# Patient Record
Sex: FEMALE | Race: WHITE | NOT HISPANIC OR LATINO | Employment: OTHER | URBAN - NONMETROPOLITAN AREA
[De-identification: names, ages, dates, MRNs, and addresses within clinical notes are randomized per-mention and may not be internally consistent; named-entity substitution may affect disease eponyms.]

---

## 2017-06-20 ENCOUNTER — TRANSCRIBE ORDERS (OUTPATIENT)
Dept: ADMINISTRATIVE | Facility: HOSPITAL | Age: 77
End: 2017-06-20

## 2017-06-20 DIAGNOSIS — M54.16 LUMBAR RADICULOPATHY: ICD-10-CM

## 2017-06-20 DIAGNOSIS — M54.5 LOW BACK PAIN, UNSPECIFIED BACK PAIN LATERALITY, UNSPECIFIED CHRONICITY, WITH SCIATICA PRESENCE UNSPECIFIED: Primary | ICD-10-CM

## 2017-06-27 ENCOUNTER — HOSPITAL ENCOUNTER (OUTPATIENT)
Dept: CT IMAGING | Facility: HOSPITAL | Age: 77
Discharge: HOME | End: 2017-06-27
Admitting: PHYSICIAN ASSISTANT

## 2017-06-27 DIAGNOSIS — M54.5 LOW BACK PAIN, UNSPECIFIED BACK PAIN LATERALITY, UNSPECIFIED CHRONICITY, WITH SCIATICA PRESENCE UNSPECIFIED: ICD-10-CM

## 2017-06-27 DIAGNOSIS — M54.16 LUMBAR RADICULOPATHY: ICD-10-CM

## 2017-06-27 PROCEDURE — 72131 CT LUMBAR SPINE W/O DYE: CPT

## 2017-09-15 ENCOUNTER — HOSPITAL ENCOUNTER (EMERGENCY)
Facility: HOSPITAL | Age: 77
Discharge: HOME | End: 2017-09-16
Attending: EMERGENCY MEDICINE | Admitting: EMERGENCY MEDICINE

## 2017-09-15 ENCOUNTER — APPOINTMENT (OUTPATIENT)
Dept: CT IMAGING | Facility: HOSPITAL | Age: 77
End: 2017-09-15

## 2017-09-15 DIAGNOSIS — R10.84 GENERALIZED ABDOMINAL PAIN: Primary | ICD-10-CM

## 2017-09-15 LAB
ALBUMIN SERPL-MCNC: 3.7 G/DL (ref 3.5–5)
ALBUMIN/GLOB SERPL: 1 G/DL (ref 1.1–2.5)
ALP SERPL-CCNC: 185 U/L (ref 24–120)
ALT SERPL W P-5'-P-CCNC: 28 U/L (ref 0–54)
ANION GAP SERPL CALCULATED.3IONS-SCNC: 10 MMOL/L (ref 4–13)
AST SERPL-CCNC: 40 U/L (ref 7–45)
BACTERIA UR QL AUTO: ABNORMAL /HPF
BASOPHILS # BLD AUTO: 0.04 10*3/MM3 (ref 0–0.2)
BASOPHILS NFR BLD AUTO: 0.5 % (ref 0–2)
BILIRUB SERPL-MCNC: 0.7 MG/DL (ref 0.1–1)
BILIRUB UR QL STRIP: ABNORMAL
BUN BLD-MCNC: 22 MG/DL (ref 5–21)
BUN/CREAT SERPL: 7.1 (ref 7–25)
CALCIUM SPEC-SCNC: 9 MG/DL (ref 8.4–10.4)
CHLORIDE SERPL-SCNC: 99 MMOL/L (ref 98–110)
CLARITY UR: CLEAR
CO2 SERPL-SCNC: 31 MMOL/L (ref 24–31)
COLOR UR: YELLOW
CREAT BLD-MCNC: 3.1 MG/DL (ref 0.5–1.4)
DEPRECATED RDW RBC AUTO: 54 FL (ref 40–54)
EOSINOPHIL # BLD AUTO: 0.21 10*3/MM3 (ref 0–0.7)
EOSINOPHIL NFR BLD AUTO: 2.7 % (ref 0–4)
ERYTHROCYTE [DISTWIDTH] IN BLOOD BY AUTOMATED COUNT: 14 % (ref 12–15)
GFR SERPL CREATININE-BSD FRML MDRD: 15 ML/MIN/1.73
GLOBULIN UR ELPH-MCNC: 3.6 GM/DL
GLUCOSE BLD-MCNC: 122 MG/DL (ref 70–100)
GLUCOSE UR STRIP-MCNC: NEGATIVE MG/DL
HCT VFR BLD AUTO: 36.5 % (ref 37–47)
HGB BLD-MCNC: 11.5 G/DL (ref 12–16)
HGB UR QL STRIP.AUTO: ABNORMAL
HOLD SPECIMEN: NORMAL
HOLD SPECIMEN: NORMAL
HYALINE CASTS UR QL AUTO: ABNORMAL /LPF
IMM GRANULOCYTES # BLD: 0.02 10*3/MM3 (ref 0–0.03)
IMM GRANULOCYTES NFR BLD: 0.3 % (ref 0–5)
KETONES UR QL STRIP: ABNORMAL
LARGE PLATELETS: NORMAL
LEUKOCYTE ESTERASE UR QL STRIP.AUTO: ABNORMAL
LIPASE SERPL-CCNC: 28 U/L (ref 23–203)
LYMPHOCYTES # BLD AUTO: 1.09 10*3/MM3 (ref 0.72–4.86)
LYMPHOCYTES NFR BLD AUTO: 13.9 % (ref 15–45)
MACROCYTES BLD QL SMEAR: NORMAL
MCH RBC QN AUTO: 33.4 PG (ref 28–32)
MCHC RBC AUTO-ENTMCNC: 31.5 G/DL (ref 33–36)
MCV RBC AUTO: 106.1 FL (ref 82–98)
MONOCYTES # BLD AUTO: 0.74 10*3/MM3 (ref 0.19–1.3)
MONOCYTES NFR BLD AUTO: 9.4 % (ref 4–12)
NEUTROPHILS # BLD AUTO: 5.74 10*3/MM3 (ref 1.87–8.4)
NEUTROPHILS NFR BLD AUTO: 73.2 % (ref 39–78)
NITRITE UR QL STRIP: NEGATIVE
PH UR STRIP.AUTO: 5.5 [PH] (ref 5–8)
PLATELET # BLD AUTO: 132 10*3/MM3 (ref 130–400)
PMV BLD AUTO: 11.4 FL (ref 6–12)
POTASSIUM BLD-SCNC: 3.9 MMOL/L (ref 3.5–5.3)
PROT SERPL-MCNC: 7.3 G/DL (ref 6.3–8.7)
PROT UR QL STRIP: ABNORMAL
RBC # BLD AUTO: 3.44 10*6/MM3 (ref 4.2–5.4)
RBC # UR: ABNORMAL /HPF
REF LAB TEST METHOD: ABNORMAL
SODIUM BLD-SCNC: 140 MMOL/L (ref 135–145)
SP GR UR STRIP: >=1.03 (ref 1–1.03)
SQUAMOUS #/AREA URNS HPF: ABNORMAL /HPF
UROBILINOGEN UR QL STRIP: ABNORMAL
WBC MORPH BLD: NORMAL
WBC NRBC COR # BLD: 7.84 10*3/MM3 (ref 4.8–10.8)
WBC UR QL AUTO: ABNORMAL /HPF
WHOLE BLOOD HOLD SPECIMEN: NORMAL
WHOLE BLOOD HOLD SPECIMEN: NORMAL

## 2017-09-15 PROCEDURE — 87086 URINE CULTURE/COLONY COUNT: CPT | Performed by: EMERGENCY MEDICINE

## 2017-09-15 PROCEDURE — 83690 ASSAY OF LIPASE: CPT | Performed by: EMERGENCY MEDICINE

## 2017-09-15 PROCEDURE — 80053 COMPREHEN METABOLIC PANEL: CPT | Performed by: EMERGENCY MEDICINE

## 2017-09-15 PROCEDURE — 85007 BL SMEAR W/DIFF WBC COUNT: CPT | Performed by: EMERGENCY MEDICINE

## 2017-09-15 PROCEDURE — 85025 COMPLETE CBC W/AUTO DIFF WBC: CPT | Performed by: EMERGENCY MEDICINE

## 2017-09-15 PROCEDURE — 74176 CT ABD & PELVIS W/O CONTRAST: CPT

## 2017-09-15 PROCEDURE — 81001 URINALYSIS AUTO W/SCOPE: CPT | Performed by: EMERGENCY MEDICINE

## 2017-09-15 PROCEDURE — 99284 EMERGENCY DEPT VISIT MOD MDM: CPT

## 2017-09-15 RX ORDER — PRAVASTATIN SODIUM 40 MG
40 TABLET ORAL DAILY
COMMUNITY

## 2017-09-15 RX ORDER — DIPHENHYDRAMINE HCL 25 MG
25 CAPSULE ORAL 2 TIMES DAILY
Status: ON HOLD | COMMUNITY
End: 2019-02-11 | Stop reason: SDUPTHER

## 2017-09-15 RX ORDER — OXYCODONE AND ACETAMINOPHEN 10; 325 MG/1; MG/1
1 TABLET ORAL EVERY 8 HOURS PRN
Status: ON HOLD | COMMUNITY
End: 2017-11-04

## 2017-09-15 RX ORDER — OMEPRAZOLE 20 MG/1
20 CAPSULE, DELAYED RELEASE ORAL DAILY
COMMUNITY
End: 2018-12-04

## 2017-09-15 RX ORDER — LEVOTHYROXINE SODIUM 112 UG/1
112 TABLET ORAL DAILY
COMMUNITY
End: 2019-07-06 | Stop reason: HOSPADM

## 2017-09-15 RX ORDER — CARVEDILOL 12.5 MG/1
12.5 TABLET ORAL SEE ADMIN INSTRUCTIONS
COMMUNITY
End: 2019-02-11 | Stop reason: HOSPADM

## 2017-09-15 RX ORDER — LOSARTAN POTASSIUM 25 MG/1
25 TABLET ORAL NIGHTLY
COMMUNITY
End: 2018-07-24

## 2017-09-15 RX ORDER — ONDANSETRON 4 MG/1
4 TABLET, ORALLY DISINTEGRATING ORAL EVERY 8 HOURS PRN
COMMUNITY
End: 2020-03-24

## 2017-09-16 VITALS
SYSTOLIC BLOOD PRESSURE: 139 MMHG | OXYGEN SATURATION: 94 % | WEIGHT: 165 LBS | DIASTOLIC BLOOD PRESSURE: 72 MMHG | HEIGHT: 61 IN | RESPIRATION RATE: 18 BRPM | HEART RATE: 92 BPM | TEMPERATURE: 97.8 F | BODY MASS INDEX: 31.15 KG/M2

## 2017-09-16 NOTE — ED PROVIDER NOTES
Subjective   HPI Comments: Patient complaining of generalized abdominal pain.  Has been an ongoing issue for the past several weeks.  She states that is the past day the pain has worsened.  She reports that it is sharp left lower quadrant and similar to the pain that she had when she had diverticulitis.  Patient has not had any nausea or vomiting associated with the abdominal pain.  She has not had any fever.  Patient states that she has been hurting so bad that she has not been able to eat.  Prior to arrival patient took a Percocet which she states has essentially resolved her pain this evening.      History provided by:  Patient      Review of Systems   Constitutional: Negative for activity change, fatigue and fever.   HENT: Negative for congestion, ear pain, facial swelling, postnasal drip, rhinorrhea, sinus pressure, sore throat and trouble swallowing.    Eyes: Negative for photophobia and redness.   Respiratory: Negative for chest tightness, shortness of breath and wheezing.    Cardiovascular: Negative for chest pain and palpitations.   Gastrointestinal: Positive for abdominal distention. Negative for abdominal pain, diarrhea, nausea and vomiting.   Genitourinary: Negative for difficulty urinating, dysuria and flank pain.   Musculoskeletal: Negative for back pain and neck pain.   Skin: Negative for color change and wound.   Neurological: Negative for dizziness, speech difficulty, weakness, light-headedness and headaches.   Psychiatric/Behavioral: Negative for agitation, confusion, self-injury and suicidal ideas.       Past Medical History:   Diagnosis Date   • Arthritis    • CHF (congestive heart failure)    • Coronary artery disease    • Disease of thyroid gland    • Diverticulitis    • History of transfusion    • Hypertension    • Injury of back    • Pancreatitis    • Pneumonia    • Renal disorder     dialysis        No Known Allergies    Past Surgical History:   Procedure Laterality Date   • APPENDECTOMY      • BACK SURGERY     • CARDIAC SURGERY     • CAROTID ENDARTERECTOMY Bilateral    • CHOLECYSTECTOMY     • COLON SURGERY     • JOINT REPLACEMENT      knee   • LAPAROSCOPIC GASTRIC BANDING     • TUBAL ABDOMINAL LIGATION         History reviewed. No pertinent family history.    Social History     Social History   • Marital status:      Spouse name: N/A   • Number of children: N/A   • Years of education: N/A     Social History Main Topics   • Smoking status: Never Smoker   • Smokeless tobacco: None   • Alcohol use No   • Drug use: No   • Sexual activity: Not Asked     Other Topics Concern   • None     Social History Narrative   • None           Objective   Physical Exam   Constitutional: She is oriented to person, place, and time. She appears well-developed and well-nourished.   HENT:   Head: Normocephalic and atraumatic.   Nose: Nose normal.   Mouth/Throat: Oropharynx is clear and moist.   Eyes: Conjunctivae and EOM are normal. Pupils are equal, round, and reactive to light.   Neck: Normal range of motion. Neck supple.   Cardiovascular: Normal rate, regular rhythm and normal heart sounds.    No murmur heard.  Pulmonary/Chest: Effort normal and breath sounds normal.   Abdominal: Soft. Normal appearance and bowel sounds are normal. She exhibits no distension. There is tenderness in the left lower quadrant. There is no rigidity, no rebound and no guarding.   Musculoskeletal: Normal range of motion.   Neurological: She is alert and oriented to person, place, and time. No cranial nerve deficit.   Skin: Skin is warm and dry.   Psychiatric: She has a normal mood and affect. Her behavior is normal. Judgment and thought content normal.   Nursing note and vitals reviewed.      Procedures         ED Course  ED Course      Lab Results (last 24 hours)     Procedure Component Value Units Date/Time    Urinalysis With / Culture If Indicated [[id number]]  (Abnormal) Collected:  09/15/17 2038    Specimen:  Urine from Urine, Clean  Catch Updated:  09/15/17 2117     Color, UA Yellow     Appearance, UA Clear     pH, UA 5.5     Specific Gravity, UA >=1.030 (H)     Glucose, UA Negative     Ketones, UA Trace (A)     Bilirubin, UA Moderate (2+) (A)     Blood, UA Moderate (2+) (A)     Protein, UA 30 mg/dL (1+) (A)     Leuk Esterase, UA Small (1+) (A)     Nitrite, UA Negative     Urobilinogen, UA 0.2 E.U./dL    Urine Culture [[id number]] Collected:  09/15/17 2038    Specimen:  Urine from Urine, Clean Catch Updated:  09/15/17 2107    Urinalysis, Microscopic Only [[id number]]  (Abnormal) Collected:  09/15/17 2038    Specimen:  Urine from Urine, Clean Catch Updated:  09/15/17 2117     RBC, UA 3-5 (A) /HPF      WBC, UA 3-5 (A) /HPF      Bacteria, UA Trace (A) /HPF      Squamous Epithelial Cells, UA 0-2 /HPF      Hyaline Casts, UA 0-2 /LPF      Methodology Automated Microscopy    Narrative:       <0.5 cc urine received  Microscopic performed on unspun urine    CBC & Differential [[id number]] Collected:  09/15/17 2059    Specimen:  Blood Updated:  09/15/17 2158    Narrative:       The following orders were created for panel order CBC & Differential.  Procedure                               Abnormality         Status                     ---------                               -----------         ------                     Scan Slide[[id number]]                                       Final result               CBC Auto Differential[[id number]]        Abnormal            Final result                 Please view results for these tests on the individual orders.    Comprehensive Metabolic Panel [[id number]]  (Abnormal) Collected:  09/15/17 2059    Specimen:  Blood Updated:  09/15/17 2214     Glucose 122 (H) mg/dL      BUN 22 (H) mg/dL      Creatinine 3.10 (H) mg/dL      Sodium 140 mmol/L      Potassium 3.9 mmol/L      Chloride 99 mmol/L      CO2 31.0 mmol/L      Calcium 9.0 mg/dL      Total Protein 7.3 g/dL      Albumin 3.70 g/dL      ALT (SGPT) 28 U/L      AST (SGOT)  40 U/L      Alkaline Phosphatase 185 (H) U/L      Total Bilirubin 0.7 mg/dL      eGFR Non African Amer 15 (L) mL/min/1.73      Globulin 3.6 gm/dL      A/G Ratio 1.0 (L) g/dL      BUN/Creatinine Ratio 7.1     Anion Gap 10.0 mmol/L     Narrative:       The MDRD GFR formula is only valid for adults with stable renal function between ages 18 and 70.    Lipase [[id number]]  (Normal) Collected:  09/15/17 2059    Specimen:  Blood Updated:  09/15/17 2214     Lipase 28 U/L     CBC Auto Differential [[id number]]  (Abnormal) Collected:  09/15/17 2059    Specimen:  Blood Updated:  09/15/17 2158     WBC 7.84 10*3/mm3      RBC 3.44 (L) 10*6/mm3      Hemoglobin 11.5 (L) g/dL      Hematocrit 36.5 (L) %      .1 (H) fL      MCH 33.4 (H) pg      MCHC 31.5 (L) g/dL      RDW 14.0 %      RDW-SD 54.0 fl      MPV 11.4 fL      Platelets 132 10*3/mm3      Neutrophil % 73.2 %      Lymphocyte % 13.9 (L) %      Monocyte % 9.4 %      Eosinophil % 2.7 %      Basophil % 0.5 %      Immature Grans % 0.3 %      Neutrophils, Absolute 5.74 10*3/mm3      Lymphocytes, Absolute 1.09 10*3/mm3      Monocytes, Absolute 0.74 10*3/mm3      Eosinophils, Absolute 0.21 10*3/mm3      Basophils, Absolute 0.04 10*3/mm3      Immature Grans, Absolute 0.02 10*3/mm3     Narrative:       Appended report.  These results have been appended to a previously verified report.    Scan Slide [[id number]] Collected:  09/15/17 2059    Specimen:  Blood Updated:  09/15/17 2158     Macrocytes Slight/1+     WBC Morphology Normal     Large Platelets Slight/1+                      MDM  Number of Diagnoses or Management Options  Generalized abdominal pain: new and requires workup  Diagnosis management comments: Patient has not had any pain while here in the ED.  The CT results are essentially negative there is no blockage, diverticuli or colonic wall thickening.  Lab work also is unremarkable.  Her renal function is essentially baseline.  Do not believe that she has a urinary tract  infection since patient barely makes urine to begin with.  We will have her follow-up with her primary care.  Told to return to the ED if she has any further issues or new complaints.       Amount and/or Complexity of Data Reviewed  Clinical lab tests: ordered and reviewed  Tests in the radiology section of CPT®: ordered and reviewed  Tests in the medicine section of CPT®: ordered and reviewed  Independent visualization of images, tracings, or specimens: yes    Risk of Complications, Morbidity, and/or Mortality  Presenting problems: moderate  Diagnostic procedures: moderate  Management options: moderate    Patient Progress  Patient progress: stable      Final diagnoses:   Generalized abdominal pain            Jony Starks MD  09/16/17 0023

## 2017-09-17 LAB — BACTERIA SPEC AEROBE CULT: NORMAL

## 2017-09-19 NOTE — ED NOTES
"ED Call Back Questions    1. How are you doing since leaving the Emergency Department?    Feel better  2. Do you have any questions about your discharge instructions? No     3. Have you filled your new prescriptions yet? N/A  a. Do you have any questions about those medications? N/A    4. Were you able to make a follow-up appointment with the physician? Yes     5. Do you have a primary care physician? Yes   a. If No, would you like for me to set you up with one? N/A  i. If Yes, “I will have our ED  give you a call right back at this number to work with you on the best time for an appointment.”    6. We are always looking to get better at what we do. Do you have any suggestions for what we can do to be even better? N/A  a. If Yes, \"Thank you for sharing your concerns. I apologize. I will follow up with our manager and patient . Would you like someone to call you back?\" No     7. Is there anything else I can do for you? No   Pt stated visit went very well     Luis Carlos Matias  09/19/17 1437    "

## 2017-10-09 ENCOUNTER — TELEPHONE (OUTPATIENT)
Dept: VASCULAR SURGERY | Facility: CLINIC | Age: 77
End: 2017-10-09

## 2017-10-09 NOTE — TELEPHONE ENCOUNTER
Patient called and stated that Dr. San had increased her medicine and that when she was in dialysis it was causing her BP to drop drastically.  Advised patient that she had yet to see Dr. San and that it must be another provider who increased the said medication.  Patient apologized and said that she had called the wrong doctor and that she would call the correct provider.

## 2017-10-26 ENCOUNTER — OFFICE VISIT (OUTPATIENT)
Dept: VASCULAR SURGERY | Facility: CLINIC | Age: 77
End: 2017-10-26

## 2017-10-26 VITALS
SYSTOLIC BLOOD PRESSURE: 96 MMHG | BODY MASS INDEX: 32.1 KG/M2 | HEIGHT: 61 IN | DIASTOLIC BLOOD PRESSURE: 50 MMHG | HEART RATE: 72 BPM | WEIGHT: 170 LBS

## 2017-10-26 DIAGNOSIS — R10.84 GENERALIZED ABDOMINAL PAIN: ICD-10-CM

## 2017-10-26 DIAGNOSIS — K55.1 MESENTERIC ISCHEMIA, CHRONIC (HCC): Primary | ICD-10-CM

## 2017-10-26 DIAGNOSIS — I10 ESSENTIAL HYPERTENSION: ICD-10-CM

## 2017-10-26 DIAGNOSIS — E78.2 MIXED HYPERLIPIDEMIA: ICD-10-CM

## 2017-10-26 PROCEDURE — 99204 OFFICE O/P NEW MOD 45 MIN: CPT | Performed by: SURGERY

## 2017-10-26 NOTE — PROGRESS NOTES
10/26/2017      Barrett Mckenzie MD  [address] [place] [address]   [address] [place] [id number]    Cheri Ely  1940    Chief Complaint   Patient presents with   • Mesenteric Artery Stenosis     Complaint of abdominal pain radiating to back.       Dear Barrett Mckenzie MD:      HPI  I had the pleasure of seeing your patient Cheri Ely in the office today.  Thank you kindly for this consultation.  As you recall, Cheri Ely is a 77 y.o.  female who you are currently following for routine health maintenance.  She has complaints of cramping to her stomach after she eats for the past couple of weeks.  She had lap band surgery in 2008.  She is also on dialysis for the past 2 years with a left upper extremity fistula created by Dr. Dubose.     Past Medical History:   Diagnosis Date   • Arthritis    • Carotid artery disease    • CHF (congestive heart failure)    • Chronic renal failure     on dialysis   • Coronary artery disease    • Disease of thyroid gland    • Diverticulitis    • History of transfusion    • Hyperlipidemia    • Hypertension    • Injury of back    • Mesenteric artery insufficiency    • Multilevel degenerative disc disease    • Osteoporosis    • Pancreatitis    • Pneumonia        Past Surgical History:   Procedure Laterality Date   • AORTIC VALVE SURGERY     • APPENDECTOMY     • BACK SURGERY     • CARDIAC SURGERY     • CAROTID ENDARTERECTOMY Bilateral    • CATARACT EXTRACTION, BILATERAL     • CERVICAL SPINE SURGERY     • CHOLECYSTECTOMY     • COLON SURGERY     • CORONARY ARTERY BYPASS GRAFT     • JOINT REPLACEMENT      knee   • LAPAROSCOPIC GASTRIC BANDING     • LEEP     • LUMBAR FUSION     • TOE AMPUTATION Left     2nd toe   • TOTAL KNEE ARTHROPLASTY Bilateral    • TUBAL ABDOMINAL LIGATION         Family History   Problem Relation Age of Onset   • Hypertension Mother    • Cancer Mother      stomach   • Coronary artery disease Father    • Heart attack Father    • Diabetes Brother    •  Coronary artery disease Brother        Social History     Social History   • Marital status:      Spouse name: N/A   • Number of children: N/A   • Years of education: N/A     Occupational History   • Not on file.     Social History Main Topics   • Smoking status: Never Smoker   • Smokeless tobacco: Never Used   • Alcohol use No   • Drug use: No   • Sexual activity: Defer     Other Topics Concern   • Not on file     Social History Narrative       No Known Allergies    Prior to Admission medications    Medication Sig Start Date End Date Taking? Authorizing Provider   B Complex-C-Folic Acid (DIALYVITE PO) Take  by mouth Daily.   Yes Historical Provider, MD   carvedilol (COREG) 12.5 MG tablet Take 12.5 mg by mouth 3 (Three) Times a Day.   Yes Historical Provider, MD   Cholecalciferol (D3 ADULT PO) Take 2,000 Units by mouth Daily.   Yes Historical Provider, MD   diphenhydrAMINE (BENADRYL) 25 mg capsule Take 25 mg by mouth 2 (Two) Times a Day.   Yes Historical Provider, MD   levothyroxine (SYNTHROID, LEVOTHROID) 175 MCG tablet Take 175 mcg by mouth Daily.   Yes Historical Provider, MD   losartan (COZAAR) 25 MG tablet Take 25 mg by mouth Daily.   Yes Historical Provider, MD   Multiple Vitamin (DAILY-SANGEETA PO) Take 1 tablet by mouth Daily.   Yes Historical Provider, MD   omeprazole (priLOSEC) 20 MG capsule Take 20 mg by mouth Daily.   Yes Historical Provider, MD   ondansetron ODT (ZOFRAN-ODT) 4 MG disintegrating tablet Take 4 mg by mouth Every 8 (Eight) Hours As Needed for Nausea or Vomiting.   Yes Historical Provider, MD   oxyCODONE-acetaminophen (PERCOCET)  MG per tablet Take 1 tablet by mouth Every 8 (Eight) Hours As Needed for Moderate Pain .   Yes Historical Provider, MD   pravastatin (PRAVACHOL) 40 MG tablet Take 40 mg by mouth Daily.   Yes Historical Provider, MD   sertraline (ZOLOFT) 50 MG tablet Take 50 mg by mouth Daily.   Yes Historical Provider, MD   budesonide-formoterol (SYMBICORT) 160-4.5 MCG/ACT  "inhaler Inhale 2 puffs As Needed.  10/26/17  Historical Provider, MD   fluticasone-salmeterol (ADVAIR) 250-50 MCG/DOSE DISKUS Inhale As Needed.  10/26/17  Historical Provider, MD       Review of Systems   Constitutional: Negative.    HENT: Negative.    Eyes: Negative.    Respiratory: Negative.    Gastrointestinal: Positive for abdominal pain.   Endocrine: Negative.    Genitourinary: Negative.    Musculoskeletal: Negative.    Skin: Negative.    Allergic/Immunologic: Negative.    Neurological: Negative.    Hematological: Negative.    Psychiatric/Behavioral: Negative.        BP 96/50  Pulse 72  Ht 61\" (154.9 cm)  Wt 170 lb (77.1 kg)  BMI 32.12 kg/m2  Physical Exam   Constitutional: She is oriented to person, place, and time. She appears well-developed and well-nourished.   HENT:   Head: Normocephalic and atraumatic.   Eyes: Pupils are equal, round, and reactive to light. No scleral icterus.   Neck: Neck supple. No JVD present. Carotid bruit is not present. No thyromegaly present.   Cardiovascular: Normal rate, regular rhythm and normal heart sounds.    Pulses:       Carotid pulses are 2+ on the right side, and 2+ on the left side.       Femoral pulses are 2+ on the right side, and 2+ on the left side.       Popliteal pulses are 2+ on the right side, and 2+ on the left side.        Dorsalis pedis pulses are 2+ on the right side, and 2+ on the left side.        Posterior tibial pulses are 2+ on the right side, and 2+ on the left side.   Pulmonary/Chest: Effort normal and breath sounds normal.   Abdominal: Soft. Bowel sounds are normal. She exhibits no distension, no abdominal bruit and no mass. There is no hepatosplenomegaly. There is no tenderness.   Musculoskeletal: Normal range of motion. She exhibits no edema.   Lymphadenopathy:     She has no cervical adenopathy.   Neurological: She is alert and oriented to person, place, and time. She has normal strength. No cranial nerve deficit or sensory deficit.   Skin: " Skin is warm, dry and intact.   Psychiatric: She has a normal mood and affect.   Nursing note and vitals reviewed.      No results found.    Patient Active Problem List   Diagnosis   • Hypertension   • Hyperlipidemia   • Chronic renal failure         ICD-10-CM ICD-9-CM   1. Mesenteric ischemia, chronic K55.1 557.1   2. Generalized abdominal pain R10.84 789.07   3. Essential hypertension I10 401.9   4. Mixed hyperlipidemia E78.2 272.2           Plan: After thoroughly evaluating Cheri Ely, I believe the best course of action is to Proceed with a CTA of the abdomen and pelvis to better evaluate her mesenteric arteries.  She does have evidence of arterial occlusive disease that she may need intervention.  If not then I would recommend that she sees GI.  She has a history of ulcers in the past and this may be the cause of her symptoms.  I will see her back in 1 week's time with her testing.  I also counseled her about her vascular risk factors with regards to hypertension and hyperlipidemia.  The patient can continue taking her current medication regimen as previously planned.  This was all discussed in full with complete understanding.    Thank you for allowing me to participate in the care of your patient.  Please do not hesitate with any questions or concerns.  I will keep you aware of any further encounters with Cheri Ely.        Sincerely yours,         Tomasz San, DO Barrett Mckenzie Jr., MD

## 2017-10-30 ENCOUNTER — APPOINTMENT (OUTPATIENT)
Dept: GENERAL RADIOLOGY | Facility: HOSPITAL | Age: 77
End: 2017-10-30

## 2017-10-30 ENCOUNTER — HOSPITAL ENCOUNTER (INPATIENT)
Facility: HOSPITAL | Age: 77
LOS: 5 days | Discharge: NURSING, REHAB OR LONG-TERM CARE | End: 2017-11-04
Attending: EMERGENCY MEDICINE | Admitting: ORTHOPAEDIC SURGERY

## 2017-10-30 ENCOUNTER — APPOINTMENT (OUTPATIENT)
Dept: CT IMAGING | Facility: HOSPITAL | Age: 77
End: 2017-10-30

## 2017-10-30 DIAGNOSIS — S32.502A CLOSED NONDISPLACED FRACTURE OF LEFT PUBIS, INITIAL ENCOUNTER (HCC): Primary | ICD-10-CM

## 2017-10-30 DIAGNOSIS — Z74.09 IMPAIRED FUNCTIONAL MOBILITY, BALANCE, GAIT, AND ENDURANCE: ICD-10-CM
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
## 2017-10-30 PROBLEM — S32.592A CLOSED FRACTURE OF RAMUS OF LEFT PUBIS (HCC): Status: ACTIVE | Noted: 2017-10-30

## 2017-10-30 PROBLEM — S32.9XXA PELVIS FRACTURE (HCC): Status: ACTIVE | Noted: 2017-10-30

## 2017-10-30 LAB
ALBUMIN SERPL-MCNC: 3.4 G/DL (ref 3.5–5)
ALBUMIN/GLOB SERPL: 1 G/DL (ref 1.1–2.5)
ALP SERPL-CCNC: 178 U/L (ref 24–120)
ALT SERPL W P-5'-P-CCNC: 27 U/L (ref 0–54)
ANION GAP SERPL CALCULATED.3IONS-SCNC: 16 MMOL/L (ref 4–13)
APTT PPP: 32.3 SECONDS (ref 24.1–34.8)
AST SERPL-CCNC: 30 U/L (ref 7–45)
BASOPHILS # BLD AUTO: 0.03 10*3/MM3 (ref 0–0.2)
BASOPHILS NFR BLD AUTO: 0.4 % (ref 0–2)
BILIRUB SERPL-MCNC: 0.4 MG/DL (ref 0.1–1)
BUN BLD-MCNC: 65 MG/DL (ref 5–21)
BUN/CREAT SERPL: 8.2 (ref 7–25)
CALCIUM SPEC-SCNC: 8.5 MG/DL (ref 8.4–10.4)
CHLORIDE SERPL-SCNC: 94 MMOL/L (ref 98–110)
CO2 SERPL-SCNC: 26 MMOL/L (ref 24–31)
CREAT BLD-MCNC: 7.9 MG/DL (ref 0.5–1.4)
DEPRECATED RDW RBC AUTO: 52.8 FL (ref 40–54)
EOSINOPHIL # BLD AUTO: 0.26 10*3/MM3 (ref 0–0.7)
EOSINOPHIL NFR BLD AUTO: 3.4 % (ref 0–4)
ERYTHROCYTE [DISTWIDTH] IN BLOOD BY AUTOMATED COUNT: 14 % (ref 12–15)
GFR SERPL CREATININE-BSD FRML MDRD: 5 ML/MIN/1.73
GLOBULIN UR ELPH-MCNC: 3.3 GM/DL
GLUCOSE BLD-MCNC: 88 MG/DL (ref 70–100)
HCT VFR BLD AUTO: 31.8 % (ref 37–47)
HGB BLD-MCNC: 10.1 G/DL (ref 12–16)
IMM GRANULOCYTES # BLD: 0.02 10*3/MM3 (ref 0–0.03)
IMM GRANULOCYTES NFR BLD: 0.3 % (ref 0–5)
INR PPP: 1.18 (ref 0.91–1.09)
LYMPHOCYTES # BLD AUTO: 0.91 10*3/MM3 (ref 0.72–4.86)
LYMPHOCYTES NFR BLD AUTO: 12 % (ref 15–45)
MCH RBC QN AUTO: 33.1 PG (ref 28–32)
MCHC RBC AUTO-ENTMCNC: 31.8 G/DL (ref 33–36)
MCV RBC AUTO: 104.3 FL (ref 82–98)
MONOCYTES # BLD AUTO: 0.48 10*3/MM3 (ref 0.19–1.3)
MONOCYTES NFR BLD AUTO: 6.3 % (ref 4–12)
NEUTROPHILS # BLD AUTO: 5.86 10*3/MM3 (ref 1.87–8.4)
NEUTROPHILS NFR BLD AUTO: 77.6 % (ref 39–78)
PLATELET # BLD AUTO: 140 10*3/MM3 (ref 130–400)
PMV BLD AUTO: 11 FL (ref 6–12)
POTASSIUM BLD-SCNC: 4.2 MMOL/L (ref 3.5–5.3)
PROT SERPL-MCNC: 6.7 G/DL (ref 6.3–8.7)
PROTHROMBIN TIME: 15.4 SECONDS (ref 11.9–14.6)
RBC # BLD AUTO: 3.05 10*6/MM3 (ref 4.2–5.4)
SODIUM BLD-SCNC: 136 MMOL/L (ref 135–145)
WBC NRBC COR # BLD: 7.56 10*3/MM3 (ref 4.8–10.8)

## 2017-10-30 PROCEDURE — 85730 THROMBOPLASTIN TIME PARTIAL: CPT | Performed by: ORTHOPAEDIC SURGERY

## 2017-10-30 PROCEDURE — 63710000001 DIPHENHYDRAMINE PER 50 MG: Performed by: ORTHOPAEDIC SURGERY

## 2017-10-30 PROCEDURE — 73502 X-RAY EXAM HIP UNI 2-3 VIEWS: CPT

## 2017-10-30 PROCEDURE — 5A1D70Z PERFORMANCE OF URINARY FILTRATION, INTERMITTENT, LESS THAN 6 HOURS PER DAY: ICD-10-PCS | Performed by: INTERNAL MEDICINE

## 2017-10-30 PROCEDURE — 85025 COMPLETE CBC W/AUTO DIFF WBC: CPT | Performed by: ORTHOPAEDIC SURGERY

## 2017-10-30 PROCEDURE — 99283 EMERGENCY DEPT VISIT LOW MDM: CPT

## 2017-10-30 PROCEDURE — 85610 PROTHROMBIN TIME: CPT | Performed by: ORTHOPAEDIC SURGERY

## 2017-10-30 PROCEDURE — 80053 COMPREHEN METABOLIC PANEL: CPT | Performed by: ORTHOPAEDIC SURGERY

## 2017-10-30 PROCEDURE — 70450 CT HEAD/BRAIN W/O DYE: CPT

## 2017-10-30 PROCEDURE — 73552 X-RAY EXAM OF FEMUR 2/>: CPT

## 2017-10-30 RX ORDER — MORPHINE SULFATE 4 MG/ML
4 INJECTION, SOLUTION INTRAMUSCULAR; INTRAVENOUS ONCE
Status: DISCONTINUED | OUTPATIENT
Start: 2017-10-30 | End: 2017-10-30

## 2017-10-30 RX ORDER — ONDANSETRON 2 MG/ML
4 INJECTION INTRAMUSCULAR; INTRAVENOUS
Status: DISCONTINUED | OUTPATIENT
Start: 2017-10-30 | End: 2017-11-04 | Stop reason: HOSPADM

## 2017-10-30 RX ORDER — DIPHENHYDRAMINE HCL 25 MG
25 CAPSULE ORAL EVERY 4 HOURS PRN
Status: DISCONTINUED | OUTPATIENT
Start: 2017-10-30 | End: 2017-11-04 | Stop reason: HOSPADM

## 2017-10-30 RX ORDER — SODIUM CHLORIDE 0.9 % (FLUSH) 0.9 %
1-10 SYRINGE (ML) INJECTION AS NEEDED
Status: DISCONTINUED | OUTPATIENT
Start: 2017-10-30 | End: 2017-11-04 | Stop reason: HOSPADM

## 2017-10-30 RX ORDER — OXYCODONE AND ACETAMINOPHEN 10; 325 MG/1; MG/1
1 TABLET ORAL EVERY 8 HOURS PRN
Status: DISCONTINUED | OUTPATIENT
Start: 2017-10-30 | End: 2017-11-04 | Stop reason: HOSPADM

## 2017-10-30 RX ORDER — CARVEDILOL 6.25 MG/1
12.5 TABLET ORAL 3 TIMES DAILY
Status: DISCONTINUED | OUTPATIENT
Start: 2017-10-30 | End: 2017-11-04 | Stop reason: HOSPADM

## 2017-10-30 RX ORDER — ATORVASTATIN CALCIUM 10 MG/1
10 TABLET, FILM COATED ORAL NIGHTLY
Status: DISCONTINUED | OUTPATIENT
Start: 2017-10-30 | End: 2017-11-04 | Stop reason: HOSPADM

## 2017-10-30 RX ORDER — DIPHENHYDRAMINE HCL 25 MG
25 CAPSULE ORAL 2 TIMES DAILY
Status: DISCONTINUED | OUTPATIENT
Start: 2017-10-30 | End: 2017-11-04 | Stop reason: HOSPADM

## 2017-10-30 RX ORDER — DIPHENHYDRAMINE HYDROCHLORIDE 50 MG/ML
25 INJECTION INTRAMUSCULAR; INTRAVENOUS EVERY 4 HOURS PRN
Status: DISCONTINUED | OUTPATIENT
Start: 2017-10-30 | End: 2017-11-04 | Stop reason: HOSPADM

## 2017-10-30 RX ORDER — LIDOCAINE HYDROCHLORIDE 10 MG/ML
0.3 INJECTION, SOLUTION INFILTRATION; PERINEURAL AS NEEDED
Status: DISCONTINUED | OUTPATIENT
Start: 2017-10-30 | End: 2017-11-04 | Stop reason: HOSPADM

## 2017-10-30 RX ORDER — ONDANSETRON 2 MG/ML
4 INJECTION INTRAMUSCULAR; INTRAVENOUS ONCE
Status: DISCONTINUED | OUTPATIENT
Start: 2017-10-30 | End: 2017-11-04 | Stop reason: HOSPADM

## 2017-10-30 RX ORDER — ALBUMIN (HUMAN) 12.5 G/50ML
12.5 SOLUTION INTRAVENOUS AS NEEDED
Status: ACTIVE | OUTPATIENT
Start: 2017-10-30 | End: 2017-10-31

## 2017-10-30 RX ORDER — CLONIDINE HYDROCHLORIDE 0.1 MG/1
0.1 TABLET ORAL
Status: DISCONTINUED | OUTPATIENT
Start: 2017-10-30 | End: 2017-11-04 | Stop reason: HOSPADM

## 2017-10-30 RX ORDER — PANTOPRAZOLE SODIUM 40 MG/1
40 TABLET, DELAYED RELEASE ORAL
Status: DISCONTINUED | OUTPATIENT
Start: 2017-10-30 | End: 2017-11-04 | Stop reason: HOSPADM

## 2017-10-30 RX ORDER — ALUMINA, MAGNESIA, AND SIMETHICONE 2400; 2400; 240 MG/30ML; MG/30ML; MG/30ML
15 SUSPENSION ORAL AS NEEDED
Status: DISCONTINUED | OUTPATIENT
Start: 2017-10-30 | End: 2017-11-04 | Stop reason: HOSPADM

## 2017-10-30 RX ORDER — LEVOTHYROXINE SODIUM 175 UG/1
175 TABLET ORAL
Status: DISCONTINUED | OUTPATIENT
Start: 2017-10-30 | End: 2017-11-04 | Stop reason: HOSPADM

## 2017-10-30 RX ORDER — ACETAMINOPHEN 500 MG
1000 TABLET ORAL EVERY 4 HOURS PRN
Status: DISCONTINUED | OUTPATIENT
Start: 2017-10-30 | End: 2017-11-04 | Stop reason: HOSPADM

## 2017-10-30 RX ADMIN — CARVEDILOL 12.5 MG: 6.25 TABLET, FILM COATED ORAL at 22:55

## 2017-10-30 RX ADMIN — CARVEDILOL 12.5 MG: 6.25 TABLET, FILM COATED ORAL at 17:47

## 2017-10-30 RX ADMIN — OXYCODONE HYDROCHLORIDE AND ACETAMINOPHEN 1 TABLET: 10; 325 TABLET ORAL at 15:20

## 2017-10-30 RX ADMIN — OXYCODONE HYDROCHLORIDE AND ACETAMINOPHEN 1 TABLET: 10; 325 TABLET ORAL at 23:30

## 2017-10-30 RX ADMIN — DIPHENHYDRAMINE HYDROCHLORIDE 25 MG: 25 CAPSULE ORAL at 17:50

## 2017-10-30 RX ADMIN — ATORVASTATIN CALCIUM 10 MG: 10 TABLET, FILM COATED ORAL at 21:24

## 2017-10-31 ENCOUNTER — APPOINTMENT (OUTPATIENT)
Dept: GENERAL RADIOLOGY | Facility: HOSPITAL | Age: 77
End: 2017-10-31

## 2017-10-31 LAB
25(OH)D3 SERPL-MCNC: 46.8 NG/ML (ref 30–100)
ANION GAP SERPL CALCULATED.3IONS-SCNC: 14 MMOL/L (ref 4–13)
BUN BLD-MCNC: 39 MG/DL (ref 5–21)
BUN/CREAT SERPL: 6.6 (ref 7–25)
CALCIUM SPEC-SCNC: 8.2 MG/DL (ref 8.4–10.4)
CHLORIDE SERPL-SCNC: 92 MMOL/L (ref 98–110)
CO2 SERPL-SCNC: 31 MMOL/L (ref 24–31)
CREAT BLD-MCNC: 5.9 MG/DL (ref 0.5–1.4)
GFR SERPL CREATININE-BSD FRML MDRD: 7 ML/MIN/1.73
GLUCOSE BLD-MCNC: 96 MG/DL (ref 70–100)
MAGNESIUM SERPL-MCNC: 1.6 MG/DL (ref 1.4–2.2)
PHOSPHATE SERPL-MCNC: 4.3 MG/DL (ref 2.5–4.5)
POTASSIUM BLD-SCNC: 3.5 MMOL/L (ref 3.5–5.3)
PTH-INTACT SERPL-MCNC: 59.7 PG/ML (ref 7.5–53.5)
SODIUM BLD-SCNC: 137 MMOL/L (ref 135–145)

## 2017-10-31 PROCEDURE — 97530 THERAPEUTIC ACTIVITIES: CPT

## 2017-10-31 PROCEDURE — 82306 VITAMIN D 25 HYDROXY: CPT | Performed by: INTERNAL MEDICINE

## 2017-10-31 PROCEDURE — 73610 X-RAY EXAM OF ANKLE: CPT

## 2017-10-31 PROCEDURE — G8979 MOBILITY GOAL STATUS: HCPCS

## 2017-10-31 PROCEDURE — 83970 ASSAY OF PARATHORMONE: CPT | Performed by: INTERNAL MEDICINE

## 2017-10-31 PROCEDURE — 80048 BASIC METABOLIC PNL TOTAL CA: CPT | Performed by: INTERNAL MEDICINE

## 2017-10-31 PROCEDURE — G8978 MOBILITY CURRENT STATUS: HCPCS

## 2017-10-31 PROCEDURE — 63710000001 DIPHENHYDRAMINE PER 50 MG: Performed by: ORTHOPAEDIC SURGERY

## 2017-10-31 PROCEDURE — 83735 ASSAY OF MAGNESIUM: CPT | Performed by: INTERNAL MEDICINE

## 2017-10-31 PROCEDURE — 84100 ASSAY OF PHOSPHORUS: CPT | Performed by: INTERNAL MEDICINE

## 2017-10-31 PROCEDURE — 97110 THERAPEUTIC EXERCISES: CPT

## 2017-10-31 PROCEDURE — 97162 PT EVAL MOD COMPLEX 30 MIN: CPT

## 2017-10-31 RX ADMIN — ATORVASTATIN CALCIUM 10 MG: 10 TABLET, FILM COATED ORAL at 20:01

## 2017-10-31 RX ADMIN — DIPHENHYDRAMINE HYDROCHLORIDE 25 MG: 25 CAPSULE ORAL at 08:19

## 2017-10-31 RX ADMIN — OXYCODONE HYDROCHLORIDE AND ACETAMINOPHEN 1 TABLET: 10; 325 TABLET ORAL at 16:31

## 2017-10-31 RX ADMIN — OXYCODONE HYDROCHLORIDE AND ACETAMINOPHEN 1 TABLET: 10; 325 TABLET ORAL at 08:20

## 2017-10-31 RX ADMIN — LEVOTHYROXINE SODIUM 175 MCG: 175 TABLET ORAL at 05:27

## 2017-10-31 RX ADMIN — CARVEDILOL 12.5 MG: 6.25 TABLET, FILM COATED ORAL at 20:00

## 2017-10-31 RX ADMIN — SERTRALINE 50 MG: 50 TABLET, FILM COATED ORAL at 08:20

## 2017-10-31 RX ADMIN — DIPHENHYDRAMINE HYDROCHLORIDE 25 MG: 25 CAPSULE ORAL at 17:29

## 2017-10-31 RX ADMIN — PANTOPRAZOLE SODIUM 40 MG: 40 TABLET, DELAYED RELEASE ORAL at 05:27

## 2017-11-01 LAB
ANION GAP SERPL CALCULATED.3IONS-SCNC: 15 MMOL/L (ref 4–13)
BUN BLD-MCNC: 56 MG/DL (ref 5–21)
BUN/CREAT SERPL: 7.7 (ref 7–25)
CALCIUM SPEC-SCNC: 7.9 MG/DL (ref 8.4–10.4)
CHLORIDE SERPL-SCNC: 89 MMOL/L (ref 98–110)
CO2 SERPL-SCNC: 27 MMOL/L (ref 24–31)
CREAT BLD-MCNC: 7.32 MG/DL (ref 0.5–1.4)
GFR SERPL CREATININE-BSD FRML MDRD: 5 ML/MIN/1.73
GLUCOSE BLD-MCNC: 91 MG/DL (ref 70–100)
POTASSIUM BLD-SCNC: 4.5 MMOL/L (ref 3.5–5.3)
SODIUM BLD-SCNC: 131 MMOL/L (ref 135–145)

## 2017-11-01 PROCEDURE — 5A1D70Z PERFORMANCE OF URINARY FILTRATION, INTERMITTENT, LESS THAN 6 HOURS PER DAY: ICD-10-PCS | Performed by: INTERNAL MEDICINE

## 2017-11-01 PROCEDURE — 63710000001 DIPHENHYDRAMINE PER 50 MG: Performed by: ORTHOPAEDIC SURGERY

## 2017-11-01 PROCEDURE — 80048 BASIC METABOLIC PNL TOTAL CA: CPT | Performed by: INTERNAL MEDICINE

## 2017-11-01 PROCEDURE — 97530 THERAPEUTIC ACTIVITIES: CPT

## 2017-11-01 PROCEDURE — 97110 THERAPEUTIC EXERCISES: CPT

## 2017-11-01 RX ADMIN — ATORVASTATIN CALCIUM 10 MG: 10 TABLET, FILM COATED ORAL at 21:15

## 2017-11-01 RX ADMIN — PANTOPRAZOLE SODIUM 40 MG: 40 TABLET, DELAYED RELEASE ORAL at 05:47

## 2017-11-01 RX ADMIN — DIPHENHYDRAMINE HYDROCHLORIDE 25 MG: 25 CAPSULE ORAL at 18:13

## 2017-11-01 RX ADMIN — OXYCODONE HYDROCHLORIDE AND ACETAMINOPHEN 1 TABLET: 10; 325 TABLET ORAL at 17:02

## 2017-11-01 RX ADMIN — DIPHENHYDRAMINE HYDROCHLORIDE 25 MG: 25 CAPSULE ORAL at 08:41

## 2017-11-01 RX ADMIN — CARVEDILOL 12.5 MG: 6.25 TABLET, FILM COATED ORAL at 17:00

## 2017-11-01 RX ADMIN — OXYCODONE HYDROCHLORIDE AND ACETAMINOPHEN 1 TABLET: 10; 325 TABLET ORAL at 05:47

## 2017-11-01 RX ADMIN — LEVOTHYROXINE SODIUM 175 MCG: 175 TABLET ORAL at 05:47

## 2017-11-01 RX ADMIN — CARVEDILOL 6.25 MG: 6.25 TABLET, FILM COATED ORAL at 21:15

## 2017-11-01 RX ADMIN — SERTRALINE 50 MG: 50 TABLET, FILM COATED ORAL at 08:41

## 2017-11-01 NOTE — PROGRESS NOTES
Continued Stay Note  AGATHA Jimenez     Patient Name: Cheri Ely  MRN: [id number]  Today's Date: 11/1/2017    Admit Date: 10/30/2017          Discharge Plan       11/01/17 1513    Case Management/Social Work Plan    Plan Needs SNF    Additional Comments SW went to patient's room to discuss need for SNF at discharge. Patient is currently in dialysis. Will follow to speak to patient. Patient is not safe to return home.                  LEONARDO HungW

## 2017-11-01 NOTE — PROGRESS NOTES
Nephrology ([facility]) Progress Note      Patient:  Cheri Ely  YOB: 1940  Date of Service: 11/1/2017  MRN: [id number]   Acct: [id number]   Primary Care Physician: Barrett Mckenzie Jr., MD  Advance Directive: Full Code  Admit Date: 10/30/2017       Hospital Day: 2  Referring Provider: Ko Rivera MD      Patient personally seen and examined.  Complete chart including Consults, Notes, Operative Reports, Labs, Cardiology, and Radiology studies reviewed as able.        Subjective:  Cheri Ely is a 77 y.o. female  whom we were consulted for end stage renal disease management.  Admitted following a fall and pubic ramus fracture.  Hemodialysis on Monday Wednesday Friday; no issues.  Today is feeling the same; pain well controlled as long as she remains immobile.    Allergies:  Review of patient's allergies indicates no known allergies.    Home Meds:  Prescriptions Prior to Admission   Medication Sig Dispense Refill Last Dose   • carvedilol (COREG) 12.5 MG tablet Take 12.5 mg by mouth 3 (Three) Times a Day.   10/30/2017 at Unknown time   • Cholecalciferol (D3 ADULT PO) Take 2,000 Units by mouth Daily.   10/30/2017 at Unknown time   • diphenhydrAMINE (BENADRYL) 25 mg capsule Take 25 mg by mouth 2 (Two) Times a Day.   10/30/2017 at Unknown time   • levothyroxine (SYNTHROID, LEVOTHROID) 175 MCG tablet Take 175 mcg by mouth Daily.   10/30/2017 at Unknown time   • losartan (COZAAR) 25 MG tablet Take 25 mg by mouth Every Night.   10/29/2017 at Unknown time   • Multiple Vitamin (DAILY-SANGEETA PO) Take 1 tablet by mouth Daily.   10/30/2017 at Unknown time   • omeprazole (priLOSEC) 20 MG capsule Take 20 mg by mouth Daily.   10/30/2017 at Unknown time   • ondansetron ODT (ZOFRAN-ODT) 4 MG disintegrating tablet Take 4 mg by mouth Every 8 (Eight) Hours As Needed for Nausea or Vomiting.   Past Week at Unknown time   • oxyCODONE-acetaminophen (PERCOCET)  MG per tablet Take 1  tablet by mouth Every 8 (Eight) Hours As Needed for Moderate Pain .   Past Week at Unknown time   • pravastatin (PRAVACHOL) 40 MG tablet Take 40 mg by mouth Daily.   10/29/2017 at Unknown time   • sertraline (ZOLOFT) 50 MG tablet Take 50 mg by mouth Daily.   10/29/2017 at Unknown time       Medicines:  Current Facility-Administered Medications   Medication Dose Route Frequency Provider Last Rate Last Dose   • acetaminophen (TYLENOL) tablet 1,000 mg  1,000 mg Oral Q4H PRN Chris Hsu MD       • aluminum-magnesium hydroxide-simethicone (MAALOX MAX) 400-400-40 MG/5ML suspension 15 mL  15 mL Oral PRN Chris Hsu MD       • atorvastatin (LIPITOR) tablet 10 mg  10 mg Oral Nightly Ko Rivera MD   10 mg at 10/31/17 2001   • carvedilol (COREG) tablet 12.5 mg  12.5 mg Oral TID Ko Rivera MD   12.5 mg at 10/31/17 2000   • CloNIDine (CATAPRES) tablet 0.1 mg  0.1 mg Oral Q1H PRN Chris Hsu MD       • diphenhydrAMINE (BENADRYL) capsule 25 mg  25 mg Oral BID Ko Rivera MD   25 mg at 11/01/17 0841   • diphenhydrAMINE (BENADRYL) capsule 25 mg  25 mg Oral Q4H PRN Chris Hsu MD        Or   • diphenhydrAMINE (BENADRYL) injection 25 mg  25 mg Intravenous Q4H PRN Chris Hsu MD       • levothyroxine (SYNTHROID, LEVOTHROID) tablet 175 mcg  175 mcg Oral Q AM Ko Rivera MD   175 mcg at 11/01/17 0547   • lidocaine (XYLOCAINE) 1 % injection 0.3 mL  0.3 mL Infiltration PRN Chris Hsu MD       • ondansetron (ZOFRAN) injection 4 mg  4 mg Intravenous Once Ko Rivera MD       • ondansetron (ZOFRAN) injection 4 mg  4 mg Intravenous Q2H PRN Chris Hsu MD       • oxyCODONE-acetaminophen (PERCOCET)  MG per tablet 1 tablet  1 tablet Oral Q8H PRN Ko Rivera MD   1 tablet at 11/01/17 0547   • pantoprazole (PROTONIX) EC tablet 40 mg  40 mg Oral Q AM Ko Rivera MD   40 mg at 11/01/17 0547   •  sertraline (ZOLOFT) tablet 50 mg  50 mg Oral Daily Ko Rivera MD   50 mg at 11/01/17 0841   • sodium chloride 0.9 % flush 1-10 mL  1-10 mL Intravenous PRN Ko Rivera MD           Past Medical History:  Past Medical History:   Diagnosis Date   • Arthritis    • Carotid artery disease    • CHF (congestive heart failure)    • Chronic renal failure     on dialysis   • Coronary artery disease    • Disease of thyroid gland    • Diverticulitis    • History of transfusion    • Hyperlipidemia    • Hypertension    • Injury of back    • Mesenteric artery insufficiency    • Multilevel degenerative disc disease    • Osteoporosis    • Pancreatitis    • Pneumonia        Past Surgical History:  Past Surgical History:   Procedure Laterality Date   • AORTIC VALVE SURGERY     • APPENDECTOMY     • BACK SURGERY     • CARDIAC SURGERY     • CAROTID ENDARTERECTOMY Bilateral    • CATARACT EXTRACTION, BILATERAL     • CERVICAL SPINE SURGERY     • CHOLECYSTECTOMY     • COLON SURGERY     • CORONARY ARTERY BYPASS GRAFT     • JOINT REPLACEMENT      knee   • LAPAROSCOPIC GASTRIC BANDING     • LEEP     • LUMBAR FUSION     • TOE AMPUTATION Left     2nd toe   • TOTAL KNEE ARTHROPLASTY Bilateral    • TUBAL ABDOMINAL LIGATION         Family History  Family History   Problem Relation Age of Onset   • Hypertension Mother    • Cancer Mother      stomach   • Coronary artery disease Father    • Heart attack Father    • Diabetes Brother    • Coronary artery disease Brother        Social History  Social History     Social History   • Marital status:      Spouse name: N/A   • Number of children: N/A   • Years of education: N/A     Occupational History   • Not on file.     Social History Main Topics   • Smoking status: Never Smoker   • Smokeless tobacco: Never Used   • Alcohol use No   • Drug use: No   • Sexual activity: Defer     Other Topics Concern   • Not on file     Social History Narrative         Review of Systems:  History obtained  "from chart review and the patient  General ROS: No fever or chills  Respiratory ROS: No cough, shortness of breath, wheezing  Cardiovascular ROS: no chest pain or dyspnea on exertion  Gastrointestinal ROS: No abdominal pain or melena  Genito-Urinary ROS: No dysuria or hematuria  Neurological ROS: negative  14 point ROS reviewed with the patient and negative except as noted above and in the HPI unless unable to obtain.    Objective:  /50 (BP Location: Right arm, Patient Position: Lying)  Pulse 58  Temp 98.2 °F (36.8 °C) (Oral)   Resp 16  Ht 61\" (154.9 cm)  Wt 170 lb (77.1 kg)  SpO2 94%  BMI 32.12 kg/m2    Intake/Output Summary (Last 24 hours) at 11/01/17 1030  Last data filed at 11/01/17 0548   Gross per 24 hour   Intake              240 ml   Output                0 ml   Net              240 ml        General: awake/alert    Neck: supple, no JVD  Chest:  clear to auscultation bilaterally without respiratory distress  CVS: regular rate and rhythm  Abdominal: soft, nontender, normal bowel sounds  Extremities: trace bilateral lower ext edema  Skin: warm and dry without rash   Neuro: no focal motor deficits      Labs:    Results from last 7 days  Lab Units 10/30/17  0919   WBC 10*3/mm3 7.56   HEMOGLOBIN g/dL 10.1*   HEMATOCRIT % 31.8*   PLATELETS 10*3/mm3 140           Results from last 7 days  Lab Units 11/01/17  0517 10/31/17  0533 10/30/17  0919   SODIUM mmol/L 131* 137 136   POTASSIUM mmol/L 4.5 3.5 4.2   CHLORIDE mmol/L 89* 92* 94*   CO2 mmol/L 27.0 31.0 26.0   BUN mg/dL 56* 39* 65*   CREATININE mg/dL 7.32* 5.90* 7.90*   CALCIUM mg/dL 7.9* 8.2* 8.5   BILIRUBIN mg/dL  --   --  0.4   ALK PHOS U/L  --   --  178*   ALT (SGPT) U/L  --   --  27   AST (SGOT) U/L  --   --  30   GLUCOSE mg/dL 91 96 88       Radiology:   Imaging Results (last 72 hours)     Procedure Component Value Units Date/Time    XR Hip With or Without Pelvis 2 - 3 View Left [[id number]] Collected:  10/30/17 0757     Updated:  10/30/17 0804    " Narrative:       EXAMINATION: XR HIP W OR WO PELVIS 2-3 VIEW LEFT-  10/30/2017 7:57 AM  CDT     HISTORY: Fall. Left hip pain     COMPARISON:CT exam dated 09/15/2017     TECHNIQUE:3 views: AP pelvis. AP lateral left hip.     FINDINGS:     There are fractures of the left superior-inferior pubic rami not noted  on the previous CT examination.     The hip joint is maintained with osteoarthritic changes. The femur is  intact without fracture.     There is osteoporosis. There are changes from lumbar spine surgery.       Impression:       1. Left superior and inferior pubic rami fractures.  2. Left hip joint maintained without femur fracture.  This report was finalized on 10/30/2017 08:01 by Dr. Valentino Mccollum MD.    XR Femur 2 View Left [[id number]] Collected:  10/30/17 0801     Updated:  10/30/17 0806    Narrative:       EXAMINATION: XR FEMUR 2 VW LEFT-  10/30/2017 8:02 AM CDT     HISTORY: Fall     COMPARISON:None     TECHNIQUE:2 views, 4 images. AP and lateral projection imaging     FINDINGS:     Left superior and inferior pubic rami fractures noted.     The hip joint and knee joint are maintained. The bony structures are  intact without additional fracture.     Changes from knee arthroplasty observed. No hardware complication  identified.     Arterial calcifications observed.       Impression:       1. Left superior and inferior pubic rami fractures.  2. Left femur intact without fracture.  3. Changes from left knee arthroplasty. No hardware complications.  This report was finalized on 10/30/2017 08:03 by Dr. Valentino Mccollum MD.    CT Head Without Contrast [[id number]] Collected:  10/30/17 0821     Updated:  10/30/17 0832    Narrative:       History:  77-year-old evaluated for fall.      Reference:  CT head July 2015     Technique:   Unenhanced CT imaging of the head/brain performed.     For this CT exam, one or more of the following dose reduction techniques  was employed:  -automated exposure control  -mA and/or kVp  adjustment for patient size  -iterative reconstruction      mGy-cm     Findings:   There is no intracranial hemorrhage or extra-axial collection. No  findings of cerebral edema or contusion. 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  There is age-related atrophy without hydrocephalus.     No skull fracture is evident. No hemorrhage in the visualized paranasal  sinuses. Extracranial soft tissues are unremarkable.          Impression:       Impression:  No acute intracranial injury.  This report was finalized on 10/30/2017 08:29 by  Tolu Joseph, .          Culture:         Assessment   1.  End stage renal disease on hemodialysis  2.  Status post fall with left pubic ramus fracture  3.  Essential hypertension  4.  Anemia of chronic kidney disease  5.  Secondary hyperparathyroidism  6.  Vitamin D deficiency   7.  Hyponatremia     Plan:  1.  Dialysis today  2.  Continue to follow labs      Dirk Tristan, ABILIO  11/1/2017  10:30 AM

## 2017-11-01 NOTE — PLAN OF CARE
Problem: Patient Care Overview (Adult)  Goal: Plan of Care Review  Outcome: Ongoing (interventions implemented as appropriate)    11/01/17 2904   Coping/Psychosocial Response Interventions   Plan Of Care Reviewed With patient;daughter   Patient Care Overview   Progress no change   Outcome Evaluation   Outcome Summary/Follow up Plan PO pain meds PRN with good relief. Up with PT only. Talked about possible rehab placement.          Problem: Fall Risk (Adult)  Goal: Absence of Falls  Outcome: Ongoing (interventions implemented as appropriate)    Problem: Orthopaedic Fracture (Adult)  Goal: Signs and Symptoms of Listed Potential Problems Will be Absent or Manageable (Orthopaedic Fracture)  Outcome: Ongoing (interventions implemented as appropriate)    Problem: Pain, Chronic (Adult)  Goal: Acceptable Pain Control/Comfort Level  Outcome: Outcome(s) achieved Date Met:  11/01/17

## 2017-11-01 NOTE — PLAN OF CARE
Problem: Patient Care Overview (Adult)  Goal: Plan of Care Review  Outcome: Ongoing (interventions implemented as appropriate)    11/01/17 1011   Coping/Psychosocial Response Interventions   Plan Of Care Reviewed With patient   Patient Care Overview   Progress progress toward functional goals is gradual   Outcome Evaluation   Outcome Summary/Follow up Plan PT tx completed. Pt supine in bed. C/O increased pain with any movement. 10/10 with movement. Max assist of 2 supine to sit, sit-stand mod of 2. Able to stand more upright thann yesterday, but unable to take any steps due to pain. Pt benefit from rehab/SNF for cont'd therapy.

## 2017-11-01 NOTE — PLAN OF CARE
Problem: Patient Care Overview (Adult)  Goal: Plan of Care Review  Outcome: Ongoing (interventions implemented as appropriate)    11/01/17 0213   Coping/Psychosocial Response Interventions   Plan Of Care Reviewed With patient   Patient Care Overview   Progress improving   Outcome Evaluation   Outcome Summary/Follow up Plan pt denies pain as long as she remains immobile, x-ray of right ankle negative however it is causing more pain than pelvic fx at this time, VSS, safety maintained, no urine produced, dialysis today.         Problem: Fall Risk (Adult)  Goal: Absence of Falls  Outcome: Ongoing (interventions implemented as appropriate)    Problem: Orthopaedic Fracture (Adult)  Goal: Signs and Symptoms of Listed Potential Problems Will be Absent or Manageable (Orthopaedic Fracture)  Outcome: Ongoing (interventions implemented as appropriate)    Problem: Pain, Chronic (Adult)  Goal: Acceptable Pain Control/Comfort Level  Outcome: Ongoing (interventions implemented as appropriate)

## 2017-11-01 NOTE — THERAPY TREATMENT NOTE
Acute Care - Physical Therapy Treatment Note  [facility]     Patient Name: Cheri Ely  : 1940  MRN: [id number]  Today's Date: 2017  Onset of Illness/Injury or Date of Surgery Date: 10/30/17  Date of Referral to PT: 10/30/17  Referring Physician: Dr Rivera    Admit Date: 10/30/2017    Visit Dx:    ICD-10-CM ICD-9-CM   1. Closed nondisplaced fracture of left pubis, initial encounter S32.502A 808.2   2. Fall, initial encounter W19.XXXA E888.9   3. Impaired functional mobility, balance, gait, and endurance Z74.09 V49.89     Patient Active Problem List   Diagnosis   • Hypertension   • Hyperlipidemia   • Chronic renal failure   • Closed fracture of ramus of left pubis               Adult Rehabilitation Note       17 1011 10/31/17 1350       Rehab Assessment/Intervention    Discipline physical therapy assistant  -KJ physical therapy assistant  -CW     Document Type therapy note (daily note)  -KJ therapy note (daily note)  -CW     Subjective Information agree to therapy;complains of;pain  -KJ agree to therapy  -CW     Patient Effort, Rehab Treatment fair  -KJ good  -CW     Precautions/Limitations fall precautions   WBAT LLE and R foot  -KJ fall precautions  -CW     Recorded by [KJ] Airam Pelletier PTA [CW] Casandra Crooks PTA     Pain Assessment    Pain Assessment 0-10  -KJ Farias-Lopez FACES  -CW     Farias-Baker FACES Pain Rating  10  -CW     Pain Score 10  -KJ      Post Pain Score 10  -KJ      Pain Type Acute pain  -KJ Acute pain  -CW     Pain Location Groin  -KJ Hip  -CW     Pain Orientation Left  -KJ Left  -CW     Pain Radiating Towards  L thigh  -CW     Pain Descriptors Sharp  -KJ Sharp;Shooting;Stabbing  -CW     Pain Frequency Intermittent   with movement  -KJ Intermittent  -CW     Pain Intervention(s) Medication (See MAR);Repositioned  -KJ Repositioned  -CW     Response to Interventions  tolerated  -CW     Multiple Pain Sites Yes  -KJ Yes  -CW     Recorded by [KJ] Airam Pelletier PTA [CW]  Casandra Crooks PTA     Pain 2    Farias-Baker FACES Pain Rating 2  10  -CW     Pre Tx Pain Score 2 5  -KJ      Post Tx Pain Score 2 10  -KJ      Pain Type 2 Acute pain  -KJ Acute pain  -CW     Pain Location 2 Ankle  -KJ Ankle  -CW     Pain Orientation 2 Right  -KJ Right  -CW     Pain Descriptors 2 Sharp;Stabbing  -KJ Stabbing;Aching  -CW     Pain Frequency 2 Constant/continuous  -KJ Constant/continuous  -CW     Pain Intervention(s) 2 Repositioned  -KJ Repositioned  -CW     Recorded by [KJ] Airam Pelletier PTA [CW] Casandra Crooks PTA     Mobility Assessment/Training    Extremity Weight-Bearing Status left lower extremity;right lower extremity  -KJ left lower extremity  -CW     Left Lower Extremity Weight-Bearing weight-bearing as tolerated  -KJ weight-bearing as tolerated  -CW     Additional Documentation --   CT scan ok on right foot  -KJ      Recorded by [KJ] Airam Pelletier PTA [CW] Casandra Crooks PTA     Bed Mobility, Assessment/Treatment    Bed Mobility, Assistive Device bed rails;head of bed elevated;draw sheet  -KJ bed rails;head of bed elevated;draw sheet  -CW     Bed Mob, Supine to Sit, [place] verbal cues required;maximum assist (25% patient effort);2 person assist required;moderate assist (50% patient effort)  -KJ maximum assist (25% patient effort);verbal cues required  -CW     Bed Mob, Sit to Supine, [place] verbal cues required;maximum assist (25% patient effort);2 person assist required  -KJ maximum assist (25% patient effort);2 person assist required;verbal cues required  -CW     Bed Mobility, Safety Issues decreased use of legs for bridging/pushing;decreased use of arms for pushing/pulling  -KJ decreased use of legs for bridging/pushing  -CW     Bed Mobility, Impairments pain;strength decreased  -KJ pain  -CW     Recorded by [KJ] Airam Pellteier PTA [CW] Casandra Crooks PTA     Transfer Assessment/Treatment    Transfers, Bed-Chair-Bed, Assist Device elevated surface  -KJ  elevated surface  -CW     Transfers, Sit-Stand [place] moderate assist (50% patient effort);verbal cues required;2 person assist required  -KJ maximum assist (25% patient effort);2 person assist required;verbal cues required   CGA X 2 to maintain balance standing  -CW     Transfers, Stand-Sit [place] verbal cues required;minimum assist (75% patient effort);moderate assist (50% patient effort);2 person assist required  -KJ minimum assist (75% patient effort);2 person assist required;verbal cues required  -CW     Transfers, Sit-Stand-Sit, Assist Device rolling walker  -KJ rolling walker  -CW     Transfer, Safety Issues weight-shifting ability decreased;step length decreased  -KJ weight-shifting ability decreased  -CW     Transfer, Impairments strength decreased;pain  -KJ pain;strength decreased  -CW     Transfer, Comment stood x 1 for 2 minutes; able to take one side step with assistance from therapist sideways up towards head of bed  -KJ Stood x 2. Pt very forward flexed leaning over walker.  -CW     Recorded by [KJ] Airam Pelletier PTA [CW] Casandra Crooks PTA     Gait Assessment/Treatment    Gait, Comment not ablet to walk due to pain  -KJ      Recorded by [KJ] Airam Pelletier PTA      Balance Skills Training    Sitting-Level of Assistance  Close supervision  -CW     Sitting-Balance Support  Feet supported;Right upper extremity supported;Left upper extremity supported  -CW     Standing-Level of Assistance  Contact guard;Minimum assistance;x2  -CW     Static Standing Balance Support  assistive device  -CW     Recorded by  [CW] Casandra Crooks PTA     Therapy Exercises    Bilateral Lower Extremities AAROM:;10 reps  -KJ AROM:;20 reps;sitting;ankle pumps/circles;LAQ   unable to AP on R foot  -CW     Recorded by [KJ] Airam Pelletier PTA [CW] Casandra Crooks PTA     Positioning and Restraints    Pre-Treatment Position in bed  -KJ in bed  -CW     Post Treatment Position bed  -KJ bed  -CW     In Bed   supine;call light within reach;with family/caregiver;RLE elevated  -CW     Recorded by [KJ] Airam Pelletier, PTA [CW] Casandra Crooks, PTA       User Key  (r) = Recorded By, (t) = Taken By, (c) = Cosigned By    Initials Name Effective Dates    KJ Airam BARROW Pelletier, PTA 08/02/16 -     CW Casandra Crooks, PTA 06/22/15 -                 IP PT Goals       10/31/17 1201          Bed Mobility PT LTG    Bed Mobility PT LTG, Date Established 10/31/17  -PB (r) AA (t) PB (c)      Bed Mobility PT LTG, Time to Achieve by discharge  -PB (r) AA (t) PB (c)      Bed Mobility PT LTG, Activity Type all bed mobility  -PB (r) AA (t) PB (c)      Bed Mobility PT LTG, [place] Level independent  -PB (r) AA (t) PB (c)      Transfer Training PT LTG    Transfer Training PT LTG, Date Established 10/31/17  -PB (r) AA (t) PB (c)      Transfer Training PT LTG, Time to Achieve by discharge  -PB (r) AA (t) PB (c)      Transfer Training PT LTG, Activity Type bed to chair /chair to bed;sit to stand/stand to sit  -PB (r) AA (t) PB (c)      Transfer Training PT LTG, [place] Level conditional independence  -PB (r) AA (t) PB (c)      Transfer Training PT LTG, Assist Device walker, rolling  -PB (r) AA (t) PB (c)      Static Standing Balance PT LTG    Static Standing Balance PT LTG, Date Established 10/31/17  -PB (r) AA (t) PB (c)      Static Standing Balance PT LTG, Time to Achieve by discharge  -PB (r) AA (t) PB (c)      Static Standing Balance PT LTG, [place] Level conditional independence  -PB (r) AA (t) PB (c)      Static Standing Balance PT LTG, Assist Device assistive Device  -PB (r) AA (t) PB (c)      Static Standing Balance PT LTG, Additional Goal 5 min  -PB (r) AA (t) PB (c)        User Key  (r) = Recorded By, (t) = Taken By, (c) = Cosigned By    Initials Name Provider Type    PB Valentino Puentes, PT DPT Physical Therapist    MARIA D Palencia, PT Student PT Student          Physical Therapy Education     Title: PT OT SLP  Therapies (Active)     Topic: Physical Therapy (Active)     Point: Mobility training (Active)    Learning Progress Summary    Learner Readiness Method Response Comment Documented by Status   Patient Acceptance E NR bed mobility, transfers, and benefits of exercise  11/01/17 1102 Active    Acceptance E VU,NR Benefits of activity, role of PT, hand and foot placement for transfers  10/31/17 1200 Done               Point: Body mechanics (Done)    Learning Progress Summary    Learner Readiness Method Response Comment Documented by Status   Patient Acceptance E VU,NR Benefits of activity, role of PT, hand and foot placement for transfers  10/31/17 1200 Done                      User Key     Initials Effective Dates Name Provider Type Discipline     08/02/16 -  Airam Pelletier, PTA Physical Therapy Assistant PT     10/11/17 -  Vandana Palencia, PT Student PT Student PT                    PT Recommendation and Plan  Anticipated Discharge Disposition: skilled nursing facility, home with 24/7 care, home with home health  Demonstrates Need for Referral to Another Service: occupational therapy  Planned Therapy Interventions: bed mobility training, gait training, patient/family education, transfer training  PT Frequency: 2 times/day, daily, per priority policy  Plan of Care Review  Plan Of Care Reviewed With: patient  Progress: progress toward functional goals is gradual  Outcome Summary/Follow up Plan: PT tx completed. Pt supine in bed. C/O increased pain with any movement. 10/10 with movement. Max assist of 2 supine to sit, sit-stand mod of 2. Able to stand more upright thann yesterday, but unable to take any steps due to pain. Pt benefit from rehab/SNF for cont'd therapy.          Outcome Measures       10/31/17 1100          How much help from another person do you currently need...    Turning from your back to your side while in flat bed without using bedrails? 2  -PB (r) AA (t) PB (c)      Moving from lying on back  to sitting on the side of a flat bed without bedrails? 2  -PB (r) AA (t) PB (c)      Moving to and from a bed to a chair (including a wheelchair)? 2  -PB (r) AA (t) PB (c)      Standing up from a chair using your arms (e.g., wheelchair, bedside chair)? 2  -PB (r) AA (t) PB (c)      Climbing 3-5 steps with a railing? 1  -PB (r) AA (t) PB (c)      To walk in hospital room? 1  -PB (r) AA (t) PB (c)      AM-PAC 6 Clicks Score 10  -PB (r) AA (t)      Functional Assessment    Outcome Measure Options AM-PAC 6 Clicks Basic Mobility (PT)  -PB (r) AA (t) PB (c)        User Key  (r) = Recorded By, (t) = Taken By, (c) = Cosigned By    Initials Name Provider Type    PB Valentino Puentes, PT DPT Physical Therapist    MARIA D Palencia, PT Student PT Student           Time Calculation:       Therapy Charges for Today     Code Description Service Date Service Provider Modifiers Qty    [id number]  PT THER SUPP EA 15 MIN 10/31/2017 Airam Pelletier, PTA GP 1          PT G-Codes  Outcome Measure Options: AM-PAC 6 Clicks Basic Mobility (PT)  Score: 10  Functional Limitation: Mobility: Walking and moving around  Mobility: Walking and Moving Around Current Status (): At least 60 percent but less than 80 percent impaired, limited or restricted  Mobility: Walking and Moving Around Goal Status (): At least 40 percent but less than 60 percent impaired, limited or restricted    Airam Pelletier, PTA  11/1/2017

## 2017-11-02 ENCOUNTER — APPOINTMENT (OUTPATIENT)
Dept: CT IMAGING | Facility: HOSPITAL | Age: 77
End: 2017-11-02

## 2017-11-02 LAB
ANION GAP SERPL CALCULATED.3IONS-SCNC: 14 MMOL/L (ref 4–13)
BUN BLD-MCNC: 30 MG/DL (ref 5–21)
BUN/CREAT SERPL: 6.1 (ref 7–25)
CALCIUM SPEC-SCNC: 8.4 MG/DL (ref 8.4–10.4)
CHLORIDE SERPL-SCNC: 91 MMOL/L (ref 98–110)
CO2 SERPL-SCNC: 31 MMOL/L (ref 24–31)
CREAT BLD-MCNC: 4.9 MG/DL (ref 0.5–1.4)
GFR SERPL CREATININE-BSD FRML MDRD: 9 ML/MIN/1.73
GLUCOSE BLD-MCNC: 107 MG/DL (ref 70–100)
POTASSIUM BLD-SCNC: 4.2 MMOL/L (ref 3.5–5.3)
SODIUM BLD-SCNC: 136 MMOL/L (ref 135–145)

## 2017-11-02 PROCEDURE — 97530 THERAPEUTIC ACTIVITIES: CPT

## 2017-11-02 PROCEDURE — 63710000001 DIPHENHYDRAMINE PER 50 MG: Performed by: ORTHOPAEDIC SURGERY

## 2017-11-02 PROCEDURE — 97110 THERAPEUTIC EXERCISES: CPT

## 2017-11-02 PROCEDURE — 80048 BASIC METABOLIC PNL TOTAL CA: CPT | Performed by: INTERNAL MEDICINE

## 2017-11-02 RX ADMIN — LEVOTHYROXINE SODIUM 175 MCG: 175 TABLET ORAL at 05:40

## 2017-11-02 RX ADMIN — SERTRALINE 50 MG: 50 TABLET, FILM COATED ORAL at 09:52

## 2017-11-02 RX ADMIN — ATORVASTATIN CALCIUM 10 MG: 10 TABLET, FILM COATED ORAL at 20:43

## 2017-11-02 RX ADMIN — DIPHENHYDRAMINE HYDROCHLORIDE 25 MG: 25 CAPSULE ORAL at 17:51

## 2017-11-02 RX ADMIN — PANTOPRAZOLE SODIUM 40 MG: 40 TABLET, DELAYED RELEASE ORAL at 05:40

## 2017-11-02 RX ADMIN — OXYCODONE HYDROCHLORIDE AND ACETAMINOPHEN 1 TABLET: 10; 325 TABLET ORAL at 22:10

## 2017-11-02 RX ADMIN — DIPHENHYDRAMINE HYDROCHLORIDE 25 MG: 25 CAPSULE ORAL at 09:52

## 2017-11-02 RX ADMIN — OXYCODONE HYDROCHLORIDE AND ACETAMINOPHEN 1 TABLET: 10; 325 TABLET ORAL at 01:04

## 2017-11-02 RX ADMIN — OXYCODONE HYDROCHLORIDE AND ACETAMINOPHEN 1 TABLET: 10; 325 TABLET ORAL at 11:28

## 2017-11-02 RX ADMIN — CARVEDILOL 12.5 MG: 6.25 TABLET, FILM COATED ORAL at 09:52

## 2017-11-02 NOTE — PROGRESS NOTES
Continued Stay Note   [facility]     Patient Name: Cheri Ely  MRN: [id number]  Today's Date: 11/2/2017    Admit Date: 10/30/2017          Discharge Plan       11/02/17 0938    Case Management/Social Work Plan    Plan referral to [facility]    Patient/Family In Agreement With Plan yes    Additional Comments Spoke to patient regarding placement.  Patient is in agreement with referral to [facility]  Patient does dialysis at [place].  Will proceed with referral to [facility]  Transportation to and from dialysis will need to be addressed.  Patient states is unsure if she will be able to transport via private vehicle but states her daughter can transport her if patient is physicially able to.              Discharge Codes     None            ELYSSA Norman

## 2017-11-02 NOTE — PLAN OF CARE
Problem: Patient Care Overview (Adult)  Goal: Plan of Care Review  Outcome: Ongoing (interventions implemented as appropriate)    11/02/17 4568   Coping/Psychosocial Response Interventions   Plan Of Care Reviewed With patient   Patient Care Overview   Progress improving   Outcome Evaluation   Outcome Summary/Follow up Plan pt c/o only when she is moving. Got up to BSC this shift and up to straight back chair with PT. Remains free form injury this shift.          Problem: Fall Risk (Adult)  Goal: Absence of Falls  Outcome: Ongoing (interventions implemented as appropriate)    Problem: Orthopaedic Fracture (Adult)  Goal: Signs and Symptoms of Listed Potential Problems Will be Absent or Manageable (Orthopaedic Fracture)  Outcome: Ongoing (interventions implemented as appropriate)

## 2017-11-02 NOTE — PROGRESS NOTES
Nephrology ([facility]) Progress Note      Patient:  Cheri Ely  YOB: 1940  Date of Service: 11/2/2017  MRN: [id number]   Acct: [id number]   Primary Care Physician: Barrett Mckenzie Jr., MD  Advance Directive: Full Code  Admit Date: 10/30/2017       Hospital Day: 3  Referring Provider: Ko Rivera MD      Patient personally seen and examined.  Complete chart including Consults, Notes, Operative Reports, Labs, Cardiology, and Radiology studies reviewed as able.        Subjective:  Cheri Ely is a 77 y.o. female  whom we were consulted for end stage renal disease management.  Admitted following a fall and pubic ramus fracture.  Hemodialysis on Monday Wednesday Friday; no issues.  No new overnight issues; pain in pelvis being controlled with PRN medications.    Allergies:  Review of patient's allergies indicates no known allergies.    Home Meds:  Prescriptions Prior to Admission   Medication Sig Dispense Refill Last Dose   • carvedilol (COREG) 12.5 MG tablet Take 12.5 mg by mouth 3 (Three) Times a Day.   10/30/2017 at Unknown time   • Cholecalciferol (D3 ADULT PO) Take 2,000 Units by mouth Daily.   10/30/2017 at Unknown time   • diphenhydrAMINE (BENADRYL) 25 mg capsule Take 25 mg by mouth 2 (Two) Times a Day.   10/30/2017 at Unknown time   • levothyroxine (SYNTHROID, LEVOTHROID) 175 MCG tablet Take 175 mcg by mouth Daily.   10/30/2017 at Unknown time   • losartan (COZAAR) 25 MG tablet Take 25 mg by mouth Every Night.   10/29/2017 at Unknown time   • Multiple Vitamin (DAILY-SANGEETA PO) Take 1 tablet by mouth Daily.   10/30/2017 at Unknown time   • omeprazole (priLOSEC) 20 MG capsule Take 20 mg by mouth Daily.   10/30/2017 at Unknown time   • ondansetron ODT (ZOFRAN-ODT) 4 MG disintegrating tablet Take 4 mg by mouth Every 8 (Eight) Hours As Needed for Nausea or Vomiting.   Past Week at Unknown time   • oxyCODONE-acetaminophen (PERCOCET)  MG per tablet Take 1  tablet by mouth Every 8 (Eight) Hours As Needed for Moderate Pain .   Past Week at Unknown time   • pravastatin (PRAVACHOL) 40 MG tablet Take 40 mg by mouth Daily.   10/29/2017 at Unknown time   • sertraline (ZOLOFT) 50 MG tablet Take 50 mg by mouth Daily.   10/29/2017 at Unknown time       Medicines:  Current Facility-Administered Medications   Medication Dose Route Frequency Provider Last Rate Last Dose   • acetaminophen (TYLENOL) tablet 1,000 mg  1,000 mg Oral Q4H PRN Chris Hsu MD       • aluminum-magnesium hydroxide-simethicone (MAALOX MAX) 400-400-40 MG/5ML suspension 15 mL  15 mL Oral PRN Chris Hsu MD       • atorvastatin (LIPITOR) tablet 10 mg  10 mg Oral Nightly Ko Rivera MD   10 mg at 11/01/17 2115   • carvedilol (COREG) tablet 12.5 mg  12.5 mg Oral TID Ko Rivera MD   12.5 mg at 11/02/17 0952   • CloNIDine (CATAPRES) tablet 0.1 mg  0.1 mg Oral Q1H PRN Chris Hsu MD       • diphenhydrAMINE (BENADRYL) capsule 25 mg  25 mg Oral BID Ko Rivera MD   25 mg at 11/02/17 0952   • diphenhydrAMINE (BENADRYL) capsule 25 mg  25 mg Oral Q4H PRN Chris Hsu MD        Or   • diphenhydrAMINE (BENADRYL) injection 25 mg  25 mg Intravenous Q4H PRN Chris Hsu MD       • levothyroxine (SYNTHROID, LEVOTHROID) tablet 175 mcg  175 mcg Oral Q AM Ko Rivera MD   175 mcg at 11/02/17 0540   • lidocaine (XYLOCAINE) 1 % injection 0.3 mL  0.3 mL Infiltration PRN Chris Hsu MD       • ondansetron (ZOFRAN) injection 4 mg  4 mg Intravenous Once Ko Rivera MD       • ondansetron (ZOFRAN) injection 4 mg  4 mg Intravenous Q2H PRN Chris Hsu MD       • oxyCODONE-acetaminophen (PERCOCET)  MG per tablet 1 tablet  1 tablet Oral Q8H PRN Ko Rivera MD   1 tablet at 11/02/17 0104   • pantoprazole (PROTONIX) EC tablet 40 mg  40 mg Oral Q AM Ko Rivera MD   40 mg at 11/02/17 1294   •  sertraline (ZOLOFT) tablet 50 mg  50 mg Oral Daily Ko Rivera MD   50 mg at 11/02/17 0952   • sodium chloride 0.9 % flush 1-10 mL  1-10 mL Intravenous PRN Ko Rivera MD           Past Medical History:  Past Medical History:   Diagnosis Date   • Arthritis    • Carotid artery disease    • CHF (congestive heart failure)    • Chronic renal failure     on dialysis   • Coronary artery disease    • Disease of thyroid gland    • Diverticulitis    • History of transfusion    • Hyperlipidemia    • Hypertension    • Injury of back    • Mesenteric artery insufficiency    • Multilevel degenerative disc disease    • Osteoporosis    • Pancreatitis    • Pneumonia        Past Surgical History:  Past Surgical History:   Procedure Laterality Date   • AORTIC VALVE SURGERY     • APPENDECTOMY     • BACK SURGERY     • CARDIAC SURGERY     • CAROTID ENDARTERECTOMY Bilateral    • CATARACT EXTRACTION, BILATERAL     • CERVICAL SPINE SURGERY     • CHOLECYSTECTOMY     • COLON SURGERY     • CORONARY ARTERY BYPASS GRAFT     • JOINT REPLACEMENT      knee   • LAPAROSCOPIC GASTRIC BANDING     • LEEP     • LUMBAR FUSION     • TOE AMPUTATION Left     2nd toe   • TOTAL KNEE ARTHROPLASTY Bilateral    • TUBAL ABDOMINAL LIGATION         Family History  Family History   Problem Relation Age of Onset   • Hypertension Mother    • Cancer Mother      stomach   • Coronary artery disease Father    • Heart attack Father    • Diabetes Brother    • Coronary artery disease Brother        Social History  Social History     Social History   • Marital status:      Spouse name: N/A   • Number of children: N/A   • Years of education: N/A     Occupational History   • Not on file.     Social History Main Topics   • Smoking status: Never Smoker   • Smokeless tobacco: Never Used   • Alcohol use No   • Drug use: No   • Sexual activity: Defer     Other Topics Concern   • Not on file     Social History Narrative         Review of Systems:  History obtained  "from chart review and the patient  General ROS: No fever or chills  Respiratory ROS: No cough, shortness of breath, wheezing  Cardiovascular ROS: no chest pain or dyspnea on exertion  Gastrointestinal ROS: No abdominal pain or melena  Genito-Urinary ROS: No dysuria or hematuria  Neurological ROS: negative  14 point ROS reviewed with the patient and negative except as noted above and in the HPI unless unable to obtain.    Objective:  BP (!) 104/35 (BP Location: Right arm, Patient Position: Lying)  Pulse 76  Temp 97.9 °F (36.6 °C) (Oral)   Resp 14  Ht 61\" (154.9 cm)  Wt 170 lb (77.1 kg)  SpO2 98%  BMI 32.12 kg/m2    Intake/Output Summary (Last 24 hours) at 11/02/17 1021  Last data filed at 11/02/17 0400   Gross per 24 hour   Intake              720 ml   Output                0 ml   Net              720 ml        General: awake/alert    Neck: supple, no JVD  Chest:  clear to auscultation bilaterally without respiratory distress  CVS: regular rate and rhythm  Abdominal: soft, nontender, normal bowel sounds  Extremities: trace bilateral lower ext edema  Skin: warm and dry without rash   Neuro: no focal motor deficits      Labs:    Results from last 7 days  Lab Units 10/30/17  0919   WBC 10*3/mm3 7.56   HEMOGLOBIN g/dL 10.1*   HEMATOCRIT % 31.8*   PLATELETS 10*3/mm3 140           Results from last 7 days  Lab Units 11/02/17  0538 11/01/17  0517 10/31/17  0533 10/30/17  0919   SODIUM mmol/L 136 131* 137 136   POTASSIUM mmol/L 4.2 4.5 3.5 4.2   CHLORIDE mmol/L 91* 89* 92* 94*   CO2 mmol/L 31.0 27.0 31.0 26.0   BUN mg/dL 30* 56* 39* 65*   CREATININE mg/dL 4.90* 7.32* 5.90* 7.90*   CALCIUM mg/dL 8.4 7.9* 8.2* 8.5   BILIRUBIN mg/dL  --   --   --  0.4   ALK PHOS U/L  --   --   --  178*   ALT (SGPT) U/L  --   --   --  27   AST (SGOT) U/L  --   --   --  30   GLUCOSE mg/dL 107* 91 96 88       Radiology:   Imaging Results (last 72 hours)     Procedure Component Value Units Date/Time    XR Hip With or Without Pelvis 2 - 3 " View Left [[id number]] Collected:  10/30/17 0757     Updated:  10/30/17 0804    Narrative:       EXAMINATION: XR HIP W OR WO PELVIS 2-3 VIEW LEFT-  10/30/2017 7:57 AM  CDT     HISTORY: Fall. Left hip pain     COMPARISON:CT exam dated 09/15/2017     TECHNIQUE:3 views: AP pelvis. AP lateral left hip.     FINDINGS:     There are fractures of the left superior-inferior pubic rami not noted  on the previous CT examination.     The hip joint is maintained with osteoarthritic changes. The femur is  intact without fracture.     There is osteoporosis. There are changes from lumbar spine surgery.       Impression:       1. Left superior and inferior pubic rami fractures.  2. Left hip joint maintained without femur fracture.  This report was finalized on 10/30/2017 08:01 by Dr. Valentino Mccollum MD.    XR Femur 2 View Left [[id number]] Collected:  10/30/17 0801     Updated:  10/30/17 0806    Narrative:       EXAMINATION: XR FEMUR 2 VW LEFT-  10/30/2017 8:02 AM CDT     HISTORY: Fall     COMPARISON:None     TECHNIQUE:2 views, 4 images. AP and lateral projection imaging     FINDINGS:     Left superior and inferior pubic rami fractures noted.     The hip joint and knee joint are maintained. The bony structures are  intact without additional fracture.     Changes from knee arthroplasty observed. No hardware complication  identified.     Arterial calcifications observed.       Impression:       1. Left superior and inferior pubic rami fractures.  2. Left femur intact without fracture.  3. Changes from left knee arthroplasty. No hardware complications.  This report was finalized on 10/30/2017 08:03 by Dr. Valentino Mccollum MD.    CT Head Without Contrast [[id number]] Collected:  10/30/17 0821     Updated:  10/30/17 0832    Narrative:       History:  77-year-old evaluated for fall.      Reference:  CT head July 2015     Technique:   Unenhanced CT imaging of the head/brain performed.     For this CT exam, one or more of the following dose  reduction techniques  was employed:  -automated exposure control  -mA and/or kVp adjustment for patient size  -iterative reconstruction      mGy-cm     Findings:   There is no intracranial hemorrhage or extra-axial collection. No  findings of cerebral edema or contusion. 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  There is age-related atrophy without hydrocephalus.     No skull fracture is evident. No hemorrhage in the visualized paranasal  sinuses. Extracranial soft tissues are unremarkable.          Impression:       Impression:  No acute intracranial injury.  This report was finalized on 10/30/2017 08:29 by  Tolu Joseph, .          Culture:         Assessment   1.  End stage renal disease on hemodialysis  2.  Status post fall with left pubic ramus fracture  3.  Essential hypertension  4.  Anemia of chronic kidney disease  5.  Secondary hyperparathyroidism  6.  Vitamin D deficiency   7.  Hyponatremia     Plan:  1.  Dialysis next due 11/03  2.  Continue to follow labs      Dirk Tristan, APRN  11/2/2017  10:21 AM

## 2017-11-02 NOTE — PLAN OF CARE
Problem: Patient Care Overview (Adult)  Goal: Plan of Care Review  Outcome: Ongoing (interventions implemented as appropriate)    11/02/17 0229   Coping/Psychosocial Response Interventions   Plan Of Care Reviewed With patient   Patient Care Overview   Progress no change   Outcome Evaluation   Outcome Summary/Follow up Plan pt still unable to ambulate, pt encouraged to turn to prevent pu but stated she did not want to, VSS, PPP, safety maintained         Problem: Fall Risk (Adult)  Goal: Absence of Falls  Outcome: Ongoing (interventions implemented as appropriate)    Problem: Orthopaedic Fracture (Adult)  Goal: Signs and Symptoms of Listed Potential Problems Will be Absent or Manageable (Orthopaedic Fracture)  Outcome: Ongoing (interventions implemented as appropriate)

## 2017-11-02 NOTE — PLAN OF CARE
Problem: Patient Care Overview (Adult)  Goal: Plan of Care Review  Outcome: Ongoing (interventions implemented as appropriate)    11/02/17 1045   Coping/Psychosocial Response Interventions   Plan Of Care Reviewed With patient   Patient Care Overview   Progress progress toward functional goals as expected   Outcome Evaluation   Outcome Summary/Follow up Plan She was Mod assist for bedmobility and sit to stand. She took a few side steps up in bed and t/f's to [facility] [place] mod assist with Rwx. She was left in fowlers with call light.

## 2017-11-02 NOTE — DISCHARGE PLACEMENT REQUEST
"To: [facility]    From: Cynthia Blum [phone]          Cheri Ely (77 y.o. Female)     Date of Birth Social Security Number Address Home Phone MRN    1940  [address]  [facility] [address]  [id number]    [employer] Mar[employer] Status          [employer]        Admission Date Admission Type Admitting Provider Attending Provider Department, Room/Bed    10/30/17 Emergency Ko Rivera MD Jackson, Stephen H, MD [facility] 3A, 333/1    Discharge Date Discharge Disposition Discharge Destination                      Attending Provider: Ko Rivera MD     Allergies:  No Known Allergies    Isolation:  None   Infection:  None   Code Status:  FULL    Ht:  61\" (154.9 cm)   Wt:  170 lb (77.1 kg)    Admission Cmt:  None   Principal Problem:  None                Active Insurance as of 10/30/2017     Primary Coverage     Payor Plan Insurance Group Employer/Plan Group    MEDICARE MEDICARE A & B      Payor Plan Address Payor Plan Phone Number Effective From Effective To    [address] [phone] 4/1/1998     [place] [address]       Subscriber Name Subscriber Birth Date Member ID       CHERI ELY 1940 [id number]           Secondary Coverage     Payor Plan Insurance Group Employer/Plan Group    COMBINED INSURANCE CO COMBINED INSURANCE CO      Payor Plan Address Payor Plan Phone Number Effective From Effective To    [address] [phone] 1/1/2017     [place] [address]       Subscriber Name Subscriber Birth Date Member ID       CHERI ELY 1940 [id number]                 Emergency Contacts      (Rel.) Home Phone Work Phone Mobile Phone    Lucero Morales (Daughter) -- -- [phone]    LangstonChristy thompson (Daughter) -- -- [phone]               History & Physical      JESSICA Fox at 10/30/2017 12:18 PM            History and Physical  [facility]      Cheri Ely " (1940)  10/30/2017        CHIEF COMPLAINT:  Left hip pain    History Obtained From: patient     HISTORY OF PRESENT ILLNESS:                The patient is a 77 y.o. female who presents with above chief complaint. The patient was accompanying her daughter to surgery at [facility].  She walks with a walker and she slipped and fell landing on her left hip.  She was brought to the emergency department and was found to have fractures of the left superior and inferior pubic ramus.  She continues to have pain which is only controlled with narcotic pain medication.  The patient is on renal dialysis.  She denies any head trauma or loss of consciousness.    Past Medical History:    Past Medical History:   Diagnosis Date   • Arthritis    • Carotid artery disease    • CHF (congestive heart failure)    • Chronic renal failure     on dialysis   • Coronary artery disease    • Disease of thyroid gland    • Diverticulitis    • History of transfusion    • Hyperlipidemia    • Hypertension    • Injury of back    • Mesenteric artery insufficiency    • Multilevel degenerative disc disease    • Osteoporosis    • Pancreatitis    • Pneumonia        Past Surgical History:    Past Surgical History:   Procedure Laterality Date   • AORTIC VALVE SURGERY     • APPENDECTOMY     • BACK SURGERY     • CARDIAC SURGERY     • CAROTID ENDARTERECTOMY Bilateral    • CATARACT EXTRACTION, BILATERAL     • CERVICAL SPINE SURGERY     • CHOLECYSTECTOMY     • COLON SURGERY     • CORONARY ARTERY BYPASS GRAFT     • JOINT REPLACEMENT      knee   • LAPAROSCOPIC GASTRIC BANDING     • LEEP     • LUMBAR FUSION     • TOE AMPUTATION Left     2nd toe   • TOTAL KNEE ARTHROPLASTY Bilateral    • TUBAL ABDOMINAL LIGATION         Current Medications:     Current Facility-Administered Medications:   •  atorvastatin (LIPITOR) tablet 10 mg, 10 mg, Oral, Nightly, Ko Rivera MD  •  carvedilol (COREG) tablet 12.5 mg, 12.5 mg, Oral, TID, Ko Rivera  MD  •  diphenhydrAMINE (BENADRYL) capsule 25 mg, 25 mg, Oral, BID, Ko Rivera MD  •  levothyroxine (SYNTHROID, LEVOTHROID) tablet 175 mcg, 175 mcg, Oral, Q AM, Ko Rivera MD  •  ondansetron (ZOFRAN) injection 4 mg, 4 mg, Intravenous, Once, Ko Rivera MD  •  oxyCODONE-acetaminophen (PERCOCET)  MG per tablet 1 tablet, 1 tablet, Oral, Q8H PRN, Ko Rivera MD  •  pantoprazole (PROTONIX) EC tablet 40 mg, 40 mg, Oral, Q AM, Ko Rivera MD  •  sertraline (ZOLOFT) tablet 50 mg, 50 mg, Oral, Daily, Ko Rivera MD  •  sodium chloride 0.9 % flush 1-10 mL, 1-10 mL, Intravenous, PRN, Ko Rivera MD    Allergies:  Review of patient's allergies indicates no known allergies.    Social History:   Social History     Social History   • Marital status:      Spouse name: N/A   • Number of children: N/A   • Years of education: N/A     Social History Main Topics   • Smoking status: Never Smoker   • Smokeless tobacco: Never Used   • Alcohol use No   • Drug use: No   • Sexual activity: Defer     Other Topics Concern   • None     Social History Narrative       Family History:   Family History   Problem Relation Age of Onset   • Hypertension Mother    • Cancer Mother      stomach   • Coronary artery disease Father    • Heart attack Father    • Diabetes Brother    • Coronary artery disease Brother        REVIEW OF SYSTEMS:    All systems were reviewed and negative except for:  Musculoskeletal: positive for  bone pain, joint pain, joint stiffness, joint swelling and See HPI    PHYSICAL EXAM:        General Appearance:    Alert, cooperative, in no acute distress   Head:    Normocephalic, without obvious abnormality, atraumatic   Eyes:            Vision intact, EOM intact     Ears:    Ears appear intact with no abnormalities noted   Neck:   No adenopathy, supple, trachea midline, no thyromegaly   Back:     No kyphosis present, no scoliosis present, no skin lesions,      erythema or  scars, no tenderness to palpation,   range of motion normal   Lungs:     Clear, respirations regular, even and unlabored    Heart:    Regular rhythm and normal rate, no edema   Chest Wall:    No abnormalities observed   Abdomen:     Normal bowel sounds, no masses, no organomegaly, soft        non-tender, non-distended, no guarding   Rectal:     Deferred   Extremities:   Severe tenderness left hip.  Severe pain with any ROM left hip.  Otherwise, Moves all extremities well, no edema, no cyanosis, no redness   Pulses:   Pulses palpable and equal bilaterally   Skin:   No bleeding, bruising or rash   Lymph nodes:   No palpable adenopathy   Neurologic:   Cranial nerves 2 - 12 grossly intact, alert and oriented times 3.       DATA:    Lab Results (last 24 hours)     Procedure Component Value Units Date/Time    CBC & Differential [[id number]] Collected:  10/30/17 0919    Specimen:  Blood Updated:  10/30/17 0929    Narrative:       The following orders were created for panel order CBC & Differential.  Procedure                               Abnormality         Status                     ---------                               -----------         ------                     CBC Auto Differential[[id number]]        Abnormal            Final result                 Please view results for these tests on the individual orders.    CBC Auto Differential [[id number]]  (Abnormal) Collected:  10/30/17 0919    Specimen:  Blood Updated:  10/30/17 0929     WBC 7.56 10*3/mm3      RBC 3.05 (L) 10*6/mm3      Hemoglobin 10.1 (L) g/dL      Hematocrit 31.8 (L) %      .3 (H) fL      MCH 33.1 (H) pg      MCHC 31.8 (L) g/dL      RDW 14.0 %      RDW-SD 52.8 fl      MPV 11.0 fL      Platelets 140 10*3/mm3      Neutrophil % 77.6 %      Lymphocyte % 12.0 (L) %      Monocyte % 6.3 %      Eosinophil % 3.4 %      Basophil % 0.4 %      Immature Grans % 0.3 %      Neutrophils, Absolute 5.86 10*3/mm3      Lymphocytes, Absolute 0.91 10*3/mm3       Monocytes, Absolute 0.48 10*3/mm3      Eosinophils, Absolute 0.26 10*3/mm3      Basophils, Absolute 0.03 10*3/mm3      Immature Grans, Absolute 0.02 10*3/mm3     Protime-INR [[id number]]  (Abnormal) Collected:  10/30/17 0919    Specimen:  Blood Updated:  10/30/17 0938     Protime 15.4 (H) Seconds      INR 1.18 (H)    aPTT [[id number]]  (Normal) Collected:  10/30/17 0919    Specimen:  Blood Updated:  10/30/17 0938     PTT 32.3 seconds     Comprehensive Metabolic Panel [[id number]]  (Abnormal) Collected:  10/30/17 0919    Specimen:  Blood Updated:  10/30/17 1003     Glucose 88 mg/dL      BUN 65 (H) mg/dL      Creatinine 7.90 (H) mg/dL      Sodium 136 mmol/L      Potassium 4.2 mmol/L      Chloride 94 (L) mmol/L      CO2 26.0 mmol/L      Calcium 8.5 mg/dL      Total Protein 6.7 g/dL      Albumin 3.40 (L) g/dL      ALT (SGPT) 27 U/L      AST (SGOT) 30 U/L      Alkaline Phosphatase 178 (H) U/L      Total Bilirubin 0.4 mg/dL      eGFR Non African Amer 5 (L) mL/min/1.73      Globulin 3.3 gm/dL      A/G Ratio 1.0 (L) g/dL      BUN/Creatinine Ratio 8.2     Anion Gap 16.0 (H) mmol/L     Narrative:       The MDRD GFR formula is only valid for adults with stable renal function between ages 18 and 70.          Radiology:   Imaging Results (last 7 days)     Procedure Component Value Units Date/Time    XR Hip With or Without Pelvis 2 - 3 View Left [[id number]] Collected:  10/30/17 0757     Updated:  10/30/17 0804    Narrative:       EXAMINATION: XR HIP W OR WO PELVIS 2-3 VIEW LEFT-  10/30/2017 7:57 AM  CDT     HISTORY: Fall. Left hip pain     COMPARISON:CT exam dated 09/15/2017     TECHNIQUE:3 views: AP pelvis. AP lateral left hip.     FINDINGS:     There are fractures of the left superior-inferior pubic rami not noted  on the previous CT examination.     The hip joint is maintained with osteoarthritic changes. The femur is  intact without fracture.     There is osteoporosis. There are changes from lumbar spine surgery.        Impression:       1. Left superior and inferior pubic rami fractures.  2. Left hip joint maintained without femur fracture.  This report was finalized on 10/30/2017 08:01 by Dr. Valentino Mccollum MD.    XR Femur 2 View Left [[id number]] Collected:  10/30/17 0801     Updated:  10/30/17 0806    Narrative:       EXAMINATION: XR FEMUR 2 VW LEFT-  10/30/2017 8:02 AM CDT     HISTORY: Fall     COMPARISON:None     TECHNIQUE:2 views, 4 images. AP and lateral projection imaging     FINDINGS:     Left superior and inferior pubic rami fractures noted.     The hip joint and knee joint are maintained. The bony structures are  intact without additional fracture.     Changes from knee arthroplasty observed. No hardware complication  identified.     Arterial calcifications observed.       Impression:       1. Left superior and inferior pubic rami fractures.  2. Left femur intact without fracture.  3. Changes from left knee arthroplasty. No hardware complications.  This report was finalized on 10/30/2017 08:03 by Dr. Valentino Mccollum MD.    CT Head Without Contrast [[id number]] Collected:  10/30/17 0821     Updated:  10/30/17 0832    Narrative:       History:  77-year-old evaluated for fall.      Reference:  CT head July 2015     Technique:   Unenhanced CT imaging of the head/brain performed.     For this CT exam, one or more of the following dose reduction techniques  was employed:  -automated exposure control  -mA and/or kVp adjustment for patient size  -iterative reconstruction      mGy-cm     Findings:   There is no intracranial hemorrhage or extra-axial collection. No  findings of cerebral edema or contusion. 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  There is age-related atrophy without hydrocephalus.     No skull fracture is evident. No hemorrhage in the visualized paranasal  sinuses. Extracranial soft tissues are unremarkable.          Impression:       Impression:  No  acute intracranial injury.  This report was finalized on 10/30/2017 08:29 by  Tolu Joseph, .          Imaging was brought up and reviewed and I agree with the radiology findings.    IMPRESSION/RECOMMENDATIONS:    Active Problems:    Closed fracture of ramus of left pubis      Assessment: Fractures of left pubic ramus in a patient on renal dialysis.    Plan:  1) The patient has been admitted to the hospital for pain control.  Physical therapy has been ordered and she can be weight bearing as tolerated.  Nephrology has been consulted.  We plan to treat this patient non-operatively and will follow her with xrays after discharge until the fracture has healed.  She will need dialysis per nephrology.      Electronically signed by JESSICA Fox12:18 PM10/30/2017             Electronically signed by Ko Rivera MD at 10/30/2017  5:37 PM        Hospital Medications (active)       Dose Frequency Start End    acetaminophen (TYLENOL) tablet 1,000 mg 1,000 mg Every 4 Hours PRN 10/30/2017     Sig - Route: Take 2 tablets by mouth Every 4 (Four) Hours As Needed for Mild Pain , Headache or Fever. - Oral    Cosign for Ordering: Accepted by Chris Hsu MD on 10/30/2017  8:28 PM    aluminum-magnesium hydroxide-simethicone (MAALOX MAX) 400-400-40 MG/5ML suspension 15 mL 15 mL As Needed 10/30/2017     Sig - Route: Take 15 mL by mouth As Needed for Indigestion. - Oral    Cosign for Ordering: Accepted by Chris Hsu MD on 10/30/2017  8:28 PM    atorvastatin (LIPITOR) tablet 10 mg 10 mg Nightly 10/30/2017     Sig - Route: Take 1 tablet by mouth Every Night. - Oral    carvedilol (COREG) tablet 12.5 mg 12.5 mg 3 Times Daily 10/30/2017     Sig - Route: Take 2 tablets by mouth 3 (Three) Times a Day. - Oral    CloNIDine (CATAPRES) tablet 0.1 mg 0.1 mg Every 1 Hour PRN 10/30/2017     Sig - Route: Take 1 tablet by mouth Every 1 (One) Hour As Needed for High Blood Pressure (SPB greater than 190 or DBP   "greater than 120). - Oral    Cosign for Ordering: Accepted by Chris Hsu MD on 10/30/2017  8:28 PM    diphenhydrAMINE (BENADRYL) capsule 25 mg 25 mg 2 Times Daily 10/30/2017     Sig - Route: Take 1 capsule by mouth 2 (Two) Times a Day. - Oral    diphenhydrAMINE (BENADRYL) capsule 25 mg 25 mg Every 4 Hours PRN 10/30/2017     Sig - Route: Take 1 capsule by mouth Every 4 (Four) Hours As Needed for Itching (anaphylactic reaction). - Oral    Cosign for Ordering: Accepted by Chris Hsu MD on 10/30/2017  8:28 PM    Linked Group 1:  \"Or\" Linked Group Details        diphenhydrAMINE (BENADRYL) injection 25 mg 25 mg Every 4 Hours PRN 10/30/2017     Sig - Route: Infuse 0.5 mL into a venous catheter Every 4 (Four) Hours As Needed for Itching (anaphylactic reaction). - Intravenous    Cosign for Ordering: Accepted by Chris Hsu MD on 10/30/2017  8:28 PM    Linked Group 1:  \"Or\" Linked Group Details        levothyroxine (SYNTHROID, LEVOTHROID) tablet 175 mcg 175 mcg Every Early Morning 10/30/2017     Sig - Route: Take 1 tablet by mouth Every Morning. - Oral    lidocaine (XYLOCAINE) 1 % injection 0.3 mL 0.3 mL As Needed 10/30/2017     Sig - Route: 0.3 mL by Infiltration route As Needed (prior to insertion of fistula needles). - Infiltration    Cosign for Ordering: Accepted by Chris Hsu MD on 10/30/2017  8:28 PM    ondansetron (ZOFRAN) injection 4 mg 4 mg Once 10/30/2017     Sig - Route: Infuse 2 mL into a venous catheter 1 (One) Time. - Intravenous    ondansetron (ZOFRAN) injection 4 mg 4 mg Every 2 Hours PRN 10/30/2017     Sig - Route: Infuse 2 mL into a venous catheter Every 2 (Two) Hours As Needed for Nausea or Vomiting. - Intravenous    Cosign for Ordering: Accepted by Chris Hsu MD on 10/30/2017  8:28 PM    oxyCODONE-acetaminophen (PERCOCET)  MG per tablet 1 tablet 1 tablet Every 8 Hours PRN 10/30/2017     Sig - Route: Take 1 tablet by mouth Every " 8 (Eight) Hours As Needed for Moderate Pain . - Oral    pantoprazole (PROTONIX) EC tablet 40 mg 40 mg Every Early Morning 10/30/2017     Sig - Route: Take 1 tablet by mouth Every Morning. - Oral    sertraline (ZOLOFT) tablet 50 mg 50 mg Daily 10/30/2017     Sig - Route: Take 1 tablet by mouth Daily. - Oral    sodium chloride 0.9 % flush 1-10 mL 1-10 mL As Needed 10/30/2017     Sig - Route: Infuse 1-10 mL into a venous catheter As Needed for Line Care. - Intravenous          Operative/Procedure Notes (all)     No notes of this type exist for this encounter.           Physician Progress Notes (most recent note)      Ko Rivera MD at 11/1/2017  3:06 PM  Version 1 of 1         Ankle xray neg     Electronically signed by Ko Rivera MD at 11/1/2017  3:07 PM           Consult Notes (most recent note)      Chris Hsu MD at 10/30/2017  2:20 PM  Version 1 of 1     Consult Orders:    1. Inpatient Consult to Nephrology [[id number]] ordered by Ko Rivera MD at 10/30/17 1014                Nephrology ([facility]) Consult Note      Patient:  Cheri Ely  YOB: 1940  Date of Service: 10/30/2017  MRN: [id number]   Acct: [id number]   Primary Care Physician: Barrett Mckenzie Jr., MD  Advance Directive: Full Code  Admit Date: 10/30/2017       Hospital Day: 0  Referring Provider: Ko Rivera MD      Patient personally seen and examined.  Complete chart including Consults, Notes, Operative Reports, Labs, Cardiology, and Radiology studies reviewed as able.        Subjective:  Cheri Ely is a 77 y.o. female  whom we were consulted for ESRD.  Pt admitted after fall and pubic ramus fracture while visiting daughter at [facility].  Pain controlled.  No issues with HD.  No n/v/d/cp.  No syncope or LOC.  No dizziness.      Allergies:  Review of patient's allergies indicates no known allergies.    Home Meds:  Prescriptions Prior to Admission   Medication Sig  Dispense Refill Last Dose   • carvedilol (COREG) 12.5 MG tablet Take 12.5 mg by mouth 3 (Three) Times a Day.   10/30/2017 at Unknown time   • Cholecalciferol (D3 ADULT PO) Take 2,000 Units by mouth Daily.   10/30/2017 at Unknown time   • diphenhydrAMINE (BENADRYL) 25 mg capsule Take 25 mg by mouth 2 (Two) Times a Day.   10/30/2017 at Unknown time   • levothyroxine (SYNTHROID, LEVOTHROID) 175 MCG tablet Take 175 mcg by mouth Daily.   10/30/2017 at Unknown time   • losartan (COZAAR) 25 MG tablet Take 25 mg by mouth Every Night.   10/29/2017 at Unknown time   • Multiple Vitamin (DAILY-SANGEETA PO) Take 1 tablet by mouth Daily.   10/30/2017 at Unknown time   • omeprazole (priLOSEC) 20 MG capsule Take 20 mg by mouth Daily.   10/30/2017 at Unknown time   • ondansetron ODT (ZOFRAN-ODT) 4 MG disintegrating tablet Take 4 mg by mouth Every 8 (Eight) Hours As Needed for Nausea or Vomiting.   Past Week at Unknown time   • oxyCODONE-acetaminophen (PERCOCET)  MG per tablet Take 1 tablet by mouth Every 8 (Eight) Hours As Needed for Moderate Pain .   Past Week at Unknown time   • pravastatin (PRAVACHOL) 40 MG tablet Take 40 mg by mouth Daily.   10/29/2017 at Unknown time   • sertraline (ZOLOFT) 50 MG tablet Take 50 mg by mouth Daily.   10/29/2017 at Unknown time       Medicines:  Current Facility-Administered Medications   Medication Dose Route Frequency Provider Last Rate Last Dose   • acetaminophen (TYLENOL) tablet 1,000 mg  1,000 mg Oral Q4H PRN Chris Hsu MD       • albumin human 25 % IV SOLN 12.5 g  12.5 g Intravenous PRN Chris Hsu MD       • aluminum-magnesium hydroxide-simethicone (MAALOX MAX) 400-400-40 MG/5ML suspension 15 mL  15 mL Oral PRN Chris Hsu MD       • atorvastatin (LIPITOR) tablet 10 mg  10 mg Oral Nightly Ko Rivera MD       • carvedilol (COREG) tablet 12.5 mg  12.5 mg Oral TID Ko Rivera MD   12.5 mg at 10/30/17 7274   • CloNIDine (CATAPRES)  tablet 0.1 mg  0.1 mg Oral Q1H PRN Chris Hsu MD       • diphenhydrAMINE (BENADRYL) capsule 25 mg  25 mg Oral BID Ko Rivera MD   25 mg at 10/30/17 1750   • diphenhydrAMINE (BENADRYL) capsule 25 mg  25 mg Oral Q4H PRN Chris Hsu MD        Or   • diphenhydrAMINE (BENADRYL) injection 25 mg  25 mg Intravenous Q4H PRN Chris Hsu MD       • levothyroxine (SYNTHROID, LEVOTHROID) tablet 175 mcg  175 mcg Oral Q AM Ko Rivera MD       • lidocaine (XYLOCAINE) 1 % injection 0.3 mL  0.3 mL Infiltration PRN Chris Hsu MD       • ondansetron (ZOFRAN) injection 4 mg  4 mg Intravenous Once Ko Rivera MD       • ondansetron (ZOFRAN) injection 4 mg  4 mg Intravenous Q2H PRN Chris Hsu MD       • oxyCODONE-acetaminophen (PERCOCET)  MG per tablet 1 tablet  1 tablet Oral Q8H PRN Ko Rivera MD   1 tablet at 10/30/17 1520   • pantoprazole (PROTONIX) EC tablet 40 mg  40 mg Oral Q AM Ko Rivera MD       • sertraline (ZOLOFT) tablet 50 mg  50 mg Oral Daily Ko Rivera MD       • sodium chloride 0.9 % flush 1-10 mL  1-10 mL Intravenous PRN Ko Rivera MD           Past Medical History:  Past Medical History:   Diagnosis Date   • Arthritis    • Carotid artery disease    • CHF (congestive heart failure)    • Chronic renal failure     on dialysis   • Coronary artery disease    • Disease of thyroid gland    • Diverticulitis    • History of transfusion    • Hyperlipidemia    • Hypertension    • Injury of back    • Mesenteric artery insufficiency    • Multilevel degenerative disc disease    • Osteoporosis    • Pancreatitis    • Pneumonia        Past Surgical History:  Past Surgical History:   Procedure Laterality Date   • AORTIC VALVE SURGERY     • APPENDECTOMY     • BACK SURGERY     • CARDIAC SURGERY     • CAROTID ENDARTERECTOMY Bilateral    • CATARACT EXTRACTION, BILATERAL     • CERVICAL SPINE SURGERY     •  "CHOLECYSTECTOMY     • COLON SURGERY     • CORONARY ARTERY BYPASS GRAFT     • JOINT REPLACEMENT      knee   • LAPAROSCOPIC GASTRIC BANDING     • LEEP     • LUMBAR FUSION     • TOE AMPUTATION Left     2nd toe   • TOTAL KNEE ARTHROPLASTY Bilateral    • TUBAL ABDOMINAL LIGATION         Family History  Family History   Problem Relation Age of Onset   • Hypertension Mother    • Cancer Mother      stomach   • Coronary artery disease Father    • Heart attack Father    • Diabetes Brother    • Coronary artery disease Brother        Social History  Social History     Social History   • Marital status:      Spouse name: N/A   • Number of children: N/A   • Years of education: N/A     Occupational History   • Not on file.     Social History Main Topics   • Smoking status: Never Smoker   • Smokeless tobacco: Never Used   • Alcohol use No   • Drug use: No   • Sexual activity: Defer     Other Topics Concern   • Not on file     Social History Narrative         Review of Systems:  History obtained from chart review and the patient  General ROS: No fever or chills  Respiratory ROS: No cough, shortness of breath, wheezing  Cardiovascular ROS: no chest pain or dyspnea on exertion  Gastrointestinal ROS: No abdominal pain or melena  Genito-Urinary ROS: No dysuria or hematuria  14 point ROS reviewed with the patient and negative except as noted above and in the HPI unless unable to obtain.    Objective:  BP (!) 100/36 (BP Location: Right arm, Patient Position: Lying)  Pulse 95  Temp 98.9 °F (37.2 °C) (Oral)   Resp 16  Ht 61\" (154.9 cm)  Wt 170 lb (77.1 kg)  SpO2 93%  BMI 32.12 kg/m2  No intake or output data in the 24 hours ending 10/30/17 2120  General: awake/alert   Chest:  clear to auscultation bilaterally without respiratory distress  CVS: regular rate and rhythm  Abdominal: soft, nontender, normal bowel sounds  Extremities: no cyanosis or edema  Skin: warm and dry without rash      Labs:    Results from last 7 days  Lab " Units 10/30/17  0919   WBC 10*3/mm3 7.56   HEMOGLOBIN g/dL 10.1*   HEMATOCRIT % 31.8*   PLATELETS 10*3/mm3 140           Results from last 7 days  Lab Units 10/30/17  0919   SODIUM mmol/L 136   POTASSIUM mmol/L 4.2   CHLORIDE mmol/L 94*   CO2 mmol/L 26.0   BUN mg/dL 65*   CREATININE mg/dL 7.90*   CALCIUM mg/dL 8.5   BILIRUBIN mg/dL 0.4   ALK PHOS U/L 178*   ALT (SGPT) U/L 27   AST (SGOT) U/L 30   GLUCOSE mg/dL 88       Radiology:   Imaging Results (last 72 hours)     Procedure Component Value Units Date/Time    XR Hip With or Without Pelvis 2 - 3 View Left [[id number]] Collected:  10/30/17 0757     Updated:  10/30/17 0804    Narrative:       EXAMINATION: XR HIP W OR WO PELVIS 2-3 VIEW LEFT-  10/30/2017 7:57 AM  CDT     HISTORY: Fall. Left hip pain     COMPARISON:CT exam dated 09/15/2017     TECHNIQUE:3 views: AP pelvis. AP lateral left hip.     FINDINGS:     There are fractures of the left superior-inferior pubic rami not noted  on the previous CT examination.     The hip joint is maintained with osteoarthritic changes. The femur is  intact without fracture.     There is osteoporosis. There are changes from lumbar spine surgery.       Impression:       1. Left superior and inferior pubic rami fractures.  2. Left hip joint maintained without femur fracture.  This report was finalized on 10/30/2017 08:01 by Dr. Valentino Mccollum MD.    XR Femur 2 View Left [[id number]] Collected:  10/30/17 0801     Updated:  10/30/17 0806    Narrative:       EXAMINATION: XR FEMUR 2 VW LEFT-  10/30/2017 8:02 AM CDT     HISTORY: Fall     COMPARISON:None     TECHNIQUE:2 views, 4 images. AP and lateral projection imaging     FINDINGS:     Left superior and inferior pubic rami fractures noted.     The hip joint and knee joint are maintained. The bony structures are  intact without additional fracture.     Changes from knee arthroplasty observed. No hardware complication  identified.     Arterial calcifications observed.       Impression:        1. Left superior and inferior pubic rami fractures.  2. Left femur intact without fracture.  3. Changes from left knee arthroplasty. No hardware complications.  This report was finalized on 10/30/2017 08:03 by Dr. Valentino Mccollum MD.    CT Head Without Contrast [[id number]] Collected:  10/30/17 0821     Updated:  10/30/17 0832    Narrative:       History:  77-year-old evaluated for fall.      Reference:  CT head July 2015     Technique:   Unenhanced CT imaging of the head/brain performed.     For this CT exam, one or more of the following dose reduction techniques  was employed:  -automated exposure control  -mA and/or kVp adjustment for patient size  -iterative reconstruction      mGy-cm     Findings:   There is no intracranial hemorrhage or extra-axial collection. No  findings of cerebral edema or contusion. 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  There is age-related atrophy without hydrocephalus.     No skull fracture is evident. No hemorrhage in the visualized paranasal  sinuses. Extracranial soft tissues are unremarkable.          Impression:       Impression:  No acute intracranial injury.  This report was finalized on 10/30/2017 08:29 by  Tolu Joseph, .          Culture:         Assessment   ESRD  Fall from standing with closed left pubic ramus fracture  HTN  Anemia CKD  Secondary Hyperparathyroidism  Low Vitamin D    Plan:  HD MWF  Monitor labs  Pain control per ortho, no operative plans per their note      Thank you for the consult, we appreciate the opportunity to provide care to your patients.  Feel free to contact me if I can be of any further assistance.      Chris Hsu MD  10/30/2017  9:20 PM       Electronically signed by Chris Hsu MD at 10/30/2017  9:22 PM        Nutrition Notes (most recent note)     No notes of this type exist for this encounter.           Physical Therapy Notes (most recent note)       Airam Pelletier PTA at 2017 11:05 AM  Version 1 of 1         Acute Care - Physical Therapy Treatment Note  [facility]     Patient Name: Cheri Ely  : 1940  MRN: [id number]  Today's Date: 2017  Onset of Illness/Injury or Date of Surgery Date: 10/30/17  Date of Referral to PT: 10/30/17  Referring Physician: Dr Rivera    Admit Date: 10/30/2017    Visit Dx:    ICD-10-CM ICD-9-CM   1. Closed nondisplaced fracture of left pubis, initial encounter S32.502A 808.2   2. Fall, initial encounter W19.XXXA E888.9   3. Impaired functional mobility, balance, gait, and endurance Z74.09 V49.89     Patient Active Problem List   Diagnosis   • Hypertension   • Hyperlipidemia   • Chronic renal failure   • Closed fracture of ramus of left pubis               Adult Rehabilitation Note       17 1011 10/31/17 1350       Rehab Assessment/Intervention    Discipline physical therapy assistant  -KJ physical therapy assistant  -CW     Document Type therapy note (daily note)  -KJ therapy note (daily note)  -CW     Subjective Information agree to therapy;complains of;pain  -KJ agree to therapy  -CW     Patient Effort, Rehab Treatment fair  -KJ good  -CW     Precautions/Limitations fall precautions   WBAT LLE and R foot  -KJ fall precautions  -CW     Recorded by [KJ] Airam Pelletier PTA [CW] Casandra Crooks PTA     Pain Assessment    Pain Assessment 0-10  -KJ Farias-Lopez FACES  -CW     Farias-Baker FACES Pain Rating  10  -CW     Pain Score 10  -KJ      Post Pain Score 10  -KJ      Pain Type Acute pain  -KJ Acute pain  -CW     Pain Location Groin  -KJ Hip  -CW     Pain Orientation Left  -KJ Left  -CW     Pain Radiating Towards  L thigh  -CW     Pain Descriptors Sharp  -KJ Sharp;Shooting;Stabbing  -CW     Pain Frequency Intermittent   with movement  -KJ Intermittent  -CW     Pain Intervention(s) Medication (See MAR);Repositioned  -KJ Repositioned  -CW     Response to Interventions  tolerated  -CW     Multiple Pain  Sites Yes  -KJ Yes  -CW     Recorded by [KJ] Airam Pelletier PTA [CW] Casandra Crooks PTA     Pain 2    Farias-Baker FACES Pain Rating 2  10  -CW     Pre Tx Pain Score 2 5  -KJ      Post Tx Pain Score 2 10  -KJ      Pain Type 2 Acute pain  -KJ Acute pain  -CW     Pain Location 2 Ankle  -KJ Ankle  -CW     Pain Orientation 2 Right  -KJ Right  -CW     Pain Descriptors 2 Sharp;Stabbing  -KJ Stabbing;Aching  -CW     Pain Frequency 2 Constant/continuous  -KJ Constant/continuous  -CW     Pain Intervention(s) 2 Repositioned  -KJ Repositioned  -CW     Recorded by [KJ] Airam Pelletier PTA [CW] Casandra Crooks PTA     Mobility Assessment/Training    Extremity Weight-Bearing Status left lower extremity;right lower extremity  -KJ left lower extremity  -CW     Left Lower Extremity Weight-Bearing weight-bearing as tolerated  -KJ weight-bearing as tolerated  -CW     Additional Documentation --   CT scan ok on right foot  -KJ      Recorded by [KJ] Airam Pelletier PTA [CW] Casandra Crooks PTA     Bed Mobility, Assessment/Treatment    Bed Mobility, Assistive Device bed rails;head of bed elevated;draw sheet  -KJ bed rails;head of bed elevated;draw sheet  -CW     Bed Mob, Supine to Sit, [place] verbal cues required;maximum assist (25% patient effort);2 person assist required;moderate assist (50% patient effort)  -KJ maximum assist (25% patient effort);verbal cues required  -CW     Bed Mob, Sit to Supine, [place] verbal cues required;maximum assist (25% patient effort);2 person assist required  -KJ maximum assist (25% patient effort);2 person assist required;verbal cues required  -CW     Bed Mobility, Safety Issues decreased use of legs for bridging/pushing;decreased use of arms for pushing/pulling  -KJ decreased use of legs for bridging/pushing  -CW     Bed Mobility, Impairments pain;strength decreased  -KJ pain  -CW     Recorded by [KJ] Airam Pelletier PTA [CW] Casandra Crooks PTA     Transfer  Assessment/Treatment    Transfers, Bed-Chair-Bed, Assist Device elevated surface  -KJ elevated surface  -CW     Transfers, Sit-Stand [place] moderate assist (50% patient effort);verbal cues required;2 person assist required  -KJ maximum assist (25% patient effort);2 person assist required;verbal cues required   CGA X 2 to maintain balance standing  -CW     Transfers, Stand-Sit [place] verbal cues required;minimum assist (75% patient effort);moderate assist (50% patient effort);2 person assist required  -KJ minimum assist (75% patient effort);2 person assist required;verbal cues required  -CW     Transfers, Sit-Stand-Sit, Assist Device rolling walker  -KJ rolling walker  -CW     Transfer, Safety Issues weight-shifting ability decreased;step length decreased  -KJ weight-shifting ability decreased  -CW     Transfer, Impairments strength decreased;pain  -KJ pain;strength decreased  -CW     Transfer, Comment stood x 1 for 2 minutes; able to take one side step with assistance from therapist sideways up towards head of bed  -KJ Stood x 2. Pt very forward flexed leaning over walker.  -CW     Recorded by [KJ] Airam Pelletier PTA [CW] Casandra Crooks PTA     Gait Assessment/Treatment    Gait, Comment not ablet to walk due to pain  -KJ      Recorded by [KJ] Airam Pelletier PTA      Balance Skills Training    Sitting-Level of Assistance  Close supervision  -CW     Sitting-Balance Support  Feet supported;Right upper extremity supported;Left upper extremity supported  -CW     Standing-Level of Assistance  Contact guard;Minimum assistance;x2  -CW     Static Standing Balance Support  assistive device  -CW     Recorded by  [CW] Casandra Crooks PTA     Therapy Exercises    Bilateral Lower Extremities AAROM:;10 reps  -KJ AROM:;20 reps;sitting;ankle pumps/circles;LAQ   unable to AP on R foot  -CW     Recorded by [KJ] Airam Pelletier PTA [CW] Casandra Crooks PTA     Positioning and Restraints    Pre-Treatment  Position in bed  -KJ in bed  -CW     Post Treatment Position bed  -KJ bed  -CW     In Bed  supine;call light within reach;with family/caregiver;RLE elevated  -CW     Recorded by [KJ] Airam Pelletier, BEATRICE [CW] Casandra Crooks PTA       User Key  (r) = Recorded By, (t) = Taken By, (c) = Cosigned By    Initials Name Effective Dates    KYMBERLY Pelletier, BEATRICE 08/02/16 -     CW Casandra Crooks PTA 06/22/15 -                 IP PT Goals       10/31/17 1201          Bed Mobility PT LTG    Bed Mobility PT LTG, Date Established 10/31/17  -PB (r) AA (t) PB (c)      Bed Mobility PT LTG, Time to Achieve by discharge  -PB (r) AA (t) PB (c)      Bed Mobility PT LTG, Activity Type all bed mobility  -PB (r) AA (t) PB (c)      Bed Mobility PT LTG, [place] Level independent  -PB (r) AA (t) PB (c)      Transfer Training PT LTG    Transfer Training PT LTG, Date Established 10/31/17  -PB (r) AA (t) PB (c)      Transfer Training PT LTG, Time to Achieve by discharge  -PB (r) AA (t) PB (c)      Transfer Training PT LTG, Activity Type bed to chair /chair to bed;sit to stand/stand to sit  -PB (r) AA (t) PB (c)      Transfer Training PT LTG, [place] Level conditional independence  -PB (r) AA (t) PB (c)      Transfer Training PT LTG, Assist Device walker, rolling  -PB (r) AA (t) PB (c)      Static Standing Balance PT LTG    Static Standing Balance PT LTG, Date Established 10/31/17  -PB (r) AA (t) PB (c)      Static Standing Balance PT LTG, Time to Achieve by discharge  -PB (r) AA (t) PB (c)      Static Standing Balance PT LTG, [place] Level conditional independence  -PB (r) AA (t) PB (c)      Static Standing Balance PT LTG, Assist Device assistive Device  -PB (r) AA (t) PB (c)      Static Standing Balance PT LTG, Additional Goal 5 min  -PB (r) AA (t) PB (c)        User Key  (r) = Recorded By, (t) = Taken By, (c) = Cosigned By    Initials Name Provider Type    PB Valentino Puentes, PT DPT Physical Therapist    MARIA D BARROW  Talha, PT Student PT Student          Physical Therapy Education     Title: PT OT SLP Therapies (Active)     Topic: Physical Therapy (Active)     Point: Mobility training (Active)    Learning Progress Summary    Learner Readiness Method Response Comment Documented by Status   Patient Acceptance E NR bed mobility, transfers, and benefits of exercise  11/01/17 1102 Active    Acceptance E VU,NR Benefits of activity, role of PT, hand and foot placement for transfers  10/31/17 1200 Done               Point: Body mechanics (Done)    Learning Progress Summary    Learner Readiness Method Response Comment Documented by Status   Patient Acceptance E VU,NR Benefits of activity, role of PT, hand and foot placement for transfers AA 10/31/17 1200 Done                      User Key     Initials Effective Dates Name Provider Type Discipline     08/02/16 -  Airam Pelletier, PTA Physical Therapy Assistant PT     10/11/17 -  Vandana Palencia, PT Student PT Student PT                    PT Recommendation and Plan  Anticipated Discharge Disposition: skilled nursing facility, home with 24/7 care, home with home health  Demonstrates Need for Referral to Another Service: occupational therapy  Planned Therapy Interventions: bed mobility training, gait training, patient/family education, transfer training  PT Frequency: 2 times/day, daily, per priority policy  Plan of Care Review  Plan Of Care Reviewed With: patient  Progress: progress toward functional goals is gradual  Outcome Summary/Follow up Plan: PT tx completed. Pt supine in bed. C/O increased pain with any movement. 10/10 with movement. Max assist of 2 supine to sit, sit-stand mod of 2. Able to stand more upright thann yesterday, but unable to take any steps due to pain. Pt benefit from rehab/SNF for cont'd therapy.          Outcome Measures       10/31/17 1100          How much help from another person do you currently need...    Turning from your back to your side while in  flat bed without using bedrails? 2  -PB (r) AA (t) PB (c)      Moving from lying on back to sitting on the side of a flat bed without bedrails? 2  -PB (r) AA (t) PB (c)      Moving to and from a bed to a chair (including a wheelchair)? 2  -PB (r) AA (t) PB (c)      Standing up from a chair using your arms (e.g., wheelchair, bedside chair)? 2  -PB (r) AA (t) PB (c)      Climbing 3-5 steps with a railing? 1  -PB (r) AA (t) PB (c)      To walk in hospital room? 1  -PB (r) AA (t) PB (c)      AM-PAC 6 Clicks Score 10  -PB (r) AA (t)      Functional Assessment    Outcome Measure Options AM-PAC 6 Clicks Basic Mobility (PT)  -PB (r) AA (t) PB (c)        User Key  (r) = Recorded By, (t) = Taken By, (c) = Cosigned By    Initials Name Provider Type    PB Valentino Puentes, PT DPT Physical Therapist    AA Vandana Palencia, PT Student PT Student           Time Calculation:       Therapy Charges for Today     Code Description Service Date Service Provider Modifiers Qty    94357519337 HC PT THER SUPP EA 15 MIN 10/31/2017 Airam Pelletier PTA GP 1          PT G-Codes  Outcome Measure Options: AM-PAC 6 Clicks Basic Mobility (PT)  Score: 10  Functional Limitation: Mobility: Walking and moving around  Mobility: Walking and Moving Around Current Status (): At least 60 percent but less than 80 percent impaired, limited or restricted  Mobility: Walking and Moving Around Goal Status (): At least 40 percent but less than 60 percent impaired, limited or restricted    Airam Pelletier PTA  11/1/2017            Electronically signed by Airam Pelletier PTA at 11/1/2017 11:06 AM        Occupational Therapy Notes (most recent note)     No notes of this type exist for this encounter.        Speech Language Pathology Notes (most recent note)     No notes of this type exist for this encounter.

## 2017-11-02 NOTE — THERAPY TREATMENT NOTE
Acute Care - Physical Therapy Treatment Note  [facility]     Patient Name: Cheri Ely  : 1940  MRN: [id number]  Today's Date: 2017  Onset of Illness/Injury or Date of Surgery Date: 10/30/17  Date of Referral to PT: 10/30/17  Referring Physician: Dr Rivera    Admit Date: 10/30/2017    Visit Dx:    ICD-10-CM ICD-9-CM   1. Closed nondisplaced fracture of left pubis, initial encounter S32.502A 808.2   2. Fall, initial encounter W19.XXXA E888.9   3. Impaired functional mobility, balance, gait, and endurance Z74.09 V49.89     Patient Active Problem List   Diagnosis   • Hypertension   • Hyperlipidemia   • Chronic renal failure   • Closed fracture of ramus of left pubis               Adult Rehabilitation Note       17 1544 17 1045 17 1500    Rehab Assessment/Intervention    Discipline physical therapy assistant  -KJ physical therapy assistant  -AB physical therapy assistant  -KJ    Document Type therapy note (daily note)  -KJ therapy note (daily note)  -AB therapy note (daily note)  -KJ    Subjective Information agree to therapy  -KJ agree to therapy;complains of;pain  -AB agree to therapy  -KJ    Precautions/Limitations fall precautions  -KJ fall precautions  -AB     Recorded by [KJ] Airam Pelletier PTA [AB] Rafaela rAreguin PTA [KJ] Airam Pelletier PTA    Pain Assessment    Pain Assessment 0-10  -KJ 0-10  -AB     Pain Score 5  -KJ 9  -AB     Post Pain Score 7  -KJ 10  -AB     Pain Type Acute pain  -KJ Acute pain  -AB     Pain Location Groin  -KJ Groin  -AB     Pain Orientation Left  -KJ Left  -AB     Pain Descriptors Sharp  -KJ Sharp  -AB     Pain Frequency Intermittent  -KJ Intermittent  -AB     Pain Intervention(s) Repositioned  -KJ Repositioned  -AB     Response to Interventions  tolerated  -AB     Recorded by [KJ] Airam Pelletier PTA [AB] Rafaela Arreguin PTA     Pain 2    Pre Tx Pain Score 2  7  -AB     Post Tx Pain Score 2  10  -AB     Pain Type 2  Acute pain  -AB     Pain  Location 2  Ankle  -AB     Pain Orientation 2  Right  -AB     Pain Descriptors 2  Sharp  -AB     Pain Frequency 2  Intermittent  -AB     Pain Intervention(s) 2  Repositioned  -AB     Recorded by  [AB] Rafaela Arreguin PTA     Bed Mobility, Assessment/Treatment    Bed Mob, Supine to Sit, [place] verbal cues required;moderate assist (50% patient effort)  -KJ maximum assist (25% patient effort);moderate assist (50% patient effort)  -AB     Bed Mob, Sit to Supine, [place]  moderate assist (50% patient effort);maximum assist (25% patient effort)  -AB     Bed Mobility, Safety Issues decreased use of arms for pushing/pulling  -KJ decreased use of legs for bridging/pushing;decreased use of arms for pushing/pulling  -AB     Bed Mobility, Impairments pain;strength decreased  -KJ pain;strength decreased  -AB     Recorded by [KJ] Airam Pelletier PTA [AB] Rafaela Arreguin PTA     Transfer Assessment/Treatment    Transfers, Sit-Stand [place] verbal cues required;minimum assist (75% patient effort);contact guard assist  -KJ moderate assist (50% patient effort)  -AB     Transfers, Stand-Sit [place] verbal cues required;contact guard assist  -KJ minimum assist (75% patient effort)  -AB     Transfers, Sit-Stand-Sit, Assist Device rolling walker  -KJ rolling walker  -AB     Toilet Transfer, [place]  moderate assist (50% patient effort);other (see comments)   had to help move BLE  -AB     Toilet Transfer, Assistive Device  rolling walker  -AB     Transfer, Safety Issues weight-shifting ability decreased  -KJ weight-shifting ability decreased;step length decreased;other (see comments)   hold to front of walker  -AB     Transfer, Impairments strength decreased  -KJ strength decreased  -AB     Recorded by [KJ] Airam Pelletier PTA [AB] Rafaela Arreguin PTA     Gait Assessment/Treatment    Gait, [place] Level verbal cues required;minimum assist (75% patient effort)  -KJ minimum assist (75% patient  effort);moderate assist (50% patient effort);other (see comments)   had to help slide R LE  -AB     Gait, Assistive Device rolling walker  -KJ rolling walker  -AB     Gait, Distance (Feet) --   steps to chair(3-4) small steps  -KJ --   side steps up in bed  -AB     Gait, Safety Issues weight-shifting ability decreased;step length decreased  -KJ      Gait, Impairments impaired balance;strength decreased;pain  -KJ      Recorded by [KJ] Airam Pelletier PTA [AB] Rafaela Arreguin PTA     Therapy Exercises    Bilateral Lower Extremities AAROM:;AROM:;15 reps  -KJ AAROM:;AROM:;20 reps;sitting;LAQ  -AB     Recorded by [KJ] Airam Pelletier PTA [AB] Rafaela Arreguin PTA     Positioning and Restraints    Pre-Treatment Position in bed  -KJ in bed  -AB     Post Treatment Position chair  -KJ bed  -AB     In Bed  fowlers;call light within reach  -AB     Recorded by [KJ] Airam Pelletier PTA [AB] Rafaela Arreguin PTA       11/01/17 1011 10/31/17 1350       Rehab Assessment/Intervention    Discipline physical therapy assistant  -KJ physical therapy assistant  -CW     Document Type therapy note (daily note)  -KJ therapy note (daily note)  -CW     Subjective Information agree to therapy;complains of;pain  -KJ agree to therapy  -CW     Patient Effort, Rehab Treatment fair  -KJ good  -CW     Precautions/Limitations fall precautions   WBAT LLE and R foot  -KJ fall precautions  -CW     Recorded by [KJ] Airam Pelletier PTA [CW] Casandra Crooks PTA     Pain Assessment    Pain Assessment 0-10  -KJ Farias-Lopez FACES  -CW     Farias-Baker FACES Pain Rating  10  -CW     Pain Score 10  -KJ      Post Pain Score 10  -KJ      Pain Type Acute pain  -KJ Acute pain  -CW     Pain Location Groin  -KJ Hip  -CW     Pain Orientation Left  -KJ Left  -CW     Pain Radiating Towards  L thigh  -CW     Pain Descriptors Sharp  -KJ Sharp;Shooting;Stabbing  -CW     Pain Frequency Intermittent   with movement  -KJ Intermittent  -CW     Pain Intervention(s) Medication  (See MAR);Repositioned  -KJ Repositioned  -CW     Response to Interventions  tolerated  -CW     Multiple Pain Sites Yes  -KJ Yes  -CW     Recorded by [KJ] Airam Pelletier PTA [CW] Casandra Crooks PTA     Pain 2    Farias-Baker FACES Pain Rating 2  10  -CW     Pre Tx Pain Score 2 5  -KJ      Post Tx Pain Score 2 10  -KJ      Pain Type 2 Acute pain  -KJ Acute pain  -CW     Pain Location 2 Ankle  -KJ Ankle  -CW     Pain Orientation 2 Right  -KJ Right  -CW     Pain Descriptors 2 Sharp;Stabbing  -KJ Stabbing;Aching  -CW     Pain Frequency 2 Constant/continuous  -KJ Constant/continuous  -CW     Pain Intervention(s) 2 Repositioned  -KJ Repositioned  -CW     Recorded by [KJ] Airam Pelletier PTA [CW] Casandra Crooks PTA     Mobility Assessment/Training    Extremity Weight-Bearing Status left lower extremity;right lower extremity  -KJ left lower extremity  -CW     Left Lower Extremity Weight-Bearing weight-bearing as tolerated  -KJ weight-bearing as tolerated  -CW     Additional Documentation --   CT scan ok on right foot  -KJ      Recorded by [KJ] Airam Pelletier PTA [CW] Casandra Crooks PTA     Bed Mobility, Assessment/Treatment    Bed Mobility, Assistive Device bed rails;head of bed elevated;draw sheet  -KJ bed rails;head of bed elevated;draw sheet  -CW     Bed Mob, Supine to Sit, [place] verbal cues required;maximum assist (25% patient effort);2 person assist required;moderate assist (50% patient effort)  -KJ maximum assist (25% patient effort);verbal cues required  -CW     Bed Mob, Sit to Supine, [place] verbal cues required;maximum assist (25% patient effort);2 person assist required  -KJ maximum assist (25% patient effort);2 person assist required;verbal cues required  -CW     Bed Mobility, Safety Issues decreased use of legs for bridging/pushing;decreased use of arms for pushing/pulling  -KJ decreased use of legs for bridging/pushing  -CW     Bed Mobility, Impairments pain;strength decreased  -KJ  pain  -CW     Recorded by [KJ] Airam Pelletier PTA [CW] Casandra Coroks PTA     Transfer Assessment/Treatment    Transfers, Bed-Chair-Bed, Assist Device elevated surface  -KJ elevated surface  -CW     Transfers, Sit-Stand [place] moderate assist (50% patient effort);verbal cues required;2 person assist required  -KJ maximum assist (25% patient effort);2 person assist required;verbal cues required   CGA X 2 to maintain balance standing  -CW     Transfers, Stand-Sit [place] verbal cues required;minimum assist (75% patient effort);moderate assist (50% patient effort);2 person assist required  -KJ minimum assist (75% patient effort);2 person assist required;verbal cues required  -CW     Transfers, Sit-Stand-Sit, Assist Device rolling walker  -KJ rolling walker  -CW     Transfer, Safety Issues weight-shifting ability decreased;step length decreased  -KJ weight-shifting ability decreased  -CW     Transfer, Impairments strength decreased;pain  -KJ pain;strength decreased  -CW     Transfer, Comment stood x 1 for 2 minutes; able to take one side step with assistance from therapist sideways up towards head of bed  -KJ Stood x 2. Pt very forward flexed leaning over walker.  -CW     Recorded by [KJ] Airam Pelletier PTA [CW] Casandra Crooks PTA     Gait Assessment/Treatment    Gait, Comment not ablet to walk due to pain  -KJ      Recorded by [KJ] Airam Pelletier PTA      Balance Skills Training    Sitting-Level of Assistance  Close supervision  -CW     Sitting-Balance Support  Feet supported;Right upper extremity supported;Left upper extremity supported  -CW     Standing-Level of Assistance  Contact guard;Minimum assistance;x2  -CW     Static Standing Balance Support  assistive device  -CW     Recorded by  [CW] Casandra Crooks PTA     Therapy Exercises    Bilateral Lower Extremities AAROM:;10 reps  -KJ AROM:;20 reps;sitting;ankle pumps/circles;LAQ   unable to AP on R foot  -CW     Recorded by [KJ] Airam BARROW  Prabhu, PTA [CW] Casandra Crooks PTA     Positioning and Restraints    Pre-Treatment Position in bed  -KJ in bed  -CW     Post Treatment Position bed  -KJ bed  -CW     In Bed  supine;call light within reach;with family/caregiver;RLE elevated  -CW     Recorded by [KJ] Airam Pelletier, PTA [CW] Casandra Crooks PTA       User Key  (r) = Recorded By, (t) = Taken By, (c) = Cosigned By    Initials Name Effective Dates    AB Rafaela Arreguin, PTA 08/02/16 -     KJ Airam Pelletier, PTA 08/02/16 -     CW Casandra Crooks, PTA 06/22/15 -                 IP PT Goals       10/31/17 1201          Bed Mobility PT LTG    Bed Mobility PT LTG, Date Established 10/31/17  -PB (r) AA (t) PB (c)      Bed Mobility PT LTG, Time to Achieve by discharge  -PB (r) AA (t) PB (c)      Bed Mobility PT LTG, Activity Type all bed mobility  -PB (r) AA (t) PB (c)      Bed Mobility PT LTG, [place] Level independent  -PB (r) AA (t) PB (c)      Transfer Training PT LTG    Transfer Training PT LTG, Date Established 10/31/17  -PB (r) AA (t) PB (c)      Transfer Training PT LTG, Time to Achieve by discharge  -PB (r) AA (t) PB (c)      Transfer Training PT LTG, Activity Type bed to chair /chair to bed;sit to stand/stand to sit  -PB (r) AA (t) PB (c)      Transfer Training PT LTG, [place] Level conditional independence  -PB (r) AA (t) PB (c)      Transfer Training PT LTG, Assist Device walker, rolling  -PB (r) AA (t) PB (c)      Static Standing Balance PT LTG    Static Standing Balance PT LTG, Date Established 10/31/17  -PB (r) AA (t) PB (c)      Static Standing Balance PT LTG, Time to Achieve by discharge  -PB (r) AA (t) PB (c)      Static Standing Balance PT LTG, [place] Level conditional independence  -PB (r) AA (t) PB (c)      Static Standing Balance PT LTG, Assist Device assistive Device  -PB (r) AA (t) PB (c)      Static Standing Balance PT LTG, Additional Goal 5 min  -PB (r) AA (t) PB (c)        User Key  (r) = Recorded By, (t)  = Taken By, (c) = Cosigned By    Initials Name Provider Type    PB Valentino Puentes, PT DPT Physical Therapist     Vandana Palencia, PT Student PT Student          Physical Therapy Education     Title: PT OT SLP Therapies (Active)     Topic: Physical Therapy (Active)     Point: Mobility training (Active)    Learning Progress Summary    Learner Readiness Method Response Comment Documented by Status   Patient Acceptance E NR bed mobility, transfers, and benefits of exercise  11/01/17 1102 Active    Acceptance E VU,NR Benefits of activity, role of PT, hand and foot placement for transfers  10/31/17 1200 Done               Point: Body mechanics (Done)    Learning Progress Summary    Learner Readiness Method Response Comment Documented by Status   Patient Acceptance E VU,NR Benefits of activity, role of PT, hand and foot placement for transfers  10/31/17 1200 Done                      User Key     Initials Effective Dates Name Provider Type Discipline     08/02/16 -  Airam Pelletier, PTA Physical Therapy Assistant PT     10/11/17 -  Vandana Palencia, PT Student PT Student PT                    PT Recommendation and Plan  Anticipated Discharge Disposition: skilled nursing facility, home with 24/7 care, home with home health  Demonstrates Need for Referral to Another Service: occupational therapy  Planned Therapy Interventions: bed mobility training, gait training, patient/family education, transfer training  PT Frequency: 2 times/day, daily, per priority policy  Plan of Care Review  Plan Of Care Reviewed With: patient  Progress: progress toward functional goals is gradual  Outcome Summary/Follow up Plan: PT tx completed. Pt supine in bed. C/O increased pain with any movement. 10/10 with movement. Max assist of 2 supine to sit, sit-stand mod of 2. Able to stand more upright thann yesterday, but unable to take any steps due to pain. Pt benefit from rehab/SNF for cont'd therapy.          Outcome Measures        10/31/17 1100          How much help from another person do you currently need...    Turning from your back to your side while in flat bed without using bedrails? 2  -PB (r) AA (t) PB (c)      Moving from lying on back to sitting on the side of a flat bed without bedrails? 2  -PB (r) AA (t) PB (c)      Moving to and from a bed to a chair (including a wheelchair)? 2  -PB (r) AA (t) PB (c)      Standing up from a chair using your arms (e.g., wheelchair, bedside chair)? 2  -PB (r) AA (t) PB (c)      Climbing 3-5 steps with a railing? 1  -PB (r) AA (t) PB (c)      To walk in hospital room? 1  -PB (r) AA (t) PB (c)      AM-PAC 6 Clicks Score 10  -PB (r) AA (t)      Functional Assessment    Outcome Measure Options AM-PAC 6 Clicks Basic Mobility (PT)  -PB (r) AA (t) PB (c)        User Key  (r) = Recorded By, (t) = Taken By, (c) = Cosigned By    Initials Name Provider Type    PB Valentino Puentes, PT DPT Physical Therapist    AA Vandana Palencia, PT Student PT Student           Time Calculation:         PT Charges       11/02/17 1045          Time Calculation    Start Time 1045  -AB      Stop Time 1126  -AB      Time Calculation (min) 41 min  -AB      PT Received On 11/02/17  -AB      PT Goal Re-Cert Due Date 11/10/17  -AB      Time Calculation- PT    Total Timed Code Minutes- PT 41 minute(s)  -AB        User Key  (r) = Recorded By, (t) = Taken By, (c) = Cosigned By    Initials Name Provider Type    AB Rafaela Arreguin PTA Physical Therapy Assistant          Therapy Charges for Today     Code Description Service Date Service Provider Modifiers Qty    [id number]  PT THER PROC EA 15 MIN 11/1/2017 Airam Pelletier PTA GP 1    [id number]  PT THERAPEUTIC ACT EA 15 MIN 11/1/2017 Airam Pelletier PTA GP 2          PT G-Codes  Outcome Measure Options: AM-PAC 6 Clicks Basic Mobility (PT)  Score: 10  Functional Limitation: Mobility: Walking and moving around  Mobility: Walking and Moving Around Current Status (): At least 60  percent but less than 80 percent impaired, limited or restricted  Mobility: Walking and Moving Around Goal Status (): At least 40 percent but less than 60 percent impaired, limited or restricted    Airam Pelletier, PTA  11/2/2017

## 2017-11-02 NOTE — PROGRESS NOTES
Continued Stay Note   [facility]     Patient Name: Cheri Ely  MRN: [id number]  Today's Date: 11/2/2017    Admit Date: 10/30/2017          Discharge Plan       11/02/17 1409    Case Management/Social Work Plan    Plan [facility] [place]    Additional Comments [facility] [place] has offered pt a private room and will provide transportation to and from dialysis. Pt/family have been in communication with the nh and they accept the bed offer. Will follow for d/c.               Discharge Codes     None            MALIA Trevizo

## 2017-11-02 NOTE — THERAPY TREATMENT NOTE
Acute Care - Physical Therapy Treatment Note  [facility]     Patient Name: Cheri Ely  : 1940  MRN: [id number]  Today's Date: 2017  Onset of Illness/Injury or Date of Surgery Date: 10/30/17  Date of Referral to PT: 10/30/17  Referring Physician: Dr Rivera    Admit Date: 10/30/2017    Visit Dx:    ICD-10-CM ICD-9-CM   1. Closed nondisplaced fracture of left pubis, initial encounter S32.502A 808.2   2. Fall, initial encounter W19.XXXA E888.9   3. Impaired functional mobility, balance, gait, and endurance Z74.09 V49.89     Patient Active Problem List   Diagnosis   • Hypertension   • Hyperlipidemia   • Chronic renal failure   • Closed fracture of ramus of left pubis               Adult Rehabilitation Note       17 1045 17 1500 17 1011    Rehab Assessment/Intervention    Discipline physical therapy assistant  -AB physical therapy assistant  -KJ physical therapy assistant  -KJ    Document Type therapy note (daily note)  -AB therapy note (daily note)  -KJ therapy note (daily note)  -KJ    Subjective Information agree to therapy;complains of;pain  -AB agree to therapy  -KJ agree to therapy;complains of;pain  -KJ    Patient Effort, Rehab Treatment   fair  -KJ    Precautions/Limitations fall precautions  -AB  fall precautions   WBAT LLE and R foot  -KJ    Recorded by [AB] Rafaela Arreguin PTA [KJ] Airam Pelletier PTA [KJ] Airam Pelletier PTA    Pain Assessment    Pain Assessment 0-10  -AB  0-10  -KJ    Pain Score 9  -AB  10  -KJ    Post Pain Score 10  -AB  10  -KJ    Pain Type Acute pain  -AB  Acute pain  -KJ    Pain Location Groin  -AB  Groin  -KJ    Pain Orientation Left  -AB  Left  -KJ    Pain Descriptors Sharp  -AB  Sharp  -KJ    Pain Frequency Intermittent  -AB  Intermittent   with movement  -KJ    Pain Intervention(s) Repositioned  -AB  Medication (See MAR);Repositioned  -KJ    Response to Interventions tolerated  -AB      Multiple Pain Sites   Yes  -KJ    Recorded by [AB] Rafaela ORTIZ  BEATRICE Arreguin  [KJ] Airam Pelletier PTA    Pain 2    Pre Tx Pain Score 2 7  -AB  5  -KJ    Post Tx Pain Score 2 10  -AB  10  -KJ    Pain Type 2 Acute pain  -AB  Acute pain  -KJ    Pain Location 2 Ankle  -AB  Ankle  -KJ    Pain Orientation 2 Right  -AB  Right  -KJ    Pain Descriptors 2 Sharp  -AB  Sharp;Stabbing  -KJ    Pain Frequency 2 Intermittent  -AB  Constant/continuous  -KJ    Pain Intervention(s) 2 Repositioned  -AB  Repositioned  -KJ    Recorded by [AB] Rafaela Arreguin PTA  [KJ] Airam Pelletier PTA    Mobility Assessment/Training    Extremity Weight-Bearing Status   left lower extremity;right lower extremity  -KJ    Left Lower Extremity Weight-Bearing   weight-bearing as tolerated  -KJ    Additional Documentation   --   CT scan ok on right foot  -KJ    Recorded by   [KJ] Airam Pelletier PTA    Bed Mobility, Assessment/Treatment    Bed Mobility, Assistive Device   bed rails;head of bed elevated;draw sheet  -KJ    Bed Mob, Supine to Sit, [place] maximum assist (25% patient effort);moderate assist (50% patient effort)  -AB  verbal cues required;maximum assist (25% patient effort);2 person assist required;moderate assist (50% patient effort)  -KJ    Bed Mob, Sit to Supine, [place] moderate assist (50% patient effort);maximum assist (25% patient effort)  -AB  verbal cues required;maximum assist (25% patient effort);2 person assist required  -KJ    Bed Mobility, Safety Issues decreased use of legs for bridging/pushing;decreased use of arms for pushing/pulling  -AB  decreased use of legs for bridging/pushing;decreased use of arms for pushing/pulling  -KJ    Bed Mobility, Impairments pain;strength decreased  -AB  pain;strength decreased  -KJ    Recorded by [AB] Rafaela Arreguin, BEATRICE  [KJ] Airam Pelletier PTA    Transfer Assessment/Treatment    Transfers, Bed-Chair-Bed, Assist Device   elevated surface  -KJ    Transfers, Sit-Stand [place] moderate assist (50% patient effort)  -AB  moderate assist (50%  patient effort);verbal cues required;2 person assist required  -KJ    Transfers, Stand-Sit [place] minimum assist (75% patient effort)  -AB  verbal cues required;minimum assist (75% patient effort);moderate assist (50% patient effort);2 person assist required  -KJ    Transfers, Sit-Stand-Sit, Assist Device rolling walker  -AB  rolling walker  -KJ    Toilet Transfer, [place] moderate assist (50% patient effort);other (see comments)   had to help move BLE  -AB      Toilet Transfer, Assistive Device rolling walker  -AB      Transfer, Safety Issues weight-shifting ability decreased;step length decreased;other (see comments)   hold to front of walker  -AB  weight-shifting ability decreased;step length decreased  -KJ    Transfer, Impairments strength decreased  -AB  strength decreased;pain  -KJ    Transfer, Comment   stood x 1 for 2 minutes; able to take one side step with assistance from therapist sideways up towards head of bed  -KJ    Recorded by [AB] Rafaela Arreguin PTA  [KJ] Airam Pelletier PTA    Gait Assessment/Treatment    Gait, [place] minimum assist (75% patient effort);moderate assist (50% patient effort);other (see comments)   had to help slide R LE  -AB      Gait, Assistive Device rolling walker  -AB      Gait, Distance (Feet) --   side steps up in bed  -AB      Gait, Comment   not ablet to walk due to pain  -KJ    Recorded by [AB] Rafaela Arreguin PTA  [KJ] Airam Pelletier PTA    Therapy Exercises    Bilateral Lower Extremities AAROM:;AROM:;20 reps;sitting;LAQ  -AB  AAROM:;10 reps  -KJ    Recorded by [AB] Rafaela Arreguin PTA  [KJ] Airam Pelletier PTA    Positioning and Restraints    Pre-Treatment Position in bed  -AB  in bed  -KJ    Post Treatment Position bed  -AB  bed  -KJ    In Bed fowlers;call light within reach  -AB      Recorded by [AB] Rafaela Arreguin PTA  [KJ] Airam Pelletier PTA      10/31/17 1350          Rehab Assessment/Intervention    Discipline physical therapy assistant   -CW      Document Type therapy note (daily note)  -CW      Subjective Information agree to therapy  -CW      Patient Effort, Rehab Treatment good  -CW      Precautions/Limitations fall precautions  -CW      Recorded by [CW] Casandra Crooks PTA      Pain Assessment    Pain Assessment Farias-Lopez FACES  -CW      Farias-Baker FACES Pain Rating 10  -CW      Pain Type Acute pain  -CW      Pain Location Hip  -CW      Pain Orientation Left  -CW      Pain Radiating Towards L thigh  -CW      Pain Descriptors Sharp;Shooting;Stabbing  -CW      Pain Frequency Intermittent  -CW      Pain Intervention(s) Repositioned  -CW      Response to Interventions tolerated  -CW      Multiple Pain Sites Yes  -CW      Recorded by [CW] Casandra Crooks PTA      Pain 2    Farias-Baker FACES Pain Rating 2 10  -CW      Pain Type 2 Acute pain  -CW      Pain Location 2 Ankle  -CW      Pain Orientation 2 Right  -CW      Pain Descriptors 2 Stabbing;Aching  -CW      Pain Frequency 2 Constant/continuous  -CW      Pain Intervention(s) 2 Repositioned  -CW      Recorded by [CW] Casandra Crooks PTA      Mobility Assessment/Training    Extremity Weight-Bearing Status left lower extremity  -CW      Left Lower Extremity Weight-Bearing weight-bearing as tolerated  -CW      Recorded by [CW] Casandra Crooks PTA      Bed Mobility, Assessment/Treatment    Bed Mobility, Assistive Device bed rails;head of bed elevated;draw sheet  -CW      Bed Mob, Supine to Sit, [place] maximum assist (25% patient effort);verbal cues required  -CW      Bed Mob, Sit to Supine, [place] maximum assist (25% patient effort);2 person assist required;verbal cues required  -CW      Bed Mobility, Safety Issues decreased use of legs for bridging/pushing  -CW      Bed Mobility, Impairments pain  -CW      Recorded by [CW] Casandra Crooks PTA      Transfer Assessment/Treatment    Transfers, Bed-Chair-Bed, Assist Device elevated surface  -CW      Transfers, Sit-Stand  [place] maximum assist (25% patient effort);2 person assist required;verbal cues required   CGA X 2 to maintain balance standing  -CW      Transfers, Stand-Sit [place] minimum assist (75% patient effort);2 person assist required;verbal cues required  -CW      Transfers, Sit-Stand-Sit, Assist Device rolling walker  -CW      Transfer, Safety Issues weight-shifting ability decreased  -CW      Transfer, Impairments pain;strength decreased  -CW      Transfer, Comment Stood x 2. Pt very forward flexed leaning over walker.  -CW      Recorded by [CW] Casandra Crooks PTA      Balance Skills Training    Sitting-Level of Assistance Close supervision  -CW      Sitting-Balance Support Feet supported;Right upper extremity supported;Left upper extremity supported  -CW      Standing-Level of Assistance Contact guard;Minimum assistance;x2  -CW      Static Standing Balance Support assistive device  -CW      Recorded by [CW] Casandra Crooks PTA      Therapy Exercises    Bilateral Lower Extremities AROM:;20 reps;sitting;ankle pumps/circles;LAQ   unable to AP on R foot  -CW      Recorded by [CW] Casandra Crooks PTA      Positioning and Restraints    Pre-Treatment Position in bed  -CW      Post Treatment Position bed  -CW      In Bed supine;call light within reach;with family/caregiver;RLE elevated  -CW      Recorded by [CW] Caasndra Crooks PTA        User Key  (r) = Recorded By, (t) = Taken By, (c) = Cosigned By    Initials Name Effective Dates    AB Rafaela Arreguin, [facility] 08/02/16 -     KJ Airam Pelletier, [facility] 08/02/16 -     CW Casandra Crooks, [facility] 06/22/15 -                 IP PT Goals       10/31/17 1201          Bed Mobility PT LTG    Bed Mobility PT LTG, Date Established 10/31/17  -PB (r) AA (t) PB (c)      Bed Mobility PT LTG, Time to Achieve by discharge  -PB (r) AA (t) PB (c)      Bed Mobility PT LTG, Activity Type all bed mobility  -PB (r) AA (t) PB (c)      Bed Mobility PT LTG, [place] Level  independent  -PB (r) AA (t) PB (c)      Transfer Training PT LTG    Transfer Training PT LTG, Date Established 10/31/17  -PB (r) AA (t) PB (c)      Transfer Training PT LTG, Time to Achieve by discharge  -PB (r) AA (t) PB (c)      Transfer Training PT LTG, Activity Type bed to chair /chair to bed;sit to stand/stand to sit  -PB (r) AA (t) PB (c)      Transfer Training PT LTG, [place] Level conditional independence  -PB (r) AA (t) PB (c)      Transfer Training PT LTG, Assist Device walker, rolling  -PB (r) AA (t) PB (c)      Static Standing Balance PT LTG    Static Standing Balance PT LTG, Date Established 10/31/17  -PB (r) AA (t) PB (c)      Static Standing Balance PT LTG, Time to Achieve by discharge  -PB (r) AA (t) PB (c)      Static Standing Balance PT LTG, [place] Level conditional independence  -PB (r) AA (t) PB (c)      Static Standing Balance PT LTG, Assist Device assistive Device  -PB (r) AA (t) PB (c)      Static Standing Balance PT LTG, Additional Goal 5 min  -PB (r) AA (t) PB (c)        User Key  (r) = Recorded By, (t) = Taken By, (c) = Cosigned By    Initials Name Provider Type    ELVER Puentes, PT DPT Physical Therapist    MARIA D Palencia, PT Student PT Student          Physical Therapy Education     Title: PT OT SLP Therapies (Active)     Topic: Physical Therapy (Active)     Point: Mobility training (Active)    Learning Progress Summary    Learner Readiness Method Response Comment Documented by Status   Patient Acceptance E NR bed mobility, transfers, and benefits of exercise KJ 11/01/17 1102 Active    Acceptance E VU,NR Benefits of activity, role of PT, hand and foot placement for transfers AA 10/31/17 1200 Done               Point: Body mechanics (Done)    Learning Progress Summary    Learner Readiness Method Response Comment Documented by Status   Patient Acceptance E VU,NR Benefits of activity, role of PT, hand and foot placement for transfers AA 10/31/17 1200 Done                       User Key     Initials Effective Dates Name Provider Type Discipline    KJ 08/02/16 -  Airam Pelletier, PTA Physical Therapy Assistant PT    AA 10/11/17 -  Vandana Palencia, PT Student PT Student PT                    PT Recommendation and Plan  Anticipated Discharge Disposition: skilled nursing facility, home with 24/7 care, home with home health  Demonstrates Need for Referral to Another Service: occupational therapy  Planned Therapy Interventions: bed mobility training, gait training, patient/family education, transfer training  PT Frequency: 2 times/day, daily, per priority policy  Plan of Care Review  Plan Of Care Reviewed With: patient  Progress: progress toward functional goals as expected  Outcome Summary/Follow up Plan: She was Mod assist for bedmobility and sit to stand. She took a few side steps up in bed and t/f's to [facility] mod assist with Rwx. She was left in fowlers with call light.          Outcome Measures       10/31/17 1100          How much help from another person do you currently need...    Turning from your back to your side while in flat bed without using bedrails? 2  -PB (r) AA (t) PB (c)      Moving from lying on back to sitting on the side of a flat bed without bedrails? 2  -PB (r) AA (t) PB (c)      Moving to and from a bed to a chair (including a wheelchair)? 2  -PB (r) AA (t) PB (c)      Standing up from a chair using your arms (e.g., wheelchair, bedside chair)? 2  -PB (r) AA (t) PB (c)      Climbing 3-5 steps with a railing? 1  -PB (r) AA (t) PB (c)      To walk in hospital room? 1  -PB (r) AA (t) PB (c)      AM-PAC 6 Clicks Score 10  -PB (r) AA (t)      Functional Assessment    Outcome Measure Options AM-PAC 6 Clicks Basic Mobility (PT)  -PB (r) AA (t) PB (c)        User Key  (r) = Recorded By, (t) = Taken By, (c) = Cosigned By    Initials Name Provider Type    PB Valentino Puentes, PT DPT Physical Therapist    AA Vandana Palencia, PT Student PT Student           Time Calculation:          PT Charges       11/02/17 1045          Time Calculation    Start Time 1045  -AB      Stop Time 1126  -AB      Time Calculation (min) 41 min  -AB      PT Received On 11/02/17  -AB      PT Goal Re-Cert Due Date 11/10/17  -AB      Time Calculation- PT    Total Timed Code Minutes- PT 41 minute(s)  -AB        User Key  (r) = Recorded By, (t) = Taken By, (c) = Cosigned By    Initials Name Provider Type    AB Rafaela Arreguin PTA Physical Therapy Assistant          Therapy Charges for Today     Code Description Service Date Service Provider Modifiers Qty    47726430613 HC PT THERAPEUTIC ACT EA 15 MIN 11/2/2017 Rafaela Arreguin PTA GP 2    31642644593 HC PT THER PROC EA 15 MIN 11/2/2017 Rafaela Arreguin PTA GP 1          PT G-Codes  Outcome Measure Options: AM-PAC 6 Clicks Basic Mobility (PT)  Score: 10  Functional Limitation: Mobility: Walking and moving around  Mobility: Walking and Moving Around Current Status (): At least 60 percent but less than 80 percent impaired, limited or restricted  Mobility: Walking and Moving Around Goal Status (): At least 40 percent but less than 60 percent impaired, limited or restricted    Rafaela Arreguin PTA  11/2/2017

## 2017-11-03 LAB
ANION GAP SERPL CALCULATED.3IONS-SCNC: 14 MMOL/L (ref 4–13)
BUN BLD-MCNC: 45 MG/DL (ref 5–21)
BUN/CREAT SERPL: 7 (ref 7–25)
CALCIUM SPEC-SCNC: 8.1 MG/DL (ref 8.4–10.4)
CHLORIDE SERPL-SCNC: 88 MMOL/L (ref 98–110)
CO2 SERPL-SCNC: 29 MMOL/L (ref 24–31)
CREAT BLD-MCNC: 6.41 MG/DL (ref 0.5–1.4)
GFR SERPL CREATININE-BSD FRML MDRD: 6 ML/MIN/1.73
GLUCOSE BLD-MCNC: 81 MG/DL (ref 70–100)
POTASSIUM BLD-SCNC: 4.2 MMOL/L (ref 3.5–5.3)
SODIUM BLD-SCNC: 131 MMOL/L (ref 135–145)

## 2017-11-03 PROCEDURE — 80048 BASIC METABOLIC PNL TOTAL CA: CPT | Performed by: INTERNAL MEDICINE

## 2017-11-03 PROCEDURE — 63710000001 DIPHENHYDRAMINE PER 50 MG: Performed by: ORTHOPAEDIC SURGERY

## 2017-11-03 PROCEDURE — 97530 THERAPEUTIC ACTIVITIES: CPT

## 2017-11-03 PROCEDURE — 97110 THERAPEUTIC EXERCISES: CPT

## 2017-11-03 PROCEDURE — 5A1D70Z PERFORMANCE OF URINARY FILTRATION, INTERMITTENT, LESS THAN 6 HOURS PER DAY: ICD-10-PCS | Performed by: INTERNAL MEDICINE

## 2017-11-03 RX ADMIN — ATORVASTATIN CALCIUM 10 MG: 10 TABLET, FILM COATED ORAL at 21:12

## 2017-11-03 RX ADMIN — OXYCODONE HYDROCHLORIDE AND ACETAMINOPHEN 1 TABLET: 10; 325 TABLET ORAL at 10:36

## 2017-11-03 RX ADMIN — SERTRALINE 50 MG: 50 TABLET, FILM COATED ORAL at 10:34

## 2017-11-03 RX ADMIN — LEVOTHYROXINE SODIUM 175 MCG: 175 TABLET ORAL at 06:32

## 2017-11-03 RX ADMIN — PANTOPRAZOLE SODIUM 40 MG: 40 TABLET, DELAYED RELEASE ORAL at 06:32

## 2017-11-03 RX ADMIN — DIPHENHYDRAMINE HYDROCHLORIDE 25 MG: 25 CAPSULE ORAL at 17:58

## 2017-11-03 RX ADMIN — DIPHENHYDRAMINE HYDROCHLORIDE 25 MG: 25 CAPSULE ORAL at 10:34

## 2017-11-03 RX ADMIN — CARVEDILOL 12.5 MG: 6.25 TABLET, FILM COATED ORAL at 21:12

## 2017-11-03 RX ADMIN — CARVEDILOL 12.5 MG: 6.25 TABLET, FILM COATED ORAL at 17:57

## 2017-11-03 RX ADMIN — OXYCODONE HYDROCHLORIDE AND ACETAMINOPHEN 1 TABLET: 10; 325 TABLET ORAL at 21:12

## 2017-11-03 NOTE — THERAPY TREATMENT NOTE
Acute Care - Physical Therapy Treatment Note  [facility]     Patient Name: Cheri Ely  : 1940  MRN: [id number]  Today's Date: 11/3/2017  Onset of Illness/Injury or Date of Surgery Date: 10/30/17  Date of Referral to PT: 10/30/17  Referring Physician: Dr Rivera    Admit Date: 10/30/2017    Visit Dx:    ICD-10-CM ICD-9-CM   1. Closed nondisplaced fracture of left pubis, initial encounter S32.502A 808.2   2. Fall, initial encounter W19.XXXA E888.9   3. Impaired functional mobility, balance, gait, and endurance Z74.09 V49.89     Patient Active Problem List   Diagnosis   • Hypertension   • Hyperlipidemia   • Chronic renal failure   • Closed fracture of ramus of left pubis               Adult Rehabilitation Note       17 1425 17 0900 17 1544    Rehab Assessment/Intervention    Discipline physical therapy assistant  - physical therapy assistant  - physical therapy assistant  -    Document Type therapy note (daily note)  -  therapy note (daily note)  -    Subjective Information agree to therapy;complains of  -  agree to therapy  -    Treatment Not Performed  patient unavailable for treatment  -     Treatment Not Performed, Comment  dialysis  -     Precautions/Limitations fall precautions  -  fall precautions  -    Recorded by [] Idania Sibley PTA [] Idania Sibley PTA [KJ] Airam Pelletier PTA    Pain Assessment    Pain Assessment 0-10  -  0-10  -KJ    Pain Score 6  -  5  -KJ    Post Pain Score   7  -KJ    Pain Type Acute pain  -  Acute pain  -KJ    Pain Location Hip  -  Groin  -    Pain Orientation Right;Left  -  Left  -KJ    Pain Descriptors Sharp  -  Sharp  -KJ    Pain Frequency Intermittent   with movement  -  Intermittent  -KJ    Pain Intervention(s) Medication (See MAR);Repositioned  -  Repositioned  -KJ    Response to Interventions tolerated  -      Recorded by [] Idania Sibley PTA  [KJ] Airam Pelletier PTA    Pain 2    Pre Tx Pain  Score 2 4  -      Pain Type 2 Acute pain  -      Pain Location 2 Ankle  -      Pain Orientation 2 Right  -      Pain Descriptors 2 Sharp  -      Pain Frequency 2 Intermittent  -      Pain Intervention(s) 2 Medication (See MAR);Repositioned  -      Response to Interventions 2 tolerated  -      Recorded by [] Idania Sibley PTA      Bed Mobility, Assessment/Treatment    Bed Mob, Supine to Sit, [place] verbal cues required;minimum assist (75% patient effort)  -  verbal cues required;moderate assist (50% patient effort)  -    Bed Mob, Sit to Supine, [place] --   chair  -      Bed Mobility, Safety Issues decreased use of arms for pushing/pulling;decreased use of legs for bridging/pushing  -  decreased use of arms for pushing/pulling  -KJ    Bed Mobility, Impairments pain;strength decreased  -  pain;strength decreased  -KJ    Recorded by [] Idania Sibley PTA  [KJ] Airam Pelletier PTA    Transfer Assessment/Treatment    Transfers, Sit-Stand [place] minimum assist (75% patient effort);verbal cues required  -  verbal cues required;minimum assist (75% patient effort);contact guard assist  -    Transfers, Stand-Sit [place] contact guard assist;verbal cues required  -  verbal cues required;contact guard assist  -KJ    Transfers, Sit-Stand-Sit, Assist Device rolling walker  -  rolling walker  -    Transfer, Safety Issues weight-shifting ability decreased  -  weight-shifting ability decreased  -KJ    Transfer, Impairments pain  -  strength decreased  -KJ    Recorded by [] Idania Sibley PTA  [KJ] Airam Pelletier PTA    Gait Assessment/Treatment    Gait, [place] Level verbal cues required;contact guard assist  -  verbal cues required;minimum assist (75% patient effort)  -KJ    Gait, Assistive Device rolling walker  -  rolling walker  -KJ    Gait, Distance (Feet) --   steps bed to chair  -  --   steps to chair(3-4) small steps  -    Gait, Safety  Issues   weight-shifting ability decreased;step length decreased  -KJ    Gait, Impairments pain  -AH  impaired balance;strength decreased;pain  -KJ    Recorded by [AH] Idania Sibley PTA  [KJ] Airam Pelletier PTA    Therapy Exercises    Bilateral Lower Extremities AAROM:;AROM:;20 reps;supine  -AH  AAROM:;AROM:;15 reps  -KJ    Recorded by [AH] Idania Sibley PTA  [KJ] Airam Pelletier PTA    Positioning and Restraints    Pre-Treatment Position in bed  -AH  in bed  -KJ    Post Treatment Position chair  -AH  chair  -KJ    In Chair sitting;call light within reach;encouraged to call for assist;with nsg  -AH      Recorded by [AH] Idania Sibley PTA  [KJ] Airam Pelletier PTA      11/02/17 1045 11/01/17 1500 11/01/17 1011    Rehab Assessment/Intervention    Discipline physical therapy assistant  -AB physical therapy assistant  -KJ physical therapy assistant  -KJ    Document Type therapy note (daily note)  -AB therapy note (daily note)  -KJ therapy note (daily note)  -KJ    Subjective Information agree to therapy;complains of;pain  -AB agree to therapy  -KJ agree to therapy;complains of;pain  -KJ    Patient Effort, Rehab Treatment   fair  -KJ    Precautions/Limitations fall precautions  -AB  fall precautions   WBAT LLE and R foot  -KJ    Recorded by [AB] Rafaela Arreguin PTA [KJ] Airam Pelletier PTA [KJ] Airam Pelletier PTA    Pain Assessment    Pain Assessment 0-10  -AB  0-10  -KJ    Pain Score 9  -AB  10  -KJ    Post Pain Score 10  -AB  10  -KJ    Pain Type Acute pain  -AB  Acute pain  -KJ    Pain Location Groin  -AB  Groin  -KJ    Pain Orientation Left  -AB  Left  -KJ    Pain Descriptors Sharp  -AB  Sharp  -KJ    Pain Frequency Intermittent  -AB  Intermittent   with movement  -KJ    Pain Intervention(s) Repositioned  -AB  Medication (See MAR);Repositioned  -KJ    Response to Interventions tolerated  -AB      Multiple Pain Sites   Yes  -KJ    Recorded by [AB] Rafaela Arreguin PTA  [KJ] Airam Pelletier PTA    Pain 2     Pre Tx Pain Score 2 7  -AB  5  -KJ    Post Tx Pain Score 2 10  -AB  10  -KJ    Pain Type 2 Acute pain  -AB  Acute pain  -KJ    Pain Location 2 Ankle  -AB  Ankle  -KJ    Pain Orientation 2 Right  -AB  Right  -KJ    Pain Descriptors 2 Sharp  -AB  Sharp;Stabbing  -KJ    Pain Frequency 2 Intermittent  -AB  Constant/continuous  -KJ    Pain Intervention(s) 2 Repositioned  -AB  Repositioned  -KJ    Recorded by [AB] Rafaela Arreguin PTA  [KJ] Airam Pelletier PTA    Mobility Assessment/Training    Extremity Weight-Bearing Status   left lower extremity;right lower extremity  -KJ    Left Lower Extremity Weight-Bearing   weight-bearing as tolerated  -KJ    Additional Documentation   --   CT scan ok on right foot  -KJ    Recorded by   [KJ] Airam Pelletier PTA    Bed Mobility, Assessment/Treatment    Bed Mobility, Assistive Device   bed rails;head of bed elevated;draw sheet  -KJ    Bed Mob, Supine to Sit, [place] maximum assist (25% patient effort);moderate assist (50% patient effort)  -AB  verbal cues required;maximum assist (25% patient effort);2 person assist required;moderate assist (50% patient effort)  -KJ    Bed Mob, Sit to Supine, [place] moderate assist (50% patient effort);maximum assist (25% patient effort)  -AB  verbal cues required;maximum assist (25% patient effort);2 person assist required  -KJ    Bed Mobility, Safety Issues decreased use of legs for bridging/pushing;decreased use of arms for pushing/pulling  -AB  decreased use of legs for bridging/pushing;decreased use of arms for pushing/pulling  -KJ    Bed Mobility, Impairments pain;strength decreased  -AB  pain;strength decreased  -KJ    Recorded by [AB] Rafaela Arreguin, PTA  [KJ] Airam Pelletier PTA    Transfer Assessment/Treatment    Transfers, Bed-Chair-Bed, Assist Device   elevated surface  -KJ    Transfers, Sit-Stand [place] moderate assist (50% patient effort)  -AB  moderate assist (50% patient effort);verbal cues required;2 person assist  required  -KJ    Transfers, Stand-Sit [place] minimum assist (75% patient effort)  -AB  verbal cues required;minimum assist (75% patient effort);moderate assist (50% patient effort);2 person assist required  -KJ    Transfers, Sit-Stand-Sit, Assist Device rolling walker  -AB  rolling walker  -KJ    Toilet Transfer, [place] moderate assist (50% patient effort);other (see comments)   had to help move BLE  -AB      Toilet Transfer, Assistive Device rolling walker  -AB      Transfer, Safety Issues weight-shifting ability decreased;step length decreased;other (see comments)   hold to front of walker  -AB  weight-shifting ability decreased;step length decreased  -KJ    Transfer, Impairments strength decreased  -AB  strength decreased;pain  -KJ    Transfer, Comment   stood x 1 for 2 minutes; able to take one side step with assistance from therapist sideways up towards head of bed  -KJ    Recorded by [AB] Rafaela Arreguin PTA  [KJ] Airam Pelletier PTA    Gait Assessment/Treatment    Gait, [place] Level minimum assist (75% patient effort);moderate assist (50% patient effort);other (see comments)   had to help slide R LE  -AB      Gait, Assistive Device rolling walker  -AB      Gait, Distance (Feet) --   side steps up in bed  -AB      Gait, Comment   not ablet to walk due to pain  -KJ    Recorded by [AB] Rafaela Arreguin PTA  [KJ] Airam Pelletier PTA    Therapy Exercises    Bilateral Lower Extremities AAROM:;AROM:;20 reps;sitting;LAQ  -AB  AAROM:;10 reps  -KJ    Recorded by [AB] Rafaela Arreguin PTA  [KJ] Airam Pelletier PTA    Positioning and Restraints    Pre-Treatment Position in bed  -AB  in bed  -KJ    Post Treatment Position bed  -AB  bed  -KJ    In Bed fowlers;call light within reach  -AB      Recorded by [AB] Rafaela Arreguin PTA  [KJ] Airam Pelletier PTA      User Key  (r) = Recorded By, (t) = Taken By, (c) = Cosigned By    Initials Name Effective Dates    AB Rafaela Arreguin PTA 08/02/16 [facility]  Idania Sibley, PTA 08/02/16 -     KJ Airam BARROW Pelletier, PTA 08/02/16 -                 IP PT Goals       10/31/17 1201          Bed Mobility PT LTG    Bed Mobility PT LTG, Date Established 10/31/17  -PB (r) AA (t) PB (c)      Bed Mobility PT LTG, Time to Achieve by discharge  -PB (r) AA (t) PB (c)      Bed Mobility PT LTG, Activity Type all bed mobility  -PB (r) AA (t) PB (c)      Bed Mobility PT LTG, [place] Level independent  -PB (r) AA (t) PB (c)      Transfer Training PT LTG    Transfer Training PT LTG, Date Established 10/31/17  -PB (r) AA (t) PB (c)      Transfer Training PT LTG, Time to Achieve by discharge  -PB (r) AA (t) PB (c)      Transfer Training PT LTG, Activity Type bed to chair /chair to bed;sit to stand/stand to sit  -PB (r) AA (t) PB (c)      Transfer Training PT LTG, [place] Level conditional independence  -PB (r) AA (t) PB (c)      Transfer Training PT LTG, Assist Device walker, rolling  -PB (r) AA (t) PB (c)      Static Standing Balance PT LTG    Static Standing Balance PT LTG, Date Established 10/31/17  -PB (r) AA (t) PB (c)      Static Standing Balance PT LTG, Time to Achieve by discharge  -PB (r) AA (t) PB (c)      Static Standing Balance PT LTG, [place] Level conditional independence  -PB (r) AA (t) PB (c)      Static Standing Balance PT LTG, Assist Device assistive Device  -PB (r) AA (t) PB (c)      Static Standing Balance PT LTG, Additional Goal 5 min  -PB (r) AA (t) PB (c)        User Key  (r) = Recorded By, (t) = Taken By, (c) = Cosigned By    Initials Name Provider Type    PB Valentino Puentes, PT DPT Physical Therapist    MARIA D Palencia, PT Student PT Student          Physical Therapy Education     Title: PT OT SLP Therapies (Active)     Topic: Physical Therapy (Active)     Point: Mobility training (Done)    Learning Progress Summary    Learner Readiness Method Response Comment Documented by Status   Patient Acceptance E VU trans  11/03/17 1506 Done    Acceptance E NR  bed mobility, transfers, and benefits of exercise  11/01/17 1102 Active    Acceptance E VU,NR Benefits of activity, role of PT, hand and foot placement for transfers  10/31/17 1200 Done               Point: Body mechanics (Done)    Learning Progress Summary    Learner Readiness Method Response Comment Documented by Status   Patient Acceptance E VU,NR Benefits of activity, role of PT, hand and foot placement for transfers  10/31/17 1200 Done                      User Key     Initials Effective Dates Name Provider Type Discipline     08/02/16 -  Idania Silbey, PTA Physical Therapy Assistant PT     08/02/16 -  Airam Pelletier PTA Physical Therapy Assistant PT     10/11/17 -  Vandana Palencia, PT Student PT Student PT                    PT Recommendation and Plan  Anticipated Discharge Disposition: skilled nursing facility, home with 24/7 care, home with home health  Demonstrates Need for Referral to Another Service: occupational therapy  Planned Therapy Interventions: bed mobility training, gait training, patient/family education, transfer training  PT Frequency: 2 times/day, daily, per priority policy  Plan of Care Review  Plan Of Care Reviewed With: patient  Progress: progress toward functional goals as expected  Outcome Summary/Follow up Plan: pt performed BLE AA-AROM x 20 reps, trans bed-chair min assist, pt  would benefit SNF          Outcome Measures       11/03/17 1500          How much help from another person do you currently need...    Turning from your back to your side while in flat bed without using bedrails? 2  -AH      Moving from lying on back to sitting on the side of a flat bed without bedrails? 2  -AH      Moving to and from a bed to a chair (including a wheelchair)? 2  -AH      Standing up from a chair using your arms (e.g., wheelchair, bedside chair)? 2  -AH      Climbing 3-5 steps with a railing? 1  -AH      To walk in hospital room? 1  -AH      AM-PAC 6 Clicks Score 10  -AH       Functional Assessment    Outcome Measure Options -PAC 6 Clicks Basic Mobility (PT)  -        User Key  (r) = Recorded By, (t) = Taken By, (c) = Cosigned By    Initials Name Provider Type    MICHA Sibley PTA Physical Therapy Assistant           Time Calculation:         PT Charges       11/03/17 1513          Time Calculation    Start Time 1425  -      Stop Time 1448  -      Time Calculation (min) 23 min  -      PT Received On 11/03/17  -      PT Goal Re-Cert Due Date 11/10/17  -      Time Calculation- PT    Total Timed Code Minutes- PT 23 minute(s)  -        User Key  (r) = Recorded By, (t) = Taken By, (c) = Cosigned By    Initials Name Provider Type    MICHA Sibley PTA Physical Therapy Assistant          Therapy Charges for Today     Code Description Service Date Service Provider Modifiers Qty    30218906708 HC PT THERAPEUTIC ACT EA 15 MIN 11/3/2017 Idania Sibley PTA GP 1    58008917602 HC PT THER PROC EA 15 MIN 11/3/2017 Idania Sibley PTA GP 1          PT G-Codes  Outcome Measure Options: -PAC 6 Clicks Basic Mobility (PT)  Score: 10  Functional Limitation: Mobility: Walking and moving around  Mobility: Walking and Moving Around Current Status (): At least 60 percent but less than 80 percent impaired, limited or restricted  Mobility: Walking and Moving Around Goal Status (): At least 40 percent but less than 60 percent impaired, limited or restricted    Idania Sibley PTA  11/3/2017

## 2017-11-03 NOTE — PLAN OF CARE
Problem: Patient Care Overview (Adult)  Goal: Plan of Care Review  Outcome: Ongoing (interventions implemented as appropriate)    11/03/17 9967   Coping/Psychosocial Response Interventions   Plan Of Care Reviewed With patient   Patient Care Overview   Progress progress toward functional goals as expected   Outcome Evaluation   Outcome Summary/Follow up Plan pt performed BLE AA-AROM x 20 reps, trans bed-chair min assist, pt would benefit SNF

## 2017-11-03 NOTE — PLAN OF CARE
Problem: Patient Care Overview (Adult)  Goal: Plan of Care Review  Outcome: Ongoing (interventions implemented as appropriate)    11/03/17 1539   Coping/Psychosocial Response Interventions   Plan Of Care Reviewed With patient   Patient Care Overview   Progress improving   Outcome Evaluation   Outcome Summary/Follow up Plan medicated for pain as ordered...dialysis today...tolerated well...bp meds held this am...up in chair...safety maintained...awaiting d/c to rehab...Dr Rivera's office notified of bed availability...continue to monitor       Goal: Adult Individualization and Mutuality  Outcome: Ongoing (interventions implemented as appropriate)    Problem: Fall Risk (Adult)  Goal: Absence of Falls  Outcome: Ongoing (interventions implemented as appropriate)    Problem: Orthopaedic Fracture (Adult)  Goal: Signs and Symptoms of Listed Potential Problems Will be Absent or Manageable (Orthopaedic Fracture)  Outcome: Ongoing (interventions implemented as appropriate)

## 2017-11-03 NOTE — PROGRESS NOTES
Continued Stay Note   [place]     Patient Name: Cheri Ely  MRN: [id number]  Today's Date: 11/3/2017    Admit Date: 10/30/2017          Discharge Plan       11/03/17 1449    Case Management/Social Work Plan    Plan [facility]    Patient/Family In Agreement With Plan yes    Additional Comments Patient has bed available at [facility]. JUICE following for discharge orders. JUICE did confirm with Peggy at [facility] that patient can admit over the weekend if ready for discharge. Patient can admit today but discharge med list will have to be faxed by 1700.               Discharge Codes     None            ELYSSA Hung

## 2017-11-03 NOTE — PROGRESS NOTES
Nephrology ([facility]) Progress Note      Patient:  Cheri Ely  YOB: 1940  Date of Service: 11/3/2017  MRN: [id number]   Acct: [id number]   Primary Care Physician: Barrett Mckenzie Jr., MD  Advance Directive: Full Code  Admit Date: 10/30/2017       Hospital Day: 4  Referring Provider: Ko Rivera MD      Patient personally seen and examined.  Complete chart including Consults, Notes, Operative Reports, Labs, Cardiology, and Radiology studies reviewed as able.        Subjective:  Cheri Ely is a 77 y.o. female  whom we were consulted for end stage renal disease management.  Admitted following a fall and pubic ramus fracture.  Hemodialysis on Monday Wednesday Friday; no issues.  No new issues today; pain in pelvis adequately controlled with PRN medications.  Seen during dialysis this AM; tolerating well.    Allergies:  Review of patient's allergies indicates no known allergies.    Home Meds:  Prescriptions Prior to Admission   Medication Sig Dispense Refill Last Dose   • carvedilol (COREG) 12.5 MG tablet Take 12.5 mg by mouth 3 (Three) Times a Day.   10/30/2017 at Unknown time   • Cholecalciferol (D3 ADULT PO) Take 2,000 Units by mouth Daily.   10/30/2017 at Unknown time   • diphenhydrAMINE (BENADRYL) 25 mg capsule Take 25 mg by mouth 2 (Two) Times a Day.   10/30/2017 at Unknown time   • levothyroxine (SYNTHROID, LEVOTHROID) 175 MCG tablet Take 175 mcg by mouth Daily.   10/30/2017 at Unknown time   • losartan (COZAAR) 25 MG tablet Take 25 mg by mouth Every Night.   10/29/2017 at Unknown time   • Multiple Vitamin (DAILY-SANGEETA PO) Take 1 tablet by mouth Daily.   10/30/2017 at Unknown time   • omeprazole (priLOSEC) 20 MG capsule Take 20 mg by mouth Daily.   10/30/2017 at Unknown time   • ondansetron ODT (ZOFRAN-ODT) 4 MG disintegrating tablet Take 4 mg by mouth Every 8 (Eight) Hours As Needed for Nausea or Vomiting.   Past Week at Unknown time   •  oxyCODONE-acetaminophen (PERCOCET)  MG per tablet Take 1 tablet by mouth Every 8 (Eight) Hours As Needed for Moderate Pain .   Past Week at Unknown time   • pravastatin (PRAVACHOL) 40 MG tablet Take 40 mg by mouth Daily.   10/29/2017 at Unknown time   • sertraline (ZOLOFT) 50 MG tablet Take 50 mg by mouth Daily.   10/29/2017 at Unknown time       Medicines:  Current Facility-Administered Medications   Medication Dose Route Frequency Provider Last Rate Last Dose   • acetaminophen (TYLENOL) tablet 1,000 mg  1,000 mg Oral Q4H PRN Chris Hsu MD       • aluminum-magnesium hydroxide-simethicone (MAALOX MAX) 400-400-40 MG/5ML suspension 15 mL  15 mL Oral PRN Chris Hsu MD       • atorvastatin (LIPITOR) tablet 10 mg  10 mg Oral Nightly Ko Rivera MD   10 mg at 11/02/17 2043   • carvedilol (COREG) tablet 12.5 mg  12.5 mg Oral TID Ko Rivera MD   12.5 mg at 11/02/17 0952   • CloNIDine (CATAPRES) tablet 0.1 mg  0.1 mg Oral Q1H PRN Chris Hsu MD       • diphenhydrAMINE (BENADRYL) capsule 25 mg  25 mg Oral BID Ko Rivera MD   25 mg at 11/02/17 1751   • diphenhydrAMINE (BENADRYL) capsule 25 mg  25 mg Oral Q4H PRN Chris Hsu MD        Or   • diphenhydrAMINE (BENADRYL) injection 25 mg  25 mg Intravenous Q4H PRN Chris Hsu MD       • levothyroxine (SYNTHROID, LEVOTHROID) tablet 175 mcg  175 mcg Oral Q AM Ko Rivera MD   175 mcg at 11/03/17 0632   • lidocaine (XYLOCAINE) 1 % injection 0.3 mL  0.3 mL Infiltration PRN Chris Hsu MD       • ondansetron (ZOFRAN) injection 4 mg  4 mg Intravenous Once Ko Rivera MD       • ondansetron (ZOFRAN) injection 4 mg  4 mg Intravenous Q2H PRN Chris Hsu MD       • oxyCODONE-acetaminophen (PERCOCET)  MG per tablet 1 tablet  1 tablet Oral Q8H PRN Ko Rivera MD   1 tablet at 11/02/17 8465   • pantoprazole (PROTONIX) EC tablet 40 mg  40 mg  Oral Q AM Ko Rivera MD   40 mg at 11/03/17 0632   • sertraline (ZOLOFT) tablet 50 mg  50 mg Oral Daily Ko Rivera MD   50 mg at 11/02/17 0952   • sodium chloride 0.9 % flush 1-10 mL  1-10 mL Intravenous PRN Ko Rivera MD           Past Medical History:  Past Medical History:   Diagnosis Date   • Arthritis    • Carotid artery disease    • CHF (congestive heart failure)    • Chronic renal failure     on dialysis   • Coronary artery disease    • Disease of thyroid gland    • Diverticulitis    • History of transfusion    • Hyperlipidemia    • Hypertension    • Injury of back    • Mesenteric artery insufficiency    • Multilevel degenerative disc disease    • Osteoporosis    • Pancreatitis    • Pneumonia        Past Surgical History:  Past Surgical History:   Procedure Laterality Date   • AORTIC VALVE SURGERY     • APPENDECTOMY     • BACK SURGERY     • CARDIAC SURGERY     • CAROTID ENDARTERECTOMY Bilateral    • CATARACT EXTRACTION, BILATERAL     • CERVICAL SPINE SURGERY     • CHOLECYSTECTOMY     • COLON SURGERY     • CORONARY ARTERY BYPASS GRAFT     • JOINT REPLACEMENT      knee   • LAPAROSCOPIC GASTRIC BANDING     • LEEP     • LUMBAR FUSION     • TOE AMPUTATION Left     2nd toe   • TOTAL KNEE ARTHROPLASTY Bilateral    • TUBAL ABDOMINAL LIGATION         Family History  Family History   Problem Relation Age of Onset   • Hypertension Mother    • Cancer Mother      stomach   • Coronary artery disease Father    • Heart attack Father    • Diabetes Brother    • Coronary artery disease Brother        Social History  Social History     Social History   • Marital status:      Spouse name: N/A   • Number of children: N/A   • Years of education: N/A     Occupational History   • Not on file.     Social History Main Topics   • Smoking status: Never Smoker   • Smokeless tobacco: Never Used   • Alcohol use No   • Drug use: No   • Sexual activity: Defer     Other Topics Concern   • Not on file     Social  "History Narrative         Review of Systems:  History obtained from chart review and the patient  General ROS: No fever or chills  Respiratory ROS: No cough, shortness of breath, wheezing  Cardiovascular ROS: no chest pain or dyspnea on exertion  Gastrointestinal ROS: No abdominal pain or melena  Genito-Urinary ROS: No dysuria or hematuria  Neurological ROS: negative  14 point ROS reviewed with the patient and negative except as noted above and in the HPI unless unable to obtain.    Objective:  /79 (BP Location: Right arm, Patient Position: Lying)  Pulse 67  Temp 98 °F (36.7 °C) (Oral)   Resp 16  Ht 61\" (154.9 cm)  Wt 170 lb (77.1 kg)  SpO2 100%  BMI 32.12 kg/m2    Intake/Output Summary (Last 24 hours) at 11/03/17 0829  Last data filed at 11/02/17 1958   Gross per 24 hour   Intake              536 ml   Output                0 ml   Net              536 ml        General: awake/alert    Neck: supple, no JVD  Chest:  clear to auscultation bilaterally without respiratory distress  CVS: regular rate and rhythm  Abdominal: soft, nontender, normal bowel sounds  Extremities: trace bilateral lower ext edema  Skin: warm and dry without rash   Neuro: no focal motor deficits      Labs:    Results from last 7 days  Lab Units 10/30/17  0919   WBC 10*3/mm3 7.56   HEMOGLOBIN g/dL 10.1*   HEMATOCRIT % 31.8*   PLATELETS 10*3/mm3 140           Results from last 7 days  Lab Units 11/03/17  0518 11/02/17  0538 11/01/17  0517  10/30/17  0919   SODIUM mmol/L 131* 136 131*  < > 136   POTASSIUM mmol/L 4.2 4.2 4.5  < > 4.2   CHLORIDE mmol/L 88* 91* 89*  < > 94*   CO2 mmol/L 29.0 31.0 27.0  < > 26.0   BUN mg/dL 45* 30* 56*  < > 65*   CREATININE mg/dL 6.41* 4.90* 7.32*  < > 7.90*   CALCIUM mg/dL 8.1* 8.4 7.9*  < > 8.5   BILIRUBIN mg/dL  --   --   --   --  0.4   ALK PHOS U/L  --   --   --   --  178*   ALT (SGPT) U/L  --   --   --   --  27   AST (SGOT) U/L  --   --   --   --  30   GLUCOSE mg/dL 81 107* 91  < > 88   < > = values in " this interval not displayed.    Radiology:   Imaging Results (last 72 hours)     Procedure Component Value Units Date/Time    XR Hip With or Without Pelvis 2 - 3 View Left [[id number]] Collected:  10/30/17 0757     Updated:  10/30/17 0804    Narrative:       EXAMINATION: XR HIP W OR WO PELVIS 2-3 VIEW LEFT-  10/30/2017 7:57 AM  CDT     HISTORY: Fall. Left hip pain     COMPARISON:CT exam dated 09/15/2017     TECHNIQUE:3 views: AP pelvis. AP lateral left hip.     FINDINGS:     There are fractures of the left superior-inferior pubic rami not noted  on the previous CT examination.     The hip joint is maintained with osteoarthritic changes. The femur is  intact without fracture.     There is osteoporosis. There are changes from lumbar spine surgery.       Impression:       1. Left superior and inferior pubic rami fractures.  2. Left hip joint maintained without femur fracture.  This report was finalized on 10/30/2017 08:01 by Dr. Valentino Mccollum MD.    XR Femur 2 View Left [[id number]] Collected:  10/30/17 0801     Updated:  10/30/17 0806    Narrative:       EXAMINATION: XR FEMUR 2 VW LEFT-  10/30/2017 8:02 AM CDT     HISTORY: Fall     COMPARISON:None     TECHNIQUE:2 views, 4 images. AP and lateral projection imaging     FINDINGS:     Left superior and inferior pubic rami fractures noted.     The hip joint and knee joint are maintained. The bony structures are  intact without additional fracture.     Changes from knee arthroplasty observed. No hardware complication  identified.     Arterial calcifications observed.       Impression:       1. Left superior and inferior pubic rami fractures.  2. Left femur intact without fracture.  3. Changes from left knee arthroplasty. No hardware complications.  This report was finalized on 10/30/2017 08:03 by Dr. Valentino Mccollum MD.    CT Head Without Contrast [[id number]] Collected:  10/30/17 0821     Updated:  10/30/17 0832    Narrative:       History:  77-year-old evaluated for fall.       Reference:  CT head July 2015     Technique:   Unenhanced CT imaging of the head/brain performed.     For this CT exam, one or more of the following dose reduction techniques  was employed:  -automated exposure control  -mA and/or kVp adjustment for patient size  -iterative reconstruction      mGy-cm     Findings:   There is no intracranial hemorrhage or extra-axial collection. No  findings of cerebral edema or contusion. 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  There is age-related atrophy without hydrocephalus.     No skull fracture is evident. No hemorrhage in the visualized paranasal  sinuses. Extracranial soft tissues are unremarkable.          Impression:       Impression:  No acute intracranial injury.  This report was finalized on 10/30/2017 08:29 by  Tolu Joseph, .          Culture:         Assessment   1.  End stage renal disease on hemodialysis  2.  Status post fall with left pubic ramus fracture  3.  Essential hypertension  4.  Anemia of chronic kidney disease  5.  Secondary hyperparathyroidism  6.  Vitamin D deficiency   7.  Hyponatremia     Plan:  1.  Dialysis today  2.  Continue to follow labs    Dialysis   Pt was seen on RRT--tolerating well  Modality: Hemodialysis  Access: Arterial Venous Fistula  Location: right upper  QB: 400  QD: 600  UF: 3000      Dirk Tristan, ABILIO  11/3/2017  8:29 AM

## 2017-11-03 NOTE — PLAN OF CARE
Problem: Patient Care Overview (Adult)  Goal: Plan of Care Review  Outcome: Ongoing (interventions implemented as appropriate)    11/03/17 0616   Coping/Psychosocial Response Interventions   Plan Of Care Reviewed With patient   Patient Care Overview   Progress improving   Outcome Evaluation   Outcome Summary/Follow up Plan Medicated for c/o pain with prn pain meds, relief noted. Neuro wnl. Will have dialysis today. Safety maintained.       Goal: Adult Individualization and Mutuality  Outcome: Ongoing (interventions implemented as appropriate)  Goal: Discharge Needs Assessment  Outcome: Ongoing (interventions implemented as appropriate)    Problem: Fall Risk (Adult)  Goal: Absence of Falls  Outcome: Ongoing (interventions implemented as appropriate)    Problem: Orthopaedic Fracture (Adult)  Goal: Signs and Symptoms of Listed Potential Problems Will be Absent or Manageable (Orthopaedic Fracture)  Outcome: Ongoing (interventions implemented as appropriate)

## 2017-11-04 VITALS
HEIGHT: 61 IN | RESPIRATION RATE: 18 BRPM | SYSTOLIC BLOOD PRESSURE: 109 MMHG | BODY MASS INDEX: 32.1 KG/M2 | HEART RATE: 95 BPM | TEMPERATURE: 98 F | WEIGHT: 170 LBS | DIASTOLIC BLOOD PRESSURE: 59 MMHG | OXYGEN SATURATION: 94 %

## 2017-11-04 LAB
ANION GAP SERPL CALCULATED.3IONS-SCNC: 13 MMOL/L (ref 4–13)
BUN BLD-MCNC: 31 MG/DL (ref 5–21)
BUN/CREAT SERPL: 7.5 (ref 7–25)
CALCIUM SPEC-SCNC: 8.5 MG/DL (ref 8.4–10.4)
CHLORIDE SERPL-SCNC: 95 MMOL/L (ref 98–110)
CO2 SERPL-SCNC: 26 MMOL/L (ref 24–31)
CREAT BLD-MCNC: 4.16 MG/DL (ref 0.5–1.4)
GFR SERPL CREATININE-BSD FRML MDRD: 10 ML/MIN/1.73
GLUCOSE BLD-MCNC: 90 MG/DL (ref 70–100)
POTASSIUM BLD-SCNC: 4.7 MMOL/L (ref 3.5–5.3)
SODIUM BLD-SCNC: 134 MMOL/L (ref 135–145)

## 2017-11-04 PROCEDURE — 63710000001 DIPHENHYDRAMINE PER 50 MG: Performed by: ORTHOPAEDIC SURGERY

## 2017-11-04 PROCEDURE — 80048 BASIC METABOLIC PNL TOTAL CA: CPT | Performed by: INTERNAL MEDICINE

## 2017-11-04 RX ORDER — OXYCODONE AND ACETAMINOPHEN 10; 325 MG/1; MG/1
1 TABLET ORAL EVERY 8 HOURS PRN
Qty: 40 TABLET | Refills: 0 | Status: SHIPPED | OUTPATIENT
Start: 2017-11-04 | End: 2019-02-11 | Stop reason: HOSPADM

## 2017-11-04 RX ADMIN — DIPHENHYDRAMINE HYDROCHLORIDE 25 MG: 25 CAPSULE ORAL at 08:41

## 2017-11-04 RX ADMIN — PANTOPRAZOLE SODIUM 40 MG: 40 TABLET, DELAYED RELEASE ORAL at 06:01

## 2017-11-04 RX ADMIN — SERTRALINE 50 MG: 50 TABLET, FILM COATED ORAL at 08:41

## 2017-11-04 RX ADMIN — OXYCODONE HYDROCHLORIDE AND ACETAMINOPHEN 1 TABLET: 10; 325 TABLET ORAL at 06:01

## 2017-11-04 RX ADMIN — LEVOTHYROXINE SODIUM 175 MCG: 175 TABLET ORAL at 06:01

## 2017-11-04 NOTE — PLAN OF CARE
Problem: Patient Care Overview (Adult)  Goal: Plan of Care Review  Outcome: Outcome(s) achieved Date Met:  11/04/17 11/04/17 1454   Coping/Psychosocial Response Interventions   Plan Of Care Reviewed With patient   Patient Care Overview   Progress no change   Outcome Evaluation   Outcome Summary/Follow up Plan VSS, safety maintained, no c/o pain, free from falls, discharged to [facility] rehab       Goal: Adult Individualization and Mutuality  Outcome: Outcome(s) achieved Date Met:  11/04/17  Goal: Discharge Needs Assessment  Outcome: Outcome(s) achieved Date Met:  11/04/17    Problem: Fall Risk (Adult)  Goal: Absence of Falls  Outcome: Outcome(s) achieved Date Met:  11/04/17    Problem: Orthopaedic Fracture (Adult)  Goal: Signs and Symptoms of Listed Potential Problems Will be Absent or Manageable (Orthopaedic Fracture)  Outcome: Outcome(s) achieved Date Met:  11/04/17    Problem: Pain, Chronic (Adult)  Goal: Acceptable Pain Control/Comfort Level  Outcome: Outcome(s) achieved Date Met:  11/04/17

## 2017-11-04 NOTE — DISCHARGE SUMMARY
"[facility]  DISCHARGE SUMMARY       Date of Admission: 10/30/2017  Date of Discharge:  11/4/2017  Primary Care Physician: Barrett Mckenzie Jr., MD    Presenting Problem/History of Present Illness:  Pelvis fracture [S32.9XXA]     Final Discharge Diagnoses:  Hospital Problem List     Closed fracture of ramus of left pubis          Consults: Renal service    Procedures Performed: None    Pertinent Test Results: Negative Route ankle x-ray    History of Present Illness on Day of Discharge: Stable on discharge     Hospital Course:  The patient is a 77 y.o. female who presented to [facility] following a fall with a pelvic fracture that was minimally displaced.  A decision was made to treat the pelvic fracture nonoperatively.  She was having some ankle pain and x-ray proved to be negative for fracture.  At this juncture the patient is ambulatory with a walker with assistance.  She currently is discharged to a nursing facility and can bear weight as tolerated.  She is to follow-up in the office       /59 (BP Location: Right arm, Patient Position: Lying)  Pulse 95  Temp 98 °F (36.7 °C) (Oral)   Resp 18  Ht 61\" (154.9 cm)  Wt 170 lb (77.1 kg)  SpO2 94%  BMI 32.12 kg/m2      Discharge Medications:   Cheri Ely   Home Medication Instructions RENEE:[id number]    Printed on:11/04/17 0848   Medication Information                      carvedilol (COREG) 12.5 MG tablet  Take 12.5 mg by mouth 3 (Three) Times a Day.             Cholecalciferol (D3 ADULT PO)  Take 2,000 Units by mouth Daily.             diphenhydrAMINE (BENADRYL) 25 mg capsule  Take 25 mg by mouth 2 (Two) Times a Day.             levothyroxine (SYNTHROID, LEVOTHROID) 175 MCG tablet  Take 175 mcg by mouth Daily.             losartan (COZAAR) 25 MG tablet  Take 25 mg by mouth Every Night.             Multiple Vitamin (DAILY-SANGEETA PO)  Take 1 tablet by mouth Daily.             omeprazole (priLOSEC) 20 MG capsule  Take 20 mg by mouth " Daily.             ondansetron ODT (ZOFRAN-ODT) 4 MG disintegrating tablet  Take 4 mg by mouth Every 8 (Eight) Hours As Needed for Nausea or Vomiting.             oxyCODONE-acetaminophen (PERCOCET)  MG per tablet  Take 1 tablet by mouth Every 8 (Eight) Hours As Needed for Moderate Pain .             pravastatin (PRAVACHOL) 40 MG tablet  Take 40 mg by mouth Daily.             sertraline (ZOLOFT) 50 MG tablet  Take 50 mg by mouth Daily.                 Discharge Diet: reg    Discharge Care Plan/Instructions: She can bear weight as tolerated she is to follow-up in the office in 4 weeks    Follow-up Appointments:   mon dec 4      Ko Rivera MD  11/04/17  8:48 AM

## 2017-11-04 NOTE — DISCHARGE PLACEMENT REQUEST
"To:  Rhonda  From:  Ifrah Kian, MARTINA  [phone]    Cheri Ely (77 y.o. Female)     Date of Birth Social Security Number Address Home Phone MRN    1940  [address]  [facility] [place] [facility] [address]  [id number]    Sikh Marital Status          Rastafarian        Admission Date Admission Type Admitting Provider Attending Provider Department, Room/Bed    10/30/17 Emergency Ko Rivera MD Jackson, Stephen H, MD [facility] 3A, 333/1    Discharge Date Discharge Disposition Discharge Destination         Skilled Nursing Facility (AL - External)             Attending Provider: Ko Rivera MD     Allergies:  No Known Allergies    Isolation:  None   Infection:  None   Code Status:  FULL    Ht:  61\" (154.9 cm)   Wt:  170 lb (77.1 kg)    Admission Cmt:  None   Principal Problem:  None                Active Insurance as of 10/30/2017     Primary Coverage     Payor Plan Insurance Group Employer/Plan Group    MEDICARE MEDICARE A & B      Payor Plan Address Payor Plan Phone Number Effective From Effective To    [address] [phone] 4/1/1998     [place] [address]       Subscriber Name Subscriber Birth Date Member ID       CHERI ELY ALFREDO 1940 [id number]           Secondary Coverage     Payor Plan Insurance Group Employer/Plan Group    COMBINED INSURANCE CO COMBINED INSURANCE CO      Payor Plan Address Payor Plan Phone Number Effective From Effective To    [address] [phone] 1/1/2017     [place] [address]       Subscriber Name Subscriber Birth Date Member ID       CHERI ELY ALFREDO 1940 [id number]                 Emergency Contacts      (Rel.) Home Phone Work Phone Mobile Phone    Lucero Morales (Daughter) -- -- [phone]    LangstonChristy thompson (Daughter) -- -- [phone]              "

## 2017-11-04 NOTE — PLAN OF CARE
Problem: Inpatient Physical Therapy  Goal: Bed Mobility Goal LTG- PT  Outcome: Outcome(s) achieved Date Met:  11/04/17    10/31/17 1201 11/04/17 1618   Bed Mobility PT LTG   Bed Mobility PT LTG, Date Established 10/31/17 --    Bed Mobility PT LTG, Time to Achieve by discharge --    Bed Mobility PT LTG, Activity Type all bed mobility --    Bed Mobility PT LTG, [place] Level independent --    Bed Mobility PT LTG, Date Goal Reviewed --  11/04/17   Bed Mobility PT LTG, Outcome --  goal met       Goal: Transfer Training Goal 1 LTG- PT  Outcome: Outcome(s) achieved Date Met:  11/04/17    10/31/17 1201 11/04/17 1618   Transfer Training PT LTG   Transfer Training PT LTG, Date Established 10/31/17 --    Transfer Training PT LTG, Time to Achieve by discharge --    Transfer Training PT LTG, Activity Type bed to chair /chair to bed;sit to stand/stand to sit --    Transfer Training PT LTG, [place] Level conditional independence --    Transfer Training PT LTG, Assist Device walker, rolling --    Transfer Training PT LTG, Date Goal Reviewed --  11/04/17   Transfer Training PT LTG, Outcome --  goal met       Goal: Static Standing Balance Goal LTG- PT  Outcome: Outcome(s) achieved Date Met:  11/04/17    10/31/17 1201 11/04/17 1618   Static Standing Balance PT LTG   Static Standing Balance PT LTG, Date Established 10/31/17 --    Static Standing Balance PT LTG, Time to Achieve by discharge --    Static Standing Balance PT LTG, [place] Level conditional independence --    Static Standing Balance PT LTG, Assist Device assistive Device --    Static Standing Balance PT LTG, Additional Goal 5 min --    Static Standing Balance PT LTG, Date Goal Reviewed --  11/04/17   Static Standing Balance PT LTG, Outcome --  goal met

## 2017-11-04 NOTE — THERAPY DISCHARGE NOTE
Acute Care - Physical Therapy Discharge Summary  [facility]       Patient Name: Cheri Ely  : 1940  MRN: [id number]    Today's Date: 2017  Onset of Illness/Injury or Date of Surgery Date: 10/30/17    Date of Referral to PT: 10/30/17  Referring Physician: Dr Rivera      Admit Date: 10/30/2017      PT Recommendation and Plan    Visit Dx:    ICD-10-CM ICD-9-CM   1. Closed nondisplaced fracture of left pubis, initial encounter S32.502A 808.2   2. Fall, initial encounter W19.XXXA E888.9   3. Impaired functional mobility, balance, gait, and endurance Z74.09 V49.89             Outcome Measures       17 1500          How much help from another person do you currently need...    Turning from your back to your side while in flat bed without using bedrails? 2  -AH      Moving from lying on back to sitting on the side of a flat bed without bedrails? 2  -AH      Moving to and from a bed to a chair (including a wheelchair)? 2  -AH      Standing up from a chair using your arms (e.g., wheelchair, bedside chair)? 2  -AH      Climbing 3-5 steps with a railing? 1  -AH      To walk in hospital room? 1  -AH      AM-PAC 6 Clicks Score 10  -      Functional Assessment    Outcome Measure Options AM-PAC 6 Clicks Basic Mobility (PT)  -        User Key  (r) = Recorded By, (t) = Taken By, (c) = Cosigned By    Initials Name Provider Type     Idania Sibley, PTA Physical Therapy Assistant                      IP PT Goals       17 1618 10/31/17 1201       Bed Mobility PT LTG    Bed Mobility PT LTG, Date Established  10/31/17  -PB (r) AA (t) PB (c)     Bed Mobility PT LTG, Time to Achieve  by discharge  -PB (r) AA (t) PB (c)     Bed Mobility PT LTG, Activity Type  all bed mobility  -PB (r) AA (t) PB (c)     Bed Mobility PT LTG, [place] Level  independent  -PB (r) AA (t) PB (c)     Bed Mobility PT LTG, Date Goal Reviewed 17  -      Bed Mobility PT LTG, Outcome goal met  -      Transfer Training PT  LTG    Transfer Training PT LTG, Date Established  10/31/17  -PB (r) AA (t) PB (c)     Transfer Training PT LTG, Time to Achieve  by discharge  -PB (r) AA (t) PB (c)     Transfer Training PT LTG, Activity Type  bed to chair /chair to bed;sit to stand/stand to sit  -PB (r) AA (t) PB (c)     Transfer Training PT LTG, [place] Level  conditional independence  -PB (r) AA (t) PB (c)     Transfer Training PT LTG, Assist Device  walker, rolling  -PB (r) AA (t) PB (c)     Transfer Training PT  LTG, Date Goal Reviewed 11/04/17  -      Transfer Training PT LTG, Outcome goal met  -      Static Standing Balance PT LTG    Static Standing Balance PT LTG, Date Established  10/31/17  -PB (r) AA (t) PB (c)     Static Standing Balance PT LTG, Time to Achieve  by discharge  -PB (r) AA (t) PB (c)     Static Standing Balance PT LTG, [place] Level  conditional independence  -PB (r) AA (t) PB (c)     Static Standing Balance PT LTG, Assist Device  assistive Device  -PB (r) AA (t) PB (c)     Static Standing Balance PT LTG, Additional Goal  5 min  -PB (r) AA (t) PB (c)     Static Standing Balance PT LTG, Date Goal Reviewed 11/04/17  -      Static Standing Balance PT LTG, Outcome goal met  -        User Key  (r) = Recorded By, (t) = Taken By, (c) = Cosigned By    Initials Name Provider Type     Idania Sibley PTA Physical Therapy Assistant    ELVER Puentes, PT DPT Physical Therapist    AA Vandana Palencia, PT Student PT Student          Therapy Charges for Today     Code Description Service Date Service Provider Modifiers Qty    36054647851 HC PT THERAPEUTIC ACT EA 15 MIN 11/3/2017 Idania Sibley PTA GP 1    17065913038 HC PT THER PROC EA 15 MIN 11/3/2017 Idania Sibley PTA GP 1          PT Discharge Summary  Reason for Discharge: Discharge from facility  Outcomes Achieved: Refer to plan of care for updates on goals achieved  Discharge Destination: SNF      Idania Sibley PTA   11/4/2017

## 2017-11-04 NOTE — PLAN OF CARE
Problem: Patient Care Overview (Adult)  Goal: Plan of Care Review  Outcome: Ongoing (interventions implemented as appropriate)    11/04/17 0322   Coping/Psychosocial Response Interventions   Plan Of Care Reviewed With patient   Patient Care Overview   Progress no change   Outcome Evaluation   Outcome Summary/Follow up Plan Patient rested well through the night. c/o pain, prn pain meds given as ordered. VSS, safety maintained. will continue to monitor.         Problem: Fall Risk (Adult)  Goal: Absence of Falls  Outcome: Ongoing (interventions implemented as appropriate)    Problem: Orthopaedic Fracture (Adult)  Goal: Signs and Symptoms of Listed Potential Problems Will be Absent or Manageable (Orthopaedic Fracture)  Outcome: Ongoing (interventions implemented as appropriate)    Problem: Pain, Chronic (Adult)  Goal: Acceptable Pain Control/Comfort Level  Outcome: Ongoing (interventions implemented as appropriate)

## 2017-11-04 NOTE — PROGRESS NOTES
Continued Stay Note   [place]     Patient Name: Cheri Ely  MRN: [id number]  Today's Date: 11/4/2017    Admit Date: 10/30/2017          Discharge Plan       11/04/17 0921    Case Management/Social Work Plan    Plan SW spoke to Rose at [facility] [phone].  She stated pt can dc there today.  RN please call report to [phone] and fax dc summary/meds/scripts to [phone].  JUICE spoke to Marie at [facility] and they are on standby to take pt.  A facesheet has been faxed to [facility].  ELYSSA Zimmerman.    Patient/Family In Agreement With Plan yes              Discharge Codes     None        Expected Discharge Date and Time     Expected Discharge Date Expected Discharge Time    Nov 4, 2017             ELYSSA Frances

## 2017-12-12 ENCOUNTER — TELEPHONE (OUTPATIENT)
Dept: VASCULAR SURGERY | Facility: CLINIC | Age: 77
End: 2017-12-12

## 2017-12-14 ENCOUNTER — OFFICE VISIT (OUTPATIENT)
Dept: VASCULAR SURGERY | Facility: CLINIC | Age: 77
End: 2017-12-14

## 2017-12-14 ENCOUNTER — HOSPITAL ENCOUNTER (OUTPATIENT)
Dept: CT IMAGING | Facility: HOSPITAL | Age: 77
Discharge: HOME | End: 2017-12-14
Admitting: NURSE PRACTITIONER

## 2017-12-14 VITALS
SYSTOLIC BLOOD PRESSURE: 104 MMHG | HEIGHT: 61 IN | DIASTOLIC BLOOD PRESSURE: 58 MMHG | WEIGHT: 166 LBS | HEART RATE: 74 BPM | BODY MASS INDEX: 31.34 KG/M2

## 2017-12-14 DIAGNOSIS — E78.2 MIXED HYPERLIPIDEMIA: ICD-10-CM

## 2017-12-14 DIAGNOSIS — Z51.81 ENCOUNTER FOR MONITORING ANTIPLATELET THERAPY: ICD-10-CM

## 2017-12-14 DIAGNOSIS — K55.069 OCCLUSION OF SUPERIOR MESENTERIC ARTERY (HCC): Primary | ICD-10-CM

## 2017-12-14 DIAGNOSIS — I10 ESSENTIAL HYPERTENSION: ICD-10-CM

## 2017-12-14 DIAGNOSIS — Z01.818 PREOPERATIVE TESTING: ICD-10-CM

## 2017-12-14 DIAGNOSIS — K55.1 CHRONIC MESENTERIC ISCHEMIA (HCC): ICD-10-CM

## 2017-12-14 DIAGNOSIS — R10.84 GENERALIZED ABDOMINAL PAIN: ICD-10-CM

## 2017-12-14 DIAGNOSIS — Z79.02 ENCOUNTER FOR MONITORING ANTIPLATELET THERAPY: ICD-10-CM

## 2017-12-14 LAB — CREAT BLDA-MCNC: 3.9 MG/DL (ref 0.6–1.3)

## 2017-12-14 PROCEDURE — 0 IOPAMIDOL PER 1 ML: Performed by: SURGERY

## 2017-12-14 PROCEDURE — 82565 ASSAY OF CREATININE: CPT

## 2017-12-14 PROCEDURE — 74174 CTA ABD&PLVS W/CONTRAST: CPT

## 2017-12-14 PROCEDURE — 99214 OFFICE O/P EST MOD 30 MIN: CPT | Performed by: SURGERY

## 2017-12-14 RX ADMIN — IOPAMIDOL 101 ML: 755 INJECTION, SOLUTION INTRAVENOUS at 09:15

## 2017-12-14 NOTE — PROGRESS NOTES
"12/14/2017       Barrett Mckenzie MD  [address] [place] [address]   [address] [place] [id number]    Cheri Ely  1940    Chief Complaint   Patient presents with   • Follow-up     Follow up from CT of abdomen. Abdomenal fullness.       Dear Barrett Mckenzie MD:      HPI  I had the pleasure of seeing your patient Cheri Ely in the office today. As you recall, Cheri Ely is a 77 y.o.  female who you are currently following for routine health maintenance.  She has complaints of cramping to her stomach after she eats for the past couple of weeks.  She continues to complaints of pain after she eats.  She had lap band surgery in 2008.  She is also on dialysis for the past 2 years with a left upper extremity fistula created by Dr. Dubose.  She did fall after her last visit sustaining a pelvic fracture.  She was sent to rehab for this.  She did have noninvasive testing performed today, which I did review in office.  Her dialysis is on Monday, Wednesday, and Friday.      Review of Systems   Constitutional: Negative.    HENT: Negative.    Eyes: Negative.    Respiratory: Negative.    Gastrointestinal: Positive for abdominal pain.   Endocrine: Negative.    Genitourinary: Negative.    Musculoskeletal: Negative.    Skin: Negative.    Allergic/Immunologic: Negative.    Neurological: Negative.    Hematological: Negative.    Psychiatric/Behavioral: Negative.        /58  Pulse 74  Ht 154.9 cm (61\")  Wt 75.3 kg (166 lb)  BMI 31.37 kg/m2  Physical Exam   Constitutional: She is oriented to person, place, and time. She appears well-developed and well-nourished.   HENT:   Head: Normocephalic and atraumatic.   Eyes: Pupils are equal, round, and reactive to light. No scleral icterus.   Neck: Neck supple. No JVD present. Carotid bruit is not present. No thyromegaly present.   Cardiovascular: Normal rate, regular rhythm and normal heart sounds.    Pulses:       Carotid pulses are 2+ on the right side, and 2+ on " the left side.       Femoral pulses are 2+ on the right side, and 2+ on the left side.       Popliteal pulses are 2+ on the right side, and 2+ on the left side.        Dorsalis pedis pulses are 2+ on the right side, and 2+ on the left side.        Posterior tibial pulses are 2+ on the right side, and 2+ on the left side.   Pulmonary/Chest: Effort normal and breath sounds normal.   Abdominal: Soft. Bowel sounds are normal. She exhibits no distension, no abdominal bruit and no mass. There is no hepatosplenomegaly. There is no tenderness.   Musculoskeletal: Normal range of motion. She exhibits no edema.   Lymphadenopathy:     She has no cervical adenopathy.   Neurological: She is alert and oriented to person, place, and time. She has normal strength. No cranial nerve deficit or sensory deficit.   Skin: Skin is warm, dry and intact.   Psychiatric: She has a normal mood and affect.   Nursing note and vitals reviewed.      Ct Angiogram Abdomen Pelvis With & Without Contrast    Result Date: 12/14/2017  Narrative: EXAMINATION:  CT ANGIOGRAM ABDOMEN PELVIS W WO CONTRAST-  12/14/2017 9:28 AM EST  HISTORY: Rule out mesenteric ischemia; R10.84-Generalized abdominal pain.  TECHNIQUE: Spiral CT angiography was performed of the abdomen and pelvis with contrast. Multiplanar and 3-D images were reconstructed.  DLP: 1044 mGy-cm. Automated dosage control was utilized.  COMPARISON: 09/15/2017.  LUNG BASES: There is mild pulmonary fibrosis in the lung bases. There is an aortic valve prosthesis. Coronary artery calcification is noted. There is cardiomegaly.  LIVER AND SPLEEN: The liver demonstrates a normal essentially unenhanced appearance. There is very early arterial enhancement. There has been prior cholecystectomy. Mild prominence of the biliary tree is likely related. Calcified splenic granulomas are noted.  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 There is an 8 mm cystic lesion in the distal tail of the pancreas the  pancreas is otherwise unremarkable. The small cystic lesion in the tail of pancreas is stable compared to the prior study.  KIDNEYS AND ADRENALS: The adrenal glands are unremarkable. There is diffuse cortical thinning of both kidneys right greater than left. There is a 1.8 cm left renal cyst. The renal collecting systems and ureters are nondilated. The urinary bladder is decompressed. There is a small amount of air in the urinary bladder. There is mild stranding of the fat around the urinary bladder.  BOWEL: No oral contrast was administered. Gastric wall thickening is likely an artifact of underdistention. There is a lap band device in place. There is diverticulosis of the colon. There are no CT findings of diverticulitis. There are areas of underdistention of the colon. Small bowel loops are nondilated.  OTHER: There are new fractures of the superior and inferior pubic rami on the left side not present on the previous study. These are probably subacute in age. There are postoperative and degenerative changes of the visualized spine. There is enthesopathy of the pelvis and greater trochanters.  VASCULAR: There is diffuse atheromatous disease of the aortoiliac vessels. The celiac plexus is patent but there is high-grade narrowing at its origin. There is dense plaque at the origin of the SMA with thrombus extending to the first bifurcation of the SMA. The proximal SMA is completely occluded. There is reconstitution of flow in the SMA just inferior to the first bifurcation. There is a moderate amount of plaque in the SMA distal to the first bifurcation. The inferior mesenteric artery is patent and slightly larger than normal. This likely provides some collateral blood flow. There is very dense plaque and severe stenosis of the proximal renal arteries bilaterally. There does not appear to be significant iliac artery stenosis although there is moderate plaque in the common iliac arteries and in the proximal internal and  external iliac arteries.      Impression: 1. Extensive vascular disease, as described above. 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 Please see the above report for complete description of vascular findings. There is also high-grade stenosis at the origins of both renal arteries. 2. Pulmonary fibrosis in both lung bases. Cardiomegaly. Coronary artery disease. Prior aortic valve replacement. 3. Prior cholecystectomy. Prominence of the biliary tree is felt to be related. 4. Diffuse cortical thinning of both kidneys right greater than left. There is a 1.8 cm left renal cyst. Bladder wall thickening versus artifact of underdistention. Small foci of air within the urinary bladder, indeterminate. Correlate with any instrumentation. 5. Diverticulosis of the colon. Wall thickening of the stomach is probably an artifact of underdistention. No small bowel distention. 6. Subacute appearing fractures of the superior and inferior pubic rami on the left are new compared to 09/15/2017.  Results were discussed with ABILIO Rae. This report was finalized on 12/14/2017 09:33 by Dr. Jaswinder Pena MD.      Patient Active Problem List   Diagnosis   • Hypertension   • Hyperlipidemia   • Chronic renal failure   • Closed fracture of ramus of left pubis   • Preoperative testing   • Occlusion of superior mesenteric artery   • Chronic mesenteric ischemia         ICD-10-CM ICD-9-CM   1. Occlusion of superior mesenteric artery K55.069 557.0   2. Preoperative testing Z01.818 V72.84   3. Encounter for monitoring antiplatelet therapy Z51.81 V58.83    Z79.02 V58.63   4. Essential hypertension I10 401.9   5. Mixed hyperlipidemia E78.2 272.2   6. Chronic mesenteric ischemia K55.1 557.1           Plan: After thoroughly evaluating Cheri FUENTES Ely, I believe the best course of action is to proceed with a mesenteric angiogram.  She does have  continued discomfort in her abdomen when she eats.  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  She has a widely patent RIZWANA with retrograde filling which is compensating.  A lengthy discussion was had regarding all treatment options both endovascular and open.  Risks of angiogram were discussed.  These include, but are not limited to, bleeding, infection, vessel damage, nerve damage, embolus, and loss of limb.  The patient understands these risks and wishes to proceed with procedure.   I also counseled her about her vascular risk factors with regards to hypertension and hyperlipidemia.  The patient can continue taking her current medication regimen as previously planned.  This was all discussed in full with complete understanding.    Thank you for allowing me to participate in the care of your patient.  Please do not hesitate with any questions or concerns.  I will keep you aware of any further encounters with Cheri Ely.        Sincerely yours,         DO Barrett Blandon Jr., MD

## 2018-01-02 ENCOUNTER — APPOINTMENT (OUTPATIENT)
Dept: PREADMISSION TESTING | Facility: HOSPITAL | Age: 78
End: 2018-01-02

## 2018-01-02 ENCOUNTER — HOSPITAL ENCOUNTER (OUTPATIENT)
Dept: GENERAL RADIOLOGY | Facility: HOSPITAL | Age: 78
Discharge: HOME | End: 2018-01-02
Admitting: NURSE PRACTITIONER

## 2018-01-02 VITALS
WEIGHT: 174.82 LBS | DIASTOLIC BLOOD PRESSURE: 46 MMHG | BODY MASS INDEX: 34.32 KG/M2 | HEART RATE: 67 BPM | RESPIRATION RATE: 18 BRPM | SYSTOLIC BLOOD PRESSURE: 122 MMHG | OXYGEN SATURATION: 98 % | HEIGHT: 60 IN

## 2018-01-02 DIAGNOSIS — Z51.81 ENCOUNTER FOR MONITORING ANTIPLATELET THERAPY: ICD-10-CM

## 2018-01-02 DIAGNOSIS — Z01.818 PREOPERATIVE TESTING: ICD-10-CM

## 2018-01-02 DIAGNOSIS — Z79.02 ENCOUNTER FOR MONITORING ANTIPLATELET THERAPY: ICD-10-CM

## 2018-01-02 DIAGNOSIS — R79.89 ABNORMAL BLOOD CREATININE LEVEL: Primary | ICD-10-CM

## 2018-01-02 DIAGNOSIS — K55.069 OCCLUSION OF SUPERIOR MESENTERIC ARTERY (HCC): ICD-10-CM

## 2018-01-02 LAB
ANION GAP SERPL CALCULATED.3IONS-SCNC: 12 MMOL/L (ref 4–13)
APTT PPP: 33.7 SECONDS (ref 24.1–34.8)
BASOPHILS # BLD AUTO: 0.05 10*3/MM3 (ref 0–0.2)
BASOPHILS NFR BLD AUTO: 0.6 % (ref 0–2)
BUN BLD-MCNC: 37 MG/DL (ref 5–21)
BUN/CREAT SERPL: 6.2 (ref 7–25)
CALCIUM SPEC-SCNC: 8.2 MG/DL (ref 8.4–10.4)
CHLORIDE SERPL-SCNC: 101 MMOL/L (ref 98–110)
CO2 SERPL-SCNC: 30 MMOL/L (ref 24–31)
CREAT BLD-MCNC: 6 MG/DL (ref 0.5–1.4)
DEPRECATED RDW RBC AUTO: 55.2 FL (ref 40–54)
EOSINOPHIL # BLD AUTO: 0.24 10*3/MM3 (ref 0–0.7)
EOSINOPHIL NFR BLD AUTO: 3 % (ref 0–4)
ERYTHROCYTE [DISTWIDTH] IN BLOOD BY AUTOMATED COUNT: 14.6 % (ref 12–15)
GFR SERPL CREATININE-BSD FRML MDRD: 7 ML/MIN/1.73
GLUCOSE BLD-MCNC: 101 MG/DL (ref 70–100)
HCT VFR BLD AUTO: 31.4 % (ref 37–47)
HGB BLD-MCNC: 10 G/DL (ref 12–16)
IMM GRANULOCYTES # BLD: 0.03 10*3/MM3 (ref 0–0.03)
IMM GRANULOCYTES NFR BLD: 0.4 % (ref 0–5)
INR PPP: 1.28 (ref 0.91–1.09)
LYMPHOCYTES # BLD AUTO: 0.94 10*3/MM3 (ref 0.72–4.86)
LYMPHOCYTES NFR BLD AUTO: 11.8 % (ref 15–45)
MCH RBC QN AUTO: 33.2 PG (ref 28–32)
MCHC RBC AUTO-ENTMCNC: 31.8 G/DL (ref 33–36)
MCV RBC AUTO: 104.3 FL (ref 82–98)
MONOCYTES # BLD AUTO: 0.48 10*3/MM3 (ref 0.19–1.3)
MONOCYTES NFR BLD AUTO: 6 % (ref 4–12)
NEUTROPHILS # BLD AUTO: 6.25 10*3/MM3 (ref 1.87–8.4)
NEUTROPHILS NFR BLD AUTO: 78.2 % (ref 39–78)
NRBC BLD MANUAL-RTO: 0 /100 WBC (ref 0–0)
PLATELET # BLD AUTO: 149 10*3/MM3 (ref 130–400)
PMV BLD AUTO: 10.8 FL (ref 6–12)
POTASSIUM BLD-SCNC: 4.2 MMOL/L (ref 3.5–5.3)
PROTHROMBIN TIME: 16.4 SECONDS (ref 11.9–14.6)
RBC # BLD AUTO: 3.01 10*6/MM3 (ref 4.2–5.4)
SODIUM BLD-SCNC: 143 MMOL/L (ref 135–145)
WBC NRBC COR # BLD: 7.99 10*3/MM3 (ref 4.8–10.8)

## 2018-01-02 PROCEDURE — 71046 X-RAY EXAM CHEST 2 VIEWS: CPT

## 2018-01-02 PROCEDURE — 85025 COMPLETE CBC W/AUTO DIFF WBC: CPT | Performed by: NURSE PRACTITIONER

## 2018-01-02 PROCEDURE — 93005 ELECTROCARDIOGRAM TRACING: CPT

## 2018-01-02 PROCEDURE — 36415 COLL VENOUS BLD VENIPUNCTURE: CPT

## 2018-01-02 PROCEDURE — 80048 BASIC METABOLIC PNL TOTAL CA: CPT | Performed by: NURSE PRACTITIONER

## 2018-01-02 PROCEDURE — 85610 PROTHROMBIN TIME: CPT | Performed by: NURSE PRACTITIONER

## 2018-01-02 PROCEDURE — 93010 ELECTROCARDIOGRAM REPORT: CPT | Performed by: INTERNAL MEDICINE

## 2018-01-02 PROCEDURE — 85730 THROMBOPLASTIN TIME PARTIAL: CPT | Performed by: NURSE PRACTITIONER

## 2018-01-02 NOTE — DISCHARGE INSTRUCTIONS
DAY OF SURGERY INSTRUCTIONS        YOUR SURGEON: ROSEMARY PASTRANA    PROCEDURE: MESENTERIC ANGIOGRAM, GROIN ACCESS    DATE OF SURGERY: January 8TH 2018    ARRIVAL TIME: AS DIRECTED BY OFFICE    DAY OF SURGERY TAKE ONLY THESE MEDICATIONS: CARVEDILOL        BEFORE YOU COME TO THE HOSPITAL  (Pre-op instructions)  • Do not eat, drink, smoke or chew gum after midnight the night before surgery.  This also includes no mints.  • Morning of surgery take only the medicines you have been instructed with a sip of water unless otherwise instructed  by your physician.  • Do not shave, wear makeup or dark nail polish.  • Remove all jewelry including rings.  • Leave anything you consider valuable at home.  • Leave your suitcase in the car until after your surgery.  • Bring the following with you if applicable:  o Picture ID and insurance, Medicare or Medicaid cards  o Co-pay/deductible required by insurance (cash, check, credit card)  o Copy of advance directive, living will or power-of- documents if not brought to PAT  o CPAP or BIPAP mask and tubing  o Relaxation aids (MP3 player, book, magazine)  • On the day of surgery check in at registration located at the main entrance of the hospital.       Outpatient Surgery Guidelines, Adult  Outpatient procedures are those for which the person having the procedure is allowed to go home the same day as the procedure. Various procedures are done on an outpatient basis. You should follow some general guidelines if you will be having an outpatient procedure.  LET YOUR HEALTH CARE PROVIDER KNOW ABOUT:  · Any allergies you have.  · All medicines you are taking, including vitamins, herbs, eye drops, creams, and over-the-counter medicines.  · Previous problems you or members of your family have had with the use of anesthetics.  · Any blood disorders you have.  · Previous surgeries you have had.  · Medical conditions you have.  RISKS AND COMPLICATIONS  Your health care provider will  discuss possible risks and complications with you before surgery. Common risks and complications include:    · Problems due to the use of anesthetics.  · Blood loss and replacement (does not apply to minor surgical procedures).  · Temporary increase in pain due to surgery.  · Uncorrected pain or problems that the surgery was meant to correct.  · Infection.  · New damage.  BEFORE THE PROCEDURE  · Ask your health care provider about changing or stopping your regular medicines. You may need to stop taking certain medicines in the days or weeks before the procedure.  · Stop smoking at least 2 weeks before surgery. This lowers your risk for complications during and after surgery. Ask your health care provider for help with this if needed.  · Eat your usual meals and a light supper the day before surgery. Continue fluid intake. Do not drink alcohol.  · Do not eat or drink after midnight the night before your surgery.   · Arrange for someone to take you home and to stay with you for 24 hours after the procedure. Medicine given for your procedure may affect your ability to drive or to care for yourself.  · Call your health care provider's office if you develop an illness or problem that may prevent you from safely having your procedure.  AFTER THE PROCEDURE  After surgery, you will be taken to a recovery area, where your progress will be monitored. If there are no complications, you will be allowed to go home when you are awake, stable, and taking fluids well. You may have numbness around the surgical site. Healing will take some time. You will have tenderness at the surgical site and may have some swelling and bruising. You may also have some nausea.  HOME CARE INSTRUCTIONS  · Do not drive for 24 hours, or as directed by your health care provider. Do not drive while taking prescription pain medicines.  · Do not drink alcohol for 24 hours.  · Do not make important decisions or sign legal documents for 24 hours.  · You may  resume a normal diet and activities as directed.  · Do not lift anything heavier than 10 pounds (4.5 kg) or play contact sports until your health care provider says it is okay.  · Change your bandages (dressings) as directed.  · Only take over-the-counter or prescription medicines as directed by your health care provider.  · Follow up with your health care provider as directed.  SEEK MEDICAL CARE IF:  · You have increased bleeding (more than a small spot) from the surgical site.  · You have redness, swelling, or increasing pain in the wound.  · You see pus coming from the wound.  · You have a fever.  · You notice a bad smell coming from the wound or dressing.  · You feel lightheaded or faint.  · You develop a rash.  · You have trouble breathing.  · You develop allergies.  MAKE SURE YOU:  · Understand these instructions.  · Will watch your condition.  · Will get help right away if you are not doing well or get worse.     This information is not intended to replace advice given to you by your health care provider. Make sure you discuss any questions you have with your health care provider.     Document Released: 09/12/2002 Document Revised: 05/03/2016 Document Reviewed: 05/22/2014  [employer] Interactive Patient Education ©2016 [employer]       Fall Prevention in Hospitals, Adult  As a hospital patient, your condition and the treatments you receive can increase your risk for falls. Some additional risk factors for falls in a hospital include:  · Being in an unfamiliar environment.  · Being on bed rest.  · Your surgery.  · Taking certain medicines.  · Your tubing requirements, such as intravenous (IV) therapy or catheters.  It is important that you learn how to decrease fall risks while at the hospital. Below are important tips that can help prevent falls.  SAFETY TIPS FOR PREVENTING FALLS  Talk about your risk of falling.  · Ask your health care provider why you are at risk for falling. Is it your medicine, illness,  tubing placement, or something else?  · Make a plan with your health care provider to keep you safe from falls.  · Ask your health care provider or pharmacist about side effects of your medicines. Some medicines can make you dizzy or affect your coordination.  Ask for help.  · Ask for help before getting out of bed. You may need to press your call button.  · Ask for assistance in getting safely to the toilet.  · Ask for a walker or cane to be put at your bedside. Ask that most of the side rails on your bed be placed up before your health care provider leaves the room.  · Ask family or friends to sit with you.  · Ask for things that are out of your reach, such as your glasses, hearing aids, telephone, bedside table, or call button.  Follow these tips to avoid falling:  · Stay lying or seated, rather than standing, while waiting for help.  · Wear rubber-soled slippers or shoes whenever you walk in the hospital.  · Avoid quick, sudden movements.  ¨ Change positions slowly.  ¨ Sit on the side of your bed before standing.  ¨ Stand up slowly and wait before you start to walk.  · Let your health care provider know if there is a spill on the floor.  · Pay careful attention to the medical equipment, electrical cords, and tubes around you.  · When you need help, use your call button by your bed or in the bathroom. Wait for one of your health care providers to help you.  · If you feel dizzy or unsure of your footing, return to bed and wait for assistance.  · Avoid being distracted by the TV, telephone, or another person in your room.  · Do not lean or support yourself on rolling objects, such as IV poles or bedside tables.     This information is not intended to replace advice given to you by your health care provider. Make sure you discuss any questions you have with your health care provider.     Document Released: 12/15/2001 Document Revised: 01/08/2016 Document Reviewed: 08/25/2013  [employer] Interactive Patient Education  ©2016 [employer].       Surgical Site Infections FAQs  What is a Surgical Site Infection (SSI)?  A surgical site infection is an infection that occurs after surgery in the part of the body where the surgery took place. Most patients who have surgery do not develop an infection. However, infections develop in about 1 to 3 out of every 100 patients who have surgery.  Some of the common symptoms of a surgical site infection are:  · Redness and pain around the area where you had surgery  · Drainage of cloudy fluid from your surgical wound  · Fever  Can SSIs be treated?  Yes. Most surgical site infections can be treated with antibiotics. The antibiotic given to you depends on the bacteria (germs) causing the infection. Sometimes patients with SSIs also need another surgery to treat the infection.  What are some of the things that hospitals are doing to prevent SSIs?  To prevent SSIs, doctors, nurses, and other healthcare providers:  · Clean their hands and arms up to their elbows with an antiseptic agent just before the surgery.  · Clean their hands with soap and water or an alcohol-based hand rub before and after caring for each patient.  · May remove some of your hair immediately before your surgery using electric clippers if the hair is in the same area where the procedure will occur. They should not shave you with a razor.  · Wear special hair covers, masks, gowns, and gloves during surgery to keep the surgery area clean.  · Give you antibiotics before your surgery starts. In most cases, you should get antibiotics within 60 minutes before the surgery starts and the antibiotics should be stopped within 24 hours after surgery.  · Clean the skin at the site of your surgery with a special soap that kills germs.  What can I do to help prevent SSIs?  Before your surgery:  · Tell your doctor about other medical problems you may have. Health problems such as allergies, diabetes, and obesity could affect your surgery and  your treatment.  · Quit smoking. Patients who smoke get more infections. Talk to your doctor about how you can quit before your surgery.  · Do not shave near where you will have surgery. Shaving with a razor can irritate your skin and make it easier to develop an infection.  At the time of your surgery:  · Speak up if someone tries to shave you with a razor before surgery. Ask why you need to be shaved and talk with your surgeon if you have any concerns.  · Ask if you will get antibiotics before surgery.  After your surgery:  · Make sure that your healthcare providers clean their hands before examining you, either with soap and water or an alcohol-based hand rub.  · If you do not see your providers clean their hands, please ask them to do so.  · Family and friends who visit you should not touch the surgical wound or dressings.  · Family and friends should clean their hands with soap and water or an alcohol-based hand rub before and after visiting you. If you do not see them clean their hands, ask them to clean their hands.  What do I need to do when I go home from the hospital?  · Before you go home, your doctor or nurse should explain everything you need to know about taking care of your wound. Make sure you understand how to care for your wound before you leave the hospital.  · Always clean your hands before and after caring for your wound.  · Before you go home, make sure you know who to contact if you have questions or problems after you get home.  · If you have any symptoms of an infection, such as redness and pain at the surgery site, drainage, or fever, call your doctor immediately.  If you have additional questions, please ask your doctor or nurse.  Developed and co-sponsored by The Society for Healthcare Epidemiology of [place] (SHEA); Infectious Diseases Society of [place] (IDSA); American Hospital Association; Association for Professionals in Infection Control and Epidemiology (APIC); Centers for Disease  Control and Prevention (CDC); and The Joint Commission.     This information is not intended to replace advice given to you by your health care provider. Make sure you discuss any questions you have with your health care provider.     Document Released: 12/23/2014 Document Revised: 01/08/2016 Document Reviewed: 03/02/2016  [employer] Interactive Patient Education ©2016 [employer] Inc.       [facility]  CHG 4% Patient Instruction Sheet    Preparing the Skin Before Surgery  Preparing or “prepping” skin before surgery can reduce the risk of infection at the surgical site. To make the process easier,[facility] has chosen 4% Chlorhexidine Gluconate (CHG) antiseptic solution.   The steps below outline the prepping process and should be carefully followed.                                                                                                                                                      Prep the skin at the following time(s):                                                      We recommend you shower the night before surgery, and again the morning of surgery with the 4% CHG antiseptic solution using half of the bottle and a cloth each time.  Dress in clean clothes/sleepwear after showering.  See instructions below for application.          Do not apply any lotions or moisturizers.       Do not shave the area to be prepped for at least 2 days prior to surgery.    Clipping the hair may be done immediately prior to your surgery at the hospital    if needed.    Directions:  Thoroughly rinse your body with water.  Apply 4% CHG to a cloth and wash skin gently, paying special attention to the operative site.  Rinse again thoroughly.  Once you have begun using this product do not apply anything else to your skin. If itching or redness persists, rinse affected areas and discontinue use.    When using this product:  • Keep out of eyes, ears, and mouth.  • If solution should contact these areas, rinse out promptly  and thoroughly with water.  • For external use only.  • Do not use in genital area, on your face or head.      PATIENT/FAMILY/RESPONSIBLE PARTY VERBALIZES UNDERSTANDING OF ABOVE EDUCATION.  COPY OF PAIN SCALE GIVEN AND REVIEWED WITH VERBALIZED UNDERSTANDING.

## 2018-01-05 ENCOUNTER — TELEPHONE (OUTPATIENT)
Dept: VASCULAR SURGERY | Facility: CLINIC | Age: 78
End: 2018-01-05

## 2018-01-05 NOTE — TELEPHONE ENCOUNTER
"Left a voicemail regarding as well--will await phone call back.  She is scheduled for a mesenteric angiogram and preop testing EKG showed atrial fibrillation.  I compared with other EKGs and can not find in history, but patient new whe had an \"abnormality\".  I only see previous EKGs showing A-V block  "

## 2018-01-05 NOTE — TELEPHONE ENCOUNTER
[facility] Called back on Ms Marcelo and stated you could give the office a call back at the [phone] or you could also try [phone]. Thanks

## 2018-01-05 NOTE — TELEPHONE ENCOUNTER
I called [phone] and left message at Dr. Lopez's office on the triage nurse line requesting records be faxed to our office that indicate the chronic atrial fibrillation diagnosis per Kim Maria's request.

## 2018-01-05 NOTE — TELEPHONE ENCOUNTER
Call placed to Dr Miguel's office.Message left per voice mail.Inquired if patient had diagnosis of afib.Office phone and fax number given.

## 2018-01-08 ENCOUNTER — APPOINTMENT (OUTPATIENT)
Dept: INTERVENTIONAL RADIOLOGY/VASCULAR | Facility: HOSPITAL | Age: 78
End: 2018-01-08

## 2018-01-08 ENCOUNTER — ANESTHESIA EVENT (OUTPATIENT)
Dept: PERIOP | Facility: HOSPITAL | Age: 78
End: 2018-01-08

## 2018-01-08 ENCOUNTER — ANESTHESIA (OUTPATIENT)
Dept: PERIOP | Facility: HOSPITAL | Age: 78
End: 2018-01-08

## 2018-01-08 ENCOUNTER — HOSPITAL ENCOUNTER (OUTPATIENT)
Facility: HOSPITAL | Age: 78
Discharge: HOME | End: 2018-01-08
Attending: SURGERY | Admitting: SURGERY

## 2018-01-08 VITALS
TEMPERATURE: 97.5 F | SYSTOLIC BLOOD PRESSURE: 144 MMHG | OXYGEN SATURATION: 96 % | DIASTOLIC BLOOD PRESSURE: 58 MMHG | HEART RATE: 99 BPM | RESPIRATION RATE: 16 BRPM

## 2018-01-08 DIAGNOSIS — Z01.818 PREOPERATIVE TESTING: ICD-10-CM

## 2018-01-08 DIAGNOSIS — R79.89 ABNORMAL BLOOD CREATININE LEVEL: ICD-10-CM

## 2018-01-08 DIAGNOSIS — K55.069 OCCLUSION OF SUPERIOR MESENTERIC ARTERY (HCC): ICD-10-CM

## 2018-01-08 LAB
ABO GROUP BLD: NORMAL
ALBUMIN SERPL-MCNC: 3.3 G/DL (ref 3.5–5)
ALBUMIN/GLOB SERPL: 1 G/DL (ref 1.1–2.5)
ALP SERPL-CCNC: 179 U/L (ref 24–120)
ALT SERPL W P-5'-P-CCNC: 23 U/L (ref 0–54)
ANION GAP SERPL CALCULATED.3IONS-SCNC: 16 MMOL/L (ref 4–13)
AST SERPL-CCNC: 23 U/L (ref 7–45)
BILIRUB SERPL-MCNC: 0.3 MG/DL (ref 0.1–1)
BLD GP AB SCN SERPL QL: NEGATIVE
BUN BLD-MCNC: 60 MG/DL (ref 5–21)
BUN/CREAT SERPL: 7.9 (ref 7–25)
CALCIUM SPEC-SCNC: 8 MG/DL (ref 8.4–10.4)
CHLORIDE SERPL-SCNC: 100 MMOL/L (ref 98–110)
CO2 SERPL-SCNC: 25 MMOL/L (ref 24–31)
CREAT BLD-MCNC: 7.57 MG/DL (ref 0.5–1.4)
GFR SERPL CREATININE-BSD FRML MDRD: 5 ML/MIN/1.73
GLOBULIN UR ELPH-MCNC: 3.3 GM/DL
GLUCOSE BLD-MCNC: 81 MG/DL (ref 70–100)
POTASSIUM BLD-SCNC: 4.1 MMOL/L (ref 3.5–5.3)
PROT SERPL-MCNC: 6.6 G/DL (ref 6.3–8.7)
RH BLD: POSITIVE
SODIUM BLD-SCNC: 141 MMOL/L (ref 135–145)

## 2018-01-08 PROCEDURE — 80053 COMPREHEN METABOLIC PANEL: CPT | Performed by: NURSE PRACTITIONER

## 2018-01-08 PROCEDURE — 25010000002 DEXAMETHASONE PER 1 MG: Performed by: NURSE ANESTHETIST, CERTIFIED REGISTERED

## 2018-01-08 PROCEDURE — 25010000002 HEPARIN (PORCINE) PER 1000 UNITS: Performed by: SURGERY

## 2018-01-08 PROCEDURE — 86850 RBC ANTIBODY SCREEN: CPT | Performed by: NURSE PRACTITIONER

## 2018-01-08 PROCEDURE — C1769 GUIDE WIRE: HCPCS | Performed by: SURGERY

## 2018-01-08 PROCEDURE — G0257 UNSCHED DIALYSIS ESRD PT HOS: HCPCS

## 2018-01-08 PROCEDURE — 86900 BLOOD TYPING SEROLOGIC ABO: CPT | Performed by: NURSE PRACTITIONER

## 2018-01-08 PROCEDURE — C1894 INTRO/SHEATH, NON-LASER: HCPCS | Performed by: SURGERY

## 2018-01-08 PROCEDURE — 25010000003 CEFAZOLIN PER 500 MG: Performed by: NURSE PRACTITIONER

## 2018-01-08 PROCEDURE — 25010000002 HEPARIN (PORCINE) PER 1000 UNITS: Performed by: NURSE ANESTHETIST, CERTIFIED REGISTERED

## 2018-01-08 PROCEDURE — 25010000002 FENTANYL CITRATE (PF) 250 MCG/5ML SOLUTION: Performed by: NURSE ANESTHETIST, CERTIFIED REGISTERED

## 2018-01-08 PROCEDURE — 86901 BLOOD TYPING SEROLOGIC RH(D): CPT | Performed by: NURSE PRACTITIONER

## 2018-01-08 PROCEDURE — 36200 PLACE CATHETER IN AORTA: CPT | Performed by: SURGERY

## 2018-01-08 PROCEDURE — 25010000002 PROPOFOL 10 MG/ML EMULSION: Performed by: NURSE ANESTHETIST, CERTIFIED REGISTERED

## 2018-01-08 PROCEDURE — 76000 FLUOROSCOPY <1 HR PHYS/QHP: CPT

## 2018-01-08 PROCEDURE — 25010000002 ONDANSETRON PER 1 MG: Performed by: NURSE ANESTHETIST, CERTIFIED REGISTERED

## 2018-01-08 PROCEDURE — 25010000002 NEOSTIGMINE PER 0.5 MG: Performed by: NURSE ANESTHETIST, CERTIFIED REGISTERED

## 2018-01-08 PROCEDURE — 0 IOPAMIDOL 61 % SOLUTION: Performed by: SURGERY

## 2018-01-08 PROCEDURE — C1760 CLOSURE DEV, VASC: HCPCS | Performed by: SURGERY

## 2018-01-08 PROCEDURE — C1887 CATHETER, GUIDING: HCPCS | Performed by: SURGERY

## 2018-01-08 PROCEDURE — 36245 INS CATH ABD/L-EXT ART 1ST: CPT | Performed by: SURGERY

## 2018-01-08 PROCEDURE — 75726 ARTERY X-RAYS ABDOMEN: CPT | Performed by: SURGERY

## 2018-01-08 PROCEDURE — 75726 ARTERY X-RAYS ABDOMEN: CPT

## 2018-01-08 PROCEDURE — 75625 CONTRAST EXAM ABDOMINL AORTA: CPT

## 2018-01-08 RX ORDER — PHENYLEPHRINE HCL IN 0.9% NACL 0.8MG/10ML
SYRINGE (ML) INTRAVENOUS AS NEEDED
Status: DISCONTINUED | OUTPATIENT
Start: 2018-01-08 | End: 2018-01-08 | Stop reason: SURG

## 2018-01-08 RX ORDER — SODIUM CHLORIDE 0.9 % (FLUSH) 0.9 %
3 SYRINGE (ML) INJECTION AS NEEDED
Status: DISCONTINUED | OUTPATIENT
Start: 2018-01-08 | End: 2018-01-08

## 2018-01-08 RX ORDER — DEXAMETHASONE SODIUM PHOSPHATE 4 MG/ML
INJECTION, SOLUTION INTRA-ARTICULAR; INTRALESIONAL; INTRAMUSCULAR; INTRAVENOUS; SOFT TISSUE AS NEEDED
Status: DISCONTINUED | OUTPATIENT
Start: 2018-01-08 | End: 2018-01-08 | Stop reason: SURG

## 2018-01-08 RX ORDER — HYDROCODONE BITARTRATE AND ACETAMINOPHEN 7.5; 325 MG/1; MG/1
1 TABLET ORAL ONCE AS NEEDED
Status: DISCONTINUED | OUTPATIENT
Start: 2018-01-08 | End: 2018-01-08 | Stop reason: HOSPADM

## 2018-01-08 RX ORDER — LABETALOL HYDROCHLORIDE 5 MG/ML
5 INJECTION, SOLUTION INTRAVENOUS
Status: DISCONTINUED | OUTPATIENT
Start: 2018-01-08 | End: 2018-01-08 | Stop reason: HOSPADM

## 2018-01-08 RX ORDER — NALOXONE HCL 0.4 MG/ML
0.04 VIAL (ML) INJECTION AS NEEDED
Status: DISCONTINUED | OUTPATIENT
Start: 2018-01-08 | End: 2018-01-08 | Stop reason: HOSPADM

## 2018-01-08 RX ORDER — ONDANSETRON 2 MG/ML
INJECTION INTRAMUSCULAR; INTRAVENOUS AS NEEDED
Status: DISCONTINUED | OUTPATIENT
Start: 2018-01-08 | End: 2018-01-08 | Stop reason: SURG

## 2018-01-08 RX ORDER — FENTANYL CITRATE 50 UG/ML
INJECTION, SOLUTION INTRAMUSCULAR; INTRAVENOUS AS NEEDED
Status: DISCONTINUED | OUTPATIENT
Start: 2018-01-08 | End: 2018-01-08 | Stop reason: SURG

## 2018-01-08 RX ORDER — SODIUM CHLORIDE 9 MG/ML
20 INJECTION, SOLUTION INTRAVENOUS CONTINUOUS
Status: DISCONTINUED | OUTPATIENT
Start: 2018-01-08 | End: 2018-01-08 | Stop reason: HOSPADM

## 2018-01-08 RX ORDER — ONDANSETRON 2 MG/ML
4 INJECTION INTRAMUSCULAR; INTRAVENOUS ONCE AS NEEDED
Status: DISCONTINUED | OUTPATIENT
Start: 2018-01-08 | End: 2018-01-08 | Stop reason: HOSPADM

## 2018-01-08 RX ORDER — HEPARIN SODIUM 1000 [USP'U]/ML
INJECTION, SOLUTION INTRAVENOUS; SUBCUTANEOUS AS NEEDED
Status: DISCONTINUED | OUTPATIENT
Start: 2018-01-08 | End: 2018-01-08 | Stop reason: SURG

## 2018-01-08 RX ORDER — ONDANSETRON 2 MG/ML
4 INJECTION INTRAMUSCULAR; INTRAVENOUS AS NEEDED
Status: DISCONTINUED | OUTPATIENT
Start: 2018-01-08 | End: 2018-01-08 | Stop reason: HOSPADM

## 2018-01-08 RX ORDER — LIDOCAINE HYDROCHLORIDE 20 MG/ML
INJECTION, SOLUTION INFILTRATION; PERINEURAL AS NEEDED
Status: DISCONTINUED | OUTPATIENT
Start: 2018-01-08 | End: 2018-01-08 | Stop reason: SURG

## 2018-01-08 RX ORDER — CLOPIDOGREL BISULFATE 75 MG/1
75 TABLET ORAL DAILY
Qty: 30 TABLET | Refills: 5 | Status: SHIPPED | OUTPATIENT
Start: 2018-01-08 | End: 2018-02-07

## 2018-01-08 RX ORDER — CISATRACURIUM BESYLATE 2 MG/ML
INJECTION, SOLUTION INTRAVENOUS AS NEEDED
Status: DISCONTINUED | OUTPATIENT
Start: 2018-01-08 | End: 2018-01-08 | Stop reason: SURG

## 2018-01-08 RX ORDER — SODIUM CHLORIDE 9 MG/ML
500 INJECTION, SOLUTION INTRAVENOUS CONTINUOUS
Status: DISCONTINUED | OUTPATIENT
Start: 2018-01-08 | End: 2018-01-08

## 2018-01-08 RX ORDER — BUPIVACAINE HYDROCHLORIDE 5 MG/ML
INJECTION, SOLUTION EPIDURAL; INTRACAUDAL AS NEEDED
Status: DISCONTINUED | OUTPATIENT
Start: 2018-01-08 | End: 2018-01-08 | Stop reason: HOSPADM

## 2018-01-08 RX ORDER — HYDRALAZINE HYDROCHLORIDE 20 MG/ML
5 INJECTION INTRAMUSCULAR; INTRAVENOUS
Status: DISCONTINUED | OUTPATIENT
Start: 2018-01-08 | End: 2018-01-08 | Stop reason: HOSPADM

## 2018-01-08 RX ORDER — METOCLOPRAMIDE HYDROCHLORIDE 5 MG/ML
5 INJECTION INTRAMUSCULAR; INTRAVENOUS
Status: DISCONTINUED | OUTPATIENT
Start: 2018-01-08 | End: 2018-01-08 | Stop reason: HOSPADM

## 2018-01-08 RX ORDER — FLUMAZENIL 0.1 MG/ML
0.2 INJECTION INTRAVENOUS AS NEEDED
Status: DISCONTINUED | OUTPATIENT
Start: 2018-01-08 | End: 2018-01-08 | Stop reason: HOSPADM

## 2018-01-08 RX ORDER — MORPHINE SULFATE 2 MG/ML
2 INJECTION, SOLUTION INTRAMUSCULAR; INTRAVENOUS AS NEEDED
Status: DISCONTINUED | OUTPATIENT
Start: 2018-01-08 | End: 2018-01-08 | Stop reason: HOSPADM

## 2018-01-08 RX ORDER — IPRATROPIUM BROMIDE AND ALBUTEROL SULFATE 2.5; .5 MG/3ML; MG/3ML
3 SOLUTION RESPIRATORY (INHALATION) ONCE AS NEEDED
Status: DISCONTINUED | OUTPATIENT
Start: 2018-01-08 | End: 2018-01-08 | Stop reason: HOSPADM

## 2018-01-08 RX ORDER — MEPERIDINE HYDROCHLORIDE 25 MG/ML
12.5 INJECTION INTRAMUSCULAR; INTRAVENOUS; SUBCUTANEOUS
Status: DISCONTINUED | OUTPATIENT
Start: 2018-01-08 | End: 2018-01-08 | Stop reason: HOSPADM

## 2018-01-08 RX ORDER — SODIUM CHLORIDE, SODIUM LACTATE, POTASSIUM CHLORIDE, CALCIUM CHLORIDE 600; 310; 30; 20 MG/100ML; MG/100ML; MG/100ML; MG/100ML
100 INJECTION, SOLUTION INTRAVENOUS CONTINUOUS
Status: DISCONTINUED | OUTPATIENT
Start: 2018-01-08 | End: 2018-01-08 | Stop reason: HOSPADM

## 2018-01-08 RX ORDER — PROPOFOL 10 MG/ML
VIAL (ML) INTRAVENOUS AS NEEDED
Status: DISCONTINUED | OUTPATIENT
Start: 2018-01-08 | End: 2018-01-08 | Stop reason: SURG

## 2018-01-08 RX ORDER — GLYCOPYRROLATE 0.2 MG/ML
INJECTION INTRAMUSCULAR; INTRAVENOUS AS NEEDED
Status: DISCONTINUED | OUTPATIENT
Start: 2018-01-08 | End: 2018-01-08 | Stop reason: SURG

## 2018-01-08 RX ORDER — SODIUM CHLORIDE 0.9 % (FLUSH) 0.9 %
1-10 SYRINGE (ML) INJECTION AS NEEDED
Status: DISCONTINUED | OUTPATIENT
Start: 2018-01-08 | End: 2018-01-08 | Stop reason: HOSPADM

## 2018-01-08 RX ADMIN — Medication 80 MCG: at 08:27

## 2018-01-08 RX ADMIN — GLYCOPYRROLATE 0.4 MG: 0.2 INJECTION, SOLUTION INTRAMUSCULAR; INTRAVENOUS at 08:56

## 2018-01-08 RX ADMIN — PROPOFOL 100 MG: 10 INJECTION, EMULSION INTRAVENOUS at 07:48

## 2018-01-08 RX ADMIN — SODIUM CHLORIDE: 9 INJECTION, SOLUTION INTRAVENOUS at 08:56

## 2018-01-08 RX ADMIN — LIDOCAINE HYDROCHLORIDE 0.5 ML: 10 INJECTION, SOLUTION EPIDURAL; INFILTRATION; INTRACAUDAL; PERINEURAL at 06:09

## 2018-01-08 RX ADMIN — CISATRACURIUM BESYLATE 2 MG: 2 INJECTION INTRAVENOUS at 08:19

## 2018-01-08 RX ADMIN — ONDANSETRON HYDROCHLORIDE 4 MG: 2 SOLUTION INTRAMUSCULAR; INTRAVENOUS at 08:51

## 2018-01-08 RX ADMIN — SODIUM CHLORIDE 20 ML/HR: 9 INJECTION, SOLUTION INTRAVENOUS at 06:09

## 2018-01-08 RX ADMIN — FENTANYL CITRATE 50 MCG: 50 INJECTION INTRAMUSCULAR; INTRAVENOUS at 07:45

## 2018-01-08 RX ADMIN — DEXAMETHASONE SODIUM PHOSPHATE 4 MG: 4 INJECTION, SOLUTION INTRAMUSCULAR; INTRAVENOUS at 08:18

## 2018-01-08 RX ADMIN — FENTANYL CITRATE 50 MCG: 50 INJECTION INTRAMUSCULAR; INTRAVENOUS at 08:05

## 2018-01-08 RX ADMIN — FENTANYL CITRATE 50 MCG: 50 INJECTION INTRAMUSCULAR; INTRAVENOUS at 08:19

## 2018-01-08 RX ADMIN — HEPARIN SODIUM 1000 UNITS: 1000 INJECTION, SOLUTION INTRAVENOUS; SUBCUTANEOUS at 08:15

## 2018-01-08 RX ADMIN — Medication 80 MCG: at 07:49

## 2018-01-08 RX ADMIN — Medication 3 MG: at 08:56

## 2018-01-08 RX ADMIN — FENTANYL CITRATE 100 MCG: 50 INJECTION INTRAMUSCULAR; INTRAVENOUS at 07:48

## 2018-01-08 RX ADMIN — Medication 160 MCG: at 07:56

## 2018-01-08 RX ADMIN — Medication 80 MCG: at 08:19

## 2018-01-08 RX ADMIN — CEFAZOLIN SODIUM 2 G: 2 SOLUTION INTRAVENOUS at 07:57

## 2018-01-08 RX ADMIN — CISATRACURIUM BESYLATE 6 MG: 2 INJECTION INTRAVENOUS at 07:48

## 2018-01-08 RX ADMIN — LIDOCAINE HYDROCHLORIDE 100 MG: 20 INJECTION, SOLUTION INFILTRATION; PERINEURAL at 07:48

## 2018-01-08 NOTE — BRIEF OP NOTE
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

## 2018-01-08 NOTE — DISCHARGE INSTRUCTIONS
Moderate Conscious Sedation, Adult, Care After  Refer to this sheet in the next few weeks. These instructions provide you with information on caring for yourself after your procedure. Your health care provider may also give you more specific instructions. Your treatment has been planned according to current medical practices, but problems sometimes occur. Call your health care provider if you have any problems or questions after your procedure.  WHAT TO EXPECT AFTER THE PROCEDURE    After your procedure:  · You may feel sleepy, clumsy, and have poor balance for several hours.  · Vomiting may occur if you eat too soon after the procedure.  HOME CARE INSTRUCTIONS  · Do not participate in any activities where you could become injured for at least 24 hours. Do not:  ¨ Drive.  ¨ Swim.  ¨ Ride a bicycle.  ¨ Operate heavy machinery.  ¨ Cook.  ¨ Use power tools.  ¨ Climb ladders.  ¨ Work from a high place.  · Do not make important decisions or sign legal documents until you are improved.  · If you vomit, drink water, juice, or soup when you can drink without vomiting. Make sure you have little or no nausea before eating solid foods.  · Only take over-the-counter or prescription medicines for pain, discomfort, or fever as directed by your health care provider.  · Make sure you and your family fully understand everything about the medicines given to you, including what side effects may occur.  · You should not drink alcohol, take sleeping pills, or take medicines that cause drowsiness for at least 24 hours.  · If you smoke, do not smoke without supervision.  · If you are feeling better, you may resume normal activities 24 hours after you were sedated.  · Keep all appointments with your health care provider.  SEEK MEDICAL CARE IF:  · Your skin is pale or bluish in color.  · You continue to feel nauseous or vomit.  · Your pain is getting worse and is not helped by medicine.  · You have bleeding or swelling.  · You are still  sleepy or feeling clumsy after 24 hours.  SEEK IMMEDIATE MEDICAL CARE IF:  · You develop a rash.  · You have difficulty breathing.  · You develop any type of allergic problem.  · You have a fever.  MAKE SURE YOU:  · Understand these instructions.  · Will watch your condition.  · Will get help right away if you are not doing well or get worse.     This information is not intended to replace advice given to you by your health care provider. Make sure you discuss any questions you have with your health care provider.     Document Released: 10/08/2014 Document Revised: 01/08/2016 Document Reviewed: 10/08/2014  [employer] Interactive Patient Education ©2016 [employer]         CALL YOUR PHYSICIAN IF YOU EXPERIENCE  INCREASED PAIN NOT HELPED BY YOUR PAIN MEDICATION.        Fall Prevention in the Home      Falls can cause injuries. They can happen to people of all ages. There are many things you can do to make your home safe and to help prevent falls.    WHAT CAN I DO ON THE OUTSIDE OF MY HOME?  · Regularly fix the edges of walkways and driveways and fix any cracks.  · Remove anything that might make you trip as you walk through a door, such as a raised step or threshold.  · Trim any bushes or trees on the path to your home.  · Use bright outdoor lighting.  · Clear any walking paths of anything that might make someone trip, such as rocks or tools.  · Regularly check to see if handrails are loose or broken. Make sure that both sides of any steps have handrails.  · Any raised decks and porches should have guardrails on the edges.  · Have any leaves, snow, or ice cleared regularly.  · Use sand or salt on walking paths during winter.  · Clean up any spills in your garage right away. This includes oil or grease spills.  WHAT CAN I DO IN THE BATHROOM?    · Use night lights.  · Install grab bars by the toilet and in the tub and shower. Do not use towel bars as grab bars.  · Use non-skid mats or decals in the tub or shower.  · If  you need to sit down in the shower, use a plastic, non-slip stool.  · Keep the floor dry. Clean up any water that spills on the floor as soon as it happens.  · Remove soap buildup in the tub or shower regularly.  · Attach bath mats securely with double-sided non-slip rug tape.  · Do not have throw rugs and other things on the floor that can make you trip.  WHAT CAN I DO IN THE BEDROOM?  · Use night lights.  · Make sure that you have a light by your bed that is easy to reach.  · Do not use any sheets or blankets that are too big for your bed. They should not hang down onto the floor.  · Have a firm chair that has side arms. You can use this for support while you get dressed.  · Do not have throw rugs and other things on the floor that can make you trip.  WHAT CAN I DO IN THE KITCHEN?  · Clean up any spills right away.  · Avoid walking on wet floors.  · Keep items that you use a lot in easy-to-reach places.  · If you need to reach something above you, use a strong step stool that has a grab bar.  · Keep electrical cords out of the way.  · Do not use floor polish or wax that makes floors slippery. If you must use wax, use non-skid floor wax.  · Do not have throw rugs and other things on the floor that can make you trip.  WHAT CAN I DO WITH MY STAIRS?  · Do not leave any items on the stairs.  · Make sure that there are handrails on both sides of the stairs and use them. Fix handrails that are broken or loose. Make sure that handrails are as long as the stairways.  · Check any carpeting to make sure that it is firmly attached to the stairs. Fix any carpet that is loose or worn.  · Avoid having throw rugs at the top or bottom of the stairs. If you do have throw rugs, attach them to the floor with carpet tape.  · Make sure that you have a light switch at the top of the stairs and the bottom of the stairs. If you do not have them, ask someone to add them for you.  WHAT ELSE CAN I DO TO HELP PREVENT FALLS?  · Wear shoes  that:  ¨ Do not have high heels.  ¨ Have rubber bottoms.  ¨ Are comfortable and fit you well.  ¨ Are closed at the toe. Do not wear sandals.  · If you use a stepladder:  ¨ Make sure that it is fully opened. Do not climb a closed stepladder.  ¨ Make sure that both sides of the stepladder are locked into place.  ¨ Ask someone to hold it for you, if possible.  · Clearly [place] and make sure that you can see:  ¨ Any grab bars or handrails.  ¨ First and last steps.  ¨ Where the edge of each step is.  · Use tools that help you move around (mobility aids) if they are needed. These include:  ¨ Canes.  ¨ Walkers.  ¨ Scooters.  ¨ Crutches.  · Turn on the lights when you go into a dark area. Replace any light bulbs as soon as they burn out.  · Set up your furniture so you have a clear path. Avoid moving your furniture around.  · If any of your floors are uneven, fix them.  · If there are any pets around you, be aware of where they are.  · Review your medicines with your doctor. Some medicines can make you feel dizzy. This can increase your chance of falling.  Ask your doctor what other things that you can do to help prevent falls.     This information is not intended to replace advice given to you by your health care provider. Make sure you discuss any questions you have with your health care provider.     Document Released: 10/14/2010 Document Revised: 05/03/2016 Document Reviewed: 01/22/2016  [employer] Interactive Patient Education ©2016 [employer]     PATIENT/FAMILY/RESPONSIBLE PARTY VERBALIZES UNDERSTANDING OF ABOVE EDUCATION.  COPY OF PAIN SCALE GIVEN AND REVIEWED WITH VERBALIZED UNDERSTANDING.

## 2018-01-08 NOTE — PLAN OF CARE
Problem: Patient Care Overview (Adult)  Goal: Plan of Care Review  Outcome: Outcome(s) achieved Date Met: 01/08/18 01/08/18 1609   Coping/Psychosocial Response Interventions   Plan Of Care Reviewed With patient;daughter   Patient Care Overview   Progress improving   Outcome Evaluation   Outcome Summary/Follow up Plan Patient meets discharge criteria and is ready for discharge.     Goal: Adult Individualization and Mutuality  Outcome: Outcome(s) achieved Date Met: 01/08/18    Goal: Discharge Needs Assessment  Outcome: Outcome(s) achieved Date Met: 01/08/18      Problem: Perioperative Period (Adult)  Goal: Signs and Symptoms of Listed Potential Problems Will be Absent or Manageable (Perioperative Period)  Outcome: Outcome(s) achieved Date Met: 01/08/18

## 2018-01-08 NOTE — ANESTHESIA PREPROCEDURE EVALUATION
Anesthesia Evaluation     Patient summary reviewed   no history of anesthetic complications:  NPO Solid Status: > 8 hours  NPO Liquid Status: > 2 hours     Airway   Dental          Pulmonary - normal exam    breath sounds clear to auscultation  (-) COPD, asthma, recent URI, sleep apnea, not a smoker  Cardiovascular   Exercise tolerance: poor (<4 METS) (Limited by leg pain)    ECG reviewed  Patient on routine beta blocker and Beta blocker given within 24 hours of surgery  Rhythm: irregular  Rate: normal    (+) hypertension, valvular problems/murmurs (Aortic valve replacement 3 yrs ago), CAD, CABG (5-10 years ago, 2 vessels), dysrhythmias (Rate controlled with carvedilol) Atrial Fib, hyperlipidemia  (-) pacemaker, angina, cardiac stents    ROS comment: Dr. Lopez, Cardiologist in [place], [place], has been contacted by surgery, and the A-fib is chronic.  Has been rate-controlled.    Neuro/Psych  (-) seizures, TIA, CVA  GI/Hepatic/Renal/Endo    (+)  renal disease dialysis, hypothyroidism,   (-) GERD, liver disease, diabetes, hyperthyroidism    Musculoskeletal     Abdominal    Substance History      OB/GYN          Other        ROS/Med Hx Other: Last had dialysis 1/5/18                                  Anesthesia Plan    ASA 3     general   total IV anesthesia  intravenous induction   Anesthetic plan and risks discussed with patient.

## 2018-01-08 NOTE — PLAN OF CARE
Problem: Patient Care Overview (Adult)  Goal: Plan of Care Review  Outcome: Ongoing (interventions implemented as appropriate)   01/08/18 0937   Coping/Psychosocial Response Interventions   Plan Of Care Reviewed With patient   Patient Care Overview   Progress improving   Outcome Evaluation   Outcome Summary/Follow up Plan met pacu dc criteria       Problem: Perioperative Period (Adult)  Goal: Signs and Symptoms of Listed Potential Problems Will be Absent or Manageable (Perioperative Period)  Outcome: Ongoing (interventions implemented as appropriate)

## 2018-01-08 NOTE — ANESTHESIA PROCEDURE NOTES
Airway  Urgency: elective    Airway not difficult    General Information and Staff    Patient location during procedure: OR  CRNA: AUSTIN MARTINS    Indications and Patient Condition  Indications for airway management: airway protection    Preoxygenated: yes  Mask difficulty assessment: 1 - vent by mask    Final Airway Details  Final airway type: endotracheal airway      Successful airway: ETT  Cuffed: yes   Successful intubation technique: direct laryngoscopy  Facilitating devices/methods: intubating stylet  Endotracheal tube insertion site: oral  Blade: Gordon  Blade size: #2  ETT size: 7.5 mm  Cormack-Lehane Classification: grade I - full view of glottis  Placement verified by: chest auscultation and capnometry   Cuff volume (mL): 8  Measured from: lips  ETT to lips (cm): 22  Number of attempts at approach: 1    Additional Comments  Atraumatic

## 2018-01-08 NOTE — ANESTHESIA POSTPROCEDURE EVALUATION
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]) DO Lulu Clark CRNA          Anesthesia Type: MAC  Last vitals  BP   125/46 (01/08/18 0950)   Temp   97.5 °F (36.4 °C) (01/08/18 0940)   Pulse   74 (01/08/18 0950)   Resp   16 (01/08/18 0950)     SpO2   92 % (01/08/18 0950)     Post Anesthesia Care and Evaluation    Patient location during evaluation: PACU  Patient participation: complete - patient participated  Level of consciousness: awake and alert  Pain management: adequate  Airway patency: patent  Anesthetic complications: No anesthetic complications  PONV Status: controlled  Cardiovascular status: acceptable and hemodynamically stable  Respiratory status: acceptable  Hydration status: acceptable    Comments: Patient discharged from PACU prior to anesthesia evaluation based on Hilary Score.  For details, see RN note.     /46  Pulse 74  Temp 97.5 °F (36.4 °C) (Temporal Artery )   Resp 16  SpO2 92%

## 2018-01-08 NOTE — H&P
Cheri Ely  [id number]  [id number]  [facility] OR/NONE  Tomasz San DO  1/8/2018      HPI: Cheri Ely is a 77 y.o.  female who you are currently following for routine health maintenance.  She has complaints of cramping to her stomach after she eats for the past couple of weeks.  She continues to complaints of pain after she eats.  She had lap band surgery in 2008.  She is also on dialysis for the past 2 years with a left upper extremity fistula created by Dr. Dubose.  She did fall after her last visit sustaining a pelvic fracture.  She was sent to rehab for this.  She did have noninvasive testing performed today, which I did review in office.  Her dialysis is on Monday, Wednesday, and Friday.       Past Medical History:   Diagnosis Date   • Arthritis    • Carotid artery disease    • CHF (congestive heart failure)    • Chronic kidney disease on chronic dialysis    • Chronic renal failure     on dialysis   • Coronary artery disease    • Disease of thyroid gland    • Diverticulitis    • History of transfusion    • Hyperlipidemia    • Hypertension    • Injury of back    • Mesenteric artery insufficiency    • Multilevel degenerative disc disease    • Osteoporosis    • Pancreatitis    • Pelvis fracture    • Pneumonia        Past Surgical History:   Procedure Laterality Date   • AORTIC VALVE SURGERY     • APPENDECTOMY     • BACK SURGERY     • CARDIAC SURGERY     • CAROTID ENDARTERECTOMY Bilateral    • CATARACT EXTRACTION, BILATERAL     • CERVICAL SPINE SURGERY     • CHOLECYSTECTOMY     • COLON SURGERY     • CORONARY ARTERY BYPASS GRAFT     • DIALYSIS FISTULA CREATION     • JOINT REPLACEMENT      knee   • LAPAROSCOPIC GASTRIC BANDING     • LEEP     • LUMBAR FUSION     • TOE AMPUTATION Left     2nd toe   • TOTAL KNEE ARTHROPLASTY Bilateral    • TUBAL ABDOMINAL LIGATION         Family History   Problem Relation Age of Onset   • Hypertension Mother    • Cancer Mother      stomach   • Coronary artery disease Father     • Heart attack Father    • Diabetes Brother    • Coronary artery disease Brother        Social History     Social History   • Marital status:      Spouse name: N/A   • Number of children: N/A   • Years of education: N/A     Occupational History   • Not on file.     Social History Main Topics   • Smoking status: Never Smoker   • Smokeless tobacco: Never Used   • Alcohol use No   • Drug use: No   • Sexual activity: Defer     Other Topics Concern   • Not on file     Social History Narrative       No Known Allergies    Prescriptions Prior to Admission   Medication Sig Dispense Refill Last Dose   • carvedilol (COREG) 12.5 MG tablet Take 12.5 mg by mouth 3 (Three) Times a Day.   1/8/2018 at 0330   • Cholecalciferol (D3 ADULT PO) Take 2,000 Units by mouth Daily.   1/7/2018 at 1700   • diphenhydrAMINE (BENADRYL) 25 mg capsule Take 25 mg by mouth 2 (Two) Times a Day.   1/7/2018 at 1700   • levothyroxine (SYNTHROID, LEVOTHROID) 175 MCG tablet Take 175 mcg by mouth Daily.   1/7/2018 at 1700   • losartan (COZAAR) 25 MG tablet Take 25 mg by mouth Every Night.   1/5/2018   • Multiple Vitamin (DAILY-SANGEETA PO) Take 1 tablet by mouth Daily.   1/7/2018 at 1700   • omeprazole (priLOSEC) 20 MG capsule Take 20 mg by mouth Daily.   1/7/2018 at 0600   • ondansetron ODT (ZOFRAN-ODT) 4 MG disintegrating tablet Take 4 mg by mouth Every 8 (Eight) Hours As Needed for Nausea or Vomiting.   1/7/2018 at 1700   • oxyCODONE-acetaminophen (PERCOCET)  MG per tablet Take 1 tablet by mouth Every 8 (Eight) Hours As Needed for Moderate Pain . 40 tablet 0 1/7/2018 at 1700   • pravastatin (PRAVACHOL) 40 MG tablet Take 40 mg by mouth Daily.   1/7/2018 at 1700   • sertraline (ZOLOFT) 50 MG tablet Take 50 mg by mouth Daily.   1/7/2018 at 0600       Review of Systems  Constitutional: Negative.    HENT: Negative.    Eyes: Negative.    Respiratory: Negative.    Gastrointestinal: Positive for abdominal pain.   Endocrine: Negative.    Genitourinary:  Negative.    Musculoskeletal: Negative.    Skin: Negative.    Allergic/Immunologic: Negative.    Neurological: Negative.    Hematological: Negative.    Psychiatric/Behavioral: Negative.       Physical Exam  Constitutional: She is oriented to person, place, and time. She appears well-developed and well-nourished.   HENT:   Head: Normocephalic and atraumatic.   Eyes: Pupils are equal, round, and reactive to light. No scleral icterus.   Neck: Neck supple. No JVD present. Carotid bruit is not present. No thyromegaly present.   Cardiovascular: Normal rate, regular rhythm and normal heart sounds.    Pulses:       Carotid pulses are 2+ on the right side, and 2+ on the left side.       Femoral pulses are 2+ on the right side, and 2+ on the left side.       Popliteal pulses are 2+ on the right side, and 2+ on the left side.        Dorsalis pedis pulses are 2+ on the right side, and 2+ on the left side.        Posterior tibial pulses are 2+ on the right side, and 2+ on the left side.   Pulmonary/Chest: Effort normal and breath sounds normal.   Abdominal: Soft. Bowel sounds are normal. She exhibits no distension, no abdominal bruit and no mass. There is no hepatosplenomegaly. There is no tenderness.   Musculoskeletal: Normal range of motion. She exhibits no edema.   Lymphadenopathy:     She has no cervical adenopathy.   Neurological: She is alert and oriented to person, place, and time. She has normal strength. No cranial nerve deficit or sensory deficit.   Skin: Skin is warm, dry and intact.   Psychiatric: She has a normal mood and affect.   Nursing note and vitals reviewed.      Lab Results (last 7 days)     Procedure Component Value Units Date/Time    Comprehensive Metabolic Panel [[id number]]  (Abnormal) Collected:  01/08/18 0555    Specimen:  Blood Updated:  01/08/18 0700     Glucose 81 mg/dL      BUN 60 (H) mg/dL      Creatinine 7.57 (H) mg/dL      Sodium 141 mmol/L      Potassium 4.1 mmol/L      Chloride 100 mmol/L       CO2 25.0 mmol/L      Calcium 8.0 (L) mg/dL      Total Protein 6.6 g/dL      Albumin 3.30 (L) g/dL      ALT (SGPT) 23 U/L      AST (SGOT) 23 U/L      Alkaline Phosphatase 179 (H) U/L      Total Bilirubin 0.3 mg/dL      eGFR Non African Amer 5 (L) mL/min/1.73      Globulin 3.3 gm/dL      A/G Ratio 1.0 (L) g/dL      BUN/Creatinine Ratio 7.9     Anion Gap 16.0 (H) mmol/L     Narrative:       The MDRD GFR formula is only valid for adults with stable renal function between ages 18 and 70.          Imaging Results (last 7 days)     ** No results found for the last 168 hours. **          Impression:  1. Occlusion of superior mesenteric artery         Plan:  After thoroughly evaluating Cheri Ely, I believe the best course of action is to proceed with a mesenteric angiogram.  She does have continued discomfort in her abdomen when she eats.  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  She has a widely patent RIZWANA with retrograde filling which is compensating.  A lengthy discussion was had regarding all treatment options both endovascular and open.  Risks of angiogram were discussed.  These include, but are not limited to, bleeding, infection, vessel damage, nerve damage, embolus, and loss of limb.  The patient understands these risks and wishes to proceed with procedure.   I also counseled her about her vascular risk factors with regards to hypertension and hyperlipidemia.  The patient can continue taking her current medication regimen as previously planned.  This was all discussed in full with complete understanding.    Tomasz San,

## 2018-01-08 NOTE — PLAN OF CARE
Problem: Patient Care Overview (Adult)  Goal: Plan of Care Review  Outcome: Ongoing (interventions implemented as appropriate)   01/08/18 0651   Coping/Psychosocial Response Interventions   Plan Of Care Reviewed With patient   Patient Care Overview   Progress no change       Problem: Perioperative Period (Adult)  Goal: Signs and Symptoms of Listed Potential Problems Will be Absent or Manageable (Perioperative Period)  Outcome: Ongoing (interventions implemented as appropriate)   01/08/18 0651   Perioperative Period   Problems Assessed (Perioperative Period) hypothermia;physiologic stress response;situational response   Problems Present (Perioperative Period) none

## 2018-01-08 NOTE — OP NOTE
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  Introduction of catheter into the aorta  2.  Mesenteric angiogram with radiographic supervision and interpretation  3.  Attempted selective cannulation of the superior mesenteric artery and celiac artery  4.  Minx closure     SURGEON: Tomasz San DO      ANESTHESIA: General.    PREPARATION: Routine.    STAFF: Circulator: Adelita Glover RN  Scrub Person: Garth Ramos  Assistant: Brinda Rodriguez  Vascular Radiology Technician: Seema Billings    ESTIMATED BLOOD LOSS: 10 ML    SPECIMENS: None    COMPLICATIONS: None    INDICATIONS: Cheri Ely is a 77 y.o. female who you are currently following for routine health maintenance.  She has complaints of cramping to her stomach after she eats for the past couple of weeks.  She continues to complaints of pain after she eats.  She had lap band surgery in 2008.  She is also on dialysis for the past 2 years with a left upper extremity fistula created by Dr. Dubose.  She did fall after her last visit sustaining a pelvic fracture.  She was sent to rehab for this.  She did have noninvasive testing performed today, which I did review in office.  Her dialysis is on Monday, Wednesday, and Friday. The indications, risks, and possible complications of the procedure were explained to the patient, who voiced understanding and wished to proceed with surgery.     PROCEDURE IN DETAIL: The patient was taken to the operating room and placed on the operating table in a supine position. After general anesthesia was obtained, the bilateral groins was prepped and draped in a sterile manner.  5 mL of 0.5% Marcaine plain was used to infiltrate the right groin for local  anesthesia.  Using a micropuncture technique the right common femoral artery was cannulated and a micro-sheath was placed.  The advantage Glidewire was advanced into the aorta and a short 5 British introducer sheath was placed.  Patient was given 1000 units of intravenous heparin.  The Omni Flush catheter was advanced into the aorta and with C-arm at 90° JUWAN a mesenteric angiogram was performed.  Findings are as follows:  1.  Patent celiac artery with at least 80% ostial stenosis over the first 2 cm then widely patent at the split.   2.  Occluded SMA for the first 3 cm then quick reconstitution from retrograde filling of the RIZWANA  3.  Patent renal arteries bilaterally without significant stenosis  4.  Patent RIZWANA with large retrograde filling in the arc of Riolan    At this point multiple attempts were made to cannulate the SMA and celiac arteries.  There was significant heavy plaque burden blocking the take off of the SMA and this was unsuccessful.  Attempts were made at the celiac artery also which were also unsuccessful passing the wire.  Therefore the decision was made not to proceed any further.  Patient has significant collateral flow through the RIZWANA.  The sheath, wire, catheter were removed.  A minx was used to seal off the right common femoral artery.  Direct pressure was held for 10 minutes to help ensure hemostasis.  Sterile dressings were applied. The patient tolerated the procedure well. Sponge and needle counts were correct. The patient was then awakened and extubated in the operating room and taken to the recovery room in good condition.  Tomasz San,   Date: 1/8/2018 Time: 9:02 AM     CC:Barrett Mckenzie MD:

## 2018-01-22 ENCOUNTER — OFFICE VISIT (OUTPATIENT)
Dept: VASCULAR SURGERY | Facility: CLINIC | Age: 78
End: 2018-01-22

## 2018-01-22 VITALS
HEIGHT: 60 IN | WEIGHT: 167 LBS | RESPIRATION RATE: 20 BRPM | HEART RATE: 88 BPM | BODY MASS INDEX: 32.79 KG/M2 | SYSTOLIC BLOOD PRESSURE: 78 MMHG | DIASTOLIC BLOOD PRESSURE: 50 MMHG | OXYGEN SATURATION: 98 %

## 2018-01-22 DIAGNOSIS — E78.2 MIXED HYPERLIPIDEMIA: ICD-10-CM

## 2018-01-22 DIAGNOSIS — K55.1 CHRONIC MESENTERIC ISCHEMIA (HCC): Primary | ICD-10-CM

## 2018-01-22 DIAGNOSIS — I10 ESSENTIAL HYPERTENSION: ICD-10-CM

## 2018-01-22 DIAGNOSIS — N18.5 CHRONIC RENAL FAILURE, STAGE 5 (HCC): ICD-10-CM

## 2018-01-22 PROCEDURE — 99213 OFFICE O/P EST LOW 20 MIN: CPT | Performed by: NURSE PRACTITIONER

## 2018-01-22 NOTE — PROGRESS NOTES
"01/22/2018       Barrett Mckenzie MD  [address] [place] [address]   [address] [place] [id number]    Cheri Ely  1940    Chief Complaint   Patient presents with   • Follow-up     2 week post-op. Patient states she has a headache and they started about a week ago. She denies and sytems of stroke.       Dear Barrett Mckenzie MD:      HPI  I had the pleasure of seeing your patient Cheri Ely in the office today. As you recall, Cheri Ely is a 77 y.o.  female who you are currently following for routine health maintenance.  She has complaints of cramping to her stomach after she eats for the past couple of weeks.  She continues to complaints of pain after she eats.  She had lap band surgery in 2008.  She is also on dialysis for the past 2 years with a left upper extremity fistula created by Dr. Dubose.  She did fall after her last visit sustaining a pelvic fracture.  She was sent to rehab for this.  She did undergo a mesenteric angiogram, but was unsuccessful.  Dr. San Was unable to cannulate the superior mesenteric artery or celiac artery.  Her dialysis is on Monday, Wednesday, and Friday.  She did have dialysis today and her blood pressure is low.  She reports this is normal for her.  She has not seen [facility] vascular for almost 2 years and was instructed to follow up as needed.      Review of Systems   Constitutional: Negative.    HENT: Negative.    Eyes: Negative.    Respiratory: Negative.    Gastrointestinal: Positive for abdominal pain.   Endocrine: Negative.    Genitourinary: Negative.    Musculoskeletal: Negative.    Skin: Negative.    Allergic/Immunologic: Negative.    Neurological: Negative.    Hematological: Negative.    Psychiatric/Behavioral: Negative.        BP (!) 78/50  Pulse 88  Resp 20  Ht 152.4 cm (60\")  Wt 75.8 kg (167 lb)  SpO2 98%  BMI 32.61 kg/m2  Physical Exam   Constitutional: She is oriented to person, place, and time. She appears well-developed and well-nourished. "   HENT:   Head: Normocephalic and atraumatic.   Eyes: Pupils are equal, round, and reactive to light. No scleral icterus.   Neck: Neck supple. No JVD present. Carotid bruit is not present. No thyromegaly present.   Cardiovascular: Normal rate, regular rhythm and normal heart sounds.    Pulses:       Carotid pulses are 2+ on the right side, and 2+ on the left side.       Femoral pulses are 2+ on the right side, and 2+ on the left side.       Popliteal pulses are 2+ on the right side, and 2+ on the left side.        Dorsalis pedis pulses are 2+ on the right side, and 2+ on the left side.        Posterior tibial pulses are 2+ on the right side, and 2+ on the left side.   Left upper extremity fistula with thrill noted.   Pulmonary/Chest: Effort normal and breath sounds normal.   Abdominal: Soft. Bowel sounds are normal. She exhibits no distension, no abdominal bruit and no mass. There is no hepatosplenomegaly. There is no tenderness.   Musculoskeletal: Normal range of motion. She exhibits no edema.   Lymphadenopathy:     She has no cervical adenopathy.   Neurological: She is alert and oriented to person, place, and time. She has normal strength. No cranial nerve deficit or sensory deficit.   Skin: Skin is warm, dry and intact.   Psychiatric: She has a normal mood and affect.   Nursing note and vitals reviewed.      Xr Chest 2 Vw    Result Date: 1/2/2018  Narrative: EXAMINATION: XR CHEST 2 VW- 1/2/2018 1:51 PM CST  HISTORY: PREOP-MESENTERIC ANGIOGRAM, GROIN ACCESS; K55.069-Acute infarction of intestine, part and extent unspecified; Z01.818-Encounter for other preprocedural examination.  REPORT: Comparison is made with the study from 06/20/2016.  The lungs are hyperinflated, there are coarse interstitial markings that appears stable. No pneumothorax or pleural effusion is identified. There is no pulmonary consolidation. Mild cardiomegaly is present and there is evidence of previous aortic valve replacement. Fusion  hardware seen in the cervical spine and upper lumbar spine. There is also evidence of previous lap band surgery.      Impression: COPD with interstitial fibrosis and no pulmonary consolidation. Mild cardiomegaly with evidence of previous aortic valve replacement. No overt CHF is seen. Surgical changes also include cervical fusion, lumbar fusion and LAP-BAND surgery. This report was finalized on 01/02/2018 13:54 by Dr. Agustin Gonzalez MD.    Fl C Arm During Surgery    Result Date: 1/8/2018  Narrative: Performed by Dr. San. Please see procedure note.  This report was finalized on 01/08/2018 15:15 by Dr. Tomasz San MD.    Ir Angiogram Mesenteric Selective    Result Date: 1/11/2018  Narrative: Performed by Dr. San. Please see procedure note.  This report was finalized on 01/11/2018 13:11 by Dr. Tomasz San MD.      Patient Active Problem List   Diagnosis   • Hypertension   • Hyperlipidemia   • Chronic renal failure   • Closed fracture of ramus of left pubis   • Preoperative testing   • Occlusion of superior mesenteric artery   • Chronic mesenteric ischemia         ICD-10-CM ICD-9-CM   1. Chronic mesenteric ischemia K55.1 557.1   2. Mixed hyperlipidemia E78.2 272.2   3. Essential hypertension I10 401.9   4. Chronic renal failure, stage 5 N18.5 585.5         Plan: After thoroughly evaluating Cheri Ely, I believe the best course of action is to remain conservative from a vascular standpoint.  She did undergo an a mesenteric angiogram, but unsuccessful at cannulating the SMA or celiac.  She does have a left upper extremity arteriovenous fistula that was created at [facility], however she has not scheduled follow up.  She reports no problems during dialysis.  I will have her return in 6 months to assess.  I also counseled her about her vascular risk factors with regards to hypertension and hyperlipidemia.  The patient can continue taking her current medication regimen as previously planned.  This was  all discussed in full with complete understanding.    Thank you for allowing me to participate in the care of your patient.  Please do not hesitate with any questions or concerns.  I will keep you aware of any further encounters with Cheri Ely.        Sincerely yours,         ABILIO Rae Jr., MD

## 2018-07-23 ENCOUNTER — TELEPHONE (OUTPATIENT)
Dept: VASCULAR SURGERY | Facility: CLINIC | Age: 78
End: 2018-07-23

## 2018-07-23 NOTE — TELEPHONE ENCOUNTER
Spoke with Ms Ely reminding her of her appointment for Tuesday, July 24th, 2018 at 1015 am with Dr San. Ms Ely confirmed she would be here.

## 2018-07-24 ENCOUNTER — OFFICE VISIT (OUTPATIENT)
Dept: VASCULAR SURGERY | Facility: CLINIC | Age: 78
End: 2018-07-24

## 2018-07-24 VITALS
HEIGHT: 60 IN | HEART RATE: 77 BPM | BODY MASS INDEX: 32.59 KG/M2 | SYSTOLIC BLOOD PRESSURE: 118 MMHG | OXYGEN SATURATION: 96 % | WEIGHT: 166 LBS | DIASTOLIC BLOOD PRESSURE: 70 MMHG

## 2018-07-24 DIAGNOSIS — E78.2 MIXED HYPERLIPIDEMIA: ICD-10-CM

## 2018-07-24 DIAGNOSIS — I10 ESSENTIAL HYPERTENSION: Primary | ICD-10-CM

## 2018-07-24 DIAGNOSIS — K55.1 CHRONIC MESENTERIC ISCHEMIA (HCC): ICD-10-CM

## 2018-07-24 PROBLEM — Z01.818 PREOPERATIVE TESTING: Status: RESOLVED | Noted: 2017-12-14 | Resolved: 2018-07-24

## 2018-07-24 PROCEDURE — 99213 OFFICE O/P EST LOW 20 MIN: CPT | Performed by: SURGERY

## 2018-07-24 RX ORDER — CLOPIDOGREL BISULFATE 75 MG/1
75 TABLET ORAL DAILY
Qty: 90 TABLET | Refills: 2 | Status: SHIPPED | OUTPATIENT
Start: 2018-07-24 | End: 2019-04-02 | Stop reason: HOSPADM

## 2018-07-24 RX ORDER — LOSARTAN POTASSIUM 25 MG/1
25 TABLET ORAL 3 TIMES WEEKLY
COMMUNITY
End: 2019-07-06 | Stop reason: HOSPADM

## 2018-07-24 RX ORDER — CLOPIDOGREL BISULFATE 75 MG/1
75 TABLET ORAL DAILY
COMMUNITY
End: 2018-07-24 | Stop reason: SDUPTHER

## 2018-07-24 NOTE — PROGRESS NOTES
"07/24/2018       Barrett Mckenzie Jr., MD   [address] [place] [address]  [address] [place] [id number]      Cheri Ely  1940    Chief Complaint   Patient presents with   • Follow-up     Patients here for her 6 month follow up. Patient denies any problems at this time, no stroke like symptoms per patient.       Dear Barrett Mckenzie Jr., MD       HPI  I had the pleasure of seeing your patient Cheri Ely in the office today. As you recall, Cheri Ely is a 78 y.o.  female who you are currently following for routine health maintenance.  She was having complaints of pain when eats, but this has resolved.  She had lap band surgery in 2008.  She is also on dialysis for the past 2 years with a left upper extremity fistula created by Dr. Dubose.  SShe did undergo a mesenteric angiogram, but was unsuccessful.  Dr. San Was unable to cannulate the superior mesenteric artery or celiac artery.  Her dialysis is on Monday, Wednesday, and Friday.        Review of Systems   Constitutional: Negative.    HENT: Negative.    Eyes: Negative.    Respiratory: Negative.    Gastrointestinal: Positive for abdominal pain.   Endocrine: Negative.    Genitourinary: Negative.    Musculoskeletal: Negative.    Skin: Negative.    Allergic/Immunologic: Negative.    Neurological: Negative.    Hematological: Negative.    Psychiatric/Behavioral: Negative.        /70 (BP Location: Left arm, Patient Position: Sitting, Cuff Size: Adult)   Pulse 77   Ht 152.4 cm (60\")   Wt 75.3 kg (166 lb)   SpO2 96%   Breastfeeding? No   BMI 32.42 kg/m²   Physical Exam   Constitutional: She is oriented to person, place, and time. She appears well-developed and well-nourished.   HENT:   Head: Normocephalic and atraumatic.   Eyes: Pupils are equal, round, and reactive to light. No scleral icterus.   Neck: Neck supple. No JVD present. Carotid bruit is not present. No thyromegaly present.   Cardiovascular: Normal rate, regular rhythm and normal heart " sounds.    Pulses:       Carotid pulses are 2+ on the right side, and 2+ on the left side.       Femoral pulses are 2+ on the right side, and 2+ on the left side.       Popliteal pulses are 2+ on the right side, and 2+ on the left side.        Dorsalis pedis pulses are 2+ on the right side, and 2+ on the left side.        Posterior tibial pulses are 2+ on the right side, and 2+ on the left side.   Left upper extremity fistula with pulsatility   Pulmonary/Chest: Effort normal and breath sounds normal.   Abdominal: Soft. Bowel sounds are normal. She exhibits no distension, no abdominal bruit and no mass. There is no hepatosplenomegaly. There is no tenderness.   Musculoskeletal: Normal range of motion. She exhibits no edema.   Lymphadenopathy:     She has no cervical adenopathy.   Neurological: She is alert and oriented to person, place, and time. She has normal strength. No cranial nerve deficit or sensory deficit.   Skin: Skin is warm, dry and intact.   Psychiatric: She has a normal mood and affect.   Nursing note and vitals reviewed.      No results found.    Patient Active Problem List   Diagnosis   • Hypertension   • Hyperlipidemia   • Chronic renal failure   • Closed fracture of ramus of left pubis (CMS/HCC)   • Occlusion of superior mesenteric artery (CMS/HCC)   • Chronic mesenteric ischemia (CMS/HCC)         ICD-10-CM ICD-9-CM   1. Essential hypertension I10 401.9   2. Mixed hyperlipidemia E78.2 272.2   3. Chronic mesenteric ischemia (CMS/HCC) K55.1 557.1         Plan: After thoroughly evaluating Cheri Ely, I believe the best course of action is to remain conservative from a vascular standpoint.  She reports she is having no problems with dialysis however does have some pulsatility to her fistula.  She did undergo an a mesenteric angiogram, but unsuccessful at cannulating the SMA or celiac.  She does have a left upper extremity arteriovenous fistula that was created at [facility], however she has not  scheduled follow up.  She reports no problems during dialysis.  I will have her return in 6 months to assess.  I also counseled her about her vascular risk factors with regards to hypertension and hyperlipidemia.  The patient can continue taking her current medication regimen as previously planned.  This was all discussed in full with complete understanding.    Thank you for allowing me to participate in the care of your patient.  Please do not hesitate with any questions or concerns.  I will keep you aware of any further encounters with Cheri Ely.        Sincerely yours,         ABILIO Rae Jr., MD

## 2018-12-04 ENCOUNTER — OFFICE VISIT (OUTPATIENT)
Dept: GASTROENTEROLOGY | Facility: CLINIC | Age: 78
End: 2018-12-04

## 2018-12-04 VITALS
OXYGEN SATURATION: 95 % | HEART RATE: 53 BPM | HEIGHT: 59 IN | DIASTOLIC BLOOD PRESSURE: 60 MMHG | SYSTOLIC BLOOD PRESSURE: 110 MMHG | WEIGHT: 165 LBS | TEMPERATURE: 96.4 F | BODY MASS INDEX: 33.26 KG/M2

## 2018-12-04 DIAGNOSIS — R19.5 OCCULT BLOOD POSITIVE STOOL: ICD-10-CM

## 2018-12-04 DIAGNOSIS — K92.1 BLACK TARRY STOOLS: Primary | ICD-10-CM

## 2018-12-04 DIAGNOSIS — Z79.02 ANTIPLATELET OR ANTITHROMBOTIC LONG-TERM USE: ICD-10-CM

## 2018-12-04 DIAGNOSIS — D64.9 ANEMIA, UNSPECIFIED TYPE: ICD-10-CM

## 2018-12-04 DIAGNOSIS — Z87.19 HX OF GASTRITIS: ICD-10-CM

## 2018-12-04 PROCEDURE — 99214 OFFICE O/P EST MOD 30 MIN: CPT | Performed by: NURSE PRACTITIONER

## 2018-12-04 NOTE — PROGRESS NOTES
Chief Complaint:   Chief Complaint   Patient presents with   • GI Problem     blood in stool   • Anemia         Patient ID: Cheri Ely is a 78 y.o. female     History of Present Illness: This is a very pleasant 78-year-old female who is referred to our office by Dr. Deion Perry with oncology for occult blood in stools and anemia.    The patients last colonoscopy and EGD were10/1/15. Colonoscopy was diverticulosis no history of polyps. No family history of colon cancer or colon polyps. EGD was found to show bleeding erosive gastritis.     About 1 month ago she states she saw both mulberry and black stools.  She states that this occurred for 2 days in a row over the weekend and has not occurred since that time.  She states she had no other associated symptoms. The patient denies any nausea, vomiting, epigastric pain, dysphagia, pyrosis or hematemesis.  The patient denies any fever or chills.  Denies any hematochezia.  Denies any unintentional weight loss or loss of appetite.    Past Medical History:   Diagnosis Date   • Arthritis    • Carotid artery disease (CMS/HCC)    • CHF (congestive heart failure) (CMS/HCC)    • Chronic kidney disease on chronic dialysis (CMS/HCC)    • Chronic renal failure     on dialysis   • Coronary artery disease    • Disease of thyroid gland    • Diverticulitis    • History of transfusion    • Hyperlipidemia    • Hypertension    • Injury of back    • Mesenteric artery insufficiency (CMS/HCC)    • Multilevel degenerative disc disease    • Osteoporosis    • Pancreatitis    • Pelvis fracture (CMS/HCC)    • Pneumonia        Past Surgical History:   Procedure Laterality Date   • AORTIC VALVE SURGERY     • APPENDECTOMY     • BACK SURGERY     • CARDIAC SURGERY     • CAROTID ENDARTERECTOMY Bilateral    • CATARACT EXTRACTION, BILATERAL     • CERVICAL SPINE SURGERY     • CHOLECYSTECTOMY     • COLON SURGERY     • COLONOSCOPY  10/01/2015   • CORONARY ARTERY BYPASS GRAFT     • DIALYSIS FISTULA  CREATION     • JOINT REPLACEMENT      knee   • LAPAROSCOPIC GASTRIC BANDING     • LEEP     • LUMBAR FUSION     • TOE AMPUTATION Left     2nd toe   • TOTAL KNEE ARTHROPLASTY Bilateral    • TUBAL ABDOMINAL LIGATION     • UPPER GASTROINTESTINAL ENDOSCOPY  10/01/2015         Current Outpatient Medications:   •  B Complex-C (SUPER B COMPLEX/VITAMIN C PO), Take  by mouth., Disp: , Rfl:   •  carvedilol (COREG) 12.5 MG tablet, Take 12.5 mg by mouth 3 (Three) Times a Day., Disp: , Rfl:   •  Cholecalciferol (D3 ADULT PO), Take 2,000 Units by mouth Daily., Disp: , Rfl:   •  clopidogrel (PLAVIX) 75 MG tablet, Take 1 tablet by mouth Daily., Disp: 90 tablet, Rfl: 2  •  diphenhydrAMINE (BENADRYL) 25 mg capsule, Take 25 mg by mouth 2 (Two) Times a Day., Disp: , Rfl:   •  levothyroxine (SYNTHROID, LEVOTHROID) 175 MCG tablet, Take 175 mcg by mouth Daily., Disp: , Rfl:   •  losartan (COZAAR) 25 MG tablet, Take 25 mg by mouth Daily., Disp: , Rfl:   •  Multiple Vitamin (DAILY-SANGEETA PO), Take 1 tablet by mouth Daily., Disp: , Rfl:   •  ondansetron ODT (ZOFRAN-ODT) 4 MG disintegrating tablet, Take 4 mg by mouth Every 8 (Eight) Hours As Needed for Nausea or Vomiting., Disp: , Rfl:   •  oxyCODONE-acetaminophen (PERCOCET)  MG per tablet, Take 1 tablet by mouth Every 8 (Eight) Hours As Needed for Moderate Pain ., Disp: 40 tablet, Rfl: 0  •  pravastatin (PRAVACHOL) 40 MG tablet, Take 40 mg by mouth Daily., Disp: , Rfl:   •  sertraline (ZOLOFT) 50 MG tablet, Take 50 mg by mouth Daily., Disp: , Rfl:     No Known Allergies    Social History     Socioeconomic History   • Marital status:      Spouse name: Not on file   • Number of children: Not on file   • Years of education: Not on file   • Highest education level: Not on file   Social Needs   • Financial resource strain: Not on file   • Food insecurity - worry: Not on file   • Food insecurity - inability: Not on file   • Transportation needs - medical: Not on file   • Transportation  "needs - non-medical: Not on file   Occupational History   • Not on file   Tobacco Use   • Smoking status: Never Smoker   • Smokeless tobacco: Never Used   Substance and Sexual Activity   • Alcohol use: No   • Drug use: No   • Sexual activity: Defer   Other Topics Concern   • Not on file   Social History Narrative   • Not on file       Family History   Problem Relation Age of Onset   • Hypertension Mother    • Cancer Mother         stomach   • Coronary artery disease Father    • Heart attack Father    • Diabetes Brother    • Coronary artery disease Brother    • Colon cancer Neg Hx    • Colon polyps Neg Hx        Vitals:    12/04/18 0952   BP: 110/60   Pulse: 53   Temp: 96.4 °F (35.8 °C)   SpO2: 95%   Weight: 74.8 kg (165 lb)   Height: 149.9 cm (59\")       Review of Systems:    General:    Present -feeling well   Skin:    Not Present-Rash   HEENT:     Not Present-Acute visual changes or Acute hearing changes   Neck :    Not Present- swollen glands   Genitourinary:      Not Present- burning, frequency, urgency hematuria, dysuria,   Cardiovascular:   Not Present-chest pain, palpitations, or pressure   Respiratory:   Not Present- shortness of breath or cough   Gastrointestinal:  Musculoskeletal:  Neurological:  Psychiatric:   Present as mentioned in the HP    Not Present. Recent gait disturbances.    Not Present-Seizures and weakness in extremities.    Not Present- Anxiety or Depression.       Physical Exam:    General Appearance:    Alert, cooperative, in no acute distress   Psych:    Mood appropriate    Eyes:          conjunctivae and sclerae normal, no   icterus, no pallor   ENMT:    Ears appear intact with no abnormalities noted oral mucosa moist   Neck:   No adenopathy, supple, trachea midline, no thyromegaly, no   carotid bruit, no JVD    Cardiovascular:    Regular rhythm and normal rate, normal S1 and S2, no            murmur, no gallop, no rub, no click   Gastrointestinal:     Inspection normal.  Normal bowel " sounds, no masses, no organomegaly, soft round non-tender, non-distended, no guarding, no rebound or tenderness. No hepatosplenomegaly.   Skin:   No bleeding, bruising or rash   Neurologic:   nonfocal       Lab Results   Component Value Date    WBC 7.99 01/02/2018    WBC 7.56 10/30/2017    WBC 7.84 09/15/2017    HGB 10.0 (L) 01/02/2018    HGB 10.1 (L) 10/30/2017    HGB 11.5 (L) 09/15/2017    HCT 31.4 (L) 01/02/2018    HCT 31.8 (L) 10/30/2017    HCT 36.5 (L) 09/15/2017     01/02/2018     10/30/2017     09/15/2017        Lab Results   Component Value Date     01/08/2018     01/02/2018     (L) 11/04/2017    K 4.1 01/08/2018    K 4.2 01/02/2018    K 4.7 11/04/2017     01/08/2018     01/02/2018    CL 95 (L) 11/04/2017    CO2 25.0 01/08/2018    CO2 30.0 01/02/2018    CO2 26.0 11/04/2017    BUN 60 (H) 01/08/2018    BUN 37 (H) 01/02/2018    BUN 31 (H) 11/04/2017    CREATININE 7.57 (H) 01/08/2018    CREATININE 6.00 (H) 01/02/2018    CREATININE 3.90 (H) 12/14/2017    BILITOT 0.3 01/08/2018    BILITOT 0.4 10/30/2017    BILITOT 0.7 09/15/2017    ALKPHOS 179 (H) 01/08/2018    ALKPHOS 178 (H) 10/30/2017    ALKPHOS 185 (H) 09/15/2017    ALT 23 01/08/2018    ALT 27 10/30/2017    ALT 28 09/15/2017    AST 23 01/08/2018    AST 30 10/30/2017    AST 40 09/15/2017    GLUCOSE 81 01/08/2018    GLUCOSE 101 (H) 01/02/2018    GLUCOSE 90 11/04/2017       Lab Results   Component Value Date    INR 1.28 (H) 01/02/2018    INR 1.18 (H) 10/30/2017    INR 1.13 (H) 06/20/2016       Body mass index is 33.33 kg/m². Patient's Body mass index is 33.33 kg/m². BMI is above normal parameters. Recommendations include: no follow-up required.    Assessment and Plan:  Assessment/Plan   Cheri was seen today for gi problem and anemia.    Diagnoses and all orders for this visit:    Black tarry stools  Comments:  Scheduling EGD.  Orders:  -     Case Request; Standing  -     Case Request    Hx of  gastritis  Comments:  Erosive bleeding in the past  Orders:  -     Case Request; Standing  -     Case Request    Anemia, unspecified type    Occult blood positive stool    Antiplatelet or antithrombotic long-term use  Comments:  CAD and Carotids on Plavix must hold 5 days with permission      Other orders  -     Follow Anesthesia Guidelines / Standing Orders; Future  -     Implement Anesthesia Orders Day of Procedure; Standing  -     Obtain Informed Consent; Standing             There are no Patient Instructions on file for this visit.    Next follow-up appointment      The risks, benefits, and alternatives of endoscopy were reviewed with the patient today.  Risks including perforation, with or without dilation, possibly requiring surgery.  Additional risks include risk of bleeding.  There is also the risk of a drug reaction or problems with anesthesia.  This will be discussed with the further by the anesthesia team on the day of the procedure. The benefits include the diagnosis and management of disease of the upper digestive tract.  Alternatives to endoscopy include upper GI series, laboratory testing, radiographic evaluation, or no intervention.  The patient verbalizes understanding and agrees.      EMR Dragon/Transcription disclaimer:  Much of this encounter note is an electronic transcription/translation of spoken language to printed text. The electronic translation of spoken language may permit erroneous, or at times, nonsensical words or phrases to be inadvertently transcribed; although I have reviewed the note for such errors, some may still exist.

## 2018-12-11 ENCOUNTER — TELEPHONE (OUTPATIENT)
Dept: GASTROENTEROLOGY | Facility: CLINIC | Age: 78
End: 2018-12-11

## 2018-12-11 NOTE — TELEPHONE ENCOUNTER
I received Plavix clearance from Dr. San. I have scanned this to you under media in her chart.     [endoscopy]

## 2018-12-11 NOTE — TELEPHONE ENCOUNTER
OK to schedule procedure now that clearance to hold antiplatelet/anticoagulatant has been obtained.    Moni Garza MD

## 2018-12-12 PROBLEM — Z87.19 HX OF GASTRITIS: Status: ACTIVE | Noted: 2018-12-12

## 2018-12-12 PROBLEM — K92.1 BLACK TARRY STOOLS: Status: ACTIVE | Noted: 2018-12-12

## 2018-12-27 ENCOUNTER — ANESTHESIA EVENT (OUTPATIENT)
Dept: GASTROENTEROLOGY | Facility: HOSPITAL | Age: 78
End: 2018-12-27

## 2018-12-27 ENCOUNTER — ANESTHESIA (OUTPATIENT)
Dept: GASTROENTEROLOGY | Facility: HOSPITAL | Age: 78
End: 2018-12-27

## 2018-12-27 ENCOUNTER — TELEPHONE (OUTPATIENT)
Dept: GASTROENTEROLOGY | Facility: CLINIC | Age: 78
End: 2018-12-27

## 2018-12-27 ENCOUNTER — HOSPITAL ENCOUNTER (OUTPATIENT)
Facility: HOSPITAL | Age: 78
Setting detail: HOSPITAL OUTPATIENT SURGERY
Discharge: HOME | End: 2018-12-27
Attending: INTERNAL MEDICINE | Admitting: INTERNAL MEDICINE

## 2018-12-27 VITALS
WEIGHT: 163 LBS | TEMPERATURE: 96.2 F | HEIGHT: 59 IN | DIASTOLIC BLOOD PRESSURE: 44 MMHG | SYSTOLIC BLOOD PRESSURE: 104 MMHG | BODY MASS INDEX: 32.86 KG/M2 | OXYGEN SATURATION: 96 % | HEART RATE: 93 BPM | RESPIRATION RATE: 14 BRPM

## 2018-12-27 DIAGNOSIS — K25.0 ACUTE GASTRIC ULCER WITH HEMORRHAGE: Primary | ICD-10-CM

## 2018-12-27 DIAGNOSIS — K92.1 BLACK TARRY STOOLS: ICD-10-CM

## 2018-12-27 DIAGNOSIS — Z87.19 HX OF GASTRITIS: ICD-10-CM

## 2018-12-27 PROCEDURE — 25010000002 PROPOFOL 10 MG/ML EMULSION: Performed by: NURSE ANESTHETIST, CERTIFIED REGISTERED

## 2018-12-27 PROCEDURE — 87081 CULTURE SCREEN ONLY: CPT | Performed by: INTERNAL MEDICINE

## 2018-12-27 PROCEDURE — 43239 EGD BIOPSY SINGLE/MULTIPLE: CPT | Performed by: INTERNAL MEDICINE

## 2018-12-27 RX ORDER — SODIUM CHLORIDE 9 MG/ML
500 INJECTION, SOLUTION INTRAVENOUS CONTINUOUS PRN
Status: DISCONTINUED | OUTPATIENT
Start: 2018-12-27 | End: 2018-12-27 | Stop reason: HOSPADM

## 2018-12-27 RX ORDER — PROPOFOL 10 MG/ML
VIAL (ML) INTRAVENOUS AS NEEDED
Status: DISCONTINUED | OUTPATIENT
Start: 2018-12-27 | End: 2018-12-27 | Stop reason: SURG

## 2018-12-27 RX ORDER — LIDOCAINE HYDROCHLORIDE 20 MG/ML
INJECTION, SOLUTION INFILTRATION; PERINEURAL AS NEEDED
Status: DISCONTINUED | OUTPATIENT
Start: 2018-12-27 | End: 2018-12-27 | Stop reason: SURG

## 2018-12-27 RX ORDER — PANTOPRAZOLE SODIUM 40 MG/1
40 TABLET, DELAYED RELEASE ORAL DAILY
Qty: 30 TABLET | Refills: 11 | Status: ON HOLD | OUTPATIENT
Start: 2018-12-27 | End: 2019-06-27

## 2018-12-27 RX ORDER — SODIUM CHLORIDE 0.9 % (FLUSH) 0.9 %
3 SYRINGE (ML) INJECTION AS NEEDED
Status: DISCONTINUED | OUTPATIENT
Start: 2018-12-27 | End: 2018-12-27 | Stop reason: HOSPADM

## 2018-12-27 RX ADMIN — LIDOCAINE HYDROCHLORIDE 100 MG: 20 INJECTION, SOLUTION INFILTRATION; PERINEURAL at 10:54

## 2018-12-27 RX ADMIN — SODIUM CHLORIDE 500 ML: 9 INJECTION, SOLUTION INTRAVENOUS at 09:55

## 2018-12-27 RX ADMIN — LIDOCAINE HYDROCHLORIDE 0.5 ML: 10 INJECTION, SOLUTION EPIDURAL; INFILTRATION; INTRACAUDAL; PERINEURAL at 09:55

## 2018-12-27 RX ADMIN — PROPOFOL 70 MG: 10 INJECTION, EMULSION INTRAVENOUS at 10:54

## 2018-12-27 NOTE — DISCHARGE INSTRUCTIONS
AVOID all NSAIDS.  Start pantoprazole 40 mg daily on empty stomach.  Resume plavix in two days.   repeat endoscopy in two months. Office will call you.

## 2018-12-27 NOTE — ANESTHESIA POSTPROCEDURE EVALUATION
Patient: Cheri Ely    Procedure Summary     Date:  12/27/18 Room / Location:  [facility] ENDOSCOPY 6 / [facility] END[facility]    Anesthesia Start:  1050 Anesthesia Stop:  1102    Procedure:  ESOPHAGOGASTRODUODENOSCOPY WITH ANESTHESIA (N/A ) Diagnosis:       Black tarry stools      Hx of gastritis      (Black tarry stools [K92.1])      (Hx of gastritis [Z87.19])    Surgeon:  Moni Garza MD Provider:  Priscilla Duncan CRNA    Anesthesia Type:  general ASA Status:  3          Anesthesia Type: general  Last vitals  BP   104/44 (12/27/18 1130)   Temp   96.2 °F (35.7 °C) (12/27/18 0934)   Pulse   93 (12/27/18 1130)   Resp   14 (12/27/18 1130)     SpO2   96 % (12/27/18 1130)     Post Anesthesia Care and Evaluation    Patient location during evaluation: PHASE II  Patient participation: complete - patient participated  Level of consciousness: awake and alert  Pain score: 0  Pain management: adequate  Airway patency: patent  Anesthetic complications: No anesthetic complications  PONV Status: none  Cardiovascular status: acceptable  Respiratory status: acceptable  Hydration status: acceptable  No anesthesia care post op

## 2018-12-27 NOTE — ANESTHESIA PREPROCEDURE EVALUATION
Anesthesia Evaluation     Patient summary reviewed   no history of anesthetic complications:  NPO Solid Status: > 8 hours  NPO Liquid Status: > 8 hours           Airway   Mallampati: I  TM distance: >3 FB  Neck ROM: full  Dental    (+) edentulous    Pulmonary    (-) COPD, asthma, recent URI, sleep apnea, not a smoker  Cardiovascular   Exercise tolerance: poor (<4 METS) (Limited by leg pain)    ECG reviewed  Patient on routine beta blocker and Beta blocker given within 24 hours of surgery  Rhythm: irregular    (+) hypertension, valvular problems/murmurs (Aortic valve replacement 3 yrs ago), CAD, CABG (2V), dysrhythmias (Rate controlled with carvedilol) Atrial Fib, CHF, PVD, hyperlipidemia,   (-) pacemaker, angina, cardiac stents    ROS comment: Off plavix 12/21    Neuro/Psych  (+) seizures,     (-) TIA, CVA  GI/Hepatic/Renal/Endo    (+)   renal disease (12/26/18) dialysis, hypothyroidism,   (-) GERD, liver disease, diabetes, hyperthyroidism    Musculoskeletal     Abdominal    Substance History      OB/GYN          Other                        Anesthesia Plan    ASA 3     general   total IV anesthesia  intravenous induction   Anesthetic plan, all risks, benefits, and alternatives have been provided, discussed and informed consent has been obtained with: patient.

## 2018-12-28 PROBLEM — K25.0 ACUTE GASTRIC ULCER WITH HEMORRHAGE: Status: ACTIVE | Noted: 2018-12-28

## 2018-12-28 LAB — UREASE TISS QL: NEGATIVE

## 2019-01-01 ENCOUNTER — HOSPITAL ENCOUNTER (OUTPATIENT)
Dept: WOUND CARE | Age: 79
Discharge: HOME | End: 2019-06-27

## 2019-01-02 ENCOUNTER — TELEPHONE (OUTPATIENT)
Dept: GASTROENTEROLOGY | Facility: CLINIC | Age: 79
End: 2019-01-02

## 2019-01-02 NOTE — TELEPHONE ENCOUNTER
----- Message from Moni Garza MD sent at 1/2/2019  1:00 PM CST -----  I am writing to inform you that your endoscopy biopsies were good.     If you have any questions, please call our office.      Sincerely,        Moni Garza MD

## 2019-01-04 ENCOUNTER — TELEPHONE (OUTPATIENT)
Dept: GASTROENTEROLOGY | Facility: CLINIC | Age: 79
End: 2019-01-04

## 2019-01-04 NOTE — TELEPHONE ENCOUNTER
----- Message from Jelena Lala MA sent at 1/4/2019  9:27 AM CST -----    ----- Message -----  From: Kim Maria APRN  Sent: 1/4/2019   8:35 AM  To: Jelena Lala MA    She is ok to hold Plavix, but should take aspirin EC 81 mg while holding.  Thanks.  ----- Message -----  From: Jelena Lala MA  Sent: 12/28/2018   2:08 PM  To: Tomasz San, DO

## 2019-01-04 NOTE — TELEPHONE ENCOUNTER
I have explained to patient that I have received Plavix clearance from Dr. San's office and that her last dose of Plavix will be on 2/22/19.

## 2019-01-04 NOTE — TELEPHONE ENCOUNTER
I have received Plavix clearance from Dr. San's office. Please see Kim Maria's message.    Patient is already scheduled for endoscopy on 2/28/19. Her last dose of Plavix will be Friday, February 22nd.

## 2019-01-07 ENCOUNTER — TELEPHONE (OUTPATIENT)
Dept: GASTROENTEROLOGY | Facility: CLINIC | Age: 79
End: 2019-01-07

## 2019-01-07 NOTE — TELEPHONE ENCOUNTER
I have received Plavix clearance from Dr. San. I have scanned this to you under media in her chart. They had sent a message in epic about clearance last week.     Patient is scheduled for endoscopy on 2/28/19 and is aware that the last dose of Plavix is on 2/22/19. She is also aware that she needs to take Aspirin 81 mg for the 5 days she is holding the Plavix.

## 2019-01-23 ENCOUNTER — TELEPHONE (OUTPATIENT)
Dept: VASCULAR SURGERY | Facility: CLINIC | Age: 79
End: 2019-01-23

## 2019-01-23 NOTE — TELEPHONE ENCOUNTER
Spoke with Ms Ely reminding her of her appointment for Thursday, January 24th, 2019 at 915 am with Dr San. Ms Ely confirmed she would be here.

## 2019-01-24 ENCOUNTER — OFFICE VISIT (OUTPATIENT)
Dept: VASCULAR SURGERY | Facility: CLINIC | Age: 79
End: 2019-01-24

## 2019-01-24 VITALS
OXYGEN SATURATION: 92 % | WEIGHT: 166 LBS | HEART RATE: 75 BPM | HEIGHT: 60 IN | SYSTOLIC BLOOD PRESSURE: 82 MMHG | BODY MASS INDEX: 32.59 KG/M2 | DIASTOLIC BLOOD PRESSURE: 52 MMHG

## 2019-01-24 DIAGNOSIS — E78.2 MIXED HYPERLIPIDEMIA: ICD-10-CM

## 2019-01-24 DIAGNOSIS — N18.6 CHRONIC KIDNEY DISEASE WITH END STAGE RENAL FAILURE ON DIALYSIS (HCC): Primary | ICD-10-CM

## 2019-01-24 DIAGNOSIS — Z99.2 CHRONIC KIDNEY DISEASE WITH END STAGE RENAL FAILURE ON DIALYSIS (HCC): Primary | ICD-10-CM

## 2019-01-24 DIAGNOSIS — I10 ESSENTIAL HYPERTENSION: ICD-10-CM

## 2019-01-24 PROCEDURE — 99213 OFFICE O/P EST LOW 20 MIN: CPT | Performed by: NURSE PRACTITIONER

## 2019-01-24 NOTE — PROGRESS NOTES
"1/24/2019       Barrett Mckenzie Jr, MD   [address] [place] [address]  [address] [place] [id number]      Cheri Ely  1940    Chief Complaint   Patient presents with   • Follow-up     6 month f/u to evaluate fistula.  Pt states that she is doing well.        Dear Barrett Mckenzie Jr, MD       HPI  I had the pleasure of seeing your patient Cheri Ely in the office today. As you recall, Cheri Ely is a 78 y.o.  female who you are currently following for routine health maintenance.  She was having complaints of pain when eats, but this has resolved.  She had lap band surgery in 2008.  She is also on dialysis for the past 2 years with a left upper extremity fistula created by Dr. Dubose.  She did undergo a mesenteric angiogram, but was unsuccessful.  Dr. San Was unable to cannulate the superior mesenteric artery or celiac artery.  Her dialysis is on Monday, Wednesday, and Friday.  She reports no problems during dialysis.  Her blood pressure is low, however she is not symptomatic.        Review of Systems   Constitutional: Negative.    HENT: Negative.    Eyes: Negative.    Respiratory: Negative.    Endocrine: Negative.    Genitourinary: Negative.    Musculoskeletal: Negative.    Skin: Negative.    Allergic/Immunologic: Negative.    Neurological: Negative.    Hematological: Negative.    Psychiatric/Behavioral: Negative.        BP (!) 82/52   Pulse 75   Ht 151.1 cm (59.5\")   Wt 75.3 kg (166 lb)   SpO2 92%   BMI 32.97 kg/m²   Physical Exam   Constitutional: She is oriented to person, place, and time. She appears well-developed and well-nourished.   HENT:   Head: Normocephalic and atraumatic.   Eyes: Pupils are equal, round, and reactive to light. No scleral icterus.   Neck: Neck supple. No JVD present. Carotid bruit is not present. No thyromegaly present.   Cardiovascular: Normal rate, regular rhythm and normal heart sounds.   Pulses:       Carotid pulses are 2+ on the right side, and 2+ on the left " side.       Femoral pulses are 2+ on the right side, and 2+ on the left side.       Popliteal pulses are 2+ on the right side, and 2+ on the left side.        Dorsalis pedis pulses are 2+ on the right side, and 2+ on the left side.        Posterior tibial pulses are 2+ on the right side, and 2+ on the left side.   Left upper extremity fistula with pulsatility   Pulmonary/Chest: Effort normal and breath sounds normal.   Abdominal: Soft. Bowel sounds are normal. She exhibits no distension, no abdominal bruit and no mass. There is no hepatosplenomegaly. There is no tenderness.   Musculoskeletal: Normal range of motion. She exhibits no edema.   Lymphadenopathy:     She has no cervical adenopathy.   Neurological: She is alert and oriented to person, place, and time. She has normal strength. No cranial nerve deficit or sensory deficit.   Skin: Skin is warm, dry and intact.   Psychiatric: She has a normal mood and affect.   Nursing note and vitals reviewed.        Patient Active Problem List   Diagnosis   • Hypertension   • Hyperlipidemia   • Chronic renal failure   • Closed fracture of ramus of left pubis (CMS/HCC)   • Occlusion of superior mesenteric artery (CMS/HCC)   • Chronic mesenteric ischemia (CMS/HCC)   • Black tarry stools   • Hx of gastritis   • Acute gastric ulcer with hemorrhage         ICD-10-CM ICD-9-CM   1. Chronic kidney disease with end stage renal failure on dialysis (CMS/HCC) N18.6 585.6    Z99.2 V45.11   2. Essential hypertension I10 401.9   3. Mixed hyperlipidemia E78.2 272.2         Plan: After thoroughly evaluating Cheri ALFREDO Ely, I believe the best course of action is to remain conservative from a vascular standpoint.  She reports she is having no problems with dialysis however does have some pulsatility to her fistula, which is unchanged.    She does have a left upper extremity arteriovenous fistula that was created at [facility], but they had no follow up scheduled.  I discussed her monitoring her  blood pressure closely as is low, which she states is not abnormal.  I will have her return in 6 months for continued sureveillance.  She is having no symptoms of mesenteric ischemia.  I also counseled her about her vascular risk factors with regards to hypertension and hyperlipidemia.  The patient can continue taking her current medication regimen as previously planned.  This was all discussed in full with complete understanding.    Thank you for allowing me to participate in the care of your patient.  Please do not hesitate with any questions or concerns.  I will keep you aware of any further encounters with Cheri Ely.        Sincerely yours,         ABILIO Rae Ralph L Jr L, MD

## 2019-02-07 ENCOUNTER — APPOINTMENT (OUTPATIENT)
Dept: GENERAL RADIOLOGY | Facility: HOSPITAL | Age: 79
End: 2019-02-07

## 2019-02-07 ENCOUNTER — HOSPITAL ENCOUNTER (INPATIENT)
Facility: HOSPITAL | Age: 79
LOS: 3 days | Discharge: NURSING, REHAB OR LONG-TERM CARE | End: 2019-02-11
Attending: INTERNAL MEDICINE | Admitting: ORTHOPAEDIC SURGERY

## 2019-02-07 DIAGNOSIS — S72.141A DISPLACED INTERTROCHANTERIC FRACTURE OF RIGHT FEMUR, INITIAL ENCOUNTER FOR CLOSED FRACTURE (HCC): Primary | ICD-10-CM

## 2019-02-07 DIAGNOSIS — S72.141A CLOSED DISPLACED INTERTROCHANTERIC FRACTURE OF RIGHT FEMUR, INITIAL ENCOUNTER (HCC): ICD-10-CM

## 2019-02-07 DIAGNOSIS — Z74.09 IMPAIRED MOBILITY AND ADLS: ICD-10-CM

## 2019-02-07 DIAGNOSIS — Z74.09 IMPAIRED MOBILITY: ICD-10-CM

## 2019-02-07 DIAGNOSIS — Z78.9 IMPAIRED MOBILITY AND ADLS: ICD-10-CM

## 2019-02-07 LAB
ALBUMIN SERPL-MCNC: 3.5 G/DL (ref 3.5–5)
ALBUMIN/GLOB SERPL: 1.1 G/DL (ref 1.1–2.5)
ALP SERPL-CCNC: 170 U/L (ref 24–120)
ALT SERPL W P-5'-P-CCNC: 20 U/L (ref 0–54)
ANION GAP SERPL CALCULATED.3IONS-SCNC: 12 MMOL/L (ref 4–13)
ANISOCYTOSIS BLD QL: ABNORMAL
APTT PPP: 30.1 SECONDS (ref 24.1–34.8)
AST SERPL-CCNC: 33 U/L (ref 7–45)
BASOPHILS # BLD MANUAL: 0.11 10*3/MM3 (ref 0–0.2)
BASOPHILS NFR BLD AUTO: 2 % (ref 0–2)
BILIRUB SERPL-MCNC: 0.4 MG/DL (ref 0.1–1)
BUN BLD-MCNC: 47 MG/DL (ref 5–21)
BUN/CREAT SERPL: 7.8 (ref 7–25)
CALCIUM SPEC-SCNC: 8.5 MG/DL (ref 8.4–10.4)
CHLORIDE SERPL-SCNC: 98 MMOL/L (ref 98–110)
CO2 SERPL-SCNC: 28 MMOL/L (ref 24–31)
CREAT BLD-MCNC: 6.04 MG/DL (ref 0.5–1.4)
DEPRECATED RDW RBC AUTO: 55.5 FL (ref 40–54)
EOSINOPHIL # BLD MANUAL: 0.23 10*3/MM3 (ref 0–0.7)
EOSINOPHIL NFR BLD MANUAL: 4 % (ref 0–4)
ERYTHROCYTE [DISTWIDTH] IN BLOOD BY AUTOMATED COUNT: 14 % (ref 12–15)
GFR SERPL CREATININE-BSD FRML MDRD: 7 ML/MIN/1.73
GFR SERPL CREATININE-BSD FRML MDRD: ABNORMAL ML/MIN/1.73
GLOBULIN UR ELPH-MCNC: 3.3 GM/DL
GLUCOSE BLD-MCNC: 146 MG/DL (ref 70–100)
HCT VFR BLD AUTO: 33.8 % (ref 37–47)
HGB BLD-MCNC: 10.9 G/DL (ref 12–16)
INR PPP: 1.17 (ref 0.91–1.09)
LYMPHOCYTES # BLD MANUAL: 0.4 10*3/MM3 (ref 0.72–4.86)
LYMPHOCYTES NFR BLD MANUAL: 6 % (ref 4–12)
LYMPHOCYTES NFR BLD MANUAL: 7 % (ref 15–45)
MACROCYTES BLD QL SMEAR: ABNORMAL
MCH RBC QN AUTO: 34.8 PG (ref 28–32)
MCHC RBC AUTO-ENTMCNC: 32.2 G/DL (ref 33–36)
MCV RBC AUTO: 108 FL (ref 82–98)
MONOCYTES # BLD AUTO: 0.34 10*3/MM3 (ref 0.19–1.3)
NEUTROPHILS # BLD AUTO: 4.25 10*3/MM3 (ref 1.87–8.4)
NEUTROPHILS NFR BLD MANUAL: 75 % (ref 39–78)
PLATELET # BLD AUTO: 139 10*3/MM3 (ref 130–400)
PMV BLD AUTO: 10.7 FL (ref 6–12)
POTASSIUM BLD-SCNC: 4.4 MMOL/L (ref 3.5–5.3)
PROT SERPL-MCNC: 6.8 G/DL (ref 6.3–8.7)
PROTHROMBIN TIME: 15.3 SECONDS (ref 11.9–14.6)
RBC # BLD AUTO: 3.13 10*6/MM3 (ref 4.2–5.4)
SMALL PLATELETS BLD QL SMEAR: ABNORMAL
SODIUM BLD-SCNC: 138 MMOL/L (ref 135–145)
VARIANT LYMPHS NFR BLD MANUAL: 6 % (ref 0–5)
WBC MORPH BLD: NORMAL
WBC NRBC COR # BLD: 5.66 10*3/MM3 (ref 4.8–10.8)

## 2019-02-07 PROCEDURE — 85610 PROTHROMBIN TIME: CPT | Performed by: PHYSICIAN ASSISTANT

## 2019-02-07 PROCEDURE — 93010 ELECTROCARDIOGRAM REPORT: CPT | Performed by: INTERNAL MEDICINE

## 2019-02-07 PROCEDURE — 25010000002 MORPHINE PER 10 MG: Performed by: EMERGENCY MEDICINE

## 2019-02-07 PROCEDURE — 85025 COMPLETE CBC W/AUTO DIFF WBC: CPT | Performed by: PHYSICIAN ASSISTANT

## 2019-02-07 PROCEDURE — 86901 BLOOD TYPING SEROLOGIC RH(D): CPT | Performed by: PHYSICIAN ASSISTANT

## 2019-02-07 PROCEDURE — 73502 X-RAY EXAM HIP UNI 2-3 VIEWS: CPT

## 2019-02-07 PROCEDURE — 85007 BL SMEAR W/DIFF WBC COUNT: CPT | Performed by: PHYSICIAN ASSISTANT

## 2019-02-07 PROCEDURE — 86900 BLOOD TYPING SEROLOGIC ABO: CPT | Performed by: PHYSICIAN ASSISTANT

## 2019-02-07 PROCEDURE — 93005 ELECTROCARDIOGRAM TRACING: CPT | Performed by: INTERNAL MEDICINE

## 2019-02-07 PROCEDURE — 86850 RBC ANTIBODY SCREEN: CPT | Performed by: PHYSICIAN ASSISTANT

## 2019-02-07 PROCEDURE — 73560 X-RAY EXAM OF KNEE 1 OR 2: CPT

## 2019-02-07 PROCEDURE — 85730 THROMBOPLASTIN TIME PARTIAL: CPT | Performed by: PHYSICIAN ASSISTANT

## 2019-02-07 PROCEDURE — 99284 EMERGENCY DEPT VISIT MOD MDM: CPT

## 2019-02-07 PROCEDURE — 71045 X-RAY EXAM CHEST 1 VIEW: CPT

## 2019-02-07 PROCEDURE — 80053 COMPREHEN METABOLIC PANEL: CPT | Performed by: PHYSICIAN ASSISTANT

## 2019-02-07 PROCEDURE — 93005 ELECTROCARDIOGRAM TRACING: CPT | Performed by: ORTHOPAEDIC SURGERY

## 2019-02-07 PROCEDURE — 25010000002 ONDANSETRON PER 1 MG: Performed by: EMERGENCY MEDICINE

## 2019-02-07 PROCEDURE — 93005 ELECTROCARDIOGRAM TRACING: CPT | Performed by: PHYSICIAN ASSISTANT

## 2019-02-07 RX ORDER — ONDANSETRON 2 MG/ML
4 INJECTION INTRAMUSCULAR; INTRAVENOUS ONCE
Status: COMPLETED | OUTPATIENT
Start: 2019-02-07 | End: 2019-02-07

## 2019-02-07 RX ADMIN — ONDANSETRON HYDROCHLORIDE 4 MG: 2 INJECTION, SOLUTION INTRAMUSCULAR; INTRAVENOUS at 23:55

## 2019-02-07 RX ADMIN — MORPHINE SULFATE 4 MG: 4 INJECTION INTRAVENOUS at 23:54

## 2019-02-08 ENCOUNTER — ANESTHESIA EVENT (OUTPATIENT)
Dept: PERIOP | Facility: HOSPITAL | Age: 79
End: 2019-02-08

## 2019-02-08 ENCOUNTER — ANESTHESIA (OUTPATIENT)
Dept: PERIOP | Facility: HOSPITAL | Age: 79
End: 2019-02-08

## 2019-02-08 ENCOUNTER — APPOINTMENT (OUTPATIENT)
Dept: GENERAL RADIOLOGY | Facility: HOSPITAL | Age: 79
End: 2019-02-08

## 2019-02-08 PROBLEM — S72.141A CLOSED DISPLACED INTERTROCHANTERIC FRACTURE OF RIGHT FEMUR (HCC): Status: ACTIVE | Noted: 2019-02-08

## 2019-02-08 PROBLEM — S72.141A DISPLACED INTERTROCHANTERIC FRACTURE OF RIGHT FEMUR, INITIAL ENCOUNTER FOR CLOSED FRACTURE (HCC): Status: ACTIVE | Noted: 2019-02-07

## 2019-02-08 LAB
ABO GROUP BLD: NORMAL
ALBUMIN SERPL-MCNC: 3.5 G/DL (ref 3.5–5)
ALBUMIN/GLOB SERPL: 1.2 G/DL (ref 1.1–2.5)
ALP SERPL-CCNC: 154 U/L (ref 24–120)
ALT SERPL W P-5'-P-CCNC: 20 U/L (ref 0–54)
ANION GAP SERPL CALCULATED.3IONS-SCNC: 10 MMOL/L (ref 4–13)
AST SERPL-CCNC: 41 U/L (ref 7–45)
BASOPHILS # BLD AUTO: 0.06 10*3/MM3 (ref 0–0.2)
BASOPHILS NFR BLD AUTO: 0.6 % (ref 0–2)
BILIRUB SERPL-MCNC: 0.5 MG/DL (ref 0.1–1)
BLD GP AB SCN SERPL QL: NEGATIVE
BUN BLD-MCNC: 52 MG/DL (ref 5–21)
BUN/CREAT SERPL: 7.4 (ref 7–25)
CALCIUM SPEC-SCNC: 8.7 MG/DL (ref 8.4–10.4)
CHLORIDE SERPL-SCNC: 97 MMOL/L (ref 98–110)
CO2 SERPL-SCNC: 31 MMOL/L (ref 24–31)
CREAT BLD-MCNC: 6.99 MG/DL (ref 0.5–1.4)
DEPRECATED RDW RBC AUTO: 54.6 FL (ref 40–54)
EOSINOPHIL # BLD AUTO: 0.08 10*3/MM3 (ref 0–0.7)
EOSINOPHIL NFR BLD AUTO: 0.8 % (ref 0–4)
ERYTHROCYTE [DISTWIDTH] IN BLOOD BY AUTOMATED COUNT: 14 % (ref 12–15)
GFR SERPL CREATININE-BSD FRML MDRD: 6 ML/MIN/1.73
GFR SERPL CREATININE-BSD FRML MDRD: ABNORMAL ML/MIN/1.73
GLOBULIN UR ELPH-MCNC: 3 GM/DL
GLUCOSE BLD-MCNC: 129 MG/DL (ref 70–100)
GLUCOSE BLDC GLUCOMTR-MCNC: 126 MG/DL (ref 70–130)
HCT VFR BLD AUTO: 31.9 % (ref 37–47)
HGB BLD-MCNC: 10.2 G/DL (ref 12–16)
LYMPHOCYTES # BLD AUTO: 0.74 10*3/MM3 (ref 0.72–4.86)
LYMPHOCYTES NFR BLD AUTO: 7.5 % (ref 15–45)
MAGNESIUM SERPL-MCNC: 1.9 MG/DL (ref 1.4–2.2)
MCH RBC QN AUTO: 34.7 PG (ref 28–32)
MCHC RBC AUTO-ENTMCNC: 32 G/DL (ref 33–36)
MCV RBC AUTO: 108.5 FL (ref 82–98)
MONOCYTES # BLD AUTO: 0.69 10*3/MM3 (ref 0.19–1.3)
MONOCYTES NFR BLD AUTO: 7 % (ref 4–12)
NEUTROPHILS # BLD AUTO: 8.28 10*3/MM3 (ref 1.87–8.4)
NEUTROPHILS NFR BLD AUTO: 83.5 % (ref 39–78)
PHOSPHATE SERPL-MCNC: 5.3 MG/DL (ref 2.5–4.5)
PLATELET # BLD AUTO: 142 10*3/MM3 (ref 130–400)
PMV BLD AUTO: 10.9 FL (ref 6–12)
POTASSIUM BLD-SCNC: 5.4 MMOL/L (ref 3.5–5.3)
PROT SERPL-MCNC: 6.5 G/DL (ref 6.3–8.7)
RBC # BLD AUTO: 2.94 10*6/MM3 (ref 4.2–5.4)
RH BLD: POSITIVE
SODIUM BLD-SCNC: 138 MMOL/L (ref 135–145)
T&S EXPIRATION DATE: NORMAL
WBC NRBC COR # BLD: 9.91 10*3/MM3 (ref 4.8–10.8)

## 2019-02-08 PROCEDURE — 94799 UNLISTED PULMONARY SVC/PX: CPT

## 2019-02-08 PROCEDURE — 80053 COMPREHEN METABOLIC PANEL: CPT | Performed by: INTERNAL MEDICINE

## 2019-02-08 PROCEDURE — 85025 COMPLETE CBC W/AUTO DIFF WBC: CPT | Performed by: INTERNAL MEDICINE

## 2019-02-08 PROCEDURE — 25010000002 PROPOFOL 10 MG/ML EMULSION: Performed by: NURSE ANESTHETIST, CERTIFIED REGISTERED

## 2019-02-08 PROCEDURE — 25010000002 FENTANYL CITRATE (PF) 100 MCG/2ML SOLUTION: Performed by: ANESTHESIOLOGY

## 2019-02-08 PROCEDURE — 73502 X-RAY EXAM HIP UNI 2-3 VIEWS: CPT

## 2019-02-08 PROCEDURE — 5A1D70Z PERFORMANCE OF URINARY FILTRATION, INTERMITTENT, LESS THAN 6 HOURS PER DAY: ICD-10-PCS | Performed by: INTERNAL MEDICINE

## 2019-02-08 PROCEDURE — 76000 FLUOROSCOPY <1 HR PHYS/QHP: CPT

## 2019-02-08 PROCEDURE — C1713 ANCHOR/SCREW BN/BN,TIS/BN: HCPCS | Performed by: ORTHOPAEDIC SURGERY

## 2019-02-08 PROCEDURE — 25010000002 FENTANYL CITRATE (PF) 100 MCG/2ML SOLUTION: Performed by: NURSE ANESTHETIST, CERTIFIED REGISTERED

## 2019-02-08 PROCEDURE — 83735 ASSAY OF MAGNESIUM: CPT | Performed by: INTERNAL MEDICINE

## 2019-02-08 PROCEDURE — 82962 GLUCOSE BLOOD TEST: CPT

## 2019-02-08 PROCEDURE — 94760 N-INVAS EAR/PLS OXIMETRY 1: CPT

## 2019-02-08 PROCEDURE — 25010000002 METOCLOPRAMIDE PER 10 MG: Performed by: ANESTHESIOLOGY

## 2019-02-08 PROCEDURE — 84100 ASSAY OF PHOSPHORUS: CPT | Performed by: INTERNAL MEDICINE

## 2019-02-08 PROCEDURE — 0QS636Z REPOSITION RIGHT UPPER FEMUR WITH INTRAMEDULLARY INTERNAL FIXATION DEVICE, PERCUTANEOUS APPROACH: ICD-10-PCS | Performed by: ORTHOPAEDIC SURGERY

## 2019-02-08 PROCEDURE — C1769 GUIDE WIRE: HCPCS | Performed by: ORTHOPAEDIC SURGERY

## 2019-02-08 DEVICE — SCRW LK STRDRV TI 5X48MM STRL: Type: IMPLANTABLE DEVICE | Status: FUNCTIONAL

## 2019-02-08 DEVICE — IMPLANTABLE DEVICE: Type: IMPLANTABLE DEVICE | Status: FUNCTIONAL

## 2019-02-08 DEVICE — SCRW LK STRDRV TI 5X44MM STRL: Type: IMPLANTABLE DEVICE | Status: FUNCTIONAL

## 2019-02-08 RX ORDER — MEPERIDINE HYDROCHLORIDE 25 MG/ML
12.5 INJECTION INTRAMUSCULAR; INTRAVENOUS; SUBCUTANEOUS
Status: DISCONTINUED | OUTPATIENT
Start: 2019-02-08 | End: 2019-02-08 | Stop reason: HOSPADM

## 2019-02-08 RX ORDER — ONDANSETRON 4 MG/1
4 TABLET, ORALLY DISINTEGRATING ORAL EVERY 6 HOURS PRN
Status: DISCONTINUED | OUTPATIENT
Start: 2019-02-08 | End: 2019-02-11 | Stop reason: HOSPADM

## 2019-02-08 RX ORDER — ONDANSETRON 2 MG/ML
4 INJECTION INTRAMUSCULAR; INTRAVENOUS AS NEEDED
Status: DISCONTINUED | OUTPATIENT
Start: 2019-02-08 | End: 2019-02-08 | Stop reason: HOSPADM

## 2019-02-08 RX ORDER — BISACODYL 5 MG/1
5 TABLET, DELAYED RELEASE ORAL DAILY PRN
Status: DISCONTINUED | OUTPATIENT
Start: 2019-02-08 | End: 2019-02-11 | Stop reason: HOSPADM

## 2019-02-08 RX ORDER — CARVEDILOL 6.25 MG/1
12.5 TABLET ORAL
Status: DISCONTINUED | OUTPATIENT
Start: 2019-02-08 | End: 2019-02-09

## 2019-02-08 RX ORDER — NALOXONE HCL 0.4 MG/ML
0.04 VIAL (ML) INJECTION AS NEEDED
Status: DISCONTINUED | OUTPATIENT
Start: 2019-02-08 | End: 2019-02-08 | Stop reason: HOSPADM

## 2019-02-08 RX ORDER — MORPHINE SULFATE 2 MG/ML
2 INJECTION, SOLUTION INTRAMUSCULAR; INTRAVENOUS
Status: DISCONTINUED | OUTPATIENT
Start: 2019-02-08 | End: 2019-02-08 | Stop reason: HOSPADM

## 2019-02-08 RX ORDER — OXYCODONE AND ACETAMINOPHEN 10; 325 MG/1; MG/1
1 TABLET ORAL EVERY 8 HOURS PRN
Status: DISCONTINUED | OUTPATIENT
Start: 2019-02-08 | End: 2019-02-11 | Stop reason: HOSPADM

## 2019-02-08 RX ORDER — LEVOTHYROXINE SODIUM 0.15 MG/1
150 TABLET ORAL DAILY
Status: DISCONTINUED | OUTPATIENT
Start: 2019-02-08 | End: 2019-02-11 | Stop reason: HOSPADM

## 2019-02-08 RX ORDER — DOCUSATE SODIUM 100 MG/1
100 CAPSULE, LIQUID FILLED ORAL 2 TIMES DAILY
Status: DISCONTINUED | OUTPATIENT
Start: 2019-02-08 | End: 2019-02-11 | Stop reason: HOSPADM

## 2019-02-08 RX ORDER — IPRATROPIUM BROMIDE AND ALBUTEROL SULFATE 2.5; .5 MG/3ML; MG/3ML
3 SOLUTION RESPIRATORY (INHALATION) ONCE AS NEEDED
Status: DISCONTINUED | OUTPATIENT
Start: 2019-02-08 | End: 2019-02-08 | Stop reason: HOSPADM

## 2019-02-08 RX ORDER — LIDOCAINE HYDROCHLORIDE 20 MG/ML
INJECTION, SOLUTION INFILTRATION; PERINEURAL AS NEEDED
Status: DISCONTINUED | OUTPATIENT
Start: 2019-02-08 | End: 2019-02-08 | Stop reason: SURG

## 2019-02-08 RX ORDER — FLUMAZENIL 0.1 MG/ML
0.2 INJECTION INTRAVENOUS AS NEEDED
Status: DISCONTINUED | OUTPATIENT
Start: 2019-02-08 | End: 2019-02-08 | Stop reason: HOSPADM

## 2019-02-08 RX ORDER — ROCURONIUM BROMIDE 10 MG/ML
INJECTION, SOLUTION INTRAVENOUS AS NEEDED
Status: DISCONTINUED | OUTPATIENT
Start: 2019-02-08 | End: 2019-02-08 | Stop reason: SURG

## 2019-02-08 RX ORDER — SODIUM CHLORIDE 9 MG/ML
50 INJECTION, SOLUTION INTRAVENOUS CONTINUOUS
Status: DISCONTINUED | OUTPATIENT
Start: 2019-02-08 | End: 2019-02-08 | Stop reason: SDUPTHER

## 2019-02-08 RX ORDER — FENTANYL CITRATE 50 UG/ML
25 INJECTION, SOLUTION INTRAMUSCULAR; INTRAVENOUS AS NEEDED
Status: DISCONTINUED | OUTPATIENT
Start: 2019-02-08 | End: 2019-02-08 | Stop reason: HOSPADM

## 2019-02-08 RX ORDER — NYSTATIN 100000 [USP'U]/G
POWDER TOPICAL EVERY 12 HOURS SCHEDULED
Status: DISCONTINUED | OUTPATIENT
Start: 2019-02-08 | End: 2019-02-11 | Stop reason: HOSPADM

## 2019-02-08 RX ORDER — ACETAMINOPHEN 325 MG/1
650 TABLET ORAL EVERY 4 HOURS PRN
Status: DISCONTINUED | OUTPATIENT
Start: 2019-02-08 | End: 2019-02-11 | Stop reason: HOSPADM

## 2019-02-08 RX ORDER — ONDANSETRON 2 MG/ML
4 INJECTION INTRAMUSCULAR; INTRAVENOUS EVERY 6 HOURS PRN
Status: DISCONTINUED | OUTPATIENT
Start: 2019-02-08 | End: 2019-02-11 | Stop reason: HOSPADM

## 2019-02-08 RX ORDER — SODIUM CHLORIDE 9 MG/ML
INJECTION, SOLUTION INTRAVENOUS AS NEEDED
Status: DISCONTINUED | OUTPATIENT
Start: 2019-02-08 | End: 2019-02-08 | Stop reason: HOSPADM

## 2019-02-08 RX ORDER — PANTOPRAZOLE SODIUM 40 MG/1
40 TABLET, DELAYED RELEASE ORAL DAILY
Status: DISCONTINUED | OUTPATIENT
Start: 2019-02-08 | End: 2019-02-11 | Stop reason: HOSPADM

## 2019-02-08 RX ORDER — NALOXONE HCL 0.4 MG/ML
0.4 VIAL (ML) INJECTION
Status: DISCONTINUED | OUTPATIENT
Start: 2019-02-08 | End: 2019-02-11 | Stop reason: HOSPADM

## 2019-02-08 RX ORDER — PHENYLEPHRINE HCL IN 0.9% NACL 0.8MG/10ML
SYRINGE (ML) INTRAVENOUS AS NEEDED
Status: DISCONTINUED | OUTPATIENT
Start: 2019-02-08 | End: 2019-02-08 | Stop reason: SURG

## 2019-02-08 RX ORDER — SODIUM CHLORIDE 0.9 % (FLUSH) 0.9 %
3 SYRINGE (ML) INJECTION EVERY 12 HOURS SCHEDULED
Status: DISCONTINUED | OUTPATIENT
Start: 2019-02-08 | End: 2019-02-11 | Stop reason: HOSPADM

## 2019-02-08 RX ORDER — FENTANYL CITRATE 50 UG/ML
25 INJECTION, SOLUTION INTRAMUSCULAR; INTRAVENOUS
Status: DISCONTINUED | OUTPATIENT
Start: 2019-02-08 | End: 2019-02-08 | Stop reason: HOSPADM

## 2019-02-08 RX ORDER — METOCLOPRAMIDE HYDROCHLORIDE 5 MG/ML
5 INJECTION INTRAMUSCULAR; INTRAVENOUS
Status: DISCONTINUED | OUTPATIENT
Start: 2019-02-08 | End: 2019-02-08 | Stop reason: HOSPADM

## 2019-02-08 RX ORDER — HYDRALAZINE HYDROCHLORIDE 20 MG/ML
5 INJECTION INTRAMUSCULAR; INTRAVENOUS
Status: DISCONTINUED | OUTPATIENT
Start: 2019-02-08 | End: 2019-02-08 | Stop reason: HOSPADM

## 2019-02-08 RX ORDER — METOPROLOL TARTRATE 5 MG/5ML
2 INJECTION INTRAVENOUS ONCE AS NEEDED
Status: COMPLETED | OUTPATIENT
Start: 2019-02-08 | End: 2019-02-08

## 2019-02-08 RX ORDER — BUPIVACAINE HCL/0.9 % NACL/PF 0.1 %
2 PLASTIC BAG, INJECTION (ML) EPIDURAL ONCE
Status: COMPLETED | OUTPATIENT
Start: 2019-02-08 | End: 2019-02-08

## 2019-02-08 RX ORDER — OXYCODONE AND ACETAMINOPHEN 10; 325 MG/1; MG/1
1 TABLET ORAL ONCE AS NEEDED
Status: DISCONTINUED | OUTPATIENT
Start: 2019-02-08 | End: 2019-02-08 | Stop reason: HOSPADM

## 2019-02-08 RX ORDER — FENTANYL CITRATE 50 UG/ML
INJECTION, SOLUTION INTRAMUSCULAR; INTRAVENOUS AS NEEDED
Status: DISCONTINUED | OUTPATIENT
Start: 2019-02-08 | End: 2019-02-08 | Stop reason: SURG

## 2019-02-08 RX ORDER — CARVEDILOL 6.25 MG/1
12.5 TABLET ORAL
Status: DISCONTINUED | OUTPATIENT
Start: 2019-02-09 | End: 2019-02-09

## 2019-02-08 RX ORDER — METOCLOPRAMIDE HYDROCHLORIDE 5 MG/ML
5 INJECTION INTRAMUSCULAR; INTRAVENOUS ONCE AS NEEDED
Status: COMPLETED | OUTPATIENT
Start: 2019-02-08 | End: 2019-02-08

## 2019-02-08 RX ORDER — SODIUM CHLORIDE 0.9 % (FLUSH) 0.9 %
3-10 SYRINGE (ML) INJECTION AS NEEDED
Status: DISCONTINUED | OUTPATIENT
Start: 2019-02-08 | End: 2019-02-11 | Stop reason: HOSPADM

## 2019-02-08 RX ORDER — CARVEDILOL 6.25 MG/1
12.5 TABLET ORAL SEE ADMIN INSTRUCTIONS
Status: DISCONTINUED | OUTPATIENT
Start: 2019-02-08 | End: 2019-02-08

## 2019-02-08 RX ORDER — SODIUM CHLORIDE 0.9 % (FLUSH) 0.9 %
1-10 SYRINGE (ML) INJECTION AS NEEDED
Status: DISCONTINUED | OUTPATIENT
Start: 2019-02-08 | End: 2019-02-08 | Stop reason: HOSPADM

## 2019-02-08 RX ORDER — NALOXONE HCL 0.4 MG/ML
0.4 VIAL (ML) INJECTION
Status: DISCONTINUED | OUTPATIENT
Start: 2019-02-08 | End: 2019-02-08 | Stop reason: SDUPTHER

## 2019-02-08 RX ORDER — ATORVASTATIN CALCIUM 10 MG/1
10 TABLET, FILM COATED ORAL DAILY
Status: DISCONTINUED | OUTPATIENT
Start: 2019-02-08 | End: 2019-02-11 | Stop reason: HOSPADM

## 2019-02-08 RX ORDER — MORPHINE SULFATE 2 MG/ML
1 INJECTION, SOLUTION INTRAMUSCULAR; INTRAVENOUS EVERY 4 HOURS PRN
Status: DISCONTINUED | OUTPATIENT
Start: 2019-02-08 | End: 2019-02-11 | Stop reason: HOSPADM

## 2019-02-08 RX ORDER — ACETAMINOPHEN 500 MG
1000 TABLET ORAL ONCE
Status: COMPLETED | OUTPATIENT
Start: 2019-02-08 | End: 2019-02-08

## 2019-02-08 RX ORDER — PROPOFOL 10 MG/ML
VIAL (ML) INTRAVENOUS AS NEEDED
Status: DISCONTINUED | OUTPATIENT
Start: 2019-02-08 | End: 2019-02-08 | Stop reason: SURG

## 2019-02-08 RX ORDER — ONDANSETRON 4 MG/1
4 TABLET, FILM COATED ORAL EVERY 6 HOURS PRN
Status: DISCONTINUED | OUTPATIENT
Start: 2019-02-08 | End: 2019-02-11 | Stop reason: HOSPADM

## 2019-02-08 RX ORDER — BISACODYL 10 MG
10 SUPPOSITORY, RECTAL RECTAL DAILY PRN
Status: DISCONTINUED | OUTPATIENT
Start: 2019-02-08 | End: 2019-02-11 | Stop reason: HOSPADM

## 2019-02-08 RX ORDER — SODIUM CHLORIDE 0.9 % (FLUSH) 0.9 %
3 SYRINGE (ML) INJECTION EVERY 12 HOURS SCHEDULED
Status: DISCONTINUED | OUTPATIENT
Start: 2019-02-08 | End: 2019-02-08 | Stop reason: HOSPADM

## 2019-02-08 RX ORDER — VASOPRESSIN 20 U/ML
INJECTION PARENTERAL AS NEEDED
Status: DISCONTINUED | OUTPATIENT
Start: 2019-02-08 | End: 2019-02-08 | Stop reason: SURG

## 2019-02-08 RX ORDER — LABETALOL HYDROCHLORIDE 5 MG/ML
5 INJECTION, SOLUTION INTRAVENOUS
Status: DISCONTINUED | OUTPATIENT
Start: 2019-02-08 | End: 2019-02-08 | Stop reason: HOSPADM

## 2019-02-08 RX ORDER — ONDANSETRON 4 MG/1
4 TABLET, ORALLY DISINTEGRATING ORAL EVERY 8 HOURS PRN
Status: DISCONTINUED | OUTPATIENT
Start: 2019-02-08 | End: 2019-02-08 | Stop reason: SDUPTHER

## 2019-02-08 RX ORDER — LOSARTAN POTASSIUM 25 MG/1
25 TABLET ORAL
Status: DISCONTINUED | OUTPATIENT
Start: 2019-02-08 | End: 2019-02-11 | Stop reason: HOSPADM

## 2019-02-08 RX ADMIN — OXYCODONE HYDROCHLORIDE AND ACETAMINOPHEN 1 TABLET: 10; 325 TABLET ORAL at 03:13

## 2019-02-08 RX ADMIN — FENTANYL CITRATE 25 MCG: 50 INJECTION, SOLUTION INTRAMUSCULAR; INTRAVENOUS at 10:37

## 2019-02-08 RX ADMIN — Medication 160 MCG: at 11:30

## 2019-02-08 RX ADMIN — SODIUM CHLORIDE, PRESERVATIVE FREE 3 ML: 5 INJECTION INTRAVENOUS at 12:06

## 2019-02-08 RX ADMIN — METOCLOPRAMIDE 5 MG: 5 INJECTION, SOLUTION INTRAMUSCULAR; INTRAVENOUS at 10:37

## 2019-02-08 RX ADMIN — VASOPRESSIN 1 UNITS: 20 INJECTION INTRAVENOUS at 11:27

## 2019-02-08 RX ADMIN — EPHEDRINE SULFATE 20 MG: 50 INJECTION INTRAMUSCULAR; INTRAVENOUS; SUBCUTANEOUS at 11:35

## 2019-02-08 RX ADMIN — Medication 2 G: at 11:27

## 2019-02-08 RX ADMIN — ROCURONIUM BROMIDE 30 MG: 10 INJECTION INTRAVENOUS at 11:23

## 2019-02-08 RX ADMIN — SODIUM CHLORIDE, PRESERVATIVE FREE 3 ML: 5 INJECTION INTRAVENOUS at 02:18

## 2019-02-08 RX ADMIN — Medication 160 MCG: at 12:25

## 2019-02-08 RX ADMIN — VASOPRESSIN 1 UNITS: 20 INJECTION INTRAVENOUS at 11:35

## 2019-02-08 RX ADMIN — VASOPRESSIN 2 UNITS: 20 INJECTION INTRAVENOUS at 11:45

## 2019-02-08 RX ADMIN — HYDROMORPHONE HYDROCHLORIDE 1 MG: 1 INJECTION, SOLUTION INTRAMUSCULAR; INTRAVENOUS; SUBCUTANEOUS at 02:10

## 2019-02-08 RX ADMIN — Medication 160 MCG: at 12:40

## 2019-02-08 RX ADMIN — HYDROMORPHONE HYDROCHLORIDE 1 MG: 1 INJECTION, SOLUTION INTRAMUSCULAR; INTRAVENOUS; SUBCUTANEOUS at 05:40

## 2019-02-08 RX ADMIN — ACETAMINOPHEN 1000 MG: 500 TABLET, FILM COATED ORAL at 10:37

## 2019-02-08 RX ADMIN — FENTANYL CITRATE 50 MCG: 50 INJECTION, SOLUTION INTRAMUSCULAR; INTRAVENOUS at 12:24

## 2019-02-08 RX ADMIN — Medication 80 MCG: at 11:23

## 2019-02-08 RX ADMIN — NYSTATIN: 100000 POWDER TOPICAL at 21:15

## 2019-02-08 RX ADMIN — METOPROLOL TARTRATE 2 MG: 1 INJECTION, SOLUTION INTRAVENOUS at 10:41

## 2019-02-08 RX ADMIN — FENTANYL CITRATE 100 MCG: 50 INJECTION, SOLUTION INTRAMUSCULAR; INTRAVENOUS at 11:23

## 2019-02-08 RX ADMIN — SODIUM CHLORIDE 50 ML/HR: 9 INJECTION, SOLUTION INTRAVENOUS at 10:40

## 2019-02-08 RX ADMIN — Medication 160 MCG: at 11:40

## 2019-02-08 RX ADMIN — Medication 160 MCG: at 12:01

## 2019-02-08 RX ADMIN — PROPOFOL 70 MG: 10 INJECTION, EMULSION INTRAVENOUS at 11:23

## 2019-02-08 RX ADMIN — HYDROMORPHONE HYDROCHLORIDE 1 MG: 1 INJECTION, SOLUTION INTRAMUSCULAR; INTRAVENOUS; SUBCUTANEOUS at 08:47

## 2019-02-08 RX ADMIN — LIDOCAINE HYDROCHLORIDE 40 MG: 20 INJECTION, SOLUTION INFILTRATION; PERINEURAL at 11:23

## 2019-02-08 RX ADMIN — SODIUM CHLORIDE 1000 ML: 9 INJECTION, SOLUTION INTRAVENOUS at 18:28

## 2019-02-08 RX ADMIN — FENTANYL CITRATE 25 MCG: 50 INJECTION, SOLUTION INTRAMUSCULAR; INTRAVENOUS at 10:43

## 2019-02-08 NOTE — H&P
[facility]  HISTORY AND PHYSICAL    Date of Admission: 2/7/2019  Primary Care Physician: Barrett Mckenzie Jr, MD    Subjective     Chief Complaint: Fall    History of Present Illness  Patient is a 78-year-old white female with end-stage renal disease on hemodialysis Monday Wednesday Friday who was getting on her bathroom scale tonight when her feet got jumbled up with each other and she slid off the scales and fell and landed on her side.  She presented to the emergency room with significant right-sided pain in her hip.  She was found to have a closed fracture of the right hip with some displacement.  Orthopedics has been contacted and agreed to take her to the OR later on the day.  She is also to receive dialysis today.  She denies any chest pain or shortness of breath she denies any loss of consciousness she denies any other pains right now than her right hip.  Chest x-ray was also reviewed and is unremarkable from previous x-ray.  No EKG was done however she is in chronic A. fib which she sounds like she is currently from her exam.        Review of Systems   14 point review of systems negative except for as per HPI    Otherwise complete ROS reviewed and negative except as mentioned in the HPI.    Past Medical History:   Past Medical History:   Diagnosis Date   • Arthritis    • Carotid artery disease (CMS/HCC)    • CHF (congestive heart failure) (CMS/HCC)    • Chronic kidney disease on chronic dialysis (CMS/HCC)    • Chronic renal failure     on dialysis ON MON, WED, FRI   • Coronary artery disease    • Disease of thyroid gland    • Diverticulitis    • Gastric ulcer    • History of transfusion    • Hyperlipidemia    • Hypertension    • Injury of back    • Mesenteric artery insufficiency (CMS/HCC)    • Multilevel degenerative disc disease    • Osteoporosis    • Pancreatitis    • Pelvis fracture (CMS/HCC)    • Pneumonia      Past Surgical History:  Past Surgical  History:   Procedure Laterality Date   • AORTAGRAM Right 1/8/2018    Procedure: MESENTERIC ANGIOGRAM, GROIN ACCESS, MYNX CLOSURE;  Surgeon: Tomasz San DO;  Location: [facility] OR;  Service:    • AORTIC VALVE SURGERY     • APPENDECTOMY     • BACK SURGERY     • CARDIAC SURGERY     • CAROTID ENDARTERECTOMY Bilateral    • CATARACT EXTRACTION, BILATERAL     • CERVICAL SPINE SURGERY     • CHOLECYSTECTOMY     • COLON SURGERY     • COLONOSCOPY  10/01/2015   • CORONARY ARTERY BYPASS GRAFT     • DIALYSIS FISTULA CREATION     • ENDOSCOPY N/A 12/27/2018    Procedure: ESOPHAGOGASTRODUODENOSCOPY WITH ANESTHESIA;  Surgeon: Moni Garza MD;  Location:  [facility] ENDOSCOPY;  Service: Gastroenterology   • JOINT REPLACEMENT      knee   • LAPAROSCOPIC GASTRIC BANDING     • LEEP     • LUMBAR FUSION     • TOE AMPUTATION Left     2nd toe   • TOTAL KNEE ARTHROPLASTY Bilateral    • TUBAL ABDOMINAL LIGATION     • UPPER GASTROINTESTINAL ENDOSCOPY  10/01/2015     Social History:  reports that  has never smoked. she has never used smokeless tobacco. She reports that she does not drink alcohol or use drugs.    Family History: family history includes Cancer in her mother; Coronary artery disease in her brother and father; Diabetes in her brother; Heart attack in her father; Hypertension in her mother.       Allergies:  No Known Allergies  Medications:  Prior to Admission medications    Medication Sig Start Date End Date Taking? Authorizing Provider   B Complex-C (SUPER B COMPLEX/VITAMIN C PO) Take  by mouth.   Yes Radha Kruse MD   carvedilol (COREG) 12.5 MG tablet Take 12.5 mg by mouth See Admin Instructions. 1 tablet BID on non-dialysis days, and 1 tablet daily on dialysis days   Yes Radha Kruse MD   Cholecalciferol (D3 ADULT PO) Take 5,000 Units by mouth Daily.   Yes Radha Kruse MD   clopidogrel (PLAVIX) 75 MG tablet Take 1 tablet by mouth Daily. 7/24/18  Yes Kim Maria APRN   diphenhydrAMINE  "(BENADRYL) 25 mg capsule Take 25 mg by mouth 2 (Two) Times a Day.   Yes Radha Kruse MD   levothyroxine (SYNTHROID, LEVOTHROID) 175 MCG tablet Take 150 mcg by mouth Daily.   Yes Radha Kruse MD   losartan (COZAAR) 25 MG tablet Take 25 mg by mouth Daily (Monday-Friday). Monday, Wednesday, And Friday only.   Yes Radha Kruse MD   Multiple Vitamin (DAILY-SANGEETA PO) Take 1 tablet by mouth Daily.   Yes Radha Kruse MD   Multiple Vitamins-Minerals (HAIR SKIN AND NAILS FORMULA PO) Take 2 tablets by mouth Daily.   Yes Radha Kruse MD   ondansetron ODT (ZOFRAN-ODT) 4 MG disintegrating tablet Take 4 mg by mouth Every 8 (Eight) Hours As Needed for Nausea or Vomiting.   Yes Radha Kruse MD   oxyCODONE-acetaminophen (PERCOCET)  MG per tablet Take 1 tablet by mouth Every 8 (Eight) Hours As Needed for Moderate Pain . 11/4/17  Yes Ko Rivera MD   pantoprazole (PROTONIX) 40 MG EC tablet Take 1 tablet by mouth Daily. 12/27/18  Yes Moni Garza MD   pravastatin (PRAVACHOL) 40 MG tablet Take 40 mg by mouth Daily.   Yes Radha Kruse MD   sertraline (ZOLOFT) 50 MG tablet Take 50 mg by mouth Daily.   Yes Radha Kruse MD     Objective     Vital Signs: /58 (BP Location: Right arm, Patient Position: Lying)   Pulse 99   Temp 98 °F (36.7 °C) (Oral)   Resp 20   Ht 149.9 cm (59\")   Wt 77.6 kg (171 lb 1 oz)   SpO2 93%   BMI 34.55 kg/m²   Physical Exam  Gen. well-nourished well-developed white female in no acute distress  HEENT: Atraumatic normocephalic pupils equal round reactive to light extraocular movements intact sclerae anicteric TMs negative mucous membranes moist neck is supple without lymphadenopathy no JVD is noted no carotid bruits are auscultated oropharynx is without erythema or exudate  Chest: Clear to auscultation percussion  CV: Irregularly irregular rate and rhythm normal S1-S2 no gallops murmurs or rubs  Abdomen: Soft nontender " nondistended active bowel sounds no hepatosplenomegaly no masses no hernias  Extremities: No clubbing edema or cyanosis capillary refill is normal pulses are 2+ and symmetric at radial and dorsalis pedis posterior tibial pulses are intact and 2+ palpable pain with range of motion right hip  Neuro: Patient is awake alert and oriented ×3, cranial nerves II through XII are grossly intact, motor is 5 out of 5 bilaterally, DTRs are 2+ and symmetric bilaterally sensory exam is nonfocal  Skin: Warm dry and intact no evidence of breakdown  Sensorium: Normal thought and affect  Nursing notes and vital signs reviewed        Results Reviewed:  Lab Results (last 24 hours)     Procedure Component Value Units Date/Time    CBC & Differential [[id number]] Collected:  02/07/19 2249    Specimen:  Blood Updated:  02/07/19 2327    Narrative:       The following orders were created for panel order CBC & Differential.  Procedure                               Abnormality         Status                     ---------                               -----------         ------                     Manual Differential[[id number]]          Abnormal            Final result               CBC Auto Differential[[id number]]        Abnormal            Final result                 Please view results for these tests on the individual orders.    CBC Auto Differential [[id number]]  (Abnormal) Collected:  02/07/19 2249    Specimen:  Blood Updated:  02/07/19 2327     WBC 5.66 10*3/mm3      RBC 3.13 10*6/mm3      Hemoglobin 10.9 g/dL      Hematocrit 33.8 %      .0 fL      MCH 34.8 pg      MCHC 32.2 g/dL      RDW 14.0 %      RDW-SD 55.5 fl      MPV 10.7 fL      Platelets 139 10*3/mm3     Narrative:       The previously reported component NRBC is no longer being reported.    Manual Differential [[id number]]  (Abnormal) Collected:  02/07/19 2249    Specimen:  Blood Updated:  02/07/19 2327     Neutrophil % 75.0 %      Lymphocyte % 7.0 %      Monocyte % 6.0 %       Eosinophil % 4.0 %      Basophil % 2.0 %      Atypical Lymphocyte % 6.0 %      Neutrophils Absolute 4.25 10*3/mm3      Lymphocytes Absolute 0.40 10*3/mm3      Monocytes Absolute 0.34 10*3/mm3      Eosinophils Absolute 0.23 10*3/mm3      Basophils Absolute 0.11 10*3/mm3      Anisocytosis Slight/1+     Macrocytes Slight/1+     WBC Morphology Normal     Platelet Estimate Decreased    Comprehensive Metabolic Panel [[id number]]  (Abnormal) Collected:  02/07/19 2250    Specimen:  Blood Updated:  02/07/19 2320     Glucose 146 mg/dL      BUN 47 mg/dL      Creatinine 6.04 mg/dL      Sodium 138 mmol/L      Potassium 4.4 mmol/L      Chloride 98 mmol/L      CO2 28.0 mmol/L      Calcium 8.5 mg/dL      Total Protein 6.8 g/dL      Albumin 3.50 g/dL      ALT (SGPT) 20 U/L      AST (SGOT) 33 U/L      Alkaline Phosphatase 170 U/L      Total Bilirubin 0.4 mg/dL      eGFR Non African Amer 7 mL/min/1.73      Comment: <15 Indicative of kidney failure.        eGFR   Amer -- mL/min/1.73      Comment: <15 Indicative of kidney failure.        Globulin 3.3 gm/dL      A/G Ratio 1.1 g/dL      BUN/Creatinine Ratio 7.8     Anion Gap 12.0 mmol/L     Narrative:       The MDRD GFR formula is only valid for adults with stable renal function between ages 18 and 70.    aPTT [[id number]]  (Normal) Collected:  02/07/19 2250    Specimen:  Blood Updated:  02/07/19 2309     PTT 30.1 seconds     Protime-INR [[id number]]  (Abnormal) Collected:  02/07/19 2250    Specimen:  Blood Updated:  02/07/19 2309     Protime 15.3 Seconds      INR 1.17        Imaging Results (last 24 hours)     Procedure Component Value Units Date/Time    XR Chest 1 View [[id number]] Updated:  02/08/19 0006    XR Knee 1 or 2 View Right [[id number]] Updated:  02/07/19 2354    XR Hip With or Without Pelvis 2 - 3 View Right [[id number]] Updated:  02/07/19 2310        I have personally reviewed and interpreted the radiology studies and ECG obtained at time of admission.     Assessment /  Plan     Assessment:   Active Hospital Problems    Diagnosis   • Closed displaced intertrochanteric fracture of right femur (CMS/HCC)   1.  Fall with closed displaced intertrochanteric fracture of the right femur plan is to admit patient keep her n.p.o. consult orthopedic surgery for definitive management.  We will also check preop EKG.  IV Dilaudid for pain control as needed.  We will also consult physical therapy to evaluate and manage.  2.  End-stage renal disease on hemodialysis Monday Wednesday Friday consult nephrology to manage and continue dialysis here while she is in the hospital.  3.  A. fib chronic stable check EKG  4.  Peripheral vascular disease on Plavix we will hold preoperatively.    Patient seen after midnight         Code Status: Full     I discussed the patient's findings and my recommendations with the patient and her daughters are her healthcare power surrogate    Estimated length of stay to be determined    Jony Concepcion MD   02/08/19   1:37 AM

## 2019-02-08 NOTE — ED PROVIDER NOTES
Subjective   History of Present Illness  78-year-old female presents with a chief complaint of right hip pain.  Patient reports prior to arrival she was on a scale checking her weight when she had stepped off tripped over her feet and fell onto her right hip.  Patient reports she she was unable to move her leg and had called EMS.  Upon arrival her right leg is shortened and externally rotated.  Patient denies pain to her knee her lower back or head or neck injury.  Review of Systems   All other systems reviewed and are negative.      Past Medical History:   Diagnosis Date   • Arthritis    • Carotid artery disease (CMS/HCC)    • CHF (congestive heart failure) (CMS/HCC)    • Chronic kidney disease on chronic dialysis (CMS/HCC)    • Chronic renal failure     on dialysis ON MON, WED, FRI   • Coronary artery disease    • Disease of thyroid gland    • Diverticulitis    • Gastric ulcer    • History of transfusion    • Hyperlipidemia    • Hypertension    • Injury of back    • Mesenteric artery insufficiency (CMS/HCC)    • Multilevel degenerative disc disease    • Osteoporosis    • Pancreatitis    • Pelvis fracture (CMS/HCC)    • Pneumonia        No Known Allergies    Past Surgical History:   Procedure Laterality Date   • AORTAGRAM Right 1/8/2018    Procedure: MESENTERIC ANGIOGRAM, GROIN ACCESS, MYNX CLOSURE;  Surgeon: Tomasz San DO;  Location: [facility] OR;  Service:    • AORTIC VALVE SURGERY     • APPENDECTOMY     • BACK SURGERY     • CARDIAC SURGERY     • CAROTID ENDARTERECTOMY Bilateral    • CATARACT EXTRACTION, BILATERAL     • CERVICAL SPINE SURGERY     • CHOLECYSTECTOMY     • COLON SURGERY     • COLONOSCOPY  10/01/2015   • CORONARY ARTERY BYPASS GRAFT     • DIALYSIS FISTULA CREATION     • ENDOSCOPY N/A 12/27/2018    Procedure: ESOPHAGOGASTRODUODENOSCOPY WITH ANESTHESIA;  Surgeon: Moni Garza MD;  Location: [facility] ENDOSCOPY;  Service: Gastroenterology   • JOINT REPLACEMENT      knee   • LAPAROSCOPIC GASTRIC  BANDING     • LEEP     • LUMBAR FUSION     • TOE AMPUTATION Left     2nd toe   • TOTAL KNEE ARTHROPLASTY Bilateral    • TUBAL ABDOMINAL LIGATION     • UPPER GASTROINTESTINAL ENDOSCOPY  10/01/2015       Family History   Problem Relation Age of Onset   • Hypertension Mother    • Cancer Mother         stomach   • Coronary artery disease Father    • Heart attack Father    • Diabetes Brother    • Coronary artery disease Brother    • Colon cancer Neg Hx    • Colon polyps Neg Hx        Social History     Socioeconomic History   • Marital status:      Spouse name: Not on file   • Number of children: Not on file   • Years of education: Not on file   • Highest education level: Not on file   Tobacco Use   • Smoking status: Never Smoker   • Smokeless tobacco: Never Used   Substance and Sexual Activity   • Alcohol use: No   • Drug use: No   • Sexual activity: Defer           Objective   Physical Exam   Constitutional: She is oriented to person, place, and time. She appears well-developed and well-nourished.   HENT:   Head: Normocephalic and atraumatic.   Eyes: EOM are normal. Pupils are equal, round, and reactive to light.   Neck: Normal range of motion. Neck supple.   Cardiovascular: Normal rate and regular rhythm.   Murmur heard.  Abdominal: Soft. Bowel sounds are normal.   Musculoskeletal: She exhibits tenderness and deformity.   Right leg externally rotated and shortened   Neurological: She is alert and oriented to person, place, and time. No cranial nerve deficit. Coordination normal.   Skin: Skin is warm and dry.   Psychiatric: She has a normal mood and affect. Her behavior is normal.   Nursing note and vitals reviewed.      Procedures           ED Course        Labs Reviewed   COMPREHENSIVE METABOLIC PANEL - Abnormal; Notable for the following components:       Result Value    Glucose 146 (*)     BUN 47 (*)     Creatinine 6.04 (*)     Alkaline Phosphatase 170 (*)     eGFR Non  Amer 7 (*)     All other  components within normal limits    Narrative:     The MDRD GFR formula is only valid for adults with stable renal function between ages 18 and 70.   PROTIME-INR - Abnormal; Notable for the following components:    Protime 15.3 (*)     INR 1.17 (*)     All other components within normal limits   CBC WITH AUTO DIFFERENTIAL - Abnormal; Notable for the following components:    RBC 3.13 (*)     Hemoglobin 10.9 (*)     Hematocrit 33.8 (*)     .0 (*)     MCH 34.8 (*)     MCHC 32.2 (*)     RDW-SD 55.5 (*)     All other components within normal limits    Narrative:     The previously reported component NRBC is no longer being reported.   MANUAL DIFFERENTIAL - Abnormal; Notable for the following components:    Lymphocyte % 7.0 (*)     Atypical Lymphocyte % 6.0 (*)     Lymphocytes Absolute 0.40 (*)     All other components within normal limits   APTT - Normal   TYPE AND SCREEN   CBC AND DIFFERENTIAL    Narrative:     The following orders were created for panel order CBC & Differential.  Procedure                               Abnormality         Status                     ---------                               -----------         ------                     Manual Differential[[id number]]          Abnormal            Final result               CBC Auto Differential[[id number]]        Abnormal            Final result                 Please view results for these tests on the individual orders.     XR Hip With or Without Pelvis 2 - 3 View Right    (Results Pending)   XR Knee 1 or 2 View Right    (Results Pending)   XR Chest 1 View    (Results Pending)               MDM  Number of Diagnoses or Management Options  Diagnosis management comments: Right hip fx, Dr. Camacho requests hospitalist admission and will take patient to surgery tomorrow        Amount and/or Complexity of Data Reviewed  Clinical lab tests: reviewed and ordered  Tests in the radiology section of CPT®: ordered and reviewed  Tests in the medicine section of  CPT®: ordered and reviewed  Decide to obtain previous medical records or to obtain history from someone other than the patient: yes    Risk of Complications, Morbidity, and/or Mortality  Presenting problems: moderate  Diagnostic procedures: moderate  Management options: moderate    Patient Progress  Patient progress: stable        Final diagnoses:   Closed displaced intertrochanteric fracture of right femur, initial encounter (CMS/[facility])            Dirk Sahu PA-C  02/08/19 0109

## 2019-02-08 NOTE — ANESTHESIA POSTPROCEDURE EVALUATION
"Patient: Cheri Ely    Procedure Summary     Date:  02/08/19 Room / Location:   [facility]  /  [facility]    Anesthesia Start:  1117 Anesthesia Stop:  1308    Procedure:  HIP TROCANTERIC NAILING LONG WITH INTRAMEDULLARY HIP SCREW (Right Hip) Diagnosis:       Displaced intertrochanteric fracture of right femur, initial encounter for closed fracture (CMS/HCC)      (Displaced intertrochanteric fracture of right femur, initial encounter for closed fracture (CMS/HCC) [S72.141A])    Surgeon:  Boo Camacho MD Provider:  Nelly Guerrero CRNA    Anesthesia Type:  general ASA Status:  3          Anesthesia Type: general  Last vitals  BP   109/46 (02/08/19 1335)   Temp   97.8 °F (36.6 °C) (02/08/19 1335)   Pulse   98 (02/08/19 1335)   Resp   14 (02/08/19 1335)     SpO2   96 % (02/08/19 1335)     Post Anesthesia Care and Evaluation    Patient location during evaluation: PACU  Patient participation: complete - patient participated  Level of consciousness: awake and alert  Pain management: adequate  Airway patency: patent  Anesthetic complications: No anesthetic complications    Cardiovascular status: acceptable  Respiratory status: acceptable  Hydration status: acceptable    Comments: Blood pressure 109/46, pulse 98, temperature 97.8 °F (36.6 °C), temperature source Temporal, resp. rate 14, height 149.9 cm (59\"), weight 77.6 kg (171 lb 1 oz), SpO2 96 %, not currently breastfeeding.    Pt discharged from PACU based on eneida score >8      "

## 2019-02-08 NOTE — ANESTHESIA PROCEDURE NOTES
Airway  Urgency: elective    Airway not difficult    General Information and Staff    Patient location during procedure: OR  CRNA: Eye, Nelly, CRNA    Indications and Patient Condition  Indications for airway management: airway protection    Preoxygenated: yes  Mask difficulty assessment: 1 - vent by mask    Final Airway Details  Final airway type: endotracheal airway      Successful airway: ETT  Cuffed: yes   Successful intubation technique: direct laryngoscopy  Facilitating devices/methods: intubating stylet  Endotracheal tube insertion site: oral  Blade: Gordon  Blade size: 2  ETT size (mm): 7.0  Cormack-Lehane Classification: grade I - full view of glottis  Placement verified by: chest auscultation and capnometry   Cuff volume (mL): 5  Measured from: lips  Number of attempts at approach: 1

## 2019-02-08 NOTE — OP NOTE
Patient Name: Cynthia  MRN: [id number]  : 1940    DATE of SURGERY: 2019    SURGEON: Boo Camacho MD    ASSISTANT: NONE     PREOPERATIVE DIAGNOSIS: Acute traumatic displaced intertrochanteric fracture of the Right femur, initial encounter for closed fracture    POSTOPERATIVE DIAGNOSIS: Acute traumatic displaced intertrochanteric fracture of the Right femur, initial encounter for closed fracture    PROCEDURE PERFORMED: Cephalomedullary Nailing Right closed displaced Intertrochanteric hip fracture      IMPLANTS: Synthes long TFN measuring 11 x 340 mm    ANESTHESIA USED: General endotrachial anesthesia    OPERATIVE INDICATIONS: 78 y.o. female status post fall onto her right side resulting in the acute onset of pain and inability to bear weight.  Surgical indications include fracture displacement, stabilization of fracture, and mobilization of the patient.  Risks include, but are not limited to, anesthesia, bleeding, infection, pain, damage to local structures, need for further surgery, malunion, nonunion, fracture displacement, failure of hardware, intraoperative death.  Risks, benefits, and alternative were discussed and the patient wishes to proceed with surgery.    ESTIMATED BLOOD LOSS: < 50 mL    DRAINS: none     COMPLICATIONS: none    SPECIMENS: none    FINDINGS: see op note    PROCEDURE in DETAIL:  The patient was seen in the preoperative holding room, the informed consent was reviewed and signed, and the correct operative extremity marked with the patient’s agreement.  The patient was transported to the operating room, where a timeout was performed identifying the correct patient and operative site.  Perioperative antibiotics were administered prior to incision.    Once anesthetized, both feet were placed into well-padded boots and the patient was positioned on the fracture table.  Traction and internal rotation were applied to the operative extremity and the fracture was noted to adequately  align. A sterile prep and drape was then performed.    A guidepin was placed at the tip of the greater trochanter on the AP plane and in line with the intramedullary canal on the lateral view.  The wire was advanced to the level of the lesser trochanter followed by introduction of the starting reamer.  The rigid guidewire was exchanged for a ball tipped flexible guidewire which was advanced down the third of the intramedullary canal to the proximal extent of the femoral component of the total knee arthroplasty.  A radiolucent measuring guide was then advanced over the proximal extent of the flexible guidewire down to the level of the greater trochanter.  It was determined that a 340 mm length nail would be appropriate.  Sequential reaming was then performed by 0.5 mm increments up to 12.5 mm where an appropriate amount of intracortical chatter was encountered accommodating an 11 mm diameter nail.    The nail of choice was then placed into the intramedullary canal.  Utilizing the attachment jig, a guidepin was then placed into the central aspect of the femoral head on both the AP and lateral views.  Length was measured for the spiral blade and the path of the screw was drilled to the appropriate depth.  The spiral blade was placed without complication and the set screw was placed proximally, loosened a 1/4 turn to allow for compression of the fracture.    Utilizing C-arm fluoroscopy perfect circles technique, two distal interlocking screws were placed into the static portion of the dynamic hole of the nail as well as the static hole of the nail each gaining excellent purchase. C-arm images were used in multiple planes showing adequate alignment of the fracture without hardware complication.    Incisions were irrigated, closed in layers, and the skin was sealed with Dermabond adhesive skin dressing.  A sterile dressing was applied, the patient awakened, transported the recovery room in stable  condition.    POSTOPERATIVE PLAN:  1) TTWB RLE  2) DVT prophylaxis x 6 weeks  3) PT/OT    Electronically signed by Boo Camacho MD on 2/8/2019 at 1:12 PM

## 2019-02-08 NOTE — BRIEF OP NOTE
HIP TROCANTERIC NAILING LONG WITH INTRAMEDULLARY HIP SCREW  Progress Note    Cheri FUENTES Solis  2/7/2019 - 2/8/2019    Pre-op Diagnosis:   Displaced intertrochanteric fracture of right femur, initial encounter for closed fracture (CMS/[facility]) [S72.141A]       Post-Op Diagnosis Codes:     * Displaced intertrochanteric fracture of right femur, initial encounter for closed fracture (CMS/[facility]) [S72.141A]    Procedure/CPT® Codes:      Procedure(s):  HIP TROCANTERIC NAILING LONG WITH INTRAMEDULLARY HIP SCREW    Surgeon(s):  Boo Camacho MD    Anesthesia: Choice    Staff:   Circulator: Nelda Stephenson RN; Neyda Reynolds RN  Scrub Person: Seema Pena; Aidan Jamison  Vendor Representative: Markell Marcano    Estimated Blood Loss: < 50 mL    Urine Voided: * No values recorded between 2/8/2019 11:18 AM and 2/8/2019  1:03 PM *    Specimens:                None      Drains:  None    Findings: Displaced unstable right intertrochanteric femur fracture    Complications: None      Boo Camacho MD     Date: 2/8/2019  Time: 1:10 PM

## 2019-02-08 NOTE — PROGRESS NOTES
Discharge Planning Assessment  [facility]     Patient Name: Cheri Ely  MRN: [id number]  Today's Date: 2/8/2019    Admit Date: 2/7/2019    Discharge Needs Assessment     Row Name 02/08/19 1736       Living Environment    Lives With  alone    Current Living Arrangements  home/apartment/condo    Primary Care Provided by  child(meagan)    Provides Primary Care For  no one    Family Caregiver if Needed  child(meagan), adult    Quality of Family Relationships  helpful;involved    Able to Return to Prior Arrangements  yes       Resource/Environmental Concerns    Resource/Environmental Concerns  none       Transition Planning    Patient/Family Anticipates Transition to  inpatient rehabilitation facility    Patient/Family Anticipated Services at Transition  rehabilitation services    Transportation Anticipated  family or friend will provide       Discharge Needs Assessment    Readmission Within the Last 30 Days  no previous admission in last 30 days    Concerns to be Addressed  adjustment to diagnosis/illness    Equipment Currently Used at Home  walker, rolling;wheelchair;commode;shower chair;rollator;ramp;bath bench;power chair,(recliner lift)    Outpatient/Agency/Support Group Needs  skilled nursing facility    Offered/Gave Vendor List  yes    Patient's Choice of Community Agency(s)  [facility]     Discharge Coordination/Progress  SW spoke to patient's daughter, patient was still in surgery. Daughter would like referral made to [facility] in [place] as patient has been there previously for rehab. Patient is a current HD patient MWF at [facility] in [place]. JUICE faxed referral to [facility] at [phone]. PT/OT notes will be added to referral when available. Patient's three night stay will not be complete until Monday 2/11.            Destination      Service Provider Request Status Selected Services Address Phone Number Fax Number    [facility] Pending - No Request Sent N/A  [address], [facility] [id number] [phone]          LEONARDO HungW

## 2019-02-08 NOTE — PROGRESS NOTES
"    [facility] Medicine Services  INPATIENT PROGRESS NOTE    Patient Name: Cheri Ely  Date of Admission: 2/7/2019  Today's Date: 02/08/19  Length of Stay: 0  Primary Care Physician: Barrett Mckenzie Jr, MD    Subjective   Chief Complaint: follow up hip fracture  HPI   Pt is seen in dialysis. Is post op Nailing of right hip. States she just got her feet jumbled up while getting off her scale at home and fell. She denies any chest pain or shortness of breath. States her hip hurts \"quite a bit.\" Dialysis nurse states that she has been having problems with her blood pressure being low. She has received some Iv fluids in dialysis. Her blood pressure currently is 80's systolic.     Review of Systems   All pertinent negatives and positives are as above. All other systems have been reviewed and are negative unless otherwise stated.     Objective    Temp:  [97.2 °F (36.2 °C)-98.3 °F (36.8 °C)] 97.8 °F (36.6 °C)  Heart Rate:  [] 98  Resp:  [13-20] 14  BP: (102-139)/(46-79) 109/46  Physical Exam   Constitutional: She is oriented to person, place, and time. She appears well-developed.   Chronically ill appearing.    HENT:   Head: Normocephalic and atraumatic.   Eyes: Conjunctivae and EOM are normal. Pupils are equal, round, and reactive to light.   Neck: Neck supple. No JVD present. No thyromegaly present.   Cardiovascular: Normal rate, regular rhythm, normal heart sounds and intact distal pulses. Exam reveals no gallop and no friction rub.   No murmur heard.  Pulmonary/Chest: Effort normal and breath sounds normal. No respiratory distress. She has no wheezes. She has no rales. She exhibits no tenderness.   Abdominal: Soft. Bowel sounds are normal. She exhibits no distension. There is no tenderness. There is no rebound and no guarding.   Musculoskeletal: She exhibits no edema, tenderness or deformity.   Right hip with incisions. Mild drainage noted.    Lymphadenopathy:     She has no " cervical adenopathy.   Neurological: She is alert and oriented to person, place, and time. She displays normal reflexes. No cranial nerve deficit. She exhibits normal muscle tone.   Skin: Skin is warm and dry. No rash noted.   Psychiatric: She has a normal mood and affect. Her behavior is normal. Judgment and thought content normal.     Results Review:  I have reviewed the labs, radiology results, and diagnostic studies.    Laboratory Data:   Results from last 7 days   Lab Units 02/08/19  0715 02/07/19  2249   WBC 10*3/mm3 9.91 5.66   HEMOGLOBIN g/dL 10.2* 10.9*   HEMATOCRIT % 31.9* 33.8*   PLATELETS 10*3/mm3 142 139        Results from last 7 days   Lab Units 02/08/19  0636 02/07/19  2250   SODIUM mmol/L 138 138   POTASSIUM mmol/L 5.4* 4.4   CHLORIDE mmol/L 97* 98   CO2 mmol/L 31.0 28.0   BUN mg/dL 52* 47*   CREATININE mg/dL 6.99* 6.04*   CALCIUM mg/dL 8.7 8.5   BILIRUBIN mg/dL 0.5 0.4   ALK PHOS U/L 154* 170*   ALT (SGPT) U/L 20 20   AST (SGOT) U/L 41 33   GLUCOSE mg/dL 129* 146*     Radiology Data:   Imaging Results (last 24 hours)     Procedure Component Value Units Date/Time    XR Hip With or Without Pelvis 2 - 3 View Right [[id number]] Collected:  02/08/19 1247     Updated:  02/08/19 1515    Narrative:       EXAMINATION:   XR HIP W OR WO PELVIS 2-3 VIEW RIGHT-  2/8/2019 12:47 PM  CST     HISTORY: Right hip pinning     4 images are obtained in the operating room. Compression pin is present  in the right femoral head and neck. Femoral nail is present. Distal end  of the femoral nail demonstrates to fixation screws. Postsurgical  changes noted from prior right knee arthroplasty     1 minute and 59 seconds of fluoroscopic time was used during this  procedure  This report was finalized on 02/08/2019 12:48 by Dr. Elias Daly MD.    FL C Arm During Surgery [[id number]] Collected:  02/08/19 1247     Updated:  02/08/19 1515    Narrative:       EXAMINATION:   XR HIP W OR WO PELVIS 2-3 VIEW RIGHT-  2/8/2019 12:47  PM  CST     HISTORY: Right hip pinning     4 images are obtained in the operating room. Compression pin is present  in the right femoral head and neck. Femoral nail is present. Distal end  of the femoral nail demonstrates to fixation screws. Postsurgical  changes noted from prior right knee arthroplasty     1 minute and 59 seconds of fluoroscopic time was used during this  procedure  This report was finalized on 02/08/2019 12:48 by Dr. Elias Daly MD.    XR Hip With or Without Pelvis 2 - 3 View Right [[id number]] Collected:  02/08/19 0647     Updated:  02/08/19 0653    Narrative:       EXAMINATION:   XR HIP W OR WO PELVIS 2-3 VIEW RIGHT-  2/8/2019 6:47 AM  CST     HISTORY: Patient fell     AP view the pelvis AP and lateral views of the right hip are obtained.     SI joints are normal. Symphysis appears intact.     There is a comminuted intertrochanteric fracture of the right hip.     Postsurgical changes noted from the instrumented fusion in the lumbar  spine.       Impression:       Comminuted intertrochanteric right hip fracture  This report was finalized on 02/08/2019 06:49 by Dr. Elias Daly MD.    XR Knee 1 or 2 View Right [[id number]] Collected:  02/08/19 0641     Updated:  02/08/19 0644    Narrative:       EXAMINATION:   XR KNEE 1 OR 2 VW RIGHT-  2/8/2019 6:41 AM CST     HISTORY: Fracture     AP and lateral views of the right knee are obtained. Postsurgical  changes noted from total right knee arthroplasty. Prosthesis appears  intact. Skeletal structures are normally aligned. There is no obvious  fracture.     IMPRESSION status post total right knee arthroplasty  This report was finalized on 02/08/2019 06:41 by Dr. Elias Daly MD.    XR Chest 1 View [[id number]] Collected:  02/08/19 0636     Updated:  02/08/19 0640    Narrative:       EXAMINATION:   XR CHEST 1 VW-  2/8/2019 6:36 AM CST     HISTORY: Preop     Frontal upright radiograph of the chest 2/8/2019 12:01 AM CST     COMPARISON: January 2, 2018.      FINDINGS:   The lungs are clear. The cardiac silhouette is mildly enlarged.  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 Wires are  present from previous median sternotomy..      The osseous structures and surrounding soft tissues demonstrate no acute  abnormality.       Impression:       1. Mild cardiomegaly no acute cardiopulmonary process.        This report was finalized on 02/08/2019 06:37 by Dr. Elias Daly MD.          I have reviewed the patient's current medications.     Assessment/Plan     Active Hospital Problems    Diagnosis   • **Displaced intertrochanteric fracture of right femur, initial encounter for closed fracture (CMS/[facility])     Added automatically from request for surgery [id number]     • Closed displaced intertrochanteric fracture of right femur (CMS/[facility])     Assessment:  1. Right intertrochanteric femur fracture- status post cephalomedullary nailing 2/8  2. ESRD on maintenance hemodialysis [facility]  3. Hypotension  4. Hyperkalemia-on K bath  5. Anemia-macrocytic  6. Peripheral vascular disease-plavix on hold  7. Paroxysmal Atrial fibrillation-not anticoagulated at present time. EKG was sinus rhythm.   8. Labs in am.     Discharge Planning: I expect the patient to be discharged to ? in ? days.    Jacquie Gasca, ABILIO   02/08/19   3:53 PM

## 2019-02-08 NOTE — ANESTHESIA PREPROCEDURE EVALUATION
Anesthesia Evaluation     Patient summary reviewed   no history of anesthetic complications:  NPO Solid Status: > 8 hours  NPO Liquid Status: > 8 hours           Airway   Mallampati: I  TM distance: >3 FB  Neck ROM: full  Dental    (+) edentulous        Pulmonary - normal exam    breath sounds clear to auscultation  (-) COPD, asthma, recent URI, sleep apnea, not a smoker  Cardiovascular   Exercise tolerance: poor (<4 METS) (Limited by leg pain)    ECG reviewed  PT is on anticoagulation therapy  Patient on routine beta blocker and Beta blocker given within 24 hours of surgery  Rhythm: irregular  Rate: normal    (+) hypertension, valvular problems/murmurs (Aortic valve replacement 3 yrs ago), CAD, CABG (Prior to AVR), dysrhythmias (Rate controlled with carvedilol) Atrial Fib, hyperlipidemia,   (-) pacemaker, past MI, angina, cardiac stents    ROS comment: Follows with Dr. Lopez in [place] for chronic A-fib    Neuro/Psych  (-) seizures, TIA, CVA  GI/Hepatic/Renal/Endo    (+)   renal disease (Last dialysis 2/6/19) dialysis, hypothyroidism,   (-) GERD, liver disease, diabetes, hyperthyroidism    Musculoskeletal     Abdominal    Substance History      OB/GYN          Other                        Anesthesia Plan    ASA 3     general   (Will need dialysis after procedure)  intravenous induction   Anesthetic plan, all risks, benefits, and alternatives have been provided, discussed and informed consent has been obtained with: patient.

## 2019-02-08 NOTE — CONSULTS
Orthopaedic Inpatient Consultation    NAME:  Cheri Ely   : 1940  MRN: [id number]    2019 10:19 PM    Requesting Physician: Jony Concepcion MD    CHIEF COMPLAINT:  right hip pain      HISTORY OF PRESENT ILLNESS:   The patient is a 78 y.o. female who has numerous medical comorbidities and is dialysis dependent who experienced a mechanical fall onto her right hip resulting in the acute onset of pain and inability to bear weight.  Pain is located in her right groin, rated a 2/5, dull and constant, worse with movement, better with rest and medication.  There are no associated symptoms.      Past Medical History:    Past Medical History:   Diagnosis Date   • Arthritis    • Carotid artery disease (CMS/HCC)    • CHF (congestive heart failure) (CMS/HCC)    • Chronic kidney disease on chronic dialysis (CMS/HCC)    • Chronic renal failure     on dialysis ON MON, WED, FRI   • Coronary artery disease    • Disease of thyroid gland    • Diverticulitis    • Gastric ulcer    • History of transfusion    • Hyperlipidemia    • Hypertension    • Injury of back    • Mesenteric artery insufficiency (CMS/HCC)    • Multilevel degenerative disc disease    • Osteoporosis    • Pancreatitis    • Pelvis fracture (CMS/HCC)    • Pneumonia        Past Surgical History:    Past Surgical History:   Procedure Laterality Date   • AORTAGRAM Right 2018    Procedure: MESENTERIC ANGIOGRAM, GROIN ACCESS, MYNX CLOSURE;  Surgeon: Tomasz San DO;  Location: [facility] OR;  Service:    • AORTIC VALVE SURGERY     • APPENDECTOMY     • BACK SURGERY     • CARDIAC SURGERY     • CAROTID ENDARTERECTOMY Bilateral    • CATARACT EXTRACTION, BILATERAL     • CERVICAL SPINE SURGERY     • CHOLECYSTECTOMY     • COLON SURGERY     • COLONOSCOPY  10/01/2015   • CORONARY ARTERY BYPASS GRAFT     • DIALYSIS FISTULA CREATION     • ENDOSCOPY N/A 2018    Procedure: ESOPHAGOGASTRODUODENOSCOPY WITH ANESTHESIA;  Surgeon: Moni Garza MD;  Location:   [facility];  Service: Gastroenterology   • JOINT REPLACEMENT      knee   • LAPAROSCOPIC GASTRIC BANDING     • LEEP     • LUMBAR FUSION     • TOE AMPUTATION Left     2nd toe   • TOTAL KNEE ARTHROPLASTY Bilateral    • TUBAL ABDOMINAL LIGATION     • UPPER GASTROINTESTINAL ENDOSCOPY  10/01/2015       Current Medications:   Prior to Admission medications    Medication Sig Start Date End Date Taking? Authorizing Provider   B Complex-C (SUPER B COMPLEX/VITAMIN C PO) Take  by mouth.   Yes Radha Kruse MD   carvedilol (COREG) 12.5 MG tablet Take 12.5 mg by mouth See Admin Instructions. 1 tablet BID on non-dialysis days, and 1 tablet daily on dialysis days   Yes Radha Kruse MD   Cholecalciferol (D3 ADULT PO) Take 5,000 Units by mouth Daily.   Yes Radha Kruse MD   clopidogrel (PLAVIX) 75 MG tablet Take 1 tablet by mouth Daily. 7/24/18  Yes Kim Maria APRN   diphenhydrAMINE (BENADRYL) 25 mg capsule Take 25 mg by mouth 2 (Two) Times a Day.   Yes Radha Kruse MD   levothyroxine (SYNTHROID, LEVOTHROID) 175 MCG tablet Take 150 mcg by mouth Daily.   Yes Radha Kruse MD   losartan (COZAAR) 25 MG tablet Take 25 mg by mouth Daily (Monday-Friday). Monday, Wednesday, And Friday only.   Yes Radha Kruse MD   Multiple Vitamin (DAILY-SANGEETA PO) Take 1 tablet by mouth Daily.   Yes Radha Kruse MD   Multiple Vitamins-Minerals (HAIR SKIN AND NAILS FORMULA PO) Take 2 tablets by mouth Daily.   Yes Radha Kruse MD   ondansetron ODT (ZOFRAN-ODT) 4 MG disintegrating tablet Take 4 mg by mouth Every 8 (Eight) Hours As Needed for Nausea or Vomiting.   Yes Radha Kruse MD   oxyCODONE-acetaminophen (PERCOCET)  MG per tablet Take 1 tablet by mouth Every 8 (Eight) Hours As Needed for Moderate Pain . 11/4/17  Yes Ko Rivera MD   pantoprazole (PROTONIX) 40 MG EC tablet Take 1 tablet by mouth Daily. 12/27/18  Yes Moni Garza MD   pravastatin  "(PRAVACHOL) 40 MG tablet Take 40 mg by mouth Daily.   Yes Provider, MD Radha   sertraline (ZOLOFT) 50 MG tablet Take 50 mg by mouth Daily.   Yes Provider, MD Radha       Allergies:  Patient has no known allergies.    Social History:   Social History     Socioeconomic History   • Marital status:      Spouse name: Not on file   • Number of children: Not on file   • Years of education: Not on file   • Highest education level: Not on file   Social Needs   • Financial resource strain: Not on file   • Food insecurity - worry: Not on file   • Food insecurity - inability: Not on file   • Transportation needs - medical: Not on file   • Transportation needs - non-medical: Not on file   Occupational History   • Not on file   Tobacco Use   • Smoking status: Never Smoker   • Smokeless tobacco: Never Used   Substance and Sexual Activity   • Alcohol use: No   • Drug use: No   • Sexual activity: Defer   Other Topics Concern   • Not on file   Social History Narrative   • Not on file       Family History:   Family History   Problem Relation Age of Onset   • Hypertension Mother    • Cancer Mother         stomach   • Coronary artery disease Father    • Heart attack Father    • Diabetes Brother    • Coronary artery disease Brother    • Colon cancer Neg Hx    • Colon polyps Neg Hx        REVIEW OF SYSTEMS:  14 point review of systems has been reviewed from the patient's emergency room visit, reviewed with the patient on today's date with no new changes.    PHYSICAL EXAM:      Physical Examination:  Vitals:   Vitals:    02/07/19 2348 02/08/19 0036 02/08/19 0200 02/08/19 0300   BP: 139/64 133/58  127/55   BP Location:  Right arm  Right arm   Patient Position:  Lying  Lying   Pulse:  99 98 99   Resp:  20  18   Temp:  98 °F (36.7 °C)  97.7 °F (36.5 °C)   TempSrc:  Oral  Oral   SpO2: 100% 93% 94% 93%   Weight:  77.6 kg (171 lb 1 oz)     Height:  149.9 cm (59\")       General:  Appears stated age, no distress.  Orientation:  " Alert and oriented to time, place, and person.  Mood and Affect:  Cooperative and pleasant.  Gait:  Resting comfortably in bed.  Cardiovascular:  Symmetric 1-2 plus pulses in upper and lower extremities.  Lymph:  No cervical or inguinal lymphadenopathy noted.  Sensation:  Grossly intact to light touch.  DTR:  Normal, no pathologic reflexes.  Coordination/balance:  Normal    Musculoskeletal:  Right upper extremity exam:  There is no tenderness to palpation about the shoulder, elbow, wrist or hand.  Unrestricted full function motion is present.  Stability is normal with provocative tests, 5/5 strength, and skin is normal.      Left upper extremity exam:  There is no tenderness to palpation about the shoulder, elbow, wrist or hand.  Unrestricted full function motion is present.  Stability is normal with provocative tests, 5/5 strength, and skin is normal.     Right lower extremity exam:  There is no tenderness to palpation about the knee, ankle or foot, but obvious pain and discomfort with palpation about the right hip.  There is pain with attempted active or passive range of motion through the right hip.  There is obvious bony crepitus.  The surrounding muscle compartments are soft and compressible.  The overlying skin is intact.  There is a well-healed midline incision over her knee consistent with her report of the previous total knee arthroplasty by Dr. Fortune approximately 20 years ago.     Left lower extremity exam:  There is no tenderness to palpation about the hip, knee, ankle or foot.  Unrestricted full function motion is present.  Stability is normal with provocative tests, 5/5 strength, and skin is normal.      DATA:    CBC with Differential:    Lab Results   Component Value Date    WBC 5.66 02/07/2019    RBC 3.13 (L) 02/07/2019    HGB 10.9 (L) 02/07/2019    HCT 33.8 (L) 02/07/2019     02/07/2019    .0 (H) 02/07/2019    MCH 34.8 (H) 02/07/2019    MCHC 32.2 (L) 02/07/2019    RDW 14.0 02/07/2019     LYMPHOPCT 7.0 (L) 02/07/2019    MONOPCT 6.0 02/07/2019    EOSPCT 4.0 02/07/2019    BASOPCT 2.0 02/07/2019    EOSABS 0.23 02/07/2019    BASOSABS 0.11 02/07/2019     CMP:    Lab Results   Component Value Date     02/07/2019    K 4.4 02/07/2019    CL 98 02/07/2019    CO2 28.0 02/07/2019    BUN 47 (H) 02/07/2019    CREATININE 6.04 (H) 02/07/2019    AGRATIO 1.1 02/07/2019    GLUCOSE 146 (H) 02/07/2019    CALCIUM 8.5 02/07/2019    BILITOT 0.4 02/07/2019    ALKPHOS 170 (H) 02/07/2019    AST 33 02/07/2019    ALT 20 02/07/2019     BMP:    Lab Results   Component Value Date     02/07/2019    K 4.4 02/07/2019    CL 98 02/07/2019    CO2 28.0 02/07/2019    BUN 47 (H) 02/07/2019    CREATININE 6.04 (H) 02/07/2019    CALCIUM 8.5 02/07/2019    GLUCOSE 146 (H) 02/07/2019       ----------------------------------------------------------------------------------------------------------------------  I have reviewed the radiology images above and agree with the findings dictated below    Radiology:   Imaging Results (last 24 hours)     Procedure Component Value Units Date/Time    XR Hip With or Without Pelvis 2 - 3 View Right [[id number]] Collected:  02/08/19 0647     Updated:  02/08/19 0653    Narrative:       EXAMINATION:   XR HIP W OR WO PELVIS 2-3 VIEW RIGHT-  2/8/2019 6:47 AM  CST     HISTORY: Patient fell     AP view the pelvis AP and lateral views of the right hip are obtained.     SI joints are normal. Symphysis appears intact.     There is a comminuted intertrochanteric fracture of the right hip.     Postsurgical changes noted from the instrumented fusion in the lumbar  spine.       Impression:       Comminuted intertrochanteric right hip fracture  This report was finalized on 02/08/2019 06:49 by Dr. Elias Daly MD.    XR Knee 1 or 2 View Right [[id number]] Collected:  02/08/19 0641     Updated:  02/08/19 0644    Narrative:       EXAMINATION:   XR KNEE 1 OR 2 VW RIGHT-  2/8/2019 6:41 AM CST     HISTORY: Fracture      AP and lateral views of the right knee are obtained. Postsurgical  changes noted from total right knee arthroplasty. Prosthesis appears  intact. Skeletal structures are normally aligned. There is no obvious  fracture.     IMPRESSION status post total right knee arthroplasty  This report was finalized on 02/08/2019 06:41 by Dr. Elias Daly MD.    XR Chest 1 View [[id number]] Collected:  02/08/19 0636     Updated:  02/08/19 0640    Narrative:       EXAMINATION:   XR CHEST 1 VW-  2/8/2019 6:36 AM CST     HISTORY: Preop     Frontal upright radiograph of the chest 2/8/2019 12:01 AM CST     COMPARISON: January 2, 2018.     FINDINGS:   The lungs are clear. The cardiac silhouette is mildly enlarged.  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 Wires are  present from previous median sternotomy..      The osseous structures and surrounding soft tissues demonstrate no acute  abnormality.       Impression:       1. Mild cardiomegaly no acute cardiopulmonary process.        This report was finalized on 02/08/2019 06:37 by Dr. Elias Daly MD.          ----------------------------------------------------------------------------------------------------------------------  Assessment: Right comminuted intertrochanteric femur fracture    Plan:  1) to OR for trochanteric entry femoral nail stabilization of her right intertrochanteric femur fracture- we discussed the risks, benefits, and alternatives and the patient wishes to proceed  2) Admit postop for pain control, PT/OT  3) n.p.o. presently and potentially toe-touch weightbearing for at least 6 weeks postoperatively    Electronically signed by Boo Camacho MD on 2/8/2019 at 7:09 AM

## 2019-02-08 NOTE — CONSULTS
Nephrology ([facility]) Consult Note      Patient:  Cheri Ely  YOB: 1940  Date of Service: 2/8/2019  MRN: [id number]   Acct: [id number]   Primary Care Physician: Barrett Mckenzie Jr, MD  Advance Directive:   Code Status and Medical Interventions:   Ordered at: 02/08/19 0134     Level Of Support Discussed With:    Patient     Code Status:    CPR     Medical Interventions (Level of Support Prior to Arrest):    Full     Admit Date: 2/7/2019       Hospital Day: 0  Referring Provider: Jony Concepcion MD      Patient Seen, Chart, Consults, Notes, Labs, Radiology studies reviewed.        Subjective:  Cheri Ely is a 78 y.o. female  whom we were consulted for ESRD management. Patient is admitted after a fall. She sustained a displaced intertrochanteric right femoral fracture. Seen in postoperative period, she was having some pain. She was awake and responsive. She is on dialysis on MWF. She has benign HTN and hypothyroidism.     Allergies:  Patient has no known allergies.    Home Meds:  Medications Prior to Admission   Medication Sig Dispense Refill Last Dose   • B Complex-C (SUPER B COMPLEX/VITAMIN C PO) Take  by mouth.   Taking   • carvedilol (COREG) 12.5 MG tablet Take 12.5 mg by mouth See Admin Instructions. 1 tablet BID on non-dialysis days, and 1 tablet daily on dialysis days   Taking   • Cholecalciferol (D3 ADULT PO) Take 5,000 Units by mouth Daily.   Taking   • clopidogrel (PLAVIX) 75 MG tablet Take 1 tablet by mouth Daily. 90 tablet 2 Taking   • diphenhydrAMINE (BENADRYL) 25 mg capsule Take 25 mg by mouth 2 (Two) Times a Day.   Taking   • levothyroxine (SYNTHROID, LEVOTHROID) 175 MCG tablet Take 150 mcg by mouth Daily.   Taking   • losartan (COZAAR) 25 MG tablet Take 25 mg by mouth Daily (Monday-Friday). Monday, Wednesday, And Friday only.   Taking   • Multiple Vitamin (DAILY-SANGEETA PO) Take 1 tablet by mouth Daily.   Taking   • Multiple Vitamins-Minerals (HAIR  SKIN AND NAILS FORMULA PO) Take 2 tablets by mouth Daily.      • ondansetron ODT (ZOFRAN-ODT) 4 MG disintegrating tablet Take 4 mg by mouth Every 8 (Eight) Hours As Needed for Nausea or Vomiting.   Taking   • oxyCODONE-acetaminophen (PERCOCET)  MG per tablet Take 1 tablet by mouth Every 8 (Eight) Hours As Needed for Moderate Pain . 40 tablet 0 Taking   • pantoprazole (PROTONIX) 40 MG EC tablet Take 1 tablet by mouth Daily. 30 tablet 11 Taking   • pravastatin (PRAVACHOL) 40 MG tablet Take 40 mg by mouth Daily.   Taking   • sertraline (ZOLOFT) 50 MG tablet Take 50 mg by mouth Daily.   Taking       Medicines:  Current Facility-Administered Medications   Medication Dose Route Frequency Provider Last Rate Last Dose   • [MAR Hold] acetaminophen (TYLENOL) tablet 650 mg  650 mg Oral Q4H PRN Jony Concepcion MD       • [MAR Hold] atorvastatin (LIPITOR) tablet 10 mg  10 mg Oral Daily Jony Concepcion MD       • atropine sulfate injection 0.5 mg  0.5 mg Intravenous Once PRN Mac Miller MD       • carvedilol (COREG) tablet 12.5 mg  12.5 mg Oral Once per day on Mon Wed Fri Jony Concepcion MD       • [START ON 2/9/2019] carvedilol (COREG) tablet 12.5 mg  12.5 mg Oral 2 times per day on Sun Tue Thu Sat Jony Concepcion MD       • fentaNYL citrate (PF) (SUBLIMAZE) injection 25 mcg  25 mcg Intravenous PRN aMc Miller MD       • flumazenil (ROMAZICON) injection 0.2 mg  0.2 mg Intravenous PRN Mac Miller MD       • hydrALAZINE (APRESOLINE) injection 5 mg  5 mg Intravenous Q10 Min PRN Mac Miller MD       • [MAR Hold] HYDROmorphone (DILAUDID) injection solution 1 mg  1 mg Intravenous Q2H PRN Jony Concepcion MD   1 mg at 02/08/19 0847    And   • [MAR Hold] naloxone (NARCAN) injection 0.4 mg  0.4 mg Intravenous Q5 Min PRN Jony Concepcion MD       • ipratropium-albuterol (DUO-NEB) nebulizer solution 3 mL  3 mL Nebulization Once PRN Mac Miller MD       • labetalol  (NORMODYNE,TRANDATE) injection 5 mg  5 mg Intravenous Q5 Min PRN Mac Miller MD       • [MAR Hold] levothyroxine (SYNTHROID, LEVOTHROID) tablet 150 mcg  150 mcg Oral Daily Jony Concepcion MD       • losartan (COZAAR) tablet 25 mg  25 mg Oral Once per day on Mon Wed Fri Jony Concepcion MD       • meperidine (DEMEROL) injection 12.5 mg  12.5 mg Intravenous Q5 Min PRN Mac Miller MD       • metoclopramide (REGLAN) injection 5 mg  5 mg Intravenous Q15 Min PRN Mac Miller MD       • Morphine sulfate (PF) injection 2 mg  2 mg Intravenous Q10 Min PRN Mac Miller MD       • naloxone (NARCAN) injection 0.04 mg  0.04 mg Intravenous PRN Mac Miller MD       • [MAR Hold] ondansetron (ZOFRAN) tablet 4 mg  4 mg Oral Q6H PRN Jony Concepcion MD        Or   • [MAR Hold] ondansetron ODT (ZOFRAN-ODT) disintegrating tablet 4 mg  4 mg Oral Q6H PRN Jony Concepcion MD        Or   • [MAR Hold] ondansetron (ZOFRAN) injection 4 mg  4 mg Intravenous Q6H PRN Jony Concepcion MD       • ondansetron (ZOFRAN) injection 4 mg  4 mg Intravenous PRN Mac Miller MD       • [MAR Hold] ondansetron ODT (ZOFRAN-ODT) disintegrating tablet 4 mg  4 mg Oral Q8H PRN Jony Concepcion MD       • [MAR Hold] oxyCODONE-acetaminophen (PERCOCET)  MG per tablet 1 tablet  1 tablet Oral Q8H PRN Jony Concepcion MD   1 tablet at 02/08/19 0313   • oxyCODONE-acetaminophen (PERCOCET)  MG per tablet 1 tablet  1 tablet Oral Once PRN Mac Miller MD       • [MAR Hold] pantoprazole (PROTONIX) EC tablet 40 mg  40 mg Oral Daily Jony Concepcion MD       • [MAR Hold] sertraline (ZOLOFT) tablet 50 mg  50 mg Oral Daily Jony Concepcion MD       • [MAR Hold] sodium chloride 0.9 % flush 3 mL  3 mL Intravenous Q12H Jony Concepcion MD   3 mL at 02/08/19 1206   • [MAR Hold] sodium chloride 0.9 % flush 3-10 mL  3-10 mL Intravenous PRN Jony Concepcion MD       • sodium chloride 0.9 %  infusion  50 mL/hr Intravenous Continuous Mac Miller MD 50 mL/hr at 02/08/19 1040 50 mL/hr at 02/08/19 1040   • [MAR Hold] vitamin D3 capsule 5,000 Units  5,000 Units Oral Daily Jony Concepcion MD           Past Medical History:  Past Medical History:   Diagnosis Date   • Arthritis    • Carotid artery disease (CMS/HCC)    • CHF (congestive heart failure) (CMS/HCC)    • Chronic kidney disease on chronic dialysis (CMS/HCC)    • Chronic renal failure     on dialysis ON MON, WED, FRI   • Coronary artery disease    • Disease of thyroid gland    • Diverticulitis    • Gastric ulcer    • History of transfusion    • Hyperlipidemia    • Hypertension    • Injury of back    • Mesenteric artery insufficiency (CMS/HCC)    • Multilevel degenerative disc disease    • Osteoporosis    • Pancreatitis    • Pelvis fracture (CMS/HCC)    • Pneumonia        Past Surgical History:  Past Surgical History:   Procedure Laterality Date   • AORTAGRAM Right 1/8/2018    Procedure: MESENTERIC ANGIOGRAM, GROIN ACCESS, MYNX CLOSURE;  Surgeon: Tomasz San DO;  Location: [facility] OR;  Service:    • AORTIC VALVE SURGERY     • APPENDECTOMY     • BACK SURGERY     • CARDIAC SURGERY     • CAROTID ENDARTERECTOMY Bilateral    • CATARACT EXTRACTION, BILATERAL     • CERVICAL SPINE SURGERY     • CHOLECYSTECTOMY     • COLON SURGERY     • COLONOSCOPY  10/01/2015   • CORONARY ARTERY BYPASS GRAFT     • DIALYSIS FISTULA CREATION     • ENDOSCOPY N/A 12/27/2018    Procedure: ESOPHAGOGASTRODUODENOSCOPY WITH ANESTHESIA;  Surgeon: Moni Garza MD;  Location: [facility] ENDOSCOPY;  Service: Gastroenterology   • JOINT REPLACEMENT      knee   • LAPAROSCOPIC GASTRIC BANDING     • LEEP     • LUMBAR FUSION     • TOE AMPUTATION Left     2nd toe   • TOTAL KNEE ARTHROPLASTY Bilateral    • TUBAL ABDOMINAL LIGATION     • UPPER GASTROINTESTINAL ENDOSCOPY  10/01/2015       Family History  Family History   Problem Relation Age of Onset   • Hypertension Mother    •  "Cancer Mother         stomach   • Coronary artery disease Father    • Heart attack Father    • Diabetes Brother    • Coronary artery disease Brother    • Colon cancer Neg Hx    • Colon polyps Neg Hx        Social History  Social History     Socioeconomic History   • Marital status:      Spouse name: Not on file   • Number of children: Not on file   • Years of education: Not on file   • Highest education level: Not on file   Social Needs   • Financial resource strain: Not on file   • Food insecurity - worry: Not on file   • Food insecurity - inability: Not on file   • Transportation needs - medical: Not on file   • Transportation needs - non-medical: Not on file   Occupational History   • Not on file   Tobacco Use   • Smoking status: Never Smoker   • Smokeless tobacco: Never Used   Substance and Sexual Activity   • Alcohol use: No   • Drug use: No   • Sexual activity: Defer   Other Topics Concern   • Not on file   Social History Narrative   • Not on file         Review of Systems:  History obtained from chart review and the patient  General ROS: No fever or chills  Respiratory ROS: No cough, shortness of breath, wheezing  Cardiovascular ROS: no chest pain or dyspnea on exertion  Gastrointestinal ROS: No abdominal pain or melena  Genito-Urinary ROS: No dysuria or hematuria  14 point ROS reviewed with the patient and negative except as noted above and in the HPI unless unable to obtain.    Objective:  /46   Pulse 98   Temp 97.8 °F (36.6 °C) (Temporal)   Resp 14   Ht 149.9 cm (59\")   Wt 77.6 kg (171 lb 1 oz)   SpO2 96%   BMI 34.55 kg/m²   No intake or output data in the 24 hours ending 02/08/19 1350  General: awake/alert   Chest:  clear to auscultation bilaterally without respiratory distress  CVS: regular rate and rhythm  Abdominal: soft, nontender, normal bowel sounds  Extremities: no cyanosis or edema  Skin: warm and dry without rash  Neuro: No focal motor deficits  Musculoskeletal: No obvious " joint effusions    Labs:  Lab Results (last 72 hours)     Procedure Component Value Units Date/Time    POC Glucose Once [[id number]]  (Normal) Collected:  02/08/19 0821    Specimen:  Blood Updated:  02/08/19 0833     Glucose 126 mg/dL      Comment: : [id number] Yamini ChrisMeter ID: [id number]       Comprehensive Metabolic Panel [[id number]]  (Abnormal) Collected:  02/08/19 0636    Specimen:  Blood Updated:  02/08/19 0754     Glucose 129 mg/dL      BUN 52 mg/dL      Creatinine 6.99 mg/dL      Sodium 138 mmol/L      Potassium 5.4 mmol/L      Chloride 97 mmol/L      CO2 31.0 mmol/L      Calcium 8.7 mg/dL      Total Protein 6.5 g/dL      Albumin 3.50 g/dL      ALT (SGPT) 20 U/L      AST (SGOT) 41 U/L      Alkaline Phosphatase 154 U/L      Total Bilirubin 0.5 mg/dL      eGFR Non African Amer 6 mL/min/1.73      Comment: <15 Indicative of kidney failure.        eGFR   Amer -- mL/min/1.73      Comment: <15 Indicative of kidney failure.        Globulin 3.0 gm/dL      A/G Ratio 1.2 g/dL      BUN/Creatinine Ratio 7.4     Anion Gap 10.0 mmol/L     Narrative:       The MDRD GFR formula is only valid for adults with stable renal function between ages 18 and 70.    Phosphorus [[id number]]  (Abnormal) Collected:  02/08/19 0636    Specimen:  Blood Updated:  02/08/19 0738     Phosphorus 5.3 mg/dL     Magnesium [[id number]]  (Normal) Collected:  02/08/19 0636    Specimen:  Blood Updated:  02/08/19 0738     Magnesium 1.9 mg/dL     CBC Auto Differential [[id number]]  (Abnormal) Collected:  02/08/19 0715    Specimen:  Blood Updated:  02/08/19 0727     WBC 9.91 10*3/mm3      RBC 2.94 10*6/mm3      Hemoglobin 10.2 g/dL      Hematocrit 31.9 %      .5 fL      MCH 34.7 pg      MCHC 32.0 g/dL      RDW 14.0 %      RDW-SD 54.6 fl      MPV 10.9 fL      Platelets 142 10*3/mm3      Neutrophil % 83.5 %      Lymphocyte % 7.5 %      Monocyte % 7.0 %      Eosinophil % 0.8 %      Basophil % 0.6 %      Neutrophils, Absolute 8.28 10*3/mm3       Lymphocytes, Absolute 0.74 10*3/mm3      Monocytes, Absolute 0.69 10*3/mm3      Eosinophils, Absolute 0.08 10*3/mm3      Basophils, Absolute 0.06 10*3/mm3     CBC & Differential [[id number]] Collected:  02/07/19 2249    Specimen:  Blood Updated:  02/07/19 2327    Narrative:       The following orders were created for panel order CBC & Differential.  Procedure                               Abnormality         Status                     ---------                               -----------         ------                     Manual Differential[[id number]]          Abnormal            Final result               CBC Auto Differential[[id number]]        Abnormal            Final result                 Please view results for these tests on the individual orders.    CBC Auto Differential [[id number]]  (Abnormal) Collected:  02/07/19 2249    Specimen:  Blood Updated:  02/07/19 2327     WBC 5.66 10*3/mm3      RBC 3.13 10*6/mm3      Hemoglobin 10.9 g/dL      Hematocrit 33.8 %      .0 fL      MCH 34.8 pg      MCHC 32.2 g/dL      RDW 14.0 %      RDW-SD 55.5 fl      MPV 10.7 fL      Platelets 139 10*3/mm3     Narrative:       The previously reported component NRBC is no longer being reported.    Manual Differential [[id number]]  (Abnormal) Collected:  02/07/19 2249    Specimen:  Blood Updated:  02/07/19 2327     Neutrophil % 75.0 %      Lymphocyte % 7.0 %      Monocyte % 6.0 %      Eosinophil % 4.0 %      Basophil % 2.0 %      Atypical Lymphocyte % 6.0 %      Neutrophils Absolute 4.25 10*3/mm3      Lymphocytes Absolute 0.40 10*3/mm3      Monocytes Absolute 0.34 10*3/mm3      Eosinophils Absolute 0.23 10*3/mm3      Basophils Absolute 0.11 10*3/mm3      Anisocytosis Slight/1+     Macrocytes Slight/1+     WBC Morphology Normal     Platelet Estimate Decreased    Comprehensive Metabolic Panel [[id number]]  (Abnormal) Collected:  02/07/19 2250    Specimen:  Blood Updated:  02/07/19 2320     Glucose 146 mg/dL      BUN 47 mg/dL       Creatinine 6.04 mg/dL      Sodium 138 mmol/L      Potassium 4.4 mmol/L      Chloride 98 mmol/L      CO2 28.0 mmol/L      Calcium 8.5 mg/dL      Total Protein 6.8 g/dL      Albumin 3.50 g/dL      ALT (SGPT) 20 U/L      AST (SGOT) 33 U/L      Alkaline Phosphatase 170 U/L      Total Bilirubin 0.4 mg/dL      eGFR Non African Amer 7 mL/min/1.73      Comment: <15 Indicative of kidney failure.        eGFR   Amer -- mL/min/1.73      Comment: <15 Indicative of kidney failure.        Globulin 3.3 gm/dL      A/G Ratio 1.1 g/dL      BUN/Creatinine Ratio 7.8     Anion Gap 12.0 mmol/L     Narrative:       The MDRD GFR formula is only valid for adults with stable renal function between ages 18 and 70.    aPTT [[id number]]  (Normal) Collected:  02/07/19 2250    Specimen:  Blood Updated:  02/07/19 2309     PTT 30.1 seconds     Protime-INR [[id number]]  (Abnormal) Collected:  02/07/19 2250    Specimen:  Blood Updated:  02/07/19 2309     Protime 15.3 Seconds      INR 1.17          Radiology:   Imaging Results (last 72 hours)     Procedure Component Value Units Date/Time    XR Hip With or Without Pelvis 2 - 3 View Right [[id number]] Collected:  02/08/19 1247     Updated:  02/08/19 1251    Narrative:       EXAMINATION:   XR HIP W OR WO PELVIS 2-3 VIEW RIGHT-  2/8/2019 12:47 PM  CST     HISTORY: Right hip pinning     4 images are obtained in the operating room. Compression pin is present  in the right femoral head and neck. Femoral nail is present. Distal end  of the femoral nail demonstrates to fixation screws. Postsurgical  changes noted from prior right knee arthroplasty     1 minute and 59 seconds of fluoroscopic time was used during this  procedure  This report was finalized on 02/08/2019 12:48 by Dr. Elias Daly MD.    FL C Arm During Surgery [[id number]] Updated:  02/08/19 1241    XR Hip With or Without Pelvis 2 - 3 View Right [[id number]] Collected:  02/08/19 0647     Updated:  02/08/19 0653    Narrative:       EXAMINATION:    XR HIP W OR WO PELVIS 2-3 VIEW RIGHT-  2/8/2019 6:47 AM  CST     HISTORY: Patient fell     AP view the pelvis AP and lateral views of the right hip are obtained.     SI joints are normal. Symphysis appears intact.     There is a comminuted intertrochanteric fracture of the right hip.     Postsurgical changes noted from the instrumented fusion in the lumbar  spine.       Impression:       Comminuted intertrochanteric right hip fracture  This report was finalized on 02/08/2019 06:49 by Dr. Elias Daly MD.    XR Knee 1 or 2 View Right [[id number]] Collected:  02/08/19 0641     Updated:  02/08/19 0644    Narrative:       EXAMINATION:   XR KNEE 1 OR 2 VW RIGHT-  2/8/2019 6:41 AM CST     HISTORY: Fracture     AP and lateral views of the right knee are obtained. Postsurgical  changes noted from total right knee arthroplasty. Prosthesis appears  intact. Skeletal structures are normally aligned. There is no obvious  fracture.     IMPRESSION status post total right knee arthroplasty  This report was finalized on 02/08/2019 06:41 by Dr. Elias Daly MD.    XR Chest 1 View [[id number]] Collected:  02/08/19 0636     Updated:  02/08/19 0640    Narrative:       EXAMINATION:   XR CHEST 1 VW-  2/8/2019 6:36 AM CST     HISTORY: Preop     Frontal upright radiograph of the chest 2/8/2019 12:01 AM CST     COMPARISON: January 2, 2018.     FINDINGS:   The lungs are clear. The cardiac silhouette is mildly enlarged.  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 Wires are  present from previous median sternotomy..      The osseous structures and surrounding soft tissues demonstrate no acute  abnormality.       Impression:       1. Mild cardiomegaly no acute cardiopulmonary process.        This report was finalized on 02/08/2019 06:37 by Dr. Elias Daly MD.          Culture:  No components found for: WOUNDCUL, 3  No components found for: CSFCUL, 3  No components found for: BC, 3  No components found for: URINECUL,  3      Assessment     -ESRD  -Right displaced intertrochanteric femur fracture (s/p repair)  -Benign HTN  -Hyperkalemia  -Anemia of CKD       Plan:  Will provide dialysis with 2K bath today and follow up labs.       Thank you for the consult, we appreciate the opportunity to provide care to your patients.  Feel free to contact me if I can be of any further assistance.      Faisal Sevilla MD  2/8/2019  1:50 PM

## 2019-02-09 PROBLEM — I95.9 HYPOTENSION: Status: ACTIVE | Noted: 2019-02-09

## 2019-02-09 PROBLEM — D62 ACUTE BLOOD LOSS ANEMIA: Status: ACTIVE | Noted: 2019-02-09

## 2019-02-09 LAB
ANION GAP SERPL CALCULATED.3IONS-SCNC: 9 MMOL/L (ref 4–13)
BASOPHILS # BLD AUTO: 0.04 10*3/MM3 (ref 0–0.2)
BASOPHILS NFR BLD AUTO: 0.4 % (ref 0–2)
BUN BLD-MCNC: 29 MG/DL (ref 5–21)
BUN/CREAT SERPL: 5.7 (ref 7–25)
CALCIUM SPEC-SCNC: 8 MG/DL (ref 8.4–10.4)
CHLORIDE SERPL-SCNC: 101 MMOL/L (ref 98–110)
CO2 SERPL-SCNC: 26 MMOL/L (ref 24–31)
CREAT BLD-MCNC: 5.13 MG/DL (ref 0.5–1.4)
DEPRECATED RDW RBC AUTO: 58.3 FL (ref 40–54)
EOSINOPHIL # BLD AUTO: 0.27 10*3/MM3 (ref 0–0.7)
EOSINOPHIL NFR BLD AUTO: 3 % (ref 0–4)
ERYTHROCYTE [DISTWIDTH] IN BLOOD BY AUTOMATED COUNT: 14.4 % (ref 12–15)
GFR SERPL CREATININE-BSD FRML MDRD: 8 ML/MIN/1.73
GFR SERPL CREATININE-BSD FRML MDRD: ABNORMAL ML/MIN/1.73
GLUCOSE BLD-MCNC: 121 MG/DL (ref 70–100)
HCT VFR BLD AUTO: 27.2 % (ref 37–47)
HGB BLD-MCNC: 8.7 G/DL (ref 12–16)
LYMPHOCYTES # BLD AUTO: 0.47 10*3/MM3 (ref 0.72–4.86)
LYMPHOCYTES NFR BLD AUTO: 5.3 % (ref 15–45)
MCH RBC QN AUTO: 35.4 PG (ref 28–32)
MCHC RBC AUTO-ENTMCNC: 32 G/DL (ref 33–36)
MCV RBC AUTO: 110.6 FL (ref 82–98)
MONOCYTES # BLD AUTO: 0.57 10*3/MM3 (ref 0.19–1.3)
MONOCYTES NFR BLD AUTO: 6.4 % (ref 4–12)
NEUTROPHILS # BLD AUTO: 7.52 10*3/MM3 (ref 1.87–8.4)
NEUTROPHILS NFR BLD AUTO: 84.5 % (ref 39–78)
PLATELET # BLD AUTO: 119 10*3/MM3 (ref 130–400)
PMV BLD AUTO: 11.2 FL (ref 6–12)
POTASSIUM BLD-SCNC: 5.1 MMOL/L (ref 3.5–5.3)
RBC # BLD AUTO: 2.46 10*6/MM3 (ref 4.2–5.4)
SODIUM BLD-SCNC: 136 MMOL/L (ref 135–145)
WBC NRBC COR # BLD: 8.91 10*3/MM3 (ref 4.8–10.8)

## 2019-02-09 PROCEDURE — 97167 OT EVAL HIGH COMPLEX 60 MIN: CPT | Performed by: OCCUPATIONAL THERAPIST

## 2019-02-09 PROCEDURE — 85025 COMPLETE CBC W/AUTO DIFF WBC: CPT | Performed by: NURSE PRACTITIONER

## 2019-02-09 PROCEDURE — 80048 BASIC METABOLIC PNL TOTAL CA: CPT | Performed by: INTERNAL MEDICINE

## 2019-02-09 PROCEDURE — 94799 UNLISTED PULMONARY SVC/PX: CPT

## 2019-02-09 PROCEDURE — 97163 PT EVAL HIGH COMPLEX 45 MIN: CPT

## 2019-02-09 PROCEDURE — 97110 THERAPEUTIC EXERCISES: CPT

## 2019-02-09 RX ORDER — CARVEDILOL 3.12 MG/1
3.12 TABLET ORAL
Status: DISCONTINUED | OUTPATIENT
Start: 2019-02-09 | End: 2019-02-11 | Stop reason: HOSPADM

## 2019-02-09 RX ORDER — LACTULOSE 20 G/30ML
20 SOLUTION ORAL DAILY PRN
Status: DISCONTINUED | OUTPATIENT
Start: 2019-02-09 | End: 2019-02-11 | Stop reason: HOSPADM

## 2019-02-09 RX ORDER — CARVEDILOL 3.12 MG/1
3.12 TABLET ORAL
Status: DISCONTINUED | OUTPATIENT
Start: 2019-02-11 | End: 2019-02-11 | Stop reason: HOSPADM

## 2019-02-09 RX ADMIN — CHOLECALCIFEROL CAP 125 MCG (5000 UNIT) 5000 UNITS: 125 CAP at 08:56

## 2019-02-09 RX ADMIN — SERTRALINE 50 MG: 50 TABLET, FILM COATED ORAL at 08:57

## 2019-02-09 RX ADMIN — SODIUM CHLORIDE, PRESERVATIVE FREE 3 ML: 5 INJECTION INTRAVENOUS at 08:57

## 2019-02-09 RX ADMIN — OXYCODONE HYDROCHLORIDE AND ACETAMINOPHEN 1 TABLET: 10; 325 TABLET ORAL at 08:55

## 2019-02-09 RX ADMIN — PANTOPRAZOLE SODIUM 40 MG: 40 TABLET, DELAYED RELEASE ORAL at 08:55

## 2019-02-09 RX ADMIN — LEVOTHYROXINE SODIUM 150 MCG: 150 TABLET ORAL at 08:56

## 2019-02-09 RX ADMIN — ATORVASTATIN CALCIUM 10 MG: 10 TABLET, FILM COATED ORAL at 08:57

## 2019-02-09 RX ADMIN — DOCUSATE SODIUM 100 MG: 100 CAPSULE ORAL at 08:55

## 2019-02-09 RX ADMIN — NYSTATIN: 100000 POWDER TOPICAL at 21:46

## 2019-02-09 RX ADMIN — NYSTATIN: 100000 POWDER TOPICAL at 08:58

## 2019-02-09 RX ADMIN — APIXABAN 2.5 MG: 2.5 TABLET, FILM COATED ORAL at 17:20

## 2019-02-09 RX ADMIN — SODIUM CHLORIDE, PRESERVATIVE FREE 3 ML: 5 INJECTION INTRAVENOUS at 21:46

## 2019-02-09 RX ADMIN — APIXABAN 2.5 MG: 2.5 TABLET, FILM COATED ORAL at 05:14

## 2019-02-09 RX ADMIN — DOCUSATE SODIUM 100 MG: 100 CAPSULE ORAL at 21:46

## 2019-02-09 RX ADMIN — OXYCODONE HYDROCHLORIDE AND ACETAMINOPHEN 1 TABLET: 10; 325 TABLET ORAL at 17:19

## 2019-02-09 RX ADMIN — CARVEDILOL 3.12 MG: 3.12 TABLET, FILM COATED ORAL at 17:23

## 2019-02-09 RX ADMIN — SODIUM CHLORIDE, PRESERVATIVE FREE 3 ML: 5 INJECTION INTRAVENOUS at 08:58

## 2019-02-09 NOTE — PLAN OF CARE
Problem: Patient Care Overview  Goal: Plan of Care Review  Outcome: Ongoing (interventions implemented as appropriate)   02/09/19 8203   Coping/Psychosocial   Plan of Care Reviewed With patient   Plan of Care Review   Progress no change   OTHER   Outcome Summary Pt A&Ox4. BP remains low this shift. 80-90's/30-40's. MD aware. cont to monitor. HR remains low 100's. coreg held d/t low bp. Pt R hip drsg in place x3 C/D/I. no drainage noted. Pt c/o minimal pain, only with movement. Pt med x1 for pain.       Problem: Fall Risk (Adult)  Goal: Absence of Fall  Outcome: Ongoing (interventions implemented as appropriate)      Problem: Skin Injury Risk (Adult)  Goal: Skin Health and Integrity  Outcome: Ongoing (interventions implemented as appropriate)

## 2019-02-09 NOTE — PROGRESS NOTES
"PROGRESS NOTE.      Patient:  Cheri Ely  YOB: 1940  Date of Service: 2/9/2019  MRN: [id number]   Acct: [id number]   Primary Care Physician: Barrett Mckenzie Jr, MD  Advance Directive:   Code Status and Medical Interventions:   Ordered at: 02/08/19 0134     Level Of Support Discussed With:    Patient     Code Status:    CPR     Medical Interventions (Level of Support Prior to Arrest):    Full     Admit Date: 2/7/2019       Hospital Day: 1  Referring Provider: Jony Concepcion MD      Patient Seen, Chart, Consults, Notes, Labs, Radiology studies reviewed.    Subjective:    Cheri Ely is a 78 y.o. female  whom we were consulted for ESRD management. Patient is admitted after a fall. She sustained a displaced intertrochanteric right femoral fracture. She had hip fracture repair on 2/8. She is s./p  dialysis on 2/8. Today, she continues to have pain in her right hip. No other complaints otherwise.        Review of Systems:  History obtained from chart review and the patient  General ROS: No fever or chills  Respiratory ROS: No cough, shortness of breath, wheezing  Cardiovascular ROS: no chest pain or dyspnea on exertion  Gastrointestinal ROS: No abdominal pain or melena  Genito-Urinary ROS: No dysuria or hematuria  Musculoskeletal: Positive for right hip pain  Skin: negative    Objective:  BP (!) 90/36 (BP Location: Right arm, Patient Position: Lying) Comment: nurse notified  Pulse 104   Temp 98.1 °F (36.7 °C) (Axillary)   Resp 18   Ht 149.9 cm (59\")   Wt 77.6 kg (171 lb 1 oz)   SpO2 92%   BMI 34.55 kg/m²   No intake or output data in the 24 hours ending 02/09/19 0930    Physical examination:  General: awake/alert   Chest:  clear to auscultation bilaterally without respiratory distress  CVS: regular rate and rhythm  Abdominal: soft, nontender, normal bowel sounds  Extremities: no cyanosis or edema  Skin: warm and dry without rash  Neuro: No focal motor deficits    Labs:  Lab Results (last " 24 hours)     Procedure Component Value Units Date/Time    Basic Metabolic Panel [[id number]]  (Abnormal) Collected:  02/09/19 0653    Specimen:  Blood Updated:  02/09/19 0735     Glucose 121 mg/dL      BUN 29 mg/dL      Creatinine 5.13 mg/dL      Sodium 136 mmol/L      Potassium 5.1 mmol/L      Chloride 101 mmol/L      CO2 26.0 mmol/L      Calcium 8.0 mg/dL      eGFR  African Amer -- mL/min/1.73      Comment: <15 Indicative of kidney failure.        eGFR Non African Amer 8 mL/min/1.73      Comment: <15 Indicative of kidney failure.        BUN/Creatinine Ratio 5.7     Anion Gap 9.0 mmol/L     Narrative:       The MDRD GFR formula is only valid for adults with stable renal function between ages 18 and 70.    CBC & Differential [[id number]] Collected:  02/09/19 0652    Specimen:  Blood Updated:  02/09/19 0723    Narrative:       The following orders were created for panel order CBC & Differential.  Procedure                               Abnormality         Status                     ---------                               -----------         ------                     Manual Differential[[id number]]                                                         CBC Auto Differential[[id number]]        Abnormal            Final result                 Please view results for these tests on the individual orders.    CBC Auto Differential [[id number]]  (Abnormal) Collected:  02/09/19 0652    Specimen:  Blood Updated:  02/09/19 0723     WBC 8.91 10*3/mm3      RBC 2.46 10*6/mm3      Hemoglobin 8.7 g/dL      Hematocrit 27.2 %      .6 fL      MCH 35.4 pg      MCHC 32.0 g/dL      RDW 14.4 %      RDW-SD 58.3 fl      MPV 11.2 fL      Platelets 119 10*3/mm3      Neutrophil % 84.5 %      Lymphocyte % 5.3 %      Monocyte % 6.4 %      Eosinophil % 3.0 %      Basophil % 0.4 %      Neutrophils, Absolute 7.52 10*3/mm3      Lymphocytes, Absolute 0.47 10*3/mm3      Monocytes, Absolute 0.57 10*3/mm3      Eosinophils, Absolute 0.27 10*3/mm3       Basophils, Absolute 0.04 10*3/mm3           Radiology:   Imaging Results (last 24 hours)     Procedure Component Value Units Date/Time    XR Hip With or Without Pelvis 2 - 3 View Right [[id number]] Collected:  02/08/19 1247     Updated:  02/08/19 1515    Narrative:       EXAMINATION:   XR HIP W OR WO PELVIS 2-3 VIEW RIGHT-  2/8/2019 12:47 PM  CST     HISTORY: Right hip pinning     4 images are obtained in the operating room. Compression pin is present  in the right femoral head and neck. Femoral nail is present. Distal end  of the femoral nail demonstrates to fixation screws. Postsurgical  changes noted from prior right knee arthroplasty     1 minute and 59 seconds of fluoroscopic time was used during this  procedure  This report was finalized on 02/08/2019 12:48 by Dr. Elias Daly MD.    FL C Arm During Surgery [[id number]] Collected:  02/08/19 1247     Updated:  02/08/19 1515    Narrative:       EXAMINATION:   XR HIP W OR WO PELVIS 2-3 VIEW RIGHT-  2/8/2019 12:47 PM  CST     HISTORY: Right hip pinning     4 images are obtained in the operating room. Compression pin is present  in the right femoral head and neck. Femoral nail is present. Distal end  of the femoral nail demonstrates to fixation screws. Postsurgical  changes noted from prior right knee arthroplasty     1 minute and 59 seconds of fluoroscopic time was used during this  procedure  This report was finalized on 02/08/2019 12:48 by Dr. Elias Daly MD.              Assessment   -ESRD  -Right displaced intertrochanteric femur fracture (s/p repair)  -Benign HTN  -Hyperkalemia  -Anemia of CKD    Plan:  Dialysis is due next on February 11.  We will follow-up labs.      Faisal Sevilla MD  2/9/2019  9:30 AM

## 2019-02-09 NOTE — PLAN OF CARE
Problem: Patient Care Overview  Goal: Plan of Care Review  Outcome: Ongoing (interventions implemented as appropriate)   02/09/19 9459   Coping/Psychosocial   Plan of Care Reviewed With patient   Plan of Care Review   Progress no change   OTHER   Outcome Summary IVF bolus given. Wakes easily. Denies pain and discomfort, turned every 2 hours. Mepilex X3 to right hip CDI. Will cont. to monitor.        Problem: Fall Risk (Adult)  Goal: Identify Related Risk Factors and Signs and Symptoms  Outcome: Outcome(s) achieved Date Met: 02/09/19    Goal: Absence of Fall  Outcome: Ongoing (interventions implemented as appropriate)      Problem: Skin Injury Risk (Adult)  Goal: Identify Related Risk Factors and Signs and Symptoms  Outcome: Outcome(s) achieved Date Met: 02/09/19    Goal: Skin Health and Integrity  Outcome: Ongoing (interventions implemented as appropriate)

## 2019-02-09 NOTE — PLAN OF CARE
Problem: Patient Care Overview  Goal: Plan of Care Review  Outcome: Ongoing (interventions implemented as appropriate)   02/09/19 1006   Coping/Psychosocial   Plan of Care Reviewed With patient   OTHER   Outcome Summary PT IE complete. Pt mod/max x2 for bed mobility and standing at bedside. Able to maintain TTWB RLE w/ RW. C/o 8/10 R hip pain w/ movement. Skilled PT to address functional mobility deficits. Recommend SNF at [place]. Thank you for referral.

## 2019-02-09 NOTE — THERAPY EVALUATION
Acute Care - Physical Therapy Initial Evaluation   [place]     Patient Name: Cheri Ely  : 1940  MRN: [id number]  Today's Date: 2019   Onset of Illness/Injury or Date of Surgery: 19  Date of Referral to PT: 19  Referring Physician: Dr. Camacho      Admit Date: 2019    Visit Dx:     ICD-10-CM ICD-9-CM   1. Displaced intertrochanteric fracture of right femur, initial encounter for closed fracture (CMS/[facility]) S72.141A 820.21   2. Closed displaced intertrochanteric fracture of right femur, initial encounter (CMS/[facility]) S72.141A 820.21   3. Impaired mobility Z74.09 799.89     Patient Active Problem List   Diagnosis   • Hypertension   • Hyperlipidemia   • Chronic renal failure   • Closed fracture of ramus of left pubis (CMS/[facility])   • Occlusion of superior mesenteric artery (CMS/[facility])   • Chronic mesenteric ischemia (CMS/[facility])   • Black tarry stools   • Hx of gastritis   • Acute gastric ulcer with hemorrhage   • Closed displaced intertrochanteric fracture of right femur (CMS/HCC)   • Displaced intertrochanteric fracture of right femur, initial encounter for closed fracture (CMS/[facility])     Past Medical History:   Diagnosis Date   • Arthritis    • Carotid artery disease (CMS/[facility])    • CHF (congestive heart failure) (CMS/[facility])    • Chronic kidney disease on chronic dialysis (CMS/[facility])    • Chronic renal failure     on dialysis ON MON, WED, FRI   • Coronary artery disease    • Disease of thyroid gland    • Diverticulitis    • Gastric ulcer    • History of transfusion    • Hyperlipidemia    • Hypertension    • Injury of back    • Mesenteric artery insufficiency (CMS/HCC)    • Multilevel degenerative disc disease    • Osteoporosis    • Pancreatitis    • Pelvis fracture (CMS/[facility])    • Pneumonia      Past Surgical History:   Procedure Laterality Date   • AORTAGRAM Right 2018    Procedure: MESENTERIC ANGIOGRAM, GROIN ACCESS, MYNX CLOSURE;  Surgeon: Tomasz San DO;  Location: [facility];  Service:    •  AORTIC VALVE SURGERY     • APPENDECTOMY     • BACK SURGERY     • CARDIAC SURGERY     • CAROTID ENDARTERECTOMY Bilateral    • CATARACT EXTRACTION, BILATERAL     • CERVICAL SPINE SURGERY     • CHOLECYSTECTOMY     • COLON SURGERY     • COLONOSCOPY  10/01/2015   • CORONARY ARTERY BYPASS GRAFT     • DIALYSIS FISTULA CREATION     • ENDOSCOPY N/A 12/27/2018    Procedure: ESOPHAGOGASTRODUODENOSCOPY WITH ANESTHESIA;  Surgeon: Moni Garza MD;  Location: [facility] ENDOSCOPY;  Service: Gastroenterology   • HIP TROCANTERIC NAILING WITH INTRAMEDULLARY HIP SCREW Right 2/8/2019    Procedure: HIP TROCANTERIC NAILING LONG WITH INTRAMEDULLARY HIP SCREW;  Surgeon: Boo Camacho MD;  Location: [facility] OR;  Service: Orthopedics   • JOINT REPLACEMENT      knee   • LAPAROSCOPIC GASTRIC BANDING     • LEEP     • LUMBAR FUSION     • TOE AMPUTATION Left     2nd toe   • TOTAL KNEE ARTHROPLASTY Bilateral    • TUBAL ABDOMINAL LIGATION     • UPPER GASTROINTESTINAL ENDOSCOPY  10/01/2015        PT ASSESSMENT (last 12 hours)      Physical Therapy Evaluation     Row Name 02/09/19 0923          PT Evaluation Time/Intention    Subjective Information  complains of;weakness;pain hands go to sleep-chronic issue  -     Document Type  evaluation  -     Mode of Treatment  physical therapy  -     Row Name 02/09/19 0923          General Information    Patient Profile Reviewed?  yes  -     Onset of Illness/Injury or Date of Surgery  02/07/19  -     Referring Physician  Dr. Camacho  -     Patient Observations  alert;cooperative;agree to therapy  -     General Observations of Patient  fowlers in bed  -     Prior Level of Function  independent:;all household mobility;community mobility;ADL's  -     Equipment Currently Used at Home  wheelchair, motorized;rollator;bath bench;wheelchair;ramp  -     Pertinent History of Current Functional Problem  R hip fx, Sx:  R hip nailing 2.8.19  -     Existing Precautions/Restrictions  fall  (Significant)   TTWB RLE  -     Equipment Issued to Patient  gait belt;walker, front wheeled  -     Risks Reviewed  patient:;LOB;nausea/vomiting;dizziness;increased discomfort  -     Benefits Reviewed  patient:;improve function;increase independence;increase strength;increase balance  -     Barriers to Rehab  physical barrier  -[facility] Name 02/09/19 0923          Relationship/Environment    Lives With  alone  -     Family Caregiver if Needed  child(meagan), adult  -     Concerns About Impact on Relationships  dtr has stayed w/ her for past month  -[facility] Name 02/09/19 0923          Resource/Environmental Concerns    Current Living Arrangements  home/apartment/condo  -[facility] Name 02/09/19 0923          Cognitive Assessment/Interventions    Additional Documentation  Cognitive Assessment/Intervention (Group)  -[facility] Name 02/09/19 0923          Cognitive Assessment/Intervention- PT/OT    Affect/Mental Status (Cognitive)  WNL  -     Orientation Status (Cognition)  oriented x 4  -[facility] Name 02/09/19 0923          Safety Issues, Functional Mobility    Impairments Affecting Function (Mobility)  balance;endurance/activity tolerance;pain;strength  -[facility] Name 02/09/19 0923          Mobility Assessment/Treatment    Extremity Weight-bearing Status  right lower extremity  (Significant)   -     Right Lower Extremity (Weight-bearing Status)  toe touch weight-bearing (TTWB)  (Significant)   -[facility] Name 02/09/19 0923          Bed Mobility Assessment/Treatment    Bed Mobility Assessment/Treatment  supine-sit;sit-supine;scooting/bridging  -     Scooting/Bridging [place] (Bed Mobility)  verbal cues;maximum assist (25% patient effort);2 person assist  -     Supine-Sit [place] (Bed Mobility)  verbal cues;moderate assist (50% patient effort);2 person assist  -     Sit-Supine [place] (Bed Mobility)  verbal cues;maximum assist (25% patient effort);2 person assist  -[facility] Name 02/09/19  0923          Transfer Assessment/Treatment    Transfer Assessment/Treatment  sit-stand transfer;stand-sit transfer  -     Maintains Weight-bearing Status (Transfers)  able to maintain  -     Sit-Stand [place] (Transfers)  verbal cues;moderate assist (50% patient effort);2 person assist  -     Stand-Sit [place] (Transfers)  verbal cues;minimum assist (75% patient effort);2 person assist  -     Row Name 02/09/19 0923          Gait/Stairs Assessment/Training    Comment (Gait/Stairs)  n/a to assess  -     Row Name 02/09/19 0923          General ROM    GENERAL ROM COMMENTS  WFL; increased kyphosis, forward head  -     Row Name 02/09/19 0923          MMT (Manual Muscle Testing)    General MMT Comments  functionally 4-/5  -     Row Name 02/09/19 0923          Pain Assessment    Additional Documentation  Pain Scale: Numbers Pre/Post-Treatment (Group)  -[facility] Name 02/09/19 0923          Pain Scale: Numbers Pre/Post-Treatment    Pain Scale: Numbers, Pretreatment  8/10  -     Pain Location - Side  Right  -LH     Pain Location  hip  -LH     Pain Intervention(s)  Medication (See MAR);Repositioned;Ambulation/increased activity  -     Row Name             Wound 02/08/19 1211 Right hip incision    Wound - Properties Group Date first assessed: 02/08/19  -HT Time first assessed: 1211  -HT Side: Right  -HT Location: hip  -HT Type: incision  -HT    Row Name 02/09/19 0923          Plan of Care Review    Plan of Care Reviewed With  patient  -     Row Name 02/09/19 0923          Physical Therapy Clinical Impression    Date of Referral to PT  02/08/19  -     PT Diagnosis (PT Clinical Impression)  impaired mobility  -     Patient/Family Goals Statement (PT Clinical Impression)  return home  -     Criteria for Skilled Interventions Met (PT Clinical Impression)  yes;treatment indicated  -     Rehab Potential (PT Clinical Summary)  good, to achieve stated therapy goals  -     Predicted Duration of  Therapy (PT)  until dc  -     Care Plan Review (PT)  evaluation/treatment results reviewed;risks/benefits reviewed;patient/other agree to care plan  -     Row Name 02/09/19 0923          Physical Therapy Goals    Bed Mobility Goal Selection (PT)  bed mobility, PT goal 1  -     Transfer Goal Selection (PT)  transfer, PT goal 1  -     Row Name 02/09/19 0923          Bed Mobility Goal 1 (PT)    Activity/Assistive Device (Bed Mobility Goal 1, PT)  bed mobility activities, all  -     [place] Level/Cues Needed (Bed Mobility Goal 1, PT)  minimum assist (75% or more patient effort)  -     Time Frame (Bed Mobility Goal 1, PT)  by discharge  -     Progress/Outcomes (Bed Mobility Goal 1, PT)  goal ongoing  -     Row Name 02/09/19 0923          Transfer Goal 1 (PT)    Activity/Assistive Device (Transfer Goal 1, PT)  sit-to-stand/stand-to-sit;bed-to-chair/chair-to-bed;walker, rolling  -     [place] Level/Cues Needed (Transfer Goal 1, PT)  contact guard assist  -     Time Frame (Transfer Goal 1, PT)  by discharge  -     Barriers (Transfers Goal 1, PT)  TTWB RLE  -     Progress/Outcome (Transfer Goal 1, PT)  goal ongoing  -     Row Name 02/09/19 0923          Positioning and Restraints    Pre-Treatment Position  in bed  -     Post Treatment Position  bed  -     In Bed  fowlers;call light within reach;encouraged to call for assist;side rails up x2;SCD pump applied  -     Row Name 02/09/19 0923          Living Environment    Home Accessibility  tub/shower is not walk in  -       User Key  (r) = Recorded By, (t) = Taken By, (c) = Cosigned By    Initials Name Provider Type     Espinoza Cole, PT Physical Therapist    HT Neyda Reynolds, RN Registered Nurse        Physical Therapy Education     Title: PT OT SLP Therapies (In Progress)     Topic: Physical Therapy (Done)     Point: Mobility training (Done)     Learning Progress Summary           Patient Acceptance, MARIA DE JESUS DOMINGUEZ, SHELLEY,VU by  at 2/9/2019  10:06 AM    Comment:  benefits of PT and POC, call for assist OOB, TTWB RLE                   Point: Precautions (Done)     Learning Progress Summary           Patient Acceptance, ANGELICA,MARIA DE JESUS, SHELLEY,GIRMA by  at 2/9/2019 10:06 AM    Comment:  benefits of PT and POC, call for assist OOB, TTWB RLE                               User Key     Initials Effective Dates Name Provider Type Discipline     08/02/16 -  Espinoza Cole, PT Physical Therapist PT              PT Recommendation and Plan  Anticipated Discharge Disposition (PT): skilled nursing facility  Planned Therapy Interventions (PT Eval): balance training, bed mobility training, home exercise program, patient/family education, strengthening, transfer training  Therapy Frequency (PT Clinical Impression): 2 times/day  Outcome Summary/Treatment Plan (PT)  Anticipated Discharge Disposition (PT): skilled nursing facility  Plan of Care Reviewed With: patient  Outcome Summary: PT IE complete.  Pt mod/max x2 for bed mobility and standing at bedside.  Able to maintain TTWB RLE w/ RW.  C/o 8/10 R hip pain w/ movement.  Skilled PT to address functional mobility deficits.  Recommend SNF at [place].  Thank you for referral.  Outcome Measures     Row Name 02/09/19 1007 02/09/19 1000          How much help from another person do you currently need...    Turning from your back to your side while in flat bed without using bedrails?  --  2  -LH     Moving from lying on back to sitting on the side of a flat bed without bedrails?  --  2  -LH     Moving to and from a bed to a chair (including a wheelchair)?  --  2  -LH     Standing up from a chair using your arms (e.g., wheelchair, bedside chair)?  --  2  -LH     Climbing 3-5 steps with a railing?  --  1  -LH     To walk in hospital room?  --  2  -LH     AM-PAC 6 Clicks Score  --  11  -LH        How much help from another is currently needed...    Putting on and taking off regular lower body clothing?  1  -MM  --     Bathing (including washing,  rinsing, and drying)  2  -MM  --     Toileting (which includes using toilet bed pan or urinal)  2  -MM  --     Putting on and taking off regular upper body clothing  3  -MM  --     Taking care of personal grooming (such as brushing teeth)  3  -MM  --     Eating meals  3  -MM  --     Score  14  -MM  --        Functional Assessment    Outcome Measure Options  AM-PAC 6 Clicks Daily Activity (OT)  -MM  AM-PAC 6 Clicks Basic Mobility (PT)  -       User Key  (r) = Recorded By, (t) = Taken By, (c) = Cosigned By    Initials Name Provider Type     Espinoza Cole, PT Physical Therapist    MM Ángel Rand, OTR/L Occupational Therapist         Time Calculation:   PT Charges     Row Name 02/09/19 1008             Time Calculation    Start Time  0923  -      Stop Time  1008  -      Time Calculation (min)  45 min  -      PT Received On  02/09/19  -      PT Goal Re-Cert Due Date  02/19/19  -        User Key  (r) = Recorded By, (t) = Taken By, (c) = Cosigned By    Initials Name Provider Type     Espinoza Cole, PT Physical Therapist        Therapy Suggested Charges     Code   Minutes Charges    None           Therapy Charges for Today     Code Description Service Date Service Provider Modifiers Qty    58572962425 HC PT EVAL HIGH COMPLEXITY 3 2/9/2019 Espinoza Cole, PT GP 1          PT G-Codes  Outcome Measure Options: AM-PAC 6 Clicks Daily Activity (OT)  AM-PAC 6 Clicks Score: 11  Score: 14      Espinoza Cole PT  2/9/2019

## 2019-02-09 NOTE — PLAN OF CARE
Problem: Patient Care Overview  Goal: Plan of Care Review  Outcome: Ongoing (interventions implemented as appropriate)   02/08/19 7661   Coping/Psychosocial   Plan of Care Reviewed With patient   Plan of Care Review   Progress improving   OTHER   Outcome Summary patient has returned from dialysis. Ongoing VS assessment. MD notified concerning BP and HR. New orders for fluid bolus. Bolus running now w/o adverse effects. She is verbal and pleasant after arrival with stimulation. Basic comfort care provided. Mepilex X 3 to right hip and right leg dry w/o drainage, clean/clear. States pain is bearable at this moment. Will continue to monitor.       Problem: Fall Risk (Adult)  Goal: Absence of Fall  Outcome: Ongoing (interventions implemented as appropriate)      Problem: Skin Injury Risk (Adult)  Goal: Identify Related Risk Factors and Signs and Symptoms  Outcome: Ongoing (interventions implemented as appropriate)    Goal: Skin Health and Integrity  Outcome: Ongoing (interventions implemented as appropriate)

## 2019-02-09 NOTE — PROGRESS NOTES
Orthopedic Surgery Progress Note    Cheri Ely  2/9/2019      Subjective:     Systemic or Specific Complaints: Mild to moderate postoperative pain presently, but patient says she is doing well    Objective:     Patient Vitals for the past 24 hrs:   BP Temp Temp src Pulse Resp SpO2   02/09/19 0320 97/52 98.7 °F (37.1 °C) Oral 100 18 93 %   02/09/19 0032 (!) 90/35 98.2 °F (36.8 °C) Oral 103 16 93 %   02/08/19 2249 -- -- -- -- -- 96 %   02/08/19 2010 (!) 84/43 97.4 °F (36.3 °C) Oral 102 14 95 %   02/08/19 1934 98/43 -- -- 104 -- --   02/08/19 1852 (!) 82/41 -- -- 101 14 94 %   02/08/19 1820 91/40 97.6 °F (36.4 °C) Oral 107 15 92 %   02/08/19 1745 (!) 82/34 98 °F (36.7 °C) Oral 105 14 --   02/08/19 1335 109/46 97.8 °F (36.6 °C) Temporal 98 14 96 %   02/08/19 1320 122/53 -- -- 99 14 100 %   02/08/19 1315 115/51 -- -- 98 14 100 %   02/08/19 1310 116/48 -- -- 98 14 100 %   02/08/19 1305 114/52 97.2 °F (36.2 °C) Temporal 99 13 100 %   02/08/19 1030 -- -- -- 102 16 97 %   02/08/19 0700 111/51 98.3 °F (36.8 °C) Oral 98 18 93 %       right lower  General: alert, appears stated age and cooperative   Wound: clean, dry, intact             Dressing: clean, dry, intact   Extremity: Distal NVI           DVT Exam: No evidence of DVT seen on physical exam.                   Data Review:  Lab Results (last 24 hours)     Procedure Component Value Units Date/Time    POC Glucose Once [[id number]]  (Normal) Collected:  02/08/19 0821    Specimen:  Blood Updated:  02/08/19 0833     Glucose 126 mg/dL      Comment: : [id number] Yamini Del Rosario ID: [id number]       Comprehensive Metabolic Panel [[id number]]  (Abnormal) Collected:  02/08/19 0636    Specimen:  Blood Updated:  02/08/19 0754     Glucose 129 mg/dL      BUN 52 mg/dL      Creatinine 6.99 mg/dL      Sodium 138 mmol/L      Potassium 5.4 mmol/L      Chloride 97 mmol/L      CO2 31.0 mmol/L      Calcium 8.7 mg/dL      Total Protein 6.5 g/dL      Albumin 3.50 g/dL      ALT (SGPT)  20 U/L      AST (SGOT) 41 U/L      Alkaline Phosphatase 154 U/L      Total Bilirubin 0.5 mg/dL      eGFR Non African Amer 6 mL/min/1.73      Comment: <15 Indicative of kidney failure.        eGFR   Amer -- mL/min/1.73      Comment: <15 Indicative of kidney failure.        Globulin 3.0 gm/dL      A/G Ratio 1.2 g/dL      BUN/Creatinine Ratio 7.4     Anion Gap 10.0 mmol/L     Narrative:       The MDRD GFR formula is only valid for adults with stable renal function between ages 18 and 70.    Phosphorus [[id number]]  (Abnormal) Collected:  02/08/19 0636    Specimen:  Blood Updated:  02/08/19 0738     Phosphorus 5.3 mg/dL     Magnesium [[id number]]  (Normal) Collected:  02/08/19 0636    Specimen:  Blood Updated:  02/08/19 0738     Magnesium 1.9 mg/dL     CBC Auto Differential [[id number]]  (Abnormal) Collected:  02/08/19 0715    Specimen:  Blood Updated:  02/08/19 0727     WBC 9.91 10*3/mm3      RBC 2.94 10*6/mm3      Hemoglobin 10.2 g/dL      Hematocrit 31.9 %      .5 fL      MCH 34.7 pg      MCHC 32.0 g/dL      RDW 14.0 %      RDW-SD 54.6 fl      MPV 10.9 fL      Platelets 142 10*3/mm3      Neutrophil % 83.5 %      Lymphocyte % 7.5 %      Monocyte % 7.0 %      Eosinophil % 0.8 %      Basophil % 0.6 %      Neutrophils, Absolute 8.28 10*3/mm3      Lymphocytes, Absolute 0.74 10*3/mm3      Monocytes, Absolute 0.69 10*3/mm3      Eosinophils, Absolute 0.08 10*3/mm3      Basophils, Absolute 0.06 10*3/mm3         Imaging Results (last 24 hours)     Procedure Component Value Units Date/Time    XR Hip With or Without Pelvis 2 - 3 View Right [[id number]] Collected:  02/08/19 1247     Updated:  02/08/19 1515    Narrative:       EXAMINATION:   XR HIP W OR WO PELVIS 2-3 VIEW RIGHT-  2/8/2019 12:47 PM  CST     HISTORY: Right hip pinning     4 images are obtained in the operating room. Compression pin is present  in the right femoral head and neck. Femoral nail is present. Distal end  of the femoral nail demonstrates to  fixation screws. Postsurgical  changes noted from prior right knee arthroplasty     1 minute and 59 seconds of fluoroscopic time was used during this  procedure  This report was finalized on 02/08/2019 12:48 by Dr. Elias Daly MD.    FL C Arm During Surgery [[id number]] Collected:  02/08/19 1247     Updated:  02/08/19 1515    Narrative:       EXAMINATION:   XR HIP W OR WO PELVIS 2-3 VIEW RIGHT-  2/8/2019 12:47 PM  CST     HISTORY: Right hip pinning     4 images are obtained in the operating room. Compression pin is present  in the right femoral head and neck. Femoral nail is present. Distal end  of the femoral nail demonstrates to fixation screws. Postsurgical  changes noted from prior right knee arthroplasty     1 minute and 59 seconds of fluoroscopic time was used during this  procedure  This report was finalized on 02/08/2019 12:48 by Dr. Elias Daly MD.    XR Hip With or Without Pelvis 2 - 3 View Right [[id number]] Collected:  02/08/19 0647     Updated:  02/08/19 0653    Narrative:       EXAMINATION:   XR HIP W OR WO PELVIS 2-3 VIEW RIGHT-  2/8/2019 6:47 AM  CST     HISTORY: Patient fell     AP view the pelvis AP and lateral views of the right hip are obtained.     SI joints are normal. Symphysis appears intact.     There is a comminuted intertrochanteric fracture of the right hip.     Postsurgical changes noted from the instrumented fusion in the lumbar  spine.       Impression:       Comminuted intertrochanteric right hip fracture  This report was finalized on 02/08/2019 06:49 by Dr. Elias Daly MD.    XR Knee 1 or 2 View Right [[id number]] Collected:  02/08/19 0641     Updated:  02/08/19 0644    Narrative:       EXAMINATION:   XR KNEE 1 OR 2 VW RIGHT-  2/8/2019 6:41 AM CST     HISTORY: Fracture     AP and lateral views of the right knee are obtained. Postsurgical  changes noted from total right knee arthroplasty. Prosthesis appears  intact. Skeletal structures are normally aligned. There is no  obvious  fracture.     IMPRESSION status post total right knee arthroplasty  This report was finalized on 02/08/2019 06:41 by Dr. Elias Daly MD.    XR Chest 1 View [[id number]] Collected:  02/08/19 0636     Updated:  02/08/19 0640    Narrative:       EXAMINATION:   XR CHEST 1 VW-  2/8/2019 6:36 AM CST     HISTORY: Preop     Frontal upright radiograph of the chest 2/8/2019 12:01 AM CST     COMPARISON: January 2, 2018.     FINDINGS:   The lungs are clear. The cardiac silhouette is mildly enlarged.  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 Wires are  present from previous median sternotomy..      The osseous structures and surrounding soft tissues demonstrate no acute  abnormality.       Impression:       1. Mild cardiomegaly no acute cardiopulmonary process.        This report was finalized on 02/08/2019 06:37 by Dr. Elias Daly MD.          Assessment:     POD# 1 status post right hip peritrochanteric injury femoral nailing secondary to displaced unstable intertrochanteric right femur fracture    Plan:      1:  DVT prophylaxis, ICE, elevate  2:  Pain control  3:  Physical therapy/Occupational therapy  4:  Anticipate discharge once placement arranged and if pain well controlled  5:  TTWB LEIGHA Camacho MD

## 2019-02-09 NOTE — PLAN OF CARE
Problem: Patient Care Overview  Goal: Plan of Care Review  Outcome: Ongoing (interventions implemented as appropriate)   02/09/19 0920   Coping/Psychosocial   Plan of Care Reviewed With patient   Plan of Care Review   Progress improving   OTHER   Outcome Summary OT eval completed. Pt was mod to max x2 for bed mobility and t/f. Pt was dependent to carmelina socks. Pt reports 8/10 pain in R hip with movement. Skilled OT recommended to address adls, functional mobility and education. Recommended d/c SNF.

## 2019-02-09 NOTE — THERAPY TREATMENT NOTE
Acute Care - Physical Therapy Treatment Note  [facility]     Patient Name: Cheri Ely  : 1940  MRN: [id number]  Today's Date: 2019  Onset of Illness/Injury or Date of Surgery: 19  Date of Referral to PT: 19  Referring Physician: Dr. Camacho    Admit Date: 2019    Visit Dx:    ICD-10-CM ICD-9-CM   1. Displaced intertrochanteric fracture of right femur, initial encounter for closed fracture (CMS/Formerly [facility]) S72.141A 820.21   2. Closed displaced intertrochanteric fracture of right femur, initial encounter (CMS/Formerly [facility]) S72.141A 820.21   3. Impaired mobility Z74.09 799.89   4. Impaired mobility and ADLs Z74.09 799.89     Patient Active Problem List   Diagnosis   • Hypertension   • Hyperlipidemia   • Chronic kidney disease on chronic dialysis (CMS/Formerly [facility])   • Closed fracture of ramus of left pubis (CMS/Formerly [facility])   • Occlusion of superior mesenteric artery (CMS/Formerly [facility])   • Chronic mesenteric ischemia (CMS/Formerly [facility])   • Black tarry stools   • Hx of gastritis   • Acute gastric ulcer with hemorrhage   • Closed displaced intertrochanteric fracture of right femur (CMS/Formerly [facility])   • Displaced intertrochanteric fracture of right femur, initial encounter for closed fracture (CMS/Formerly [facility])   • Hypotension   • Acute blood loss anemia       Therapy Treatment    Rehabilitation Treatment Summary     Row Name 19 1518             Treatment Time/Intention    Discipline  physical therapy assistant  -AE      Document Type  therapy note (daily note)  -AE      Subjective Information  complains of;weakness;pain  -AE      Existing Precautions/Restrictions  fall  (Significant)  TTWB R LE  -AE      Recorded by [AE] Lin Chacon PTA 19 1528      Row Name 19 1518             Therapeutic Exercise    Lower Extremity (Therapeutic Exercise)  gluteal sets;quad sets, bilateral;SLR (straight leg raise), left;heel slides, bilateral  -AE      Lower Extremity Range of Motion (Therapeutic Exercise)  hip abduction/adduction, bilateral;ankle  dorsiflexion/plantar flexion, bilateral  -AE      Exercise Type (Therapeutic Exercise)  -- PROM R LE AROM L LE  -AE      Sets/Reps (Therapeutic Exercise)  15  -AE      Recorded by [AE] Lin Chacon PTA 02/09/19 1528      Row Name 02/09/19 1518             Positioning and Restraints    Pre-Treatment Position  in bed  -AE      Post Treatment Position  bed  -AE      In Bed  fowlers;call light within reach  -AE      Recorded by [AE] Lin Chacon PTA 02/09/19 1528      Row Name 02/09/19 1518             Pain Scale: Numbers Pre/Post-Treatment    Pain Scale: Numbers, Pretreatment  7/10  -AE      Pain Location - Side  Right  -AE      Pain Location  hip with movement  -AE      Pain Intervention(s)  Medication (See MAR)  -AE      Recorded by [AE] Lin Chacon PTA 02/09/19 1528      Row Name                Wound 02/08/19 1211 Right hip incision    Wound - Properties Group Date first assessed: 02/08/19 [HT] Time first assessed: 1211 [HT] Side: Right [HT] Location: hip [HT] Type: incision [HT] Recorded by:  [HT] Neyda Reynolds RN 02/08/19 1211      User Key  (r) = Recorded By, (t) = Taken By, (c) = Cosigned By    Initials Name Effective Dates Discipline    AE Lin Chacon PTA 06/22/15 -  PT    HT Neyda Reynolds RN 08/02/16 -  Nurse          Wound 02/08/19 1211 Right hip incision (Active)   Dressing Appearance no drainage 2/9/2019  8:00 AM   Closure RIYA 2/9/2019  8:00 AM   Base dressing in place, unable to visualize 2/9/2019  8:00 AM   Drainage Amount none 2/9/2019  8:00 AM   Dressing Care, Wound foam 2/9/2019  8:00 AM       Rehab Goal Summary     Row Name 02/09/19 0923 02/09/19 0920          Physical Therapy Goals    Bed Mobility Goal Selection (PT)  bed mobility, PT goal 1  -LH  --     Transfer Goal Selection (PT)  transfer, PT goal 1  -LH  --        Bed Mobility Goal 1 (PT)    Activity/Assistive Device (Bed Mobility Goal 1, PT)  bed mobility activities, all  -LH  --     [place] Level/Cues Needed (Bed  Mobility Goal 1, PT)  minimum assist (75% or more patient effort)  -  --     Time Frame (Bed Mobility Goal 1, PT)  by discharge  -  --     Progress/Outcomes (Bed Mobility Goal 1, PT)  goal ongoing  -  --        Transfer Goal 1 (PT)    Activity/Assistive Device (Transfer Goal 1, PT)  sit-to-stand/stand-to-sit;bed-to-chair/chair-to-bed;walker, rolling  -  --     [place] Level/Cues Needed (Transfer Goal 1, PT)  contact guard assist  -  --     Time Frame (Transfer Goal 1, PT)  by discharge  -  --     Barriers (Transfers Goal 1, PT)  TTWB RLE  -  --     Progress/Outcome (Transfer Goal 1, PT)  goal ongoing  -  --        Occupational Therapy Goals    Dressing Goal Selection (OT)  --  dressing, OT goal 1  -MM     Toileting Goal Selection (OT)  --  toileting, OT goal 1  -MM     Grooming Goal Selection (OT)  --  grooming, OT goal 1  -MM        Dressing Goal 1 (OT)    Activity/Assistive Device (Dressing Goal 1, OT)  --  lower body dressing  -MM     [place]/Cues Needed (Dressing Goal 1, OT)  --  moderate assist (50-74% patient effort) with AE  -MM     Time Frame (Dressing Goal 1, OT)  --  10 days  -MM     Progress/Outcome (Dressing Goal 1, OT)  --  goal ongoing  -MM        Toileting Goal 1 (OT)    Activity/Device (Toileting Goal 1, OT)  --  toileting skills, all;commode, bedside with drop arms  -MM     [place] Level/Cues Needed (Toileting Goal 1, OT)  --  minimum assist (75% or more patient effort);verbal cues required  -MM     Time Frame (Toileting Goal 1, OT)  --  10 days  -MM     Progress/Outcome (Toileting Goal 1, OT)  --  goal ongoing  -MM        Grooming Goal 1 (OT)    Activity/Device (Grooming Goal 1, OT)  --  grooming skills, all  -MM     [place] (Grooming Goal 1, OT)  --  conditional independence;set-up required  -MM     Time Frame (Grooming Goal 1, OT)  --  10 days  -MM     Progress/Outcome (Grooming Goal 1, OT)  --  goal ongoing  -MM       User Key  (r) = Recorded By, (t) = Taken  By, (c) = Cosigned By    Initials Name Provider Type Discipline     Espinoza Cole, PT Physical Therapist PT    MM Ángel Rand E, OTR/L Occupational Therapist OT          Physical Therapy Education     Title: PT OT SLP Therapies (In Progress)     Topic: Physical Therapy (Done)     Point: Mobility training (Done)     Learning Progress Summary           Patient Acceptance, E,D, DU,VU by  at 2/9/2019 10:06 AM    Comment:  benefits of PT and POC, call for assist OOB, TTWB RLE                   Point: Precautions (Done)     Learning Progress Summary           Patient Acceptance, E,D, DU,VU by  at 2/9/2019 10:06 AM    Comment:  benefits of PT and POC, call for assist OOB, TTWB RLE                               User Key     Initials Effective Dates Name Provider Type Discipline     08/02/16 -  Espinoza Cole, PT Physical Therapist PT                PT Recommendation and Plan        Outcome Measures     Row Name 02/09/19 1007 02/09/19 1000          How much help from another person do you currently need...    Turning from your back to your side while in flat bed without using bedrails?  --  2  -LH     Moving from lying on back to sitting on the side of a flat bed without bedrails?  --  2  -LH     Moving to and from a bed to a chair (including a wheelchair)?  --  2  -LH     Standing up from a chair using your arms (e.g., wheelchair, bedside chair)?  --  2  -LH     Climbing 3-5 steps with a railing?  --  1  -LH     To walk in hospital room?  --  2  -     AM-PAC 6 Clicks Score  --  11  -        How much help from another is currently needed...    Putting on and taking off regular lower body clothing?  1  -MM  --     Bathing (including washing, rinsing, and drying)  2  -MM  --     Toileting (which includes using toilet bed pan or urinal)  2  -MM  --     Putting on and taking off regular upper body clothing  3  -MM  --     Taking care of personal grooming (such as brushing teeth)  3  -MM  --     Eating meals  3   -MM  --     Score  14  -MM  --        Functional Assessment    Outcome Measure Options  AM-PAC 6 Clicks Daily Activity (OT)  -MM  AM-PAC 6 Clicks Basic Mobility (PT)  -       User Key  (r) = Recorded By, (t) = Taken By, (c) = Cosigned By    Initials Name Provider Type     Espinoza Cole, PT Physical Therapist    MM Ángel Rand, OTR/L Occupational Therapist         Time Calculation:   PT Charges     Row Name 02/09/19 1529 02/09/19 1528 02/09/19 1008       Time Calculation    Start Time  1518  -AE  1518  -AE  0923  -    Stop Time  1528  -AE  --  1008  -    Time Calculation (min)  10 min  -AE  --  45 min  -    PT Received On  02/09/19  -AE  --  02/09/19  -    PT Goal Re-Cert Due Date  02/19/19  -AE  --  02/19/19  -       Time Calculation- PT    Total Timed Code Minutes- PT  10 minute(s)  -AE  --  --      User Key  (r) = Recorded By, (t) = Taken By, (c) = Cosigned By    Initials Name Provider Type     Lin Chacon, PTA Physical Therapy Assistant     Espinoza Cole, PT Physical Therapist        Therapy Suggested Charges     Code   Minutes Charges    None           Therapy Charges for Today     Code Description Service Date Service Provider Modifiers Qty    75602048224 HC PT THER PROC EA 15 MIN 2/9/2019 Lin Chacon PTA GP 1          PT G-Codes  Outcome Measure Options: AM-PAC 6 Clicks Daily Activity (OT)  AM-PAC 6 Clicks Score: 11  Score: 14    Lin Chacon PTA  2/9/2019

## 2019-02-09 NOTE — PROGRESS NOTES
[facility] Medicine Services  INPATIENT PROGRESS NOTE    Length of Stay: 1  Date of Admission: 2/7/2019  Primary Care Physician: Barrett Mckenzie Jr, MD    Subjective   Chief Complaint: Follow-up hip pain  HPI   The patient is resting in bed eating her lunch.  She tells me she feels okay today, except for the pain in her hip.  She denies any chest pain or shortness of breath.  She denies abdominal pain, nausea, or vomiting.  She tells me that she has recently had a bleeding gastric ulcer and is due to have another endoscopy per Dr. Garza at the end of this month.    Review of Systems   All pertinent negatives and positives are as above. All other systems have been reviewed and are negative unless otherwise stated.     Objective    Temp:  [97.4 °F (36.3 °C)-99 °F (37.2 °C)] 99 °F (37.2 °C)  Heart Rate:  [] 93  Resp:  [14-18] 16  BP: ()/(34-53) 87/44  Physical Exam   Constitutional: She is oriented to person, place, and time. She appears well-developed and well-nourished. No distress.   HENT:   Head: Normocephalic and atraumatic.   Neck: Normal range of motion. Neck supple. No JVD present. No tracheal deviation present. No thyromegaly present.   Cardiovascular: Normal rate, regular rhythm and normal heart sounds. Exam reveals no gallop and no friction rub.   No murmur heard.  Pulmonary/Chest: Effort normal. She has no wheezes. She has no rales.   Diminished breath sounds   Abdominal: Soft. Bowel sounds are normal. She exhibits no distension. There is no tenderness. There is no guarding.   Musculoskeletal: Normal range of motion. She exhibits tenderness (right hip). She exhibits no edema.   Neurological: She is alert and oriented to person, place, and time. No cranial nerve deficit.   Skin: Skin is warm and dry. No rash noted. No erythema.   Right hip dressings dry and intact   Psychiatric: She has a normal mood and affect. Her behavior is normal. Judgment and thought  content normal.   Vitals reviewed.     Results Review:  I have reviewed the labs, radiology results, and diagnostic studies.    Laboratory Data:   Results from last 7 days   Lab Units 02/09/19  0652 02/08/19  0715 02/07/19  2249   WBC 10*3/mm3 8.91 9.91 5.66   HEMOGLOBIN g/dL 8.7* 10.2* 10.9*   HEMATOCRIT % 27.2* 31.9* 33.8*   PLATELETS 10*3/mm3 119* 142 139     Results from last 7 days   Lab Units 02/09/19  0653 02/08/19  0636 02/07/19  2250   SODIUM mmol/L 136 138 138   POTASSIUM mmol/L 5.1 5.4* 4.4   CHLORIDE mmol/L 101 97* 98   CO2 mmol/L 26.0 31.0 28.0   BUN mg/dL 29* 52* 47*   CREATININE mg/dL 5.13* 6.99* 6.04*   CALCIUM mg/dL 8.0* 8.7 8.5   BILIRUBIN mg/dL  --  0.5 0.4   ALK PHOS U/L  --  154* 170*   ALT (SGPT) U/L  --  20 20   AST (SGOT) U/L  --  41 33   GLUCOSE mg/dL 121* 129* 146*     Radiology Data:   Imaging Results (last 24 hours)     Procedure Component Value Units Date/Time    XR Hip With or Without Pelvis 2 - 3 View Right [[id number]] Collected:  02/08/19 1247     Updated:  02/08/19 1515    Narrative:       EXAMINATION:   XR HIP W OR WO PELVIS 2-3 VIEW RIGHT-  2/8/2019 12:47 PM  CST     HISTORY: Right hip pinning     4 images are obtained in the operating room. Compression pin is present  in the right femoral head and neck. Femoral nail is present. Distal end  of the femoral nail demonstrates to fixation screws. Postsurgical  changes noted from prior right knee arthroplasty     1 minute and 59 seconds of fluoroscopic time was used during this  procedure  This report was finalized on 02/08/2019 12:48 by Dr. Elias Daly MD.    FL C Arm During Surgery [[id number]] Collected:  02/08/19 1247     Updated:  02/08/19 1515    Narrative:       EXAMINATION:   XR HIP W OR WO PELVIS 2-3 VIEW RIGHT-  2/8/2019 12:47 PM  CST     HISTORY: Right hip pinning     4 images are obtained in the operating room. Compression pin is present  in the right femoral head and neck. Femoral nail is present. Distal end  of the  femoral nail demonstrates to fixation screws. Postsurgical  changes noted from prior right knee arthroplasty     1 minute and 59 seconds of fluoroscopic time was used during this  procedure  This report was finalized on 02/08/2019 12:48 by Dr. Elias Daly MD.        I have reviewed the patient current medications.     Assessment/Plan     Active Hospital Problems    Diagnosis   • **Displaced intertrochanteric fracture of right femur, initial encounter for closed fracture (CMS/Former[facility])     Added automatically from request for surgery [id number]     • Hypotension   • Acute blood loss anemia     Superimposed on anemia of chronic disease     • Closed displaced intertrochanteric fracture of right femur (CMS/[facility])     S/p Cephalomedullary nailing 2/8     • Hypertension   • Hyperlipidemia   • Chronic kidney disease on chronic dialysis (CMS/[facility]ly [facility])     on dialysis       Plan:  1.  Post-operative care as per orthopedic surgery. Patient has been placed on Eliquis for DVT prophylaxis. Plavix is on hold.  The patient has had a recent GI bleed with gastric ulcer, so we will need to monitor her very closely in the postoperative period while on Eliquis.  2.  Appreciate nephrology assistance, patient's next dialysis treatment will be Monday  3.  Physical and occupational therapy with toe-touch weightbearing to the right. 4.   is following for discharge disposition, and referral has been made to [facility], will follow for bed offer  5.  Patient's blood pressure noted today, she states it does run rather low at times. She is asymptomatic. May possibly be a candidate for Midodrine, will continue to assess. Will decrease Coreg to 3.125 mg.  6.  Colace for bowel regimen. Lactulose as needed  7.  Labs in AM    Discharge Planning: I expect the patient to be discharged to SNF in ? days.    Lilly Adan, APRN   02/09/19   1:05 PM

## 2019-02-09 NOTE — THERAPY EVALUATION
Acute Care - Occupational Therapy Initial Evaluation  [facility]     Patient Name: Cheri Ely  : 1940  MRN: [id number]  Today's Date: 2019  Onset of Illness/Injury or Date of Surgery: 19  Date of Referral to OT: 19  Referring Physician: Dr. Camacho    Admit Date: 2019       ICD-10-CM ICD-9-CM   1. Displaced intertrochanteric fracture of right femur, initial encounter for closed fracture (CMS/[facility]) S72.141A 820.21   2. Closed displaced intertrochanteric fracture of right femur, initial encounter (CMS/[facility]) S72.141A 820.21   3. Impaired mobility Z74.09 799.89   4. Impaired mobility and ADLs Z74.09 799.89     Patient Active Problem List   Diagnosis   • Hypertension   • Hyperlipidemia   • Chronic renal failure   • Closed fracture of ramus of left pubis (CMS/[facility])   • Occlusion of superior mesenteric artery (CMS/[facility])   • Chronic mesenteric ischemia (CMS/[facility])   • Black tarry stools   • Hx of gastritis   • Acute gastric ulcer with hemorrhage   • Closed displaced intertrochanteric fracture of right femur (CMS/[facility])   • Displaced intertrochanteric fracture of right femur, initial encounter for closed fracture (CMS/[facility])     Past Medical History:   Diagnosis Date   • Arthritis    • Carotid artery disease (CMS/[facility])    • CHF (congestive heart failure) (CMS/[facility])    • Chronic kidney disease on chronic dialysis (CMS/[facility])    • Chronic renal failure     on dialysis ON MON, WED, FRI   • Coronary artery disease    • Disease of thyroid gland    • Diverticulitis    • Gastric ulcer    • History of transfusion    • Hyperlipidemia    • Hypertension    • Injury of back    • Mesenteric artery insufficiency (CMS/[facility])    • Multilevel degenerative disc disease    • Osteoporosis    • Pancreatitis    • Pelvis fracture (CMS/[facility])    • Pneumonia      Past Surgical History:   Procedure Laterality Date   • AORTAGRAM Right 2018    Procedure: MESENTERIC ANGIOGRAM, GROIN ACCESS, MYNX CLOSURE;  Surgeon: Tomasz San DO;   Location:  [facility] OR;  Service:    • AORTIC VALVE SURGERY     • APPENDECTOMY     • BACK SURGERY     • CARDIAC SURGERY     • CAROTID ENDARTERECTOMY Bilateral    • CATARACT EXTRACTION, BILATERAL     • CERVICAL SPINE SURGERY     • CHOLECYSTECTOMY     • COLON SURGERY     • COLONOSCOPY  10/01/2015   • CORONARY ARTERY BYPASS GRAFT     • DIALYSIS FISTULA CREATION     • ENDOSCOPY N/A 12/27/2018    Procedure: ESOPHAGOGASTRODUODENOSCOPY WITH ANESTHESIA;  Surgeon: Moni Garza MD;  Location:  [facility] ENDOSCOPY;  Service: Gastroenterology   • HIP TROCANTERIC NAILING WITH INTRAMEDULLARY HIP SCREW Right 2/8/2019    Procedure: HIP TROCANTERIC NAILING LONG WITH INTRAMEDULLARY HIP SCREW;  Surgeon: Boo Camacho MD;  Location:  [facility] OR;  Service: Orthopedics   • JOINT REPLACEMENT      knee   • LAPAROSCOPIC GASTRIC BANDING     • LEEP     • LUMBAR FUSION     • TOE AMPUTATION Left     2nd toe   • TOTAL KNEE ARTHROPLASTY Bilateral    • TUBAL ABDOMINAL LIGATION     • UPPER GASTROINTESTINAL ENDOSCOPY  10/01/2015          OT ASSESSMENT FLOWSHEET (last 72 hours)      Occupational Therapy Evaluation     Row Name 02/09/19 0920                   OT Evaluation Time/Intention    Subjective Information  complains of;pain;weakness Hands go to sleep  -MM        Document Type  evaluation  -MM        Mode of Treatment  occupational therapy  -MM           General Information    Patient Profile Reviewed?  yes  -MM        Onset of Illness/Injury or Date of Surgery  02/07/19  -MM        Referring Physician  Dr. Camacho  -MM        Patient Observations  alert;cooperative;agree to therapy  -MM        Patient/Family Observations  no family present  -MM        General Observations of Patient  fowlers, no apparent distress,   -MM        Prior Level of Function  independent:;all household mobility;community mobility;transfer;bed mobility;ADL's;feeding;grooming;dressing;bathing  -MM        Equipment Currently Used at Home  wheelchair, motorized;rollator;bath  bench;wheelchair  -MM        Pertinent History of Current Functional Problem  Pt had fall at home with R hip pain. 2/8 Displaced intertrochanteric fx R femur s/p TFN.  -MM        Existing Precautions/Restrictions  fall  (Significant)  RLE TTWB  -MM        Equipment Issued to Patient  gait belt;walker, front wheeled  -MM        Risks Reviewed  patient:;LOB;nausea/vomiting;dizziness;increased discomfort;change in vital signs;increased drainage;lines disloged  -MM        Benefits Reviewed  patient:;improve function;increase independence;increase strength;increase balance;decrease pain;decrease risk of DVT;increase knowledge;improve skin integrity  -MM        Barriers to Rehab  none identified  -MM           Relationship/Environment    Lives With  alone  -MM        Family Caregiver if Needed  child(meagan), adult  -MM        Concerns About Impact on Relationships  Daughter has stayed with her for last month  -MM           Resource/Environmental Concerns    Current Living Arrangements  home/apartment/condo  -MM           Cognitive Assessment/Interventions    Additional Documentation  Cognitive Assessment/Intervention (Group)  -MM           Cognitive Assessment/Intervention- PT/OT    Affect/Mental Status (Cognitive)  WNL  -MM        Orientation Status (Cognition)  oriented x 4  -MM        Follows Commands (Cognition)  WNL  -MM        Cognitive Function (Cognitive)  WNL  -MM        Personal Safety Interventions  fall prevention program maintained;gait belt;nonskid shoes/slippers when out of bed;supervised activity  -MM           Safety Issues, Functional Mobility    Impairments Affecting Function (Mobility)  balance;endurance/activity tolerance;pain;strength  -MM           Mobility Assessment/Treatment    Extremity Weight-bearing Status  right lower extremity  (Significant)   -MM        Right Lower Extremity (Weight-bearing Status)  toe touch weight-bearing (TTWB)  (Significant)   -MM           Bed Mobility  Assessment/Treatment    Bed Mobility Assessment/Treatment  supine-sit;sit-supine;scooting/bridging  -MM        Scooting/Bridging [place] (Bed Mobility)  verbal cues;maximum assist (25% patient effort);2 person assist  -MM        Supine-Sit [place] (Bed Mobility)  verbal cues;moderate assist (50% patient effort);2 person assist  -MM        Sit-Supine [place] (Bed Mobility)  verbal cues;maximum assist (25% patient effort);2 person assist  -MM           Transfer Assessment/Treatment    Transfer Assessment/Treatment  sit-stand transfer;stand-sit transfer  -MM        Maintains Weight-bearing Status (Transfers)  able to maintain  -MM           Sit-Stand Transfer    Sit-Stand [place] (Transfers)  verbal cues;moderate assist (50% patient effort);2 person assist  -MM           Stand-Sit Transfer    Stand-Sit [place] (Transfers)  verbal cues;minimum assist (75% patient effort);2 person assist  -MM           ADL Assessment/Intervention    BADL Assessment/Intervention  lower body dressing  -MM           Lower Body Dressing Assessment/Training    Lower Body Dressing [place] Level  don;socks;dependent (less than 25% patient effort)  -MM        Lower Body Dressing Position  edge of bed sitting  -MM           BADL Safety/Performance    Impairments, BADL Safety/Performance  balance;endurance/activity tolerance;pain  -MM           General ROM    GENERAL ROM COMMENTS  BUE WNL  -MM           MMT (Manual Muscle Testing)    General MMT Comments  functionally 4-/5  -MM           Sensory Assessment/Intervention    Sensory General Assessment  no sensation deficits identified  -MM           Positioning and Restraints    Pre-Treatment Position  in bed  -MM        Post Treatment Position  bed  -MM        In Bed  fowlers;call light within reach;encouraged to call for assist;exit alarm on;side rails up x2;SCD pump applied  -MM           Pain Assessment    Additional Documentation  Pain Scale: Numbers  Pre/Post-Treatment (Group)  -MM           Pain Scale: Numbers Pre/Post-Treatment    Pain Scale: Numbers, Pretreatment  8/10  -MM        Pain Location - Side  Right  -MM        Pain Location  hip  -MM        Pain Intervention(s)  Repositioned;Medication (See MAR)  -MM           Wound 02/08/19 1211 Right hip incision    Wound - Properties Group Date first assessed: 02/08/19  -HT Time first assessed: 1211  -HT Side: Right  -HT Location: hip  -HT Type: incision  -HT       Plan of Care Review    Plan of Care Reviewed With  patient  -MM           Clinical Impression (OT)    Date of Referral to OT  02/08/19  -MM        OT Diagnosis  impaired mobility and adls  -MM        Functional Level at Time of Evaluation (OT Eval)  poor/fair  -MM        Patient/Family Goals Statement (OT Eval)  SNF for therapy  -MM        Criteria for Skilled Therapeutic Interventions Met (OT Eval)  yes;treatment indicated  -MM        Rehab Potential (OT Eval)  good, to achieve stated therapy goals  -MM        Therapy Frequency (OT Eval)  5 times/wk  -MM        Predicted Duration of Therapy Intervention (Therapy Eval)  until d/c  -MM        Care Plan Review (OT)  evaluation/treatment results reviewed;care plan/treatment goals reviewed;risks/benefits reviewed;current/potential barriers reviewed;patient/other agree to care plan  -MM        Anticipated Discharge Disposition (OT)  skilled nursing facility  -MM           Planned OT Interventions    Planned Therapy Interventions (OT Eval)  activity tolerance training;adaptive equipment training;BADL retraining;functional balance retraining;occupation/activity based interventions;patient/caregiver education/training;strengthening exercise;transfer/mobility retraining  -MM           OT Goals    Dressing Goal Selection (OT)  dressing, OT goal 1  -MM        Toileting Goal Selection (OT)  toileting, OT goal 1  -MM        Grooming Goal Selection (OT)  grooming, OT goal 1  -MM        Additional Documentation   Grooming Goal Selection (OT) (Row)  -MM           Dressing Goal 1 (OT)    Activity/Assistive Device (Dressing Goal 1, OT)  lower body dressing  -MM        [place]/Cues Needed (Dressing Goal 1, OT)  moderate assist (50-74% patient effort) with AE  -MM        Time Frame (Dressing Goal 1, OT)  10 days  -MM        Progress/Outcome (Dressing Goal 1, OT)  goal ongoing  -MM           Toileting Goal 1 (OT)    Activity/Device (Toileting Goal 1, OT)  toileting skills, all;commode, bedside with drop arms  -MM        [place] Level/Cues Needed (Toileting Goal 1, OT)  minimum assist (75% or more patient effort);verbal cues required  -MM        Time Frame (Toileting Goal 1, OT)  10 days  -MM        Progress/Outcome (Toileting Goal 1, OT)  goal ongoing  -MM           Grooming Goal 1 (OT)    Activity/Device (Grooming Goal 1, OT)  grooming skills, all  -MM        [place] (Grooming Goal 1, OT)  conditional independence;set-up required  -MM        Time Frame (Grooming Goal 1, OT)  10 days  -MM        Progress/Outcome (Grooming Goal 1, OT)  goal ongoing  -MM           Living Environment    Home Accessibility  tub/shower is not walk in;other (see comments) ramps  -MM          User Key  (r) = Recorded By, (t) = Taken By, (c) = Cosigned By    Initials Name Effective Dates    HT Neyda Reynolds RN 08/02/16 -     MM Ángel Rand, OTR/L 04/03/18 -          Occupational Therapy Education     Title: PT OT SLP Therapies (In Progress)     Topic: Occupational Therapy (In Progress)     Point: ADL training (In Progress)     Description: Instruct learner(s) on proper safety adaptation and remediation techniques during self care or transfers.   Instruct in proper use of assistive devices.    Learning Progress Summary           Patient Acceptance, E, NR by MM at 2/9/2019 10:08 AM    Comment:  OT role, benefits, POC, TTWB RLE                   Point: Precautions (In Progress)     Description: Instruct learner(s) on prescribed  precautions during self-care and functional transfers.    Learning Progress Summary           Patient Acceptance, E, NR by KHURRAM at 2/9/2019 10:08 AM    Comment:  OT role, benefits, POC, TTWB RLE                               User Key     Initials Effective Dates Name Provider Type Discipline    KHURRAM 04/03/18 -  Ángel Rand, OTR/L Occupational Therapist OT                  OT Recommendation and Plan  Outcome Summary/Treatment Plan (OT)  Anticipated Discharge Disposition (OT): skilled nursing facility  Planned Therapy Interventions (OT Eval): activity tolerance training, adaptive equipment training, BADL retraining, functional balance retraining, occupation/activity based interventions, patient/caregiver education/training, strengthening exercise, transfer/mobility retraining  Therapy Frequency (OT Eval): 5 times/wk  Plan of Care Review  Plan of Care Reviewed With: patient  Plan of Care Reviewed With: patient  Outcome Summary: OT eval completed. Pt was mod to max x2 for bed mobility and t/f. Pt was dependent to [place] socks. Pt reports 8/10 pain in R hip with movement. Skilled OT recommended to address adls, functional mobility and education. Recommended d/c SNF.    Outcome Measures     Row Name 02/09/19 1007 02/09/19 1000          How much help from another person do you currently need...    Turning from your back to your side while in flat bed without using bedrails?  --  2  -LH     Moving from lying on back to sitting on the side of a flat bed without bedrails?  --  2  -LH     Moving to and from a bed to a chair (including a wheelchair)?  --  2  -LH     Standing up from a chair using your arms (e.g., wheelchair, bedside chair)?  --  2  -LH     Climbing 3-5 steps with a railing?  --  1  -LH     To walk in hospital room?  --  2  -LH     AM-PAC 6 Clicks Score  --  11  -LH        How much help from another is currently needed...    Putting on and taking off regular lower body clothing?  1  -MM  --     Bathing  (including washing, rinsing, and drying)  2  -MM  --     Toileting (which includes using toilet bed pan or urinal)  2  -MM  --     Putting on and taking off regular upper body clothing  3  -MM  --     Taking care of personal grooming (such as brushing teeth)  3  -MM  --     Eating meals  3  -MM  --     Score  14  -MM  --        Functional Assessment    Outcome Measure Options  AM-PAC 6 Clicks Daily Activity (OT)  -  AM-PAC 6 Clicks Basic Mobility (PT)  -       User Key  (r) = Recorded By, (t) = Taken By, (c) = Cosigned By    Initials Name Provider Type     Espinoza Cole, PT Physical Therapist    MM Ángel Rand, OTR/L Occupational Therapist          Time Calculation:   Time Calculation- OT     Row Name 02/09/19 0920             Time Calculation- OT    OT Start Time  0920 +12 min chart review  -MM      OT Stop Time  1001  -MM      OT Time Calculation (min)  41 min  -MM      OT Received On  02/09/19  -MM      OT Goal Re-Cert Due Date  02/19/19  -MM        User Key  (r) = Recorded By, (t) = Taken By, (c) = Cosigned By    Initials Name Provider Type    MM Ángel Rand, OTR/L Occupational Therapist        Therapy Suggested Charges     Code   Minutes Charges    None           Therapy Charges for Today     Code Description Service Date Service Provider Modifiers Qty    [id number] HC OT EVAL HIGH COMPLEXITY 4 2/9/2019 Ángel Rand, OTR/L GO 1               ZAK Soto/PUSHPA  2/9/2019

## 2019-02-10 LAB
ANION GAP SERPL CALCULATED.3IONS-SCNC: 10 MMOL/L (ref 4–13)
BUN BLD-MCNC: 42 MG/DL (ref 5–21)
BUN/CREAT SERPL: 6.1 (ref 7–25)
CALCIUM SPEC-SCNC: 8.1 MG/DL (ref 8.4–10.4)
CHLORIDE SERPL-SCNC: 97 MMOL/L (ref 98–110)
CO2 SERPL-SCNC: 26 MMOL/L (ref 24–31)
CREAT BLD-MCNC: 6.92 MG/DL (ref 0.5–1.4)
GFR SERPL CREATININE-BSD FRML MDRD: 6 ML/MIN/1.73
GFR SERPL CREATININE-BSD FRML MDRD: ABNORMAL ML/MIN/1.73
GLUCOSE BLD-MCNC: 112 MG/DL (ref 70–100)
HCT VFR BLD AUTO: 26.1 % (ref 37–47)
HGB BLD-MCNC: 8.3 G/DL (ref 12–16)
POTASSIUM BLD-SCNC: 4.7 MMOL/L (ref 3.5–5.3)
SODIUM BLD-SCNC: 133 MMOL/L (ref 135–145)

## 2019-02-10 PROCEDURE — 80048 BASIC METABOLIC PNL TOTAL CA: CPT | Performed by: INTERNAL MEDICINE

## 2019-02-10 PROCEDURE — 97530 THERAPEUTIC ACTIVITIES: CPT

## 2019-02-10 PROCEDURE — 85018 HEMOGLOBIN: CPT | Performed by: ORTHOPAEDIC SURGERY

## 2019-02-10 PROCEDURE — 85014 HEMATOCRIT: CPT | Performed by: ORTHOPAEDIC SURGERY

## 2019-02-10 RX ADMIN — OXYCODONE HYDROCHLORIDE AND ACETAMINOPHEN 1 TABLET: 10; 325 TABLET ORAL at 08:26

## 2019-02-10 RX ADMIN — LEVOTHYROXINE SODIUM 150 MCG: 150 TABLET ORAL at 08:26

## 2019-02-10 RX ADMIN — APIXABAN 2.5 MG: 2.5 TABLET, FILM COATED ORAL at 17:37

## 2019-02-10 RX ADMIN — NYSTATIN: 100000 POWDER TOPICAL at 20:33

## 2019-02-10 RX ADMIN — PANTOPRAZOLE SODIUM 40 MG: 40 TABLET, DELAYED RELEASE ORAL at 08:26

## 2019-02-10 RX ADMIN — ATORVASTATIN CALCIUM 10 MG: 10 TABLET, FILM COATED ORAL at 08:27

## 2019-02-10 RX ADMIN — CARVEDILOL 3.12 MG: 3.12 TABLET, FILM COATED ORAL at 08:27

## 2019-02-10 RX ADMIN — SODIUM CHLORIDE, PRESERVATIVE FREE 3 ML: 5 INJECTION INTRAVENOUS at 08:29

## 2019-02-10 RX ADMIN — NYSTATIN: 100000 POWDER TOPICAL at 08:25

## 2019-02-10 RX ADMIN — SODIUM CHLORIDE, PRESERVATIVE FREE 3 ML: 5 INJECTION INTRAVENOUS at 20:33

## 2019-02-10 RX ADMIN — CHOLECALCIFEROL CAP 125 MCG (5000 UNIT) 5000 UNITS: 125 CAP at 08:27

## 2019-02-10 RX ADMIN — APIXABAN 2.5 MG: 2.5 TABLET, FILM COATED ORAL at 06:38

## 2019-02-10 RX ADMIN — DOCUSATE SODIUM 100 MG: 100 CAPSULE ORAL at 20:33

## 2019-02-10 RX ADMIN — DOCUSATE SODIUM 100 MG: 100 CAPSULE ORAL at 08:27

## 2019-02-10 RX ADMIN — SERTRALINE 50 MG: 50 TABLET, FILM COATED ORAL at 08:27

## 2019-02-10 NOTE — THERAPY TREATMENT NOTE
Acute Care - Physical Therapy Treatment Note  [facility]     Patient Name: Cheri Ely  : 1940  MRN: [id number]  Today's Date: 2/10/2019  Onset of Illness/Injury or Date of Surgery: 19  Date of Referral to PT: 19  Referring Physician: Dr. Camacho    Admit Date: 2019    Visit Dx:    ICD-10-CM ICD-9-CM   1. Displaced intertrochanteric fracture of right femur, initial encounter for closed fracture (CMS/[facility]) S72.141A 820.21   2. Closed displaced intertrochanteric fracture of right femur, initial encounter (CMS/[facility]) S72.141A 820.21   3. Impaired mobility Z74.09 799.89   4. Impaired mobility and ADLs Z74.09 799.89     Patient Active Problem List   Diagnosis   • Hypertension   • Hyperlipidemia   • Chronic kidney disease on chronic dialysis (CMS/[facility])   • Closed fracture of ramus of left pubis (CMS/[facility])   • Occlusion of superior mesenteric artery (CMS/[facility])   • Chronic mesenteric ischemia (CMS/[facility])   • Black tarry stools   • Hx of gastritis   • Acute gastric ulcer with hemorrhage   • Closed displaced intertrochanteric fracture of right femur (CMS/[facility])   • Displaced intertrochanteric fracture of right femur, initial encounter for closed fracture (CMS/[facility])   • Hypotension   • Acute blood loss anemia       Therapy Treatment    Rehabilitation Treatment Summary     Row Name 02/10/19 1330             Treatment Time/Intention    Discipline  physical therapy assistant  -CW      Document Type  therapy note (daily note)  -CW      Subjective Information  complains of;pain;weakness  -CW      Mode of Treatment  physical therapy  -CW      Comment  Needs pain meds before tx  (Significant)   -CW2      Existing Precautions/Restrictions  fall;weight bearing TTWB RLE  -CW      Treatment Considerations/Comments  TTWB RLE  -CW      Recorded by [CW] Casandra Crooks PTA 02/10/19 1332  [CW2] Casandra Crooks PTA 02/10/19 1341      Row Name 02/10/19 1330             Mobility Assessment/Intervention    Extremity  Weight-bearing Status  right lower extremity  -CW      Right Lower Extremity (Weight-bearing Status)  toe touch weight-bearing (TTWB)  -CW      Recorded by [CW] Casandra Crooks, PTA 02/10/19 1341      Row Name 02/10/19 1330             Bed Mobility Assessment/Treatment    Supine-Sit Webster (Bed Mobility)  maximum assist (25% patient effort);2 person assist;verbal cues  -CW      Sit-Supine Webster (Bed Mobility)  maximum assist (25% patient effort);2 person assist;verbal cues  -CW      Bed Mobility, Safety Issues  decreased use of legs for bridging/pushing  -CW      Assistive Device (Bed Mobility)  draw sheet;head of bed elevated  -CW      Recorded by [CW] Casandra Crooks PTA 02/10/19 1341      Row Name 02/10/19 1330             Functional Mobility    Functional Mobility- Comment  Pt stands very forward flexed. States this is her normal posture.  Usually uses a rollator and stands by holding onto the rollator.  (Significant)   -CW      Recorded by [CW] Casandra Crooks PTA 02/10/19 1347      Row Name 02/10/19 133             Transfer Assessment/Treatment    Comment (Transfers)  Needed assist to move RLE forward to avoid beariing weight during stand to sit.  -CW      Recorded by [CW] Casandra Crooks PTA 02/10/19 1341      Row Name 02/10/19 1330             Sit-Stand Transfer    Sit-Stand Webster (Transfers)  moderate assist (50% patient effort);maximum assist (25% patient effort);2 person assist;verbal cues stood x 2 - pt in too much pain to attempt any more activity  -CW      Recorded by [CW] Casandra Crooks, PTA 02/10/19 1341      Row Name 02/10/19 1330             Stand-Sit Transfer    Stand-Sit Webster (Transfers)  moderate assist (50% patient effort);2 person assist;verbal cues  -CW      Recorded by [CW] Casandra Crooks PTA 02/10/19 1341      Row Name 02/10/19 1330             Positioning and Restraints    Pre-Treatment Position  in bed  -CW      Post Treatment  Position  bed  -CW      In Bed  fowlers;call light within reach;encouraged to call for assist;SCD pump applied;RLE elevated  -CW      Recorded by [CW] Casandra Crooks PTA 02/10/19 1341      Row Name 02/10/19 1330             Pain Assessment    Additional Documentation  Pain Scale: Numbers Pre/Post-Treatment (Group)  -CW      Recorded by [CW] Casandra Crooks PTA 02/10/19 1341      Row Name 02/10/19 1330             Pain Scale: Numbers Pre/Post-Treatment    Pain Scale: Numbers, Pretreatment  5/10  -CW      Pain Scale: Numbers, Post-Treatment  10/10  -CW      Pain Location - Side  Right  -CW      Pain Location  hip  -CW      Pain Intervention(s)  Repositioned  -CW      Recorded by [CW] Casandra Crooks PTA 02/10/19 1341      Row Name                Wound 02/08/19 1211 Right hip incision    Wound - Properties Group Date first assessed: 02/08/19 [HT] Time first assessed: 1211 [HT] Side: Right [HT] Location: hip [HT] Type: incision [HT] Recorded by:  [HT] Neyda Reynolds RN 02/08/19 1211      User Key  (r) = Recorded By, (t) = Taken By, (c) = Cosigned By    Initials Name Effective Dates Discipline     Casandra Crooks PTA 06/22/15 -  PT    HT Neyda Reynolds RN 08/02/16 -  Nurse          Wound 02/08/19 1211 Right hip incision (Active)   Dressing Appearance no drainage 2/10/2019  8:00 AM   Closure RIYA 2/10/2019  8:00 AM   Base dressing in place, unable to visualize 2/10/2019  8:00 AM   Drainage Amount none 2/10/2019  8:00 AM   Dressing Care, Wound foam 2/10/2019  8:00 AM           Physical Therapy Education     Title: PT OT SLP Therapies (In Progress)     Topic: Physical Therapy (Done)     Point: Mobility training (Done)     Learning Progress Summary           Patient Acceptance, E,D, VU,NR by CW at 2/10/2019  1:42 PM    Comment:  bed mobility, transfers, WB precautions, plan of care    Acceptance, E,D, DU,VU by  at 2/9/2019 10:06 AM    Comment:  benefits of PT and POC, call for assist OOB, TTWB RLE                    Point: Precautions (Done)     Learning Progress Summary           Patient Acceptance, E,D, VU,NR by  at 2/10/2019  1:42 PM    Comment:  bed mobility, transfers, WB precautions, plan of care    Acceptance, E,D, DU,VU by  at 2/9/2019 10:06 AM    Comment:  benefits of PT and POC, call for assist OOB, TTWB RLE                               User Key     Initials Effective Dates Name Provider Type Discipline     08/02/16 -  Espinoza Cole, PT Physical Therapist PT     06/22/15 -  Casandra Crooks PTA Physical Therapy Assistant PT                PT Recommendation and Plan     Plan of Care Reviewed With: patient  Progress: no change  Outcome Summary: Pt required max x 2 assist for bed mobility.  Mod/max x 2 assist for sit to stand transfers with rolling walker..  Stood twice.  Needs assist to move RLE due to pain and weakness.  Will continue to benefit from therapy to increase strength and improve functional mobility.  Outcome Measures     Row Name 02/10/19 1300 02/09/19 1007 02/09/19 1000       How much help from another person do you currently need...    Turning from your back to your side while in flat bed without using bedrails?  2  -CW  --  2  -LH    Moving from lying on back to sitting on the side of a flat bed without bedrails?  2  -CW  --  2  -LH    Moving to and from a bed to a chair (including a wheelchair)?  2  -CW  --  2  -LH    Standing up from a chair using your arms (e.g., wheelchair, bedside chair)?  2  -CW  --  2  -LH    Climbing 3-5 steps with a railing?  1  -CW  --  1  -LH    To walk in hospital room?  1  -CW  --  2  -LH    AM-PAC 6 Clicks Score  10  -CW  --  11  -LH       How much help from another is currently needed...    Putting on and taking off regular lower body clothing?  --  1  -MM  --    Bathing (including washing, rinsing, and drying)  --  2  -MM  --    Toileting (which includes using toilet bed pan or urinal)  --  2  -MM  --    Putting on and taking off regular  upper body clothing  --  3  -MM  --    Taking care of personal grooming (such as brushing teeth)  --  3  -MM  --    Eating meals  --  3  -MM  --    Score  --  14  -MM  --       Functional Assessment    Outcome Measure Options  AM-PAC 6 Clicks Basic Mobility (PT)  -CW  AM-PAC 6 Clicks Daily Activity (OT)  -MM  AM-PAC 6 Clicks Basic Mobility (PT)  -      User Key  (r) = Recorded By, (t) = Taken By, (c) = Cosigned By    Initials Name Provider Type     Espinoza Cole, PT Physical Therapist    CW Casandra Crooks, PTA Physical Therapy Assistant    MM Ángel Rand, OTR/L Occupational Therapist         Time Calculation:   PT Charges     Row Name 02/10/19 1347             Time Calculation    Start Time  1300  -CW      Stop Time  1330  -CW      Time Calculation (min)  30 min  -CW      PT Received On  02/10/19  -CW      PT Goal Re-Cert Due Date  02/19/19  -CW         Time Calculation- PT    Total Timed Code Minutes- PT  30 minute(s)  -CW         Timed Charges    14620 - PT Therapeutic Activity Minutes  30  -CW        User Key  (r) = Recorded By, (t) = Taken By, (c) = Cosigned By    Initials Name Provider Type    Casandra Carney, PTA Physical Therapy Assistant        Therapy Suggested Charges     Code   Minutes Charges    11901 (CPT®) Hc Pt Neuromusc Re Education Ea 15 Min      35038 (CPT®) Hc Pt Ther Proc Ea 15 Min      32448 (CPT®) Hc Gait Training Ea 15 Min      96202 (CPT®) Hc Pt Therapeutic Act Ea 15 Min 30 2    57773 (CPT®) Hc Pt Manual Therapy Ea 15 Min      35288 (CPT®) Hc Pt Iontophoresis Ea 15 Min      47208 (CPT®) Hc Pt Elec Stim Ea-Per 15 Min      77966 (CPT®) Hc Pt Ultrasound Ea 15 Min      16787 (CPT®) Hc Pt Self Care/Mgmt/Train Ea 15 Min      53673 (CPT®) Hc Pt Prosthetic (S) Train Initial Encounter, Each 15 Min      41777 (CPT®) Hc Pt Orthotic(S)/Prosthetic(S) Encounter, Each 15 Min      51970 (CPT®) Hc Orthotic(S) Mgmt/Train Initial Encounter, Each 15min      Total  30 2        Therapy  Charges for Today     Code Description Service Date Service Provider Modifiers Qty    66449077983 HC PT THERAPEUTIC ACT EA 15 MIN 2/10/2019 Casandra Crooks, PTA GP 2          PT G-Codes  Outcome Measure Options: AM-PAC 6 Clicks Basic Mobility (PT)  AM-PAC 6 Clicks Score: 10  Score: 14    Casandra Crooks PTA  2/10/2019

## 2019-02-10 NOTE — PLAN OF CARE
Problem: Patient Care Overview  Goal: Plan of Care Review  Outcome: Ongoing (interventions implemented as appropriate)   02/10/19 4213   Coping/Psychosocial   Plan of Care Reviewed With patient   Plan of Care Review   Progress no change   OTHER   Outcome Summary Pt cont c/o R hip pain 8/10 with movement. Pt med with percocet as ordered. Pt encouraged to shift weight in bed and reposition more frequently. Pt stood only with PT.        Problem: Fall Risk (Adult)  Goal: Absence of Fall  Outcome: Ongoing (interventions implemented as appropriate)      Problem: Skin Injury Risk (Adult)  Goal: Skin Health and Integrity  Outcome: Ongoing (interventions implemented as appropriate)

## 2019-02-10 NOTE — PLAN OF CARE
Problem: Patient Care Overview  Goal: Plan of Care Review  Outcome: Ongoing (interventions implemented as appropriate)   02/10/19 0433   Coping/Psychosocial   Plan of Care Reviewed With patient   Plan of Care Review   Progress no change   OTHER   Outcome Summary B/P cont to be in the 90's. Turned every 2 hours. Medicated as needed for pain. Resting easy at present time. Will cont. to monitor.        Problem: Fall Risk (Adult)  Goal: Absence of Fall  Outcome: Ongoing (interventions implemented as appropriate)      Problem: Skin Injury Risk (Adult)  Goal: Skin Health and Integrity  Outcome: Ongoing (interventions implemented as appropriate)

## 2019-02-10 NOTE — PLAN OF CARE
Problem: Patient Care Overview  Goal: Plan of Care Review  Outcome: Ongoing (interventions implemented as appropriate)   02/10/19 1312   Coping/Psychosocial   Plan of Care Reviewed With patient   Plan of Care Review   Progress no change   OTHER   Outcome Summary Pt required max x 2 assist for bed mobility. Mod/max x 2 assist for sit to stand transfers with rolling walker.. Stood twice. Needs assist to move RLE due to pain and weakness. Will continue to benefit from therapy to increase strength and improve functional mobility.

## 2019-02-10 NOTE — PROGRESS NOTES
[facility] Medicine Services  INPATIENT PROGRESS NOTE    Length of Stay: 2  Date of Admission: 2/7/2019  Primary Care Physician: Barrett Mckenzie Jr, MD    Subjective   Chief Complaint: Follow-up hip pain  HPI   The patient is resting in bed. She tells me she feels ok today other than her hip pain. The pain medication does seem to help. She denies any chest pain or shortness of breath. She denies abdominal pain, nausea, vomiting, or diarrhea.     Review of Systems   All pertinent negatives and positives are as above. All other systems have been reviewed and are negative unless otherwise stated.     Objective    Temp:  [97.6 °F (36.4 °C)-99 °F (37.2 °C)] 98.3 °F (36.8 °C)  Heart Rate:  [] 102  Resp:  [16-20] 18  BP: ()/(38-46) 103/40  Physical Exam  Constitutional: She is oriented to person, place, and time. She appears well-developed and well-nourished. No distress.   HENT:   Head: Normocephalic and atraumatic.   Neck: Normal range of motion. Neck supple. No JVD present. No tracheal deviation present. No thyromegaly present.   Cardiovascular: Normal rate, regular rhythm and normal heart sounds. Exam reveals no gallop and no friction rub.   No murmur heard. Left arm Av fistula with + thrill/bruit  Pulmonary/Chest: Effort normal. She has no wheezes. She has no rales.   Diminished breath sounds   Abdominal: Soft. Bowel sounds are normal. She exhibits no distension. There is no tenderness. There is no guarding.   Musculoskeletal: Normal range of motion. She exhibits tenderness (right hip). She exhibits no edema.   Neurological: She is alert and oriented to person, place, and time. No cranial nerve deficit.   Skin: Skin is warm and dry. No rash noted. No erythema.   Right hip dressings dry and intact   Psychiatric: She has a normal mood and affect. Her behavior is normal. Judgment and thought content normal.   Vitals reviewed.    Results Review:  I have reviewed the labs,  radiology results, and diagnostic studies.    Laboratory Data:   Results from last 7 days   Lab Units 02/10/19  0623 02/09/19  0652 02/08/19  0715 02/07/19  2249   WBC 10*3/mm3  --  8.91 9.91 5.66   HEMOGLOBIN g/dL 8.3* 8.7* 10.2* 10.9*   HEMATOCRIT % 26.1* 27.2* 31.9* 33.8*   PLATELETS 10*3/mm3  --  119* 142 139        Results from last 7 days   Lab Units 02/10/19  0623 02/09/19  0653 02/08/19  0636 02/07/19  2250   SODIUM mmol/L 133* 136 138 138   POTASSIUM mmol/L 4.7 5.1 5.4* 4.4   CHLORIDE mmol/L 97* 101 97* 98   CO2 mmol/L 26.0 26.0 31.0 28.0   BUN mg/dL 42* 29* 52* 47*   CREATININE mg/dL 6.92* 5.13* 6.99* 6.04*   CALCIUM mg/dL 8.1* 8.0* 8.7 8.5   BILIRUBIN mg/dL  --   --  0.5 0.4   ALK PHOS U/L  --   --  154* 170*   ALT (SGPT) U/L  --   --  20 20   AST (SGOT) U/L  --   --  41 33   GLUCOSE mg/dL 112* 121* 129* 146*     I have reviewed the patient current medications.     Assessment/Plan     Active Hospital Problems    Diagnosis   • **Displaced intertrochanteric fracture of right femur, initial encounter for closed fracture (CMS/[facility])     Added automatically from request for surgery [id number]     • Hypotension   • Acute blood loss anemia     Superimposed on anemia of chronic disease     • Closed displaced intertrochanteric fracture of right femur (CMS/[facility])     S/p Cephalomedullary nailing 2/8     • Hypertension   • Hyperlipidemia   • Chronic kidney disease on chronic dialysis (CMS/[facility])     on dialysis       Plan:  1.  Post-operative care as per orthopedic surgery. Patient has been placed on Eliquis for DVT prophylaxis. Plavix is on hold.  The patient has had a recent GI bleed with gastric ulcer, so we will need to monitor her very closely in the postoperative period while on Eliquis.  2.  Appreciate nephrology assistance, patient's next dialysis treatment will be tomorrow  3.  Physical and occupational therapy with toe-touch weightbearing to the right  4.  Referral has been made to [facility], will follow for  bed offer. 3 night inpatient stay will be complete tomorrow  5.  Continue to monitor hgb/hct. Would like to give the patient a unit of blood with dialysis tomorrow if ok with nephrology  6.  Patient's blood pressure noted today, she states it does run rather low at times. She is asymptomatic. May possibly be a candidate for Midodrine, will continue to assess and speak with nephrology. Coreg dosage has been decreased  7.  Labs in AM  8.  Encouraged incentive spirometer use    Discharge Planning: I expect the patient to be discharged to SNF in 1-2 days.    Lilly Adan, APRN   02/10/19   7:29 AM

## 2019-02-10 NOTE — PROGRESS NOTES
"PROGRESS NOTE.      Patient:  Cheri Ely  YOB: 1940  Date of Service: 2/10/2019  MRN: [id number]   Acct: [id number]   Primary Care Physician: Barrett Mckenzie Jr, MD  Advance Directive:   Code Status and Medical Interventions:   Ordered at: 02/08/19 0134     Level Of Support Discussed With:    Patient     Code Status:    CPR     Medical Interventions (Level of Support Prior to Arrest):    Full     Admit Date: 2/7/2019       Hospital Day: 2  Referring Provider: Jony Concepcion MD      Patient Seen, Chart, Consults, Notes, Labs, Radiology studies reviewed.    Subjective:    Cheri Ely is a 78 y.o. female  whom we were consulted for ESRD management. Patient is admitted after a fall. She sustained a displaced intertrochanteric right femoral fracture. She had hip fracture repair on 2/8. She is s./p  dialysis on 2/8. Today, she continues to have pain in her right hip but overall less.        Review of Systems:  History obtained from chart review and the patient  General ROS: No fever or chills  Respiratory ROS: No cough, shortness of breath, wheezing  Cardiovascular ROS: no chest pain or dyspnea on exertion  Gastrointestinal ROS: No abdominal pain or melena  Genito-Urinary ROS: No dysuria or hematuria  Musculoskeletal: Positive for right hip pain  Skin: negative    Objective:  BP (!) 89/48 (BP Location: Right arm, Patient Position: Lying) Comment: nurse notified  Pulse 93   Temp 98.6 °F (37 °C) (Oral)   Resp 18   Ht 149.9 cm (59\")   Wt 76.7 kg (169 lb 3.2 oz)   SpO2 94%   BMI 34.17 kg/m²   No intake or output data in the 24 hours ending 02/10/19 1311    Physical examination:  General: awake/alert   Chest:  clear to auscultation bilaterally without respiratory distress  CVS: regular rate and rhythm  Abdominal: soft, nontender, normal bowel sounds  Extremities: no cyanosis or edema  Skin: warm and dry without rash  Neuro: No focal motor deficits    Labs:  Lab Results (last 24 hours)     " Procedure Component Value Units Date/Time    Basic Metabolic Panel [[id number]]  (Abnormal) Collected:  02/10/19 0623    Specimen:  Blood Updated:  02/10/19 0726     Glucose 112 mg/dL      BUN 42 mg/dL      Creatinine 6.92 mg/dL      Sodium 133 mmol/L      Potassium 4.7 mmol/L      Chloride 97 mmol/L      CO2 26.0 mmol/L      Calcium 8.1 mg/dL      eGFR  African Amer -- mL/min/1.73      Comment: <15 Indicative of kidney failure.        eGFR Non African Amer 6 mL/min/1.73      Comment: <15 Indicative of kidney failure.        BUN/Creatinine Ratio 6.1     Anion Gap 10.0 mmol/L     Narrative:       The MDRD GFR formula is only valid for adults with stable renal function between ages 18 and 70.    Hemoglobin & Hematocrit, Blood [[id number]]  (Abnormal) Collected:  02/10/19 0623    Specimen:  Blood Updated:  02/10/19 0644     Hemoglobin 8.3 g/dL      Hematocrit 26.1 %           Radiology:   Imaging Results (last 24 hours)     ** No results found for the last 24 hours. **              Assessment   -ESRD  -Right displaced intertrochanteric femur fracture (s/p repair)  -Benign HTN  -Hyperkalemia  -Anemia of CKD    Plan:  Dialysis is due next on February 11.  We will follow-up labs.      Faisal Sevilla MD  2/10/2019  1:11 PM

## 2019-02-11 VITALS
SYSTOLIC BLOOD PRESSURE: 82 MMHG | BODY MASS INDEX: 34.11 KG/M2 | HEART RATE: 97 BPM | OXYGEN SATURATION: 98 % | WEIGHT: 169.2 LBS | TEMPERATURE: 98.1 F | RESPIRATION RATE: 18 BRPM | HEIGHT: 59 IN | DIASTOLIC BLOOD PRESSURE: 47 MMHG

## 2019-02-11 LAB
ABO GROUP BLD: NORMAL
ALBUMIN SERPL-MCNC: 3 G/DL (ref 3.5–5)
ALBUMIN/GLOB SERPL: 1 G/DL (ref 1.1–2.5)
ALP SERPL-CCNC: 172 U/L (ref 24–120)
ALT SERPL W P-5'-P-CCNC: 16 U/L (ref 0–54)
ANION GAP SERPL CALCULATED.3IONS-SCNC: 14 MMOL/L (ref 4–13)
AST SERPL-CCNC: 42 U/L (ref 7–45)
BILIRUB SERPL-MCNC: 0.6 MG/DL (ref 0.1–1)
BLD GP AB SCN SERPL QL: NEGATIVE
BUN BLD-MCNC: 54 MG/DL (ref 5–21)
BUN/CREAT SERPL: 6.5 (ref 7–25)
CALCIUM SPEC-SCNC: 7.9 MG/DL (ref 8.4–10.4)
CHLORIDE SERPL-SCNC: 94 MMOL/L (ref 98–110)
CO2 SERPL-SCNC: 24 MMOL/L (ref 24–31)
CREAT BLD-MCNC: 8.29 MG/DL (ref 0.5–1.4)
GFR SERPL CREATININE-BSD FRML MDRD: 5 ML/MIN/1.73
GFR SERPL CREATININE-BSD FRML MDRD: ABNORMAL ML/MIN/1.73
GLOBULIN UR ELPH-MCNC: 3.1 GM/DL
GLUCOSE BLD-MCNC: 108 MG/DL (ref 70–100)
HBV CORE IGM SERPL QL IA: NEGATIVE
HCT VFR BLD AUTO: 25 % (ref 37–47)
HGB BLD-MCNC: 8 G/DL (ref 12–16)
POTASSIUM BLD-SCNC: 5 MMOL/L (ref 3.5–5.3)
PROT SERPL-MCNC: 6.1 G/DL (ref 6.3–8.7)
RH BLD: POSITIVE
SODIUM BLD-SCNC: 132 MMOL/L (ref 135–145)
T&S EXPIRATION DATE: NORMAL

## 2019-02-11 PROCEDURE — 86900 BLOOD TYPING SEROLOGIC ABO: CPT | Performed by: NURSE PRACTITIONER

## 2019-02-11 PROCEDURE — 85018 HEMOGLOBIN: CPT | Performed by: ORTHOPAEDIC SURGERY

## 2019-02-11 PROCEDURE — 87350 HEPATITIS BE AG IA: CPT | Performed by: INTERNAL MEDICINE

## 2019-02-11 PROCEDURE — 86923 COMPATIBILITY TEST ELECTRIC: CPT

## 2019-02-11 PROCEDURE — 86901 BLOOD TYPING SEROLOGIC RH(D): CPT | Performed by: NURSE PRACTITIONER

## 2019-02-11 PROCEDURE — 80053 COMPREHEN METABOLIC PANEL: CPT | Performed by: NURSE PRACTITIONER

## 2019-02-11 PROCEDURE — 86707 HEPATITIS BE ANTIBODY: CPT | Performed by: INTERNAL MEDICINE

## 2019-02-11 PROCEDURE — 97530 THERAPEUTIC ACTIVITIES: CPT

## 2019-02-11 PROCEDURE — 85014 HEMATOCRIT: CPT | Performed by: ORTHOPAEDIC SURGERY

## 2019-02-11 PROCEDURE — 86705 HEP B CORE ANTIBODY IGM: CPT | Performed by: INTERNAL MEDICINE

## 2019-02-11 PROCEDURE — 86850 RBC ANTIBODY SCREEN: CPT | Performed by: NURSE PRACTITIONER

## 2019-02-11 RX ORDER — CARVEDILOL 3.12 MG/1
3.12 TABLET ORAL DAILY
Status: ON HOLD
Start: 2019-02-11 | End: 2019-03-25

## 2019-02-11 RX ORDER — LACTULOSE 20 G/30ML
20 SOLUTION ORAL DAILY PRN
Qty: 30 ML | Status: ON HOLD
Start: 2019-02-11 | End: 2019-06-27

## 2019-02-11 RX ORDER — ASPIRIN 325 MG
325 TABLET, DELAYED RELEASE (ENTERIC COATED) ORAL 2 TIMES DAILY
Qty: 84 TABLET | Refills: 0 | Status: SHIPPED | OUTPATIENT
Start: 2019-02-11 | End: 2019-03-12 | Stop reason: HOSPADM

## 2019-02-11 RX ORDER — PSEUDOEPHEDRINE HCL 30 MG
100 TABLET ORAL 2 TIMES DAILY
Qty: 60 EACH | Refills: 1 | Status: ON HOLD | OUTPATIENT
Start: 2019-02-11 | End: 2019-06-27

## 2019-02-11 RX ORDER — CARVEDILOL 3.12 MG/1
3.12 TABLET ORAL 2 TIMES DAILY WITH MEALS
Status: ON HOLD
Start: 2019-02-11 | End: 2019-03-25

## 2019-02-11 RX ORDER — DIPHENHYDRAMINE HCL 25 MG
25 CAPSULE ORAL 2 TIMES DAILY PRN
Status: ON HOLD
Start: 2019-02-11 | End: 2019-03-25

## 2019-02-11 RX ORDER — NYSTATIN 100000 [USP'U]/G
POWDER TOPICAL EVERY 12 HOURS SCHEDULED
Start: 2019-02-11 | End: 2019-03-12 | Stop reason: HOSPADM

## 2019-02-11 RX ORDER — MIDODRINE HYDROCHLORIDE 2.5 MG/1
2.5 TABLET ORAL 3 TIMES DAILY
Start: 2019-02-11 | End: 2019-04-02 | Stop reason: HOSPADM

## 2019-02-11 RX ORDER — OXYCODONE AND ACETAMINOPHEN 10; 325 MG/1; MG/1
1 TABLET ORAL EVERY 4 HOURS PRN
Qty: 60 TABLET | Refills: 0 | Status: ON HOLD | OUTPATIENT
Start: 2019-02-11 | End: 2019-03-25

## 2019-02-11 RX ADMIN — PANTOPRAZOLE SODIUM 40 MG: 40 TABLET, DELAYED RELEASE ORAL at 08:54

## 2019-02-11 RX ADMIN — APIXABAN 2.5 MG: 2.5 TABLET, FILM COATED ORAL at 17:25

## 2019-02-11 RX ADMIN — SERTRALINE 50 MG: 50 TABLET, FILM COATED ORAL at 08:54

## 2019-02-11 RX ADMIN — OXYCODONE HYDROCHLORIDE AND ACETAMINOPHEN 1 TABLET: 10; 325 TABLET ORAL at 08:54

## 2019-02-11 RX ADMIN — ATORVASTATIN CALCIUM 10 MG: 10 TABLET, FILM COATED ORAL at 08:55

## 2019-02-11 RX ADMIN — BISACODYL 5 MG: 5 TABLET, COATED ORAL at 17:22

## 2019-02-11 RX ADMIN — LEVOTHYROXINE SODIUM 150 MCG: 150 TABLET ORAL at 08:55

## 2019-02-11 RX ADMIN — OXYCODONE HYDROCHLORIDE AND ACETAMINOPHEN 1 TABLET: 10; 325 TABLET ORAL at 17:22

## 2019-02-11 RX ADMIN — LOSARTAN POTASSIUM 25 MG: 25 TABLET ORAL at 08:55

## 2019-02-11 RX ADMIN — CARVEDILOL 3.12 MG: 3.12 TABLET, FILM COATED ORAL at 08:55

## 2019-02-11 RX ADMIN — DOCUSATE SODIUM 100 MG: 100 CAPSULE ORAL at 08:54

## 2019-02-11 RX ADMIN — APIXABAN 2.5 MG: 2.5 TABLET, FILM COATED ORAL at 05:57

## 2019-02-11 RX ADMIN — CHOLECALCIFEROL CAP 125 MCG (5000 UNIT) 5000 UNITS: 125 CAP at 08:55

## 2019-02-11 NOTE — PROGRESS NOTES
Continued Stay Note  [facility]     Patient Name: Cheri Ely  MRN: [id number]  Today's Date: 2/11/2019    Admit Date: 2/7/2019    Discharge Plan     Row Name 02/11/19 0946       Plan    Plan  [facility]    Patient/Family in Agreement with Plan  yes    Final Discharge Disposition Code  03 - skilled nursing facility (SNF)    Final Note  Patient is accepted at [facility] and can admit there today. SW notified Dr. Banks. Discharge summary, discharge med list, and scripts to be faxed to [phone]. Patient's nurse will call report to [phone]. Plan EMS transport.                   ELYSSA Hung

## 2019-02-11 NOTE — PLAN OF CARE
Problem: Patient Care Overview  Goal: Plan of Care Review   02/11/19 1044   Coping/Psychosocial   Plan of Care Reviewed With patient   Plan of Care Review   Progress improving   OTHER   Outcome Summary /PT tx completd. Pt supine in bed. Rates right hip pn after tx. Mod/Max 1-2 sup<>sit. Sit edge of bed x 10 minutes S/CG. Stood x 1 modA x 2 wiht r wx. Able to shimmy up towards head of bed. Unable to take steps forward. Recommend SNF for cont'd PT

## 2019-02-11 NOTE — DISCHARGE INSTRUCTIONS
Lower Extremity Post-op Instructions  Dr. NULL      POST-OP CARE: Please follow these instructions closely!    IMPORTANT PHONE NUMBERS:  • For emergencies, please call 911  • You may reach Dr. Null or his medical assistant Gaby Alvarado at [phone], M-F 8:0am-5:00pm  • After 5pm or on the weekends, please call the answering service which can be reached from the number above   • Call immediately if you have any of the following symptoms:  - Elevated temperature above 101.5 degrees for more than 48 hours after surgery  - Persistent drainage  from wound  - Severe pain around surgical site  - Calf pain    Weight Bearing:   __X___ Touch-Toe Weight-bearing     Bathing:  If stated below, it is ok to remove your postop dressing and shower.  DO NOT SOAK the incision in water.  Pat the incision site dry after surgery  _X__ You may remove your dressing on the seventh day following surgery and shower on the 4th day following surgery; if you shower before this, please cover your incision and dressing thoroughly    Dressings: Do NOT remove dressing/splint unless unless told to do so. SOME DRAINAGE IS NORMAL!  • If you have a splint or cast, do NOT get wet!!    • DO NOT touch, remove, or apply ointment to the incision and/or steri strips  • Steri strips may fall off on their own  • Signs of infection that warrant a phone call to our clinical line:  o Excessive drainage or redness  o Red streaking coming away from the incision  o Increased pain  o Increased temperature above 101 degrees    Sutures:  If your physician uses sutures in your knee or ankle, they will dissolve on their own and will not need to be removed.  Black sutures occasionally used will need to be removed 10-14 days after surgery.    Elevate: Place 2 pillows under your ankle to get the incision area above the level of the heart to help in swelling (a recliner is not elevated!!!)    Ice: Ice your surgery site 5-6 times per day for 20 minutes at a time with  dressing in place. You should wait at least 30 minutes before icing again to avoid ice irritation. It may be difficult at first to ice the surgery area due to the amount of dressing, but continue to be diligent with icing.  Your dressings will be taken down at your first post-op appointment.     Range of motion:   - For the knee- It is important to gain gain full extension (knee straight) as soon as possible following surgery.         Ice to decrease swelling --> Knee fully straight --> Walk without a limp!!!  - NO PILLOWS UNDER THE KNEE, ONLY under the ankle  - For the foot/ankle- range of motion restrictions will be given to you at your first post-op appointment. Until that time, avoid any unnecessary range o f motion.  - Physical therapy- Your physical therapy status will be discussed with you at your first post-op appointment.   **Achieving range of motion goals and decreasing swelling/inflammation are the primary focus for the first two (2) weeks following surgery. **    Medications: You will be discharged with the appropriate medications following your surgery. Fill these at the pharmacy and take them as directed on the label.   Possible medications that will be prescribed are below.  You may or may not receive all of these. Occasionally, additional medications may be given with specific instructions.  Percocet/Lortab (oxycodone/hydrocodone with tylenol) - Pain Medication.  o Take one tablet every 4-6 hours. DO NOT EXCEED 4,000mg of Tylenol in 24 hours.        **Itching is not an allergy - take benadryl or an over the counter allergy medication (claritin, Zyrtec) if needed  **DO NOT MIX WITH ALCOHOL, DRIVE WHILE TAKING, OR TAKE EXTRA TYLENOL*   Zofran - Anti-nausea medication to help prevent nausea and vomiting after surgery.     Aspirin 325 mg - all patients with lower extremity surgery should take one 325 mg tablet every 12 hours (twice daily) for 42 days after surgery in addition to your Plavix dose    DO  NOT TAKE NSAID'S (ex. IBUPROFEN, MOTRIN, ALEVE, ETC) AFTER A BROKEN BONE HAS BEEN REPAIRED OR AFTER ACL SURGERY - THESE MEDICATIONS WILL SLOW THE HEALING PROCESS!!!    **If you are running low on pain medications, please notify us if you need a refill 24-48 hours prior to when you run out, so we can make arrangements to refill the prescription for you if we determine it is necessary**

## 2019-02-11 NOTE — PROGRESS NOTES
Orthopedic Surgery Progress Note    Cheri Ely  2/11/2019      Subjective:     Systemic or Specific Complaints: Mild to moderate postoperative pain presently, but patient says she is doing well    Objective:     Patient Vitals for the past 24 hrs:   BP Temp Temp src Pulse Resp SpO2   02/11/19 0450 105/46 97.7 °F (36.5 °C) Oral 98 18 98 %   02/11/19 0015 108/46 98.4 °F (36.9 °C) Oral 99 18 97 %   02/10/19 2049 99/45 99.5 °F (37.5 °C) Oral 105 18 96 %   02/10/19 1549 92/71 98.1 °F (36.7 °C) Oral 99 18 97 %   02/10/19 1147 (!) 89/48 98.6 °F (37 °C) Oral 93 18 94 %   02/10/19 0950 (!) 82/38 98.8 °F (37.1 °C) Oral 103 18 93 %       right lower  General: alert, appears stated age and cooperative   Wound: clean, dry, intact             Dressing: clean, dry, intact   Extremity: Distal NVI           DVT Exam: No evidence of DVT seen on physical exam.                   Data Review:  Lab Results (last 24 hours)     Procedure Component Value Units Date/Time    Hemoglobin & Hematocrit, Blood [[id number]]  (Abnormal) Collected:  02/11/19 0616    Specimen:  Blood Updated:  02/11/19 0659     Hemoglobin 8.0 g/dL      Hematocrit 25.0 %     Comprehensive Metabolic Panel [[id number]] Collected:  02/11/19 0616    Specimen:  Blood Updated:  02/11/19 0655    Basic Metabolic Panel [[id number]]  (Abnormal) Collected:  02/10/19 0623    Specimen:  Blood Updated:  02/10/19 0726     Glucose 112 mg/dL      BUN 42 mg/dL      Creatinine 6.92 mg/dL      Sodium 133 mmol/L      Potassium 4.7 mmol/L      Chloride 97 mmol/L      CO2 26.0 mmol/L      Calcium 8.1 mg/dL      eGFR  African Amer -- mL/min/1.73      Comment: <15 Indicative of kidney failure.        eGFR Non African Amer 6 mL/min/1.73      Comment: <15 Indicative of kidney failure.        BUN/Creatinine Ratio 6.1     Anion Gap 10.0 mmol/L     Narrative:       The MDRD GFR formula is only valid for adults with stable renal function between ages 18 and 70.        Imaging Results (last 24  hours)     ** No results found for the last 24 hours. **          Assessment:     POD# 3 status post right hip peritrochanteric injury femoral nailing secondary to displaced unstable intertrochanteric right femur fracture    Plan:      1:  DVT prophylaxis, ICE, elevate  2:  Pain control  3:  Physical therapy/Occupational therapy  4:  Anticipate discharge today to [facility] once placement arranged and if pain well controlled  5:  TTWB LEIGHA Camacho MD

## 2019-02-11 NOTE — PROGRESS NOTES
Nephrology ([facility]) Progress Note      Patient:  Cheri Ely  YOB: 1940  Date of Service: 2/11/2019  MRN: [id number]   Acct: [id number]   Primary Care Physician: Barrett Mckenzie Jr, MD  Advance Directive:   Code Status and Medical Interventions:   Ordered at: 02/08/19 0134     Level Of Support Discussed With:    Patient     Code Status:    CPR     Medical Interventions (Level of Support Prior to Arrest):    Full     Admit Date: 2/7/2019       Hospital Day: 3  Referring Provider: Jony Concepcion MD      Patient personally seen and examined.  Complete chart including Consults, Notes, Operative Reports, Labs, Cardiology, and Radiology studies reviewed as able.        Subjective:  Cheri Ely is a 78 y.o. female  whom we were consulted for end stage renal disease.  Routine HD on MWF; no recent issues with dialysis.  Admitted after a fall with displaced right femoral fracture. Had surgical repair on 2/08.  Tolerated HD well on 2/08.    Today feeling a bit better. No new overnight issues.    Allergies:  Patient has no known allergies.    Home Meds:  Medications Prior to Admission   Medication Sig Dispense Refill Last Dose   • B Complex-C (SUPER B COMPLEX/VITAMIN C PO) Take  by mouth.   Taking   • carvedilol (COREG) 12.5 MG tablet Take 12.5 mg by mouth See Admin Instructions. 1 tablet BID on non-dialysis days, and 1 tablet daily on dialysis days   Taking   • Cholecalciferol (D3 ADULT PO) Take 5,000 Units by mouth Daily.   Taking   • clopidogrel (PLAVIX) 75 MG tablet Take 1 tablet by mouth Daily. 90 tablet 2 Taking   • levothyroxine (SYNTHROID, LEVOTHROID) 175 MCG tablet Take 150 mcg by mouth Daily.   Taking   • losartan (COZAAR) 25 MG tablet Take 25 mg by mouth Daily (Monday-Friday). Monday, Wednesday, And Friday only.   Taking   • Multiple Vitamin (DAILY-SANGEETA PO) Take 1 tablet by mouth Daily.   Taking   • Multiple Vitamins-Minerals (HAIR SKIN AND NAILS FORMULA PO)  Take 2 tablets by mouth Daily.      • ondansetron ODT (ZOFRAN-ODT) 4 MG disintegrating tablet Take 4 mg by mouth Every 8 (Eight) Hours As Needed for Nausea or Vomiting.   Taking   • oxyCODONE-acetaminophen (PERCOCET)  MG per tablet Take 1 tablet by mouth Every 8 (Eight) Hours As Needed for Moderate Pain . 40 tablet 0 Taking   • pantoprazole (PROTONIX) 40 MG EC tablet Take 1 tablet by mouth Daily. 30 tablet 11 Taking   • pravastatin (PRAVACHOL) 40 MG tablet Take 40 mg by mouth Daily.   Taking   • sertraline (ZOLOFT) 50 MG tablet Take 50 mg by mouth Daily.   Taking   • [DISCONTINUED] diphenhydrAMINE (BENADRYL) 25 mg capsule Take 25 mg by mouth 2 (Two) Times a Day.   Taking       Medicines:  Current Facility-Administered Medications   Medication Dose Route Frequency Provider Last Rate Last Dose   • acetaminophen (TYLENOL) tablet 650 mg  650 mg Oral Q4H PRN Jony Concepcion MD       • apixaban (ELIQUIS) tablet 2.5 mg  2.5 mg Oral Q12H Boo Camacho MD   2.5 mg at 02/11/19 0557   • atorvastatin (LIPITOR) tablet 10 mg  10 mg Oral Daily Jony Concepcion MD   10 mg at 02/11/19 0855   • bisacodyl (DULCOLAX) EC tablet 5 mg  5 mg Oral Daily PRN Boo Camacho MD       • bisacodyl (DULCOLAX) suppository 10 mg  10 mg Rectal Daily PRN Boo Camacho MD       • carvedilol (COREG) tablet 3.125 mg  3.125 mg Oral Once per day on Mon Wed Fri Lilly Adan APRN   3.125 mg at 02/11/19 0855   • carvedilol (COREG) tablet 3.125 mg  3.125 mg Oral 2 times per day on Sun Tue Thu Sat Lilly Adan APRN   3.125 mg at 02/10/19 0827   • docusate sodium (COLACE) capsule 100 mg  100 mg Oral BID Boo Camacho MD   100 mg at 02/11/19 0854   • lactulose solution 20 g  20 g Oral Daily PRN Lilly Adan APRN       • levothyroxine (SYNTHROID, LEVOTHROID) tablet 150 mcg  150 mcg Oral Daily Jony Concepcion MD   150 mcg at 02/11/19 0855   • losartan (COZAAR) tablet 25 mg  25 mg Oral Once per day on Mon Wed Fri  Jony Concepcion MD   25 mg at 02/11/19 0855   • Morphine sulfate (PF) injection 1 mg  1 mg Intravenous Q4H PRN Boo Camacho MD        And   • naloxone (NARCAN) injection 0.4 mg  0.4 mg Intravenous Q5 Min PRN Boo Camacho MD       • nystatin (MYCOSTATIN) powder   Topical Q12H Boo Camacho MD       • ondansetron (ZOFRAN) tablet 4 mg  4 mg Oral Q6H PRN Jony Concepcion MD        Or   • ondansetron ODT (ZOFRAN-ODT) disintegrating tablet 4 mg  4 mg Oral Q6H PRN Jony Concepcion MD        Or   • ondansetron (ZOFRAN) injection 4 mg  4 mg Intravenous Q6H PRN Jony Concepcion MD       • oxyCODONE-acetaminophen (PERCOCET)  MG per tablet 1 tablet  1 tablet Oral Q8H PRN Jony Concepcion MD   1 tablet at 02/11/19 0854   • pantoprazole (PROTONIX) EC tablet 40 mg  40 mg Oral Daily Jony Concepcion MD   40 mg at 02/11/19 0854   • sertraline (ZOLOFT) tablet 50 mg  50 mg Oral Daily Jony Concepcion MD   50 mg at 02/11/19 0854   • sodium chloride 0.9 % flush 3 mL  3 mL Intravenous Q12H Jony Concepcion MD   3 mL at 02/10/19 0829   • sodium chloride 0.9 % flush 3 mL  3 mL Intravenous Q12H Boo Camacho MD   3 mL at 02/10/19 2033   • sodium chloride 0.9 % flush 3-10 mL  3-10 mL Intravenous PRN Jony Concepcion MD       • sodium chloride 0.9 % flush 3-10 mL  3-10 mL Intravenous PRN Boo Camacho MD       • vitamin D3 capsule 5,000 Units  5,000 Units Oral Daily Jony Concepcion MD   5,000 Units at 02/11/19 0855       Past Medical History:  Past Medical History:   Diagnosis Date   • Arthritis    • Carotid artery disease (CMS/HCC)    • CHF (congestive heart failure) (CMS/HCC)    • Chronic kidney disease on chronic dialysis (CMS/HCC)    • Chronic renal failure     on dialysis ON MON, WED, FRI   • Coronary artery disease    • Disease of thyroid gland    • Diverticulitis    • Gastric ulcer    • History of transfusion    • Hyperlipidemia    • Hypertension    • Injury of back    • Mesenteric  artery insufficiency (CMS/HCC)    • Multilevel degenerative disc disease    • Osteoporosis    • Pancreatitis    • Pelvis fracture (CMS/HCC)    • Pneumonia        Past Surgical History:  Past Surgical History:   Procedure Laterality Date   • AORTAGRAM Right 1/8/2018    Procedure: MESENTERIC ANGIOGRAM, GROIN ACCESS, MYNX CLOSURE;  Surgeon: Tomasz San DO;  Location: [facility] OR;  Service:    • AORTIC VALVE SURGERY     • APPENDECTOMY     • BACK SURGERY     • CARDIAC SURGERY     • CAROTID ENDARTERECTOMY Bilateral    • CATARACT EXTRACTION, BILATERAL     • CERVICAL SPINE SURGERY     • CHOLECYSTECTOMY     • COLON SURGERY     • COLONOSCOPY  10/01/2015   • CORONARY ARTERY BYPASS GRAFT     • DIALYSIS FISTULA CREATION     • ENDOSCOPY N/A 12/27/2018    Procedure: ESOPHAGOGASTRODUODENOSCOPY WITH ANESTHESIA;  Surgeon: Moni Garza MD;  Location: [facility] ENDOSCOPY;  Service: Gastroenterology   • HIP TROCANTERIC NAILING WITH INTRAMEDULLARY HIP SCREW Right 2/8/2019    Procedure: HIP TROCANTERIC NAILING LONG WITH INTRAMEDULLARY HIP SCREW;  Surgeon: Boo Camacho MD;  Location:  [facility] OR;  Service: Orthopedics   • JOINT REPLACEMENT      knee   • LAPAROSCOPIC GASTRIC BANDING     • LEEP     • LUMBAR FUSION     • TOE AMPUTATION Left     2nd toe   • TOTAL KNEE ARTHROPLASTY Bilateral    • TUBAL ABDOMINAL LIGATION     • UPPER GASTROINTESTINAL ENDOSCOPY  10/01/2015       Family History  Family History   Problem Relation Age of Onset   • Hypertension Mother    • Cancer Mother         stomach   • Coronary artery disease Father    • Heart attack Father    • Diabetes Brother    • Coronary artery disease Brother    • Colon cancer Neg Hx    • Colon polyps Neg Hx        Social History  Social History     Socioeconomic History   • Marital status:      Spouse name: Not on file   • Number of children: Not on file   • Years of education: Not on file   • Highest education level: Not on file   Social Needs   • Financial resource strain:  "Not on file   • Food insecurity - worry: Not on file   • Food insecurity - inability: Not on file   • Transportation needs - medical: Not on file   • Transportation needs - non-medical: Not on file   Occupational History   • Not on file   Tobacco Use   • Smoking status: Never Smoker   • Smokeless tobacco: Never Used   Substance and Sexual Activity   • Alcohol use: No   • Drug use: No   • Sexual activity: Defer   Other Topics Concern   • Not on file   Social History Narrative   • Not on file         Review of Systems:  History obtained from chart review and the patient  General ROS: No fever or chills  Respiratory ROS: No cough, shortness of breath, wheezing  Cardiovascular ROS: No chest pain or palpitations  Gastrointestinal ROS: No abdominal pain or melena  Genito-Urinary ROS: No dysuria or hematuria  Neurological ROS: no headache or dizziness    Objective:  /46 (BP Location: Right arm, Patient Position: Lying)   Pulse 98   Temp 97.7 °F (36.5 °C) (Oral)   Resp 18   Ht 149.9 cm (59\")   Wt 76.7 kg (169 lb 3.2 oz)   SpO2 98%   BMI 34.17 kg/m²     Intake/Output Summary (Last 24 hours) at 2/11/2019 1100  Last data filed at 2/10/2019 2049  Gross per 24 hour   Intake 120 ml   Output --   Net 120 ml     General: awake/alert    Neck: supple, no JVD  Chest:  clear to auscultation bilaterally without respiratory distress  CVS: regular rate and rhythm  Abdominal: soft, nontender, normal bowel sounds  Extremities: no cyanosis or edema  Skin: warm and dry without rash   Neuro: no focal motor deficits      Labs:  Results from last 7 days   Lab Units 02/11/19  0616 02/10/19  0623 02/09/19  0652 02/08/19  0715 02/07/19  2249   WBC 10*3/mm3  --   --  8.91 9.91 5.66   HEMOGLOBIN g/dL 8.0* 8.3* 8.7* 10.2* 10.9*   HEMATOCRIT % 25.0* 26.1* 27.2* 31.9* 33.8*   PLATELETS 10*3/mm3  --   --  119* 142 139         Results from last 7 days   Lab Units 02/11/19  0616 02/10/19  0623 02/09/19  0653 02/08/19  0636 02/07/19  2250 "   SODIUM mmol/L 132* 133* 136 138 138   POTASSIUM mmol/L 5.0 4.7 5.1 5.4* 4.4   CHLORIDE mmol/L 94* 97* 101 97* 98   CO2 mmol/L 24.0 26.0 26.0 31.0 28.0   BUN mg/dL 54* 42* 29* 52* 47*   CREATININE mg/dL 8.29* 6.92* 5.13* 6.99* 6.04*   CALCIUM mg/dL 7.9* 8.1* 8.0* 8.7 8.5   BILIRUBIN mg/dL 0.6  --   --  0.5 0.4   ALK PHOS U/L 172*  --   --  154* 170*   ALT (SGPT) U/L 16  --   --  20 20   AST (SGOT) U/L 42  --   --  41 33   GLUCOSE mg/dL 108* 112* 121* 129* 146*       Radiology:   Imaging Results (last 72 hours)     Procedure Component Value Units Date/Time    XR Hip With or Without Pelvis 2 - 3 View Right [[id number]] Collected:  02/08/19 1247     Updated:  02/08/19 1515    Narrative:       EXAMINATION:   XR HIP W OR WO PELVIS 2-3 VIEW RIGHT-  2/8/2019 12:47 PM  CST     HISTORY: Right hip pinning     4 images are obtained in the operating room. Compression pin is present  in the right femoral head and neck. Femoral nail is present. Distal end  of the femoral nail demonstrates to fixation screws. Postsurgical  changes noted from prior right knee arthroplasty     1 minute and 59 seconds of fluoroscopic time was used during this  procedure  This report was finalized on 02/08/2019 12:48 by Dr. Elias Daly MD.    FL C Arm During Surgery [[id number]] Collected:  02/08/19 1247     Updated:  02/08/19 1515    Narrative:       EXAMINATION:   XR HIP W OR WO PELVIS 2-3 VIEW RIGHT-  2/8/2019 12:47 PM  CST     HISTORY: Right hip pinning     4 images are obtained in the operating room. Compression pin is present  in the right femoral head and neck. Femoral nail is present. Distal end  of the femoral nail demonstrates to fixation screws. Postsurgical  changes noted from prior right knee arthroplasty     1 minute and 59 seconds of fluoroscopic time was used during this  procedure  This report was finalized on 02/08/2019 12:48 by Dr. Elias Daly MD.          Culture:         Assessment   1.  End stage renal disease on  hemodialysis MWF  2.  S/p repair of right femur fracture on 2/08  3.  Essential hypertension  4.  Anemia of chronic kidney disease  5.  Hyponatremia    Plan:  1.  Dialysis today  2.  OK from renal standpoint for discharge following HD today. Follow up will be via dialysis clinic      Dirk Tristan, ABILIO  2/11/2019  11:00 AM

## 2019-02-11 NOTE — DISCHARGE SUMMARY
[facility] Medicine Services  DISCHARGE SUMMARY       Date of Admission: 2/7/2019  Date of Discharge:  2/11/2019  Primary Care Physician: Barrett Mckenzie Jr, MD    Presenting Problem/History of Present Illness:  Hip pain following fall     Final Discharge Diagnoses:  Active Hospital Problems    Diagnosis   • **Displaced intertrochanteric fracture of right femur, initial encounter for closed fracture (CMS/[facility])     Added automatically from request for surgery [id number]     • Hypotension   • Acute blood loss anemia     Superimposed on anemia of chronic disease     • Closed displaced intertrochanteric fracture of right femur (CMS/[facility])     S/p Cephalomedullary nailing 2/8     • Hypertension   • Hyperlipidemia   • Chronic kidney disease on chronic dialysis (CMS/[facility])     on dialysis       Consults:   1.  Dr. Boo Camacho-orthopedic surgery  2.  Dr. Faisal Sevilla- Nephrology    Procedures Performed:   1.  02/08/[id number] nailing of the right closed displaced intertrochanteric hip fracture    Pertinent Test Results:   Lab Results (all)     Procedure Component Value Units Date/Time    Comprehensive Metabolic Panel [[id number]]  (Abnormal) Collected:  02/11/19 0616    Specimen:  Blood Updated:  02/11/19 0720     Glucose 108 mg/dL      BUN 54 mg/dL      Creatinine 8.29 mg/dL      Sodium 132 mmol/L      Potassium 5.0 mmol/L      Chloride 94 mmol/L      CO2 24.0 mmol/L      Calcium 7.9 mg/dL      Total Protein 6.1 g/dL      Albumin 3.00 g/dL      ALT (SGPT) 16 U/L      AST (SGOT) 42 U/L      Alkaline Phosphatase 172 U/L      Total Bilirubin 0.6 mg/dL      eGFR Non African Amer 5 mL/min/1.73      Comment: <15 Indicative of kidney failure.        eGFR   Amer -- mL/min/1.73      Comment: <15 Indicative of kidney failure.        Globulin 3.1 gm/dL      A/G Ratio 1.0 g/dL      BUN/Creatinine Ratio 6.5     Anion Gap 14.0 mmol/L     Narrative:       The MDRD GFR formula is only valid  for adults with stable renal function between ages 18 and 70.    Hemoglobin & Hematocrit, Blood [[id number]]  (Abnormal) Collected:  02/11/19 0616    Specimen:  Blood Updated:  02/11/19 0659     Hemoglobin 8.0 g/dL      Hematocrit 25.0 %     Basic Metabolic Panel [[id number]]  (Abnormal) Collected:  02/10/19 0623    Specimen:  Blood Updated:  02/10/19 0726     Glucose 112 mg/dL      BUN 42 mg/dL      Creatinine 6.92 mg/dL      Sodium 133 mmol/L      Potassium 4.7 mmol/L      Chloride 97 mmol/L      CO2 26.0 mmol/L      Calcium 8.1 mg/dL      eGFR  African Amer -- mL/min/1.73      Comment: <15 Indicative of kidney failure.        eGFR Non African Amer 6 mL/min/1.73      Comment: <15 Indicative of kidney failure.        BUN/Creatinine Ratio 6.1     Anion Gap 10.0 mmol/L     Narrative:       The MDRD GFR formula is only valid for adults with stable renal function between ages 18 and 70.    Hemoglobin & Hematocrit, Blood [[id number]]  (Abnormal) Collected:  02/10/19 0623    Specimen:  Blood Updated:  02/10/19 0644     Hemoglobin 8.3 g/dL      Hematocrit 26.1 %     Basic Metabolic Panel [[id number]]  (Abnormal) Collected:  02/09/19 0653    Specimen:  Blood Updated:  02/09/19 0735     Glucose 121 mg/dL      BUN 29 mg/dL      Creatinine 5.13 mg/dL      Sodium 136 mmol/L      Potassium 5.1 mmol/L      Chloride 101 mmol/L      CO2 26.0 mmol/L      Calcium 8.0 mg/dL      eGFR  African Amer -- mL/min/1.73      Comment: <15 Indicative of kidney failure.        eGFR Non African Amer 8 mL/min/1.73      Comment: <15 Indicative of kidney failure.        BUN/Creatinine Ratio 5.7     Anion Gap 9.0 mmol/L     CBC Auto Differential [[id number]]  (Abnormal) Collected:  02/09/19 0652    Specimen:  Blood Updated:  02/09/19 0723     WBC 8.91 10*3/mm3      RBC 2.46 10*6/mm3      Hemoglobin 8.7 g/dL      Hematocrit 27.2 %      .6 fL      MCH 35.4 pg      MCHC 32.0 g/dL      RDW 14.4 %      RDW-SD 58.3 fl      MPV 11.2 fL      Platelets 119  10*3/mm3      Neutrophil % 84.5 %      Lymphocyte % 5.3 %      Monocyte % 6.4 %      Eosinophil % 3.0 %      Basophil % 0.4 %      Neutrophils, Absolute 7.52 10*3/mm3      Lymphocytes, Absolute 0.47 10*3/mm3      Monocytes, Absolute 0.57 10*3/mm3      Eosinophils, Absolute 0.27 10*3/mm3      Basophils, Absolute 0.04 10*3/mm3     POC Glucose Once [[id number]]  (Normal) Collected:  02/08/19 0821    Specimen:  Blood Updated:  02/08/19 0833     Glucose 126 mg/dL      Comment: : [id number] Yamini AppleieMeter ID: [id number]       Comprehensive Metabolic Panel [[id number]]  (Abnormal) Collected:  02/08/19 0636    Specimen:  Blood Updated:  02/08/19 0754     Glucose 129 mg/dL      BUN 52 mg/dL      Creatinine 6.99 mg/dL      Sodium 138 mmol/L      Potassium 5.4 mmol/L      Chloride 97 mmol/L      CO2 31.0 mmol/L      Calcium 8.7 mg/dL      Total Protein 6.5 g/dL      Albumin 3.50 g/dL      ALT (SGPT) 20 U/L      AST (SGOT) 41 U/L      Alkaline Phosphatase 154 U/L      Total Bilirubin 0.5 mg/dL      eGFR Non African Amer 6 mL/min/1.73      Comment: <15 Indicative of kidney failure.        eGFR   Amer -- mL/min/1.73      Comment: <15 Indicative of kidney failure.        Globulin 3.0 gm/dL      A/G Ratio 1.2 g/dL      BUN/Creatinine Ratio 7.4     Anion Gap 10.0 mmol/L     Narrative:       The MDRD GFR formula is only valid for adults with stable renal function between ages 18 and 70.    Phosphorus [[id number]]  (Abnormal) Collected:  02/08/19 0636    Specimen:  Blood Updated:  02/08/19 0738     Phosphorus 5.3 mg/dL     Magnesium [[id number]]  (Normal) Collected:  02/08/19 0636    Specimen:  Blood Updated:  02/08/19 0738     Magnesium 1.9 mg/dL     CBC Auto Differential [[id number]]  (Abnormal) Collected:  02/08/19 0715    Specimen:  Blood Updated:  02/08/19 0727     WBC 9.91 10*3/mm3      RBC 2.94 10*6/mm3      Hemoglobin 10.2 g/dL      Hematocrit 31.9 %      .5 fL      MCH 34.7 pg      MCHC 32.0 g/dL      RDW 14.0  %      RDW-SD 54.6 fl      MPV 10.9 fL      Platelets 142 10*3/mm3      Neutrophil % 83.5 %      Lymphocyte % 7.5 %      Monocyte % 7.0 %      Eosinophil % 0.8 %      Basophil % 0.6 %      Neutrophils, Absolute 8.28 10*3/mm3      Lymphocytes, Absolute 0.74 10*3/mm3      Monocytes, Absolute 0.69 10*3/mm3      Eosinophils, Absolute 0.08 10*3/mm3      Basophils, Absolute 0.06 10*3/mm3     CBC Auto Differential [[id number]]  (Abnormal) Collected:  02/07/19 2249    Specimen:  Blood Updated:  02/07/19 2327     WBC 5.66 10*3/mm3      RBC 3.13 10*6/mm3      Hemoglobin 10.9 g/dL      Hematocrit 33.8 %      .0 fL      MCH 34.8 pg      MCHC 32.2 g/dL      RDW 14.0 %      RDW-SD 55.5 fl      MPV 10.7 fL      Platelets 139 10*3/mm3     Narrative:       The previously reported component NRBC is no longer being reported.    Manual Differential [[id number]]  (Abnormal) Collected:  02/07/19 2249    Specimen:  Blood Updated:  02/07/19 2327     Neutrophil % 75.0 %      Lymphocyte % 7.0 %      Monocyte % 6.0 %      Eosinophil % 4.0 %      Basophil % 2.0 %      Atypical Lymphocyte % 6.0 %      Neutrophils Absolute 4.25 10*3/mm3      Lymphocytes Absolute 0.40 10*3/mm3      Monocytes Absolute 0.34 10*3/mm3      Eosinophils Absolute 0.23 10*3/mm3      Basophils Absolute 0.11 10*3/mm3      Anisocytosis Slight/1+     Macrocytes Slight/1+     WBC Morphology Normal     Platelet Estimate Decreased    Comprehensive Metabolic Panel [[id number]]  (Abnormal) Collected:  02/07/19 2250    Specimen:  Blood Updated:  02/07/19 2320     Glucose 146 mg/dL      BUN 47 mg/dL      Creatinine 6.04 mg/dL      Sodium 138 mmol/L      Potassium 4.4 mmol/L      Chloride 98 mmol/L      CO2 28.0 mmol/L      Calcium 8.5 mg/dL      Total Protein 6.8 g/dL      Albumin 3.50 g/dL      ALT (SGPT) 20 U/L      AST (SGOT) 33 U/L      Alkaline Phosphatase 170 U/L      Total Bilirubin 0.4 mg/dL      eGFR Non African Amer 7 mL/min/1.73      Comment: <15 Indicative of kidney  failure.        eGFR  African Amer -- mL/min/1.73      Comment: <15 Indicative of kidney failure.        Globulin 3.3 gm/dL      A/G Ratio 1.1 g/dL      BUN/Creatinine Ratio 7.8     Anion Gap 12.0 mmol/L     Narrative:       The MDRD GFR formula is only valid for adults with stable renal function between ages 18 and 70.    aPTT [[id number]]  (Normal) Collected:  02/07/19 2250    Specimen:  Blood Updated:  02/07/19 2309     PTT 30.1 seconds     Protime-INR [[id number]]  (Abnormal) Collected:  02/07/19 2250    Specimen:  Blood Updated:  02/07/19 2309     Protime 15.3 Seconds      INR 1.17        Imaging Results (all)     Procedure Component Value Units Date/Time    XR Hip With or Without Pelvis 2 - 3 View Right [[id number]] Collected:  02/08/19 1247     Updated:  02/08/19 1515    Narrative:       EXAMINATION:   XR HIP W OR WO PELVIS 2-3 VIEW RIGHT-  2/8/2019 12:47 PM  CST     HISTORY: Right hip pinning     4 images are obtained in the operating room. Compression pin is present  in the right femoral head and neck. Femoral nail is present. Distal end  of the femoral nail demonstrates to fixation screws. Postsurgical  changes noted from prior right knee arthroplasty     1 minute and 59 seconds of fluoroscopic time was used during this  procedure  This report was finalized on 02/08/2019 12:48 by Dr. Elais Daly MD.    FL C Arm During Surgery [[id number]] Collected:  02/08/19 1247     Updated:  02/08/19 1515    Narrative:       EXAMINATION:   XR HIP W OR WO PELVIS 2-3 VIEW RIGHT-  2/8/2019 12:47 PM  CST     HISTORY: Right hip pinning     4 images are obtained in the operating room. Compression pin is present  in the right femoral head and neck. Femoral nail is present. Distal end  of the femoral nail demonstrates to fixation screws. Postsurgical  changes noted from prior right knee arthroplasty     1 minute and 59 seconds of fluoroscopic time was used during this  procedure  This report was finalized on 02/08/2019 12:48 by  Dr. Elias Daly MD.    XR Hip With or Without Pelvis 2 - 3 View Right [[id number]] Collected:  02/08/19 0647     Updated:  02/08/19 0653    Narrative:       EXAMINATION:   XR HIP W OR WO PELVIS 2-3 VIEW RIGHT-  2/8/2019 6:47 AM  CST     HISTORY: Patient fell     AP view the pelvis AP and lateral views of the right hip are obtained.     SI joints are normal. Symphysis appears intact.     There is a comminuted intertrochanteric fracture of the right hip.     Postsurgical changes noted from the instrumented fusion in the lumbar  spine.       Impression:       Comminuted intertrochanteric right hip fracture  This report was finalized on 02/08/2019 06:49 by Dr. Elias Daly MD.    XR Knee 1 or 2 View Right [[id number]] Collected:  02/08/19 0641     Updated:  02/08/19 0644    Narrative:       EXAMINATION:   XR KNEE 1 OR 2 VW RIGHT-  2/8/2019 6:41 AM CST     HISTORY: Fracture     AP and lateral views of the right knee are obtained. Postsurgical  changes noted from total right knee arthroplasty. Prosthesis appears  intact. Skeletal structures are normally aligned. There is no obvious  fracture.     IMPRESSION status post total right knee arthroplasty  This report was finalized on 02/08/2019 06:41 by Dr. Elias Daly MD.    XR Chest 1 View [[id number]] Collected:  02/08/19 0636     Updated:  02/08/19 0640    Narrative:       EXAMINATION:   XR CHEST 1 VW-  2/8/2019 6:36 AM CST     HISTORY: Preop     Frontal upright radiograph of the chest 2/8/2019 12:01 AM CST     COMPARISON: January 2, 2018.     FINDINGS:   The lungs are clear. The cardiac silhouette is mildly enlarged.  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 Wires are  present from previous median sternotomy..      The osseous structures and surrounding soft tissues demonstrate no acute  abnormality.       Impression:       1. Mild cardiomegaly no acute cardiopulmonary process.        This report was finalized on 02/08/2019 06:37 by Dr. Griffin  MD Malika.        Chief Complaint on Day of Discharge: Right hip pain    History of Present Illness on Day of Discharge: The patient is resting in bed.  She tells me she feels well other than her right hip pain.  She states her pain is well controlled as long as she is not moving.  She denies any new complaints.     Hospital Course:  Ms. Ely is a pleasant 78-year-old  female who follows Dr. Barrett carcamo for primary care.  She has a past medical history significant for end-stage renal disease-on hemodialysis Monday, Wednesday, Friday, diastolic congestive heart failure, coronary artery disease status post coronary artery bypass grafting, hypothyroidism, hyperlipidemia, hypertension, and valvular heart disease.  She presented to the [facility] emergency department on 02/07/2019 after sliding off of her bathroom scale at home and suffering a fall.  She complained of right-sided hip pain, and x-ray in the emergency department confirmed intertrochanteric femur fracture.  The patient was admitted to the hospitalist service for further evaluation and management.    The patient was seen in consultation by orthopedic surgery and taken to the operating room on 02/08/2019 for cephalo-medullary nailing of the fracture.  She has had pain postoperatively, as to be expected, but has otherwise done very well.  She has worked with physical and occupational therapy and is to be toe-touch weightbearing to the operative leg.  It has been decided that the patient should be discharged to a skilled nursing facility for continued rehabilitation.  Per orthopedics, she should continue her Plavix which she takes for peripheral vascular disease and in addition will be on a full strength aspirin twice daily for 6 weeks for DVT prophylaxis.  The patient has had some postoperative anemia, and will be given a unit of blood with dialysis today for a hemoglobin of 8.0.  She will need a CBC rechecked in the next 1-2 days to  "confirm stability of her hemoglobin and hematocrit.    The patient has been noted to be hypotensive at times, and it has been noted by dialysis that she has been having difficulty with hypotension as of late.  He is asymptomatic with this.  We have reduced her dosage of Coreg, and we will place her on a trial of Midodrine, it can be titrated on an outpatient basis if needed.  Overall, the patient is hemodynamically stable and appropriate to discharge to the skilled nursing facility via ambulance today.    Condition on Discharge:  Stable    Physical Exam on Discharge:  /46 (BP Location: Right arm, Patient Position: Lying)   Pulse 98   Temp 97.7 °F (36.5 °C) (Oral)   Resp 18   Ht 149.9 cm (59\")   Wt 76.7 kg (169 lb 3.2 oz)   SpO2 98%   BMI 34.17 kg/m²   Physical Exam  Constitutional: She is oriented to person, place, and time. She appears well-developed and well-nourished. No distress.   HENT:   Head: Normocephalic and atraumatic.   Neck: Normal range of motion. Neck supple. No JVD present. No tracheal deviation present. No thyromegaly present.   Cardiovascular: Normal rate, regular rhythm and normal heart sounds. Exam reveals no gallop and no friction rub.   No murmur heard. Left arm Av fistula with + thrill/bruit  Pulmonary/Chest: Effort normal. She has no wheezes. She has no rales.   Diminished breath sounds   Abdominal: Soft. Bowel sounds are normal. She exhibits no distension. There is no tenderness. There is no guarding.   Musculoskeletal: Normal range of motion. She exhibits tenderness (right hip). She exhibits no edema.   Neurological: She is alert and oriented to person, place, and time. No cranial nerve deficit.   Skin: Skin is warm and dry. No rash noted. No erythema.   Right hip dressings dry and intact   Psychiatric: She has a normal mood and affect. Her behavior is normal. Judgment and thought content normal.   Vitals reviewed.    Discharge Disposition:  Skilled Nursing Facility (DC - " External)    Discharge Medications:     Discharge Medications      New Medications      Instructions Start Date   aspirin  MG tablet   325 mg, Oral, 2 Times Daily      docusate sodium 100 MG capsule   100 mg, Oral, 2 Times Daily      lactulose 20 GM/30ML solution solution   20 g, Oral, Daily PRN      midodrine 2.5 MG tablet  Commonly known as:  PROAMATINE   2.5 mg, Oral, 3 Times Daily      nystatin 382904 UNIT/GM powder  Commonly known as:  MYCOSTATIN   Topical, Every 12 Hours Scheduled         Changes to Medications      Instructions Start Date   carvedilol 3.125 MG tablet  Commonly known as:  COREG  What changed:    · medication strength  · how much to take  · when to take this  · additional instructions   3.125 mg, Oral, 2 Times Daily With Meals, On Non-dialysis days- Sunday, Tuesday, Thursday, Saturday.  Hold for systolic BP <100      carvedilol 3.125 MG tablet  Commonly known as:  COREG  What changed:  You were already taking a medication with the same name, and this prescription was added. Make sure you understand how and when to take each.   3.125 mg, Oral, Daily, On dialysis days-Monday, Wednesday, Friday.  Hold for systolic BP <100      diphenhydrAMINE 25 mg capsule  Commonly known as:  BENADRYL  What changed:    · when to take this  · reasons to take this   25 mg, Oral, 2 Times Daily PRN      oxyCODONE-acetaminophen  MG per tablet  Commonly known as:  PERCOCET  What changed:  when to take this   1 tablet, Oral, Every 4 Hours PRN         Continue These Medications      Instructions Start Date   clopidogrel 75 MG tablet  Commonly known as:  PLAVIX   75 mg, Oral, Daily      D3 ADULT PO   5,000 Units, Oral, Daily      DAILY-SANGEETA PO   1 tablet, Oral, Daily      HAIR SKIN AND NAILS FORMULA PO   2 tablets, Oral, Daily      levothyroxine 175 MCG tablet  Commonly known as:  SYNTHROID, LEVOTHROID   150 mcg, Oral, Daily      losartan 25 MG tablet  Commonly known as:  COZAAR   25 mg, Oral, Daily  (Monday-Friday), Monday, Wednesday, And Friday only.      ondansetron ODT 4 MG disintegrating tablet  Commonly known as:  ZOFRAN-ODT   4 mg, Oral, Every 8 Hours PRN      pantoprazole 40 MG EC tablet  Commonly known as:  PROTONIX   40 mg, Oral, Daily      pravastatin 40 MG tablet  Commonly known as:  PRAVACHOL   40 mg, Oral, Daily      sertraline 50 MG tablet  Commonly known as:  ZOLOFT   50 mg, Oral, Daily      SUPER B COMPLEX/VITAMIN C PO   Oral           Discharge Diet:   Diet Instructions     Diet: Cardiac, Renal; Thin      Discharge Diet:   Cardiac  Renal       Fluid Consistency:  Thin        Activity at Discharge:   Activity Instructions     Activity as Tolerated      Continue physical and occupational therapy with toe-touch weightbearing to the right lower extremity        Discharge Care Plan/Instructions:   1.  Return for any acute or worsening symptoms   2.  Continue physical and occupational therapy with toe-touch weightbearing to the right lower extremity  3.  Aspirin 325 mg twice daily ×6 weeks for DVT prophylaxis.  Per orthopedics, she should continue her Plavix as well during this time.  4.  New medication midodrine.  Coreg dosage has been reduced.    Follow-up Appointments:   1.  Facility physician as soon as possible for posthospitalization assessment.  CBC should be rechecked within the next 1-2 days to ensure stability of her hemoglobin and hematocrit following blood transfusion today  2.  Orthopedics as per their direction  Future Appointments   Date Time Provider Department Center   7/25/2019  8:30 AM Tomasz San, DO [facility] None       Test Results Pending at Discharge: None    ABILIO Blackmon  02/11/19  10:43 AM    Time: 40 minutes    I personally evaluated and examined the patient in conjunction with ABILIO Larson and agree with the assessment, treatment plan, and disposition of the patient as recorded by her. My history, exam, and further recommendations are:     Seen and  discussed with her nurse, Edith.    Patient was originally admitted on 2/7 by Dr. Concepcion.  She has been subsequently cared for by Dr. Ramos.  She presented after a fall.  She was found to have a closed right hip fracture.  This has been repaired by Dr. Camacho.      She is a hemodialysis patient and has been followed by nephrology.  She is going to receive 1 unit packed red blood cells today on dialysis. She is going down for dialysis shortly.     She has been accepted to [facility] today.    Madhav Banks DO  02/11/19  11:19 AM

## 2019-02-11 NOTE — PLAN OF CARE
Problem: Patient Care Overview  Goal: Plan of Care Review  Outcome: Ongoing (interventions implemented as appropriate)   02/11/19 0505   Coping/Psychosocial   Plan of Care Reviewed With patient   Plan of Care Review   Progress no change   OTHER   Outcome Summary On dialysis M.W.F. Drinks when takes medicine, doesnt drink much otherwise. Pt just needs to be reminded to turn, and she does a pretty good job. Has denied need for pain medication thus far this shift. Resting easy at present time. Will cont. to monitor.        Problem: Fall Risk (Adult)  Goal: Absence of Fall  Outcome: Ongoing (interventions implemented as appropriate)      Problem: Skin Injury Risk (Adult)  Goal: Skin Health and Integrity  Outcome: Ongoing (interventions implemented as appropriate)

## 2019-02-11 NOTE — NURSING NOTE
Recieved report from dialysis RN who stated pt did not receive PRBC transfusion as they 'were not aware'. ABILIO Mercado made aware. Pt OK to d/c with close monitoring of CBC at facility. Will continue to monitor.

## 2019-02-11 NOTE — THERAPY TREATMENT NOTE
Acute Care - Physical Therapy Treatment Note  [facility]     Patient Name: Cheri Ely  : 1940  MRN: [id number]  Today's Date: 2019  Onset of Illness/Injury or Date of Surgery: 19  Date of Referral to PT: 19  Referring Physician: Dr. Camacho    Admit Date: 2019    Visit Dx:    ICD-10-CM ICD-9-CM   1. Displaced intertrochanteric fracture of right femur, initial encounter for closed fracture (CMS/[facility]) S72.141A 820.21   2. Closed displaced intertrochanteric fracture of right femur, initial encounter (CMS/[facility]) S72.141A 820.21   3. Impaired mobility Z74.09 799.89   4. Impaired mobility and ADLs Z74.09 799.89     Patient Active Problem List   Diagnosis   • Hypertension   • Hyperlipidemia   • Chronic kidney disease on chronic dialysis (CMS/[facility])   • Closed fracture of ramus of left pubis (CMS/[facility])   • Occlusion of superior mesenteric artery (CMS/[facility])   • Chronic mesenteric ischemia (CMS/[facility])   • Black tarry stools   • Hx of gastritis   • Acute gastric ulcer with hemorrhage   • Closed displaced intertrochanteric fracture of right femur (CMS/[facility])   • Displaced intertrochanteric fracture of right femur, initial encounter for closed fracture (CMS/[facility])   • Hypotension   • Acute blood loss anemia       Therapy Treatment    Rehabilitation Treatment Summary     Row Name 19 1024             Treatment Time/Intention    Discipline  physical therapy assistant  -KJ      Document Type  therapy note (daily note)  -KJ2      Subjective Information  complains of;pain  -KJ2      Mode of Treatment  physical therapy  -KJ2      Existing Precautions/Restrictions  fall  -KJ2      Treatment Considerations/Comments  TTWB RLE  -KJ2      Recorded by [KJ] Airam Pelletier, PTA 19 1036  [KJ2] Airam Pelletier, PTA 19 1043      Row Name 19 1024             Mobility Assessment/Intervention    Extremity Weight-bearing Status  right lower extremity  -KJ      Right Lower Extremity (Weight-bearing Status)   toe touch weight-bearing (TTWB)  -KJ      Recorded by [KJ] Airam Pelletier, [facility] 02/11/19 1043      Row Name 02/11/19 1024             Bed Mobility Assessment/Treatment    Scooting/Bridging [place] (Bed Mobility)  verbal cues;moderate assist (50% patient effort)  -KJ      Supine-Sit [place] (Bed Mobility)  verbal cues;moderate assist (50% patient effort);maximum assist (25% patient effort)  -KJ      Sit-Supine [place] (Bed Mobility)  maximum assist (25% patient effort);2 person assist  -KJ      Bed Mobility, Safety Issues  decreased use of arms for pushing/pulling;decreased use of legs for bridging/pushing  -KJ      Assistive Device (Bed Mobility)  draw sheet  -KJ      Recorded by [KJ] Airam Pelletier [facility] 02/11/19 1043      Row Name 02/11/19 1024             Sit-Stand Transfer    Sit-Stand [place] (Transfers)  verbal cues;moderate assist (50% patient effort);2 person assist  -KJ      Recorded by [KJ] Airam Pelletier [facility] 02/11/19 1043      Row Name 02/11/19 1024             Stand-Sit Transfer    Stand-Sit [place] (Transfers)  verbal cues;moderate assist (50% patient effort)  -KJ      Recorded by [KJ] Airam Pelletier, [facility] 02/11/19 1043      Row Name 02/11/19 1024             Gait/Stairs Assessment/Training    Comment (Gait/Stairs)  able to shimmy up towards head of bed; cueing to maintain WB status  -KJ      Recorded by [KJ] Airam Pelletier, [facility] 02/11/19 1043      Row Name 02/11/19 1024             Positioning and Restraints    Pre-Treatment Position  in bed  -KJ      Post Treatment Position  bed  -KJ      Recorded by [KJ] Airam Pelletier, [facility] 02/11/19 1043      Row Name 02/11/19 1024             Pain Scale: Numbers Pre/Post-Treatment    Pain Scale: Numbers, Pretreatment  4/10  -KJ      Pain Scale: Numbers, Post-Treatment  8/10  -KJ      Pain Location - Side  Right  -KJ      Pain Location  hip  -KJ      Recorded by [KJ] Airam Pelletier, [facility] 02/11/19 1043      Row Name                Wound  02/08/19 1211 Right hip incision    Wound - Properties Group Date first assessed: 02/08/19 [HT] Time first assessed: 1211 [HT] Side: Right [HT] Location: hip [HT] Type: incision [HT] Recorded by:  [HT] Neyda Reynolds RN 02/08/19 1211      User Key  (r) = Recorded By, (t) = Taken By, (c) = Cosigned By    Initials Name Effective Dates Discipline     Airam Pelletier PTA 08/02/16 -  PT    HT Neyda Reynolds RN 08/02/16 -  Nurse          Wound 02/08/19 1211 Right hip incision (Active)   Dressing Appearance no drainage 2/11/2019  8:00 AM   Closure RIYA 2/11/2019  8:00 AM   Base dressing in place, unable to visualize 2/11/2019  8:00 AM   Drainage Amount none 2/11/2019  8:00 AM   Dressing Care, Wound foam;low-adherent 2/11/2019  8:00 AM           Physical Therapy Education     Title: PT OT SLP Therapies (In Progress)     Topic: Physical Therapy (Done)     Point: Mobility training (Done)     Learning Progress Summary           Patient Acceptance, E,D, VU,NR by  at 2/10/2019  1:42 PM    Comment:  bed mobility, transfers, WB precautions, plan of care    Acceptance, E,D, DU,VU by  at 2/9/2019 10:06 AM    Comment:  benefits of PT and POC, call for assist OOB, TTWB RLE                   Point: Precautions (Done)     Learning Progress Summary           Patient Acceptance, E,D, VU,NR by  at 2/10/2019  1:42 PM    Comment:  bed mobility, transfers, WB precautions, plan of care    Acceptance, E,D, DU,VU by  at 2/9/2019 10:06 AM    Comment:  benefits of PT and POC, call for assist OOB, TTWB RLE                               User Key     Initials Effective Dates Name Provider Type Discipline     08/02/16 -  Espinoza Cole PT Physical Therapist PT     06/22/15 -  Casandra Crooks PTA Physical Therapy Assistant PT                PT Recommendation and Plan     Plan of Care Reviewed With: patient  Progress: improving  Outcome Summary: /PT tx completd. Pt supine in bed. Rates right hip pn  after tx. Mod/Max 1-2 sup<>sit.  Sit edge of bed x 10 minutes S/CG. Stood x 1 modA x 2 wiht r wx. Able to shimmy up towards head of bed. Unable to take steps forward. Recommend SNF for cont'd PT  Outcome Measures     Row Name 02/10/19 1300 02/09/19 1007 02/09/19 1000       How much help from another person do you currently need...    Turning from your back to your side while in flat bed without using bedrails?  2  -CW  --  2  -LH    Moving from lying on back to sitting on the side of a flat bed without bedrails?  2  -CW  --  2  -LH    Moving to and from a bed to a chair (including a wheelchair)?  2  -CW  --  2  -LH    Standing up from a chair using your arms (e.g., wheelchair, bedside chair)?  2  -CW  --  2  -LH    Climbing 3-5 steps with a railing?  1  -CW  --  1  -LH    To walk in hospital room?  1  -CW  --  2  -LH    AM-PAC 6 Clicks Score  10  -CW  --  11  -LH       How much help from another is currently needed...    Putting on and taking off regular lower body clothing?  --  1  -MM  --    Bathing (including washing, rinsing, and drying)  --  2  -MM  --    Toileting (which includes using toilet bed pan or urinal)  --  2  -MM  --    Putting on and taking off regular upper body clothing  --  3  -MM  --    Taking care of personal grooming (such as brushing teeth)  --  3  -MM  --    Eating meals  --  3  -MM  --    Score  --  14  -MM  --       Functional Assessment    Outcome Measure Options  AM-PAC 6 Clicks Basic Mobility (PT)  -  AM-PAC 6 Clicks Daily Activity (OT)  -MM  AM-PAC 6 Clicks Basic Mobility (PT)  -      User Key  (r) = Recorded By, (t) = Taken By, (c) = Cosigned By    Initials Name Provider Type     Espinoza Cole, PT Physical Therapist    CW Casandra Crooks, PTA Physical Therapy Assistant    MM Ángel Rand, OTR/L Occupational Therapist         Time Calculation:   PT Charges     Row Name 02/11/19 1043             Time Calculation    Start Time  1024  -KJ      Stop Time  1047  -KJ      Time Calculation (min)  23 min  -KJ       PT Received On  02/11/19  -KJ      PT Goal Re-Cert Due Date  02/19/19  -KJ         Time Calculation- PT    Total Timed Code Minutes- PT  23 minute(s)  -KJ        User Key  (r) = Recorded By, (t) = Taken By, (c) = Cosigned By    Initials Name Provider Type    Airam Gandara, PTA Physical Therapy Assistant        Therapy Suggested Charges     Code   Minutes Charges    53143 (CPT®) Hc Pt Neuromusc Re Education Ea 15 Min      01666 (CPT®) Hc Pt Ther Proc Ea 15 Min      46263 (CPT®) Hc Gait Training Ea 15 Min      93548 (CPT®) Hc Pt Therapeutic Act Ea 15 Min 30 2    37657 (CPT®) Hc Pt Manual Therapy Ea 15 Min      65679 (CPT®) Hc Pt Iontophoresis Ea 15 Min      34939 (CPT®) Hc Pt Elec Stim Ea-Per 15 Min      90070 (CPT®) Hc Pt Ultrasound Ea 15 Min      35161 (CPT®) Hc Pt Self Care/Mgmt/Train Ea 15 Min      47563 (CPT®) Hc Pt Prosthetic (S) Train Initial Encounter, Each 15 Min      44664 (CPT®) Hc Pt Orthotic(S)/Prosthetic(S) Encounter, Each 15 Min      04122 (CPT®) Hc Orthotic(S) Mgmt/Train Initial Encounter, Each 15min      Total  30 2        Therapy Charges for Today     Code Description Service Date Service Provider Modifiers Qty    85973294348 HC PT THERAPEUTIC ACT EA 15 MIN 2/11/2019 Airam Pelletier PTA GP 2          PT G-Codes  Outcome Measure Options: AM-PAC 6 Clicks Basic Mobility (PT)  AM-PAC 6 Clicks Score: 10  Score: 14    Airam Pelletier PTA  2/11/2019

## 2019-02-12 LAB
HBV E AB SERPL QL IA: NEGATIVE
HBV E AG SERPL QL IA: NEGATIVE

## 2019-02-12 NOTE — THERAPY DISCHARGE NOTE
Acute Care - Physical Therapy Discharge Summary  [facility]       Patient Name: Cheri Ely  : 1940  MRN: [id number]    Today's Date: 2019  Onset of Illness/Injury or Date of Surgery: 19    Date of Referral to PT: 19  Referring Physician: Dr. Camacho      Admit Date: 2019      PT Recommendation and Plan    Visit Dx:    ICD-10-CM ICD-9-CM   1. Displaced intertrochanteric fracture of right femur, initial encounter for closed fracture (CMS/[facility]) S72.141A 820.21   2. Closed displaced intertrochanteric fracture of right femur, initial encounter (CMS/[facility]) S72.141A 820.21   3. Impaired mobility Z74.09 799.89   4. Impaired mobility and ADLs Z74.09 799.89       Outcome Measures     Row Name 02/10/19 1300             How much help from another person do you currently need...    Turning from your back to your side while in flat bed without using bedrails?  2  -CW      Moving from lying on back to sitting on the side of a flat bed without bedrails?  2  -CW      Moving to and from a bed to a chair (including a wheelchair)?  2  -CW      Standing up from a chair using your arms (e.g., wheelchair, bedside chair)?  2  -CW      Climbing 3-5 steps with a railing?  1  -CW      To walk in hospital room?  1  -CW      AM-PAC 6 Clicks Score  10  -CW         Functional Assessment    Outcome Measure Options  AM-PAC 6 Clicks Basic Mobility (PT)  -CW        User Key  (r) = Recorded By, (t) = Taken By, (c) = Cosigned By    Initials Name Provider Type    Casandra Carney PTA Physical Therapy Assistant            Therapy Suggested Charges     Code   Minutes Charges    80848 (CPT®) Hc Pt Neuromusc Re Education Ea 15 Min      01209 (CPT®) Hc Pt Ther Proc Ea 15 Min      44330 (CPT®) Hc Gait Training Ea 15 Min      34801 (CPT®) Hc Pt Therapeutic Act Ea 15 Min 30 2    13375 (CPT®) Hc Pt Manual Therapy Ea 15 Min      25553 (CPT®) Hc Pt Iontophoresis Ea 15 Min      68216 (CPT®) Hc Pt Elec Stim Ea-Per 15 Min      02455  (CPT®) Hc Pt Ultrasound Ea 15 Min      78942 (CPT®) Hc Pt Self Care/Mgmt/Train Ea 15 Min      09279 (CPT®) Hc Pt Prosthetic (S) Train Initial Encounter, Each 15 Min      00765 (CPT®) Hc Pt Orthotic(S)/Prosthetic(S) Encounter, Each 15 Min      80279 (CPT®) Hc Orthotic(S) Mgmt/Train Initial Encounter, Each 15min      Total  30 2          Rehab Goal Summary     Row Name 02/12/19 1630 02/12/19 0700          Physical Therapy Goals    Bed Mobility Goal Selection (PT)  bed mobility, PT goal 1  -AH  --     Transfer Goal Selection (PT)  transfer, PT goal 1  -AH  --        Bed Mobility Goal 1 (PT)    Activity/Assistive Device (Bed Mobility Goal 1, PT)  bed mobility activities, all  -  --     [place] Level/Cues Needed (Bed Mobility Goal 1, PT)  minimum assist (75% or more patient effort)  -  --     Time Frame (Bed Mobility Goal 1, PT)  by discharge  -  --     Progress/Outcomes (Bed Mobility Goal 1, PT)  goal not met  -  --        Transfer Goal 1 (PT)    Activity/Assistive Device (Transfer Goal 1, PT)  sit-to-stand/stand-to-sit;bed-to-chair/chair-to-bed;walker, rolling  -  --     [place] Level/Cues Needed (Transfer Goal 1, PT)  contact guard assist  -  --     Time Frame (Transfer Goal 1, PT)  by discharge  -  --     Barriers (Transfers Goal 1, PT)  TTWB RLE  -  --     Progress/Outcome (Transfer Goal 1, PT)  goal not met  -  --        Dressing Goal 1 (OT)    Activity/Assistive Device (Dressing Goal 1, OT)  --  lower body dressing  -TS     [place]/Cues Needed (Dressing Goal 1, OT)  --  moderate assist (50-74% patient effort)  -TS     Time Frame (Dressing Goal 1, OT)  --  10 days  -TS     Progress/Outcome (Dressing Goal 1, OT)  --  goal not met  -TS        Toileting Goal 1 (OT)    Activity/Device (Toileting Goal 1, OT)  --  toileting skills, all;commode, bedside with drop arms  -TS     [place] Level/Cues Needed (Toileting Goal 1, OT)  --  minimum assist (75% or more patient effort);verbal cues  required  -TS     Time Frame (Toileting Goal 1, OT)  --  10 days  -TS     Progress/Outcome (Toileting Goal 1, OT)  --  goal not met  -TS        Grooming Goal 1 (OT)    Activity/Device (Grooming Goal 1, OT)  --  grooming skills, all  -TS     [place] (Grooming Goal 1, OT)  --  conditional independence;set-up required  -TS     Time Frame (Grooming Goal 1, OT)  --  10 days  -TS     Progress/Outcome (Grooming Goal 1, OT)  --  goal not met  -TS       User Key  (r) = Recorded By, (t) = Taken By, (c) = Cosigned By    Initials Name Provider Type Discipline    Idania Keating, PTA Physical Therapy Assistant PT    TS Nelly Law, CAMPA/L Occupational Therapy Assistant OT              PT Discharge Summary  Reason for Discharge: Discharge from facility  Outcomes Achieved: Refer to plan of care for updates on goals achieved  Discharge Destination: [facility]      Idania Sibley PTA   2/12/2019

## 2019-02-15 LAB
ABO + RH BLD: NORMAL
BH BB BLOOD EXPIRATION DATE: NORMAL
BH BB BLOOD TYPE BARCODE: 6200
BH BB DISPENSE STATUS: NORMAL
BH BB PRODUCT CODE: NORMAL
BH BB UNIT NUMBER: NORMAL
UNIT  ABO: NORMAL
UNIT  RH: NORMAL

## 2019-02-28 ENCOUNTER — HOSPITAL ENCOUNTER (OUTPATIENT)
Facility: HOSPITAL | Age: 79
Setting detail: HOSPITAL OUTPATIENT SURGERY
Discharge: HOME | End: 2019-02-28
Attending: INTERNAL MEDICINE | Admitting: INTERNAL MEDICINE

## 2019-02-28 ENCOUNTER — ANESTHESIA (OUTPATIENT)
Dept: GASTROENTEROLOGY | Facility: HOSPITAL | Age: 79
End: 2019-02-28

## 2019-02-28 ENCOUNTER — ANESTHESIA EVENT (OUTPATIENT)
Dept: GASTROENTEROLOGY | Facility: HOSPITAL | Age: 79
End: 2019-02-28

## 2019-02-28 VITALS
WEIGHT: 165 LBS | HEART RATE: 80 BPM | DIASTOLIC BLOOD PRESSURE: 39 MMHG | HEIGHT: 59 IN | RESPIRATION RATE: 18 BRPM | TEMPERATURE: 96.9 F | SYSTOLIC BLOOD PRESSURE: 108 MMHG | OXYGEN SATURATION: 96 % | BODY MASS INDEX: 33.26 KG/M2

## 2019-02-28 DIAGNOSIS — K25.0 ACUTE GASTRIC ULCER WITH HEMORRHAGE: ICD-10-CM

## 2019-02-28 PROCEDURE — 25010000002 PROPOFOL 10 MG/ML EMULSION: Performed by: NURSE ANESTHETIST, CERTIFIED REGISTERED

## 2019-02-28 PROCEDURE — 88305 TISSUE EXAM BY PATHOLOGIST: CPT | Performed by: INTERNAL MEDICINE

## 2019-02-28 PROCEDURE — 43239 EGD BIOPSY SINGLE/MULTIPLE: CPT | Performed by: INTERNAL MEDICINE

## 2019-02-28 DEVICE — DEV CLIP ENDO RESOLUTION360 CONTRL ROT 235CM: Type: IMPLANTABLE DEVICE | Site: ESOPHAGUS | Status: FUNCTIONAL

## 2019-02-28 RX ORDER — SODIUM CHLORIDE 9 MG/ML
100 INJECTION, SOLUTION INTRAVENOUS CONTINUOUS
Status: DISCONTINUED | OUTPATIENT
Start: 2019-02-28 | End: 2019-02-28 | Stop reason: HOSPADM

## 2019-02-28 RX ORDER — SUCRALFATE ORAL 1 G/10ML
1 SUSPENSION ORAL 4 TIMES DAILY
Qty: 1200 ML | Refills: 0 | Status: ON HOLD | OUTPATIENT
Start: 2019-02-28 | End: 2019-04-02 | Stop reason: SDUPTHER

## 2019-02-28 RX ORDER — SODIUM CHLORIDE 0.9 % (FLUSH) 0.9 %
3-10 SYRINGE (ML) INJECTION AS NEEDED
Status: DISCONTINUED | OUTPATIENT
Start: 2019-02-28 | End: 2019-02-28 | Stop reason: HOSPADM

## 2019-02-28 RX ORDER — PROPOFOL 10 MG/ML
VIAL (ML) INTRAVENOUS AS NEEDED
Status: DISCONTINUED | OUTPATIENT
Start: 2019-02-28 | End: 2019-02-28 | Stop reason: SURG

## 2019-02-28 RX ORDER — SODIUM CHLORIDE 0.9 % (FLUSH) 0.9 %
3 SYRINGE (ML) INJECTION EVERY 12 HOURS SCHEDULED
Status: DISCONTINUED | OUTPATIENT
Start: 2019-02-28 | End: 2019-02-28 | Stop reason: HOSPADM

## 2019-02-28 RX ORDER — LIDOCAINE HYDROCHLORIDE 20 MG/ML
INJECTION, SOLUTION INFILTRATION; PERINEURAL AS NEEDED
Status: DISCONTINUED | OUTPATIENT
Start: 2019-02-28 | End: 2019-02-28 | Stop reason: SURG

## 2019-02-28 RX ADMIN — SODIUM CHLORIDE 100 ML/HR: 9 INJECTION, SOLUTION INTRAVENOUS at 07:46

## 2019-02-28 RX ADMIN — PROPOFOL 90 MG: 10 INJECTION, EMULSION INTRAVENOUS at 08:18

## 2019-02-28 RX ADMIN — LIDOCAINE HYDROCHLORIDE 60 MG: 20 INJECTION, SOLUTION INFILTRATION; PERINEURAL at 08:18

## 2019-02-28 NOTE — ANESTHESIA PREPROCEDURE EVALUATION
Anesthesia Evaluation     Patient summary reviewed   no history of anesthetic complications:  NPO Solid Status: > 8 hours  NPO Liquid Status: > 8 hours           Airway   Mallampati: I  TM distance: >3 FB  Neck ROM: full  Dental    (+) edentulous    Pulmonary    (+) sleep apnea,   (-) COPD, asthma, recent URI, not a smoker  Cardiovascular   Exercise tolerance: poor (<4 METS) (Limited by leg pain)    ECG reviewed  Patient on routine beta blocker and Beta blocker given within 24 hours of surgery  Rhythm: irregular    (+) hypertension, valvular problems/murmurs (Aortic valve replacement 3 yrs ago), CAD, CABG, dysrhythmias (Rate controlled with carvedilol) Atrial Fib, CHF, PVD, hyperlipidemia,   (-) pacemaker, angina, cardiac stents    ROS comment: Of plavix x 1 week    Neuro/Psych  C-spine cleared  (-) seizures, TIA, CVA  GI/Hepatic/Renal/Endo    (+)  GI bleeding, renal disease (2/27/19 last dialysis), hypothyroidism,   (-) GERD, liver disease, diabetes, hyperthyroidism    Musculoskeletal     Abdominal    Substance History      OB/GYN          Other                        Anesthesia Plan    ASA 3     MAC     intravenous induction   Anesthetic plan, all risks, benefits, and alternatives have been provided, discussed and informed consent has been obtained with: patient.

## 2019-02-28 NOTE — ANESTHESIA POSTPROCEDURE EVALUATION
Patient: Cheri Ely    Procedure Summary     Date:  02/28/19 Room / Location:  [facility] ENDOSCOPY 4 / [facility]    Anesthesia Start:  0813 Anesthesia Stop:  0830    Procedure:  ESOPHAGOGASTRODUODENOSCOPY WITH ANESTHESIA (N/A ) Diagnosis:       Acute gastric ulcer with hemorrhage      (Acute gastric ulcer with hemorrhage [K25.0])    Surgeon:  Moni Garza MD Provider:  Priscilla Duncan CRNA    Anesthesia Type:  MAC ASA Status:  3          Anesthesia Type: MAC  Last vitals  BP   (!) 108/39 (02/28/19 0850)   Temp   96.9 °F (36.1 °C) (02/28/19 0717)   Pulse   80 (02/28/19 0850)   Resp   18 (02/28/19 0850)     SpO2   96 % (02/28/19 0850)     Post Anesthesia Care and Evaluation    Patient location during evaluation: PHASE II  Patient participation: complete - patient participated  Level of consciousness: awake and alert  Pain score: 0  Pain management: adequate  Airway patency: patent  Anesthetic complications: No anesthetic complications  PONV Status: none  Cardiovascular status: acceptable  Respiratory status: acceptable  Hydration status: acceptable  No anesthesia care post op

## 2019-03-01 ENCOUNTER — TELEPHONE (OUTPATIENT)
Dept: GASTROENTEROLOGY | Facility: CLINIC | Age: 79
End: 2019-03-01

## 2019-03-01 ENCOUNTER — PREP FOR SURGERY (OUTPATIENT)
Dept: OTHER | Facility: HOSPITAL | Age: 79
End: 2019-03-01

## 2019-03-01 DIAGNOSIS — K25.0 ACUTE GASTRIC ULCER WITH HEMORRHAGE: Primary | ICD-10-CM

## 2019-03-01 LAB
CYTO UR: NORMAL
LAB AP CASE REPORT: NORMAL
PATH REPORT.FINAL DX SPEC: NORMAL
PATH REPORT.GROSS SPEC: NORMAL

## 2019-03-01 NOTE — TELEPHONE ENCOUNTER
Please let her know that her esophageal biopsy showed severe inflammation/ulceration.  There was nothing else concerning.  I do want to repeat an endoscopy in about 2-3 months time.  Okay to schedule and pend Case request to me.    Moni Garza MD

## 2019-03-06 ENCOUNTER — TELEPHONE (OUTPATIENT)
Dept: GASTROENTEROLOGY | Facility: CLINIC | Age: 79
End: 2019-03-06

## 2019-03-06 DIAGNOSIS — K62.5 RECTAL BLEEDING: Primary | ICD-10-CM

## 2019-03-06 NOTE — TELEPHONE ENCOUNTER
Pt called today stating that yesterday she started passing blood in her stools. She described it as black with a little red. She denies any pain, fever, nausea or vomiting. Please advise.

## 2019-03-06 NOTE — TELEPHONE ENCOUNTER
Hold plavix for 5 days.  Please do take ASA while off of plavix.  Report to lab to get labs done when able.    If it gets worse-->ER    Moni Garza MD

## 2019-03-06 NOTE — TELEPHONE ENCOUNTER
I would like for Dr. Garza to review this patient's chart as she just underwent an EGD 2/2019 to follow-up for gastric ulcer.  The patient is on Plavix.

## 2019-03-06 NOTE — TELEPHONE ENCOUNTER
Spoke with patient and she states that she told the nurse at the nursing home she lives at you wanted her to stop the  plavix for 5 days and take ASA. The nurse said that Dr. Katz would have to approve that. She did say she would get labs Friday.

## 2019-03-08 ENCOUNTER — LAB REQUISITION (OUTPATIENT)
Dept: LAB | Facility: HOSPITAL | Age: 79
End: 2019-03-08

## 2019-03-08 ENCOUNTER — HOSPITAL ENCOUNTER (INPATIENT)
Facility: HOSPITAL | Age: 79
LOS: 3 days | Discharge: NURSING, REHAB OR LONG-TERM CARE | End: 2019-03-12
Attending: INTERNAL MEDICINE | Admitting: INTERNAL MEDICINE

## 2019-03-08 ENCOUNTER — APPOINTMENT (OUTPATIENT)
Dept: GENERAL RADIOLOGY | Facility: HOSPITAL | Age: 79
End: 2019-03-08

## 2019-03-08 DIAGNOSIS — Z00.00 ROUTINE GENERAL MEDICAL EXAMINATION AT A HEALTH CARE FACILITY: ICD-10-CM

## 2019-03-08 DIAGNOSIS — K92.1 BLACK TARRY STOOLS: ICD-10-CM

## 2019-03-08 DIAGNOSIS — K92.2 GASTROINTESTINAL HEMORRHAGE, UNSPECIFIED GASTROINTESTINAL HEMORRHAGE TYPE: Primary | ICD-10-CM

## 2019-03-08 DIAGNOSIS — K25.0 ACUTE GASTRIC ULCER WITH HEMORRHAGE: ICD-10-CM

## 2019-03-08 LAB
ALBUMIN SERPL-MCNC: 2.8 G/DL (ref 3.5–5)
ALBUMIN/GLOB SERPL: 1.2 G/DL (ref 1.1–2.5)
ALP SERPL-CCNC: 166 U/L (ref 24–120)
ALT SERPL W P-5'-P-CCNC: 17 U/L (ref 0–54)
ANION GAP SERPL CALCULATED.3IONS-SCNC: 10 MMOL/L (ref 4–13)
ANISOCYTOSIS BLD QL: NORMAL
APTT PPP: 40 SECONDS (ref 24.1–34.8)
AST SERPL-CCNC: 32 U/L (ref 7–45)
BASOPHILS # BLD AUTO: 0.03 10*3/MM3 (ref 0–0.2)
BASOPHILS # BLD AUTO: 0.04 10*3/MM3 (ref 0–0.2)
BASOPHILS NFR BLD AUTO: 0.5 % (ref 0–2)
BASOPHILS NFR BLD AUTO: 0.6 % (ref 0–2)
BILIRUB SERPL-MCNC: 0.4 MG/DL (ref 0.1–1)
BUN BLD-MCNC: 17 MG/DL (ref 5–21)
BUN/CREAT SERPL: 6.5 (ref 7–25)
CALCIUM SPEC-SCNC: 8.1 MG/DL (ref 8.4–10.4)
CHLORIDE SERPL-SCNC: 96 MMOL/L (ref 98–110)
CO2 SERPL-SCNC: 33 MMOL/L (ref 24–31)
CREAT BLD-MCNC: 2.63 MG/DL (ref 0.5–1.4)
D-LACTATE SERPL-SCNC: 1.9 MMOL/L (ref 0.5–2)
DEPRECATED RDW RBC AUTO: 65.8 FL (ref 40–54)
DEPRECATED RDW RBC AUTO: 67.6 FL (ref 40–54)
EOSINOPHIL # BLD AUTO: 0.16 10*3/MM3 (ref 0–0.7)
EOSINOPHIL # BLD AUTO: 0.18 10*3/MM3 (ref 0–0.7)
EOSINOPHIL NFR BLD AUTO: 2.5 % (ref 0–4)
EOSINOPHIL NFR BLD AUTO: 2.8 % (ref 0–4)
ERYTHROCYTE [DISTWIDTH] IN BLOOD BY AUTOMATED COUNT: 16.1 % (ref 12–15)
ERYTHROCYTE [DISTWIDTH] IN BLOOD BY AUTOMATED COUNT: 16.2 % (ref 12–15)
GFR SERPL CREATININE-BSD FRML MDRD: 18 ML/MIN/1.73
GLOBULIN UR ELPH-MCNC: 2.4 GM/DL
GLUCOSE BLD-MCNC: 95 MG/DL (ref 70–100)
HCT VFR BLD AUTO: 20.1 % (ref 37–47)
HCT VFR BLD AUTO: 20.7 % (ref 37–47)
HGB BLD-MCNC: 6.1 G/DL (ref 12–16)
HGB BLD-MCNC: 6.2 G/DL (ref 12–16)
HYPOCHROMIA BLD QL: NORMAL
IMM GRANULOCYTES # BLD AUTO: 0.04 10*3/MM3 (ref 0–0.05)
IMM GRANULOCYTES NFR BLD AUTO: 0.6 % (ref 0–5)
INR PPP: 1.39 (ref 0.91–1.09)
LYMPHOCYTES # BLD AUTO: 0.83 10*3/MM3 (ref 0.72–4.86)
LYMPHOCYTES # BLD AUTO: 0.96 10*3/MM3 (ref 0.72–4.86)
LYMPHOCYTES NFR BLD AUTO: 12.9 % (ref 15–45)
LYMPHOCYTES NFR BLD AUTO: 14.8 % (ref 15–45)
MACROCYTES BLD QL SMEAR: NORMAL
MAGNESIUM SERPL-MCNC: 1.8 MG/DL (ref 1.4–2.2)
MCH RBC QN AUTO: 34.3 PG (ref 28–32)
MCH RBC QN AUTO: 35.2 PG (ref 28–32)
MCHC RBC AUTO-ENTMCNC: 30 G/DL (ref 33–36)
MCHC RBC AUTO-ENTMCNC: 30.3 G/DL (ref 33–36)
MCV RBC AUTO: 112.9 FL (ref 82–98)
MCV RBC AUTO: 117.6 FL (ref 82–98)
MONOCYTES # BLD AUTO: 0.41 10*3/MM3 (ref 0.19–1.3)
MONOCYTES # BLD AUTO: 0.47 10*3/MM3 (ref 0.19–1.3)
MONOCYTES NFR BLD AUTO: 6.4 % (ref 4–12)
MONOCYTES NFR BLD AUTO: 7.3 % (ref 4–12)
NEUTROPHILS # BLD AUTO: 4.79 10*3/MM3 (ref 1.87–8.4)
NEUTROPHILS # BLD AUTO: 4.92 10*3/MM3 (ref 1.87–8.4)
NEUTROPHILS NFR BLD AUTO: 74 % (ref 39–78)
NEUTROPHILS NFR BLD AUTO: 76.7 % (ref 39–78)
NRBC BLD AUTO-RTO: 0.3 /100 WBC (ref 0–0)
PLATELET # BLD AUTO: 207 10*3/MM3 (ref 130–400)
PLATELET # BLD AUTO: 209 10*3/MM3 (ref 130–400)
PMV BLD AUTO: 10 FL (ref 6–12)
PMV BLD AUTO: 9.7 FL (ref 6–12)
POTASSIUM BLD-SCNC: 3.6 MMOL/L (ref 3.5–5.3)
PROT SERPL-MCNC: 5.2 G/DL (ref 6.3–8.7)
PROTHROMBIN TIME: 17.5 SECONDS (ref 11.9–14.6)
RBC # BLD AUTO: 1.76 10*6/MM3 (ref 4.2–5.4)
RBC # BLD AUTO: 1.78 10*6/MM3 (ref 4.2–5.4)
SMALL PLATELETS BLD QL SMEAR: ADEQUATE
SODIUM BLD-SCNC: 139 MMOL/L (ref 135–145)
TROPONIN I SERPL-MCNC: 0.04 NG/ML (ref 0–0.03)
WBC MORPH BLD: NORMAL
WBC NRBC COR # BLD: 6.42 10*3/MM3 (ref 4.8–10.8)
WBC NRBC COR # BLD: 6.47 10*3/MM3 (ref 4.8–10.8)

## 2019-03-08 PROCEDURE — 85025 COMPLETE CBC W/AUTO DIFF WBC: CPT

## 2019-03-08 PROCEDURE — 84443 ASSAY THYROID STIM HORMONE: CPT | Performed by: PHYSICIAN ASSISTANT

## 2019-03-08 PROCEDURE — 83880 ASSAY OF NATRIURETIC PEPTIDE: CPT | Performed by: PHYSICIAN ASSISTANT

## 2019-03-08 PROCEDURE — 85007 BL SMEAR W/DIFF WBC COUNT: CPT

## 2019-03-08 PROCEDURE — 86850 RBC ANTIBODY SCREEN: CPT

## 2019-03-08 PROCEDURE — 86901 BLOOD TYPING SEROLOGIC RH(D): CPT

## 2019-03-08 PROCEDURE — 86900 BLOOD TYPING SEROLOGIC ABO: CPT

## 2019-03-08 PROCEDURE — 99285 EMERGENCY DEPT VISIT HI MDM: CPT

## 2019-03-08 PROCEDURE — 86923 COMPATIBILITY TEST ELECTRIC: CPT

## 2019-03-08 PROCEDURE — 93005 ELECTROCARDIOGRAM TRACING: CPT | Performed by: PHYSICIAN ASSISTANT

## 2019-03-08 PROCEDURE — 85730 THROMBOPLASTIN TIME PARTIAL: CPT

## 2019-03-08 PROCEDURE — 85610 PROTHROMBIN TIME: CPT

## 2019-03-08 PROCEDURE — 83605 ASSAY OF LACTIC ACID: CPT | Performed by: PHYSICIAN ASSISTANT

## 2019-03-08 PROCEDURE — 71045 X-RAY EXAM CHEST 1 VIEW: CPT

## 2019-03-08 PROCEDURE — 83735 ASSAY OF MAGNESIUM: CPT | Performed by: PHYSICIAN ASSISTANT

## 2019-03-08 PROCEDURE — 84484 ASSAY OF TROPONIN QUANT: CPT | Performed by: PHYSICIAN ASSISTANT

## 2019-03-08 PROCEDURE — 80053 COMPREHEN METABOLIC PANEL: CPT

## 2019-03-08 RX ORDER — SODIUM CHLORIDE 0.9 % (FLUSH) 0.9 %
10 SYRINGE (ML) INJECTION AS NEEDED
Status: DISCONTINUED | OUTPATIENT
Start: 2019-03-08 | End: 2019-03-12 | Stop reason: HOSPADM

## 2019-03-09 ENCOUNTER — APPOINTMENT (OUTPATIENT)
Dept: CT IMAGING | Facility: HOSPITAL | Age: 79
End: 2019-03-09

## 2019-03-09 ENCOUNTER — APPOINTMENT (OUTPATIENT)
Dept: GENERAL RADIOLOGY | Facility: HOSPITAL | Age: 79
End: 2019-03-09

## 2019-03-09 PROBLEM — I50.30 (HFPEF) HEART FAILURE WITH PRESERVED EJECTION FRACTION (HCC): Status: ACTIVE | Noted: 2019-03-09

## 2019-03-09 PROBLEM — I50.30 DIASTOLIC HEART FAILURE (HCC): Status: ACTIVE | Noted: 2019-03-09

## 2019-03-09 PROBLEM — I51.89 DIASTOLIC DYSFUNCTION: Status: ACTIVE | Noted: 2019-03-09

## 2019-03-09 PROBLEM — K92.2 GI BLEED: Status: ACTIVE | Noted: 2019-03-09

## 2019-03-09 PROBLEM — E03.9 HYPOTHYROID: Status: ACTIVE | Noted: 2019-03-09

## 2019-03-09 PROBLEM — K92.2 GASTROINTESTINAL HEMORRHAGE: Status: ACTIVE | Noted: 2019-03-09

## 2019-03-09 LAB
ABO GROUP BLD: NORMAL
ALBUMIN SERPL-MCNC: 2.5 G/DL (ref 3.5–5)
ALBUMIN/GLOB SERPL: 1 G/DL (ref 1.1–2.5)
ALP SERPL-CCNC: 157 U/L (ref 24–120)
ALT SERPL W P-5'-P-CCNC: 17 U/L (ref 0–54)
ANION GAP SERPL CALCULATED.3IONS-SCNC: 8 MMOL/L (ref 4–13)
AST SERPL-CCNC: 23 U/L (ref 7–45)
BASOPHILS # BLD AUTO: 0.03 10*3/MM3 (ref 0–0.2)
BASOPHILS NFR BLD AUTO: 0.5 % (ref 0–2)
BILIRUB SERPL-MCNC: 0.6 MG/DL (ref 0.1–1)
BLD GP AB SCN SERPL QL: NEGATIVE
BUN BLD-MCNC: 20 MG/DL (ref 5–21)
BUN/CREAT SERPL: 6.8 (ref 7–25)
CALCIUM SPEC-SCNC: 7.9 MG/DL (ref 8.4–10.4)
CHLORIDE SERPL-SCNC: 99 MMOL/L (ref 98–110)
CO2 SERPL-SCNC: 32 MMOL/L (ref 24–31)
CREAT BLD-MCNC: 2.95 MG/DL (ref 0.5–1.4)
DEPRECATED RDW RBC AUTO: 82.9 FL (ref 40–54)
EOSINOPHIL # BLD AUTO: 0.11 10*3/MM3 (ref 0–0.7)
EOSINOPHIL NFR BLD AUTO: 1.8 % (ref 0–4)
ERYTHROCYTE [DISTWIDTH] IN BLOOD BY AUTOMATED COUNT: 22.5 % (ref 12–15)
GFR SERPL CREATININE-BSD FRML MDRD: 15 ML/MIN/1.73
GLOBULIN UR ELPH-MCNC: 2.4 GM/DL
GLUCOSE BLD-MCNC: 84 MG/DL (ref 70–100)
HCT VFR BLD AUTO: 28.7 % (ref 37–47)
HGB BLD-MCNC: 9.1 G/DL (ref 12–16)
HOLD SPECIMEN: NORMAL
HOLD SPECIMEN: NORMAL
IMM GRANULOCYTES # BLD AUTO: 0.06 10*3/MM3 (ref 0–0.05)
IMM GRANULOCYTES NFR BLD AUTO: 1 % (ref 0–5)
LYMPHOCYTES # BLD AUTO: 0.62 10*3/MM3 (ref 0.72–4.86)
LYMPHOCYTES NFR BLD AUTO: 10.1 % (ref 15–45)
MCH RBC QN AUTO: 32.9 PG (ref 28–32)
MCHC RBC AUTO-ENTMCNC: 31.7 G/DL (ref 33–36)
MCV RBC AUTO: 103.6 FL (ref 82–98)
MONOCYTES # BLD AUTO: 0.3 10*3/MM3 (ref 0.19–1.3)
MONOCYTES NFR BLD AUTO: 4.9 % (ref 4–12)
NEUTROPHILS # BLD AUTO: 5 10*3/MM3 (ref 1.87–8.4)
NEUTROPHILS NFR BLD AUTO: 81.7 % (ref 39–78)
NRBC BLD AUTO-RTO: 0 /100 WBC (ref 0–0)
NT-PROBNP SERPL-MCNC: ABNORMAL PG/ML (ref 0–1800)
PLATELET # BLD AUTO: 175 10*3/MM3 (ref 130–400)
PMV BLD AUTO: 9.4 FL (ref 6–12)
POTASSIUM BLD-SCNC: 4.1 MMOL/L (ref 3.5–5.3)
PROT SERPL-MCNC: 4.9 G/DL (ref 6.3–8.7)
RBC # BLD AUTO: 2.77 10*6/MM3 (ref 4.2–5.4)
RH BLD: POSITIVE
SODIUM BLD-SCNC: 139 MMOL/L (ref 135–145)
T&S EXPIRATION DATE: NORMAL
TSH SERPL DL<=0.05 MIU/L-ACNC: 28 MIU/ML (ref 0.47–4.68)
WBC NRBC COR # BLD: 6.12 10*3/MM3 (ref 4.8–10.8)
WHOLE BLOOD HOLD SPECIMEN: NORMAL
WHOLE BLOOD HOLD SPECIMEN: NORMAL

## 2019-03-09 PROCEDURE — 94799 UNLISTED PULMONARY SVC/PX: CPT

## 2019-03-09 PROCEDURE — P9016 RBC LEUKOCYTES REDUCED: HCPCS

## 2019-03-09 PROCEDURE — 85025 COMPLETE CBC W/AUTO DIFF WBC: CPT | Performed by: INTERNAL MEDICINE

## 2019-03-09 PROCEDURE — 94760 N-INVAS EAR/PLS OXIMETRY 1: CPT

## 2019-03-09 PROCEDURE — 86900 BLOOD TYPING SEROLOGIC ABO: CPT

## 2019-03-09 PROCEDURE — 74018 RADEX ABDOMEN 1 VIEW: CPT

## 2019-03-09 PROCEDURE — 80053 COMPREHEN METABOLIC PANEL: CPT | Performed by: INTERNAL MEDICINE

## 2019-03-09 PROCEDURE — 36430 TRANSFUSION BLD/BLD COMPNT: CPT

## 2019-03-09 PROCEDURE — 74176 CT ABD & PELVIS W/O CONTRAST: CPT

## 2019-03-09 PROCEDURE — 93010 ELECTROCARDIOGRAM REPORT: CPT | Performed by: INTERNAL MEDICINE

## 2019-03-09 PROCEDURE — 99222 1ST HOSP IP/OBS MODERATE 55: CPT | Performed by: INTERNAL MEDICINE

## 2019-03-09 RX ORDER — LEVOTHYROXINE SODIUM 0.15 MG/1
150 TABLET ORAL DAILY
Status: DISCONTINUED | OUTPATIENT
Start: 2019-03-09 | End: 2019-03-12 | Stop reason: HOSPADM

## 2019-03-09 RX ORDER — PANTOPRAZOLE SODIUM 40 MG/10ML
80 INJECTION, POWDER, LYOPHILIZED, FOR SOLUTION INTRAVENOUS ONCE
Status: COMPLETED | OUTPATIENT
Start: 2019-03-09 | End: 2019-03-09

## 2019-03-09 RX ORDER — SODIUM CHLORIDE 0.9 % (FLUSH) 0.9 %
3-10 SYRINGE (ML) INJECTION AS NEEDED
Status: DISCONTINUED | OUTPATIENT
Start: 2019-03-09 | End: 2019-03-12 | Stop reason: HOSPADM

## 2019-03-09 RX ORDER — SODIUM CHLORIDE 0.9 % (FLUSH) 0.9 %
3 SYRINGE (ML) INJECTION EVERY 12 HOURS SCHEDULED
Status: DISCONTINUED | OUTPATIENT
Start: 2019-03-09 | End: 2019-03-12 | Stop reason: HOSPADM

## 2019-03-09 RX ORDER — SUCRALFATE ORAL 1 G/10ML
1 SUSPENSION ORAL
Status: DISCONTINUED | OUTPATIENT
Start: 2019-03-09 | End: 2019-03-12 | Stop reason: HOSPADM

## 2019-03-09 RX ADMIN — SODIUM CHLORIDE 8 MG/HR: 900 INJECTION INTRAVENOUS at 12:32

## 2019-03-09 RX ADMIN — SUCRALFATE 1 G: 1 SUSPENSION ORAL at 21:30

## 2019-03-09 RX ADMIN — SODIUM CHLORIDE 8 MG/HR: 900 INJECTION INTRAVENOUS at 22:45

## 2019-03-09 RX ADMIN — PANTOPRAZOLE SODIUM 80 MG: 40 INJECTION, POWDER, FOR SOLUTION INTRAVENOUS at 00:28

## 2019-03-09 RX ADMIN — SODIUM CHLORIDE, PRESERVATIVE FREE 3 ML: 5 INJECTION INTRAVENOUS at 08:01

## 2019-03-09 RX ADMIN — SODIUM CHLORIDE 8 MG/HR: 900 INJECTION INTRAVENOUS at 17:19

## 2019-03-09 RX ADMIN — SUCRALFATE 1 G: 1 SUSPENSION ORAL at 17:19

## 2019-03-09 RX ADMIN — SODIUM CHLORIDE 8 MG/HR: 900 INJECTION INTRAVENOUS at 07:54

## 2019-03-09 NOTE — H&P
[facility]  HISTORY AND PHYSICAL    Date of Admission: 3/9/2019  Primary Care Physician: Barrett Mckenzie Jr, MD    Subjective     Chief Complaint: dark tarry stools    History of Present Illness    Mrs. Ely is a 79 yo F presenting with melena.  Patient is currently a resident at [facility], she indicates that over the last 3 days she has had multiple dark tarry stools, that were reported to be bloody.  She indicates that when she flushed the toilet the water was red.  Patient recently had an endoscopy on 2/28/19 which showed LA grade B esophagitis, there was a biopsy taken and a clip was placed.  Biopsy showed severe acute ulcerative esophagitis and patient was noted to have a distal esophageal ulceration.  Patient indicates that she has had no dizziness, no hematemesis.  Patient underwent dialysis today prior to coming in.         Review of Systems   Constitutional: Negative for activity change, appetite change, chills, diaphoresis, fatigue and fever.   Respiratory: Negative for cough, chest tightness, shortness of breath and wheezing.    Cardiovascular: Negative for chest pain and palpitations.   Gastrointestinal: Positive for blood in stool. Negative for abdominal distention, abdominal pain, constipation, diarrhea, nausea and vomiting.   Musculoskeletal: Negative for arthralgias and myalgias.   Skin: Negative for pallor, rash and wound.   Neurological: Positive for weakness.   All other systems reviewed and are negative.       Otherwise complete ROS reviewed and negative except as mentioned in the HPI.    Past Medical History:   Past Medical History:   Diagnosis Date   • Arthritis    • Carotid artery disease (CMS/HCC)    • CHF (congestive heart failure) (CMS/HCC)    • Chronic kidney disease on chronic dialysis (CMS/HCC)    • Chronic renal failure     on dialysis ON MON, WED, FRI   • Coronary artery disease    • Disease of thyroid gland    •  Diverticulitis    • Gastric ulcer    • History of transfusion    • Hyperlipidemia    • Hypertension    • Injury of back    • Mesenteric artery insufficiency (CMS/HCC)    • Multilevel degenerative disc disease    • Osteoporosis    • Pancreatitis    • Pelvis fracture (CMS/HCC)    • Pneumonia      Past Surgical History:  Past Surgical History:   Procedure Laterality Date   • AORTAGRAM Right 1/8/2018    Procedure: MESENTERIC ANGIOGRAM, GROIN ACCESS, MYNX CLOSURE;  Surgeon: Tomasz San DO;  Location: [facility] OR;  Service:    • AORTIC VALVE SURGERY     • APPENDECTOMY     • BACK SURGERY     • CARDIAC SURGERY     • CAROTID ENDARTERECTOMY Bilateral    • CATARACT EXTRACTION, BILATERAL     • CERVICAL SPINE SURGERY     • CHOLECYSTECTOMY     • COLON SURGERY     • COLONOSCOPY  10/01/2015   • CORONARY ARTERY BYPASS GRAFT     • DIALYSIS FISTULA CREATION     • ENDOSCOPY N/A 12/27/2018    Procedure: ESOPHAGOGASTRODUODENOSCOPY WITH ANESTHESIA;  Surgeon: Moni Garza MD;  Location: [facility] ENDOSCOPY;  Service: Gastroenterology   • ENDOSCOPY N/A 2/28/2019    Procedure: ESOPHAGOGASTRODUODENOSCOPY WITH ANESTHESIA;  Surgeon: Moni Garza MD;  Location: [facility] ENDOSCOPY;  Service: Gastroenterology   • HIP TROCANTERIC NAILING WITH INTRAMEDULLARY HIP SCREW Right 2/8/2019    Procedure: HIP TROCANTERIC NAILING LONG WITH INTRAMEDULLARY HIP SCREW;  Surgeon: Boo Camacho MD;  Location: [facility] OR;  Service: Orthopedics   • JOINT REPLACEMENT      knee   • LAPAROSCOPIC GASTRIC BANDING     • LEEP     • LUMBAR FUSION     • TOE AMPUTATION Left     2nd toe   • TOTAL KNEE ARTHROPLASTY Bilateral    • TUBAL ABDOMINAL LIGATION     • UPPER GASTROINTESTINAL ENDOSCOPY  10/01/2015     Social History:  reports that  has never smoked. she has never used smokeless tobacco. She reports that she does not drink alcohol or use drugs.    Family History: family history includes Cancer in her mother; Coronary artery disease in her brother and father;  Diabetes in her brother; Heart attack in her father; Hypertension in her mother.       Allergies:  No Known Allergies  Medications:  Prior to Admission medications    Medication Sig Start Date End Date Taking? Authorizing Provider   aspirin  MG tablet Take 1 tablet by mouth 2 (Two) Times a Day for 42 days. 2/11/19 3/25/19  Boo Camacho MD   B Complex-C (SUPER B COMPLEX/VITAMIN C PO) Take 1 tablet by mouth Daily.    Radha Kruse MD   carvedilol (COREG) 3.125 MG tablet Take 1 tablet by mouth 2 (Two) Times a Day With Meals. On Non-dialysis days- Sunday, Tuesday, Thursday, Saturday.  Hold for systolic BP <100 2/11/19   Lilly Adan APRN   carvedilol (COREG) 3.125 MG tablet Take 1 tablet by mouth Daily. On dialysis days-Monday, Wednesday, Friday.  Hold for systolic BP <100 2/11/19   Lilly Adan APRN   Cholecalciferol (D3 ADULT PO) Take 5,000 Units by mouth Daily.    Radha Kruse MD   clopidogrel (PLAVIX) 75 MG tablet Take 1 tablet by mouth Daily. 7/24/18   Kim Maria APRN   diphenhydrAMINE (BENADRYL) 25 mg capsule Take 1 capsule by mouth 2 (Two) Times a Day As Needed for Itching. 2/11/19   Lilly Adan APRN   docusate sodium 100 MG capsule Take 100 mg by mouth 2 (Two) Times a Day. 2/11/19   Boo Camacho MD   lactulose 20 GM/30ML solution solution Take 30 mL by mouth Daily As Needed (Constipation). 2/11/19   Lilly Adan APRN   levothyroxine (SYNTHROID, LEVOTHROID) 150 MCG tablet Take 150 mcg by mouth Daily.    Radha Kruse MD   losartan (COZAAR) 25 MG tablet Take 25 mg by mouth Daily (Monday-Friday). Monday, Wednesday, And Friday only.    Radha Kruse MD   midodrine (PROAMATINE) 2.5 MG tablet Take 1 tablet by mouth 3 (Three) Times a Day. 2/11/19   Woeltz, Lilly K, APRN   Multiple Vitamin (DAILY-SANGEETA PO) Take 1 tablet by mouth Daily.    Provider, MD Radha   Multiple Vitamins-Minerals (HAIR SKIN AND NAILS FORMULA PO) Take 2  tablets by mouth Daily.    Radha Kruse MD   nystatin (MYCOSTATIN) 783666 UNIT/GM powder Apply  topically to the appropriate area as directed Every 12 (Twelve) Hours. 2/11/19   Lilly Adan APRN   ondansetron ODT (ZOFRAN-ODT) 4 MG disintegrating tablet Take 4 mg by mouth Every 8 (Eight) Hours As Needed for Nausea or Vomiting.    Radha Kruse MD   oxyCODONE-acetaminophen (PERCOCET)  MG per tablet Take 1 tablet by mouth Every 4 (Four) Hours As Needed for Moderate Pain . 2/11/19   Boo Camacho MD   pantoprazole (PROTONIX) 40 MG EC tablet Take 1 tablet by mouth Daily. 12/27/18   Moni Garza MD   pravastatin (PRAVACHOL) 40 MG tablet Take 40 mg by mouth Daily.    ProviderRadha MD   sertraline (ZOLOFT) 50 MG tablet Take 50 mg by mouth Daily.    Radha Kruse MD   sucralfate (CARAFATE) 1 GM/10ML suspension Take 10 mL by mouth 4 (Four) Times a Day. 2/28/19   Moni Garza MD     Objective     Vital Signs: BP (!) 98/35   Pulse 74   Temp 98.5 °F (36.9 °C) (Oral)   Resp 14   SpO2 94%   Physical Exam   Constitutional: She is oriented to person, place, and time. She appears well-developed and well-nourished. No distress.   HENT:   Head: Normocephalic and atraumatic.   Eyes: Conjunctivae are normal. No scleral icterus.   Neck: Neck supple. No JVD present.   Cardiovascular: Normal rate, regular rhythm and intact distal pulses. Exam reveals no gallop and no friction rub.   Murmur heard.  Palpable thrill left AC   Pulmonary/Chest: Effort normal and breath sounds normal. No stridor. No respiratory distress. She has no wheezes.   Abdominal: Soft. Bowel sounds are normal. She exhibits no distension and no mass. There is no tenderness. There is no guarding.   Neurological: She is alert and oriented to person, place, and time.   Skin: Skin is warm and dry. She is not diaphoretic. No erythema.   Psychiatric: She has a normal mood and affect. Her behavior is normal.   Nursing  note and vitals reviewed.          Results Reviewed:  Lab Results (last 24 hours)     Procedure Component Value Units Date/Time    [place] Draw [[id number]] Collected:  03/08/19 2309    Specimen:  Blood Updated:  03/09/19 0015    Narrative:       The following orders were created for panel order [place] Draw.  Procedure                               Abnormality         Status                     ---------                               -----------         ------                     Light Blue Top[[id number]]                                   Final result               Green Top (Gel)[[id number]]                                  Final result               Lavender Top[[id number]]                                     Final result               Red Top[[id number]]                                          Final result                 Please view results for these tests on the individual orders.    Light Blue Top [[id number]] Collected:  03/08/19 2309    Specimen:  Blood Updated:  03/09/19 0015     Extra Tube hold for add-on     Comment: Auto resulted       Green Top (Gel) [[id number]] Collected:  03/08/19 2309    Specimen:  Blood Updated:  03/09/19 0015     Extra Tube Hold for add-ons.     Comment: Auto resulted.       Lavender Top [[id number]] Collected:  03/08/19 2309    Specimen:  Blood Updated:  03/09/19 0015     Extra Tube hold for add-on     Comment: Auto resulted       Red Top [[id number]] Collected:  03/08/19 2309    Specimen:  Blood Updated:  03/09/19 0015     Extra Tube Hold for add-ons.     Comment: Auto resulted.       BNP [[id number]]  (Abnormal) Collected:  03/08/19 2309    Specimen:  Blood Updated:  03/09/19 0014     proBNP 48,700.0 pg/mL     TSH [[id number]]  (Abnormal) Collected:  03/08/19 2309    Specimen:  Blood Updated:  03/09/19 0008     TSH 28.000 mIU/mL     Troponin [[id number]]  (Abnormal) Collected:  03/08/19 2309    Specimen:  Blood Updated:  03/08/19 2349     Troponin I 0.038 ng/mL     Lactic Acid, Plasma  [[id number]]  (Normal) Collected:  03/08/19 2309    Specimen:  Blood Updated:  03/08/19 2346     Lactate 1.9 mmol/L     aPTT [[id number]]  (Abnormal) Collected:  03/08/19 2310    Specimen:  Blood Updated:  03/08/19 2343     PTT 40.0 seconds     Protime-INR [[id number]]  (Abnormal) Collected:  03/08/19 2310    Specimen:  Blood Updated:  03/08/19 2343     Protime 17.5 Seconds      INR 1.39    Magnesium [[id number]]  (Normal) Collected:  03/08/19 2309    Specimen:  Blood Updated:  03/08/19 2337     Magnesium 1.8 mg/dL     Comprehensive Metabolic Panel [[id number]]  (Abnormal) Collected:  03/08/19 2309    Specimen:  Blood Updated:  03/08/19 2332     Glucose 95 mg/dL      BUN 17 mg/dL      Creatinine 2.63 mg/dL      Sodium 139 mmol/L      Potassium 3.6 mmol/L      Chloride 96 mmol/L      CO2 33.0 mmol/L      Calcium 8.1 mg/dL      Total Protein 5.2 g/dL      Albumin 2.80 g/dL      ALT (SGPT) 17 U/L      AST (SGOT) 32 U/L      Alkaline Phosphatase 166 U/L      Total Bilirubin 0.4 mg/dL      eGFR Non African Amer 18 mL/min/1.73      Globulin 2.4 gm/dL      A/G Ratio 1.2 g/dL      BUN/Creatinine Ratio 6.5     Anion Gap 10.0 mmol/L     Narrative:       The MDRD GFR formula is only valid for adults with stable renal function between ages 18 and 70.    CBC & Differential [[id number]] Collected:  03/08/19 2309    Specimen:  Blood Updated:  03/08/19 2327    Narrative:       The following orders were created for panel order CBC & Differential.  Procedure                               Abnormality         Status                     ---------                               -----------         ------                     CBC Auto Differential[[id number]]        Abnormal            Final result                 Please view results for these tests on the individual orders.    CBC Auto Differential [[id number]]  (Abnormal) Collected:  03/08/19 2309    Specimen:  Blood Updated:  03/08/19 2327     WBC 6.47 10*3/mm3      RBC 1.78 10*6/mm3       Hemoglobin 6.1 g/dL      Hematocrit 20.1 %      .9 fL      MCH 34.3 pg      MCHC 30.3 g/dL      RDW 16.1 %      RDW-SD 65.8 fl      MPV 9.7 fL      Platelets 209 10*3/mm3      Neutrophil % 74.0 %      Lymphocyte % 14.8 %      Monocyte % 7.3 %      Eosinophil % 2.8 %      Basophil % 0.5 %      Immature Grans % 0.6 %      Neutrophils, Absolute 4.79 10*3/mm3      Lymphocytes, Absolute 0.96 10*3/mm3      Monocytes, Absolute 0.47 10*3/mm3      Eosinophils, Absolute 0.18 10*3/mm3      Basophils, Absolute 0.03 10*3/mm3      Immature Grans, Absolute 0.04 10*3/mm3      nRBC 0.3 /100 WBC         Imaging Results (last 24 hours)     Procedure Component Value Units Date/Time    XR Abdomen KUB [[id number]] Updated:  03/09/19 0108    XR Chest 1 View [[id number]] Updated:  03/08/19 6436        I have personally reviewed and interpreted the radiology studies and ECG obtained at time of admission.     Assessment / Plan     Assessment:   Active Hospital Problems    Diagnosis   • GI bleed     Assessment:  1.  Melena, suspected upper GI bleed  2.  Acute pulmonary edema   3.  ESRD on dialysis Monday, Wednesday, Friday   4.  LA Grade B esophagitis s/p biopsy with clip (2/28/19)  5.  Hypotension   6.  Acute on chronic anemia due to ESRD and acute blood loss  7.  History of AV replacement  8.  CAD s/p PCI  9.  Recent hip fracture      Plan:   1.  Admit to medicine  2.  Transfuse 2 U  3.  Nephrology consult for dialysis - Needs ultrafiltration due to pulmonary edema  4.  GI consult for GI bleed  5.  NPO   6.  PPI gtt  7.  Type and screened and crossmatched for an additional 2 U  8.  Repeat CBC in AM with BMP     PATIENT SEEN AFTER MIDNIGHT.     Code Status: Full    Daughters to make decisions for her if she is unable     I discussed the patient's findings and my recommendations with the patient and Dirk LOPEZ    Estimated length of stay 2-4 days    Madhav Webber MD   03/09/19   1:10 AM

## 2019-03-09 NOTE — ED PROVIDER NOTES
Subjective   History of Present Illness  78-year-old female presents the chief complaint of dark tarry stools and previous epigastric pain that radiated to her back.  The patient reports she had taken a Percocet and this had alleviated her pain.  She notes she is continued to have dark tarry stools.  She does have a history of GI bleeds and a known gastric ulcer.  She had a previous endoscopy that did reveal this and she had been treated with Carafate and Protonix.  Reports no nausea vomiting cough chest pain or abdominal pain presently.  Review of Systems   All other systems reviewed and are negative.      Past Medical History:   Diagnosis Date   • Arthritis    • Carotid artery disease (CMS/HCC)    • CHF (congestive heart failure) (CMS/HCC)    • Chronic kidney disease on chronic dialysis (CMS/HCC)    • Chronic renal failure     on dialysis ON MON, WED, FRI   • Coronary artery disease    • Disease of thyroid gland    • Diverticulitis    • Gastric ulcer    • History of transfusion    • Hyperlipidemia    • Hypertension    • Injury of back    • Mesenteric artery insufficiency (CMS/HCC)    • Multilevel degenerative disc disease    • Osteoporosis    • Pancreatitis    • Pelvis fracture (CMS/HCC)    • Pneumonia        No Known Allergies    Past Surgical History:   Procedure Laterality Date   • AORTAGRAM Right 1/8/2018    Procedure: MESENTERIC ANGIOGRAM, GROIN ACCESS, MYNX CLOSURE;  Surgeon: Tomasz San DO;  Location: [facility] OR;  Service:    • AORTIC VALVE SURGERY     • APPENDECTOMY     • BACK SURGERY     • CARDIAC SURGERY     • CAROTID ENDARTERECTOMY Bilateral    • CATARACT EXTRACTION, BILATERAL     • CERVICAL SPINE SURGERY     • CHOLECYSTECTOMY     • COLON SURGERY     • COLONOSCOPY  10/01/2015   • CORONARY ARTERY BYPASS GRAFT     • DIALYSIS FISTULA CREATION     • ENDOSCOPY N/A 12/27/2018    Procedure: ESOPHAGOGASTRODUODENOSCOPY WITH ANESTHESIA;  Surgeon: Moni Garza MD;  Location: [facility] ENDOSCOPY;  Service:  Gastroenterology   • ENDOSCOPY N/A 2/28/2019    Procedure: ESOPHAGOGASTRODUODENOSCOPY WITH ANESTHESIA;  Surgeon: Moni Garza MD;  Location: [facility] ENDOSCOPY;  Service: Gastroenterology   • HIP TROCANTERIC NAILING WITH INTRAMEDULLARY HIP SCREW Right 2/8/2019    Procedure: HIP TROCANTERIC NAILING LONG WITH INTRAMEDULLARY HIP SCREW;  Surgeon: Boo Camacho MD;  Location: [facility] OR;  Service: Orthopedics   • JOINT REPLACEMENT      knee   • LAPAROSCOPIC GASTRIC BANDING     • LEEP     • LUMBAR FUSION     • TOE AMPUTATION Left     2nd toe   • TOTAL KNEE ARTHROPLASTY Bilateral    • TUBAL ABDOMINAL LIGATION     • UPPER GASTROINTESTINAL ENDOSCOPY  10/01/2015       Family History   Problem Relation Age of Onset   • Hypertension Mother    • Cancer Mother         stomach   • Coronary artery disease Father    • Heart attack Father    • Diabetes Brother    • Coronary artery disease Brother    • Colon cancer Neg Hx    • Colon polyps Neg Hx        Social History     Socioeconomic History   • Marital status:      Spouse name: Not on file   • Number of children: Not on file   • Years of education: Not on file   • Highest education level: Not on file   Tobacco Use   • Smoking status: Never Smoker   • Smokeless tobacco: Never Used   Substance and Sexual Activity   • Alcohol use: No   • Drug use: No   • Sexual activity: Defer           Objective   Physical Exam   Constitutional: She is oriented to person, place, and time. She appears well-developed and well-nourished.   HENT:   Head: Normocephalic and atraumatic.   Eyes: EOM are normal. Pupils are equal, round, and reactive to light.   Neck: Normal range of motion. Neck supple.   Cardiovascular: Normal rate and regular rhythm.   Pulmonary/Chest: Effort normal and breath sounds normal.   Abdominal: Soft. Bowel sounds are normal. She exhibits no distension. There is tenderness.   epigastric   Genitourinary:   Genitourinary Comments: melena   Musculoskeletal: Normal range of  motion.   Neurological: She is alert and oriented to person, place, and time. No cranial nerve deficit. Coordination normal.   Skin: Skin is warm and dry. Capillary refill takes less than 2 seconds.   Psychiatric: She has a normal mood and affect. Her behavior is normal.   Nursing note and vitals reviewed.      Procedures           ED Course                  MDM  Number of Diagnoses or Management Options  Diagnosis management comments: Melena present, will admit, low hgb, 6.1, transfusing 2 units PRBC        Amount and/or Complexity of Data Reviewed  Clinical lab tests: reviewed and ordered  Tests in the radiology section of CPT®: ordered and reviewed  Tests in the medicine section of CPT®: ordered and reviewed  Decide to obtain previous medical records or to obtain history from someone other than the patient: yes    Risk of Complications, Morbidity, and/or Mortality  Presenting problems: moderate  Diagnostic procedures: moderate  Management options: moderate    Patient Progress  Patient progress: stable        Final diagnoses:   Gastrointestinal hemorrhage, unspecified gastrointestinal hemorrhage type            Dirk Sahu PA-C  03/10/19 0403

## 2019-03-09 NOTE — CONSULTS
[facility]astroenterology  Inpatient Consult Note  Today's date:  03/09/19    Cheri Ely  1940       Referring Provider: Madhav Webber MD  Primary Physician: Barrett Mckenzie Jr, MD   Primary Gastroenterologist: Moni Garza MD    Date of Admission: 3/8/2019  Date of Service:  03/09/19    Patient Seen, Chart, Consults, Notes, Labs, Radiology studies reviewed    Reason for Consultation/Chief Complaint: Melena    History of present illness: Very pleasant 78-year-old female who is been seen by me recently for GI bleeding.  Endoscopy done December 27, 2018 showed LA grade B esophagitis and 2 nonbleeding cratered gastric ulcers.  H. pylori biopsy was negative.  There is also inflammation in the antrum.  We started pantoprazole 40 mg daily and advised her to refrain from NSAIDs.  Endoscopy was repeated February 28, 2019 to document healing.  The ulcers were resolved.  She however continude to have LA grade B esophagitis in the distal esophagus.  It was very focal.  One biopsy was obtained and there was oozing noted after this.  As a result I placed an MRI conditional Endo Clip.  There is no bleeding at the end of the procedure.  I added Carafate and asked her to hold her Plavix for 2 days.  Biopsies showed severe inflammation/ulceration.  She called our office on March 6, 2019 stating that she was having black stools a little bit of red.  I advised her to hold Plavix and take aspirin.  A CBC was ordered for that day she was advised to present to the emergency room if no improvement.  She has continued to have GI bleeding as described above for the last 3 days.    She had a CBC done on dialysis which was in the 8 range.  She had a repeat CBC done yesterday which showed a hemoglobin of 6.2.  She was subsequently admitted.    She denies any pyrosis, dysphagia, odynophagia.  She had a small amount of nausea yesterday with dry heaves.  She does not have this today.  She is currently n.p.o.  Last  dose of Plavix was on March 6 per my request.    Past Medical History:   Diagnosis Date   • Arthritis    • Carotid artery disease (CMS/HCC)    • CHF (congestive heart failure) (CMS/HCC)    • Chronic kidney disease on chronic dialysis (CMS/HCC)    • Chronic renal failure     on dialysis ON MON, WED, FRI   • Coronary artery disease    • Disease of thyroid gland    • Diverticulitis    • Gastric ulcer    • History of transfusion    • Hyperlipidemia    • Hypertension    • Injury of back    • Mesenteric artery insufficiency (CMS/HCC)    • Multilevel degenerative disc disease    • Osteoporosis    • Pancreatitis    • Pelvis fracture (CMS/HCC)    • Pneumonia        Past Surgical History:   Procedure Laterality Date   • AORTAGRAM Right 1/8/2018    Procedure: MESENTERIC ANGIOGRAM, GROIN ACCESS, MYNX CLOSURE;  Surgeon: Tomasz San DO;  Location: [facility] OR;  Service:    • AORTIC VALVE SURGERY     • APPENDECTOMY     • BACK SURGERY     • CARDIAC SURGERY     • CAROTID ENDARTERECTOMY Bilateral    • CATARACT EXTRACTION, BILATERAL     • CERVICAL SPINE SURGERY     • CHOLECYSTECTOMY     • COLON SURGERY     • COLONOSCOPY  10/01/2015   • CORONARY ARTERY BYPASS GRAFT     • DIALYSIS FISTULA CREATION     • ENDOSCOPY N/A 12/27/2018    Procedure: ESOPHAGOGASTRODUODENOSCOPY WITH ANESTHESIA;  Surgeon: Moni Garza MD;  Location: [facility] ENDOSCOPY;  Service: Gastroenterology   • ENDOSCOPY N/A 2/28/2019    Procedure: ESOPHAGOGASTRODUODENOSCOPY WITH ANESTHESIA;  Surgeon: Moni Garza MD;  Location: [facility] ENDOSCOPY;  Service: Gastroenterology   • HIP TROCANTERIC NAILING WITH INTRAMEDULLARY HIP SCREW Right 2/8/2019    Procedure: HIP TROCANTERIC NAILING LONG WITH INTRAMEDULLARY HIP SCREW;  Surgeon: Boo Camacho MD;  Location: [facility] OR;  Service: Orthopedics   • JOINT REPLACEMENT      knee   • LAPAROSCOPIC GASTRIC BANDING     • LEEP     • LUMBAR FUSION     • TOE AMPUTATION Left     2nd toe   • TOTAL KNEE ARTHROPLASTY Bilateral    •  TUBAL ABDOMINAL LIGATION     • UPPER GASTROINTESTINAL ENDOSCOPY  10/01/2015        No Known Allergies    Medications Prior to Admission   Medication Sig Dispense Refill Last Dose   • aspirin  MG tablet Take 1 tablet by mouth 2 (Two) Times a Day for 42 days. 84 tablet 0 2/27/2019 at Unknown time   • B Complex-C (SUPER B COMPLEX/VITAMIN C PO) Take 1 tablet by mouth Daily.   2/27/2019 at Unknown time   • carvedilol (COREG) 3.125 MG tablet Take 1 tablet by mouth 2 (Two) Times a Day With Meals. On Non-dialysis days- Sunday, Tuesday, Thursday, Saturday.  Hold for systolic BP <100   2/28/2019 at Unknown time   • carvedilol (COREG) 3.125 MG tablet Take 1 tablet by mouth Daily. On dialysis days-Monday, Wednesday, Friday.  Hold for systolic BP <100   2/27/2019 at Unknown time   • Cholecalciferol (D3 ADULT PO) Take 5,000 Units by mouth Daily.   Unknown at Unknown time   • clopidogrel (PLAVIX) 75 MG tablet Take 1 tablet by mouth Daily. 90 tablet 2 2/22/2019   • diphenhydrAMINE (BENADRYL) 25 mg capsule Take 1 capsule by mouth 2 (Two) Times a Day As Needed for Itching.   Past Week at Unknown time   • docusate sodium 100 MG capsule Take 100 mg by mouth 2 (Two) Times a Day. 60 each 1 Unknown at Unknown time   • lactulose 20 GM/30ML solution solution Take 30 mL by mouth Daily As Needed (Constipation). 30 mL  2/27/2019 at Unknown time   • levothyroxine (SYNTHROID, LEVOTHROID) 150 MCG tablet Take 150 mcg by mouth Daily.   2/28/2019 at Unknown time   • losartan (COZAAR) 25 MG tablet Take 25 mg by mouth Daily (Monday-Friday). Monday, Wednesday, And Friday only.   2/28/2019 at Unknown time   • midodrine (PROAMATINE) 2.5 MG tablet Take 1 tablet by mouth 3 (Three) Times a Day.   2/27/2019 at Unknown time   • Multiple Vitamin (DAILY-SANGEETA PO) Take 1 tablet by mouth Daily.   2/27/2019 at Unknown time   • Multiple Vitamins-Minerals (HAIR SKIN AND NAILS FORMULA PO) Take 2 tablets by mouth Daily.   2/27/2019 at Unknown time   • nystatin  (MYCOSTATIN) 487528 UNIT/GM powder Apply  topically to the appropriate area as directed Every 12 (Twelve) Hours.   2/27/2019 at Unknown time   • ondansetron ODT (ZOFRAN-ODT) 4 MG disintegrating tablet Take 4 mg by mouth Every 8 (Eight) Hours As Needed for Nausea or Vomiting.   Unknown at Unknown time   • oxyCODONE-acetaminophen (PERCOCET)  MG per tablet Take 1 tablet by mouth Every 4 (Four) Hours As Needed for Moderate Pain . 60 tablet 0 Unknown at Unknown time   • pantoprazole (PROTONIX) 40 MG EC tablet Take 1 tablet by mouth Daily. 30 tablet 11 2/27/2019 at Unknown time   • pravastatin (PRAVACHOL) 40 MG tablet Take 40 mg by mouth Daily.   2/27/2019 at Unknown time   • sertraline (ZOLOFT) 50 MG tablet Take 50 mg by mouth Daily.   2/27/2019 at Unknown time   • sucralfate (CARAFATE) 1 GM/10ML suspension Take 10 mL by mouth 4 (Four) Times a Day. 1200 mL 0        Hospital Medications (active)       Dose Frequency Start End    levothyroxine (SYNTHROID, LEVOTHROID) tablet 150 mcg 150 mcg Daily 3/9/2019     Sig - Route: Take 1 tablet by mouth Daily. - Oral    pantoprazole (PROTONIX) 40 mg/100 mL (0.4 mg/mL) in 0.9% NS IVPB 8 mg/hr Continuous 3/9/2019     Sig - Route: Infuse 8 mg/hr into a venous catheter Continuous. - Intravenous    pantoprazole (PROTONIX) injection 80 mg 80 mg Once 3/9/2019 3/9/2019    Sig - Route: Infuse 20 mL into a venous catheter 1 (One) Time. - Intravenous    Cosign for Ordering: Required by Jennifer Mejia MD    sodium chloride 0.9 % flush 10 mL 10 mL As Needed 3/8/2019     Sig - Route: Infuse 10 mL into a venous catheter As Needed for Line Care. - Intravenous    Cosign for Ordering: Accepted by Madhav Webber MD on 3/9/2019  2:57 AM    sodium chloride 0.9 % flush 3 mL 3 mL Every 12 Hours Scheduled 3/9/2019     Sig - Route: Infuse 3 mL into a venous catheter Every 12 (Twelve) Hours. - Intravenous    sodium chloride 0.9 % flush 3-10 mL 3-10 mL As Needed 3/9/2019     Sig - Route: Infuse  3-10 mL into a venous catheter As Needed for Line Care. - Intravenous          Social History     Tobacco Use   • Smoking status: Never Smoker   • Smokeless tobacco: Never Used   Substance Use Topics   • Alcohol use: No        Past Family History:  Family History   Problem Relation Age of Onset   • Hypertension Mother    • Cancer Mother         stomach   • Coronary artery disease Father    • Heart attack Father    • Diabetes Brother    • Coronary artery disease Brother    • Colon cancer Neg Hx    • Colon polyps Neg Hx        Review of Systems:  Review of Systems   Constitutional: Negative for fever and unexpected weight change.   HENT: Negative for hearing loss.    Eyes: Negative for visual disturbance.   Respiratory: Negative for cough.    Cardiovascular: Negative for chest pain.   Gastrointestinal:        See HPI   Endocrine: Negative for cold intolerance and heat intolerance.   Genitourinary: Negative for dysuria.   Musculoskeletal: Negative for arthralgias.   Skin: Negative for rash.   Neurological: Negative for seizures.   Psychiatric/Behavioral: Negative for hallucinations.       Physical Exam:  Temp:  [97.8 °F (36.6 °C)-99.7 °F (37.6 °C)] 98.5 °F (36.9 °C)  Heart Rate:  [] 58  Resp:  [14-18] 18  BP: ()/(23-76) 127/53  Body mass index is 32.82 kg/m².    Intake/Output Summary (Last 24 hours) at 3/9/2019 1059  Last data filed at 3/9/2019 0748  Gross per 24 hour   Intake 650 ml   Output --   Net 650 ml     I/O this shift:  In: 350 [Blood:350]  Out: -   Physical Exam   Constitutional: She is oriented to person, place, and time. She appears well-developed.   HENT:   Head: Normocephalic.   Eyes: EOM are normal. No scleral icterus.   Neck: No thyromegaly present.   Cardiovascular:   Atrial fibrillation   Pulmonary/Chest: Breath sounds normal.   Abdominal: Soft. Bowel sounds are normal. She exhibits no distension and no mass. There is no tenderness. There is no rebound and no guarding.   Musculoskeletal:  Normal range of motion.   Neurological: She is alert and oriented to person, place, and time.   Skin: No rash noted.   Psychiatric: She has a normal mood and affect. Her behavior is normal.   Vitals reviewed.      Results Review:  Lab Results (last 24 hours)     Procedure Component Value Units Date/Time    Comprehensive Metabolic Panel [[id number]]  (Abnormal) Collected:  03/09/19 0858    Specimen:  Blood Updated:  03/09/19 0915     Glucose 84 mg/dL      BUN 20 mg/dL      Creatinine 2.95 mg/dL      Sodium 139 mmol/L      Potassium 4.1 mmol/L      Chloride 99 mmol/L      CO2 32.0 mmol/L      Calcium 7.9 mg/dL      Total Protein 4.9 g/dL      Albumin 2.50 g/dL      ALT (SGPT) 17 U/L      AST (SGOT) 23 U/L      Alkaline Phosphatase 157 U/L      Total Bilirubin 0.6 mg/dL      eGFR Non African Amer 15 mL/min/1.73      Globulin 2.4 gm/dL      A/G Ratio 1.0 g/dL      BUN/Creatinine Ratio 6.8     Anion Gap 8.0 mmol/L     Narrative:       The MDRD GFR formula is only valid for adults with stable renal function between ages 18 and 70.    CBC Auto Differential [[id number]]  (Abnormal) Collected:  03/09/19 0858    Specimen:  Blood Updated:  03/09/19 0914     WBC 6.12 10*3/mm3      RBC 2.77 10*6/mm3      Hemoglobin 9.1 g/dL      Hematocrit 28.7 %      .6 fL      MCH 32.9 pg      MCHC 31.7 g/dL      RDW 22.5 %      RDW-SD 82.9 fl      MPV 9.4 fL      Platelets 175 10*3/mm3      Neutrophil % 81.7 %      Lymphocyte % 10.1 %      Monocyte % 4.9 %      Eosinophil % 1.8 %      Basophil % 0.5 %      Immature Grans % 1.0 %      Neutrophils, Absolute 5.00 10*3/mm3      Lymphocytes, Absolute 0.62 10*3/mm3      Monocytes, Absolute 0.30 10*3/mm3      Eosinophils, Absolute 0.11 10*3/mm3      Basophils, Absolute 0.03 10*3/mm3      Immature Grans, Absolute 0.06 10*3/mm3      nRBC 0.0 /100 WBC     [place] Draw [[id number]] Collected:  03/08/19 2309    Specimen:  Blood Updated:  03/09/19 0015    Narrative:       The following orders were  created for panel order [place] Draw.  Procedure                               Abnormality         Status                     ---------                               -----------         ------                     Light Blue Top[[id number]]                                   Final result               Green Top (Gel)[[id number]]                                  Final result               Lavender Top[[id number]]                                     Final result               Red Top[[id number]]                                          Final result                 Please view results for these tests on the individual orders.    Light Blue Top [[id number]] Collected:  03/08/19 2309    Specimen:  Blood Updated:  03/09/19 0015     Extra Tube hold for add-on     Comment: Auto resulted       Green Top (Gel) [[id number]] Collected:  03/08/19 2309    Specimen:  Blood Updated:  03/09/19 0015     Extra Tube Hold for add-ons.     Comment: Auto resulted.       Lavender Top [[id number]] Collected:  03/08/19 2309    Specimen:  Blood Updated:  03/09/19 0015     Extra Tube hold for add-on     Comment: Auto resulted       Red Top [[id number]] Collected:  03/08/19 2309    Specimen:  Blood Updated:  03/09/19 0015     Extra Tube Hold for add-ons.     Comment: Auto resulted.       BNP [[id number]]  (Abnormal) Collected:  03/08/19 2309    Specimen:  Blood Updated:  03/09/19 0014     proBNP 48,700.0 pg/mL     TSH [[id number]]  (Abnormal) Collected:  03/08/19 2309    Specimen:  Blood Updated:  03/09/19 0008     TSH 28.000 mIU/mL     Troponin [[id number]]  (Abnormal) Collected:  03/08/19 2309    Specimen:  Blood Updated:  03/08/19 2349     Troponin I 0.038 ng/mL     Lactic Acid, Plasma [[id number]]  (Normal) Collected:  03/08/19 2309    Specimen:  Blood Updated:  03/08/19 2346     Lactate 1.9 mmol/L     aPTT [[id number]]  (Abnormal) Collected:  03/08/19 2310    Specimen:  Blood Updated:  03/08/19 2343     PTT 40.0 seconds     Protime-INR [[id number]]   (Abnormal) Collected:  03/08/19 2310    Specimen:  Blood Updated:  03/08/19 2343     Protime 17.5 Seconds      INR 1.39    Magnesium [[id number]]  (Normal) Collected:  03/08/19 2309    Specimen:  Blood Updated:  03/08/19 2337     Magnesium 1.8 mg/dL     Comprehensive Metabolic Panel [[id number]]  (Abnormal) Collected:  03/08/19 2309    Specimen:  Blood Updated:  03/08/19 2332     Glucose 95 mg/dL      BUN 17 mg/dL      Creatinine 2.63 mg/dL      Sodium 139 mmol/L      Potassium 3.6 mmol/L      Chloride 96 mmol/L      CO2 33.0 mmol/L      Calcium 8.1 mg/dL      Total Protein 5.2 g/dL      Albumin 2.80 g/dL      ALT (SGPT) 17 U/L      AST (SGOT) 32 U/L      Alkaline Phosphatase 166 U/L      Total Bilirubin 0.4 mg/dL      eGFR Non African Amer 18 mL/min/1.73      Globulin 2.4 gm/dL      A/G Ratio 1.2 g/dL      BUN/Creatinine Ratio 6.5     Anion Gap 10.0 mmol/L     Narrative:       The MDRD GFR formula is only valid for adults with stable renal function between ages 18 and 70.    CBC & Differential [[id number]] Collected:  03/08/19 2309    Specimen:  Blood Updated:  03/08/19 2327    Narrative:       The following orders were created for panel order CBC & Differential.  Procedure                               Abnormality         Status                     ---------                               -----------         ------                     CBC Auto Differential[[id number]]        Abnormal            Final result                 Please view results for these tests on the individual orders.    CBC Auto Differential [[id number]]  (Abnormal) Collected:  03/08/19 2309    Specimen:  Blood Updated:  03/08/19 2327     WBC 6.47 10*3/mm3      RBC 1.78 10*6/mm3      Hemoglobin 6.1 g/dL      Hematocrit 20.1 %      .9 fL      MCH 34.3 pg      MCHC 30.3 g/dL      RDW 16.1 %      RDW-SD 65.8 fl      MPV 9.7 fL      Platelets 209 10*3/mm3      Neutrophil % 74.0 %      Lymphocyte % 14.8 %      Monocyte % 7.3 %      Eosinophil % 2.8 %       Basophil % 0.5 %      Immature Grans % 0.6 %      Neutrophils, Absolute 4.79 10*3/mm3      Lymphocytes, Absolute 0.96 10*3/mm3      Monocytes, Absolute 0.47 10*3/mm3      Eosinophils, Absolute 0.18 10*3/mm3      Basophils, Absolute 0.03 10*3/mm3      Immature Grans, Absolute 0.04 10*3/mm3      nRBC 0.3 /100 WBC           Radiology Review:  Imaging Results (last 72 hours)     Procedure Component Value Units Date/Time    CT Abdomen Pelvis Without Contrast [[id number]] Updated:  03/09/19 0124    XR Abdomen KUB [[id number]] Updated:  03/09/19 0108    XR Chest 1 View [[id number]] Updated:  03/08/19 2336          Impression/Plan:  Patient Active Problem List   Diagnosis Code   • Hypertension I10   • Hyperlipidemia E78.5   • Chronic kidney disease on chronic dialysis (CMS/[facility]) N18.6, Z99.2   • Closed fracture of ramus of left pubis (CMS/HCC) S32.592A   • 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   • Chronic mesenteric ischemia (CMS/[facility]) K55.1   • Black tarry stools K92.1   • Hx of gastritis Z87.19   • Acute gastric ulcer with hemorrhage K25.0   • Closed displaced intertrochanteric fracture of right femur (CMS/[facility]) S72.141A   • Displaced intertrochanteric fracture of right femur, initial encounter for closed fracture (CMS/[facility]) S72.141A   • Hypotension I95.9   • Acute blood loss anemia D62   • GI bleed K92.2   • Gastrointestinal hemorrhage K92.2       Melena  She has known severe esophagitis in the distal esophagus proven on endoscopy with biopsy February 28, 2019.  Plavix has aggravated bleeding from this.  Last dose of Plavix was March 6.  Would like to repeat endoscopy to assess the esophagitis.  Plavix needs to be on hold for 5 days.  She is also in pulmonary edema and this needs to be improved prior to sedation.  Meantime support with blood transfusions.  Anticipate proceeding Monday if she remains hemodynamically stable in the meantime.    Continue with Protonix and Carafate.  Okay to give clear  liquids today and will reassess for tomorrow.    History of gastric ulcers  Seen on endoscopy December 2018.  Found to be healed February 2019.    Moni Garza MD  [facility]astroenter[facility]  03/09/19  10:59 AM    Much of this encounter note is an electronic transcription/translation of spoken language to printed text. The electronic translation of spoken language may permit erroneous, or at times, nonsensical words or phrases to be inadvertently transcribed; although I have reviewed the note for such errors, some may still exist.

## 2019-03-09 NOTE — PLAN OF CARE
Problem: Fall Risk (Adult)  Goal: Identify Related Risk Factors and Signs and Symptoms  Outcome: Ongoing (interventions implemented as appropriate)    Goal: Absence of Fall  Outcome: Ongoing (interventions implemented as appropriate)      Problem: Patient Care Overview  Goal: Plan of Care Review  Outcome: Ongoing (interventions implemented as appropriate)   03/09/19 0504   Coping/Psychosocial   Plan of Care Reviewed With patient   Plan of Care Review   Progress no change   OTHER   Outcome Summary Received pt from Er with diagnosis gastrointestinal bleeding. Pt receiving 2nd unit of PRBC transfusion. Denies any discomfort. Alert and oriented x 4. Hisory of right hip repair. Dialysis patient. Dialysis M,W, F. Has fistula to left arm, +bruit and thrill. History of low blood pressure. GI consult, nephrologist consult. After blood trnsfusion is done, will be receiving protonix infusion. Safety maintained.      Goal: Individualization and Mutuality  Outcome: Ongoing (interventions implemented as appropriate)      Problem: Gastrointestinal Bleeding (Adult)  Goal: Signs and Symptoms of Listed Potential Problems Will be Absent, Minimized or Managed (Gastrointestinal Bleeding)  Outcome: Ongoing (interventions implemented as appropriate)

## 2019-03-09 NOTE — PLAN OF CARE
Problem: Fall Risk (Adult)  Goal: Identify Related Risk Factors and Signs and Symptoms  Outcome: Ongoing (interventions implemented as appropriate)      Problem: Patient Care Overview  Goal: Plan of Care Review  Outcome: Ongoing (interventions implemented as appropriate)   03/09/19 6660   Coping/Psychosocial   Plan of Care Reviewed With patient   Plan of Care Review   Progress improving   OTHER   Outcome Summary Pt A&OX4. Pt received 2 units of PRBC. Hgb 9.1 after 2 units. Tolerating clear liquid diet. Pt still having bloody stools. SCDS fot VTE. Protonix gtt infusing. VSS. Continue to monitor. Safety maintained.      Goal: Individualization and Mutuality  Outcome: Ongoing (interventions implemented as appropriate)      Problem: Gastrointestinal Bleeding (Adult)  Goal: Signs and Symptoms of Listed Potential Problems Will be Absent, Minimized or Managed (Gastrointestinal Bleeding)  Outcome: Ongoing (interventions implemented as appropriate)

## 2019-03-10 ENCOUNTER — APPOINTMENT (OUTPATIENT)
Dept: GENERAL RADIOLOGY | Facility: HOSPITAL | Age: 79
End: 2019-03-10

## 2019-03-10 LAB
ABO + RH BLD: NORMAL
BASOPHILS # BLD AUTO: 0.03 10*3/MM3 (ref 0–0.2)
BASOPHILS NFR BLD AUTO: 0.5 % (ref 0–2)
BH BB BLOOD EXPIRATION DATE: NORMAL
BH BB BLOOD TYPE BARCODE: 6200
BH BB DISPENSE STATUS: NORMAL
BH BB PRODUCT CODE: NORMAL
BH BB UNIT NUMBER: NORMAL
DEPRECATED RDW RBC AUTO: 83.7 FL (ref 40–54)
EOSINOPHIL # BLD AUTO: 0.12 10*3/MM3 (ref 0–0.7)
EOSINOPHIL NFR BLD AUTO: 1.8 % (ref 0–4)
ERYTHROCYTE [DISTWIDTH] IN BLOOD BY AUTOMATED COUNT: 22.5 % (ref 12–15)
HCT VFR BLD AUTO: 28.8 % (ref 37–47)
HGB BLD-MCNC: 9.4 G/DL (ref 12–16)
IMM GRANULOCYTES # BLD AUTO: 0.06 10*3/MM3 (ref 0–0.05)
IMM GRANULOCYTES NFR BLD AUTO: 0.9 % (ref 0–5)
INR PPP: 1.27 (ref 0.91–1.09)
LYMPHOCYTES # BLD AUTO: 0.95 10*3/MM3 (ref 0.72–4.86)
LYMPHOCYTES NFR BLD AUTO: 14.4 % (ref 15–45)
MCH RBC QN AUTO: 33.6 PG (ref 28–32)
MCHC RBC AUTO-ENTMCNC: 32.6 G/DL (ref 33–36)
MCV RBC AUTO: 102.9 FL (ref 82–98)
MONOCYTES # BLD AUTO: 0.46 10*3/MM3 (ref 0.19–1.3)
MONOCYTES NFR BLD AUTO: 7 % (ref 4–12)
NEUTROPHILS # BLD AUTO: 4.96 10*3/MM3 (ref 1.87–8.4)
NEUTROPHILS NFR BLD AUTO: 75.4 % (ref 39–78)
NRBC BLD AUTO-RTO: 0 /100 WBC (ref 0–0)
PLATELET # BLD AUTO: 177 10*3/MM3 (ref 130–400)
PMV BLD AUTO: 9.7 FL (ref 6–12)
PROTHROMBIN TIME: 16.3 SECONDS (ref 11.9–14.6)
RBC # BLD AUTO: 2.8 10*6/MM3 (ref 4.2–5.4)
UNIT  ABO: NORMAL
UNIT  RH: NORMAL
WBC NRBC COR # BLD: 6.58 10*3/MM3 (ref 4.8–10.8)

## 2019-03-10 PROCEDURE — 85025 COMPLETE CBC W/AUTO DIFF WBC: CPT | Performed by: INTERNAL MEDICINE

## 2019-03-10 PROCEDURE — 99232 SBSQ HOSP IP/OBS MODERATE 35: CPT | Performed by: INTERNAL MEDICINE

## 2019-03-10 PROCEDURE — 85610 PROTHROMBIN TIME: CPT | Performed by: INTERNAL MEDICINE

## 2019-03-10 PROCEDURE — 71045 X-RAY EXAM CHEST 1 VIEW: CPT

## 2019-03-10 RX ADMIN — SUCRALFATE 1 G: 1 SUSPENSION ORAL at 17:00

## 2019-03-10 RX ADMIN — SODIUM CHLORIDE 8 MG/HR: 900 INJECTION INTRAVENOUS at 15:09

## 2019-03-10 RX ADMIN — SODIUM CHLORIDE 8 MG/HR: 900 INJECTION INTRAVENOUS at 10:20

## 2019-03-10 RX ADMIN — SODIUM CHLORIDE 8 MG/HR: 900 INJECTION INTRAVENOUS at 04:54

## 2019-03-10 RX ADMIN — SODIUM CHLORIDE 8 MG/HR: 900 INJECTION INTRAVENOUS at 20:10

## 2019-03-10 NOTE — PROGRESS NOTES
[facility] Medicine Services  INPATIENT PROGRESS NOTE    Patient Name: Cheri Ely  Date of Admission: 3/8/2019  Today's Date: 03/10/19  Length of Stay: 1  Primary Care Physician: Barrett Mckenzie Jr, MD    Subjective   Chief Complaint: follow up GI bleed.   HPI   Patient is awake, alert, and lying in bed. States she had one bowel movement yesterday. She said it was still dark, however, she does not know if it was bloody. Hemoglobin is stable at 9.4. Denies nausea, vomiting, and abdominal pain. States her abdomen is empty and she is hungry. She is tolerating a clear liquid diet. NPO after midnight. Dr. Garza plans for endoscopy tomorrow.    Review of Systems   All pertinent negatives and positives are as above. All other systems have been reviewed and are negative unless otherwise stated.     Objective    Temp:  [97.6 °F (36.4 °C)-98.4 °F (36.9 °C)] 98.1 °F (36.7 °C)  Heart Rate:  [] 100  Resp:  [16-20] 18  BP: (112-132)/(45-60) 112/52  Physical Exam   Constitutional: She is oriented to person, place, and time. She appears well-developed and well-nourished.   Obese body habitus   HENT:   Head: Normocephalic and atraumatic.   Eyes: Conjunctivae and EOM are normal. Pupils are equal, round, and reactive to light.   Neck: Neck supple. No JVD present. No thyromegaly present.   Cardiovascular: Normal rate, regular rhythm, normal heart sounds and intact distal pulses. Exam reveals no gallop and no friction rub.   No murmur heard.  Pulmonary/Chest: Effort normal and breath sounds normal. No respiratory distress. She has no wheezes. She has no rales. She exhibits no tenderness.   Abdominal: Soft. Bowel sounds are normal. She exhibits no distension. There is no tenderness. There is no rebound and no guarding.   Musculoskeletal: Normal range of motion. She exhibits no edema, tenderness or deformity.   Lymphadenopathy:     She has no cervical adenopathy.   Neurological: She is alert  and oriented to person, place, and time. She displays normal reflexes. No cranial nerve deficit. She exhibits normal muscle tone.   Skin: Skin is warm and dry. No rash noted.   Psychiatric: She has a normal mood and affect. Her behavior is normal. Judgment and thought content normal.     Results Review:  I have reviewed the labs, radiology results, and diagnostic studies.    Laboratory Data:   Results from last 7 days   Lab Units 03/10/19  0555 03/09/19  0858 03/08/19  2309   WBC 10*3/mm3 6.58 6.12 6.47   HEMOGLOBIN g/dL 9.4* 9.1* 6.1*   HEMATOCRIT % 28.8* 28.7* 20.1*   PLATELETS 10*3/mm3 177 175 209        Results from last 7 days   Lab Units 03/09/19  0858 03/08/19  2309   SODIUM mmol/L 139 139   POTASSIUM mmol/L 4.1 3.6   CHLORIDE mmol/L 99 96*   CO2 mmol/L 32.0* 33.0*   BUN mg/dL 20 17   CREATININE mg/dL 2.95* 2.63*   CALCIUM mg/dL 7.9* 8.1*   BILIRUBIN mg/dL 0.6 0.4   ALK PHOS U/L 157* 166*   ALT (SGPT) U/L 17 17   AST (SGOT) U/L 23 32   GLUCOSE mg/dL 84 95     Radiology Data:   Imaging Results (last 24 hours)     Procedure Component Value Units Date/Time    XR Abdomen KUB [[id number]] Collected:  03/09/19 1335     Updated:  03/09/19 1340    Narrative:       HISTORY: Abdominal pain     KUB: Frontal view of the upper abdomen is performed. Pelvis are included  on the exam.     There is no free air. Gastric band is in place. There are postoperative  changes of the lumbar spine. There are cholecystectomy clips. There is  evidence prior mediastinal surgery. Proximal ascending aortic stent in  place.     No dilated loops of bowel are seen within the upper abdomen. Vascular  calcification suspected. Chronic interstitial lung changes noted within  the lung bases. Cardiomegaly.       Impression:       1. No dilated loops of bowel seen within the visible upper abdomen.  Chronic and postoperative changes as described above.  This report was finalized on 03/09/2019 13:36 by Dr. Jacquie Salazar MD.    CT Abdomen Pelvis  Without Contrast [[id number]] Collected:  03/09/19 1323     Updated:  03/09/19 1332    Narrative:       CT ABDOMEN PELVIS WO CONTRAST- 3/9/2019 1:13 AM CST     HISTORY: epigastric pain      COMPARISON: 12/14/2017     DOSE LENGTH PRODUCT: 493 mGy cm. Automated exposure control was also  utilized to decrease patient radiation dose.     TECHNIQUE: Axial images of the abdomen and pelvis are obtained without  IV or oral contrast.     FINDINGS:  There is cardiomegaly. There are chronic basilar interstitial  lung changes.     The nonenhanced liver, spleen are unremarkable. The adrenal glands are  stable. There is atrophy of the pancreas. Accessory splenules are  suspected along the tail of the pancreas. There is a stable 11 mm cyst  seen along the tail of the pancreas, unchanged from the study performed  in 2017. Gallbladder surgically absent. There is bilateral renal  atrophy. There are diffuse renal artery calcifications. There is a left  renal cyst. There is no hydronephrosis. Bladder is under distended.  Uterus is unremarkable. No adnexal mass identified.     There is no free intraperitoneal air loculated abscess. There is left  colonic diverticulosis. There is no evidence for bowel obstruction.  Stomach is underdistended. Gastric band in place.     There is moderately diffuse vascular calcification but no aneurysm. No  pathologic lymphadenopathy visualized.     There is postoperative changes right proximal femur. There are old  fractures of the left pelvis. There is advanced osteopenia.  Postoperative changes lumbar spine.       Impression:       1. No acute inflammatory process seen within the abdomen or pelvis.  2. Stable 11 mm cystic lesion along the tail the pancreas. Stability  since 2017 favors benignity. Atrophy of pancreatic tissue.   2. Bilateral renal atrophy with renal artery calcification. Left renal  cyst.  3. Gastric band. Previous cholecystectomy.  4. Diffuse dense vascular calcification.   5.  Postoperative changes of the lumbar spine.  6. Cardiomegaly. Chronic basilar interstitial lung change.     Comments: A preliminary report is issued to the ER by the [facility]  radiology service.   This report was finalized on 03/09/2019 13:29 by Dr. Jacquie Salazar MD.    XR Chest 1 View [[id number]] Collected:  03/09/19 1320     Updated:  03/09/19 1329    Narrative:       HISTORY: Epigastric pain     CXR: A frontal view the chest is obtained and compared to the to  7/20/2019 study.     There is evidence of prior mediastinal surgery with a proximal ascending  aortic root stent identified. Cardiac silhouette remains enlarged. There  are chronic interstitial lung changes. A component of superimposed edema  considered. No pleural effusion or pneumothorax is identified. There is  no lobar consolidation. A gastric band is in place. The postoperative  changes of the cervical spine.       Impression:       1. Stable cardiomegaly with chronic interstitial lung disease. A  Component of superimposed edema considered. No lobar consolidation.  Prior mediastinal surgery. Aortic root stent. Gastric band.  This report was finalized on 03/09/2019 13:23 by Dr. Jacquie Salazar MD.          I have reviewed the patient's current medications.     Assessment/Plan     Active Hospital Problems    Diagnosis   • **Gastrointestinal hemorrhage   • Hypothyroid   • Diastolic heart failure (CMS/HCC)     EF 55-60% from echocardiogram on 3/15     • Acute blood loss anemia     Superimposed on anemia of chronic disease     • Hypertension   • Hyperlipidemia   • Chronic kidney disease on chronic dialysis (CMS/HCC)     Monday/Wednesday/Friday       Plan:  1. Endoscopy per Dr. Garza tomorrow.  2. Await nephrology evaluation.  3. She is toe touch weight bearing from nailing right hip fracture 2/8.  4. ASA remains on hold. Plavix on hold.   5. Repeat labs in AM.  6. ? Need for dialysis prior to EGD. Will repeat CXR today.     Discharge Planning: I expect the  "patient to be discharged to [facility] in ? days.    ABILIO Fermin   03/10/19   10:47 AM     I personally evaluated and examined the patient in conjunction with ABILIO Stockton and agree with the assessment, treatment plan, and disposition of the patient as recorded by her. My history, exam, and further recommendations are: Patient normally gets dialysis every Monday, Wednesday, and Friday.  She did receive her last dialysis session on Friday.  Plans for endoscopic evaluation by EGD tomorrow.  Antiplatelet therapy remains on hold.  Patient reports that she did have a bowel movement earlier today which was \"clear\" with no evidence of gross blood.  On exam, slightly diminished breath sounds at the bases, no significant crackles or rales, and patient not requiring any supplemental oxygen at this time.  Appreciate consultants.      Michael Bassett MD  03/10/19  3:54 PM  "

## 2019-03-10 NOTE — PLAN OF CARE
Problem: Fall Risk (Adult)  Goal: Identify Related Risk Factors and Signs and Symptoms  Outcome: Ongoing (interventions implemented as appropriate)    Goal: Absence of Fall  Outcome: Ongoing (interventions implemented as appropriate)      Problem: Patient Care Overview  Goal: Plan of Care Review  Outcome: Ongoing (interventions implemented as appropriate)   03/10/19 0558   Coping/Psychosocial   Plan of Care Reviewed With patient   Plan of Care Review   Progress improving   OTHER   Outcome Summary No c/o discomfort, except for having dry eyes. Would like to have eyedrops ordered. NPO since midnight. Dr Garza to reassess pt. Had bloody stools during the day yesterday. Protonix gtt infusing. VSS. Continue to monitor. Safety maintained. Assist with repositioning.        Problem: Gastrointestinal Bleeding (Adult)  Goal: Signs and Symptoms of Listed Potential Problems Will be Absent, Minimized or Managed (Gastrointestinal Bleeding)  Outcome: Ongoing (interventions implemented as appropriate)

## 2019-03-10 NOTE — CONSULTS
Nephrology ([facility]) Consult Note      Patient:  Cheri Ely  YOB: 1940  Date of Service: 3/10/2019  MRN: [id number]   Acct: [id number]   Primary Care Physician: Barrett Mckenzie Jr, MD  Advance Directive:   Code Status and Medical Interventions:   Ordered at: 03/09/19 0107     Level Of Support Discussed With:    Patient     Code Status:    CPR     Medical Interventions (Level of Support Prior to Arrest):    Full     Admit Date: 3/8/2019       Hospital Day: 1  Referring Provider: Madhav Webber MD      Patient personally seen and examined.  Complete chart including Consults, Notes, Operative Reports, Labs, Cardiology, and Radiology studies reviewed as able.        Subjective:  Cheri Ely is a 78 y.o. female  whom we were consulted for ESRD.  First notified of consult this AM, we were not called 3/9 nor was the pt added to our list.  This error has already been addressed by the hospitalist team today.  Pt admitted with GI bleed.  Notes normal BM today.  No issues what HD.  No f/c/n/v.      Allergies:  Patient has no known allergies.    Home Meds:  Medications Prior to Admission   Medication Sig Dispense Refill Last Dose   • B Complex-C (SUPER B COMPLEX/VITAMIN C PO) Take 1 tablet by mouth Daily.   Past Week at Unknown time   • carvedilol (COREG) 3.125 MG tablet Take 1 tablet by mouth 2 (Two) Times a Day With Meals. On Non-dialysis days- Sunday, Tuesday, Thursday, Saturday.  Hold for systolic BP <100   Past Week at Unknown time   • carvedilol (COREG) 3.125 MG tablet Take 1 tablet by mouth Daily. On dialysis days-Monday, Wednesday, Friday.  Hold for systolic BP <100   Past Week at Unknown time   • Cholecalciferol (D3 ADULT PO) Take 5,000 Units by mouth Daily.   Past Week at Unknown time   • docusate sodium 100 MG capsule Take 100 mg by mouth 2 (Two) Times a Day. 60 each 1 Past Week at Unknown time   • levothyroxine (SYNTHROID, LEVOTHROID) 150 MCG tablet Take 150  mcg by mouth Daily.   3/8/2019 at Unknown time   • losartan (COZAAR) 25 MG tablet Take 25 mg by mouth Daily (Monday-Friday). Monday, Wednesday, And Friday only.   Past Week at Unknown time   • midodrine (PROAMATINE) 2.5 MG tablet Take 1 tablet by mouth 3 (Three) Times a Day.   Past Week at Unknown time   • Multiple Vitamin (DAILY-SANGEETA PO) Take 1 tablet by mouth Daily.   Past Week at Unknown time   • Multiple Vitamins-Minerals (HAIR SKIN AND NAILS FORMULA PO) Take 2 tablets by mouth Daily.   Past Week at Unknown time   • nystatin (MYCOSTATIN) 104764 UNIT/GM powder Apply  topically to the appropriate area as directed Every 12 (Twelve) Hours.   Past Week at Unknown time   • oxyCODONE-acetaminophen (PERCOCET)  MG per tablet Take 1 tablet by mouth Every 4 (Four) Hours As Needed for Moderate Pain . 60 tablet 0 Past Week at Unknown time   • pantoprazole (PROTONIX) 40 MG EC tablet Take 1 tablet by mouth Daily. 30 tablet 11 Past Week at Unknown time   • sertraline (ZOLOFT) 50 MG tablet Take 50 mg by mouth Daily.   Past Week at Unknown time   • aspirin  MG tablet Take 1 tablet by mouth 2 (Two) Times a Day for 42 days. (Patient not taking: Reported on 3/9/2019) 84 tablet 0 Not Taking at Unknown time   • clopidogrel (PLAVIX) 75 MG tablet Take 1 tablet by mouth Daily. 90 tablet 2 3/7/2019   • diphenhydrAMINE (BENADRYL) 25 mg capsule Take 1 capsule by mouth 2 (Two) Times a Day As Needed for Itching.   Unknown at Unknown time   • lactulose 20 GM/30ML solution solution Take 30 mL by mouth Daily As Needed (Constipation). 30 mL  Unknown at Unknown time   • ondansetron ODT (ZOFRAN-ODT) 4 MG disintegrating tablet Take 4 mg by mouth Every 8 (Eight) Hours As Needed for Nausea or Vomiting.   Unknown at Unknown time   • pravastatin (PRAVACHOL) 40 MG tablet Take 40 mg by mouth Daily.   Unknown at Unknown time   • sucralfate (CARAFATE) 1 GM/10ML suspension Take 10 mL by mouth 4 (Four) Times a Day. 1200 mL 0 Unknown at Unknown  time       Medicines:  Current Facility-Administered Medications   Medication Dose Route Frequency Provider Last Rate Last Dose   • levothyroxine (SYNTHROID, LEVOTHROID) tablet 150 mcg  150 mcg Oral Daily Madhav Webber MD       • pantoprazole (PROTONIX) 40 mg/100 mL (0.4 mg/mL) in 0.9% NS IVPB  8 mg/hr Intravenous Continuous Madhav Webber MD 20 mL/hr at 03/10/19 1509 8 mg/hr at 03/10/19 1509   • Polyethyl Glyc-Propyl Glyc PF 0.4-0.3 % 2 drop  2 drop Both Eyes Q2H PRN Jacquie Gasca APRN       • sodium chloride 0.9 % flush 10 mL  10 mL Intravenous PRN Madhav Webber MD       • sodium chloride 0.9 % flush 3 mL  3 mL Intravenous Q12H Madhav Webber MD   3 mL at 03/09/19 0801   • sodium chloride 0.9 % flush 3-10 mL  3-10 mL Intravenous PRN Madhav Webber MD       • sucralfate (CARAFATE) 1 GM/10ML suspension 1 g  1 g Oral 4x Daily With Meals & Nightly Moni Garza MD   1 g at 03/10/19 1700       Past Medical History:  Past Medical History:   Diagnosis Date   • Arthritis    • Carotid artery disease (CMS/HCC)    • CHF (congestive heart failure) (CMS/HCC)    • Chronic kidney disease on chronic dialysis (CMS/HCC)    • Chronic renal failure     on dialysis ON MON, WED, FRI   • Coronary artery disease    • Disease of thyroid gland    • Diverticulitis    • Gastric ulcer    • History of transfusion    • Hyperlipidemia    • Hypertension    • Injury of back    • Mesenteric artery insufficiency (CMS/HCC)    • Multilevel degenerative disc disease    • Osteoporosis    • Pancreatitis    • Pelvis fracture (CMS/HCC)    • Pneumonia        Past Surgical History:  Past Surgical History:   Procedure Laterality Date   • AORTAGRAM Right 1/8/2018    Procedure: MESENTERIC ANGIOGRAM, GROIN ACCESS, MYNX CLOSURE;  Surgeon: Tomasz San DO;  Location: [facility] OR;  Service:    • AORTIC VALVE SURGERY     • APPENDECTOMY     • BACK SURGERY     • CARDIAC SURGERY     • CAROTID ENDARTERECTOMY Bilateral    •  CATARACT EXTRACTION, BILATERAL     • CERVICAL SPINE SURGERY     • CHOLECYSTECTOMY     • COLON SURGERY     • COLONOSCOPY  10/01/2015   • CORONARY ARTERY BYPASS GRAFT     • DIALYSIS FISTULA CREATION     • ENDOSCOPY N/A 12/27/2018    Procedure: ESOPHAGOGASTRODUODENOSCOPY WITH ANESTHESIA;  Surgeon: Moni Garza MD;  Location: [facility] ENDOSCOPY;  Service: Gastroenterology   • ENDOSCOPY N/A 2/28/2019    Procedure: ESOPHAGOGASTRODUODENOSCOPY WITH ANESTHESIA;  Surgeon: Moni Garza MD;  Location: [facility] ENDOSCOPY;  Service: Gastroenterology   • HIP TROCANTERIC NAILING WITH INTRAMEDULLARY HIP SCREW Right 2/8/2019    Procedure: HIP TROCANTERIC NAILING LONG WITH INTRAMEDULLARY HIP SCREW;  Surgeon: Boo Camacho MD;  Location: [facility] OR;  Service: Orthopedics   • JOINT REPLACEMENT      knee   • LAPAROSCOPIC GASTRIC BANDING     • LEEP     • LUMBAR FUSION     • TOE AMPUTATION Left     2nd toe   • TOTAL KNEE ARTHROPLASTY Bilateral    • TUBAL ABDOMINAL LIGATION     • UPPER GASTROINTESTINAL ENDOSCOPY  10/01/2015       Family History  Family History   Problem Relation Age of Onset   • Hypertension Mother    • Cancer Mother         stomach   • Coronary artery disease Father    • Heart attack Father    • Diabetes Brother    • Coronary artery disease Brother    • Colon cancer Neg Hx    • Colon polyps Neg Hx        Social History  Social History     Socioeconomic History   • Marital status:      Spouse name: Not on file   • Number of children: Not on file   • Years of education: Not on file   • Highest education level: Not on file   Social Needs   • Financial resource strain: Not on file   • Food insecurity - worry: Not on file   • Food insecurity - inability: Not on file   • Transportation needs - medical: Not on file   • Transportation needs - non-medical: Not on file   Occupational History   • Not on file   Tobacco Use   • Smoking status: Never Smoker   • Smokeless tobacco: Never Used   Substance and Sexual Activity    • Alcohol use: No   • Drug use: No   • Sexual activity: Defer   Other Topics Concern   • Not on file   Social History Narrative   • Not on file         Review of Systems:  History obtained from chart review and the patient  General ROS: No fever or chills  Respiratory ROS: No cough, shortness of breath, wheezing  Cardiovascular ROS: No chest pain or palpitations  Gastrointestinal ROS: No abdominal pain or melena  Genito-Urinary ROS: No dysuria or hematuria  14 point ROS reviewed with the patient and negative except as noted above and in the HPI unless unable to obtain.    Objective:  Patient Vitals for the past 24 hrs:   BP Temp Temp src Pulse Resp SpO2 Weight   03/10/19 1500 115/58 98.6 °F (37 °C) Oral 95 18 94 % --   03/10/19 1130 120/62 98 °F (36.7 °C) Oral 97 18 96 % --   03/10/19 0852 112/52 98.1 °F (36.7 °C) Oral 100 18 95 % --   03/10/19 0445 118/45 97.6 °F (36.4 °C) Oral 87 18 94 % --   03/09/19 2300 120/45 98.4 °F (36.9 °C) Oral 84 18 94 % --   03/09/19 2100 -- -- -- -- -- -- 75.8 kg (167 lb)   03/09/19 2021 118/60 98.4 °F (36.9 °C) Oral 101 20 94 % --       Intake/Output Summary (Last 24 hours) at 3/10/2019 1802  Last data filed at 3/10/2019 0454  Gross per 24 hour   Intake 498 ml   Output --   Net 498 ml     General: awake/alert   Chest:  clear to auscultation bilaterally without respiratory distress  CVS: regular rate and rhythm  Abdominal: soft, nontender, positive bowel sounds  Extremities: no cyanosis or edema  Skin: warm and dry without rash      Labs:  Results from last 7 days   Lab Units 03/10/19  0555 03/09/19  0858 03/08/19  2309   WBC 10*3/mm3 6.58 6.12 6.47   HEMOGLOBIN g/dL 9.4* 9.1* 6.1*   HEMATOCRIT % 28.8* 28.7* 20.1*   PLATELETS 10*3/mm3 177 175 209         Results from last 7 days   Lab Units 03/09/19  0858 03/08/19  2309   SODIUM mmol/L 139 139   POTASSIUM mmol/L 4.1 3.6   CHLORIDE mmol/L 99 96*   CO2 mmol/L 32.0* 33.0*   BUN mg/dL 20 17   CREATININE mg/dL 2.95* 2.63*   CALCIUM mg/dL  7.9* 8.1*   BILIRUBIN mg/dL 0.6 0.4   ALK PHOS U/L 157* 166*   ALT (SGPT) U/L 17 17   AST (SGOT) U/L 23 32   GLUCOSE mg/dL 84 95       Radiology:   Imaging Results (last 72 hours)     Procedure Component Value Units Date/Time    XR Chest 1 View [[id number]] Collected:  03/10/19 1408     Updated:  03/10/19 1413    Narrative:       HISTORY: Volume overload     CXR: A frontal view the chest is obtained and compared to the 3/8/2018  study.     Cardiac silhouette is enlarged. There are median sternotomy wires with a  proximal ascending aortic stent. There are diffuse bilateral  predominantly interstitial pulmonary opacities with questionable trace  pleural effusions. There is no pneumothorax. No acute bony pathology.  There are degenerative changes of the regional skeleton. Postoperative  change of the cervical spine. There is thoracic aortic calcification.  Gastric band identified.       Impression:       1. Cardiomegaly with similar diffuse primarily interstitial pulmonary  opacities suggestive for CHF.  This report was finalized on 03/10/2019 14:10 by Dr. Jacquie Salazar MD.    XR Abdomen KUB [[id number]] Collected:  03/09/19 1335     Updated:  03/09/19 1340    Narrative:       HISTORY: Abdominal pain     KUB: Frontal view of the upper abdomen is performed. Pelvis are included  on the exam.     There is no free air. Gastric band is in place. There are postoperative  changes of the lumbar spine. There are cholecystectomy clips. There is  evidence prior mediastinal surgery. Proximal ascending aortic stent in  place.     No dilated loops of bowel are seen within the upper abdomen. Vascular  calcification suspected. Chronic interstitial lung changes noted within  the lung bases. Cardiomegaly.       Impression:       1. No dilated loops of bowel seen within the visible upper abdomen.  Chronic and postoperative changes as described above.  This report was finalized on 03/09/2019 13:36 by Dr. Jacquie Salazar MD.    CT Abdomen  Pelvis Without Contrast [[id number]] Collected:  03/09/19 1323     Updated:  03/09/19 1332    Narrative:       CT ABDOMEN PELVIS WO CONTRAST- 3/9/2019 1:13 AM CST     HISTORY: epigastric pain      COMPARISON: 12/14/2017     DOSE LENGTH PRODUCT: 493 mGy cm. Automated exposure control was also  utilized to decrease patient radiation dose.     TECHNIQUE: Axial images of the abdomen and pelvis are obtained without  IV or oral contrast.     FINDINGS:  There is cardiomegaly. There are chronic basilar interstitial  lung changes.     The nonenhanced liver, spleen are unremarkable. The adrenal glands are  stable. There is atrophy of the pancreas. Accessory splenules are  suspected along the tail of the pancreas. There is a stable 11 mm cyst  seen along the tail of the pancreas, unchanged from the study performed  in 2017. Gallbladder surgically absent. There is bilateral renal  atrophy. There are diffuse renal artery calcifications. There is a left  renal cyst. There is no hydronephrosis. Bladder is under distended.  Uterus is unremarkable. No adnexal mass identified.     There is no free intraperitoneal air loculated abscess. There is left  colonic diverticulosis. There is no evidence for bowel obstruction.  Stomach is underdistended. Gastric band in place.     There is moderately diffuse vascular calcification but no aneurysm. No  pathologic lymphadenopathy visualized.     There is postoperative changes right proximal femur. There are old  fractures of the left pelvis. There is advanced osteopenia.  Postoperative changes lumbar spine.       Impression:       1. No acute inflammatory process seen within the abdomen or pelvis.  2. Stable 11 mm cystic lesion along the tail the pancreas. Stability  since 2017 favors benignity. Atrophy of pancreatic tissue.   2. Bilateral renal atrophy with renal artery calcification. Left renal  cyst.  3. Gastric band. Previous cholecystectomy.  4. Diffuse dense vascular calcification.   5.  Postoperative changes of the lumbar spine.  6. Cardiomegaly. Chronic basilar interstitial lung change.     Comments: A preliminary report is issued to the ER by the [facility]  radiology service.   This report was finalized on 03/09/2019 13:29 by Dr. Jacquie Salazar MD.    XR Chest 1 View [[id number]] Collected:  03/09/19 1320     Updated:  03/09/19 1329    Narrative:       HISTORY: Epigastric pain     CXR: A frontal view the chest is obtained and compared to the to  7/20/2019 study.     There is evidence of prior mediastinal surgery with a proximal ascending  aortic root stent identified. Cardiac silhouette remains enlarged. There  are chronic interstitial lung changes. A component of superimposed edema  considered. No pleural effusion or pneumothorax is identified. There is  no lobar consolidation. A gastric band is in place. The postoperative  changes of the cervical spine.       Impression:       1. Stable cardiomegaly with chronic interstitial lung disease. A  Component of superimposed edema considered. No lobar consolidation.  Prior mediastinal surgery. Aortic root stent. Gastric band.  This report was finalized on 03/09/2019 13:23 by Dr. Jacquie Salazar MD.          Culture:         Assessment   ESRD  HTN  Anemia CKD  Secondary Hyperparathyroidism  Low Vitamin D    Plan:  HD MWF  Monitor labs  EGD tomorrow  Please notify if specific scheduling required for EGD, will try to accomodate as able      Thank you for the consult, we appreciate the opportunity to provide care to your patients.  Feel free to contact me if I can be of any further assistance.      Chris Hsu MD  3/10/2019  6:02 PM

## 2019-03-10 NOTE — PLAN OF CARE
Problem: Fall Risk (Adult)  Goal: Identify Related Risk Factors and Signs and Symptoms  Outcome: Ongoing (interventions implemented as appropriate)    Goal: Absence of Fall  Outcome: Ongoing (interventions implemented as appropriate)      Problem: Patient Care Overview  Goal: Plan of Care Review  Outcome: Ongoing (interventions implemented as appropriate)   03/10/19 1538   Coping/Psychosocial   Plan of Care Reviewed With patient   Plan of Care Review   Progress no change   OTHER   Outcome Summary Pt A&OX4. Denies pain this shift. Pt had one dark BM today. Tolerating clear liquid diet. NPO @ midnight. Protonix gtt infusing. VSS. Continue to monitor. Safety maintained.      Goal: Discharge Needs Assessment  Outcome: Ongoing (interventions implemented as appropriate)      Problem: Gastrointestinal Bleeding (Adult)  Goal: Signs and Symptoms of Listed Potential Problems Will be Absent, Minimized or Managed (Gastrointestinal Bleeding)  Outcome: Ongoing (interventions implemented as appropriate)

## 2019-03-10 NOTE — H&P (VIEW-ONLY)
[facility]  Inpatient Progress Note  Today's date:  03/10/19    Cheri Ely  1940     Patient Seen, Chart, Consults, Notes, Labs, Radiology studies reviewed    Reason for Follow Up: Melena    Subjective:   She believes that her stool is still dark.  She denies any nausea, vomiting, abdominal pain.  She was given clear liquids yesterday.    No Known Allergies    Current Facility-Administered Medications:   •  levothyroxine (SYNTHROID, LEVOTHROID) tablet 150 mcg, 150 mcg, Oral, Daily, Madhav Webber MD  •  pantoprazole (PROTONIX) 40 mg/100 mL (0.4 mg/mL) in 0.9% NS IVPB, 8 mg/hr, Intravenous, Continuous, Madhav Webber MD, Last Rate: 20 mL/hr at 03/10/19 1020, 8 mg/hr at 03/10/19 1020  •  sodium chloride 0.9 % flush 10 mL, 10 mL, Intravenous, PRN, Madhav Webber MD  •  sodium chloride 0.9 % flush 3 mL, 3 mL, Intravenous, Q12H, Madhav Webber MD, 3 mL at 03/09/19 0801  •  sodium chloride 0.9 % flush 3-10 mL, 3-10 mL, Intravenous, PRN, Madhav Webber MD  •  sucralfate (CARAFATE) 1 GM/10ML suspension 1 g, 1 g, Oral, 4x Daily With Meals & Nightly, Moni Garza MD, 1 g at 03/09/19 2130    Review of Systems:   Review of Systems   Constitutional: Negative for fever.   Respiratory: Negative for cough and shortness of breath.    Cardiovascular: Negative for chest pain.   Genitourinary: Negative for dysuria.   Skin: Negative for rash.   Neurological: Negative for seizures.        Vital Signs:  Temp:  [97.6 °F (36.4 °C)-98.4 °F (36.9 °C)] 98.1 °F (36.7 °C)  Heart Rate:  [] 100  Resp:  [16-20] 18  BP: (112-132)/(45-60) 112/52  Body mass index is 33.05 kg/m².     Intake/Output Summary (Last 24 hours) at 3/10/2019 1048  Last data filed at 3/10/2019 0454  Gross per 24 hour   Intake 498 ml   Output —   Net 498 ml     No intake/output data recorded.  Physical Exam:  Physical Exam   Constitutional: She is oriented to person, place, and time.   Eyes: EOM are normal.      Abdominal: Soft.   Neurological: She is alert and oriented to person, place, and time.   Psychiatric: She has a normal mood and affect. Her behavior is normal.        Results Review:   I have reviewed all of the patient's current test results    Results from last 7 days   Lab Units 03/10/19  0555 03/09/19  0858 03/08/19  2309   WBC 10*3/mm3 6.58 6.12 6.47   HEMOGLOBIN g/dL 9.4* 9.1* 6.1*   HEMATOCRIT % 28.8* 28.7* 20.1*   PLATELETS 10*3/mm3 177 175 209       Results from last 7 days   Lab Units 03/09/19  0858 03/08/19  2309   SODIUM mmol/L 139 139   POTASSIUM mmol/L 4.1 3.6   CHLORIDE mmol/L 99 96*   CO2 mmol/L 32.0* 33.0*   BUN mg/dL 20 17   CREATININE mg/dL 2.95* 2.63*   CALCIUM mg/dL 7.9* 8.1*   BILIRUBIN mg/dL 0.6 0.4   ALK PHOS U/L 157* 166*   ALT (SGPT) U/L 17 17   AST (SGOT) U/L 23 32   GLUCOSE mg/dL 84 95       Results from last 7 days   Lab Units 03/10/19  0449 03/08/19  2310   INR  1.27* 1.39*       Lab Results   Lab Value Date/Time    LIPASE 28 09/15/2017 2059       Radiology Review:  Imaging Results (last 24 hours)     Procedure Component Value Units Date/Time    XR Abdomen KUB [[id number]] Collected:  03/09/19 1335     Updated:  03/09/19 1340    Narrative:       HISTORY: Abdominal pain     KUB: Frontal view of the upper abdomen is performed. Pelvis are included  on the exam.     There is no free air. Gastric band is in place. There are postoperative  changes of the lumbar spine. There are cholecystectomy clips. There is  evidence prior mediastinal surgery. Proximal ascending aortic stent in  place.     No dilated loops of bowel are seen within the upper abdomen. Vascular  calcification suspected. Chronic interstitial lung changes noted within  the lung bases. Cardiomegaly.       Impression:       1. No dilated loops of bowel seen within the visible upper abdomen.  Chronic and postoperative changes as described above.  This report was finalized on 03/09/2019 13:36 by Dr. Jacquie Salazar MD.    CT  Abdomen Pelvis Without Contrast [[id number]] Collected:  03/09/19 1323     Updated:  03/09/19 1332    Narrative:       CT ABDOMEN PELVIS WO CONTRAST- 3/9/2019 1:13 AM CST     HISTORY: epigastric pain      COMPARISON: 12/14/2017     DOSE LENGTH PRODUCT: 493 mGy cm. Automated exposure control was also  utilized to decrease patient radiation dose.     TECHNIQUE: Axial images of the abdomen and pelvis are obtained without  IV or oral contrast.     FINDINGS:  There is cardiomegaly. There are chronic basilar interstitial  lung changes.     The nonenhanced liver, spleen are unremarkable. The adrenal glands are  stable. There is atrophy of the pancreas. Accessory splenules are  suspected along the tail of the pancreas. There is a stable 11 mm cyst  seen along the tail of the pancreas, unchanged from the study performed  in 2017. Gallbladder surgically absent. There is bilateral renal  atrophy. There are diffuse renal artery calcifications. There is a left  renal cyst. There is no hydronephrosis. Bladder is under distended.  Uterus is unremarkable. No adnexal mass identified.     There is no free intraperitoneal air loculated abscess. There is left  colonic diverticulosis. There is no evidence for bowel obstruction.  Stomach is underdistended. Gastric band in place.     There is moderately diffuse vascular calcification but no aneurysm. No  pathologic lymphadenopathy visualized.     There is postoperative changes right proximal femur. There are old  fractures of the left pelvis. There is advanced osteopenia.  Postoperative changes lumbar spine.       Impression:       1. No acute inflammatory process seen within the abdomen or pelvis.  2. Stable 11 mm cystic lesion along the tail the pancreas. Stability  since 2017 favors benignity. Atrophy of pancreatic tissue.   2. Bilateral renal atrophy with renal artery calcification. Left renal  cyst.  3. Gastric band. Previous cholecystectomy.  4. Diffuse dense vascular calcification.    5. Postoperative changes of the lumbar spine.  6. Cardiomegaly. Chronic basilar interstitial lung change.     Comments: A preliminary report is issued to the ER by the [facility]  radiology service.   This report was finalized on 03/09/2019 13:29 by Dr. Jacquie Salazar MD.    XR Chest 1 View [[id number]] Collected:  03/09/19 1320     Updated:  03/09/19 1329    Narrative:       HISTORY: Epigastric pain     CXR: A frontal view the chest is obtained and compared to the to  7/20/2019 study.     There is evidence of prior mediastinal surgery with a proximal ascending  aortic root stent identified. Cardiac silhouette remains enlarged. There  are chronic interstitial lung changes. A component of superimposed edema  considered. No pleural effusion or pneumothorax is identified. There is  no lobar consolidation. A gastric band is in place. The postoperative  changes of the cervical spine.       Impression:       1. Stable cardiomegaly with chronic interstitial lung disease. A  Component of superimposed edema considered. No lobar consolidation.  Prior mediastinal surgery. Aortic root stent. Gastric band.  This report was finalized on 03/09/2019 13:23 by Dr. Jacquie Salazar MD.          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   • Chronic mesenteric ischemia (CMS/[facility]) K55.1   • Black tarry stools K92.1   • Hx of gastritis Z87.19   • Acute gastric ulcer with hemorrhage K25.0   • Closed displaced intertrochanteric fracture of right femur (CMS/[facility]) S72.141A   • Displaced intertrochanteric fracture of right femur, initial encounter for closed fracture (CMS/[facility]) S72.141A   • Hypotension I95.9   • Acute blood loss anemia D62   • GI bleed K92.2   • Gastrointestinal hemorrhage  K92.2   • (HFpEF) heart failure with preserved ejection fraction (CMS/HCC) I50.30   • Hypothyroid E03.9   • Diastolic dysfunction I51.9   • Diastolic heart failure (CMS/HCC) I50.30     Melena  She has known severe esophagitis in the distal esophagus proven on endoscopy with biopsy February 28, 2019.  Plavix has aggravated bleeding from this.  Last dose of Plavix was March 6.  Would like to repeat endoscopy to assess the esophagitis.  Plavix needs to be on hold for 5 days.  She is also in pulmonary edema and this needs to be improved prior to sedation.  Meantime support with blood transfusions.    Plan endoscopy tomorrow    The risks, benefits, and alternatives of endoscopy were reviewed with the patient today.  Risks including perforation, with or without dilation, possibly requiring surgery.  Additional risks include risk of bleeding.  There is also the risk of a drug reaction or problems with anesthesia.  This will be discussed with the further by the anesthesia team on the day of the procedure. The benefits include the diagnosis and management of disease of the upper digestive tract.  Alternatives to endoscopy include upper GI series, laboratory testing, radiographic evaluation, or no intervention.  The patient verbalizes understanding and agrees.    In accordance with requirements under the Affordable Care Act, [facility] has provided pricing for all hospital services and items on each of its websites. However, a patient's actual cost may differ based on the services the patient receives to meet individual healthcare needs and based on the benefits provided under the patient’s insurance coverage.     Continue with Protonix and Carafate.  Okay to give clear liquids today.     History of gastric ulcers  Seen on endoscopy December 2018.  Found to be healed February 2019.           Moni Garza MD  [facility]  03/10/19  10:48 AM    Much of this encounter note is an electronic  transcription/translation of spoken language to printed text. The electronic translation of spoken language may permit erroneous, or at times, nonsensical words or phrases to be inadvertently transcribed; although I have reviewed the note for such errors, some may still exist.

## 2019-03-11 ENCOUNTER — ANESTHESIA (OUTPATIENT)
Dept: GASTROENTEROLOGY | Facility: HOSPITAL | Age: 79
End: 2019-03-11

## 2019-03-11 ENCOUNTER — ANESTHESIA EVENT (OUTPATIENT)
Dept: GASTROENTEROLOGY | Facility: HOSPITAL | Age: 79
End: 2019-03-11

## 2019-03-11 PROBLEM — E87.70 VOLUME OVERLOAD: Status: ACTIVE | Noted: 2019-03-11

## 2019-03-11 LAB
ALBUMIN SERPL-MCNC: 2.9 G/DL (ref 3.5–5)
ALBUMIN/GLOB SERPL: 1 G/DL (ref 1.1–2.5)
ALP SERPL-CCNC: 172 U/L (ref 24–120)
ALT SERPL W P-5'-P-CCNC: 23 U/L (ref 0–54)
ANION GAP SERPL CALCULATED.3IONS-SCNC: 12 MMOL/L (ref 4–13)
AST SERPL-CCNC: 24 U/L (ref 7–45)
BASOPHILS # BLD AUTO: 0.05 10*3/MM3 (ref 0–0.2)
BASOPHILS NFR BLD AUTO: 0.7 % (ref 0–2)
BILIRUB SERPL-MCNC: 0.5 MG/DL (ref 0.1–1)
BUN BLD-MCNC: 35 MG/DL (ref 5–21)
BUN/CREAT SERPL: 6.7 (ref 7–25)
CALCIUM SPEC-SCNC: 8.6 MG/DL (ref 8.4–10.4)
CHLORIDE SERPL-SCNC: 95 MMOL/L (ref 98–110)
CO2 SERPL-SCNC: 28 MMOL/L (ref 24–31)
CREAT BLD-MCNC: 5.2 MG/DL (ref 0.5–1.4)
DEPRECATED RDW RBC AUTO: 80 FL (ref 40–54)
EOSINOPHIL # BLD AUTO: 0.31 10*3/MM3 (ref 0–0.7)
EOSINOPHIL NFR BLD AUTO: 4.3 % (ref 0–4)
ERYTHROCYTE [DISTWIDTH] IN BLOOD BY AUTOMATED COUNT: 21.2 % (ref 12–15)
GFR SERPL CREATININE-BSD FRML MDRD: 8 ML/MIN/1.73
GFR SERPL CREATININE-BSD FRML MDRD: ABNORMAL ML/MIN/1.73
GLOBULIN UR ELPH-MCNC: 2.8 GM/DL
GLUCOSE BLD-MCNC: 75 MG/DL (ref 70–100)
HAV IGM SERPL QL IA: NEGATIVE
HBV CORE IGM SERPL QL IA: NEGATIVE
HBV SURFACE AG SERPL QL IA: NEGATIVE
HCT VFR BLD AUTO: 33 % (ref 37–47)
HCV AB SER DONR QL: NEGATIVE
HCV S/C RATIO: 0.06 (ref 0–0.99)
HGB BLD-MCNC: 10.4 G/DL (ref 12–16)
IMM GRANULOCYTES # BLD AUTO: 0.04 10*3/MM3 (ref 0–0.05)
IMM GRANULOCYTES NFR BLD AUTO: 0.5 % (ref 0–5)
LYMPHOCYTES # BLD AUTO: 0.99 10*3/MM3 (ref 0.72–4.86)
LYMPHOCYTES NFR BLD AUTO: 13.6 % (ref 15–45)
MCH RBC QN AUTO: 32.3 PG (ref 28–32)
MCHC RBC AUTO-ENTMCNC: 31.5 G/DL (ref 33–36)
MCV RBC AUTO: 102.5 FL (ref 82–98)
MONOCYTES # BLD AUTO: 0.56 10*3/MM3 (ref 0.19–1.3)
MONOCYTES NFR BLD AUTO: 7.7 % (ref 4–12)
NEUTROPHILS # BLD AUTO: 5.33 10*3/MM3 (ref 1.87–8.4)
NEUTROPHILS NFR BLD AUTO: 73.2 % (ref 39–78)
NRBC BLD AUTO-RTO: 0 /100 WBC (ref 0–0)
PLATELET # BLD AUTO: 184 10*3/MM3 (ref 130–400)
PMV BLD AUTO: 9.9 FL (ref 6–12)
POTASSIUM BLD-SCNC: 4.6 MMOL/L (ref 3.5–5.3)
PROT SERPL-MCNC: 5.7 G/DL (ref 6.3–8.7)
RBC # BLD AUTO: 3.22 10*6/MM3 (ref 4.2–5.4)
SODIUM BLD-SCNC: 135 MMOL/L (ref 135–145)
WBC NRBC COR # BLD: 7.28 10*3/MM3 (ref 4.8–10.8)

## 2019-03-11 PROCEDURE — 43235 EGD DIAGNOSTIC BRUSH WASH: CPT | Performed by: INTERNAL MEDICINE

## 2019-03-11 PROCEDURE — 80053 COMPREHEN METABOLIC PANEL: CPT | Performed by: NURSE PRACTITIONER

## 2019-03-11 PROCEDURE — 0DJ08ZZ INSPECTION OF UPPER INTESTINAL TRACT, VIA NATURAL OR ARTIFICIAL OPENING ENDOSCOPIC: ICD-10-PCS | Performed by: INTERNAL MEDICINE

## 2019-03-11 PROCEDURE — 85025 COMPLETE CBC W/AUTO DIFF WBC: CPT | Performed by: NURSE PRACTITIONER

## 2019-03-11 PROCEDURE — 80074 ACUTE HEPATITIS PANEL: CPT | Performed by: INTERNAL MEDICINE

## 2019-03-11 PROCEDURE — 5A1D70Z PERFORMANCE OF URINARY FILTRATION, INTERMITTENT, LESS THAN 6 HOURS PER DAY: ICD-10-PCS | Performed by: INTERNAL MEDICINE

## 2019-03-11 PROCEDURE — 25010000002 PROPOFOL 10 MG/ML EMULSION: Performed by: NURSE ANESTHETIST, CERTIFIED REGISTERED

## 2019-03-11 RX ORDER — SODIUM CHLORIDE 9 MG/ML
100 INJECTION, SOLUTION INTRAVENOUS CONTINUOUS
Status: DISCONTINUED | OUTPATIENT
Start: 2019-03-11 | End: 2019-03-11

## 2019-03-11 RX ORDER — ASPIRIN 81 MG/1
81 TABLET ORAL DAILY
Status: DISCONTINUED | OUTPATIENT
Start: 2019-03-11 | End: 2019-03-12 | Stop reason: HOSPADM

## 2019-03-11 RX ORDER — DIPHENHYDRAMINE HCL 25 MG
25 CAPSULE ORAL 2 TIMES DAILY PRN
Status: DISCONTINUED | OUTPATIENT
Start: 2019-03-11 | End: 2019-03-12 | Stop reason: HOSPADM

## 2019-03-11 RX ORDER — SODIUM CHLORIDE 0.9 % (FLUSH) 0.9 %
3 SYRINGE (ML) INJECTION EVERY 12 HOURS SCHEDULED
Status: DISCONTINUED | OUTPATIENT
Start: 2019-03-11 | End: 2019-03-11 | Stop reason: HOSPADM

## 2019-03-11 RX ORDER — ACETAMINOPHEN 325 MG/1
650 TABLET ORAL EVERY 6 HOURS PRN
Status: DISCONTINUED | OUTPATIENT
Start: 2019-03-11 | End: 2019-03-12 | Stop reason: HOSPADM

## 2019-03-11 RX ORDER — ATORVASTATIN CALCIUM 10 MG/1
10 TABLET, FILM COATED ORAL NIGHTLY
Status: DISCONTINUED | OUTPATIENT
Start: 2019-03-11 | End: 2019-03-12 | Stop reason: HOSPADM

## 2019-03-11 RX ORDER — LOSARTAN POTASSIUM 25 MG/1
25 TABLET ORAL
Status: DISCONTINUED | OUTPATIENT
Start: 2019-03-11 | End: 2019-03-11

## 2019-03-11 RX ORDER — LACTULOSE 20 G/30ML
20 SOLUTION ORAL DAILY PRN
Status: DISCONTINUED | OUTPATIENT
Start: 2019-03-11 | End: 2019-03-12 | Stop reason: HOSPADM

## 2019-03-11 RX ORDER — CARVEDILOL 3.12 MG/1
3.12 TABLET ORAL 2 TIMES DAILY WITH MEALS
Status: DISCONTINUED | OUTPATIENT
Start: 2019-03-11 | End: 2019-03-12 | Stop reason: HOSPADM

## 2019-03-11 RX ORDER — PROPOFOL 10 MG/ML
VIAL (ML) INTRAVENOUS AS NEEDED
Status: DISCONTINUED | OUTPATIENT
Start: 2019-03-11 | End: 2019-03-11 | Stop reason: SURG

## 2019-03-11 RX ORDER — NYSTATIN 100000 [USP'U]/G
POWDER TOPICAL EVERY 12 HOURS SCHEDULED
Status: DISCONTINUED | OUTPATIENT
Start: 2019-03-11 | End: 2019-03-12 | Stop reason: HOSPADM

## 2019-03-11 RX ORDER — SUCRALFATE ORAL 1 G/10ML
1 SUSPENSION ORAL 4 TIMES DAILY
Status: DISCONTINUED | OUTPATIENT
Start: 2019-03-11 | End: 2019-03-11

## 2019-03-11 RX ORDER — LOSARTAN POTASSIUM 25 MG/1
25 TABLET ORAL
Status: DISCONTINUED | OUTPATIENT
Start: 2019-03-11 | End: 2019-03-12 | Stop reason: HOSPADM

## 2019-03-11 RX ORDER — LIDOCAINE HYDROCHLORIDE 20 MG/ML
INJECTION, SOLUTION INFILTRATION; PERINEURAL AS NEEDED
Status: DISCONTINUED | OUTPATIENT
Start: 2019-03-11 | End: 2019-03-11 | Stop reason: SURG

## 2019-03-11 RX ORDER — DOCUSATE SODIUM 100 MG/1
100 CAPSULE, LIQUID FILLED ORAL 2 TIMES DAILY
Status: DISCONTINUED | OUTPATIENT
Start: 2019-03-11 | End: 2019-03-12 | Stop reason: HOSPADM

## 2019-03-11 RX ORDER — ASPIRIN 325 MG
325 TABLET, DELAYED RELEASE (ENTERIC COATED) ORAL 2 TIMES DAILY
Status: DISCONTINUED | OUTPATIENT
Start: 2019-03-11 | End: 2019-03-11

## 2019-03-11 RX ORDER — OXYCODONE AND ACETAMINOPHEN 10; 325 MG/1; MG/1
1 TABLET ORAL EVERY 4 HOURS PRN
Status: DISCONTINUED | OUTPATIENT
Start: 2019-03-11 | End: 2019-03-12 | Stop reason: HOSPADM

## 2019-03-11 RX ORDER — SODIUM CHLORIDE 0.9 % (FLUSH) 0.9 %
3-10 SYRINGE (ML) INJECTION AS NEEDED
Status: DISCONTINUED | OUTPATIENT
Start: 2019-03-11 | End: 2019-03-11 | Stop reason: HOSPADM

## 2019-03-11 RX ORDER — ONDANSETRON 2 MG/ML
4 INJECTION INTRAMUSCULAR; INTRAVENOUS EVERY 6 HOURS PRN
Status: DISCONTINUED | OUTPATIENT
Start: 2019-03-11 | End: 2019-03-12 | Stop reason: HOSPADM

## 2019-03-11 RX ORDER — PANTOPRAZOLE SODIUM 40 MG/1
40 TABLET, DELAYED RELEASE ORAL
Status: DISCONTINUED | OUTPATIENT
Start: 2019-03-12 | End: 2019-03-12 | Stop reason: HOSPADM

## 2019-03-11 RX ADMIN — SODIUM CHLORIDE 8 MG/HR: 900 INJECTION INTRAVENOUS at 05:30

## 2019-03-11 RX ADMIN — PROPOFOL 30 MG: 10 INJECTION, EMULSION INTRAVENOUS at 11:28

## 2019-03-11 RX ADMIN — SODIUM CHLORIDE 8 MG/HR: 900 INJECTION INTRAVENOUS at 00:36

## 2019-03-11 RX ADMIN — SODIUM CHLORIDE, PRESERVATIVE FREE 3 ML: 5 INJECTION INTRAVENOUS at 20:49

## 2019-03-11 RX ADMIN — ASPIRIN 81 MG: 81 TABLET, DELAYED RELEASE ORAL at 19:06

## 2019-03-11 RX ADMIN — DOCUSATE SODIUM 100 MG: 100 CAPSULE ORAL at 20:49

## 2019-03-11 RX ADMIN — SERTRALINE 50 MG: 50 TABLET, FILM COATED ORAL at 19:06

## 2019-03-11 RX ADMIN — SODIUM CHLORIDE 100 ML/HR: 9 INJECTION, SOLUTION INTRAVENOUS at 11:20

## 2019-03-11 RX ADMIN — PROPOFOL 30 MG: 10 INJECTION, EMULSION INTRAVENOUS at 11:27

## 2019-03-11 RX ADMIN — SODIUM CHLORIDE 8 MG/HR: 900 INJECTION INTRAVENOUS at 09:57

## 2019-03-11 RX ADMIN — LOSARTAN POTASSIUM 25 MG: 25 TABLET ORAL at 19:06

## 2019-03-11 RX ADMIN — LIDOCAINE HYDROCHLORIDE 60 MG: 20 INJECTION, SOLUTION INFILTRATION; PERINEURAL at 11:27

## 2019-03-11 RX ADMIN — ATORVASTATIN CALCIUM 10 MG: 10 TABLET, FILM COATED ORAL at 20:49

## 2019-03-11 RX ADMIN — CARVEDILOL 3.12 MG: 3.12 TABLET, FILM COATED ORAL at 19:06

## 2019-03-11 RX ADMIN — SUCRALFATE 1 G: 1 SUSPENSION ORAL at 20:49

## 2019-03-11 NOTE — ANESTHESIA POSTPROCEDURE EVALUATION
Patient: Cheri Ely    Procedure Summary     Date:  03/11/19 Room / Location:  [facility] ENDOSCOPY 6 / [facility]    Anesthesia Start:  1122 Anesthesia Stop:  1132    Procedure:  ESOPHAGOGASTRODUODENOSCOPY WITH ANESTHESIA (N/A ) Diagnosis:       Gastrointestinal hemorrhage, unspecified gastrointestinal hemorrhage type      Black tarry stools      (Gastrointestinal hemorrhage, unspecified gastrointestinal hemorrhage type [K92.2])      (Black tarry stools [K92.1])    Surgeon:  Moni Garza MD Provider:  Flaco Rebolledo CRNA    Anesthesia Type:  MAC ASA Status:  3          Anesthesia Type: MAC  Last vitals  BP   137/61 (03/11/19 1150)   Temp   98.2 °F (36.8 °C) (03/11/19 0753)   Pulse   100 (03/11/19 1150)   Resp   16 (03/11/19 1150)     SpO2   100 % (03/11/19 1150)     Post Anesthesia Care and Evaluation    Patient location during evaluation: PHASE II  Level of consciousness: awake and alert  Pain management: adequate  Airway patency: patent  Anesthetic complications: No anesthetic complications    Cardiovascular status: acceptable  Respiratory status: acceptable  Hydration status: acceptable

## 2019-03-11 NOTE — PROGRESS NOTES
Nephrology ([facility]) Progress Note      Patient:  Cheri Ely  YOB: 1940  Date of Service: 3/11/2019  MRN: [id number]   Acct: [id number]   Primary Care Physician: Barrett Mckenzie Jr, MD  Advance Directive:   Code Status and Medical Interventions:   Ordered at: 03/09/19 0107     Level Of Support Discussed With:    Patient     Code Status:    CPR     Medical Interventions (Level of Support Prior to Arrest):    Full     Admit Date: 3/8/2019       Hospital Day: 2  Referring Provider: Madhav Webber MD      Patient personally seen and examined.  Complete chart including Consults, Notes, Operative Reports, Labs, Cardiology, and Radiology studies reviewed as able.        Subjective:  Cheri Ely is a 78 y.o. female  whom we were consulted for end stage renal disease.  Routine dialysis Monday Wednesday Friday. No recent issues with dialysis.  Admitted with GI bleeding; recently having dark, tarry stools.  Planning for endoscopy later today    Currently awake, alert. No further BM this morning.    Allergies:  Patient has no known allergies.    Home Meds:  Medications Prior to Admission   Medication Sig Dispense Refill Last Dose   • B Complex-C (SUPER B COMPLEX/VITAMIN C PO) Take 1 tablet by mouth Daily.   Past Week at Unknown time   • carvedilol (COREG) 3.125 MG tablet Take 1 tablet by mouth 2 (Two) Times a Day With Meals. On Non-dialysis days- Sunday, Tuesday, Thursday, Saturday.  Hold for systolic BP <100   Past Week at Unknown time   • carvedilol (COREG) 3.125 MG tablet Take 1 tablet by mouth Daily. On dialysis days-Monday, Wednesday, Friday.  Hold for systolic BP <100   Past Week at Unknown time   • Cholecalciferol (D3 ADULT PO) Take 5,000 Units by mouth Daily.   Past Week at Unknown time   • docusate sodium 100 MG capsule Take 100 mg by mouth 2 (Two) Times a Day. 60 each 1 Past Week at Unknown time   • levothyroxine (SYNTHROID, LEVOTHROID) 150 MCG tablet Take 150  mcg by mouth Daily.   3/8/2019 at Unknown time   • losartan (COZAAR) 25 MG tablet Take 25 mg by mouth Daily (Monday-Friday). Monday, Wednesday, And Friday only.   Past Week at Unknown time   • midodrine (PROAMATINE) 2.5 MG tablet Take 1 tablet by mouth 3 (Three) Times a Day.   Past Week at Unknown time   • Multiple Vitamin (DAILY-SANGEETA PO) Take 1 tablet by mouth Daily.   Past Week at Unknown time   • Multiple Vitamins-Minerals (HAIR SKIN AND NAILS FORMULA PO) Take 2 tablets by mouth Daily.   Past Week at Unknown time   • nystatin (MYCOSTATIN) 573090 UNIT/GM powder Apply  topically to the appropriate area as directed Every 12 (Twelve) Hours.   Past Week at Unknown time   • oxyCODONE-acetaminophen (PERCOCET)  MG per tablet Take 1 tablet by mouth Every 4 (Four) Hours As Needed for Moderate Pain . 60 tablet 0 Past Week at Unknown time   • pantoprazole (PROTONIX) 40 MG EC tablet Take 1 tablet by mouth Daily. 30 tablet 11 Past Week at Unknown time   • sertraline (ZOLOFT) 50 MG tablet Take 50 mg by mouth Daily.   Past Week at Unknown time   • aspirin  MG tablet Take 1 tablet by mouth 2 (Two) Times a Day for 42 days. (Patient not taking: Reported on 3/9/2019) 84 tablet 0 Not Taking at Unknown time   • clopidogrel (PLAVIX) 75 MG tablet Take 1 tablet by mouth Daily. 90 tablet 2 3/7/2019   • diphenhydrAMINE (BENADRYL) 25 mg capsule Take 1 capsule by mouth 2 (Two) Times a Day As Needed for Itching.   Unknown at Unknown time   • lactulose 20 GM/30ML solution solution Take 30 mL by mouth Daily As Needed (Constipation). 30 mL  Unknown at Unknown time   • ondansetron ODT (ZOFRAN-ODT) 4 MG disintegrating tablet Take 4 mg by mouth Every 8 (Eight) Hours As Needed for Nausea or Vomiting.   Unknown at Unknown time   • pravastatin (PRAVACHOL) 40 MG tablet Take 40 mg by mouth Daily.   Unknown at Unknown time   • sucralfate (CARAFATE) 1 GM/10ML suspension Take 10 mL by mouth 4 (Four) Times a Day. 1200 mL 0 Unknown at Unknown  time       Medicines:  Current Facility-Administered Medications   Medication Dose Route Frequency Provider Last Rate Last Dose   • levothyroxine (SYNTHROID, LEVOTHROID) tablet 150 mcg  150 mcg Oral Daily Madhav Webber MD       • pantoprazole (PROTONIX) 40 mg/100 mL (0.4 mg/mL) in 0.9% NS IVPB  8 mg/hr Intravenous Continuous Madhav Webber MD 20 mL/hr at 03/11/19 0957 8 mg/hr at 03/11/19 0957   • Polyethyl Glyc-Propyl Glyc PF 0.4-0.3 % 2 drop  2 drop Both Eyes Q2H PRN Jacquie Gasca APRN       • sodium chloride 0.9 % flush 10 mL  10 mL Intravenous PRN Madhav Webber MD       • sodium chloride 0.9 % flush 3 mL  3 mL Intravenous Q12H Madhav Webber MD   3 mL at 03/09/19 0801   • sodium chloride 0.9 % flush 3-10 mL  3-10 mL Intravenous PRN Madhav Webber MD       • sucralfate (CARAFATE) 1 GM/10ML suspension 1 g  1 g Oral 4x Daily With Meals & Nightly Moni Garza MD   Stopped at 03/10/19 2010       Past Medical History:  Past Medical History:   Diagnosis Date   • Arthritis    • Carotid artery disease (CMS/HCC)    • CHF (congestive heart failure) (CMS/HCC)    • Chronic kidney disease on chronic dialysis (CMS/HCC)    • Chronic renal failure     on dialysis ON MON, WED, FRI   • Coronary artery disease    • Disease of thyroid gland    • Diverticulitis    • Gastric ulcer    • History of transfusion    • Hyperlipidemia    • Hypertension    • Injury of back    • Mesenteric artery insufficiency (CMS/HCC)    • Multilevel degenerative disc disease    • Osteoporosis    • Pancreatitis    • Pelvis fracture (CMS/HCC)    • Pneumonia        Past Surgical History:  Past Surgical History:   Procedure Laterality Date   • AORTAGRAM Right 1/8/2018    Procedure: MESENTERIC ANGIOGRAM, GROIN ACCESS, MYNX CLOSURE;  Surgeon: Tomasz San DO;  Location: [facility] OR;  Service:    • AORTIC VALVE SURGERY     • APPENDECTOMY     • BACK SURGERY     • CARDIAC SURGERY     • CAROTID ENDARTERECTOMY Bilateral    •  CATARACT EXTRACTION, BILATERAL     • CERVICAL SPINE SURGERY     • CHOLECYSTECTOMY     • COLON SURGERY     • COLONOSCOPY  10/01/2015   • CORONARY ARTERY BYPASS GRAFT     • DIALYSIS FISTULA CREATION     • ENDOSCOPY N/A 12/27/2018    Procedure: ESOPHAGOGASTRODUODENOSCOPY WITH ANESTHESIA;  Surgeon: Moni Garza MD;  Location: [facility] ENDOSCOPY;  Service: Gastroenterology   • ENDOSCOPY N/A 2/28/2019    Procedure: ESOPHAGOGASTRODUODENOSCOPY WITH ANESTHESIA;  Surgeon: Moni Garza MD;  Location: [facility] ENDOSCOPY;  Service: Gastroenterology   • HIP TROCANTERIC NAILING WITH INTRAMEDULLARY HIP SCREW Right 2/8/2019    Procedure: HIP TROCANTERIC NAILING LONG WITH INTRAMEDULLARY HIP SCREW;  Surgeon: Boo Camacho MD;  Location: [facility] OR;  Service: Orthopedics   • JOINT REPLACEMENT      knee   • LAPAROSCOPIC GASTRIC BANDING     • LEEP     • LUMBAR FUSION     • TOE AMPUTATION Left     2nd toe   • TOTAL KNEE ARTHROPLASTY Bilateral    • TUBAL ABDOMINAL LIGATION     • UPPER GASTROINTESTINAL ENDOSCOPY  10/01/2015       Family History  Family History   Problem Relation Age of Onset   • Hypertension Mother    • Cancer Mother         stomach   • Coronary artery disease Father    • Heart attack Father    • Diabetes Brother    • Coronary artery disease Brother    • Colon cancer Neg Hx    • Colon polyps Neg Hx        Social History  Social History     Socioeconomic History   • Marital status:      Spouse name: Not on file   • Number of children: Not on file   • Years of education: Not on file   • Highest education level: Not on file   Social Needs   • Financial resource strain: Not on file   • Food insecurity - worry: Not on file   • Food insecurity - inability: Not on file   • Transportation needs - medical: Not on file   • Transportation needs - non-medical: Not on file   Occupational History   • Not on file   Tobacco Use   • Smoking status: Never Smoker   • Smokeless tobacco: Never Used   Substance and Sexual Activity    • Alcohol use: No   • Drug use: No   • Sexual activity: Defer   Other Topics Concern   • Not on file   Social History Narrative   • Not on file         Review of Systems:  History obtained from chart review and the patient  General ROS: No fever or chills  Respiratory ROS: No cough, shortness of breath, wheezing  Cardiovascular ROS: No chest pain or palpitations  Gastrointestinal ROS: positive for - blood in stools  Genito-Urinary ROS: No dysuria or hematuria  Neurological ROS: no headache or dizziness    Objective:  Patient Vitals for the past 24 hrs:   BP Temp Temp src Pulse Resp SpO2 Weight   03/11/19 0753 151/68 98.2 °F (36.8 °C) Oral 78 16 95 % --   03/11/19 0300 138/58 98.1 °F (36.7 °C) Oral 91 18 95 % --   03/11/19 0038 123/59 98 °F (36.7 °C) Oral 94 18 93 % 78.8 kg (173 lb 12.8 oz)   03/10/19 1900 118/63 98.5 °F (36.9 °C) Oral 89 18 95 % --   03/10/19 1500 115/58 98.6 °F (37 °C) Oral 95 18 94 % --   03/10/19 1130 120/62 98 °F (36.7 °C) Oral 97 18 96 % --       Intake/Output Summary (Last 24 hours) at 3/11/2019 1040  Last data filed at 3/11/2019 0350  Gross per 24 hour   Intake 648 ml   Output --   Net 648 ml     General: awake/alert    Neck: supple, no JVD  Chest:  clear to auscultation bilaterally without respiratory distress  CVS: regular rate and rhythm  Abdominal: soft, nontender, positive bowel sounds  Extremities: no cyanosis or edema  Skin: warm and dry without rash  Neuro: no focal motor deficits    Labs:  Results from last 7 days   Lab Units 03/11/19  0623 03/10/19  0555 03/09/19  0858   WBC 10*3/mm3 7.28 6.58 6.12   HEMOGLOBIN g/dL 10.4* 9.4* 9.1*   HEMATOCRIT % 33.0* 28.8* 28.7*   PLATELETS 10*3/mm3 184 177 175         Results from last 7 days   Lab Units 03/11/19  0623 03/09/19  0858 03/08/19  2309   SODIUM mmol/L 135 139 139   POTASSIUM mmol/L 4.6 4.1 3.6   CHLORIDE mmol/L 95* 99 96*   CO2 mmol/L 28.0 32.0* 33.0*   BUN mg/dL 35* 20 17   CREATININE mg/dL 5.20* 2.95* 2.63*   CALCIUM mg/dL 8.6  7.9* 8.1*   BILIRUBIN mg/dL 0.5 0.6 0.4   ALK PHOS U/L 172* 157* 166*   ALT (SGPT) U/L 23 17 17   AST (SGOT) U/L 24 23 32   GLUCOSE mg/dL 75 84 95       Radiology:   Imaging Results (last 72 hours)     Procedure Component Value Units Date/Time    XR Chest 1 View [[id number]] Collected:  03/10/19 1408     Updated:  03/10/19 1413    Narrative:       HISTORY: Volume overload     CXR: A frontal view the chest is obtained and compared to the 3/8/2018  study.     Cardiac silhouette is enlarged. There are median sternotomy wires with a  proximal ascending aortic stent. There are diffuse bilateral  predominantly interstitial pulmonary opacities with questionable trace  pleural effusions. There is no pneumothorax. No acute bony pathology.  There are degenerative changes of the regional skeleton. Postoperative  change of the cervical spine. There is thoracic aortic calcification.  Gastric band identified.       Impression:       1. Cardiomegaly with similar diffuse primarily interstitial pulmonary  opacities suggestive for CHF.  This report was finalized on 03/10/2019 14:10 by Dr. Jacquie Salazar MD.    XR Abdomen KUB [[id number]] Collected:  03/09/19 1335     Updated:  03/09/19 1340    Narrative:       HISTORY: Abdominal pain     KUB: Frontal view of the upper abdomen is performed. Pelvis are included  on the exam.     There is no free air. Gastric band is in place. There are postoperative  changes of the lumbar spine. There are cholecystectomy clips. There is  evidence prior mediastinal surgery. Proximal ascending aortic stent in  place.     No dilated loops of bowel are seen within the upper abdomen. Vascular  calcification suspected. Chronic interstitial lung changes noted within  the lung bases. Cardiomegaly.       Impression:       1. No dilated loops of bowel seen within the visible upper abdomen.  Chronic and postoperative changes as described above.  This report was finalized on 03/09/2019 13:36 by Dr. Jacquie Salazar  MD.    CT Abdomen Pelvis Without Contrast [[id number]] Collected:  03/09/19 1323     Updated:  03/09/19 1332    Narrative:       CT ABDOMEN PELVIS WO CONTRAST- 3/9/2019 1:13 AM CST     HISTORY: epigastric pain      COMPARISON: 12/14/2017     DOSE LENGTH PRODUCT: 493 mGy cm. Automated exposure control was also  utilized to decrease patient radiation dose.     TECHNIQUE: Axial images of the abdomen and pelvis are obtained without  IV or oral contrast.     FINDINGS:  There is cardiomegaly. There are chronic basilar interstitial  lung changes.     The nonenhanced liver, spleen are unremarkable. The adrenal glands are  stable. There is atrophy of the pancreas. Accessory splenules are  suspected along the tail of the pancreas. There is a stable 11 mm cyst  seen along the tail of the pancreas, unchanged from the study performed  in 2017. Gallbladder surgically absent. There is bilateral renal  atrophy. There are diffuse renal artery calcifications. There is a left  renal cyst. There is no hydronephrosis. Bladder is under distended.  Uterus is unremarkable. No adnexal mass identified.     There is no free intraperitoneal air loculated abscess. There is left  colonic diverticulosis. There is no evidence for bowel obstruction.  Stomach is underdistended. Gastric band in place.     There is moderately diffuse vascular calcification but no aneurysm. No  pathologic lymphadenopathy visualized.     There is postoperative changes right proximal femur. There are old  fractures of the left pelvis. There is advanced osteopenia.  Postoperative changes lumbar spine.       Impression:       1. No acute inflammatory process seen within the abdomen or pelvis.  2. Stable 11 mm cystic lesion along the tail the pancreas. Stability  since 2017 favors benignity. Atrophy of pancreatic tissue.   2. Bilateral renal atrophy with renal artery calcification. Left renal  cyst.  3. Gastric band. Previous cholecystectomy.  4. Diffuse dense vascular  calcification.   5. Postoperative changes of the lumbar spine.  6. Cardiomegaly. Chronic basilar interstitial lung change.     Comments: A preliminary report is issued to the ER by the [facility]  radiology service.   This report was finalized on 03/09/2019 13:29 by Dr. Jacquie Salazar MD.    XR Chest 1 View [[id number]] Collected:  03/09/19 1320     Updated:  03/09/19 1329    Narrative:       HISTORY: Epigastric pain     CXR: A frontal view the chest is obtained and compared to the to  7/20/2019 study.     There is evidence of prior mediastinal surgery with a proximal ascending  aortic root stent identified. Cardiac silhouette remains enlarged. There  are chronic interstitial lung changes. A component of superimposed edema  considered. No pleural effusion or pneumothorax is identified. There is  no lobar consolidation. A gastric band is in place. The postoperative  changes of the cervical spine.       Impression:       1. Stable cardiomegaly with chronic interstitial lung disease. A  Component of superimposed edema considered. No lobar consolidation.  Prior mediastinal surgery. Aortic root stent. Gastric band.  This report was finalized on 03/09/2019 13:23 by Dr. Jacquie Salazar MD.          Culture:         Assessment   1.  End stage renal disease on dialysis MWF  2.  Hypertension  3.  Anemia of CKD and blood loss  4.  Gi bleeding  5.  Secondary hyperparathyroidism    Plan:  1.  Planning for endoscopy later this morning   2.  Dialysis to follow endoscopy        Dirk Tristan, ABILIO  3/11/2019  10:40 AM

## 2019-03-11 NOTE — PLAN OF CARE
Problem: Fall Risk (Adult)  Goal: Identify Related Risk Factors and Signs and Symptoms  Outcome: Ongoing (interventions implemented as appropriate)    Goal: Absence of Fall  Outcome: Ongoing (interventions implemented as appropriate)      Problem: Patient Care Overview  Goal: Plan of Care Review  Outcome: Ongoing (interventions implemented as appropriate)   03/10/19 2013 03/11/19 0446   Coping/Psychosocial   Plan of Care Reviewed With patient --    Plan of Care Review   Progress --  no change   OTHER   Outcome Summary --  No c/o discomfort. A&Ox4. VSS. Did not have bm this shift. Generalized weakness. NPO since midnight. Protonix gtt infusing. EGD today. Hold am dose carafate. Dialysis M-W-F. Safety maintained.      Goal: Individualization and Mutuality  Outcome: Ongoing (interventions implemented as appropriate)      Problem: Gastrointestinal Bleeding (Adult)  Goal: Signs and Symptoms of Listed Potential Problems Will be Absent, Minimized or Managed (Gastrointestinal Bleeding)  Outcome: Ongoing (interventions implemented as appropriate)

## 2019-03-11 NOTE — PROGRESS NOTES
Discharge Planning Assessment  [facility]     Patient Name: Cheri Ely  MRN: [id number]  Today's Date: 3/11/2019    Admit Date: 3/8/2019    Discharge Needs Assessment     Row Name 03/11/19 1209       Living Environment    Lives With  other (see comments)       Current Living Arrangements  other (see comments    Family Caregiver if Needed  other (see comments)      Quality of Family Relationships  helpful;involved;supportive       Able to Return to Prior Arrangements  yes         Resource/Environmental Concerns    Resource/Environmental Concerns  none      Transportation Concerns  car, none          Transition Planning    Patient/Family Anticipates Transition to  inpatient rehabilitation facility     Patient/Family Anticipated Services at Transition  skilled nursing      Transportation Anticipated  family or friend will provide;health plan transportation         Discharge Needs Assessment    Readmission Within the Last 30 Days  current reason for admission unrelated to previous admission    Concerns to be Addressed  denies needs/concerns at this time      Equipment Currently Used at Home  other (see comments    Equipment Needed After Discharge  none      Outpatient/Agency/Support Group Needs  skilled nursing facility      Offered/Gave Vendor List  yes       Discharge Coordination/Progress  Pt has RX coverage and a PCP. Pt is current resident of [facility]. SW contacted [facility] and they stated that pt does have bedhold. SW will follow and assist.           Discharge Plan    No documentation.       Destination      No service coordination in this encounter.      Durable Medical Equipment      No service coordination in this encounter.      Dialysis/Infusion      No service coordination in this encounter.      Home Medical Care      No service coordination in this encounter.      Community Resources      No service coordination in this encounter.          Demographic Summary    No documentation.        Functional Status    No documentation.       Psychosocial    No documentation.       Abuse/Neglect    No documentation.       Legal    No documentation.       Substance Abuse    No documentation.       Patient Forms    No documentation.           Eleanor Carter

## 2019-03-11 NOTE — PLAN OF CARE
Problem: Fall Risk (Adult)  Goal: Identify Related Risk Factors and Signs and Symptoms  Outcome: Ongoing (interventions implemented as appropriate)      Problem: Patient Care Overview  Goal: Plan of Care Review  Outcome: Ongoing (interventions implemented as appropriate)   03/11/19 0446 03/11/19 1646   Coping/Psychosocial   Plan of Care Reviewed With --  patient   Plan of Care Review   Progress no change --    OTHER   Outcome Summary --  A&Ox4. Pt had EGD today. Tolerated regular renal diet for lunch. Pt in diaylsis now. VSS. Denies pain. Continue to monitor. Safety maintained.      Goal: Individualization and Mutuality  Outcome: Ongoing (interventions implemented as appropriate)      Problem: Gastrointestinal Bleeding (Adult)  Goal: Signs and Symptoms of Listed Potential Problems Will be Absent, Minimized or Managed (Gastrointestinal Bleeding)  Outcome: Ongoing (interventions implemented as appropriate)

## 2019-03-11 NOTE — ANESTHESIA PREPROCEDURE EVALUATION
Anesthesia Evaluation     Patient summary reviewed   no history of anesthetic complications:  NPO Solid Status: > 8 hours  NPO Liquid Status: > 8 hours           Airway   Mallampati: I  TM distance: >3 FB  Neck ROM: full  Dental    (+) edentulous        Pulmonary - normal exam    breath sounds clear to auscultation  (-) COPD, asthma, recent URI, sleep apnea, not a smoker  Cardiovascular   Exercise tolerance: poor (<4 METS) (Limited by leg pain)    ECG reviewed  PT is on anticoagulation therapy  Rhythm: irregular  Rate: normal    (+) hypertension, valvular problems/murmurs (Aortic valve replacement 3 yrs ago), CAD, CABG (about 10 yrs ago), dysrhythmias (Rate controlled with carvedilol) Atrial Fib, hyperlipidemia,   (-) pacemaker, past MI, angina, cardiac stents    ROS comment: Follows with Dr. Lopez in [place] for chronic A-fib    Neuro/Psych  (-) seizures, TIA, CVA  GI/Hepatic/Renal/Endo    (+)   renal disease (Last dialysis 3/8/19) dialysis, hypothyroidism,   (-) GERD, liver disease, diabetes, hyperthyroidism    Musculoskeletal     Abdominal    Substance History      OB/GYN          Other                        Anesthesia Plan    ASA 3     MAC   (Will need dialysis after procedure)  intravenous induction   Anesthetic plan, all risks, benefits, and alternatives have been provided, discussed and informed consent has been obtained with: patient.

## 2019-03-11 NOTE — PROGRESS NOTES
[facility] Medicine Services  INPATIENT PROGRESS NOTE    Patient Name: Cheri Ely  Date of Admission: 3/8/2019  Today's Date: 03/11/19  Length of Stay: 2  Primary Care Physician: Barrett Mckenzie Jr, MD    Subjective   Chief Complaint: follow up GI bleed  HPI   Pt for EGD this am as well as dialysis afterward. States she had a watery bowel movement yesterday which was non-bloody. States she is ready to eat. Denies any chest pain or shortness of breath.     Review of Systems   All pertinent negatives and positives are as above. All other systems have been reviewed and are negative unless otherwise stated.     Objective    Temp:  [98 °F (36.7 °C)-98.6 °F (37 °C)] 98.2 °F (36.8 °C)  Heart Rate:  [78-97] 78  Resp:  [16-18] 16  BP: (115-151)/(58-68) 151/68  Physical Exam   Constitutional: She is oriented to person, place, and time. She appears well-developed and well-nourished.   HENT:   Head: Normocephalic and atraumatic.   Eyes: Conjunctivae and EOM are normal. Pupils are equal, round, and reactive to light.   Neck: Neck supple. No JVD present. No thyromegaly present.   Cardiovascular: Normal rate, regular rhythm, normal heart sounds and intact distal pulses. Exam reveals no gallop and no friction rub.   No murmur heard.  Pulmonary/Chest: Effort normal and breath sounds normal. No respiratory distress. She has no wheezes. She has no rales. She exhibits no tenderness.   Abdominal: Soft. Bowel sounds are normal. She exhibits no distension. There is no tenderness. There is no rebound and no guarding.   Musculoskeletal: Normal range of motion. She exhibits no edema, tenderness or deformity.   Lymphadenopathy:     She has no cervical adenopathy.   Neurological: She is alert and oriented to person, place, and time. She displays normal reflexes. No cranial nerve deficit. She exhibits normal muscle tone.   Skin: Skin is warm and dry. No rash noted.   Psychiatric: She has a normal mood  and affect. Her behavior is normal. Judgment and thought content normal.     Results Review:  I have reviewed the labs, radiology results, and diagnostic studies.    Laboratory Data:   Results from last 7 days   Lab Units 03/11/19  0623 03/10/19  0555 03/09/19  0858   WBC 10*3/mm3 7.28 6.58 6.12   HEMOGLOBIN g/dL 10.4* 9.4* 9.1*   HEMATOCRIT % 33.0* 28.8* 28.7*   PLATELETS 10*3/mm3 184 177 175        Results from last 7 days   Lab Units 03/11/19  0623 03/09/19  0858 03/08/19  2309   SODIUM mmol/L 135 139 139   POTASSIUM mmol/L 4.6 4.1 3.6   CHLORIDE mmol/L 95* 99 96*   CO2 mmol/L 28.0 32.0* 33.0*   BUN mg/dL 35* 20 17   CREATININE mg/dL 5.20* 2.95* 2.63*   CALCIUM mg/dL 8.6 7.9* 8.1*   BILIRUBIN mg/dL 0.5 0.6 0.4   ALK PHOS U/L 172* 157* 166*   ALT (SGPT) U/L 23 17 17   AST (SGOT) U/L 24 23 32   GLUCOSE mg/dL 75 84 95     Radiology Data:   Imaging Results (last 24 hours)     Procedure Component Value Units Date/Time    XR Chest 1 View [[id number]] Collected:  03/10/19 1408     Updated:  03/10/19 1413    Narrative:       HISTORY: Volume overload     CXR: A frontal view the chest is obtained and compared to the 3/8/2018  study.     Cardiac silhouette is enlarged. There are median sternotomy wires with a  proximal ascending aortic stent. There are diffuse bilateral  predominantly interstitial pulmonary opacities with questionable trace  pleural effusions. There is no pneumothorax. No acute bony pathology.  There are degenerative changes of the regional skeleton. Postoperative  change of the cervical spine. There is thoracic aortic calcification.  Gastric band identified.       Impression:       1. Cardiomegaly with similar diffuse primarily interstitial pulmonary  opacities suggestive for CHF.  This report was finalized on 03/10/2019 14:10 by Dr. Jacquie Salazar MD.          I have reviewed the patient's current medications.     Assessment/Plan     Active Hospital Problems    Diagnosis   • **Gastrointestinal hemorrhage    • Volume overload     Secondary to blood product transfusion.      • Hypothyroid   • Diastolic heart failure (CMS/[facility])     EF 55-60% from echocardiogram on 3/15     • Acute blood loss anemia     Superimposed on anemia of chronic disease     • Black tarry stools     Added automatically from request for surgery [id number]     • Hypertension   • Hyperlipidemia   • Chronic kidney disease on chronic dialysis (CMS/HCC)     Monday/Wednesday/Friday       Plan:  1. Await EGD findings.   2. Dialysis after EGD today.   3. Hopefully back to SNF tomorrow based on EGD findings.   4. CXR yesterday shows volume overload. She is maintaining room air appropriate oxygen saturations and is not complaining of shortness of breath.     Discharge Planning: I expect the patient to be discharged to SNf in 1-2 days.    ABILIO Fermin   03/11/19   9:20 AM     I personally evaluated and examined the patient in conjunction with ABILIO Stockton and agree with the assessment, treatment plan, and disposition of the patient as recorded by her. My history, exam, and further recommendations are:     Seen on HD.    Discussed with her nurse, Betty when I was up on the floor.      Hemoglobin stable/improved this morning.    Endoscopy on 2/28 showed:     Endoscopy today showed:      GI has advanced her diet.  They would rather us not start for Plavix for a few more days if possible.  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  Dr. San took care of her for this in January 2018.  She did not receive a stent at that point in time.    She recently had a displaced intertrochanteric fracture of the right femur that was repaired by Dr. Camacho in mid February.  She was placed on aspirin 325 mg twice daily for postoperative DVT prophylaxis in conjunction with her Plavix.  I am going to place her on 81 mg daily for now and then resume her Plavix in 72 hours.    I am restarting her  other appropriate medications.    Madhav Banks DO  03/11/19  2:04 PM

## 2019-03-11 NOTE — PROGRESS NOTES
Case Management Readmission Assessment Note       Case Management Readmission Assessment (all recorded)      Readmission Interview     Row Name 03/11/19 1155             Readmission Indications    Is this hospitalization related to the prior hospital diagnosis?  No      What was the reason you were admitted?  GI bleed reoccurance- interview exclusion- received from [facility]      Row Name 03/11/19 1155             Index discharge location/services    What services were arranged at discharge?  Other (comment)      Row Name 03/11/19 1155             Discharge Readiness    Recommendation based on interview  Other (comment) Readmit interview exclusion r/t dc to [facility]

## 2019-03-12 LAB
ABO + RH BLD: NORMAL
ABO + RH BLD: NORMAL
ALBUMIN SERPL-MCNC: 2.7 G/DL (ref 3.5–5)
ALBUMIN/GLOB SERPL: 1.1 G/DL (ref 1.1–2.5)
ALP SERPL-CCNC: 161 U/L (ref 24–120)
ALT SERPL W P-5'-P-CCNC: 17 U/L (ref 0–54)
ANION GAP SERPL CALCULATED.3IONS-SCNC: 9 MMOL/L (ref 4–13)
AST SERPL-CCNC: 28 U/L (ref 7–45)
BASOPHILS # BLD AUTO: 0.05 10*3/MM3 (ref 0–0.2)
BASOPHILS NFR BLD AUTO: 0.7 % (ref 0–2)
BH BB BLOOD EXPIRATION DATE: NORMAL
BH BB BLOOD EXPIRATION DATE: NORMAL
BH BB BLOOD TYPE BARCODE: 6200
BH BB BLOOD TYPE BARCODE: 6200
BH BB DISPENSE STATUS: NORMAL
BH BB DISPENSE STATUS: NORMAL
BH BB PRODUCT CODE: NORMAL
BH BB PRODUCT CODE: NORMAL
BH BB UNIT NUMBER: NORMAL
BH BB UNIT NUMBER: NORMAL
BILIRUB SERPL-MCNC: 0.6 MG/DL (ref 0.1–1)
BUN BLD-MCNC: 20 MG/DL (ref 5–21)
BUN/CREAT SERPL: 5.3 (ref 7–25)
CALCIUM SPEC-SCNC: 7.9 MG/DL (ref 8.4–10.4)
CHLORIDE SERPL-SCNC: 95 MMOL/L (ref 98–110)
CO2 SERPL-SCNC: 29 MMOL/L (ref 24–31)
CREAT BLD-MCNC: 3.74 MG/DL (ref 0.5–1.4)
DEPRECATED RDW RBC AUTO: 75.6 FL (ref 40–54)
EOSINOPHIL # BLD AUTO: 0.33 10*3/MM3 (ref 0–0.7)
EOSINOPHIL NFR BLD AUTO: 4.3 % (ref 0–4)
ERYTHROCYTE [DISTWIDTH] IN BLOOD BY AUTOMATED COUNT: 19.9 % (ref 12–15)
GFR SERPL CREATININE-BSD FRML MDRD: 12 ML/MIN/1.73
GFR SERPL CREATININE-BSD FRML MDRD: ABNORMAL ML/MIN/1.73
GLOBULIN UR ELPH-MCNC: 2.5 GM/DL
GLUCOSE BLD-MCNC: 79 MG/DL (ref 70–100)
HCT VFR BLD AUTO: 29.4 % (ref 37–47)
HGB BLD-MCNC: 9.7 G/DL (ref 12–16)
IMM GRANULOCYTES # BLD AUTO: 0.02 10*3/MM3 (ref 0–0.05)
IMM GRANULOCYTES NFR BLD AUTO: 0.3 % (ref 0–5)
LYMPHOCYTES # BLD AUTO: 0.78 10*3/MM3 (ref 0.72–4.86)
LYMPHOCYTES NFR BLD AUTO: 10.2 % (ref 15–45)
MCH RBC QN AUTO: 33.8 PG (ref 28–32)
MCHC RBC AUTO-ENTMCNC: 33 G/DL (ref 33–36)
MCV RBC AUTO: 102.4 FL (ref 82–98)
MONOCYTES # BLD AUTO: 0.52 10*3/MM3 (ref 0.19–1.3)
MONOCYTES NFR BLD AUTO: 6.8 % (ref 4–12)
NEUTROPHILS # BLD AUTO: 5.97 10*3/MM3 (ref 1.87–8.4)
NEUTROPHILS NFR BLD AUTO: 77.7 % (ref 39–78)
NRBC BLD AUTO-RTO: 0 /100 WBC (ref 0–0)
PLATELET # BLD AUTO: 134 10*3/MM3 (ref 130–400)
PMV BLD AUTO: 10.7 FL (ref 6–12)
POTASSIUM BLD-SCNC: 3.7 MMOL/L (ref 3.5–5.3)
PROT SERPL-MCNC: 5.2 G/DL (ref 6.3–8.7)
RBC # BLD AUTO: 2.87 10*6/MM3 (ref 4.2–5.4)
SODIUM BLD-SCNC: 133 MMOL/L (ref 135–145)
UNIT  ABO: NORMAL
UNIT  ABO: NORMAL
UNIT  RH: NORMAL
UNIT  RH: NORMAL
WBC NRBC COR # BLD: 7.67 10*3/MM3 (ref 4.8–10.8)

## 2019-03-12 PROCEDURE — 99232 SBSQ HOSP IP/OBS MODERATE 35: CPT | Performed by: INTERNAL MEDICINE

## 2019-03-12 PROCEDURE — 80053 COMPREHEN METABOLIC PANEL: CPT | Performed by: INTERNAL MEDICINE

## 2019-03-12 PROCEDURE — 85025 COMPLETE CBC W/AUTO DIFF WBC: CPT | Performed by: NURSE PRACTITIONER

## 2019-03-12 RX ORDER — ASPIRIN 81 MG/1
81 TABLET ORAL DAILY
Start: 2019-03-12 | End: 2019-07-06 | Stop reason: HOSPADM

## 2019-03-12 RX ORDER — ACETAMINOPHEN 325 MG/1
650 TABLET ORAL EVERY 6 HOURS PRN
Start: 2019-03-12 | End: 2020-04-17 | Stop reason: HOSPADM

## 2019-03-12 RX ADMIN — LEVOTHYROXINE SODIUM 150 MCG: 150 TABLET ORAL at 08:30

## 2019-03-12 RX ADMIN — CARVEDILOL 3.12 MG: 3.12 TABLET, FILM COATED ORAL at 08:30

## 2019-03-12 RX ADMIN — DOCUSATE SODIUM 100 MG: 100 CAPSULE ORAL at 08:30

## 2019-03-12 RX ADMIN — SODIUM CHLORIDE, PRESERVATIVE FREE 3 ML: 5 INJECTION INTRAVENOUS at 08:31

## 2019-03-12 RX ADMIN — PANTOPRAZOLE SODIUM 40 MG: 40 TABLET, DELAYED RELEASE ORAL at 05:57

## 2019-03-12 RX ADMIN — ASPIRIN 81 MG: 81 TABLET, DELAYED RELEASE ORAL at 08:30

## 2019-03-12 RX ADMIN — SUCRALFATE 1 G: 1 SUSPENSION ORAL at 08:30

## 2019-03-12 RX ADMIN — NYSTATIN: 100000 POWDER TOPICAL at 08:31

## 2019-03-12 RX ADMIN — SERTRALINE 50 MG: 50 TABLET, FILM COATED ORAL at 08:30

## 2019-03-12 NOTE — PROGRESS NOTES
[facility]  Inpatient Progress Note  Today's date:  03/12/19    Cheri Ely  1940       Reason for Follow Up: Melena    Subjective: The patient is sitting up to bed this morning eating breakfast.  She tells me that she is not having any abdominal pain.  She states she has not had a bowel movement in about 2 days she thinks.    The patient denies any nausea, vomiting, epigastric pain, dysphagia, pyrosis or hematemesis.  The patient denies any fever or chills.  Denies any melena or hematochezia.  Denies any unintentional weight loss or loss of appetite.      No Known Allergies    Current Facility-Administered Medications:   •  acetaminophen (TYLENOL) tablet 650 mg, 650 mg, Oral, Q6H PRN, Madhav Banks DO  •  aspirin EC tablet 81 mg, 81 mg, Oral, Daily, Madhav Banks DO, 81 mg at 03/12/19 0830  •  atorvastatin (LIPITOR) tablet 10 mg, 10 mg, Oral, Nightly, Madhav Banks DO, 10 mg at 03/11/19 2049  •  carvedilol (COREG) tablet 3.125 mg, 3.125 mg, Oral, BID With Meals, Madhav Banks DO, 3.125 mg at 03/12/19 0830  •  diphenhydrAMINE (BENADRYL) capsule 25 mg, 25 mg, Oral, BID PRN, Madhav Banks DO  •  docusate sodium (COLACE) capsule 100 mg, 100 mg, Oral, BID, Madhav Banks DO, 100 mg at 03/12/19 0830  •  lactulose solution 20 g, 20 g, Oral, Daily PRN, Madhav Banks DO  •  levothyroxine (SYNTHROID, LEVOTHROID) tablet 150 mcg, 150 mcg, Oral, Daily, Madhav Webber MD, 150 mcg at 03/12/19 0830  •  losartan (COZAAR) tablet 25 mg, 25 mg, Oral, Once per day on Mon Wed Fri, Madhav Banks DO, 25 mg at 03/11/19 1906  •  nystatin (MYCOSTATIN) powder, , Topical, Q12H, Madhav Banks DO  •  ondansetron (ZOFRAN) injection 4 mg, 4 mg, Intravenous, Q6H PRN, Madhav Banks, DO  •  oxyCODONE-acetaminophen (PERCOCET)  MG per tablet 1 tablet, 1 tablet, Oral, Q4H PRN, Madhav Banks, DO  •  pantoprazole (PROTONIX) EC tablet 40 mg, 40 mg, Oral, Q AM, Madhav Banks, DO,  40 mg at 03/12/19 0557  •  Polyethyl Glyc-Propyl Glyc PF 0.4-0.3 % 2 drop, 2 drop, Both Eyes, Q2H PRN, Jacquie Gasca APRN  •  sertraline (ZOLOFT) tablet 50 mg, 50 mg, Oral, Daily, Madhav Banks DO, 50 mg at 03/12/19 0830  •  sodium chloride 0.9 % flush 10 mL, 10 mL, Intravenous, PRN, Madhav Webber MD  •  sodium chloride 0.9 % flush 3 mL, 3 mL, Intravenous, Q12H, Madhav Webber MD, 3 mL at 03/12/19 0831  •  sodium chloride 0.9 % flush 3-10 mL, 3-10 mL, Intravenous, PRN, Madhav Webber MD  •  sucralfate (CARAFATE) 1 GM/10ML suspension 1 g, 1 g, Oral, 4x Daily With Meals & Nightly, Moni Garza MD, 1 g at 03/12/19 0830    Review of Systems:   Review of Systems   Constitutional: Negative for fever and unexpected weight change.   HENT: Negative for hearing loss.    Eyes: Negative for visual disturbance.   Respiratory: Negative for cough.    Cardiovascular: Negative for chest pain.   Gastrointestinal:        See HPI   Endocrine: Negative for cold intolerance and heat intolerance.   Genitourinary: Negative for dysuria.   Musculoskeletal: Negative for arthralgias.   Skin: Negative for rash.   Neurological: Negative for seizures.   Psychiatric/Behavioral: Negative for hallucinations.        Vital Signs:  Temp:  [97.3 °F (36.3 °C)-98.5 °F (36.9 °C)] 98.2 °F (36.8 °C)  Heart Rate:  [] 75  Resp:  [16-24] 18  BP: (106-137)/(44-61) 106/51  Body mass index is 33.85 kg/m².     Intake/Output Summary (Last 24 hours) at 3/12/2019 0844  Last data filed at 3/11/2019 1130  Gross per 24 hour   Intake 50 ml   Output --   Net 50 ml     No intake/output data recorded.  Physical Exam:  Physical Exam   Constitutional: She appears well-developed.   Cardiovascular: Normal rate and regular rhythm.   Pulmonary/Chest: Effort normal.   Abdominal: Soft. Bowel sounds are normal.   Neurological: She is alert.   Psychiatric: She has a normal mood and affect.        Results Review:   I have reviewed all of the  patient's current test results    Results from last 7 days   Lab Units 03/12/19  0534 03/11/19  0623 03/10/19  0555   WBC 10*3/mm3 7.67 7.28 6.58   HEMOGLOBIN g/dL 9.7* 10.4* 9.4*   HEMATOCRIT % 29.4* 33.0* 28.8*   PLATELETS 10*3/mm3 134 184 177       Results from last 7 days   Lab Units 03/12/19  0534 03/11/19  0623 03/09/19  0858   SODIUM mmol/L 133* 135 139   POTASSIUM mmol/L 3.7 4.6 4.1   CHLORIDE mmol/L 95* 95* 99   CO2 mmol/L 29.0 28.0 32.0*   BUN mg/dL 20 35* 20   CREATININE mg/dL 3.74* 5.20* 2.95*   CALCIUM mg/dL 7.9* 8.6 7.9*   BILIRUBIN mg/dL 0.6 0.5 0.6   ALK PHOS U/L 161* 172* 157*   ALT (SGPT) U/L 17 23 17   AST (SGOT) U/L 28 24 23   GLUCOSE mg/dL 79 75 84       Results from last 7 days   Lab Units 03/10/19  0449 03/08/19  2310   INR  1.27* 1.39*       Lab Results   Lab Value Date/Time    LIPASE 28 09/15/2017 2059       Radiology Review:  Imaging Results (last 24 hours)     ** No results found for the last 24 hours. **          Impression/Plan:  Patient Active Problem List   Diagnosis Code   • Hypertension I10   • Hyperlipidemia E78.5   • Chronic kidney disease on chronic dialysis (CMS/[facility]) N18.6, Z99.2   • Closed fracture of ramus of left pubis (CMS/[facility]) S32.592A   • Occlusion of superior mesenteric artery (CMS/[facility]) K55.069   • Chronic mesenteric ischemia (CMS/[facility]) K55.1   • Black tarry stools K92.1   • Hx of gastritis Z87.19   • Acute gastric ulcer with hemorrhage K25.0   • Closed displaced intertrochanteric fracture of right femur (CMS/[facility]) S72.141A   • Displaced intertrochanteric fracture of right femur, initial encounter for closed fracture (CMS/[facility]) S72.141A   • Hypotension I95.9   • Acute blood loss anemia D62   • GI bleed K92.2   • Gastrointestinal hemorrhage K92.2   • (HFpEF) heart failure with preserved ejection fraction (CMS/HCC) I50.30   • Hypothyroid E03.9   • Diastolic dysfunction I51.9   • Diastolic heart failure (CMS/HCC) I50.30   • Volume overload E87.70     Melena  EGD performed on  3/11/19.  Endo Clip found in the distal esophagus no blood or bleeding noted anywhere.  Diet was resumed.  Continue Carafate and PPI.  Plans to repeat Endo in 2-3 months already scheduled.  Try to hold resuming Plavix for another few days if possible.  OK to discharge    Anemia  Hemoglobin 9.7 today      Candace ABILIO Mann  03/12/19  8:44 AM     Patient Seen, Chart, Consults, Notes, Labs, Radiology studies reviewed    I have seen and examined patient personally, performing a face-to-face diagnostic evaluation with plan of care reviewed and developed with APRN and nursing staff. I have addended and/or modified the above history of present illness, physical examination, and assessment and plan to reflect my findings and impressions. Essential elements of the care plan were discussed with APRN above.  Agree with findings and assessment/plan as documented above    Moni Garza MD  [facility]  03/12/19  10:45 AM    Much of this encounter note is an electronic transcription/translation of spoken language to printed text. The electronic translation of spoken language may permit erroneous, or at times, nonsensical words or phrases to be inadvertently transcribed; although I have reviewed the note for such errors, some may still exist.

## 2019-03-12 NOTE — PLAN OF CARE
Problem: Fall Risk (Adult)  Goal: Absence of Fall  Outcome: Ongoing (interventions implemented as appropriate)      Problem: Patient Care Overview  Goal: Plan of Care Review  Outcome: Ongoing (interventions implemented as appropriate)   03/12/19 1052   Coping/Psychosocial   Plan of Care Reviewed With patient   Plan of Care Review   Progress no change   OTHER   Outcome Summary VSS, alert and oriented, no s/s of distress, will discharge today        Problem: Gastrointestinal Bleeding (Adult)  Goal: Signs and Symptoms of Listed Potential Problems Will be Absent, Minimized or Managed (Gastrointestinal Bleeding)  Outcome: Ongoing (interventions implemented as appropriate)

## 2019-03-12 NOTE — PLAN OF CARE
Problem: Fall Risk (Adult)  Goal: Identify Related Risk Factors and Signs and Symptoms  Outcome: Outcome(s) achieved Date Met: 03/12/19    Goal: Absence of Fall  Outcome: Ongoing (interventions implemented as appropriate)      Problem: Gastrointestinal Bleeding (Adult)  Goal: Signs and Symptoms of Listed Potential Problems Will be Absent, Minimized or Managed (Gastrointestinal Bleeding)  Outcome: Ongoing (interventions implemented as appropriate)

## 2019-03-12 NOTE — PLAN OF CARE
Problem: Patient Care Overview  Goal: Plan of Care Review  Outcome: Ongoing (interventions implemented as appropriate)   03/12/19 4912   Coping/Psychosocial   Plan of Care Reviewed With patient   Plan of Care Review   Progress improving   OTHER   Outcome Summary pt alert and oriented ,no bloody or black stools noted during sfhit. Pt denies pain throughout shift, and rested well. VSS, safety maintained, will continue to monitor.

## 2019-03-12 NOTE — PROGRESS NOTES
Nephrology ([facility]) Progress Note      Patient:  Cheri Ely  YOB: 1940  Date of Service: 3/12/2019  MRN: [id number]   Acct: [id number]   Primary Care Physician: Barrett Mckenzie Jr, MD  Advance Directive:   Code Status and Medical Interventions:   Ordered at: 03/09/19 0107     Level Of Support Discussed With:    Patient     Code Status:    CPR     Medical Interventions (Level of Support Prior to Arrest):    Full     Admit Date: 3/8/2019       Hospital Day: 3  Referring Provider: Madhav Webber MD      Patient personally seen and examined.  Complete chart including Consults, Notes, Operative Reports, Labs, Cardiology, and Radiology studies reviewed as able.        Subjective:  Cheri Ely is a 78 y.o. female  whom we were consulted for end stage renal disease.  Routine dialysis Monday Wednesday Friday. No recent issues with dialysis.  Admitted with GI bleeding; recently having dark, tarry stools.  Upper endoscopy on 3/11 found no active bleeding site. Tolerated HD well on 3/11.    Currently awake, alert. No new overnight issues.    Allergies:  Patient has no known allergies.    Home Meds:  Medications Prior to Admission   Medication Sig Dispense Refill Last Dose   • B Complex-C (SUPER B COMPLEX/VITAMIN C PO) Take 1 tablet by mouth Daily.   Past Week at Unknown time   • carvedilol (COREG) 3.125 MG tablet Take 1 tablet by mouth 2 (Two) Times a Day With Meals. On Non-dialysis days- Sunday, Tuesday, Thursday, Saturday.  Hold for systolic BP <100   Past Week at Unknown time   • carvedilol (COREG) 3.125 MG tablet Take 1 tablet by mouth Daily. On dialysis days-Monday, Wednesday, Friday.  Hold for systolic BP <100   Past Week at Unknown time   • Cholecalciferol (D3 ADULT PO) Take 5,000 Units by mouth Daily.   Past Week at Unknown time   • docusate sodium 100 MG capsule Take 100 mg by mouth 2 (Two) Times a Day. 60 each 1 Past Week at Unknown time   • levothyroxine  (SYNTHROID, LEVOTHROID) 150 MCG tablet Take 150 mcg by mouth Daily.   3/8/2019 at Unknown time   • losartan (COZAAR) 25 MG tablet Take 25 mg by mouth Daily (Monday-Friday). Monday, Wednesday, And Friday only.   Past Week at Unknown time   • midodrine (PROAMATINE) 2.5 MG tablet Take 1 tablet by mouth 3 (Three) Times a Day.   Past Week at Unknown time   • Multiple Vitamin (DAILY-SANGEETA PO) Take 1 tablet by mouth Daily.   Past Week at Unknown time   • Multiple Vitamins-Minerals (HAIR SKIN AND NAILS FORMULA PO) Take 2 tablets by mouth Daily.   Past Week at Unknown time   • nystatin (MYCOSTATIN) 690285 UNIT/GM powder Apply  topically to the appropriate area as directed Every 12 (Twelve) Hours.   Past Week at Unknown time   • oxyCODONE-acetaminophen (PERCOCET)  MG per tablet Take 1 tablet by mouth Every 4 (Four) Hours As Needed for Moderate Pain . 60 tablet 0 Past Week at Unknown time   • pantoprazole (PROTONIX) 40 MG EC tablet Take 1 tablet by mouth Daily. 30 tablet 11 Past Week at Unknown time   • sertraline (ZOLOFT) 50 MG tablet Take 50 mg by mouth Daily.   Past Week at Unknown time   • aspirin  MG tablet Take 1 tablet by mouth 2 (Two) Times a Day for 42 days. (Patient not taking: Reported on 3/9/2019) 84 tablet 0 Not Taking at Unknown time   • clopidogrel (PLAVIX) 75 MG tablet Take 1 tablet by mouth Daily. 90 tablet 2 3/7/2019   • diphenhydrAMINE (BENADRYL) 25 mg capsule Take 1 capsule by mouth 2 (Two) Times a Day As Needed for Itching.   Unknown at Unknown time   • lactulose 20 GM/30ML solution solution Take 30 mL by mouth Daily As Needed (Constipation). 30 mL  Unknown at Unknown time   • ondansetron ODT (ZOFRAN-ODT) 4 MG disintegrating tablet Take 4 mg by mouth Every 8 (Eight) Hours As Needed for Nausea or Vomiting.   Unknown at Unknown time   • pravastatin (PRAVACHOL) 40 MG tablet Take 40 mg by mouth Daily.   Unknown at Unknown time   • sucralfate (CARAFATE) 1 GM/10ML suspension Take 10 mL by mouth 4  (Four) Times a Day. 1200 mL 0 Unknown at Unknown time       Medicines:  Current Facility-Administered Medications   Medication Dose Route Frequency Provider Last Rate Last Dose   • acetaminophen (TYLENOL) tablet 650 mg  650 mg Oral Q6H PRN Madhav Banks DO       • aspirin EC tablet 81 mg  81 mg Oral Daily Madhav Banks DO   81 mg at 03/12/19 0830   • atorvastatin (LIPITOR) tablet 10 mg  10 mg Oral Nightly Madhav Banks DO   10 mg at 03/11/19 2049   • carvedilol (COREG) tablet 3.125 mg  3.125 mg Oral BID With Meals Madhav Banks DO   3.125 mg at 03/12/19 0830   • diphenhydrAMINE (BENADRYL) capsule 25 mg  25 mg Oral BID PRN Madhav Banks DO       • docusate sodium (COLACE) capsule 100 mg  100 mg Oral BID Madhav Banks DO   100 mg at 03/12/19 0830   • lactulose solution 20 g  20 g Oral Daily PRN Madhav Banks DO       • levothyroxine (SYNTHROID, LEVOTHROID) tablet 150 mcg  150 mcg Oral Daily Madhav Webber MD   150 mcg at 03/12/19 0830   • losartan (COZAAR) tablet 25 mg  25 mg Oral Once per day on Mon Wed Fri Madhav Banks DO   25 mg at 03/11/19 1906   • nystatin (MYCOSTATIN) powder   Topical Q12H Madhav Banks DO       • ondansetron (ZOFRAN) injection 4 mg  4 mg Intravenous Q6H PRN Madhav Banks DO       • oxyCODONE-acetaminophen (PERCOCET)  MG per tablet 1 tablet  1 tablet Oral Q4H PRN Madhav Banks DO       • pantoprazole (PROTONIX) EC tablet 40 mg  40 mg Oral Q AM Madhav Banks DO   40 mg at 03/12/19 0557   • Polyethyl Glyc-Propyl Glyc PF 0.4-0.3 % 2 drop  2 drop Both Eyes Q2H PRN Jacquie Gasca APRN       • sertraline (ZOLOFT) tablet 50 mg  50 mg Oral Daily Madhav Banks DO   50 mg at 03/12/19 0830   • sodium chloride 0.9 % flush 10 mL  10 mL Intravenous PRN Madhav Webber MD       • sodium chloride 0.9 % flush 3 mL  3 mL Intravenous Q12H Madhav Webber MD   3 mL at 03/12/19 0831   • sodium chloride 0.9 % flush 3-10 mL  3-10 mL Intravenous PRN  Madhav Webber MD       • sucralfate (CARAFATE) 1 GM/10ML suspension 1 g  1 g Oral 4x Daily With Meals & Nightly Moni Garza MD   1 g at 03/12/19 0830       Past Medical History:  Past Medical History:   Diagnosis Date   • Arthritis    • Carotid artery disease (CMS/HCC)    • CHF (congestive heart failure) (CMS/HCC)    • Chronic kidney disease on chronic dialysis (CMS/HCC)    • Chronic renal failure     on dialysis ON MON, WED, FRI   • Coronary artery disease    • Disease of thyroid gland    • Diverticulitis    • Gastric ulcer    • History of transfusion    • Hyperlipidemia    • Hypertension    • Injury of back    • Mesenteric artery insufficiency (CMS/HCC)    • Multilevel degenerative disc disease    • Osteoporosis    • Pancreatitis    • Pelvis fracture (CMS/HCC)    • Pneumonia        Past Surgical History:  Past Surgical History:   Procedure Laterality Date   • AORTAGRAM Right 1/8/2018    Procedure: MESENTERIC ANGIOGRAM, GROIN ACCESS, MYNX CLOSURE;  Surgeon: Tomasz San DO;  Location: [facility] OR;  Service:    • AORTIC VALVE SURGERY     • APPENDECTOMY     • BACK SURGERY     • CARDIAC SURGERY     • CAROTID ENDARTERECTOMY Bilateral    • CATARACT EXTRACTION, BILATERAL     • CERVICAL SPINE SURGERY     • CHOLECYSTECTOMY     • COLON SURGERY     • COLONOSCOPY  10/01/2015   • CORONARY ARTERY BYPASS GRAFT     • DIALYSIS FISTULA CREATION     • ENDOSCOPY N/A 12/27/2018    Procedure: ESOPHAGOGASTRODUODENOSCOPY WITH ANESTHESIA;  Surgeon: Moni Garza MD;  Location: [facility] ENDOSCOPY;  Service: Gastroenterology   • ENDOSCOPY N/A 2/28/2019    Procedure: ESOPHAGOGASTRODUODENOSCOPY WITH ANESTHESIA;  Surgeon: Moni Garza MD;  Location: [facility] ENDOSCOPY;  Service: Gastroenterology   • ENDOSCOPY N/A 3/11/2019    Procedure: ESOPHAGOGASTRODUODENOSCOPY WITH ANESTHESIA;  Surgeon: Moni Garza MD;  Location: [facility] ENDOSCOPY;  Service: Gastroenterology   • HIP TROCANTERIC NAILING WITH INTRAMEDULLARY HIP SCREW Right  2/8/2019    Procedure: HIP TROCANTERIC NAILING LONG WITH INTRAMEDULLARY HIP SCREW;  Surgeon: Boo Camacho MD;  Location: [facility];  Service: Orthopedics   • JOINT REPLACEMENT      knee   • LAPAROSCOPIC GASTRIC BANDING     • LEEP     • LUMBAR FUSION     • TOE AMPUTATION Left     2nd toe   • TOTAL KNEE ARTHROPLASTY Bilateral    • TUBAL ABDOMINAL LIGATION     • UPPER GASTROINTESTINAL ENDOSCOPY  10/01/2015       Family History  Family History   Problem Relation Age of Onset   • Hypertension Mother    • Cancer Mother         stomach   • Coronary artery disease Father    • Heart attack Father    • Diabetes Brother    • Coronary artery disease Brother    • Colon cancer Neg Hx    • Colon polyps Neg Hx        Social History  Social History     Socioeconomic History   • Marital status:      Spouse name: Not on file   • Number of children: Not on file   • Years of education: Not on file   • Highest education level: Not on file   Social Needs   • Financial resource strain: Not on file   • Food insecurity - worry: Not on file   • Food insecurity - inability: Not on file   • Transportation needs - medical: Not on file   • Transportation needs - non-medical: Not on file   Occupational History   • Not on file   Tobacco Use   • Smoking status: Never Smoker   • Smokeless tobacco: Never Used   Substance and Sexual Activity   • Alcohol use: No   • Drug use: No   • Sexual activity: Defer   Other Topics Concern   • Not on file   Social History Narrative   • Not on file         Review of Systems:  History obtained from chart review and the patient  General ROS: No fever or chills  Respiratory ROS: No cough, shortness of breath, wheezing  Cardiovascular ROS: No chest pain or palpitations  Gastrointestinal ROS: positive for - blood in stools  Genito-Urinary ROS: No dysuria or hematuria  Neurological ROS: no headache or dizziness    Objective:  Patient Vitals for the past 24 hrs:   BP Temp Temp src Pulse Resp SpO2 Weight    03/12/19 0930 (!) 113/30 99 °F (37.2 °C) Oral 72 18 92 % --   03/12/19 0334 106/51 98.2 °F (36.8 °C) Oral 75 18 94 % --   03/12/19 0050 113/44 98 °F (36.7 °C) Oral 73 20 94 % --   03/11/19 2130 -- -- -- -- -- -- 77.6 kg (171 lb)   03/11/19 1917 125/50 98.5 °F (36.9 °C) Axillary 96 24 100 % --   03/11/19 1752 130/51 97.3 °F (36.3 °C) Oral 81 20 -- --   03/11/19 1150 137/61 -- -- 100 16 100 % --   03/11/19 1145 127/49 -- -- 102 16 96 % --   03/11/19 1140 121/53 -- -- 93 16 96 % --   03/11/19 1135 117/50 -- -- 95 16 (!) 88 % --   03/11/19 1134 117/50 -- -- 99 18 92 % --       Intake/Output Summary (Last 24 hours) at 3/12/2019 1012  Last data filed at 3/11/2019 1130  Gross per 24 hour   Intake 50 ml   Output --   Net 50 ml     General: awake/alert    Neck: supple, no JVD  Chest:  clear to auscultation bilaterally without respiratory distress  CVS: regular rate and rhythm  Abdominal: soft, nontender, positive bowel sounds  Extremities: no cyanosis or edema  Skin: warm and dry without rash  Neuro: no focal motor deficits    Labs:  Results from last 7 days   Lab Units 03/12/19  0534 03/11/19  0623 03/10/19  0555   WBC 10*3/mm3 7.67 7.28 6.58   HEMOGLOBIN g/dL 9.7* 10.4* 9.4*   HEMATOCRIT % 29.4* 33.0* 28.8*   PLATELETS 10*3/mm3 134 184 177         Results from last 7 days   Lab Units 03/12/19  0534 03/11/19  0623 03/09/19  0858   SODIUM mmol/L 133* 135 139   POTASSIUM mmol/L 3.7 4.6 4.1   CHLORIDE mmol/L 95* 95* 99   CO2 mmol/L 29.0 28.0 32.0*   BUN mg/dL 20 35* 20   CREATININE mg/dL 3.74* 5.20* 2.95*   CALCIUM mg/dL 7.9* 8.6 7.9*   BILIRUBIN mg/dL 0.6 0.5 0.6   ALK PHOS U/L 161* 172* 157*   ALT (SGPT) U/L 17 23 17   AST (SGOT) U/L 28 24 23   GLUCOSE mg/dL 79 75 84       Radiology:   Imaging Results (last 72 hours)     Procedure Component Value Units Date/Time    XR Chest 1 View [[id number]] Collected:  03/10/19 1408     Updated:  03/10/19 1413    Narrative:       HISTORY: Volume overload     CXR: A frontal view the chest  is obtained and compared to the 3/8/2018  study.     Cardiac silhouette is enlarged. There are median sternotomy wires with a  proximal ascending aortic stent. There are diffuse bilateral  predominantly interstitial pulmonary opacities with questionable trace  pleural effusions. There is no pneumothorax. No acute bony pathology.  There are degenerative changes of the regional skeleton. Postoperative  change of the cervical spine. There is thoracic aortic calcification.  Gastric band identified.       Impression:       1. Cardiomegaly with similar diffuse primarily interstitial pulmonary  opacities suggestive for CHF.  This report was finalized on 03/10/2019 14:10 by Dr. Jacquie Salazar MD.    XR Abdomen KUB [[id number]] Collected:  03/09/19 1335     Updated:  03/09/19 1340    Narrative:       HISTORY: Abdominal pain     KUB: Frontal view of the upper abdomen is performed. Pelvis are included  on the exam.     There is no free air. Gastric band is in place. There are postoperative  changes of the lumbar spine. There are cholecystectomy clips. There is  evidence prior mediastinal surgery. Proximal ascending aortic stent in  place.     No dilated loops of bowel are seen within the upper abdomen. Vascular  calcification suspected. Chronic interstitial lung changes noted within  the lung bases. Cardiomegaly.       Impression:       1. No dilated loops of bowel seen within the visible upper abdomen.  Chronic and postoperative changes as described above.  This report was finalized on 03/09/2019 13:36 by Dr. Jacquie Salazar MD.    CT Abdomen Pelvis Without Contrast [[id number]] Collected:  03/09/19 1323     Updated:  03/09/19 1332    Narrative:       CT ABDOMEN PELVIS WO CONTRAST- 3/9/2019 1:13 AM CST     HISTORY: epigastric pain      COMPARISON: 12/14/2017     DOSE LENGTH PRODUCT: 493 mGy cm. Automated exposure control was also  utilized to decrease patient radiation dose.     TECHNIQUE: Axial images of the abdomen and  pelvis are obtained without  IV or oral contrast.     FINDINGS:  There is cardiomegaly. There are chronic basilar interstitial  lung changes.     The nonenhanced liver, spleen are unremarkable. The adrenal glands are  stable. There is atrophy of the pancreas. Accessory splenules are  suspected along the tail of the pancreas. There is a stable 11 mm cyst  seen along the tail of the pancreas, unchanged from the study performed  in 2017. Gallbladder surgically absent. There is bilateral renal  atrophy. There are diffuse renal artery calcifications. There is a left  renal cyst. There is no hydronephrosis. Bladder is under distended.  Uterus is unremarkable. No adnexal mass identified.     There is no free intraperitoneal air loculated abscess. There is left  colonic diverticulosis. There is no evidence for bowel obstruction.  Stomach is underdistended. Gastric band in place.     There is moderately diffuse vascular calcification but no aneurysm. No  pathologic lymphadenopathy visualized.     There is postoperative changes right proximal femur. There are old  fractures of the left pelvis. There is advanced osteopenia.  Postoperative changes lumbar spine.       Impression:       1. No acute inflammatory process seen within the abdomen or pelvis.  2. Stable 11 mm cystic lesion along the tail the pancreas. Stability  since 2017 favors benignity. Atrophy of pancreatic tissue.   2. Bilateral renal atrophy with renal artery calcification. Left renal  cyst.  3. Gastric band. Previous cholecystectomy.  4. Diffuse dense vascular calcification.   5. Postoperative changes of the lumbar spine.  6. Cardiomegaly. Chronic basilar interstitial lung change.     Comments: A preliminary report is issued to the ER by the [facility]  radiology service.   This report was finalized on 03/09/2019 13:29 by Dr. Jacquie Salazar MD.    XR Chest 1 View [[id number]] Collected:  03/09/19 1320     Updated:  03/09/19 1329    Narrative:       HISTORY:  Epigastric pain     CXR: A frontal view the chest is obtained and compared to the to  7/20/2019 study.     There is evidence of prior mediastinal surgery with a proximal ascending  aortic root stent identified. Cardiac silhouette remains enlarged. There  are chronic interstitial lung changes. A component of superimposed edema  considered. No pleural effusion or pneumothorax is identified. There is  no lobar consolidation. A gastric band is in place. The postoperative  changes of the cervical spine.       Impression:       1. Stable cardiomegaly with chronic interstitial lung disease. A  Component of superimposed edema considered. No lobar consolidation.  Prior mediastinal surgery. Aortic root stent. Gastric band.  This report was finalized on 03/09/2019 13:23 by Dr. Jacquie Salazar MD.          Culture:         Assessment   1.  End stage renal disease on dialysis MWF  2.  Hypertension  3.  Anemia of CKD and blood loss  4.  Gi bleeding  5.  Secondary hyperparathyroidism    Plan:  1.  Dialysis next due 3/13  2.  Ok for discharge from renal standpoint; follow up will be via dialysis clinic        Dirk Tristan, ABILIO  3/12/2019  10:12 AM

## 2019-03-19 VITALS
TEMPERATURE: 99 F | OXYGEN SATURATION: 92 % | SYSTOLIC BLOOD PRESSURE: 113 MMHG | HEART RATE: 72 BPM | DIASTOLIC BLOOD PRESSURE: 30 MMHG | HEIGHT: 60 IN | WEIGHT: 171 LBS | RESPIRATION RATE: 18 BRPM | BODY MASS INDEX: 33.57 KG/M2

## 2019-03-24 ENCOUNTER — APPOINTMENT (OUTPATIENT)
Dept: NUCLEAR MEDICINE | Facility: HOSPITAL | Age: 79
End: 2019-03-24

## 2019-03-24 ENCOUNTER — HOSPITAL ENCOUNTER (INPATIENT)
Facility: HOSPITAL | Age: 79
LOS: 9 days | Discharge: NURSING, REHAB OR LONG-TERM CARE | End: 2019-04-02
Attending: FAMILY MEDICINE | Admitting: FAMILY MEDICINE

## 2019-03-24 DIAGNOSIS — K92.2 GASTROINTESTINAL HEMORRHAGE, UNSPECIFIED GASTROINTESTINAL HEMORRHAGE TYPE: Primary | ICD-10-CM

## 2019-03-24 DIAGNOSIS — Z99.2 ESRD (END STAGE RENAL DISEASE) ON DIALYSIS (HCC): ICD-10-CM

## 2019-03-24 DIAGNOSIS — Z74.09 IMPAIRED MOBILITY: ICD-10-CM

## 2019-03-24 DIAGNOSIS — K92.1 GASTROINTESTINAL HEMORRHAGE WITH MELENA: ICD-10-CM

## 2019-03-24 DIAGNOSIS — Z78.9 DECREASED ACTIVITIES OF DAILY LIVING (ADL): ICD-10-CM

## 2019-03-24 DIAGNOSIS — Z79.01 CHRONIC ANTICOAGULATION: ICD-10-CM

## 2019-03-24 DIAGNOSIS — D64.9 ANEMIA, UNSPECIFIED TYPE: ICD-10-CM

## 2019-03-24 DIAGNOSIS — N18.6 ESRD (END STAGE RENAL DISEASE) ON DIALYSIS (HCC): ICD-10-CM

## 2019-03-24 LAB
ABO GROUP BLD: NORMAL
ALBUMIN SERPL-MCNC: 3.1 G/DL (ref 3.5–5)
ALBUMIN/GLOB SERPL: 1.2 G/DL (ref 1.1–2.5)
ALP SERPL-CCNC: 176 U/L (ref 24–120)
ALT SERPL W P-5'-P-CCNC: 17 U/L (ref 0–54)
ANION GAP SERPL CALCULATED.3IONS-SCNC: 8 MMOL/L (ref 4–13)
APTT PPP: 35.8 SECONDS (ref 24.1–34.8)
AST SERPL-CCNC: 35 U/L (ref 7–45)
BASOPHILS # BLD AUTO: 0.06 10*3/MM3 (ref 0–0.2)
BASOPHILS NFR BLD AUTO: 0.8 % (ref 0–2)
BILIRUB SERPL-MCNC: 0.5 MG/DL (ref 0.1–1)
BLD GP AB SCN SERPL QL: NEGATIVE
BUN BLD-MCNC: 37 MG/DL (ref 5–21)
BUN/CREAT SERPL: 8 (ref 7–25)
CALCIUM SPEC-SCNC: 8.4 MG/DL (ref 8.4–10.4)
CHLORIDE SERPL-SCNC: 97 MMOL/L (ref 98–110)
CO2 SERPL-SCNC: 32 MMOL/L (ref 24–31)
CREAT BLD-MCNC: 4.6 MG/DL (ref 0.5–1.4)
D-LACTATE SERPL-SCNC: 1 MMOL/L (ref 0.5–2)
DEPRECATED RDW RBC AUTO: 72.3 FL (ref 40–54)
DEVELOPER EXPIRATION DATE: ABNORMAL
DEVELOPER LOT NUMBER: 145
EOSINOPHIL # BLD AUTO: 0.24 10*3/MM3 (ref 0–0.7)
EOSINOPHIL NFR BLD AUTO: 3.1 % (ref 0–4)
ERYTHROCYTE [DISTWIDTH] IN BLOOD BY AUTOMATED COUNT: 18.4 % (ref 12–15)
EXPIRATION DATE: ABNORMAL
FECAL OCCULT BLOOD SCREEN, POC: POSITIVE
GFR SERPL CREATININE-BSD FRML MDRD: 9 ML/MIN/1.73
GFR SERPL CREATININE-BSD FRML MDRD: ABNORMAL ML/MIN/1.73
GLOBULIN UR ELPH-MCNC: 2.6 GM/DL
GLUCOSE BLD-MCNC: 84 MG/DL (ref 70–100)
HCT VFR BLD AUTO: 23.1 % (ref 37–47)
HCT VFR BLD AUTO: 27.4 % (ref 37–47)
HGB BLD-MCNC: 7.3 G/DL (ref 12–16)
HGB BLD-MCNC: 8.7 G/DL (ref 12–16)
HOLD SPECIMEN: NORMAL
HOLD SPECIMEN: NORMAL
INR PPP: 1.14 (ref 0.91–1.09)
LIPASE SERPL-CCNC: 28 U/L (ref 23–203)
LYMPHOCYTES # BLD AUTO: 0.86 10*3/MM3 (ref 0.72–4.86)
LYMPHOCYTES NFR BLD AUTO: 11 % (ref 15–45)
Lab: 145
MCH RBC QN AUTO: 33.6 PG (ref 28–32)
MCHC RBC AUTO-ENTMCNC: 31.8 G/DL (ref 33–36)
MCV RBC AUTO: 105.8 FL (ref 82–98)
MONOCYTES # BLD AUTO: 0.43 10*3/MM3 (ref 0.19–1.3)
MONOCYTES NFR BLD AUTO: 5.5 % (ref 4–12)
NEGATIVE CONTROL: NEGATIVE
NEUTROPHILS # BLD AUTO: 6.22 10*3/MM3 (ref 1.87–8.4)
NEUTROPHILS NFR BLD AUTO: 79.2 % (ref 39–78)
NT-PROBNP SERPL-MCNC: ABNORMAL PG/ML (ref 0–1800)
PLATELET # BLD AUTO: 138 10*3/MM3 (ref 130–400)
PMV BLD AUTO: 9.9 FL (ref 6–12)
POSITIVE CONTROL: POSITIVE
POTASSIUM BLD-SCNC: 4.6 MMOL/L (ref 3.5–5.3)
PROT SERPL-MCNC: 5.7 G/DL (ref 6.3–8.7)
PROTHROMBIN TIME: 15 SECONDS (ref 11.9–14.6)
RBC # BLD AUTO: 2.59 10*6/MM3 (ref 4.2–5.4)
RH BLD: POSITIVE
SODIUM BLD-SCNC: 137 MMOL/L (ref 135–145)
T&S EXPIRATION DATE: NORMAL
TROPONIN I SERPL-MCNC: 0.03 NG/ML (ref 0–0.03)
WBC NRBC COR # BLD: 7.84 10*3/MM3 (ref 4.8–10.8)
WHOLE BLOOD HOLD SPECIMEN: NORMAL
WHOLE BLOOD HOLD SPECIMEN: NORMAL

## 2019-03-24 PROCEDURE — 80053 COMPREHEN METABOLIC PANEL: CPT | Performed by: NURSE PRACTITIONER

## 2019-03-24 PROCEDURE — 86901 BLOOD TYPING SEROLOGIC RH(D): CPT

## 2019-03-24 PROCEDURE — 86923 COMPATIBILITY TEST ELECTRIC: CPT

## 2019-03-24 PROCEDURE — 86900 BLOOD TYPING SEROLOGIC ABO: CPT

## 2019-03-24 PROCEDURE — P9016 RBC LEUKOCYTES REDUCED: HCPCS

## 2019-03-24 PROCEDURE — 85014 HEMATOCRIT: CPT | Performed by: FAMILY MEDICINE

## 2019-03-24 PROCEDURE — 85018 HEMOGLOBIN: CPT | Performed by: FAMILY MEDICINE

## 2019-03-24 PROCEDURE — 93005 ELECTROCARDIOGRAM TRACING: CPT | Performed by: NURSE PRACTITIONER

## 2019-03-24 PROCEDURE — 83690 ASSAY OF LIPASE: CPT | Performed by: NURSE PRACTITIONER

## 2019-03-24 PROCEDURE — 94760 N-INVAS EAR/PLS OXIMETRY 1: CPT

## 2019-03-24 PROCEDURE — 83605 ASSAY OF LACTIC ACID: CPT | Performed by: NURSE PRACTITIONER

## 2019-03-24 PROCEDURE — A9512 TC99M PERTECHNETATE: HCPCS | Performed by: FAMILY MEDICINE

## 2019-03-24 PROCEDURE — 85730 THROMBOPLASTIN TIME PARTIAL: CPT | Performed by: NURSE PRACTITIONER

## 2019-03-24 PROCEDURE — 86850 RBC ANTIBODY SCREEN: CPT

## 2019-03-24 PROCEDURE — 85025 COMPLETE CBC W/AUTO DIFF WBC: CPT | Performed by: NURSE PRACTITIONER

## 2019-03-24 PROCEDURE — 94799 UNLISTED PULMONARY SVC/PX: CPT

## 2019-03-24 PROCEDURE — 93010 ELECTROCARDIOGRAM REPORT: CPT | Performed by: INTERNAL MEDICINE

## 2019-03-24 PROCEDURE — 85610 PROTHROMBIN TIME: CPT | Performed by: NURSE PRACTITIONER

## 2019-03-24 PROCEDURE — 82270 OCCULT BLOOD FECES: CPT | Performed by: NURSE PRACTITIONER

## 2019-03-24 PROCEDURE — 0 SODIUM PERTECHNETATE: Performed by: FAMILY MEDICINE

## 2019-03-24 PROCEDURE — 78278 ACUTE GI BLOOD LOSS IMAGING: CPT

## 2019-03-24 PROCEDURE — 0 TECHNETIUM LABELED RED BLOOD CELLS: Performed by: FAMILY MEDICINE

## 2019-03-24 PROCEDURE — A9560 TC99M LABELED RBC: HCPCS | Performed by: FAMILY MEDICINE

## 2019-03-24 PROCEDURE — 84484 ASSAY OF TROPONIN QUANT: CPT | Performed by: NURSE PRACTITIONER

## 2019-03-24 PROCEDURE — 83880 ASSAY OF NATRIURETIC PEPTIDE: CPT | Performed by: NURSE PRACTITIONER

## 2019-03-24 PROCEDURE — 99284 EMERGENCY DEPT VISIT MOD MDM: CPT

## 2019-03-24 PROCEDURE — 36430 TRANSFUSION BLD/BLD COMPNT: CPT

## 2019-03-24 RX ORDER — FOLIC ACID/VIT B COMPLEX AND C 0.8 MG
1 TABLET ORAL DAILY
Status: DISCONTINUED | OUTPATIENT
Start: 2019-03-24 | End: 2019-04-02 | Stop reason: HOSPADM

## 2019-03-24 RX ORDER — ONDANSETRON 2 MG/ML
4 INJECTION INTRAMUSCULAR; INTRAVENOUS EVERY 6 HOURS PRN
Status: DISCONTINUED | OUTPATIENT
Start: 2019-03-24 | End: 2019-04-02 | Stop reason: HOSPADM

## 2019-03-24 RX ORDER — ATORVASTATIN CALCIUM 10 MG/1
10 TABLET, FILM COATED ORAL DAILY
Status: DISCONTINUED | OUTPATIENT
Start: 2019-03-24 | End: 2019-04-02 | Stop reason: HOSPADM

## 2019-03-24 RX ORDER — CARVEDILOL 3.12 MG/1
3.12 TABLET ORAL 2 TIMES DAILY WITH MEALS
Status: DISCONTINUED | OUTPATIENT
Start: 2019-03-24 | End: 2019-04-02 | Stop reason: HOSPADM

## 2019-03-24 RX ORDER — SODIUM CHLORIDE 0.9 % (FLUSH) 0.9 %
3-10 SYRINGE (ML) INJECTION AS NEEDED
Status: DISCONTINUED | OUTPATIENT
Start: 2019-03-24 | End: 2019-04-02 | Stop reason: HOSPADM

## 2019-03-24 RX ORDER — PANTOPRAZOLE SODIUM 40 MG/10ML
80 INJECTION, POWDER, LYOPHILIZED, FOR SOLUTION INTRAVENOUS ONCE
Status: COMPLETED | OUTPATIENT
Start: 2019-03-24 | End: 2019-03-24

## 2019-03-24 RX ORDER — SODIUM CHLORIDE 0.9 % (FLUSH) 0.9 %
3 SYRINGE (ML) INJECTION EVERY 12 HOURS SCHEDULED
Status: DISCONTINUED | OUTPATIENT
Start: 2019-03-24 | End: 2019-04-02 | Stop reason: HOSPADM

## 2019-03-24 RX ORDER — LEVOTHYROXINE SODIUM 0.15 MG/1
150 TABLET ORAL DAILY
Status: DISCONTINUED | OUTPATIENT
Start: 2019-03-24 | End: 2019-04-02 | Stop reason: HOSPADM

## 2019-03-24 RX ORDER — SODIUM CHLORIDE 0.9 % (FLUSH) 0.9 %
10 SYRINGE (ML) INJECTION AS NEEDED
Status: DISCONTINUED | OUTPATIENT
Start: 2019-03-24 | End: 2019-04-02 | Stop reason: HOSPADM

## 2019-03-24 RX ADMIN — PANTOPRAZOLE SODIUM 80 MG: 40 INJECTION, POWDER, FOR SOLUTION INTRAVENOUS at 11:55

## 2019-03-24 RX ADMIN — SODIUM CHLORIDE, PRESERVATIVE FREE 3 ML: 5 INJECTION INTRAVENOUS at 20:24

## 2019-03-24 RX ADMIN — SODIUM CHLORIDE 8 MG/HR: 900 INJECTION INTRAVENOUS at 11:55

## 2019-03-24 RX ADMIN — SODIUM CHLORIDE 8 MG/HR: 900 INJECTION INTRAVENOUS at 15:54

## 2019-03-24 RX ADMIN — TECHNETIUM TC 99M-LABELED RED BLOOD CELLS 1 DOSE: KIT at 20:35

## 2019-03-24 RX ADMIN — TECHNETIUM TC-99M 26 DOSE: 11 INJECTION, SOLUTION INTRAVENOUS at 20:35

## 2019-03-24 RX ADMIN — SODIUM CHLORIDE 8 MG/HR: 900 INJECTION INTRAVENOUS at 23:58

## 2019-03-25 ENCOUNTER — TELEPHONE (OUTPATIENT)
Dept: GASTROENTEROLOGY | Facility: CLINIC | Age: 79
End: 2019-03-25

## 2019-03-25 LAB
ALBUMIN SERPL-MCNC: 2.8 G/DL (ref 3.5–5)
ALBUMIN/GLOB SERPL: 1 G/DL (ref 1.1–2.5)
ALP SERPL-CCNC: 152 U/L (ref 24–120)
ALT SERPL W P-5'-P-CCNC: 15 U/L (ref 0–54)
ANION GAP SERPL CALCULATED.3IONS-SCNC: 12 MMOL/L (ref 4–13)
ARTICHOKE IGE QN: 50 MG/DL (ref 0–99)
AST SERPL-CCNC: 24 U/L (ref 7–45)
BASOPHILS # BLD AUTO: 0.07 10*3/MM3 (ref 0–0.2)
BASOPHILS NFR BLD AUTO: 1 % (ref 0–2)
BILIRUB SERPL-MCNC: 0.7 MG/DL (ref 0.1–1)
BUN BLD-MCNC: 50 MG/DL (ref 5–21)
BUN/CREAT SERPL: 9.2 (ref 7–25)
CALCIUM SPEC-SCNC: 8.3 MG/DL (ref 8.4–10.4)
CHLORIDE SERPL-SCNC: 99 MMOL/L (ref 98–110)
CHOLEST SERPL-MCNC: 117 MG/DL (ref 130–200)
CO2 SERPL-SCNC: 26 MMOL/L (ref 24–31)
CREAT BLD-MCNC: 5.45 MG/DL (ref 0.5–1.4)
DEPRECATED RDW RBC AUTO: 68.3 FL (ref 40–54)
EOSINOPHIL # BLD AUTO: 0.14 10*3/MM3 (ref 0–0.7)
EOSINOPHIL NFR BLD AUTO: 2 % (ref 0–4)
ERYTHROCYTE [DISTWIDTH] IN BLOOD BY AUTOMATED COUNT: 19.4 % (ref 12–15)
GFR SERPL CREATININE-BSD FRML MDRD: 8 ML/MIN/1.73
GFR SERPL CREATININE-BSD FRML MDRD: ABNORMAL ML/MIN/1.73
GLOBULIN UR ELPH-MCNC: 2.8 GM/DL
GLUCOSE BLD-MCNC: 82 MG/DL (ref 70–100)
HBA1C MFR BLD: 5 %
HCT VFR BLD AUTO: 31.5 % (ref 37–47)
HCT VFR BLD AUTO: 32.7 % (ref 37–47)
HCT VFR BLD AUTO: 33.2 % (ref 37–47)
HDLC SERPL-MCNC: 54 MG/DL
HGB BLD-MCNC: 10.7 G/DL (ref 12–16)
HGB BLD-MCNC: 10.8 G/DL (ref 12–16)
HGB BLD-MCNC: 11.1 G/DL (ref 12–16)
IMM GRANULOCYTES # BLD AUTO: 0.02 10*3/MM3 (ref 0–0.05)
IMM GRANULOCYTES NFR BLD AUTO: 0.3 % (ref 0–5)
LDLC/HDLC SERPL: 0.85 {RATIO}
LYMPHOCYTES # BLD AUTO: 0.9 10*3/MM3 (ref 0.72–4.86)
LYMPHOCYTES NFR BLD AUTO: 12.7 % (ref 15–45)
MCH RBC QN AUTO: 32.7 PG (ref 28–32)
MCHC RBC AUTO-ENTMCNC: 33 G/DL (ref 33–36)
MCV RBC AUTO: 99.1 FL (ref 82–98)
MONOCYTES # BLD AUTO: 0.38 10*3/MM3 (ref 0.19–1.3)
MONOCYTES NFR BLD AUTO: 5.4 % (ref 4–12)
NEUTROPHILS # BLD AUTO: 5.57 10*3/MM3 (ref 1.87–8.4)
NEUTROPHILS NFR BLD AUTO: 78.6 % (ref 39–78)
NRBC BLD AUTO-RTO: 0 /100 WBC (ref 0–0)
PHOSPHATE SERPL-MCNC: 4.5 MG/DL (ref 2.5–4.5)
PLATELET # BLD AUTO: 126 10*3/MM3 (ref 130–400)
PMV BLD AUTO: 10 FL (ref 6–12)
POTASSIUM BLD-SCNC: 5.7 MMOL/L (ref 3.5–5.3)
PROT SERPL-MCNC: 5.6 G/DL (ref 6.3–8.7)
RBC # BLD AUTO: 3.3 10*6/MM3 (ref 4.2–5.4)
SODIUM BLD-SCNC: 137 MMOL/L (ref 135–145)
T4 FREE SERPL-MCNC: 0.62 NG/DL (ref 0.78–2.19)
TRIGL SERPL-MCNC: 85 MG/DL (ref 0–149)
TSH SERPL DL<=0.05 MIU/L-ACNC: 38.9 MIU/ML (ref 0.47–4.68)
WBC NRBC COR # BLD: 7.08 10*3/MM3 (ref 4.8–10.8)

## 2019-03-25 PROCEDURE — 63510000001 EPOETIN ALFA PER 1000 UNITS: Performed by: INTERNAL MEDICINE

## 2019-03-25 PROCEDURE — 83036 HEMOGLOBIN GLYCOSYLATED A1C: CPT | Performed by: FAMILY MEDICINE

## 2019-03-25 PROCEDURE — 84100 ASSAY OF PHOSPHORUS: CPT | Performed by: INTERNAL MEDICINE

## 2019-03-25 PROCEDURE — 80053 COMPREHEN METABOLIC PANEL: CPT | Performed by: FAMILY MEDICINE

## 2019-03-25 PROCEDURE — 85014 HEMATOCRIT: CPT | Performed by: FAMILY MEDICINE

## 2019-03-25 PROCEDURE — 94799 UNLISTED PULMONARY SVC/PX: CPT

## 2019-03-25 PROCEDURE — C1751 CATH, INF, PER/CENT/MIDLINE: HCPCS

## 2019-03-25 PROCEDURE — 84439 ASSAY OF FREE THYROXINE: CPT | Performed by: FAMILY MEDICINE

## 2019-03-25 PROCEDURE — 99222 1ST HOSP IP/OBS MODERATE 55: CPT | Performed by: CLINICAL NURSE SPECIALIST

## 2019-03-25 PROCEDURE — 36410 VNPNXR 3YR/> PHY/QHP DX/THER: CPT

## 2019-03-25 PROCEDURE — 85018 HEMOGLOBIN: CPT | Performed by: FAMILY MEDICINE

## 2019-03-25 PROCEDURE — 80061 LIPID PANEL: CPT | Performed by: FAMILY MEDICINE

## 2019-03-25 PROCEDURE — 85025 COMPLETE CBC W/AUTO DIFF WBC: CPT | Performed by: FAMILY MEDICINE

## 2019-03-25 PROCEDURE — 84443 ASSAY THYROID STIM HORMONE: CPT | Performed by: FAMILY MEDICINE

## 2019-03-25 RX ORDER — OXYCODONE AND ACETAMINOPHEN 10; 325 MG/1; MG/1
1 TABLET ORAL EVERY 8 HOURS PRN
COMMUNITY
End: 2019-04-02 | Stop reason: HOSPADM

## 2019-03-25 RX ORDER — DIPHENHYDRAMINE HCL 25 MG
25 CAPSULE ORAL 2 TIMES DAILY PRN
COMMUNITY
End: 2019-04-02 | Stop reason: HOSPADM

## 2019-03-25 RX ORDER — FOLIC ACID/VIT B COMPLEX AND C 0.8 MG
1 TABLET ORAL DAILY
COMMUNITY

## 2019-03-25 RX ORDER — ACETAMINOPHEN 325 MG/1
650 TABLET ORAL EVERY 6 HOURS PRN
Status: DISCONTINUED | OUTPATIENT
Start: 2019-03-25 | End: 2019-04-02 | Stop reason: HOSPADM

## 2019-03-25 RX ORDER — CARVEDILOL 12.5 MG/1
12.5 TABLET ORAL DAILY
COMMUNITY
End: 2019-07-06 | Stop reason: HOSPADM

## 2019-03-25 RX ORDER — MELATONIN
1000 DAILY
Status: ON HOLD | COMMUNITY
End: 2019-03-25

## 2019-03-25 RX ORDER — CARVEDILOL 12.5 MG/1
12.5 TABLET ORAL 2 TIMES DAILY WITH MEALS
COMMUNITY
End: 2019-07-06 | Stop reason: HOSPADM

## 2019-03-25 RX ORDER — LIDOCAINE HYDROCHLORIDE 10 MG/ML
1 INJECTION, SOLUTION INFILTRATION; PERINEURAL ONCE
Status: COMPLETED | OUTPATIENT
Start: 2019-03-25 | End: 2019-03-25

## 2019-03-25 RX ADMIN — LIDOCAINE HYDROCHLORIDE 1 ML: 10 INJECTION, SOLUTION EPIDURAL; INFILTRATION; INTRACAUDAL; PERINEURAL at 10:33

## 2019-03-25 RX ADMIN — SODIUM CHLORIDE 8 MG/HR: 900 INJECTION INTRAVENOUS at 14:58

## 2019-03-25 RX ADMIN — SODIUM CHLORIDE 8 MG/HR: 900 INJECTION INTRAVENOUS at 20:32

## 2019-03-25 RX ADMIN — ATORVASTATIN CALCIUM 10 MG: 10 TABLET, FILM COATED ORAL at 14:58

## 2019-03-25 RX ADMIN — SERTRALINE 50 MG: 50 TABLET, FILM COATED ORAL at 14:58

## 2019-03-25 RX ADMIN — LEVOTHYROXINE SODIUM 150 MCG: 150 TABLET ORAL at 14:58

## 2019-03-25 RX ADMIN — SODIUM CHLORIDE 8 MG/HR: 900 INJECTION INTRAVENOUS at 09:51

## 2019-03-25 RX ADMIN — SODIUM CHLORIDE, PRESERVATIVE FREE 3 ML: 5 INJECTION INTRAVENOUS at 20:00

## 2019-03-25 RX ADMIN — ERYTHROPOIETIN 10000 UNITS: 10000 INJECTION, SOLUTION INTRAVENOUS; SUBCUTANEOUS at 15:02

## 2019-03-25 RX ADMIN — Medication 1 TABLET: at 14:58

## 2019-03-25 RX ADMIN — ACETAMINOPHEN 650 MG: 325 TABLET, FILM COATED ORAL at 19:57

## 2019-03-25 RX ADMIN — SODIUM CHLORIDE 8 MG/HR: 900 INJECTION INTRAVENOUS at 05:07

## 2019-03-25 RX ADMIN — SODIUM CHLORIDE 8 MG/HR: 900 INJECTION INTRAVENOUS at 19:36

## 2019-03-25 NOTE — TELEPHONE ENCOUNTER
----- Message from ABILIO Rae sent at 3/22/2019  6:27 PM CDT -----  Ok to hold Plavix, but remain on Aspirin during that time.  Thanks.  ----- Message -----  From: Jelena Lala MA  Sent: 3/1/2019   4:35 PM  To: Tomasz San, DO

## 2019-03-26 LAB
ABO + RH BLD: NORMAL
ABO + RH BLD: NORMAL
ALBUMIN SERPL-MCNC: 2.3 G/DL (ref 3.5–5)
ALBUMIN/GLOB SERPL: 1 G/DL (ref 1.1–2.5)
ALP SERPL-CCNC: 126 U/L (ref 24–120)
ALT SERPL W P-5'-P-CCNC: 19 U/L (ref 0–54)
ANION GAP SERPL CALCULATED.3IONS-SCNC: 7 MMOL/L (ref 4–13)
ANISOCYTOSIS BLD QL: ABNORMAL
AST SERPL-CCNC: 22 U/L (ref 7–45)
BASOPHILS # BLD MANUAL: 0.07 10*3/MM3 (ref 0–0.2)
BASOPHILS NFR BLD AUTO: 1 % (ref 0–2)
BH BB BLOOD EXPIRATION DATE: NORMAL
BH BB BLOOD EXPIRATION DATE: NORMAL
BH BB BLOOD TYPE BARCODE: 6200
BH BB BLOOD TYPE BARCODE: 6200
BH BB DISPENSE STATUS: NORMAL
BH BB DISPENSE STATUS: NORMAL
BH BB PRODUCT CODE: NORMAL
BH BB PRODUCT CODE: NORMAL
BH BB UNIT NUMBER: NORMAL
BH BB UNIT NUMBER: NORMAL
BILIRUB SERPL-MCNC: 0.6 MG/DL (ref 0.1–1)
BUN BLD-MCNC: 25 MG/DL (ref 5–21)
BUN/CREAT SERPL: 8.1 (ref 7–25)
CALCIUM SPEC-SCNC: 7.9 MG/DL (ref 8.4–10.4)
CHLORIDE SERPL-SCNC: 98 MMOL/L (ref 98–110)
CO2 SERPL-SCNC: 31 MMOL/L (ref 24–31)
CREAT BLD-MCNC: 3.09 MG/DL (ref 0.5–1.4)
DEPRECATED RDW RBC AUTO: 67.4 FL (ref 40–54)
EOSINOPHIL # BLD MANUAL: 0.29 10*3/MM3 (ref 0–0.7)
EOSINOPHIL NFR BLD MANUAL: 4 % (ref 0–4)
ERYTHROCYTE [DISTWIDTH] IN BLOOD BY AUTOMATED COUNT: 19.5 % (ref 12–15)
GFR SERPL CREATININE-BSD FRML MDRD: 15 ML/MIN/1.73
GLOBULIN UR ELPH-MCNC: 2.3 GM/DL
GLUCOSE BLD-MCNC: 78 MG/DL (ref 70–100)
HCT VFR BLD AUTO: 29.2 % (ref 37–47)
HCT VFR BLD AUTO: 29.8 % (ref 37–47)
HCT VFR BLD AUTO: 29.9 % (ref 37–47)
HCT VFR BLD AUTO: 32.5 % (ref 37–47)
HGB BLD-MCNC: 10.7 G/DL (ref 12–16)
HGB BLD-MCNC: 9.6 G/DL (ref 12–16)
HGB BLD-MCNC: 9.8 G/DL (ref 12–16)
HGB BLD-MCNC: 9.9 G/DL (ref 12–16)
LYMPHOCYTES # BLD MANUAL: 0.5 10*3/MM3 (ref 0.72–4.86)
LYMPHOCYTES NFR BLD MANUAL: 2 % (ref 4–12)
LYMPHOCYTES NFR BLD MANUAL: 7 % (ref 15–45)
MACROCYTES BLD QL SMEAR: ABNORMAL
MCH RBC QN AUTO: 32.4 PG (ref 28–32)
MCHC RBC AUTO-ENTMCNC: 33.2 G/DL (ref 33–36)
MCV RBC AUTO: 97.4 FL (ref 82–98)
MONOCYTES # BLD AUTO: 0.14 10*3/MM3 (ref 0.19–1.3)
NEUTROPHILS # BLD AUTO: 6.06 10*3/MM3 (ref 1.87–8.4)
NEUTROPHILS NFR BLD MANUAL: 82 % (ref 39–78)
NEUTS BAND NFR BLD MANUAL: 2 % (ref 0–10)
NEUTS VAC BLD QL SMEAR: ABNORMAL
PLATELET # BLD AUTO: 81 10*3/MM3 (ref 130–400)
PMV BLD AUTO: 10.4 FL (ref 6–12)
POLYCHROMASIA BLD QL SMEAR: ABNORMAL
POTASSIUM BLD-SCNC: 4.3 MMOL/L (ref 3.5–5.3)
PROT SERPL-MCNC: 4.6 G/DL (ref 6.3–8.7)
RBC # BLD AUTO: 3.06 10*6/MM3 (ref 4.2–5.4)
SMALL PLATELETS BLD QL SMEAR: ABNORMAL
SODIUM BLD-SCNC: 136 MMOL/L (ref 135–145)
UNIT  ABO: NORMAL
UNIT  ABO: NORMAL
UNIT  RH: NORMAL
UNIT  RH: NORMAL
VARIANT LYMPHS NFR BLD MANUAL: 2 % (ref 0–5)
WBC NRBC COR # BLD: 7.21 10*3/MM3 (ref 4.8–10.8)

## 2019-03-26 PROCEDURE — 99232 SBSQ HOSP IP/OBS MODERATE 35: CPT | Performed by: CLINICAL NURSE SPECIALIST

## 2019-03-26 PROCEDURE — 85018 HEMOGLOBIN: CPT | Performed by: FAMILY MEDICINE

## 2019-03-26 PROCEDURE — 97162 PT EVAL MOD COMPLEX 30 MIN: CPT

## 2019-03-26 PROCEDURE — 85025 COMPLETE CBC W/AUTO DIFF WBC: CPT | Performed by: FAMILY MEDICINE

## 2019-03-26 PROCEDURE — 80053 COMPREHEN METABOLIC PANEL: CPT | Performed by: FAMILY MEDICINE

## 2019-03-26 PROCEDURE — 97165 OT EVAL LOW COMPLEX 30 MIN: CPT | Performed by: OCCUPATIONAL THERAPIST

## 2019-03-26 PROCEDURE — 85014 HEMATOCRIT: CPT | Performed by: FAMILY MEDICINE

## 2019-03-26 PROCEDURE — 85007 BL SMEAR W/DIFF WBC COUNT: CPT | Performed by: FAMILY MEDICINE

## 2019-03-26 RX ADMIN — CARVEDILOL 3.12 MG: 3.12 TABLET, FILM COATED ORAL at 17:35

## 2019-03-26 RX ADMIN — SODIUM CHLORIDE 8 MG/HR: 900 INJECTION INTRAVENOUS at 01:07

## 2019-03-26 RX ADMIN — SODIUM CHLORIDE 8 MG/HR: 900 INJECTION INTRAVENOUS at 22:13

## 2019-03-26 RX ADMIN — SODIUM CHLORIDE, PRESERVATIVE FREE 3 ML: 5 INJECTION INTRAVENOUS at 20:24

## 2019-03-26 RX ADMIN — SODIUM CHLORIDE 8 MG/HR: 900 INJECTION INTRAVENOUS at 12:09

## 2019-03-26 RX ADMIN — ATORVASTATIN CALCIUM 10 MG: 10 TABLET, FILM COATED ORAL at 08:38

## 2019-03-26 RX ADMIN — LEVOTHYROXINE SODIUM 150 MCG: 150 TABLET ORAL at 08:39

## 2019-03-26 RX ADMIN — SODIUM CHLORIDE 8 MG/HR: 900 INJECTION INTRAVENOUS at 06:10

## 2019-03-26 RX ADMIN — Medication 1 TABLET: at 08:39

## 2019-03-26 RX ADMIN — SODIUM CHLORIDE 8 MG/HR: 900 INJECTION INTRAVENOUS at 16:40

## 2019-03-26 RX ADMIN — SERTRALINE 50 MG: 50 TABLET, FILM COATED ORAL at 08:39

## 2019-03-26 RX ADMIN — SODIUM CHLORIDE, PRESERVATIVE FREE 3 ML: 5 INJECTION INTRAVENOUS at 08:41

## 2019-03-26 RX ADMIN — CARVEDILOL 3.12 MG: 3.12 TABLET, FILM COATED ORAL at 08:38

## 2019-03-27 ENCOUNTER — ANESTHESIA (OUTPATIENT)
Dept: GASTROENTEROLOGY | Facility: HOSPITAL | Age: 79
End: 2019-03-27

## 2019-03-27 ENCOUNTER — ANESTHESIA EVENT (OUTPATIENT)
Dept: GASTROENTEROLOGY | Facility: HOSPITAL | Age: 79
End: 2019-03-27

## 2019-03-27 LAB
ALBUMIN SERPL-MCNC: 2.4 G/DL (ref 3.5–5)
ALBUMIN/GLOB SERPL: 1 G/DL (ref 1.1–2.5)
ALP SERPL-CCNC: 127 U/L (ref 24–120)
ALT SERPL W P-5'-P-CCNC: <15 U/L (ref 0–54)
ANION GAP SERPL CALCULATED.3IONS-SCNC: 9 MMOL/L (ref 4–13)
AST SERPL-CCNC: 21 U/L (ref 7–45)
BASOPHILS # BLD AUTO: 0.04 10*3/MM3 (ref 0–0.2)
BASOPHILS NFR BLD AUTO: 0.7 % (ref 0–2)
BILIRUB SERPL-MCNC: 0.5 MG/DL (ref 0.1–1)
BUN BLD-MCNC: 34 MG/DL (ref 5–21)
BUN/CREAT SERPL: 8.5 (ref 7–25)
CALCIUM SPEC-SCNC: 7.8 MG/DL (ref 8.4–10.4)
CHLORIDE SERPL-SCNC: 98 MMOL/L (ref 98–110)
CO2 SERPL-SCNC: 29 MMOL/L (ref 24–31)
CREAT BLD-MCNC: 4.02 MG/DL (ref 0.5–1.4)
DEPRECATED RDW RBC AUTO: 67 FL (ref 40–54)
EOSINOPHIL # BLD AUTO: 0.32 10*3/MM3 (ref 0–0.7)
EOSINOPHIL NFR BLD AUTO: 5.3 % (ref 0–4)
ERYTHROCYTE [DISTWIDTH] IN BLOOD BY AUTOMATED COUNT: 18.8 % (ref 12–15)
GFR SERPL CREATININE-BSD FRML MDRD: 11 ML/MIN/1.73
GFR SERPL CREATININE-BSD FRML MDRD: ABNORMAL ML/MIN/1.73
GLOBULIN UR ELPH-MCNC: 2.4 GM/DL
GLUCOSE BLD-MCNC: 83 MG/DL (ref 70–100)
HCT VFR BLD AUTO: 29.2 % (ref 37–47)
HCT VFR BLD AUTO: 30 % (ref 37–47)
HCT VFR BLD AUTO: 31.4 % (ref 37–47)
HCT VFR BLD AUTO: 32.4 % (ref 37–47)
HGB BLD-MCNC: 10.4 G/DL (ref 12–16)
HGB BLD-MCNC: 10.7 G/DL (ref 12–16)
HGB BLD-MCNC: 9.6 G/DL (ref 12–16)
HGB BLD-MCNC: 9.8 G/DL (ref 12–16)
IMM GRANULOCYTES # BLD AUTO: 0.02 10*3/MM3 (ref 0–0.05)
IMM GRANULOCYTES NFR BLD AUTO: 0.3 % (ref 0–5)
LYMPHOCYTES # BLD AUTO: 0.74 10*3/MM3 (ref 0.72–4.86)
LYMPHOCYTES NFR BLD AUTO: 12.2 % (ref 15–45)
MCH RBC QN AUTO: 32.4 PG (ref 28–32)
MCHC RBC AUTO-ENTMCNC: 32.9 G/DL (ref 33–36)
MCV RBC AUTO: 98.6 FL (ref 82–98)
MONOCYTES # BLD AUTO: 0.44 10*3/MM3 (ref 0.19–1.3)
MONOCYTES NFR BLD AUTO: 7.2 % (ref 4–12)
NEUTROPHILS # BLD AUTO: 4.52 10*3/MM3 (ref 1.87–8.4)
NEUTROPHILS NFR BLD AUTO: 74.3 % (ref 39–78)
NRBC BLD AUTO-RTO: 0 /100 WBC (ref 0–0)
PLATELET # BLD AUTO: 102 10*3/MM3 (ref 130–400)
PMV BLD AUTO: 10.4 FL (ref 6–12)
POTASSIUM BLD-SCNC: 3.8 MMOL/L (ref 3.5–5.3)
PROT SERPL-MCNC: 4.8 G/DL (ref 6.3–8.7)
RBC # BLD AUTO: 2.96 10*6/MM3 (ref 4.2–5.4)
SODIUM BLD-SCNC: 136 MMOL/L (ref 135–145)
WBC NRBC COR # BLD: 6.08 10*3/MM3 (ref 4.8–10.8)

## 2019-03-27 PROCEDURE — 25010000002 ONDANSETRON PER 1 MG: Performed by: FAMILY MEDICINE

## 2019-03-27 PROCEDURE — 85014 HEMATOCRIT: CPT | Performed by: FAMILY MEDICINE

## 2019-03-27 PROCEDURE — 63510000001 EPOETIN ALFA PER 1000 UNITS: Performed by: INTERNAL MEDICINE

## 2019-03-27 PROCEDURE — 43235 EGD DIAGNOSTIC BRUSH WASH: CPT | Performed by: INTERNAL MEDICINE

## 2019-03-27 PROCEDURE — 83735 ASSAY OF MAGNESIUM: CPT | Performed by: INTERNAL MEDICINE

## 2019-03-27 PROCEDURE — 85025 COMPLETE CBC W/AUTO DIFF WBC: CPT | Performed by: INTERNAL MEDICINE

## 2019-03-27 PROCEDURE — 85018 HEMOGLOBIN: CPT | Performed by: FAMILY MEDICINE

## 2019-03-27 PROCEDURE — 80053 COMPREHEN METABOLIC PANEL: CPT | Performed by: INTERNAL MEDICINE

## 2019-03-27 PROCEDURE — 94760 N-INVAS EAR/PLS OXIMETRY 1: CPT

## 2019-03-27 PROCEDURE — 0DJ08ZZ INSPECTION OF UPPER INTESTINAL TRACT, VIA NATURAL OR ARTIFICIAL OPENING ENDOSCOPIC: ICD-10-PCS | Performed by: INTERNAL MEDICINE

## 2019-03-27 PROCEDURE — 25010000002 PROPOFOL 10 MG/ML EMULSION: Performed by: NURSE ANESTHETIST, CERTIFIED REGISTERED

## 2019-03-27 PROCEDURE — 94799 UNLISTED PULMONARY SVC/PX: CPT

## 2019-03-27 RX ORDER — LIDOCAINE HYDROCHLORIDE 20 MG/ML
INJECTION, SOLUTION INFILTRATION; PERINEURAL AS NEEDED
Status: DISCONTINUED | OUTPATIENT
Start: 2019-03-27 | End: 2019-03-27 | Stop reason: SURG

## 2019-03-27 RX ORDER — SODIUM CHLORIDE 9 MG/ML
INJECTION, SOLUTION INTRAVENOUS CONTINUOUS PRN
Status: DISCONTINUED | OUTPATIENT
Start: 2019-03-27 | End: 2019-03-27 | Stop reason: SURG

## 2019-03-27 RX ORDER — PROPOFOL 10 MG/ML
VIAL (ML) INTRAVENOUS AS NEEDED
Status: DISCONTINUED | OUTPATIENT
Start: 2019-03-27 | End: 2019-03-27 | Stop reason: SURG

## 2019-03-27 RX ADMIN — SODIUM CHLORIDE 8 MG/HR: 900 INJECTION INTRAVENOUS at 08:22

## 2019-03-27 RX ADMIN — PROPOFOL 100 MG: 10 INJECTION, EMULSION INTRAVENOUS at 09:30

## 2019-03-27 RX ADMIN — SODIUM CHLORIDE, PRESERVATIVE FREE 3 ML: 5 INJECTION INTRAVENOUS at 08:16

## 2019-03-27 RX ADMIN — SODIUM CHLORIDE 8 MG/HR: 900 INJECTION INTRAVENOUS at 13:19

## 2019-03-27 RX ADMIN — SERTRALINE 50 MG: 50 TABLET, FILM COATED ORAL at 08:15

## 2019-03-27 RX ADMIN — POLYETHYLENE GLYCOL 3350, SODIUM SULFATE ANHYDROUS, SODIUM BICARBONATE, SODIUM CHLORIDE, POTASSIUM CHLORIDE 3000 ML: 236; 22.74; 6.74; 5.86; 2.97 POWDER, FOR SOLUTION ORAL at 17:29

## 2019-03-27 RX ADMIN — LEVOTHYROXINE SODIUM 150 MCG: 150 TABLET ORAL at 08:15

## 2019-03-27 RX ADMIN — CARVEDILOL 3.12 MG: 3.12 TABLET, FILM COATED ORAL at 17:59

## 2019-03-27 RX ADMIN — ATORVASTATIN CALCIUM 10 MG: 10 TABLET, FILM COATED ORAL at 08:15

## 2019-03-27 RX ADMIN — SODIUM CHLORIDE, PRESERVATIVE FREE 3 ML: 5 INJECTION INTRAVENOUS at 20:03

## 2019-03-27 RX ADMIN — SODIUM CHLORIDE: 9 INJECTION, SOLUTION INTRAVENOUS at 09:24

## 2019-03-27 RX ADMIN — SODIUM CHLORIDE 8 MG/HR: 900 INJECTION INTRAVENOUS at 02:52

## 2019-03-27 RX ADMIN — Medication 1 TABLET: at 08:15

## 2019-03-27 RX ADMIN — SODIUM CHLORIDE 8 MG/HR: 900 INJECTION INTRAVENOUS at 17:28

## 2019-03-27 RX ADMIN — SODIUM CHLORIDE 8 MG/HR: 900 INJECTION INTRAVENOUS at 23:09

## 2019-03-27 RX ADMIN — LIDOCAINE HYDROCHLORIDE 100 MG: 20 INJECTION, SOLUTION INFILTRATION; PERINEURAL at 09:30

## 2019-03-27 RX ADMIN — CARVEDILOL 3.12 MG: 3.12 TABLET, FILM COATED ORAL at 08:15

## 2019-03-27 RX ADMIN — ONDANSETRON 4 MG: 2 INJECTION INTRAMUSCULAR; INTRAVENOUS at 20:42

## 2019-03-27 RX ADMIN — ERYTHROPOIETIN 10000 UNITS: 10000 INJECTION, SOLUTION INTRAVENOUS; SUBCUTANEOUS at 08:15

## 2019-03-27 RX ADMIN — ACETAMINOPHEN 650 MG: 325 TABLET, FILM COATED ORAL at 02:51

## 2019-03-27 NOTE — ANESTHESIA PREPROCEDURE EVALUATION
Anesthesia Evaluation     Patient summary reviewed and Nursing notes reviewed   NPO Solid Status: > 8 hours  NPO Liquid Status: > 8 hours           Airway   Mallampati: II  TM distance: >3 FB  Neck ROM: full  No difficulty expected  Dental    (+) edentulous    Pulmonary - normal exam   (+) pneumonia ,   Cardiovascular - normal exam  Exercise tolerance: poor (<4 METS)    ECG reviewed  Patient on routine beta blocker and Beta blocker given within 24 hours of surgery    (+) hypertension, CAD, CABG, CHF, PVD, hyperlipidemia,  carotid artery disease      Neuro/Psych  GI/Hepatic/Renal/Endo    (+)  GI bleeding, renal disease dialysis, hypothyroidism,     ROS Comment: MWF dialysis    Musculoskeletal     Abdominal  - normal exam   Substance History      OB/GYN          Other   (+) arthritis                   Anesthesia Plan    ASA 4 - emergent     MAC     intravenous induction   Anesthetic plan, all risks, benefits, and alternatives have been provided, discussed and informed consent has been obtained with: patient.  Use of blood products discussed with patient .

## 2019-03-27 NOTE — ANESTHESIA POSTPROCEDURE EVALUATION
"Patient: Cheri Ely    Procedure Summary     Date:  03/27/19 Room / Location:  [facility] ENDOSCOPY 4 / [facility]    Anesthesia Start:  0924 Anesthesia Stop:  0948    Procedure:  ESOPHAGOGASTRODUODENOSCOPY WITH ANESTHESIA (N/A ) Diagnosis:       Gastrointestinal hemorrhage, unspecified gastrointestinal hemorrhage type      (Gastrointestinal hemorrhage, unspecified gastrointestinal hemorrhage type [K92.2])    Surgeon:  Rafita Merida MD Provider:  Flaco Rebolledo CRNA    Anesthesia Type:  MAC ASA Status:  4          Anesthesia Type: MAC  Last vitals  BP   117/56 (03/27/19 0700)   Temp   98 °F (36.7 °C) (03/27/19 0400)   Pulse   91 (03/27/19 0700)   Resp   20 (03/27/19 0700)     SpO2   95 % (03/27/19 0700)     Post Anesthesia Care and Evaluation    Patient location during evaluation: PACU  Patient participation: complete - patient participated  Level of consciousness: awake and alert  Pain score: 0  Pain management: adequate  Airway patency: patent  Anesthetic complications: No anesthetic complications  PONV Status: none  Cardiovascular status: acceptable  Respiratory status: acceptable  Hydration status: acceptable    Comments: Blood pressure 117/56, pulse 91, temperature 98 °F (36.7 °C), resp. rate 20, height 149.9 cm (59\"), weight 75.5 kg (166 lb 8 oz), SpO2 95 %, not currently breastfeeding.    Pt discharged from PACU based on eneida score >8      "

## 2019-03-28 ENCOUNTER — ANESTHESIA EVENT (OUTPATIENT)
Dept: GASTROENTEROLOGY | Facility: HOSPITAL | Age: 79
End: 2019-03-28

## 2019-03-28 ENCOUNTER — ANESTHESIA (OUTPATIENT)
Dept: GASTROENTEROLOGY | Facility: HOSPITAL | Age: 79
End: 2019-03-28

## 2019-03-28 ENCOUNTER — APPOINTMENT (OUTPATIENT)
Dept: GENERAL RADIOLOGY | Facility: HOSPITAL | Age: 79
End: 2019-03-28

## 2019-03-28 LAB
ANION GAP SERPL CALCULATED.3IONS-SCNC: 8 MMOL/L (ref 4–13)
BUN BLD-MCNC: 16 MG/DL (ref 5–21)
BUN/CREAT SERPL: 5.8 (ref 7–25)
CALCIUM SPEC-SCNC: 7.7 MG/DL (ref 8.4–10.4)
CHLORIDE SERPL-SCNC: 95 MMOL/L (ref 98–110)
CO2 SERPL-SCNC: 35 MMOL/L (ref 24–31)
CREAT BLD-MCNC: 2.75 MG/DL (ref 0.5–1.4)
GFR SERPL CREATININE-BSD FRML MDRD: 17 ML/MIN/1.73
GLUCOSE BLD-MCNC: 89 MG/DL (ref 70–100)
HCT VFR BLD AUTO: 28 % (ref 37–47)
HCT VFR BLD AUTO: 31 % (ref 37–47)
HCT VFR BLD AUTO: 31 % (ref 37–47)
HCT VFR BLD AUTO: 31.7 % (ref 37–47)
HGB BLD-MCNC: 10 G/DL (ref 12–16)
HGB BLD-MCNC: 10 G/DL (ref 12–16)
HGB BLD-MCNC: 10.1 G/DL (ref 12–16)
HGB BLD-MCNC: 9.2 G/DL (ref 12–16)
MAGNESIUM SERPL-MCNC: 1.7 MG/DL (ref 1.4–2.2)
POTASSIUM BLD-SCNC: 4.1 MMOL/L (ref 3.5–5.3)
SODIUM BLD-SCNC: 138 MMOL/L (ref 135–145)

## 2019-03-28 PROCEDURE — 25010000002 PROPOFOL 10 MG/ML EMULSION: Performed by: NURSE ANESTHETIST, CERTIFIED REGISTERED

## 2019-03-28 PROCEDURE — 0DBL8ZZ EXCISION OF TRANSVERSE COLON, VIA NATURAL OR ARTIFICIAL OPENING ENDOSCOPIC: ICD-10-PCS | Performed by: INTERNAL MEDICINE

## 2019-03-28 PROCEDURE — 45380 COLONOSCOPY AND BIOPSY: CPT | Performed by: INTERNAL MEDICINE

## 2019-03-28 PROCEDURE — 85018 HEMOGLOBIN: CPT | Performed by: FAMILY MEDICINE

## 2019-03-28 PROCEDURE — 71045 X-RAY EXAM CHEST 1 VIEW: CPT

## 2019-03-28 PROCEDURE — 94760 N-INVAS EAR/PLS OXIMETRY 1: CPT

## 2019-03-28 PROCEDURE — 94799 UNLISTED PULMONARY SVC/PX: CPT

## 2019-03-28 PROCEDURE — 88305 TISSUE EXAM BY PATHOLOGIST: CPT | Performed by: INTERNAL MEDICINE

## 2019-03-28 PROCEDURE — 97530 THERAPEUTIC ACTIVITIES: CPT

## 2019-03-28 PROCEDURE — 85014 HEMATOCRIT: CPT | Performed by: FAMILY MEDICINE

## 2019-03-28 PROCEDURE — 97110 THERAPEUTIC EXERCISES: CPT

## 2019-03-28 RX ORDER — SODIUM CHLORIDE 9 MG/ML
INJECTION, SOLUTION INTRAVENOUS CONTINUOUS PRN
Status: DISCONTINUED | OUTPATIENT
Start: 2019-03-28 | End: 2019-03-28 | Stop reason: SURG

## 2019-03-28 RX ORDER — PROPOFOL 10 MG/ML
VIAL (ML) INTRAVENOUS AS NEEDED
Status: DISCONTINUED | OUTPATIENT
Start: 2019-03-28 | End: 2019-03-28 | Stop reason: SURG

## 2019-03-28 RX ORDER — LIDOCAINE HYDROCHLORIDE 20 MG/ML
INJECTION, SOLUTION INFILTRATION; PERINEURAL AS NEEDED
Status: DISCONTINUED | OUTPATIENT
Start: 2019-03-28 | End: 2019-03-28 | Stop reason: SURG

## 2019-03-28 RX ADMIN — SODIUM CHLORIDE 8 MG/HR: 900 INJECTION INTRAVENOUS at 08:14

## 2019-03-28 RX ADMIN — SODIUM CHLORIDE 8 MG/HR: 900 INJECTION INTRAVENOUS at 13:40

## 2019-03-28 RX ADMIN — CARVEDILOL 3.12 MG: 3.12 TABLET, FILM COATED ORAL at 17:10

## 2019-03-28 RX ADMIN — LIDOCAINE HYDROCHLORIDE 60 MG: 20 INJECTION, SOLUTION INFILTRATION; PERINEURAL at 10:59

## 2019-03-28 RX ADMIN — LEVOTHYROXINE SODIUM 150 MCG: 150 TABLET ORAL at 08:15

## 2019-03-28 RX ADMIN — POLYETHYLENE GLYCOL 3350, SODIUM SULFATE ANHYDROUS, SODIUM BICARBONATE, SODIUM CHLORIDE, POTASSIUM CHLORIDE 1000 ML: 236; 22.74; 6.74; 5.86; 2.97 POWDER, FOR SOLUTION ORAL at 04:58

## 2019-03-28 RX ADMIN — PROPOFOL 120 MG: 10 INJECTION, EMULSION INTRAVENOUS at 10:59

## 2019-03-28 RX ADMIN — SODIUM CHLORIDE 8 MG/HR: 900 INJECTION INTRAVENOUS at 03:20

## 2019-03-28 RX ADMIN — SODIUM CHLORIDE 8 MG/HR: 900 INJECTION INTRAVENOUS at 18:38

## 2019-03-28 RX ADMIN — CARVEDILOL 3.12 MG: 3.12 TABLET, FILM COATED ORAL at 08:15

## 2019-03-28 RX ADMIN — ATORVASTATIN CALCIUM 10 MG: 10 TABLET, FILM COATED ORAL at 08:15

## 2019-03-28 RX ADMIN — SODIUM CHLORIDE, PRESERVATIVE FREE 3 ML: 5 INJECTION INTRAVENOUS at 08:15

## 2019-03-28 RX ADMIN — SODIUM CHLORIDE: 9 INJECTION, SOLUTION INTRAVENOUS at 10:51

## 2019-03-28 RX ADMIN — SERTRALINE 50 MG: 50 TABLET, FILM COATED ORAL at 08:15

## 2019-03-28 RX ADMIN — Medication 1 TABLET: at 08:15

## 2019-03-28 NOTE — ANESTHESIA PREPROCEDURE EVALUATION
Anesthesia Evaluation     Patient summary reviewed and Nursing notes reviewed   no history of anesthetic complications:  NPO Solid Status: > 8 hours  NPO Liquid Status: > 8 hours           Airway   Mallampati: II  TM distance: >3 FB  Neck ROM: full  No difficulty expected  Dental    (+) edentulous    Pulmonary - normal exam   (+) pneumonia ,   Cardiovascular - normal exam  Exercise tolerance: poor (<4 METS)    ECG reviewed  Patient on routine beta blocker and Beta blocker given within 24 hours of surgery    (+) hypertension, CAD, CABG, CHF, PVD, hyperlipidemia,  carotid artery disease      Neuro/Psych- negative ROS  GI/Hepatic/Renal/Endo    (+)  GI bleeding, renal disease (dialysis 3/27/19) dialysis, hypothyroidism,     ROS Comment: MWF dialysis    Musculoskeletal     Abdominal  - normal exam   Substance History      OB/GYN          Other   (+) arthritis                     Anesthesia Plan    ASA 3     general     intravenous induction   Anesthetic plan, all risks, benefits, and alternatives have been provided, discussed and informed consent has been obtained with: patient.  Use of blood products discussed with patient .

## 2019-03-28 NOTE — ANESTHESIA POSTPROCEDURE EVALUATION
"Patient: Cheri Ely    Procedure Summary     Date:  03/28/19 Room / Location:  [facility] ENDOSCOPY 2 /  [facility]    Anesthesia Start:  1051 Anesthesia Stop:  1115    Procedure:  COLONOSCOPY WITH ANESTHESIA (N/A ) Diagnosis:       Gastrointestinal hemorrhage, unspecified gastrointestinal hemorrhage type      (Gastrointestinal hemorrhage, unspecified gastrointestinal hemorrhage type [K92.2])    Surgeon:  Rafita Merida MD Provider:  Rahel Cyr CRNA    Anesthesia Type:  general ASA Status:  3          Anesthesia Type: general  Last vitals  BP   104/78 (03/28/19 0930)   Temp   98.6 °F (37 °C) (03/28/19 0728)   Pulse   95 (03/28/19 0940)   Resp   20 (03/28/19 0805)     SpO2   93 % (03/28/19 0940)     Post Anesthesia Care and Evaluation    Patient location during evaluation: PACU  Patient participation: complete - patient participated  Level of consciousness: awake and alert  Pain management: adequate  Airway patency: patent  Anesthetic complications: No anesthetic complications    Cardiovascular status: acceptable  Respiratory status: acceptable  Hydration status: acceptable    Comments: Blood pressure 104/78, pulse 95, temperature 98.6 °F (37 °C), temperature source Oral, resp. rate 20, height 149.9 cm (59\"), weight 73.3 kg (161 lb 8 oz), SpO2 93 %, not currently breastfeeding.    Pt discharged from PACU based on eneida score >8      "

## 2019-03-29 LAB
CYTO UR: NORMAL
HCT VFR BLD AUTO: 28.8 % (ref 37–47)
HCT VFR BLD AUTO: 30.2 % (ref 37–47)
HGB BLD-MCNC: 9.4 G/DL (ref 12–16)
HGB BLD-MCNC: 9.8 G/DL (ref 12–16)
LAB AP CASE REPORT: NORMAL
PATH REPORT.FINAL DX SPEC: NORMAL
PATH REPORT.GROSS SPEC: NORMAL

## 2019-03-29 PROCEDURE — 85018 HEMOGLOBIN: CPT | Performed by: FAMILY MEDICINE

## 2019-03-29 PROCEDURE — 99232 SBSQ HOSP IP/OBS MODERATE 35: CPT | Performed by: NURSE PRACTITIONER

## 2019-03-29 PROCEDURE — 85014 HEMATOCRIT: CPT | Performed by: FAMILY MEDICINE

## 2019-03-29 PROCEDURE — 97110 THERAPEUTIC EXERCISES: CPT

## 2019-03-29 PROCEDURE — 63510000001 EPOETIN ALFA PER 1000 UNITS: Performed by: INTERNAL MEDICINE

## 2019-03-29 PROCEDURE — 97530 THERAPEUTIC ACTIVITIES: CPT

## 2019-03-29 RX ORDER — MAGNESIUM CARB/ALUMINUM HYDROX 105-160MG
296 TABLET,CHEWABLE ORAL ONCE
Status: COMPLETED | OUTPATIENT
Start: 2019-03-29 | End: 2019-03-29

## 2019-03-29 RX ADMIN — SODIUM CHLORIDE 8 MG/HR: 900 INJECTION INTRAVENOUS at 04:43

## 2019-03-29 RX ADMIN — SODIUM CHLORIDE, PRESERVATIVE FREE 3 ML: 5 INJECTION INTRAVENOUS at 13:31

## 2019-03-29 RX ADMIN — ATORVASTATIN CALCIUM 10 MG: 10 TABLET, FILM COATED ORAL at 13:32

## 2019-03-29 RX ADMIN — SODIUM CHLORIDE 8 MG/HR: 900 INJECTION INTRAVENOUS at 20:06

## 2019-03-29 RX ADMIN — SODIUM CHLORIDE 8 MG/HR: 900 INJECTION INTRAVENOUS at 14:50

## 2019-03-29 RX ADMIN — LEVOTHYROXINE SODIUM 150 MCG: 150 TABLET ORAL at 13:29

## 2019-03-29 RX ADMIN — Medication 1 TABLET: at 13:29

## 2019-03-29 RX ADMIN — SODIUM CHLORIDE, PRESERVATIVE FREE 3 ML: 5 INJECTION INTRAVENOUS at 20:57

## 2019-03-29 RX ADMIN — ERYTHROPOIETIN 10000 UNITS: 10000 INJECTION, SOLUTION INTRAVENOUS; SUBCUTANEOUS at 14:49

## 2019-03-29 RX ADMIN — Medication 296 ML: at 18:01

## 2019-03-29 RX ADMIN — SERTRALINE 50 MG: 50 TABLET, FILM COATED ORAL at 13:30

## 2019-03-29 RX ADMIN — SODIUM CHLORIDE 8 MG/HR: 900 INJECTION INTRAVENOUS at 00:24

## 2019-03-30 LAB
ALBUMIN SERPL-MCNC: 2.8 G/DL (ref 3.5–5)
ALBUMIN/GLOB SERPL: 1.1 G/DL (ref 1.1–2.5)
ALP SERPL-CCNC: 144 U/L (ref 24–120)
ALT SERPL W P-5'-P-CCNC: 17 U/L (ref 0–54)
ANION GAP SERPL CALCULATED.3IONS-SCNC: 9 MMOL/L (ref 4–13)
AST SERPL-CCNC: 27 U/L (ref 7–45)
BASOPHILS # BLD AUTO: 0.05 10*3/MM3 (ref 0–0.2)
BASOPHILS NFR BLD AUTO: 0.8 % (ref 0–2)
BILIRUB SERPL-MCNC: 0.5 MG/DL (ref 0.1–1)
BUN BLD-MCNC: 9 MG/DL (ref 5–21)
BUN/CREAT SERPL: 2.8 (ref 7–25)
CALCIUM SPEC-SCNC: 8.3 MG/DL (ref 8.4–10.4)
CHLORIDE SERPL-SCNC: 107 MMOL/L (ref 98–110)
CO2 SERPL-SCNC: 23 MMOL/L (ref 24–31)
CREAT BLD-MCNC: 3.27 MG/DL (ref 0.5–1.4)
DEPRECATED RDW RBC AUTO: 67.4 FL (ref 40–54)
EOSINOPHIL # BLD AUTO: 0.22 10*3/MM3 (ref 0–0.7)
EOSINOPHIL NFR BLD AUTO: 3.7 % (ref 0–4)
ERYTHROCYTE [DISTWIDTH] IN BLOOD BY AUTOMATED COUNT: 18.4 % (ref 12–15)
GFR SERPL CREATININE-BSD FRML MDRD: 14 ML/MIN/1.73
GFR SERPL CREATININE-BSD FRML MDRD: ABNORMAL ML/MIN/1.73
GLOBULIN UR ELPH-MCNC: 2.6 GM/DL
GLUCOSE BLD-MCNC: 68 MG/DL (ref 70–100)
HCT VFR BLD AUTO: 32 % (ref 37–47)
HGB BLD-MCNC: 10.2 G/DL (ref 12–16)
IMM GRANULOCYTES # BLD AUTO: 0.02 10*3/MM3 (ref 0–0.05)
IMM GRANULOCYTES NFR BLD AUTO: 0.3 % (ref 0–5)
LYMPHOCYTES # BLD AUTO: 0.77 10*3/MM3 (ref 0.72–4.86)
LYMPHOCYTES NFR BLD AUTO: 12.9 % (ref 15–45)
MCH RBC QN AUTO: 32.5 PG (ref 28–32)
MCHC RBC AUTO-ENTMCNC: 31.9 G/DL (ref 33–36)
MCV RBC AUTO: 101.9 FL (ref 82–98)
MONOCYTES # BLD AUTO: 0.45 10*3/MM3 (ref 0.19–1.3)
MONOCYTES NFR BLD AUTO: 7.6 % (ref 4–12)
NEUTROPHILS # BLD AUTO: 4.44 10*3/MM3 (ref 1.87–8.4)
NEUTROPHILS NFR BLD AUTO: 74.7 % (ref 39–78)
NRBC BLD AUTO-RTO: 0 /100 WBC (ref 0–0)
PLATELET # BLD AUTO: 92 10*3/MM3 (ref 130–400)
PMV BLD AUTO: 10.8 FL (ref 6–12)
POTASSIUM BLD-SCNC: 3.9 MMOL/L (ref 3.5–5.3)
PROT SERPL-MCNC: 5.4 G/DL (ref 6.3–8.7)
RBC # BLD AUTO: 3.14 10*6/MM3 (ref 4.2–5.4)
SODIUM BLD-SCNC: 139 MMOL/L (ref 135–145)
WBC NRBC COR # BLD: 5.95 10*3/MM3 (ref 4.8–10.8)

## 2019-03-30 PROCEDURE — 80053 COMPREHEN METABOLIC PANEL: CPT | Performed by: INTERNAL MEDICINE

## 2019-03-30 PROCEDURE — 99232 SBSQ HOSP IP/OBS MODERATE 35: CPT | Performed by: NURSE PRACTITIONER

## 2019-03-30 PROCEDURE — 85025 COMPLETE CBC W/AUTO DIFF WBC: CPT | Performed by: INTERNAL MEDICINE

## 2019-03-30 PROCEDURE — 91110 GI TRC IMG INTRAL ESOPH-ILE: CPT | Performed by: INTERNAL MEDICINE

## 2019-03-30 RX ADMIN — SODIUM CHLORIDE 8 MG/HR: 900 INJECTION INTRAVENOUS at 00:59

## 2019-03-30 RX ADMIN — SODIUM CHLORIDE, PRESERVATIVE FREE 3 ML: 5 INJECTION INTRAVENOUS at 10:48

## 2019-03-30 RX ADMIN — SODIUM CHLORIDE, PRESERVATIVE FREE 3 ML: 5 INJECTION INTRAVENOUS at 22:10

## 2019-03-30 RX ADMIN — ATORVASTATIN CALCIUM 10 MG: 10 TABLET, FILM COATED ORAL at 10:47

## 2019-03-30 RX ADMIN — LEVOTHYROXINE SODIUM 150 MCG: 150 TABLET ORAL at 10:47

## 2019-03-30 RX ADMIN — SODIUM CHLORIDE 8 MG/HR: 900 INJECTION INTRAVENOUS at 06:20

## 2019-03-30 RX ADMIN — SERTRALINE 50 MG: 50 TABLET, FILM COATED ORAL at 10:47

## 2019-03-30 RX ADMIN — CARVEDILOL 3.12 MG: 3.12 TABLET, FILM COATED ORAL at 10:46

## 2019-03-30 RX ADMIN — Medication 1 TABLET: at 10:47

## 2019-03-30 RX ADMIN — CARVEDILOL 3.12 MG: 3.12 TABLET, FILM COATED ORAL at 22:10

## 2019-03-31 LAB
25(OH)D3 SERPL-MCNC: 63.6 NG/ML (ref 30–100)
MAGNESIUM SERPL-MCNC: 2 MG/DL (ref 1.4–2.2)
PHOSPHATE SERPL-MCNC: 3.2 MG/DL (ref 2.5–4.5)
PTH-INTACT SERPL-MCNC: 68.8 PG/ML (ref 7.5–53.5)

## 2019-03-31 PROCEDURE — 99232 SBSQ HOSP IP/OBS MODERATE 35: CPT | Performed by: NURSE PRACTITIONER

## 2019-03-31 PROCEDURE — 84100 ASSAY OF PHOSPHORUS: CPT | Performed by: INTERNAL MEDICINE

## 2019-03-31 PROCEDURE — 83735 ASSAY OF MAGNESIUM: CPT | Performed by: INTERNAL MEDICINE

## 2019-03-31 PROCEDURE — 97110 THERAPEUTIC EXERCISES: CPT

## 2019-03-31 PROCEDURE — 97530 THERAPEUTIC ACTIVITIES: CPT

## 2019-03-31 PROCEDURE — 82306 VITAMIN D 25 HYDROXY: CPT | Performed by: INTERNAL MEDICINE

## 2019-03-31 PROCEDURE — 83970 ASSAY OF PARATHORMONE: CPT | Performed by: INTERNAL MEDICINE

## 2019-03-31 RX ADMIN — Medication 1 TABLET: at 10:13

## 2019-03-31 RX ADMIN — CARVEDILOL 3.12 MG: 3.12 TABLET, FILM COATED ORAL at 10:14

## 2019-03-31 RX ADMIN — SODIUM CHLORIDE, PRESERVATIVE FREE 3 ML: 5 INJECTION INTRAVENOUS at 10:14

## 2019-03-31 RX ADMIN — SODIUM CHLORIDE, PRESERVATIVE FREE 3 ML: 5 INJECTION INTRAVENOUS at 22:30

## 2019-03-31 RX ADMIN — LEVOTHYROXINE SODIUM 150 MCG: 150 TABLET ORAL at 10:13

## 2019-03-31 RX ADMIN — CARVEDILOL 3.12 MG: 3.12 TABLET, FILM COATED ORAL at 22:29

## 2019-03-31 RX ADMIN — SERTRALINE 50 MG: 50 TABLET, FILM COATED ORAL at 10:13

## 2019-03-31 RX ADMIN — ATORVASTATIN CALCIUM 10 MG: 10 TABLET, FILM COATED ORAL at 10:13

## 2019-04-01 LAB
ANION GAP SERPL CALCULATED.3IONS-SCNC: 10 MMOL/L (ref 4–13)
BASOPHILS # BLD AUTO: 0.05 10*3/MM3 (ref 0–0.2)
BASOPHILS NFR BLD AUTO: 0.7 % (ref 0–2)
BUN BLD-MCNC: 19 MG/DL (ref 5–21)
BUN/CREAT SERPL: 3.2 (ref 7–25)
CALCIUM SPEC-SCNC: 8.3 MG/DL (ref 8.4–10.4)
CHLORIDE SERPL-SCNC: 106 MMOL/L (ref 98–110)
CO2 SERPL-SCNC: 24 MMOL/L (ref 24–31)
CREAT BLD-MCNC: 5.9 MG/DL (ref 0.5–1.4)
DEPRECATED RDW RBC AUTO: 69 FL (ref 40–54)
EOSINOPHIL # BLD AUTO: 0.39 10*3/MM3 (ref 0–0.7)
EOSINOPHIL NFR BLD AUTO: 5.6 % (ref 0–4)
ERYTHROCYTE [DISTWIDTH] IN BLOOD BY AUTOMATED COUNT: 18 % (ref 12–15)
GFR SERPL CREATININE-BSD FRML MDRD: 7 ML/MIN/1.73
GFR SERPL CREATININE-BSD FRML MDRD: ABNORMAL ML/MIN/1.73
GLUCOSE BLD-MCNC: 89 MG/DL (ref 70–100)
HCT VFR BLD AUTO: 35.4 % (ref 37–47)
HGB BLD-MCNC: 11.3 G/DL (ref 12–16)
IMM GRANULOCYTES # BLD AUTO: 0.03 10*3/MM3 (ref 0–0.05)
IMM GRANULOCYTES NFR BLD AUTO: 0.4 % (ref 0–5)
LYMPHOCYTES # BLD AUTO: 0.68 10*3/MM3 (ref 0.72–4.86)
LYMPHOCYTES NFR BLD AUTO: 9.8 % (ref 15–45)
MCH RBC QN AUTO: 33 PG (ref 28–32)
MCHC RBC AUTO-ENTMCNC: 31.9 G/DL (ref 33–36)
MCV RBC AUTO: 103.5 FL (ref 82–98)
MONOCYTES # BLD AUTO: 0.4 10*3/MM3 (ref 0.19–1.3)
MONOCYTES NFR BLD AUTO: 5.8 % (ref 4–12)
NEUTROPHILS # BLD AUTO: 5.4 10*3/MM3 (ref 1.87–8.4)
NEUTROPHILS NFR BLD AUTO: 77.7 % (ref 39–78)
NRBC BLD AUTO-RTO: 0 /100 WBC (ref 0–0)
PLATELET # BLD AUTO: 143 10*3/MM3 (ref 130–400)
PMV BLD AUTO: 10.3 FL (ref 6–12)
POTASSIUM BLD-SCNC: 3.9 MMOL/L (ref 3.5–5.3)
RBC # BLD AUTO: 3.42 10*6/MM3 (ref 4.2–5.4)
SODIUM BLD-SCNC: 140 MMOL/L (ref 135–145)
WBC NRBC COR # BLD: 6.95 10*3/MM3 (ref 4.8–10.8)

## 2019-04-01 PROCEDURE — 80048 BASIC METABOLIC PNL TOTAL CA: CPT | Performed by: INTERNAL MEDICINE

## 2019-04-01 PROCEDURE — 97110 THERAPEUTIC EXERCISES: CPT

## 2019-04-01 PROCEDURE — 91110 GI TRC IMG INTRAL ESOPH-ILE: CPT | Performed by: INTERNAL MEDICINE

## 2019-04-01 PROCEDURE — 63510000001 EPOETIN ALFA PER 1000 UNITS: Performed by: INTERNAL MEDICINE

## 2019-04-01 PROCEDURE — 99232 SBSQ HOSP IP/OBS MODERATE 35: CPT | Performed by: INTERNAL MEDICINE

## 2019-04-01 PROCEDURE — 97535 SELF CARE MNGMENT TRAINING: CPT

## 2019-04-01 PROCEDURE — 97530 THERAPEUTIC ACTIVITIES: CPT

## 2019-04-01 PROCEDURE — 85025 COMPLETE CBC W/AUTO DIFF WBC: CPT | Performed by: FAMILY MEDICINE

## 2019-04-01 RX ORDER — ASPIRIN 81 MG/1
81 TABLET ORAL DAILY
Status: DISCONTINUED | OUTPATIENT
Start: 2019-04-01 | End: 2019-04-02 | Stop reason: HOSPADM

## 2019-04-01 RX ADMIN — CARVEDILOL 3.12 MG: 3.12 TABLET, FILM COATED ORAL at 20:26

## 2019-04-01 RX ADMIN — Medication 1 TABLET: at 13:37

## 2019-04-01 RX ADMIN — ATORVASTATIN CALCIUM 10 MG: 10 TABLET, FILM COATED ORAL at 13:36

## 2019-04-01 RX ADMIN — ERYTHROPOIETIN 10000 UNITS: 10000 INJECTION, SOLUTION INTRAVENOUS; SUBCUTANEOUS at 13:38

## 2019-04-01 RX ADMIN — ACETAMINOPHEN 650 MG: 325 TABLET, FILM COATED ORAL at 23:31

## 2019-04-01 RX ADMIN — SODIUM CHLORIDE, PRESERVATIVE FREE 3 ML: 5 INJECTION INTRAVENOUS at 13:43

## 2019-04-01 RX ADMIN — CARVEDILOL 3.12 MG: 3.12 TABLET, FILM COATED ORAL at 13:37

## 2019-04-01 RX ADMIN — ASPIRIN 81 MG: 81 TABLET, DELAYED RELEASE ORAL at 22:00

## 2019-04-01 RX ADMIN — SODIUM CHLORIDE, PRESERVATIVE FREE 3 ML: 5 INJECTION INTRAVENOUS at 21:00

## 2019-04-01 RX ADMIN — ACETAMINOPHEN 650 MG: 325 TABLET, FILM COATED ORAL at 09:35

## 2019-04-01 RX ADMIN — SERTRALINE 50 MG: 50 TABLET, FILM COATED ORAL at 13:37

## 2019-04-01 RX ADMIN — LEVOTHYROXINE SODIUM 150 MCG: 150 TABLET ORAL at 13:37

## 2019-04-01 NOTE — PROGRESS NOTES
[facility]astroenterology  Inpatient Progress Note     TODAY'S DATE: 04/01/19  Cheri Ely  1940      Reason for Follow Up:  GI bleed     Subjective:   .  Denies abdominal pain.  Denies nausea vomiting.  Denies fevers.  No blood in BMs    No Known Allergies    Current Facility-Administered Medications:   •  acetaminophen (TYLENOL) tablet 650 mg, 650 mg, Oral, Q6H PRN, Azalia Shaver DO, 650 mg at 04/01/19 0935  •  atorvastatin (LIPITOR) tablet 10 mg, 10 mg, Oral, Daily, Jeffrey Zamora MD, 10 mg at 04/01/19 1336  •  b complex-vitamin c-folic acid (NEPHRO-SANGEETA) tablet 1 tablet, 1 tablet, Oral, Daily, Faisal Sevilla MD, 1 tablet at 04/01/19 1337  •  carvedilol (COREG) tablet 3.125 mg, 3.125 mg, Oral, BID With Meals, Jeffrey Zamora MD, 3.125 mg at 04/01/19 1337  •  epoetin lucy (EPOGEN,PROCRIT) injection 10,000 Units, 10,000 Units, Subcutaneous, Once per day on Mon Wed Fri, Faisal Sevilla MD, 10,000 Units at 04/01/19 1338  •  levothyroxine (SYNTHROID, LEVOTHROID) tablet 150 mcg, 150 mcg, Oral, Daily, Jeffrey Zamora MD, 150 mcg at 04/01/19 1337  •  ondansetron (ZOFRAN) injection 4 mg, 4 mg, Intravenous, Q6H PRN, Jeffrey Zamora MD, 4 mg at 03/27/19 2042  •  sertraline (ZOLOFT) tablet 50 mg, 50 mg, Oral, Daily, Jeffrey Zamora MD, 50 mg at 04/01/19 1337  •  [COMPLETED] Insert peripheral IV, , , Once **AND** sodium chloride 0.9 % flush 10 mL, 10 mL, Intravenous, PRN, Ashlyn Mackey, APRN  •  sodium chloride 0.9 % flush 3 mL, 3 mL, Intravenous, Q12H, Jeffrey Zamora MD, 3 mL at 04/01/19 1343  •  sodium chloride 0.9 % flush 3-10 mL, 3-10 mL, Intravenous, PRN, Jeffrey Zamora MD    Review of Systems   Constitutional: Negative for chills and fever.   Respiratory: Negative for cough, shortness of breath and wheezing.    Cardiovascular: Negative for chest pain and palpitations.   Gastrointestinal: Negative for abdominal distention, abdominal pain, anal bleeding, blood in  stool, constipation, diarrhea, nausea and vomiting.       Objective     Vital Signs  Temp:  [97.8 °F (36.6 °C)-98.8 °F (37.1 °C)] 98.8 °F (37.1 °C)  Heart Rate:  [] 93  Resp:  [16-18] 18  BP: (121-130)/(41-64) 123/64  Body mass index is 31.75 kg/m².    Intake/Output Summary (Last 24 hours) at 4/1/2019 1410  Last data filed at 3/31/2019 1856  Gross per 24 hour   Intake 240 ml   Output --   Net 240 ml     No intake/output data recorded.       PHYSICAL EXAM  Physical Exam   Constitutional: She appears well-developed and well-nourished. No distress.   Cardiovascular: Normal rate, regular rhythm and normal heart sounds.   Pulmonary/Chest: Effort normal and breath sounds normal.   Abdominal: Soft. Bowel sounds are normal. She exhibits no distension. There is no tenderness.   Musculoskeletal: She exhibits no edema.   Neurological: She is alert.   Skin: Skin is warm and dry.   Psychiatric: She has a normal mood and affect. Her behavior is normal.   Vitals reviewed.          Results Review:   I have reviewed all of the patient's current test results    Results from last 7 days   Lab Units 04/01/19  0600 03/30/19  0555 03/29/19  0458  03/27/19  0018   WBC 10*3/mm3 6.95 5.95  --   --  6.08   HEMOGLOBIN g/dL 11.3* 10.2* 9.8*   < > 9.6*   HEMATOCRIT % 35.4* 32.0* 30.2*   < > 29.2*   PLATELETS 10*3/mm3 143 92*  --   --  102*    < > = values in this interval not displayed.       Results from last 7 days   Lab Units 04/01/19  0600 03/30/19  0555 03/27/19  2341 03/27/19  0018 03/26/19  0057   SODIUM mmol/L 140 139 138 136 136   POTASSIUM mmol/L 3.9 3.9 4.1 3.8 4.3   CHLORIDE mmol/L 106 107 95* 98 98   CO2 mmol/L 24.0 23.0* 35.0* 29.0 31.0   BUN mg/dL 19 9 16 34* 25*   CREATININE mg/dL 5.90* 3.27* 2.75* 4.02* 3.09*   CALCIUM mg/dL 8.3* 8.3* 7.7* 7.8* 7.9*   BILIRUBIN mg/dL  --  0.5  --  0.5 0.6   ALK PHOS U/L  --  144*  --  127* 126*   ALT (SGPT) U/L  --  17  --  <15 19   AST (SGOT) U/L  --  27  --  21 22   GLUCOSE mg/dL 89 68*  89 83 78             Lab Results   Lab Value Date/Time    LIPASE 28 03/24/2019 1018    LIPASE 28 09/15/2017 2059       Radiology Review:  Imaging Results (last 24 hours)     ** No results found for the last 24 hours. **            Assessment/Plan     Patient Active Problem List   Diagnosis Code   • Hypertension I10   • Hyperlipidemia E78.5   • Chronic kidney disease on chronic dialysis (CMS/[facility]) N18.6, Z99.2   • Closed fracture of ramus of left pubis (CMS/HCC) S32.592A   • Occlusion of superior mesenteric artery (CMS/[facility]) K55.069   • Chronic mesenteric ischemia (CMS/[facility]) K55.1   • Black tarry stools K92.1   • Hx of gastritis Z87.19   • Acute gastric ulcer with hemorrhage K25.0   • Closed displaced intertrochanteric fracture of right femur (CMS/[facility]) S72.141A   • Displaced intertrochanteric fracture of right femur, initial encounter for closed fracture (CMS/[facility]) S72.141A   • Hypotension I95.9   • Acute blood loss anemia D62   • GI bleed K92.2   • Gastrointestinal hemorrhage K92.2   • (HFpEF) heart failure with preserved ejection fraction (CMS/[facility]) I50.30   • Hypothyroid E03.9   • Diastolic dysfunction I51.9   • Diastolic heart failure (CMS/[facility]) I50.30   • Volume overload E87.70        1. GI bleed with melena   2. TRBC scan this admission noting with active site of gi bleeding LUQ   3. Blood loss anemia   4. Coagulopathy secondary to plavix , on hold   5. History of chronic mesenteric ischemia      No further sign of active GI bleeding.     M2A capsule revealed 5 small angioma ectasias in the small bowel.  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    Recommend limit anticoagulation as much as reasonable.    I discussed the patients findings and my recommendations with patient      EMR Dragon/transcription disclaimer:  Much of this encounter note is electronic transcription/translation of spoken language to printed text.  The electronic  translation of spoken language may be erroneous, or at times, nonsensical words or phrases may be inadvertently transcribed.  Although I have reviewed the note for such errors, some may still exist.      David Yu MD  04/01/19  2:10 PM

## 2019-04-01 NOTE — THERAPY TREATMENT NOTE
Acute Care - Occupational Therapy Treatment Note  [facility]     Patient Name: Cheri Ely  : 1940  MRN: [id number]  Today's Date: 2019  Onset of Illness/Injury or Date of Surgery: 19  Date of Referral to OT: 19  Referring Physician: Dr. Bloom    Admit Date: 3/24/2019       ICD-10-CM ICD-9-CM   1. Gastrointestinal hemorrhage, unspecified gastrointestinal hemorrhage type K92.2 578.9   2. ESRD (end stage renal disease) on dialysis (CMS/[facility]) N18.6 585.6    Z99.2 V45.11   3. Anemia, unspecified type D64.9 285.9   4. Chronic anticoagulation Z79.01 V58.61   5. Decreased activities of daily living (ADL) R68.89 780.99   6. Impaired mobility Z74.09 799.89   7. Gastrointestinal hemorrhage with melena K92.1 578.1     Patient Active Problem List   Diagnosis   • Hypertension   • Hyperlipidemia   • Chronic kidney disease on chronic dialysis (CMS/[facility])   • Closed fracture of ramus of left pubis (CMS/[facility])   • Occlusion of superior mesenteric artery (CMS/[facility])   • Chronic mesenteric ischemia (CMS/[facility])   • Black tarry stools   • Hx of gastritis   • Acute gastric ulcer with hemorrhage   • Closed displaced intertrochanteric fracture of right femur (CMS/[facility])   • Displaced intertrochanteric fracture of right femur, initial encounter for closed fracture (CMS/[facility])   • Hypotension   • Acute blood loss anemia   • GI bleed   • Gastrointestinal hemorrhage   • (HFpEF) heart failure with preserved ejection fraction (CMS/[facility])   • Hypothyroid   • Diastolic dysfunction   • Diastolic heart failure (CMS/[facility])   • Volume overload     Past Medical History:   Diagnosis Date   • Arthritis    • Carotid artery disease (CMS/[facility])    • CHF (congestive heart failure) (CMS/[facility])    • Chronic kidney disease on chronic dialysis (CMS/[facility])    • Chronic renal failure     on dialysis ON MON, WED, FRI   • Coronary artery disease    • Disease of thyroid gland    • Diverticulitis    • Gastric ulcer    • History of transfusion    • Hyperlipidemia     • Hypertension    • Injury of back    • Mesenteric artery insufficiency (CMS/HCC)    • Multilevel degenerative disc disease    • Osteoporosis    • Pancreatitis    • Pelvis fracture (CMS/HCC)    • Pneumonia      Past Surgical History:   Procedure Laterality Date   • AORTAGRAM Right 1/8/2018    Procedure: MESENTERIC ANGIOGRAM, GROIN ACCESS, MYNX CLOSURE;  Surgeon: Tomasz San DO;  Location: [facility] OR;  Service:    • AORTIC VALVE SURGERY     • APPENDECTOMY     • BACK SURGERY     • CAPSULE ENDOSCOPY N/A 3/30/2019    Procedure: CAPSULE ENDOSCOPY M2A;  Surgeon: David Yu MD;  Location: [facility] ENDOSCOPY;  Service: Gastroenterology   • CARDIAC SURGERY     • CAROTID ENDARTERECTOMY Bilateral    • CATARACT EXTRACTION, BILATERAL     • CERVICAL SPINE SURGERY     • CHOLECYSTECTOMY     • COLON SURGERY     • COLONOSCOPY  10/01/2015   • COLONOSCOPY N/A 3/28/2019    Procedure: COLONOSCOPY WITH ANESTHESIA;  Surgeon: Rafita Merida MD;  Location: [facility] ENDOSCOPY;  Service: Gastroenterology   • CORONARY ARTERY BYPASS GRAFT     • DIALYSIS FISTULA CREATION     • ENDOSCOPY N/A 12/27/2018    Procedure: ESOPHAGOGASTRODUODENOSCOPY WITH ANESTHESIA;  Surgeon: Moni Garza MD;  Location: [facility] ENDOSCOPY;  Service: Gastroenterology   • ENDOSCOPY N/A 2/28/2019    Procedure: ESOPHAGOGASTRODUODENOSCOPY WITH ANESTHESIA;  Surgeon: Moni Garza MD;  Location: [facility] ENDOSCOPY;  Service: Gastroenterology   • ENDOSCOPY N/A 3/11/2019    Procedure: ESOPHAGOGASTRODUODENOSCOPY WITH ANESTHESIA;  Surgeon: Moni Garza MD;  Location: [facility] ENDOSCOPY;  Service: Gastroenterology   • ENDOSCOPY N/A 3/27/2019    Procedure: ESOPHAGOGASTRODUODENOSCOPY WITH ANESTHESIA;  Surgeon: Rafita Merida MD;  Location: [facility] ENDOSCOPY;  Service: Gastroenterology   • HIP TROCANTERIC NAILING WITH INTRAMEDULLARY HIP SCREW Right 2/8/2019    Procedure: HIP TROCANTERIC NAILING LONG WITH INTRAMEDULLARY HIP SCREW;  Surgeon: Boo Camacho MD;   Location: [facility] OR;  Service: Orthopedics   • JOINT REPLACEMENT      knee   • LAPAROSCOPIC GASTRIC BANDING     • LEEP     • LUMBAR FUSION     • TOE AMPUTATION Left     2nd toe   • TOTAL KNEE ARTHROPLASTY Bilateral    • TUBAL ABDOMINAL LIGATION     • UPPER GASTROINTESTINAL ENDOSCOPY  10/01/2015       Therapy Treatment    Rehabilitation Treatment Summary     Row Name 04/01/19 1450 04/01/19 1406 04/01/19 1331       Treatment Time/Intention    Discipline  occupational therapy assistant  -MM  physical therapy assistant  -AE  physical therapy assistant  -LG    Document Type  therapy note (daily note)  -MM  therapy note (daily note)  -AE  --    Subjective Information  complains of;fatigue  -MM  complains of;pain  -AE  --    Mode of Treatment  individual therapy;occupational therapy  -MM  --  --    Patient Effort  fair  -MM  --  --    Comment  pt falling asleep during tx; reported increased fatigue; needed cues to stay alert; declined EOB or OOB activity  -MM  --  --    Existing Precautions/Restrictions  fall;weight bearing  -MM  fall;weight bearing  -AE  --    Treatment Considerations/Comments  TTWB R LE  -MM  TTWB R LE  -AE  --    Recorded by [MM] Linda Connors COTA/PUSHPA 04/01/19 1511 [AE] Lin Chacon H, PTA 04/01/19 1436 [LG] James Augustine, PTA 04/01/19 1332    Row Name 04/01/19 1450             Cognitive Assessment/Intervention- PT/OT    Orientation Status (Cognition)  oriented x 4  -MM      Safety Deficit (Cognitive)  awareness of need for assistance  -MM      Personal Safety Interventions  fall prevention program maintained;supervised activity  -MM      Recorded by [MM] Linda Connors COTA/L 04/01/19 1511      Row Name 04/01/19 1450 04/01/19 1406          Bed Mobility Assessment/Treatment    Supine-Sit [place] (Bed Mobility)  --  minimum assist (75% patient effort);2 person assist;verbal cues  -AE     Sit-Supine [place] (Bed Mobility)  --  moderate assist (50% patient effort);verbal cues  -AE      Comment (Bed Mobility)  declined movement   -MM  --     Recorded by [MM] Linda Connors COTA/PUSHPA 04/01/19 1511 [AE] Lin Chacon, PTA 04/01/19 1436     Row Name 04/01/19 1450             Transfer Assessment/Treatment    Comment (Transfers)  declined d/t fatigue   -MM      Recorded by [MM] Linda Connors COTA/L 04/01/19 1511      Row Name 04/01/19 1406             Sit-Stand Transfer    Sit-Stand [place] (Transfers)  minimum assist (75% patient effort);2 person assist;verbal cues  -AE      Recorded by [AE] Lin Chacon, PTA 04/01/19 1436      Row Name 04/01/19 1406             Stand-Sit Transfer    Stand-Sit [place] (Transfers)  minimum assist (75% patient effort);2 person assist  -AE      Recorded by [AE] Lin Chacon, PTA 04/01/19 1436      Row Name 04/01/19 1406             Gait/Stairs Assessment/Training    Assistive Device (Gait)  walker, front-wheeled  -AE      Distance in Feet (Gait)  -- steps to chair then sitting rest then steps back to bed  -AE      Recorded by [AE] Lin Chacon, PTA 04/01/19 1436      Row Name 04/01/19 1450             BADL Safety/Performance    Impairments, BADL Safety/Performance  endurance/activity tolerance;pain;strength  -MM      Skilled BADL Treatment/Intervention  BADL process/adaptation training;compensatory training educated on joint protection for ADLs d/t arthritis c/o  -MM      Recorded by [MM] Linda Connors COTA/L 04/01/19 1511      Row Name 04/01/19 1450 04/01/19 1406          Therapeutic Exercise    Lower Extremity (Therapeutic Exercise)  --  LAQ (long arc quad), bilateral  -AE     Lower Extremity Range of Motion (Therapeutic Exercise)  --  ankle dorsiflexion/plantar flexion, bilateral;hip abduction/adduction, bilateral;hip flexion/extension, bilateral  -AE     Comment (Therapeutic Exercise)  BUE AROM x15 reps; pt very weak/fatigued.. Required mod rest breaks. Demo'd decreased ROM R shoulder  -MM  --     Recorded by [MM] Linda Connors,  LOKESH/L 04/01/19 1511 [AE] Oswaldo Lin KELLEE, PTA 04/01/19 1436     Row Name 04/01/19 1450 04/01/19 1406          Positioning and Restraints    Pre-Treatment Position  in bed  -MM  in bed  -AE     Post Treatment Position  bed  -MM  bed  -AE     In Bed  fowlers;call light within reach;encouraged to call for assist;side rails up x2  -MM  fowlers;call light within reach  -AE     Recorded by [MM] Linda Connors COTA/PUSHPA 04/01/19 1511 [AE] Lin Chacon, PTA 04/01/19 1436     Row Name 04/01/19 1450             Pain Scale: Numbers Pre/Post-Treatment    Pain Scale: Numbers, Pretreatment  2/10  -MM      Pain Scale: Numbers, Post-Treatment  2/10  -MM      Pain Location - Side  Left  -MM      Pain Location  extremity  -MM      Pain Intervention(s)  Repositioned;Medication (See MAR);Ambulation/increased activity  -MM      Recorded by [MM] Linda Connors COTA/L 04/01/19 1511      Row Name 04/01/19 1450             Plan of Care Review    Plan of Care Reviewed With  patient  -MM      Recorded by [MM] Linda Connors COTA/L 04/01/19 1511      Row Name 04/01/19 1450             Outcome Summary/Treatment Plan (OT)    Daily Summary of Progress (OT)  progress towards functional goals is fair  -MM      Recorded by [MM] Linda Connors COTA/PUSHPA 04/01/19 1511        User Key  (r) = Recorded By, (t) = Taken By, (c) = Cosigned By    Initials Name Effective Dates Discipline    AE Oswaldo Lin GOODSON, PTA 06/22/15 -  PT    LG James Augustine, PTA 08/02/16 -  PT    MM Linda Connors COTA/L 10/15/18 -  OT             Occupational Therapy Education     Title: PT OT SLP Therapies (In Progress)     Topic: Occupational Therapy (In Progress)     Point: ADL training (Done)     Description: Instruct learner(s) on proper safety adaptation and remediation techniques during self care or transfers.   Instruct in proper use of assistive devices.    Learning Progress Summary           Patient Acceptance, E,TB, VU by MM at 4/1/2019  3:11 PM     Comment:  R TTWB, joint protection, need for activity    Acceptance, E, VU by DEANGELO at 3/26/2019 12:06 PM    Comment:  Pt educated on the benefits and roles of OT, POC, adls, TDWB'ing RLE, assistive device use.                   Point: Precautions (Done)     Description: Instruct learner(s) on prescribed precautions during self-care and functional transfers.    Learning Progress Summary           Patient Acceptance, E, VU by DEANGELO at 3/26/2019 12:06 PM    Comment:  Pt educated on the benefits and roles of OT, POC, adls, TDWB'ing RLE, assistive device use.                   Point: Body mechanics (Done)     Description: Instruct learner(s) on proper positioning and spine alignment during self-care, functional mobility activities and/or exercises.    Learning Progress Summary           Patient Acceptance, E, VU by DEANGELO at 3/26/2019 12:06 PM    Comment:  Pt educated on the benefits and roles of OT, POC, adls, TDWB'ing RLE, assistive device use.                               User Key     Initials Effective Dates Name Provider Type Discipline     10/15/18 -  Linda Connors COTA/L Occupational Therapy Assistant OT     10/12/18 -  Jacquie Serra OTR/L Occupational Therapist OT                OT Recommendation and Plan  Outcome Summary/Treatment Plan (OT)  Daily Summary of Progress (OT): progress towards functional goals is fair  Daily Summary of Progress (OT): progress towards functional goals is fair  Plan of Care Review  Plan of Care Reviewed With: patient  Plan of Care Reviewed With: patient  Outcome Summary: Pt reports fatigue and not wanting to get EOB/OOB. Performed AROM BUEs x15 reps each plane for increased fxl I for t/fs and ADLs. Pt reported difficulty holding ADL items and frequently dropping them. Performed FMC activity for increased grasp. Continue OT POC. Recommend rehab at d/c.   Outcome Measures     Row Name 04/01/19 1450 03/31/19 1104          How much help from another person do you currently need...     Turning from your back to your side while in flat bed without using bedrails?  --  3  -MF     Moving from lying on back to sitting on the side of a flat bed without bedrails?  --  3  -MF     Moving to and from a bed to a chair (including a wheelchair)?  --  3  -MF     Standing up from a chair using your arms (e.g., wheelchair, bedside chair)?  --  3  -MF     Climbing 3-5 steps with a railing?  --  1  -MF     To walk in hospital room?  --  2  -MF     [facility] 6 Clicks Score  --  15  -MF        How much help from another is currently needed...    Putting on and taking off regular lower body clothing?  2  -MM  --     Bathing (including washing, rinsing, and drying)  2  -MM  --     Toileting (which includes using toilet bed pan or urinal)  2  -MM  --     Putting on and taking off regular upper body clothing  3  -MM  --     Taking care of personal grooming (such as brushing teeth)  3  -MM  --     Eating meals  3  -MM  --     Score  15  -MM  --        Functional Assessment    Outcome Measure Options  --  [facility] 6 Clicks Basic Mobility (PT)  -       User Key  (r) = Recorded By, (t) = Taken By, (c) = Cosigned By    Initials Name Provider Type    Gilda Pierre PTA Physical Therapy Assistant    Linda Hobbs COTA/L Occupational Therapy Assistant           Time Calculation:   Time Calculation- OT     Row Name 04/01/19 1450             Time Calculation- OT    OT Start Time  1450  -MM      OT Stop Time  1513  -MM      OT Time Calculation (min)  23 min  -MM      Total Timed Code Minutes- OT  23 minute(s)  -MM      OT Received On  04/01/19  -MM         Timed Charges    18313 - OT Therapeutic Exercise Minutes  13  -MM      31439 - OT Self Care/Mgmt Minutes  10  -MM        User Key  (r) = Recorded By, (t) = Taken By, (c) = Cosigned By    Initials Name Provider Type    Linda Hobbs COTA/L Occupational Therapy Assistant        Therapy Charges for Today     Code Description Service Date Service Provider  Modifiers Qty    20274074532 HC OT THER PROC EA 15 MIN 4/1/2019 Linda Connors COTA/L GO 1    99927142932 HC OT SELF CARE/MGMT/TRAIN EA 15 MIN 4/1/2019 Linda Connors COTA/L GO 1               EMMANUEL Emanuel  4/1/2019

## 2019-04-01 NOTE — DISCHARGE SUMMARY
[facility] Medicine Services  DISCHARGE SUMMARY       Date of Admission: 3/24/2019  Date of Discharge:  4/2/2019  Primary Care Physician: Provider, No Known    Presenting Problem/History of Present Illness:  Gastrointestinal hemorrhage, unspecified gastrointestinal hemorrhage type [K92.2]     Final Discharge Diagnoses:  Active Hospital Problems    Diagnosis   • AVM (arteriovenous malformation) of small bowel, acquired (CMS/HCC)   • Gastrointestinal hemorrhage   • Diastolic dysfunction     EF 55-60% from echocardiogram on 3/15     • Chronic mesenteric ischemia (CMS/HCC)   • Chronic kidney disease on chronic dialysis (CMS/HCC)     Monday/Wednesday/Friday         Consults: GI    Procedures Performed:   Colonoscopy  - The examined portion of the ileum was normal.  - One 6 mm polyp at the hepatic flexure, removed with a cold snare. Resected and retrieved.  - Diverticulosis in the sigmoid colon.    EGD  - Normal examined jejunum.  - Normal examined duodenum.  - An adjustable gastric banding was found.  - Normal esophagus.  - No specimens collected.    M2A capsule revealed 5 small angioma ectasias in the small bowel.  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    Chief Complaint on Day of Discharge: none    History of Present Illness on Day of Discharge:   Patient is a 79-year-old  female presented to ER from a nursing home with a complaint of GI bleed.  Patient started bleeding yesterday with black tarry stool.  This has been recurrent with each bowel movement.  Patient denies any abdomen pain, chest pain, shortness of breath.       Patient is on chronic aspirin and Plavix for SMA/chronic occlusion-conservative treatment per vascular.  Patient also had recent hip surgery in February 19 from a fall.     Previously patient started to read in February as an outpatient for EGD, patient had a biopsy then  which causes a GI bleed and a Hemoclip was put in.     Patient was admitted recently to this facility in March 12 for GI bleed as well. Hemoglobin was 6 in March and patient got 2 units of blood.  Patient previously underwent EGD shows esophagitis but no acute bleed. Patient put back on baby aspirin and Plavix on March 14.  Aspirin was decreased from regular aspirin to a baby aspirin due to GI bleed.    Hospital Course:  The patient is a 79 y.o. female who presented to [facility] with GI bleed/chronic mesenteric ischemia.      GI bleed/AV malformation.  Black tarry stool.  Hemoccult positive.  Consult GI.    Colonoscopy-ileum is normal, 1 6 mm polyp at the hepatic flexure, removed of cold snare, diverticulosis.  EGD.  Normal jejunum, normal duodenum, adjustable gastric banding, normal esophagus, no specimen collected.  M2A capsule revealed 5 small angioma ectasias in the small bowel.  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  Pathology-tubular adenoma-negative for high grade dysplasia.  Recommend limit anticoagulation as much as reasonable.  No sign of acute bleed.     Anemia.  Hemoglobin 3/12/19 9.7.  Today's hemoglobin 10.4.       Chronic mesenteric ischemia.  Previous note from vascular-stated the best course of action is to remain conservative.  Will hold aspirin and Plavix due to GI bleed/AV malformation for now. Probably start patient on baby aspirin a day.  Stop Plavix due to GI bleed/AV malformation.  This is been discussed the patient.  Bleeding risk versus worsening of his mesenteric ischemia has been discussed the patient.     Hypertension/ hypotension.  Pt to cont Coreg and  losartan.  Hold Midodrin.     Chronic renal failure on dialysis.  Nephrology was consulted.  Patient continue with dialysis outpatient Monday Wednesday Friday.     Hyperlipidemia.  Patient to continue Pravachol     Depression/anxiety.  Patient  "continue Zoloft.     Vital signs stable, afebrile.  Follow with nursing home physician as soon as able.  Patient to keep her appointment to see vascular surgery.  Continue dialysis Monday Wednesday Friday.    Condition on Discharge:  stable    Physical Exam on Discharge:  /53 (BP Location: Right arm, Patient Position: Lying)   Pulse 108   Temp 98.2 °F (36.8 °C) (Oral)   Resp 16   Ht 149.9 cm (59\")   Wt 71.5 kg (157 lb 9.6 oz)   SpO2 97%   BMI 31.83 kg/m²   Physical Exam  Constitutional: She appears well-developed.   HENT:   Head: Normocephalic and atraumatic.   Eyes: Conjunctivae are normal. Pupils are equal, round, and reactive to light.   Neck: Neck supple. No JVD present. No thyromegaly present.   Cardiovascular: Normal rate, regular rhythm, normal heart sounds and intact distal pulses. Exam reveals no gallop and no friction rub.   No murmur heard.  Pulmonary/Chest: No respiratory distress. She has no wheezes. She has no rales. She exhibits no tenderness.   Diminished breath sound bilateral, clear   Abdominal: Soft. Bowel sounds are normal. She exhibits no distension. There is no tenderness. There is no rebound and no guarding.   Musculoskeletal: Normal range of motion. She exhibits no edema, tenderness or deformity.   Lymphadenopathy:     She has no cervical adenopathy.   Neurological: She is alert. She displays normal reflexes. No cranial nerve deficit. She exhibits abnormal muscle tone. Coordination abnormal.   Skin: Skin is warm and dry. Capillary refill takes 2 to 3 seconds. No rash noted.   Psychiatric: She has a normal mood and affect. Her behavior is normal. Thought content normal.   Nursing note and vitals reviewed.     Discharge Disposition: Discharge back to nursing home via ambulance.      Discharge Medications:     Discharge Medications      New Medications      Instructions Start Date   epoetin lucy 29549 UNIT/ML injection  Commonly known as:  EPOGEN,PROCRIT   10,000 Units, " Subcutaneous, 3 Times Weekly   Start Date:  4/3/2019        Changes to Medications      Instructions Start Date   sucralfate 1 GM/10ML suspension  Commonly known as:  CARAFATE  What changed:  when to take this   1 g, Oral, 2 Times Daily      Vitamin D 2000 units capsule  What changed:    · medication strength  · how much to take   2,000 Units, Oral, Daily         Continue These Medications      Instructions Start Date   acetaminophen 325 MG tablet  Commonly known as:  TYLENOL   650 mg, Oral, Every 6 Hours PRN      aspirin 81 MG EC tablet   81 mg, Oral, Daily      carvedilol 3.125 MG tablet  Commonly known as:  COREG   3.125 mg, Oral, Daily, Monday, Wednesday, and Friday dose. Hold if SBP < 100.       carvedilol 3.125 MG tablet  Commonly known as:  COREG   3.125 mg, Oral, 2 Times Daily With Meals, Sunday, Tuesday, Thursday, and Saturday dose. Hold if SBP <100.       docusate sodium 100 MG capsule   100 mg, Oral, 2 Times Daily      lactulose 20 GM/30ML solution solution   20 g, Oral, Daily PRN      levothyroxine 150 MCG tablet  Commonly known as:  SYNTHROID, LEVOTHROID   150 mcg, Oral, Daily      losartan 25 MG tablet  Commonly known as:  COZAAR   25 mg, Oral, 3 Times Weekly, Monday, Wednesday, and Friday.      ondansetron ODT 4 MG disintegrating tablet  Commonly known as:  ZOFRAN-ODT   4 mg, Oral, Every 8 Hours PRN      pantoprazole 40 MG EC tablet  Commonly known as:  PROTONIX   40 mg, Oral, Daily      Polyethyl Glyc-Propyl Glyc PF 0.4-0.3 % solution ophthalmic solution   2 drops, Both Eyes, Every 2 Hours PRN      pravastatin 40 MG tablet  Commonly known as:  PRAVACHOL   40 mg, Oral, Daily      JOHN-SANGEETA tablet   1 tablet, Oral, Daily      sertraline 50 MG tablet  Commonly known as:  ZOLOFT   50 mg, Oral, Daily         Stop These Medications    clopidogrel 75 MG tablet  Commonly known as:  PLAVIX     diphenhydrAMINE 25 mg capsule  Commonly known as:  BENADRYL     HAIR SKIN AND NAILS FORMULA PO     midodrine 2.5 MG  tablet  Commonly known as:  PROAMATINE     oxyCODONE-acetaminophen  MG per tablet  Commonly known as:  PERCOCET     SUPER B COMPLEX/VITAMIN C PO            Discharge Diet:   Diet Instructions     Advance Diet As Tolerated            Activity at Discharge:   Activity Instructions     Activity as Tolerated            Discharge Care Plan/Instructions: Nursing home via ambulance    Follow-up Appointments:   Future Appointments   Date Time Provider Department Center   7/25/2019  8:30 AM Tomasz San DO [facility] None       Jeffrey Zamora MD  04/02/19  8:42 AM    Time: Greater than 30 minutes.

## 2019-04-01 NOTE — PLAN OF CARE
Problem: Patient Care Overview  Goal: Plan of Care Review  Outcome: Ongoing (interventions implemented as appropriate)   04/01/19 2717   Coping/Psychosocial   Plan of Care Reviewed With patient   Plan of Care Review   Progress improving   OTHER   Outcome Summary Pt was min x 2 for bed mobility and min x 2 to take steps to chair. Pt then completed AROM ex's x 20 reps. Pt obeyed NWB R LE well. Pt refused to sit up so pt took steps back to bed with RW. Pt is progressing well

## 2019-04-01 NOTE — PROGRESS NOTES
Continued Stay Note   [place]     Patient Name: Cheri Ely  MRN: [id number]  Today's Date: 4/1/2019    Admit Date: 3/24/2019    Discharge Plan     Row Name 04/01/19 1547       Plan    Plan  [facility]    Patient/Family in Agreement with Plan  yes    Plan Comments  Pt will return to [facility] at d/c. Will follow to coordinate.         Discharge Codes    No documentation.             MALIA Trevizo

## 2019-04-01 NOTE — PLAN OF CARE
Problem: Patient Care Overview  Goal: Plan of Care Review  Outcome: Ongoing (interventions implemented as appropriate)   04/01/19 2170   Coping/Psychosocial   Plan of Care Reviewed With patient   Plan of Care Review   Progress improving   OTHER   Outcome Summary Pt reports fatigue and not wanting to get EOB/OOB. Performed AROM BUEs x15 reps each plane for increased fxl I for t/fs and ADLs. Pt reported difficulty holding ADL items and frequently dropping them. Performed FMC activity for increased grasp. Limited by weakness and fatigue. Continue OT POC. Recommend rehab at d/c.

## 2019-04-01 NOTE — PROGRESS NOTES
[facility] Medicine Services  INPATIENT PROGRESS NOTE    Length of Stay: 8  Date of Admission: 3/24/2019  Primary Care Physician: Provider, No Known    Subjective   Chief Complaint: GI bleed.    HPI   Patient condition about the same.  Patient denies any chest pain or shortness of breath.  Blood pressure stable.  Plan discussed with patient send patient back with aspirin a day due  to AV malformation/GI bleed.    Review of Systems   Constitutional: Positive for activity change, appetite change and fatigue. Negative for chills and fever.   HENT: Negative for hearing loss, nosebleeds, tinnitus and trouble swallowing.    Eyes: Negative for visual disturbance.   Respiratory: Negative for cough, chest tightness, shortness of breath and wheezing.    Cardiovascular: Negative for chest pain, palpitations and leg swelling.   Gastrointestinal: Negative for abdominal distention, abdominal pain, blood in stool, constipation, diarrhea, nausea and vomiting.   Endocrine: Negative for cold intolerance, heat intolerance, polydipsia, polyphagia and polyuria.   Genitourinary: Negative for decreased urine volume, difficulty urinating, dysuria, flank pain, frequency and hematuria.   Musculoskeletal: Negative for arthralgias, joint swelling and myalgias.   Skin: Negative for rash.   Allergic/Immunologic: Negative for immunocompromised state.   Neurological: Positive for weakness. Negative for dizziness, syncope, light-headedness and headaches.   Hematological: Negative for adenopathy. Does not bruise/bleed easily.   Psychiatric/Behavioral: Negative for confusion and sleep disturbance. The patient is not nervous/anxious.           All pertinent negatives and positives are as above. All other systems have been reviewed and are negative unless otherwise stated.     Objective    Temp:  [97.8 °F (36.6 °C)-98.8 °F (37.1 °C)] 98.6 °F (37 °C)  Heart Rate:  [] 75  Resp:  [16-18] 18  BP: (108-126)/(41-64)  108/44    Intake/Output Summary (Last 24 hours) at 4/1/2019 1716  Last data filed at 4/1/2019 1447  Gross per 24 hour   Intake 480 ml   Output --   Net 480 ml     Physical Exam   Constitutional: She appears well-developed.   HENT:   Head: Normocephalic and atraumatic.   Eyes: Conjunctivae are normal. Pupils are equal, round, and reactive to light.   Neck: Neck supple. No JVD present. No thyromegaly present.   Cardiovascular: Normal rate, regular rhythm, normal heart sounds and intact distal pulses. Exam reveals no gallop and no friction rub.   No murmur heard.  Pulmonary/Chest: No respiratory distress. She has no wheezes. She has no rales. She exhibits no tenderness.   Diminished breath sound bilateral, clear   Abdominal: Soft. Bowel sounds are normal. She exhibits no distension. There is no tenderness. There is no rebound and no guarding.   Musculoskeletal: Normal range of motion. She exhibits no edema, tenderness or deformity.   Lymphadenopathy:     She has no cervical adenopathy.   Neurological: She is alert. She displays normal reflexes. No cranial nerve deficit. She exhibits abnormal muscle tone. Coordination abnormal.   Skin: Skin is warm and dry. Capillary refill takes 2 to 3 seconds. No rash noted.   Psychiatric: She has a normal mood and affect. Her behavior is normal. Thought content normal.   Nursing note and vitals reviewed.      Results Review:  Lab Results (last 24 hours)     Procedure Component Value Units Date/Time    Basic Metabolic Panel [[id number]]  (Abnormal) Collected:  04/01/19 0600    Specimen:  Blood Updated:  04/01/19 0650     Glucose 89 mg/dL      BUN 19 mg/dL      Creatinine 5.90 mg/dL      Sodium 140 mmol/L      Potassium 3.9 mmol/L      Chloride 106 mmol/L      CO2 24.0 mmol/L      Calcium 8.3 mg/dL      eGFR  African Amer -- mL/min/1.73      Comment: <15 Indicative of kidney failure.        eGFR Non African Amer 7 mL/min/1.73      Comment: <15 Indicative of kidney failure.         BUN/Creatinine Ratio 3.2     Anion Gap 10.0 mmol/L     Narrative:       GFR Normal >60  Chronic Kidney Disease <60  Kidney Failure <15    CBC & Differential [[id number]] Collected:  04/01/19 0600    Specimen:  Blood Updated:  04/01/19 0634    Narrative:       The following orders were created for panel order CBC & Differential.  Procedure                               Abnormality         Status                     ---------                               -----------         ------                     CBC Auto Differential[[id number]]        Abnormal            Final result                 Please view results for these tests on the individual orders.    CBC Auto Differential [[id number]]  (Abnormal) Collected:  04/01/19 0600    Specimen:  Blood Updated:  04/01/19 0634     WBC 6.95 10*3/mm3      RBC 3.42 10*6/mm3      Hemoglobin 11.3 g/dL      Hematocrit 35.4 %      .5 fL      MCH 33.0 pg      MCHC 31.9 g/dL      RDW 18.0 %      RDW-SD 69.0 fl      MPV 10.3 fL      Platelets 143 10*3/mm3      Neutrophil % 77.7 %      Lymphocyte % 9.8 %      Monocyte % 5.8 %      Eosinophil % 5.6 %      Basophil % 0.7 %      Immature Grans % 0.4 %      Neutrophils, Absolute 5.40 10*3/mm3      Lymphocytes, Absolute 0.68 10*3/mm3      Monocytes, Absolute 0.40 10*3/mm3      Eosinophils, Absolute 0.39 10*3/mm3      Basophils, Absolute 0.05 10*3/mm3      Immature Grans, Absolute 0.03 10*3/mm3      nRBC 0.0 /100 WBC            Cultures:       Radiology Data:    Imaging Results (last 24 hours)     ** No results found for the last 24 hours. **          No Known Allergies    Scheduled meds:     atorvastatin 10 mg Oral Daily   b complex-vitamin c-folic acid 1 tablet Oral Daily   carvedilol 3.125 mg Oral BID With Meals   epoetin lucy 10,000 Units Subcutaneous Once per day on Mon Wed Fri   levothyroxine 150 mcg Oral Daily   sertraline 50 mg Oral Daily   sodium chloride 3 mL Intravenous Q12H       PRN meds:  •  acetaminophen  •  ondansetron  •   [COMPLETED] Insert peripheral IV **AND** sodium chloride  •  sodium chloride    Assessment/Plan       Chronic kidney disease on chronic dialysis (CMS/HCC)    Chronic mesenteric ischemia (CMS/HCC)    Gastrointestinal hemorrhage    Diastolic dysfunction      Plan:  GI bleed/AV malformation.  Black tarry stool.  Hemoccult positive.  Consult GI.    Colonoscopy-ileum is normal, 1 6 mm polyp at the hepatic flexure, removed of cold snare, diverticulosis.  EGD.  Normal jejunum, normal duodenum, adjustable gastric banding, normal esophagus, no specimen collected.  M2A capsule revealed 5 small angioma ectasias in the small bowel.  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  Pathology-tubular adenoma-negative for high grade dysplasia.  Recommend limit anticoagulation as much as reasonable.  No sign of acute bleed.    Anemia.  Hemoglobin 3/12/19 9.7.  Today's hemoglobin 11.3 .       Chronic mesenteric ischemia.  Previous note from vascular-stated the best course of action is to remain conservative.  Will hold aspirin and Plavix due to GI bleed/AV malformation for now. Probably start patient on baby aspirin a day.  Stop Plavix due to GI bleed/AV malformation.     Hypertension/ hypotension.  Continue Coreg.  Hold losartan.  Hold Midodrin.     Chronic renal failure on dialysis. Consult nephrology.     Nausea.  Zofran as needed.     Hyperlipidemia.  Continue Pravachol     Depression/anxiety.  Continue Zoloft.    Discharge Plannin-2 days    Jeffrey Zamora MD   19   5:16 PM

## 2019-04-01 NOTE — PROGRESS NOTES
Nephrology ([facility]) Progress Note      Patient:  Cheri Ely  YOB: 1940  Date of Service: 4/1/2019  MRN: [id number]   Acct: [id number]   Primary Care Physician: Provider, No Known  Advance Directive:   Code Status and Medical Interventions:   Ordered at: 03/24/19 1243     Code Status:    CPR     Medical Interventions (Level of Support Prior to Arrest):    Full     Admit Date: 3/24/2019       Hospital Day: 8  Referring Provider: Kemi Bloom MD      Patient personally seen and examined.  Complete chart including Consults, Notes, Operative Reports, Labs, Cardiology, and Radiology studies reviewed as able.        Subjective:  Cheri Ely is a 79 y.o. female  whom we were consulted for End-stage renal disease.  She has a recurrent history of GI bleed and has multiple EGDs in the past.  Presented again with GI bleed.  No evidence of acute bleed was found on EGD as well as colonoscopy.  She has M2A camera scan 3/30.  Also has history of mesenteric ischemia and possible source of bleeding and from small intestine.    Today, no new events.  No f/c/n/v.  In good spirits.  Awaiting m2a scan results    Dialysis   Pt was seen on RRT  Modality: Hemodialysis  Access: Arterial Venous Fistula  Location: left upper  QB: 400  QD: 600  UF: 720      Allergies:  Patient has no known allergies.    Home Meds:  Medications Prior to Admission   Medication Sig Dispense Refill Last Dose   • acetaminophen (TYLENOL) 325 MG tablet Take 2 tablets by mouth Every 6 (Six) Hours As Needed for Mild Pain .   Past Month at Unknown time   • B Complex-C (SUPER B COMPLEX/VITAMIN C PO) Take 1 tablet by mouth Daily.   3/23/2019   • B Complex-C-Folic Acid (JOHN-SANGEETA) tablet Take 1 tablet by mouth Daily.   3/23/2019   • carvedilol (COREG) 3.125 MG tablet Take 3.125 mg by mouth Daily. Monday, Wednesday, and Friday dose. Hold if SBP < 100.   3/22/2019   • carvedilol (COREG) 3.125 MG tablet Take 3.125 mg  by mouth 2 (Two) Times a Day With Meals. Sunday, Tuesday, Thursday, and Saturday dose. Hold if SBP <100.   3/23/2019   • Cholecalciferol (VITAMIN D3) 5000 units capsule capsule Take 5,000 Units by mouth Daily.   3/23/2019   • clopidogrel (PLAVIX) 75 MG tablet Take 1 tablet by mouth Daily. 90 tablet 2 3/23/2019   • diphenhydrAMINE (BENADRYL) 25 mg capsule Take 25 mg by mouth 2 (Two) Times a Day As Needed for Itching, Allergies or Sleep.   Past Month at Unknown time   • docusate sodium 100 MG capsule Take 100 mg by mouth 2 (Two) Times a Day. 60 each 1 3/23/2019   • lactulose 20 GM/30ML solution solution Take 30 mL by mouth Daily As Needed (Constipation). 30 mL  3/23/2019   • levothyroxine (SYNTHROID, LEVOTHROID) 150 MCG tablet Take 150 mcg by mouth Daily.   3/23/2019   • losartan (COZAAR) 25 MG tablet Take 25 mg by mouth 3 (Three) Times a Week. Monday, Wednesday, and Friday.   3/23/2019   • midodrine (PROAMATINE) 2.5 MG tablet Take 1 tablet by mouth 3 (Three) Times a Day.   3/23/2019   • Multiple Vitamins-Minerals (HAIR SKIN AND NAILS FORMULA PO) Take 2 tablets by mouth Daily.   3/23/2019   • ondansetron ODT (ZOFRAN-ODT) 4 MG disintegrating tablet Take 4 mg by mouth Every 8 (Eight) Hours As Needed for Nausea or Vomiting.   Past Month at Unknown time   • oxyCODONE-acetaminophen (PERCOCET)  MG per tablet Take 1 tablet by mouth Every 8 (Eight) Hours As Needed for Moderate Pain .   Past Month at Unknown time   • pantoprazole (PROTONIX) 40 MG EC tablet Take 1 tablet by mouth Daily. 30 tablet 11 3/23/2019   • Polyethyl Glyc-Propyl Glyc PF 0.4-0.3 % solution ophthalmic solution Administer 2 drops to both eyes Every 2 (Two) Hours As Needed (dry eyes).   Past Month at Unknown time   • pravastatin (PRAVACHOL) 40 MG tablet Take 40 mg by mouth Daily.   3/23/2019   • sertraline (ZOLOFT) 50 MG tablet Take 50 mg by mouth Daily.   3/23/2019   • sucralfate (CARAFATE) 1 GM/10ML suspension Take 10 mL by mouth 4 (Four) Times a Day.  1200 mL 0 3/23/2019   • aspirin 81 MG EC tablet Take 1 tablet by mouth Daily.   3/23/2019       Medicines:  Current Facility-Administered Medications   Medication Dose Route Frequency Provider Last Rate Last Dose   • acetaminophen (TYLENOL) tablet 650 mg  650 mg Oral Q6H PRN Azalia Shaver DO   650 mg at 04/01/19 0935   • atorvastatin (LIPITOR) tablet 10 mg  10 mg Oral Daily Jeffrey Zamora MD   10 mg at 03/31/19 1013   • b complex-vitamin c-folic acid (NEPHRO-SANGEETA) tablet 1 tablet  1 tablet Oral Daily Faisal Sevilla MD   1 tablet at 03/31/19 1013   • carvedilol (COREG) tablet 3.125 mg  3.125 mg Oral BID With Meals Jeffrey Zamora MD   3.125 mg at 03/31/19 2229   • epoetin lucy (EPOGEN,PROCRIT) injection 10,000 Units  10,000 Units Subcutaneous Once per day on Mon Wed Fri Faisal Sevilla MD   10,000 Units at 03/29/19 1449   • levothyroxine (SYNTHROID, LEVOTHROID) tablet 150 mcg  150 mcg Oral Daily Jeffrey Zamora MD   150 mcg at 03/31/19 1013   • ondansetron (ZOFRAN) injection 4 mg  4 mg Intravenous Q6H PRN Jeffrey Zamora MD   4 mg at 03/27/19 2042   • sertraline (ZOLOFT) tablet 50 mg  50 mg Oral Daily Jeffrey Zamora MD   50 mg at 03/31/19 1013   • sodium chloride 0.9 % flush 10 mL  10 mL Intravenous PRN Ashlyn Mackey APRN       • sodium chloride 0.9 % flush 3 mL  3 mL Intravenous Q12H Jeffrey Zamora MD   3 mL at 03/31/19 2230   • sodium chloride 0.9 % flush 3-10 mL  3-10 mL Intravenous PRN Jeffrey Zamora MD           Past Medical History:  Past Medical History:   Diagnosis Date   • Arthritis    • Carotid artery disease (CMS/HCC)    • CHF (congestive heart failure) (CMS/HCC)    • Chronic kidney disease on chronic dialysis (CMS/HCC)    • Chronic renal failure     on dialysis ON MON, WED, FRI   • Coronary artery disease    • Disease of thyroid gland    • Diverticulitis    • Gastric ulcer    • History of transfusion    • Hyperlipidemia    • Hypertension    • Injury of back    •  Mesenteric artery insufficiency (CMS/HCC)    • Multilevel degenerative disc disease    • Osteoporosis    • Pancreatitis    • Pelvis fracture (CMS/HCC)    • Pneumonia        Past Surgical History:  Past Surgical History:   Procedure Laterality Date   • AORTAGRAM Right 1/8/2018    Procedure: MESENTERIC ANGIOGRAM, GROIN ACCESS, MYNX CLOSURE;  Surgeon: Tomasz San DO;  Location: [facility] OR;  Service:    • AORTIC VALVE SURGERY     • APPENDECTOMY     • BACK SURGERY     • CARDIAC SURGERY     • CAROTID ENDARTERECTOMY Bilateral    • CATARACT EXTRACTION, BILATERAL     • CERVICAL SPINE SURGERY     • CHOLECYSTECTOMY     • COLON SURGERY     • COLONOSCOPY  10/01/2015   • COLONOSCOPY N/A 3/28/2019    Procedure: COLONOSCOPY WITH ANESTHESIA;  Surgeon: Rafita Merida MD;  Location: [facility] ENDOSCOPY;  Service: Gastroenterology   • CORONARY ARTERY BYPASS GRAFT     • DIALYSIS FISTULA CREATION     • ENDOSCOPY N/A 12/27/2018    Procedure: ESOPHAGOGASTRODUODENOSCOPY WITH ANESTHESIA;  Surgeon: Moni Garza MD;  Location: [facility] ENDOSCOPY;  Service: Gastroenterology   • ENDOSCOPY N/A 2/28/2019    Procedure: ESOPHAGOGASTRODUODENOSCOPY WITH ANESTHESIA;  Surgeon: Moni Garza MD;  Location: [facility] ENDOSCOPY;  Service: Gastroenterology   • ENDOSCOPY N/A 3/11/2019    Procedure: ESOPHAGOGASTRODUODENOSCOPY WITH ANESTHESIA;  Surgeon: Moni Garza MD;  Location: [facility] ENDOSCOPY;  Service: Gastroenterology   • ENDOSCOPY N/A 3/27/2019    Procedure: ESOPHAGOGASTRODUODENOSCOPY WITH ANESTHESIA;  Surgeon: Rafita Merida MD;  Location: [facility] ENDOSCOPY;  Service: Gastroenterology   • HIP TROCANTERIC NAILING WITH INTRAMEDULLARY HIP SCREW Right 2/8/2019    Procedure: HIP TROCANTERIC NAILING LONG WITH INTRAMEDULLARY HIP SCREW;  Surgeon: Boo Camacho MD;  Location: [facility] OR;  Service: Orthopedics   • JOINT REPLACEMENT      knee   • LAPAROSCOPIC GASTRIC BANDING     • LEEP     • LUMBAR FUSION     • TOE AMPUTATION Left     2nd toe   •  TOTAL KNEE ARTHROPLASTY Bilateral    • TUBAL ABDOMINAL LIGATION     • UPPER GASTROINTESTINAL ENDOSCOPY  10/01/2015       Family History  Family History   Problem Relation Age of Onset   • Hypertension Mother    • Cancer Mother         stomach   • Coronary artery disease Father    • Heart attack Father    • Diabetes Brother    • Coronary artery disease Brother    • Colon cancer Neg Hx    • Colon polyps Neg Hx        Social History  Social History     Socioeconomic History   • Marital status:      Spouse name: Not on file   • Number of children: Not on file   • Years of education: Not on file   • Highest education level: Not on file   Tobacco Use   • Smoking status: Never Smoker   • Smokeless tobacco: Never Used   Substance and Sexual Activity   • Alcohol use: No   • Drug use: No   • Sexual activity: Defer         Review of Systems:  History obtained from chart review and the patient  General ROS: No fever or chills  Respiratory ROS: No cough, shortness of breath, wheezing  Cardiovascular ROS: No chest pain or palpitations  Gastrointestinal ROS: No abdominal pain or melena  Genito-Urinary ROS: No dysuria or hematuria    Objective:  Patient Vitals for the past 24 hrs:   BP Temp Temp src Pulse Resp SpO2 Weight   04/01/19 0541 -- -- -- -- -- -- 71.3 kg (157 lb 3.2 oz)   04/01/19 0425 121/52 97.8 °F (36.6 °C) Oral 101 16 96 % --   03/31/19 2350 121/41 97.9 °F (36.6 °C) Oral 73 16 93 % --   03/31/19 1856 126/51 98.1 °F (36.7 °C) Oral 85 18 98 % --   03/31/19 1526 130/54 97.8 °F (36.6 °C) Oral 104 18 96 % --   03/31/19 1151 124/53 98.9 °F (37.2 °C) Oral 107 18 95 % --       Intake/Output Summary (Last 24 hours) at 4/1/2019 1104  Last data filed at 3/31/2019 1856  Gross per 24 hour   Intake 720 ml   Output --   Net 720 ml     General: awake/alert   Chest:  clear to auscultation bilaterally without respiratory distress  CVS: regular rate and rhythm  Abdominal: soft, nontender, positive bowel sounds  Extremities: no  cyanosis or edema  Skin: warm and dry without rash      Labs:  Results from last 7 days   Lab Units 04/01/19  0600 03/30/19  0555 03/29/19  0458  03/27/19  0018   WBC 10*3/mm3 6.95 5.95  --   --  6.08   HEMOGLOBIN g/dL 11.3* 10.2* 9.8*   < > 9.6*   HEMATOCRIT % 35.4* 32.0* 30.2*   < > 29.2*   PLATELETS 10*3/mm3 143 92*  --   --  102*    < > = values in this interval not displayed.         Results from last 7 days   Lab Units 04/01/19  0600 03/30/19  0555 03/27/19  2341 03/27/19  0018 03/26/19  0057   SODIUM mmol/L 140 139 138 136 136   POTASSIUM mmol/L 3.9 3.9 4.1 3.8 4.3   CHLORIDE mmol/L 106 107 95* 98 98   CO2 mmol/L 24.0 23.0* 35.0* 29.0 31.0   BUN mg/dL 19 9 16 34* 25*   CREATININE mg/dL 5.90* 3.27* 2.75* 4.02* 3.09*   CALCIUM mg/dL 8.3* 8.3* 7.7* 7.8* 7.9*   BILIRUBIN mg/dL  --  0.5  --  0.5 0.6   ALK PHOS U/L  --  144*  --  127* 126*   ALT (SGPT) U/L  --  17  --  <15 19   AST (SGOT) U/L  --  27  --  21 22   GLUCOSE mg/dL 89 68* 89 83 78       Radiology:   Imaging Results (last 72 hours)     Procedure Component Value Units Date/Time    XR Chest 1 View [[id number]] Collected:  03/28/19 1017     Updated:  03/28/19 1021    Narrative:       XR CHEST 1 VW- 3/28/2019 9:35 AM CDT     HISTORY: follow up; K92.2-Gastrointestinal hemorrhage, unspecified;  N18.6-End stage renal disease; Z99.2-Dependence on renal dialysis;  D64.9-Anemia, unspecified; Z79.01-Long term (current) use of  anticoagulants; R68.89-Other general symptoms and signs; Z74.09-Other  reduced mobility       COMPARISON: 03/10/2019.     FINDINGS:   Diffuse opacities are seen throughout bilateral lungs. The heart is  enlarged. An aortic valve repair has been performed previously.  Sternotomy sutures are noted.      The osseous structures and surrounding soft tissues demonstrate no acute  abnormality.       Impression:       1. Diffuse interstitial and airspace opacities could be due to  interstitial lung disease with superimposed pulmonary edema.         This report was finalized on 03/28/2019 10:18 by Dr. Jacob Flores MD.          Culture:         Assessment   ESRD  Anemia CKD / acute GI blood loss  HTN  Hyperkalemia  Secondary Hyperparathyroidism    Plan:  HD MWF  Monitor labs  D/w pt/RN        Chris Hsu MD  4/1/2019  11:04 AM

## 2019-04-01 NOTE — THERAPY TREATMENT NOTE
Acute Care - Physical Therapy Treatment Note  [facility]     Patient Name: Cheri Ely  : 1940  MRN: [id number]  Today's Date: 2019  Onset of Illness/Injury or Date of Surgery: 19  Date of Referral to PT: 19  Referring Physician: Dr. Bloom    Admit Date: 3/24/2019    Visit Dx:    ICD-10-CM ICD-9-CM   1. Gastrointestinal hemorrhage, unspecified gastrointestinal hemorrhage type K92.2 578.9   2. ESRD (end stage renal disease) on dialysis (CMS/formerly [facility]) N18.6 585.6    Z99.2 V45.11   3. Anemia, unspecified type D64.9 285.9   4. Chronic anticoagulation Z79.01 V58.61   5. Decreased activities of daily living (ADL) R68.89 780.99   6. Impaired mobility Z74.09 799.89   7. Gastrointestinal hemorrhage with melena K92.1 578.1     Patient Active Problem List   Diagnosis   • Hypertension   • Hyperlipidemia   • Chronic kidney disease on chronic dialysis (CMS/formerly [facility])   • Closed fracture of ramus of left pubis (CMS/formerly [facility])   • Occlusion of superior mesenteric artery (CMS/formerly [facility])   • Chronic mesenteric ischemia (CMS/formerly [facility])   • Black tarry stools   • Hx of gastritis   • Acute gastric ulcer with hemorrhage   • Closed displaced intertrochanteric fracture of right femur (CMS/formerly [facility])   • Displaced intertrochanteric fracture of right femur, initial encounter for closed fracture (CMS/formerly [facility])   • Hypotension   • Acute blood loss anemia   • GI bleed   • Gastrointestinal hemorrhage   • (HFpEF) heart failure with preserved ejection fraction (CMS/formerly [facility])   • Hypothyroid   • Diastolic dysfunction   • Diastolic heart failure (CMS/formerly [facility])   • Volume overload       Therapy Treatment    Rehabilitation Treatment Summary     Row Name 19 1406 19 1331          Treatment Time/Intention    Discipline  physical therapy assistant  -AE  physical therapy assistant  -LG     Document Type  therapy note (daily note)  -AE  --     Subjective Information  complains of;pain  -AE  --     Existing Precautions/Restrictions  fall;weight bearing  -AE  --      Treatment Considerations/Comments  TTWB R LE  -AE  --     Recorded by [AE] Lin Chacon, [facility] 04/01/19 1436 [LG] James Augustine, [facility] 04/01/19 1332     Row Name 04/01/19 1406             Bed Mobility Assessment/Treatment    Supine-Sit Clinch (Bed Mobility)  minimum assist (75% patient effort);2 person assist;verbal cues  -AE      Sit-Supine Clinch (Bed Mobility)  moderate assist (50% patient effort);verbal cues  -AE      Recorded by [AE] Lin Chacon, [facility] 04/01/19 1436      Row Name 04/01/19 1406             Sit-Stand Transfer    Sit-Stand Clinch (Transfers)  minimum assist (75% patient effort);2 person assist;verbal cues  -AE      Recorded by [AE] Lin Chacon, [facility] 04/01/19 1436      Row Name 04/01/19 1406             Stand-Sit Transfer    Stand-Sit Clinch (Transfers)  minimum assist (75% patient effort);2 person assist  -AE      Recorded by [AE] Lin Chacon, [facility] 04/01/19 1436      Row Name 04/01/19 1406             Gait/Stairs Assessment/Training    Assistive Device (Gait)  walker, front-wheeled  -AE      Distance in Feet (Gait)  -- steps to chair then sitting rest then steps back to bed  -AE      Recorded by [AE] Lin Chacon, [facility] 04/01/19 1436      Row Name 04/01/19 1406             Therapeutic Exercise    Lower Extremity (Therapeutic Exercise)  LAQ (long arc quad), bilateral  -AE      Lower Extremity Range of Motion (Therapeutic Exercise)  ankle dorsiflexion/plantar flexion, bilateral;hip abduction/adduction, bilateral;hip flexion/extension, bilateral  -AE      Recorded by [AE] Lin Chacon, [facility] 04/01/19 1436      Row Name 04/01/19 1406             Positioning and Restraints    Pre-Treatment Position  in bed  -AE      Post Treatment Position  bed  -AE      In Bed  fowlers;call light within reach  -AE      Recorded by [AE] Lin Chacon, [facility] 04/01/19 1436        User Key  (r) = Recorded By, (t) = Taken By, (c) = Cosigned By    Initials Name Effective Dates Discipline     Lin Hubbard, PTA 06/22/15 -  PT    LG James Augustine, [facility] [place] 08/02/16 -  PT                   Physical Therapy Education     Title: PT OT SLP Therapies (In Progress)     Topic: Physical Therapy (In Progress)     Point: Mobility training (Done)     Learning Progress Summary           Patient Acceptance, E,D, VU,NR by  at 3/31/2019 12:05 PM    Comment:  transfers, safety concerns    Acceptance, E, NR by  at 3/26/2019 12:42 PM    Comment:  Pt educated on POC, WB status, transfer modifications, and safety/fall risk because of need of assistance.                   Point: Precautions (In Progress)     Learning Progress Summary           Patient Acceptance, E, NR by  at 3/26/2019 12:42 PM    Comment:  Pt educated on POC, WB status, transfer modifications, and safety/fall risk because of need of assistance.                               User Key     Initials Effective Dates Name Provider Type Discipline     08/02/16 -  Gilda Mackey PTA Physical Therapy Assistant PT     03/13/19 -  Courtney Mahan PT Student PT Student PT                PT Recommendation and Plan     Plan of Care Reviewed With: patient  Progress: improving  Outcome Summary: Pt was min x 2 for bed mobility and min x 2 to take steps to chair. pt then completed AROM ex's x 2- reps  Outcome Measures     Row Name 03/31/19 1104             How much help from another person do you currently need...    Turning from your back to your side while in flat bed without using bedrails?  3  -MF      Moving from lying on back to sitting on the side of a flat bed without bedrails?  3  -MF      Moving to and from a bed to a chair (including a wheelchair)?  3  -MF      Standing up from a chair using your arms (e.g., wheelchair, bedside chair)?  3  -MF      Climbing 3-5 steps with a railing?  1  -MF      To walk in hospital room?  2  -MF      AM-PAC 6 Clicks Score  15  -MF         Functional Assessment    Outcome Measure Options  AM-PAC 6 Clicks Basic  Mobility (PT)  -MF        User Key  (r) = Recorded By, (t) = Taken By, (c) = Cosigned By    Initials Name Provider Type    Gilda Pierre PTA Physical Therapy Assistant         Time Calculation:   PT Charges     Row Name 04/01/19 1443             Time Calculation    Start Time  1406  -AE      Stop Time  1430  -AE      Time Calculation (min)  24 min  -AE      PT Received On  04/01/19  -AE      PT Goal Re-Cert Due Date  04/05/19  -AE         Time Calculation- PT    Total Timed Code Minutes- PT  24 minute(s)  -AE         Timed Charges    64961 - PT Therapeutic Activity Minutes  24  -AE        User Key  (r) = Recorded By, (t) = Taken By, (c) = Cosigned By    Initials Name Provider Type    AE Lin Chacon PTA Physical Therapy Assistant        Therapy Charges for Today     Code Description Service Date Service Provider Modifiers Qty    46791376054 HC PT THERAPEUTIC ACT EA 15 MIN 4/1/2019 Lin Chacon PTA GP 1    92151896020 HC PT THER PROC EA 15 MIN 4/1/2019 Lin Chacon PTA GP 1          PT G-Codes  Outcome Measure Options: AM-PAC 6 Clicks Basic Mobility (PT)  AM-PAC 6 Clicks Score: 15  Score: 16    Lin Chacon PTA  4/1/2019

## 2019-04-01 NOTE — PLAN OF CARE
Problem: Fall Risk (Adult)  Goal: Identify Related Risk Factors and Signs and Symptoms  Outcome: Outcome(s) achieved Date Met: 04/01/19    Goal: Absence of Fall  Outcome: Ongoing (interventions implemented as appropriate)      Problem: Patient Care Overview  Goal: Plan of Care Review  Outcome: Ongoing (interventions implemented as appropriate)   04/01/19 1539   Coping/Psychosocial   Plan of Care Reviewed With patient   Plan of Care Review   Progress improving   OTHER   Outcome Summary pt went for dialysis today 2L pulled off, tele on reports a fib, creat 5.90, BUN 19, M2 capsule shows five small angioma ectasias in small bowel. VSS cont to monitor.      Goal: Individualization and Mutuality  Outcome: Ongoing (interventions implemented as appropriate)    Goal: Discharge Needs Assessment  Outcome: Ongoing (interventions implemented as appropriate)      Problem: Gastrointestinal Bleeding (Adult)  Goal: Signs and Symptoms of Listed Potential Problems Will be Absent, Minimized or Managed (Gastrointestinal Bleeding)  Outcome: Ongoing (interventions implemented as appropriate)      Problem: Skin Injury Risk (Adult)  Goal: Identify Related Risk Factors and Signs and Symptoms  Outcome: Outcome(s) achieved Date Met: 04/01/19    Goal: Skin Health and Integrity  Outcome: Ongoing (interventions implemented as appropriate)

## 2019-04-02 VITALS
RESPIRATION RATE: 16 BRPM | SYSTOLIC BLOOD PRESSURE: 135 MMHG | HEIGHT: 59 IN | BODY MASS INDEX: 31.77 KG/M2 | DIASTOLIC BLOOD PRESSURE: 53 MMHG | WEIGHT: 157.6 LBS | OXYGEN SATURATION: 97 % | TEMPERATURE: 98.2 F | HEART RATE: 108 BPM

## 2019-04-02 PROBLEM — K55.20 AVM (ARTERIOVENOUS MALFORMATION) OF SMALL BOWEL, ACQUIRED: Status: ACTIVE | Noted: 2019-04-02

## 2019-04-02 LAB
ALBUMIN SERPL-MCNC: 2.7 G/DL (ref 3.5–5)
ALBUMIN/GLOB SERPL: 1.2 G/DL (ref 1.1–2.5)
ALP SERPL-CCNC: 144 U/L (ref 24–120)
ALT SERPL W P-5'-P-CCNC: <15 U/L (ref 0–54)
ANION GAP SERPL CALCULATED.3IONS-SCNC: 8 MMOL/L (ref 4–13)
AST SERPL-CCNC: 25 U/L (ref 7–45)
BASOPHILS # BLD AUTO: 0.05 10*3/MM3 (ref 0–0.2)
BASOPHILS NFR BLD AUTO: 0.9 % (ref 0–2)
BILIRUB SERPL-MCNC: 0.5 MG/DL (ref 0.1–1)
BUN BLD-MCNC: 13 MG/DL (ref 5–21)
BUN/CREAT SERPL: 3.5 (ref 7–25)
CALCIUM SPEC-SCNC: 7.9 MG/DL (ref 8.4–10.4)
CHLORIDE SERPL-SCNC: 98 MMOL/L (ref 98–110)
CO2 SERPL-SCNC: 28 MMOL/L (ref 24–31)
CREAT BLD-MCNC: 3.69 MG/DL (ref 0.5–1.4)
DEPRECATED RDW RBC AUTO: 66.4 FL (ref 40–54)
EOSINOPHIL # BLD AUTO: 0.37 10*3/MM3 (ref 0–0.7)
EOSINOPHIL NFR BLD AUTO: 6.7 % (ref 0–4)
ERYTHROCYTE [DISTWIDTH] IN BLOOD BY AUTOMATED COUNT: 18 % (ref 12–15)
GFR SERPL CREATININE-BSD FRML MDRD: 12 ML/MIN/1.73
GFR SERPL CREATININE-BSD FRML MDRD: ABNORMAL ML/MIN/1.73
GLOBULIN UR ELPH-MCNC: 2.3 GM/DL
GLUCOSE BLD-MCNC: 114 MG/DL (ref 70–100)
HCT VFR BLD AUTO: 32 % (ref 37–47)
HGB BLD-MCNC: 10.4 G/DL (ref 12–16)
IMM GRANULOCYTES # BLD AUTO: 0.02 10*3/MM3 (ref 0–0.05)
IMM GRANULOCYTES NFR BLD AUTO: 0.4 % (ref 0–5)
LYMPHOCYTES # BLD AUTO: 0.8 10*3/MM3 (ref 0.72–4.86)
LYMPHOCYTES NFR BLD AUTO: 14.5 % (ref 15–45)
MCH RBC QN AUTO: 33.2 PG (ref 28–32)
MCHC RBC AUTO-ENTMCNC: 32.5 G/DL (ref 33–36)
MCV RBC AUTO: 102.2 FL (ref 82–98)
MONOCYTES # BLD AUTO: 0.41 10*3/MM3 (ref 0.19–1.3)
MONOCYTES NFR BLD AUTO: 7.4 % (ref 4–12)
NEUTROPHILS # BLD AUTO: 3.87 10*3/MM3 (ref 1.87–8.4)
NEUTROPHILS NFR BLD AUTO: 70.1 % (ref 39–78)
NRBC BLD AUTO-RTO: 0 /100 WBC (ref 0–0)
PLATELET # BLD AUTO: 114 10*3/MM3 (ref 130–400)
PMV BLD AUTO: 10.9 FL (ref 6–12)
POTASSIUM BLD-SCNC: 3.6 MMOL/L (ref 3.5–5.3)
PROT SERPL-MCNC: 5 G/DL (ref 6.3–8.7)
RBC # BLD AUTO: 3.13 10*6/MM3 (ref 4.2–5.4)
SODIUM BLD-SCNC: 134 MMOL/L (ref 135–145)
WBC NRBC COR # BLD: 5.52 10*3/MM3 (ref 4.8–10.8)

## 2019-04-02 PROCEDURE — 85025 COMPLETE CBC W/AUTO DIFF WBC: CPT | Performed by: FAMILY MEDICINE

## 2019-04-02 PROCEDURE — 80053 COMPREHEN METABOLIC PANEL: CPT | Performed by: FAMILY MEDICINE

## 2019-04-02 RX ORDER — MULTIVIT-MIN/IRON/FOLIC ACID/K 18-600-40
2000 CAPSULE ORAL DAILY
Qty: 30 CAPSULE
Start: 2019-04-02

## 2019-04-02 RX ORDER — SUCRALFATE ORAL 1 G/10ML
1 SUSPENSION ORAL 2 TIMES DAILY
Qty: 1200 ML | Refills: 0 | Status: ON HOLD | OUTPATIENT
Start: 2019-04-02 | End: 2020-04-14

## 2019-04-02 RX ADMIN — LEVOTHYROXINE SODIUM 150 MCG: 150 TABLET ORAL at 08:18

## 2019-04-02 RX ADMIN — Medication 1 TABLET: at 08:18

## 2019-04-02 RX ADMIN — ASPIRIN 81 MG: 81 TABLET, DELAYED RELEASE ORAL at 08:18

## 2019-04-02 RX ADMIN — ATORVASTATIN CALCIUM 10 MG: 10 TABLET, FILM COATED ORAL at 08:18

## 2019-04-02 RX ADMIN — SODIUM CHLORIDE, PRESERVATIVE FREE 3 ML: 5 INJECTION INTRAVENOUS at 08:18

## 2019-04-02 RX ADMIN — CARVEDILOL 3.12 MG: 3.12 TABLET, FILM COATED ORAL at 08:18

## 2019-04-02 RX ADMIN — SERTRALINE 50 MG: 50 TABLET, FILM COATED ORAL at 08:17

## 2019-04-02 NOTE — PROGRESS NOTES
Nephrology ([facility]) Progress Note      Patient:  Cheri Ely  YOB: 1940  Date of Service: 4/2/2019  MRN: [id number]   Acct: [id number]   Primary Care Physician: Provider, No Known  Advance Directive:   Code Status and Medical Interventions:   Ordered at: 03/24/19 1243     Code Status:    CPR     Medical Interventions (Level of Support Prior to Arrest):    Full     Admit Date: 3/24/2019       Hospital Day: 9  Referring Provider: Kemi Bloom MD      Patient personally seen and examined.  Complete chart including Consults, Notes, Operative Reports, Labs, Cardiology, and Radiology studies reviewed as able.        Subjective:  Cheri Ely is a 79 y.o. female  whom we were consulted for End-stage renal disease.  She has a recurrent history of GI bleed and has multiple EGDs in the past.  Presented again with GI bleed.  No evidence of acute bleed was found on EGD as well as colonoscopy.  She has M2A camera scan 3/30.  Also has history of mesenteric ischemia and possible source of bleeding and from small intestine.    Today, no new events.  No f/c/n/v. Being discharged to NH today.     Dialysis       Allergies:  Patient has no known allergies.    Home Meds:  Medications Prior to Admission   Medication Sig Dispense Refill Last Dose   • acetaminophen (TYLENOL) 325 MG tablet Take 2 tablets by mouth Every 6 (Six) Hours As Needed for Mild Pain .   Past Month at Unknown time   • B Complex-C (SUPER B COMPLEX/VITAMIN C PO) Take 1 tablet by mouth Daily.   3/23/2019   • B Complex-C-Folic Acid (JOHN-SANGEETA) tablet Take 1 tablet by mouth Daily.   3/23/2019   • carvedilol (COREG) 3.125 MG tablet Take 3.125 mg by mouth Daily. Monday, Wednesday, and Friday dose. Hold if SBP < 100.   3/22/2019   • carvedilol (COREG) 3.125 MG tablet Take 3.125 mg by mouth 2 (Two) Times a Day With Meals. Sunday, Tuesday, Thursday, and Saturday dose. Hold if SBP <100.   3/23/2019   • Cholecalciferol  (VITAMIN D3) 5000 units capsule capsule Take 5,000 Units by mouth Daily.   3/23/2019   • clopidogrel (PLAVIX) 75 MG tablet Take 1 tablet by mouth Daily. 90 tablet 2 3/23/2019   • diphenhydrAMINE (BENADRYL) 25 mg capsule Take 25 mg by mouth 2 (Two) Times a Day As Needed for Itching, Allergies or Sleep.   Past Month at Unknown time   • docusate sodium 100 MG capsule Take 100 mg by mouth 2 (Two) Times a Day. 60 each 1 3/23/2019   • lactulose 20 GM/30ML solution solution Take 30 mL by mouth Daily As Needed (Constipation). 30 mL  3/23/2019   • levothyroxine (SYNTHROID, LEVOTHROID) 150 MCG tablet Take 150 mcg by mouth Daily.   3/23/2019   • losartan (COZAAR) 25 MG tablet Take 25 mg by mouth 3 (Three) Times a Week. Monday, Wednesday, and Friday.   3/23/2019   • midodrine (PROAMATINE) 2.5 MG tablet Take 1 tablet by mouth 3 (Three) Times a Day.   3/23/2019   • Multiple Vitamins-Minerals (HAIR SKIN AND NAILS FORMULA PO) Take 2 tablets by mouth Daily.   3/23/2019   • ondansetron ODT (ZOFRAN-ODT) 4 MG disintegrating tablet Take 4 mg by mouth Every 8 (Eight) Hours As Needed for Nausea or Vomiting.   Past Month at Unknown time   • oxyCODONE-acetaminophen (PERCOCET)  MG per tablet Take 1 tablet by mouth Every 8 (Eight) Hours As Needed for Moderate Pain .   Past Month at Unknown time   • pantoprazole (PROTONIX) 40 MG EC tablet Take 1 tablet by mouth Daily. 30 tablet 11 3/23/2019   • Polyethyl Glyc-Propyl Glyc PF 0.4-0.3 % solution ophthalmic solution Administer 2 drops to both eyes Every 2 (Two) Hours As Needed (dry eyes).   Past Month at Unknown time   • pravastatin (PRAVACHOL) 40 MG tablet Take 40 mg by mouth Daily.   3/23/2019   • sertraline (ZOLOFT) 50 MG tablet Take 50 mg by mouth Daily.   3/23/2019   • [DISCONTINUED] sucralfate (CARAFATE) 1 GM/10ML suspension Take 10 mL by mouth 4 (Four) Times a Day. 1200 mL 0 3/23/2019   • aspirin 81 MG EC tablet Take 1 tablet by mouth Daily.   3/23/2019       Medicines:  Current  Facility-Administered Medications   Medication Dose Route Frequency Provider Last Rate Last Dose   • acetaminophen (TYLENOL) tablet 650 mg  650 mg Oral Q6H PRN Azalia Shaver DO   650 mg at 04/01/19 2331   • aspirin EC tablet 81 mg  81 mg Oral Daily Jeffrey Zamora MD   81 mg at 04/02/19 0818   • atorvastatin (LIPITOR) tablet 10 mg  10 mg Oral Daily Jeffrey Zamora MD   10 mg at 04/02/19 0818   • b complex-vitamin c-folic acid (NEPHRO-SANGEETA) tablet 1 tablet  1 tablet Oral Daily Faisal Sevilla MD   1 tablet at 04/02/19 0818   • carvedilol (COREG) tablet 3.125 mg  3.125 mg Oral BID With Meals Jeffrey Zamora MD   3.125 mg at 04/02/19 0818   • epoetin lucy (EPOGEN,PROCRIT) injection 10,000 Units  10,000 Units Subcutaneous Once per day on Mon Wed Fri Faisal Sevilla MD   10,000 Units at 04/01/19 1338   • levothyroxine (SYNTHROID, LEVOTHROID) tablet 150 mcg  150 mcg Oral Daily Jeffrey Zamora MD   150 mcg at 04/02/19 0818   • ondansetron (ZOFRAN) injection 4 mg  4 mg Intravenous Q6H PRN Jeffrey Zamora MD   4 mg at 03/27/19 2042   • sertraline (ZOLOFT) tablet 50 mg  50 mg Oral Daily Jeffrey Zamora MD   50 mg at 04/02/19 0817   • sodium chloride 0.9 % flush 10 mL  10 mL Intravenous PRN Ashlyn Mackey APRN       • sodium chloride 0.9 % flush 3 mL  3 mL Intravenous Q12H Jeffrey Zamora MD   3 mL at 04/02/19 0818   • sodium chloride 0.9 % flush 3-10 mL  3-10 mL Intravenous PRN Jeffrey Zamora MD           Past Medical History:  Past Medical History:   Diagnosis Date   • Arthritis    • Carotid artery disease (CMS/HCC)    • CHF (congestive heart failure) (CMS/HCC)    • Chronic kidney disease on chronic dialysis (CMS/HCC)    • Chronic renal failure     on dialysis ON MON, WED, FRI   • Coronary artery disease    • Disease of thyroid gland    • Diverticulitis    • Gastric ulcer    • History of transfusion    • Hyperlipidemia    • Hypertension    • Injury of back    • Mesenteric artery insufficiency  (CMS/HCC)    • Multilevel degenerative disc disease    • Osteoporosis    • Pancreatitis    • Pelvis fracture (CMS/HCC)    • Pneumonia        Past Surgical History:  Past Surgical History:   Procedure Laterality Date   • AORTAGRAM Right 1/8/2018    Procedure: MESENTERIC ANGIOGRAM, GROIN ACCESS, MYNX CLOSURE;  Surgeon: Tomasz San DO;  Location: [facility] OR;  Service:    • AORTIC VALVE SURGERY     • APPENDECTOMY     • BACK SURGERY     • CAPSULE ENDOSCOPY N/A 3/30/2019    Procedure: CAPSULE ENDOSCOPY M2A;  Surgeon: David Yu MD;  Location: [facility] ENDOSCOPY;  Service: Gastroenterology   • CARDIAC SURGERY     • CAROTID ENDARTERECTOMY Bilateral    • CATARACT EXTRACTION, BILATERAL     • CERVICAL SPINE SURGERY     • CHOLECYSTECTOMY     • COLON SURGERY     • COLONOSCOPY  10/01/2015   • COLONOSCOPY N/A 3/28/2019    Procedure: COLONOSCOPY WITH ANESTHESIA;  Surgeon: Rafita Merida MD;  Location: [facility] ENDOSCOPY;  Service: Gastroenterology   • CORONARY ARTERY BYPASS GRAFT     • DIALYSIS FISTULA CREATION     • ENDOSCOPY N/A 12/27/2018    Procedure: ESOPHAGOGASTRODUODENOSCOPY WITH ANESTHESIA;  Surgeon: Moni Garza MD;  Location: [facility] ENDOSCOPY;  Service: Gastroenterology   • ENDOSCOPY N/A 2/28/2019    Procedure: ESOPHAGOGASTRODUODENOSCOPY WITH ANESTHESIA;  Surgeon: Moni Garza MD;  Location: [facility] ENDOSCOPY;  Service: Gastroenterology   • ENDOSCOPY N/A 3/11/2019    Procedure: ESOPHAGOGASTRODUODENOSCOPY WITH ANESTHESIA;  Surgeon: Moni Garza MD;  Location: [facility] ENDOSCOPY;  Service: Gastroenterology   • ENDOSCOPY N/A 3/27/2019    Procedure: ESOPHAGOGASTRODUODENOSCOPY WITH ANESTHESIA;  Surgeon: Rafita Merida MD;  Location: [facility] ENDOSCOPY;  Service: Gastroenterology   • HIP TROCANTERIC NAILING WITH INTRAMEDULLARY HIP SCREW Right 2/8/2019    Procedure: HIP TROCANTERIC NAILING LONG WITH INTRAMEDULLARY HIP SCREW;  Surgeon: Boo Camacho MD;  Location: [facility] OR;  Service: Orthopedics   • JOINT  REPLACEMENT      knee   • LAPAROSCOPIC GASTRIC BANDING     • LEEP     • LUMBAR FUSION     • TOE AMPUTATION Left     2nd toe   • TOTAL KNEE ARTHROPLASTY Bilateral    • TUBAL ABDOMINAL LIGATION     • UPPER GASTROINTESTINAL ENDOSCOPY  10/01/2015       Family History  Family History   Problem Relation Age of Onset   • Hypertension Mother    • Cancer Mother         stomach   • Coronary artery disease Father    • Heart attack Father    • Diabetes Brother    • Coronary artery disease Brother    • Colon cancer Neg Hx    • Colon polyps Neg Hx        Social History  Social History     Socioeconomic History   • Marital status:      Spouse name: Not on file   • Number of children: Not on file   • Years of education: Not on file   • Highest education level: Not on file   Tobacco Use   • Smoking status: Never Smoker   • Smokeless tobacco: Never Used   Substance and Sexual Activity   • Alcohol use: No   • Drug use: No   • Sexual activity: Defer         Review of Systems:  History obtained from chart review and the patient  General ROS: No fever or chills  Respiratory ROS: No cough, shortness of breath, wheezing  Cardiovascular ROS: No chest pain or palpitations  Gastrointestinal ROS: No abdominal pain or melena  Genito-Urinary ROS: No dysuria or hematuria    Objective:  Patient Vitals for the past 24 hrs:   BP Temp Temp src Pulse Resp SpO2 Weight   04/02/19 0806 135/53 98.2 °F (36.8 °C) Oral 108 16 97 % --   04/02/19 0700 -- -- -- -- -- -- 71.5 kg (157 lb 9.6 oz)   04/02/19 0300 119/63 98 °F (36.7 °C) Oral 105 16 98 % --   04/01/19 2330 109/40 98.4 °F (36.9 °C) Oral 107 18 93 % --   04/01/19 1934 123/42 98.6 °F (37 °C) Oral 107 18 94 % --   04/01/19 1522 108/44 98.6 °F (37 °C) Oral 75 18 99 % --   04/01/19 1336 123/64 98.8 °F (37.1 °C) Oral 93 18 98 % --       Intake/Output Summary (Last 24 hours) at 4/2/2019 1115  Last data filed at 4/2/2019 0915  Gross per 24 hour   Intake 600 ml   Output --   Net 600 ml     General:  awake/alert   Chest:  clear to auscultation bilaterally without respiratory distress  CVS: regular rate and rhythm  Abdominal: soft, nontender, positive bowel sounds  Extremities: no cyanosis or edema  Skin: warm and dry without rash      Labs:  Results from last 7 days   Lab Units 04/02/19  0552 04/01/19  0600 03/30/19  0555   WBC 10*3/mm3 5.52 6.95 5.95   HEMOGLOBIN g/dL 10.4* 11.3* 10.2*   HEMATOCRIT % 32.0* 35.4* 32.0*   PLATELETS 10*3/mm3 114* 143 92*         Results from last 7 days   Lab Units 04/02/19  0552 04/01/19  0600 03/30/19  0555  03/27/19  0018   SODIUM mmol/L 134* 140 139   < > 136   POTASSIUM mmol/L 3.6 3.9 3.9   < > 3.8   CHLORIDE mmol/L 98 106 107   < > 98   CO2 mmol/L 28.0 24.0 23.0*   < > 29.0   BUN mg/dL 13 19 9   < > 34*   CREATININE mg/dL 3.69* 5.90* 3.27*   < > 4.02*   CALCIUM mg/dL 7.9* 8.3* 8.3*   < > 7.8*   BILIRUBIN mg/dL 0.5  --  0.5  --  0.5   ALK PHOS U/L 144*  --  144*  --  127*   ALT (SGPT) U/L <15  --  17  --  <15   AST (SGOT) U/L 25  --  27  --  21   GLUCOSE mg/dL 114* 89 68*   < > 83    < > = values in this interval not displayed.       Radiology:   Imaging Results (last 72 hours)     Procedure Component Value Units Date/Time    XR Chest 1 View [[id number]] Collected:  03/28/19 1017     Updated:  03/28/19 1021    Narrative:       XR CHEST 1 VW- 3/28/2019 9:35 AM CDT     HISTORY: follow up; K92.2-Gastrointestinal hemorrhage, unspecified;  N18.6-End stage renal disease; Z99.2-Dependence on renal dialysis;  D64.9-Anemia, unspecified; Z79.01-Long term (current) use of  anticoagulants; R68.89-Other general symptoms and signs; Z74.09-Other  reduced mobility       COMPARISON: 03/10/2019.     FINDINGS:   Diffuse opacities are seen throughout bilateral lungs. The heart is  enlarged. An aortic valve repair has been performed previously.  Sternotomy sutures are noted.      The osseous structures and surrounding soft tissues demonstrate no acute  abnormality.       Impression:       1.  Diffuse interstitial and airspace opacities could be due to  interstitial lung disease with superimposed pulmonary edema.        This report was finalized on 03/28/2019 10:18 by Dr. Jacob Flores MD.          Culture:         Assessment   ESRD  Anemia CKD / acute GI blood loss  HTN  Hyperkalemia  Secondary Hyperparathyroidism    Plan:  HD MWF  Being D/Cd to NH today. Will resume routine HD in the [facility] on M,W,F. No follow up appointment needed     Janiya Stratton, ABILIO  4/2/2019  11:15 AM

## 2019-04-02 NOTE — PLAN OF CARE
Problem: Patient Care Overview  Goal: Plan of Care Review  Outcome: Ongoing (interventions implemented as appropriate)   04/02/19 0126   Coping/Psychosocial   Plan of Care Reviewed With patient   Plan of Care Review   Progress no change   OTHER   Outcome Summary c/o head ache tonight. prn tylenol given with adequate relief. Up to [facility] with assistance. tele reports a fib. VSS. will continue to monitor.

## 2019-04-02 NOTE — THERAPY DISCHARGE NOTE
Acute Care - Physical Therapy Discharge Summary  [facility]       Patient Name: Cheri Ely  : 1940  MRN: [id number]    Today's Date: 2019  Onset of Illness/Injury or Date of Surgery: 19    Date of Referral to PT: 19  Referring Physician: Dr. Bloom      Admit Date: 3/24/2019      PT Recommendation and Plan    Visit Dx:    ICD-10-CM ICD-9-CM   1. Gastrointestinal hemorrhage, unspecified gastrointestinal hemorrhage type K92.2 578.9   2. ESRD (end stage renal disease) on dialysis (CMS/[facility]) N18.6 585.6    Z99.2 V45.11   3. Anemia, unspecified type D64.9 285.9   4. Chronic anticoagulation Z79.01 V58.61   5. Decreased activities of daily living (ADL) R68.89 780.99   6. Impaired mobility Z74.09 799.89   7. Gastrointestinal hemorrhage with melena K92.1 578.1       Outcome Measures     Row Name 19 1450 19 1104          How much help from another person do you currently need...    Turning from your back to your side while in flat bed without using bedrails?  --  3  -MF     Moving from lying on back to sitting on the side of a flat bed without bedrails?  --  3  -MF     Moving to and from a bed to a chair (including a wheelchair)?  --  3  -MF     Standing up from a chair using your arms (e.g., wheelchair, bedside chair)?  --  3  -MF     Climbing 3-5 steps with a railing?  --  1  -MF     To walk in hospital room?  --  2  -MF     AM-PAC 6 Clicks Score  --  15  -MF        How much help from another is currently needed...    Putting on and taking off regular lower body clothing?  2  -MM  --     Bathing (including washing, rinsing, and drying)  2  -MM  --     Toileting (which includes using toilet bed pan or urinal)  2  -MM  --     Putting on and taking off regular upper body clothing  3  -MM  --     Taking care of personal grooming (such as brushing teeth)  3  -MM  --     Eating meals  3  -MM  --     Score  15  -MM  --        Functional Assessment    Outcome Measure Options  --  AM-PAC 6  Clicks Basic Mobility (PT)  -MF       User Key  (r) = Recorded By, (t) = Taken By, (c) = Cosigned By    Initials Name Provider Type    Gilda Pierre PTA Physical Therapy Assistant    Linda Hobbs COTA/L Occupational Therapy Assistant              Rehab Goal Summary     Row Name 04/02/19 1555             Physical Therapy Goals    Bed Mobility Goal Selection (PT)  bed mobility, PT goal 1  -KJ      Transfer Goal Selection (PT)  transfer, PT goal 1  -KJ      Balance Goal Selection (PT)  balance, PT goal 1  -KJ         Bed Mobility Goal 1 (PT)    Activity/Assistive Device (Bed Mobility Goal 1, PT)  bed mobility activities, all  -KJ      [place] Level/Cues Needed (Bed Mobility Goal 1, PT)  minimum assist (75% or more patient effort);1 person assist  -KJ      Time Frame (Bed Mobility Goal 1, PT)  long term goal (LTG);10 days  -KJ      Barriers (Bed Mobility Goal 1, PT)  unable to maintain WB status and decreased awareness of need for assistance  -KJ      Progress/Outcomes (Bed Mobility Goal 1, PT)  goal met  -KJ         Transfer Goal 1 (PT)    Activity/Assistive Device (Transfer Goal 1, PT)  transfers, all  -KJ      [place] Level/Cues Needed (Transfer Goal 1, PT)  minimum assist (75% or more patient effort);1 person assist  -KJ      Time Frame (Transfer Goal 1, PT)  long term goal (LTG);10 days  -KJ      Barriers (Transfers Goal 1, PT)  unable to maintain WB status and decreased awareness of need for assistance  -KJ      Progress/Outcome (Transfer Goal 1, PT)  goal met  -KJ         Balance Goal 1 (PT)    Activity/Assistive Device (Balance Goal 1, PT)  sitting, dynamic;standing, static  -KJ      [place] Level/Cues Needed (Balance Goal 1, PT)  minimum assist (75% or more patient effort);1 person assist  -KJ      Time Frame (Balance Goal 1, PT)  long term goal (LTG);10 days  -KJ      Barriers (Balance Goal 1, PT)  unable to maintain WB status and decreased awareness of need for assistance   -KJ      Progress/Outcomes (Balance Goal 1, PT)  goal met  -KJ        User Key  (r) = Recorded By, (t) = Taken By, (c) = Cosigned By    Initials Name Provider Type Discipline    Airam Gandara, PTA Physical Therapy Assistant PT              PT Discharge Summary  Anticipated Discharge Disposition (PT): home  Reason for Discharge: Discharge from facility  Outcomes Achieved: Refer to plan of care for updates on goals achieved  Discharge Destination: Home      Airam Pelletier PTA   4/2/2019

## 2019-04-02 NOTE — PROGRESS NOTES
Continued Stay Note  [facility]     Patient Name: Cheri Ely  MRN: [id number]  Today's Date: 4/2/2019    Admit Date: 3/24/2019    Discharge Plan     Row Name 04/02/19 1109       Plan    Plan  [facility]    Patient/Family in Agreement with Plan  yes    Final Discharge Disposition Code  03 - skilled nursing facility (SNF)    Final Note  Pt is being d/c'ed to [facility] [phone] at the skilled level, per Peggy. Faxed d/c summary to them at [phone]. Pt states her family is aware of d/c and she will go by [facility] [phone].        Discharge Codes    No documentation.       Expected Discharge Date and Time     Expected Discharge Date Expected Discharge Time    Apr 2, 2019             MALIA Trevizo

## 2019-04-03 NOTE — THERAPY DISCHARGE NOTE
Acute Care - Occupational Therapy Discharge Summary  [facility]     Patient Name: Cheri Ely  : 1940  MRN: [id number]    Today's Date: 4/3/2019  Onset of Illness/Injury or Date of Surgery: 19    Date of Referral to OT: 19  Referring Physician: Dr. Bloom      Admit Date: 3/24/2019        OT Recommendation and Plan    Visit Dx:    ICD-10-CM ICD-9-CM   1. Gastrointestinal hemorrhage, unspecified gastrointestinal hemorrhage type K92.2 578.9   2. ESRD (end stage renal disease) on dialysis (CMS/[facility]) N18.6 585.6    Z99.2 V45.11   3. Anemia, unspecified type D64.9 285.9   4. Chronic anticoagulation Z79.01 V58.61   5. Decreased activities of daily living (ADL) R68.89 780.99   6. Impaired mobility Z74.09 799.89   7. Gastrointestinal hemorrhage with melena K92.1 578.1               Rehab Goal Summary     Row Name 19 0700             Bed Mobility Goal 1 (OT)    Activity/Assistive Device (Bed Mobility Goal 1, OT)  bed mobility activities, all  -TS      [place] Level/Cues Needed (Bed Mobility Goal 1, OT)  contact guard assist  -TS      Time Frame (Bed Mobility Goal 1, OT)  long term goal (LTG);by discharge  -TS      Progress/Outcomes (Bed Mobility Goal 1, OT)  goal not met  -TS         Transfer Goal 1 (OT)    Activity/Assistive Device (Transfer Goal 1, OT)  transfers, all  -TS      [place] Level/Cues Needed (Transfer Goal 1, OT)  minimum assist (75% or more patient effort)  -TS      Time Frame (Transfer Goal 1, OT)  long term goal (LTG);by discharge  -TS      Progress/Outcome (Transfer Goal 1, OT)  goal not met  -TS         Strength Goal 1 (OT)    Strength Goal 1 (OT)  Pt will increase BUE strength to 4/5 for increased independence with t/fs and mobility.   -TS      Time Frame (Strength Goal 1, OT)  long term goal (LTG);by discharge  -TS      Progress/Outcome (Strength Goal 1, OT)  goal not met  -TS        User Key  (r) = Recorded By, (t) = Taken By, (c) = Cosigned By    Initials Name  Provider Type Discipline     Nelly Law COTA/L Occupational Therapy Assistant OT          Outcome Measures     Row Name 04/01/19 1450 03/31/19 1104          How much help from another person do you currently need...    Turning from your back to your side while in flat bed without using bedrails?  --  3  -MF     Moving from lying on back to sitting on the side of a flat bed without bedrails?  --  3  -MF     Moving to and from a bed to a chair (including a wheelchair)?  --  3  -MF     Standing up from a chair using your arms (e.g., wheelchair, bedside chair)?  --  3  -MF     Climbing 3-5 steps with a railing?  --  1  -MF     To walk in hospital room?  --  2  -MF     AM-PAC 6 Clicks Score  --  15  -MF        How much help from another is currently needed...    Putting on and taking off regular lower body clothing?  2  -MM  --     Bathing (including washing, rinsing, and drying)  2  -MM  --     Toileting (which includes using toilet bed pan or urinal)  2  -MM  --     Putting on and taking off regular upper body clothing  3  -MM  --     Taking care of personal grooming (such as brushing teeth)  3  -MM  --     Eating meals  3  -MM  --     Score  15  -MM  --        Functional Assessment    Outcome Measure Options  --  AM-PAC 6 Clicks Basic Mobility (PT)  -       User Key  (r) = Recorded By, (t) = Taken By, (c) = Cosigned By    Initials Name Provider Type     Gilda Mackey PTA Physical Therapy Assistant    MM Linda Connors COTA/L Occupational Therapy Assistant          Therapy Suggested Charges     Code   Minutes Charges    33992 (CPT®) Hc Ot Neuromusc Re Education Ea 15 Min      69099 (CPT®) Hc Ot Ther Proc Ea 15 Min 13 1    06294 (CPT®) Hc Ot Therapeutic Act Ea 15 Min      15052 (CPT®) Hc Ot Manual Therapy Ea 15 Min      82113 (CPT®) Hc Ot Iontophoresis Ea 15 Min      79253 (CPT®) Hc Ot Elec Stim Ea-Per 15 Min      72245 (CPT®) Hc Ot Ultrasound Ea 15 Min      37788 (CPT®) Hc Ot Self  Care/Mgmt/Train Ea 15 Min 10 1    Total  23 2              OT Discharge Summary  Reason for Discharge: Discharge from facility  Outcomes Achieved: Refer to plan of care for updates on goals achieved  Discharge Destination: SNF      LOKESH Johnson/PUSHPA  4/3/2019

## 2019-05-12 ENCOUNTER — HOSPITAL ENCOUNTER (INPATIENT)
Facility: HOSPITAL | Age: 79
LOS: 8 days | Discharge: HOME HEALTH | End: 2019-05-21
Attending: SPECIALIST | Admitting: SPECIALIST

## 2019-05-12 ENCOUNTER — APPOINTMENT (OUTPATIENT)
Dept: CT IMAGING | Facility: HOSPITAL | Age: 79
End: 2019-05-12

## 2019-05-12 DIAGNOSIS — R53.1 GENERALIZED WEAKNESS: ICD-10-CM

## 2019-05-12 DIAGNOSIS — R10.9 ABDOMINAL PAIN, UNSPECIFIED ABDOMINAL LOCATION: Primary | ICD-10-CM

## 2019-05-12 DIAGNOSIS — K66.8 INTRA-ABDOMINAL FREE AIR OF UNKNOWN ETIOLOGY: ICD-10-CM

## 2019-05-12 LAB
ALBUMIN SERPL-MCNC: 3 G/DL (ref 3.5–5)
ALBUMIN/GLOB SERPL: 1 G/DL (ref 1.1–2.5)
ALP SERPL-CCNC: 215 U/L (ref 24–120)
ALT SERPL W P-5'-P-CCNC: 17 U/L (ref 0–54)
ANION GAP SERPL CALCULATED.3IONS-SCNC: 8 MMOL/L (ref 4–13)
AST SERPL-CCNC: 35 U/L (ref 7–45)
BASOPHILS # BLD AUTO: 0.04 10*3/MM3 (ref 0–0.2)
BASOPHILS NFR BLD AUTO: 0.4 % (ref 0–2)
BILIRUB SERPL-MCNC: 0.5 MG/DL (ref 0.1–1)
BUN BLD-MCNC: 27 MG/DL (ref 5–21)
BUN/CREAT SERPL: 6 (ref 7–25)
CALCIUM SPEC-SCNC: 8.4 MG/DL (ref 8.4–10.4)
CHLORIDE SERPL-SCNC: 96 MMOL/L (ref 98–110)
CO2 SERPL-SCNC: 32 MMOL/L (ref 24–31)
CREAT BLD-MCNC: 4.51 MG/DL (ref 0.5–1.4)
D-LACTATE SERPL-SCNC: 1.4 MMOL/L (ref 0.5–2)
DEPRECATED RDW RBC AUTO: 60.2 FL (ref 40–54)
EOSINOPHIL # BLD AUTO: 0.16 10*3/MM3 (ref 0–0.7)
EOSINOPHIL NFR BLD AUTO: 1.7 % (ref 0–4)
ERYTHROCYTE [DISTWIDTH] IN BLOOD BY AUTOMATED COUNT: 16.5 % (ref 12–15)
GFR SERPL CREATININE-BSD FRML MDRD: 9 ML/MIN/1.73
GFR SERPL CREATININE-BSD FRML MDRD: ABNORMAL ML/MIN/1.73
GLOBULIN UR ELPH-MCNC: 3.1 GM/DL
GLUCOSE BLD-MCNC: 92 MG/DL (ref 70–100)
HCT VFR BLD AUTO: 37.6 % (ref 37–47)
HGB BLD-MCNC: 11.9 G/DL (ref 12–16)
HOLD SPECIMEN: NORMAL
IMM GRANULOCYTES # BLD AUTO: 0.04 10*3/MM3 (ref 0–0.05)
IMM GRANULOCYTES NFR BLD AUTO: 0.4 % (ref 0–5)
LIPASE SERPL-CCNC: 14 U/L (ref 23–203)
LYMPHOCYTES # BLD AUTO: 0.6 10*3/MM3 (ref 0.72–4.86)
LYMPHOCYTES NFR BLD AUTO: 6.4 % (ref 15–45)
MCH RBC QN AUTO: 31.5 PG (ref 28–32)
MCHC RBC AUTO-ENTMCNC: 31.6 G/DL (ref 33–36)
MCV RBC AUTO: 99.5 FL (ref 82–98)
MONOCYTES # BLD AUTO: 0.32 10*3/MM3 (ref 0.19–1.3)
MONOCYTES NFR BLD AUTO: 3.4 % (ref 4–12)
NEUTROPHILS # BLD AUTO: 8.15 10*3/MM3 (ref 1.87–8.4)
NEUTROPHILS NFR BLD AUTO: 87.7 % (ref 39–78)
NRBC BLD AUTO-RTO: 0 /100 WBC (ref 0–0.2)
PLATELET # BLD AUTO: 197 10*3/MM3 (ref 130–400)
PMV BLD AUTO: 10.8 FL (ref 6–12)
POTASSIUM BLD-SCNC: 4 MMOL/L (ref 3.5–5.3)
PROT SERPL-MCNC: 6.1 G/DL (ref 6.3–8.7)
RBC # BLD AUTO: 3.78 10*6/MM3 (ref 4.2–5.4)
SODIUM BLD-SCNC: 136 MMOL/L (ref 135–145)
TROPONIN I SERPL-MCNC: <0.012 NG/ML (ref 0–0.03)
WBC NRBC COR # BLD: 9.31 10*3/MM3 (ref 4.8–10.8)
WHOLE BLOOD HOLD SPECIMEN: NORMAL
WHOLE BLOOD HOLD SPECIMEN: NORMAL

## 2019-05-12 PROCEDURE — 74176 CT ABD & PELVIS W/O CONTRAST: CPT

## 2019-05-12 PROCEDURE — 99284 EMERGENCY DEPT VISIT MOD MDM: CPT

## 2019-05-12 PROCEDURE — 83605 ASSAY OF LACTIC ACID: CPT | Performed by: NURSE PRACTITIONER

## 2019-05-12 PROCEDURE — 93010 ELECTROCARDIOGRAM REPORT: CPT | Performed by: INTERNAL MEDICINE

## 2019-05-12 PROCEDURE — 85025 COMPLETE CBC W/AUTO DIFF WBC: CPT | Performed by: NURSE PRACTITIONER

## 2019-05-12 PROCEDURE — 80053 COMPREHEN METABOLIC PANEL: CPT | Performed by: NURSE PRACTITIONER

## 2019-05-12 PROCEDURE — 83880 ASSAY OF NATRIURETIC PEPTIDE: CPT | Performed by: NURSE PRACTITIONER

## 2019-05-12 PROCEDURE — 83690 ASSAY OF LIPASE: CPT | Performed by: NURSE PRACTITIONER

## 2019-05-12 PROCEDURE — 25010000002 ONDANSETRON PER 1 MG: Performed by: NURSE PRACTITIONER

## 2019-05-12 PROCEDURE — 84484 ASSAY OF TROPONIN QUANT: CPT | Performed by: NURSE PRACTITIONER

## 2019-05-12 PROCEDURE — 25010000002 FENTANYL CITRATE (PF) 100 MCG/2ML SOLUTION: Performed by: NURSE PRACTITIONER

## 2019-05-12 PROCEDURE — 93005 ELECTROCARDIOGRAM TRACING: CPT

## 2019-05-12 RX ORDER — SODIUM CHLORIDE 0.9 % (FLUSH) 0.9 %
10 SYRINGE (ML) INJECTION AS NEEDED
Status: DISCONTINUED | OUTPATIENT
Start: 2019-05-12 | End: 2019-05-21 | Stop reason: HOSPADM

## 2019-05-12 RX ORDER — FENTANYL CITRATE 50 UG/ML
25 INJECTION, SOLUTION INTRAMUSCULAR; INTRAVENOUS ONCE
Status: COMPLETED | OUTPATIENT
Start: 2019-05-12 | End: 2019-05-12

## 2019-05-12 RX ORDER — ONDANSETRON 2 MG/ML
4 INJECTION INTRAMUSCULAR; INTRAVENOUS ONCE
Status: COMPLETED | OUTPATIENT
Start: 2019-05-12 | End: 2019-05-12

## 2019-05-12 RX ADMIN — ONDANSETRON HYDROCHLORIDE 4 MG: 2 INJECTION, SOLUTION INTRAMUSCULAR; INTRAVENOUS at 21:52

## 2019-05-12 RX ADMIN — FENTANYL CITRATE 25 MCG: 50 INJECTION, SOLUTION INTRAMUSCULAR; INTRAVENOUS at 21:52

## 2019-05-12 RX ADMIN — FENTANYL CITRATE 25 MCG: 50 INJECTION, SOLUTION INTRAMUSCULAR; INTRAVENOUS at 23:02

## 2019-05-13 ENCOUNTER — APPOINTMENT (OUTPATIENT)
Dept: GENERAL RADIOLOGY | Facility: HOSPITAL | Age: 79
End: 2019-05-13

## 2019-05-13 ENCOUNTER — ANESTHESIA (OUTPATIENT)
Dept: PERIOP | Facility: HOSPITAL | Age: 79
End: 2019-05-13

## 2019-05-13 ENCOUNTER — ANESTHESIA EVENT (OUTPATIENT)
Dept: PERIOP | Facility: HOSPITAL | Age: 79
End: 2019-05-13

## 2019-05-13 PROBLEM — R10.9 ABDOMINAL PAIN: Status: ACTIVE | Noted: 2019-05-13

## 2019-05-13 LAB
ABO GROUP BLD: NORMAL
AMYLASE SERPL-CCNC: <30 U/L (ref 30–110)
APTT PPP: 34.8 SECONDS (ref 24.1–35)
BASOPHILS # BLD AUTO: 0.04 10*3/MM3 (ref 0–0.2)
BASOPHILS NFR BLD AUTO: 0.2 % (ref 0–2)
BLD GP AB SCN SERPL QL: NEGATIVE
DEPRECATED RDW RBC AUTO: 62.2 FL (ref 40–54)
EOSINOPHIL # BLD AUTO: 0 10*3/MM3 (ref 0–0.7)
EOSINOPHIL NFR BLD AUTO: 0 % (ref 0–4)
ERYTHROCYTE [DISTWIDTH] IN BLOOD BY AUTOMATED COUNT: 16.7 % (ref 12–15)
HAV IGM SERPL QL IA: NEGATIVE
HBV CORE IGM SERPL QL IA: NEGATIVE
HBV SURFACE AB SER QL: <5
HBV SURFACE AB SER RIA-ACNC: ABNORMAL
HBV SURFACE AG SERPL QL IA: NEGATIVE
HCT VFR BLD AUTO: 34.6 % (ref 37–47)
HCV AB SER DONR QL: NEGATIVE
HCV S/C RATIO: 0.1 (ref 0–0.99)
HGB BLD-MCNC: 10.9 G/DL (ref 12–16)
HOLD SPECIMEN: NORMAL
IMM GRANULOCYTES # BLD AUTO: 0.15 10*3/MM3 (ref 0–0.05)
IMM GRANULOCYTES NFR BLD AUTO: 0.7 % (ref 0–5)
INR PPP: 1.44 (ref 0.91–1.09)
LIPASE SERPL-CCNC: <10 U/L (ref 23–203)
LYMPHOCYTES # BLD AUTO: 0.37 10*3/MM3 (ref 0.72–4.86)
LYMPHOCYTES NFR BLD AUTO: 1.8 % (ref 15–45)
MAGNESIUM SERPL-MCNC: 1.6 MG/DL (ref 1.4–2.2)
MAGNESIUM SERPL-MCNC: 1.6 MG/DL (ref 1.4–2.2)
MCH RBC QN AUTO: 31.9 PG (ref 28–32)
MCHC RBC AUTO-ENTMCNC: 31.5 G/DL (ref 33–36)
MCV RBC AUTO: 101.2 FL (ref 82–98)
MONOCYTES # BLD AUTO: 0.36 10*3/MM3 (ref 0.19–1.3)
MONOCYTES NFR BLD AUTO: 1.8 % (ref 4–12)
NEUTROPHILS # BLD AUTO: 19.64 10*3/MM3 (ref 1.87–8.4)
NEUTROPHILS NFR BLD AUTO: 95.5 % (ref 39–78)
NRBC BLD AUTO-RTO: 0 /100 WBC (ref 0–0.2)
NT-PROBNP SERPL-MCNC: ABNORMAL PG/ML (ref 0–1800)
PHOSPHATE SERPL-MCNC: 3.6 MG/DL (ref 2.5–4.5)
PHOSPHATE SERPL-MCNC: 4.3 MG/DL (ref 2.5–4.5)
PLATELET # BLD AUTO: 147 10*3/MM3 (ref 130–400)
PMV BLD AUTO: 10.6 FL (ref 6–12)
PROTHROMBIN TIME: 18 SECONDS (ref 11.9–14.6)
RBC # BLD AUTO: 3.42 10*6/MM3 (ref 4.2–5.4)
RH BLD: POSITIVE
T&S EXPIRATION DATE: NORMAL
TROPONIN I SERPL-MCNC: <0.012 NG/ML (ref 0–0.03)
TROPONIN I SERPL-MCNC: <0.012 NG/ML (ref 0–0.03)
WBC NRBC COR # BLD: 20.56 10*3/MM3 (ref 4.8–10.8)

## 2019-05-13 PROCEDURE — 25010000002 FENTANYL CITRATE (PF) 100 MCG/2ML SOLUTION: Performed by: NURSE ANESTHETIST, CERTIFIED REGISTERED

## 2019-05-13 PROCEDURE — 93005 ELECTROCARDIOGRAM TRACING: CPT | Performed by: INTERNAL MEDICINE

## 2019-05-13 PROCEDURE — 71045 X-RAY EXAM CHEST 1 VIEW: CPT

## 2019-05-13 PROCEDURE — 83690 ASSAY OF LIPASE: CPT | Performed by: SPECIALIST

## 2019-05-13 PROCEDURE — 25010000002 MEROPENEM PER 100 MG: Performed by: NURSE PRACTITIONER

## 2019-05-13 PROCEDURE — 86900 BLOOD TYPING SEROLOGIC ABO: CPT | Performed by: NURSE PRACTITIONER

## 2019-05-13 PROCEDURE — 25010000002 FENTANYL CITRATE (PF) 250 MCG/5ML SOLUTION: Performed by: NURSE ANESTHETIST, CERTIFIED REGISTERED

## 2019-05-13 PROCEDURE — 5A1D70Z PERFORMANCE OF URINARY FILTRATION, INTERMITTENT, LESS THAN 6 HOURS PER DAY: ICD-10-PCS | Performed by: SPECIALIST

## 2019-05-13 PROCEDURE — 87040 BLOOD CULTURE FOR BACTERIA: CPT | Performed by: NURSE PRACTITIONER

## 2019-05-13 PROCEDURE — 25010000002 ALBUMIN HUMAN 5% PER 50 ML: Performed by: NURSE ANESTHETIST, CERTIFIED REGISTERED

## 2019-05-13 PROCEDURE — 25010000002 PROPOFOL 10 MG/ML EMULSION: Performed by: NURSE ANESTHETIST, CERTIFIED REGISTERED

## 2019-05-13 PROCEDURE — 25010000002 ONDANSETRON PER 1 MG: Performed by: NURSE ANESTHETIST, CERTIFIED REGISTERED

## 2019-05-13 PROCEDURE — 86706 HEP B SURFACE ANTIBODY: CPT | Performed by: INTERNAL MEDICINE

## 2019-05-13 PROCEDURE — P9041 ALBUMIN (HUMAN),5%, 50ML: HCPCS | Performed by: NURSE ANESTHETIST, CERTIFIED REGISTERED

## 2019-05-13 PROCEDURE — 25010000002 PHENYLEPHRINE PER 1 ML: Performed by: NURSE ANESTHETIST, CERTIFIED REGISTERED

## 2019-05-13 PROCEDURE — 84100 ASSAY OF PHOSPHORUS: CPT | Performed by: INTERNAL MEDICINE

## 2019-05-13 PROCEDURE — 93005 ELECTROCARDIOGRAM TRACING: CPT | Performed by: NURSE PRACTITIONER

## 2019-05-13 PROCEDURE — 85610 PROTHROMBIN TIME: CPT | Performed by: NURSE PRACTITIONER

## 2019-05-13 PROCEDURE — 94799 UNLISTED PULMONARY SVC/PX: CPT

## 2019-05-13 PROCEDURE — 25010000002 DEXAMETHASONE PER 1 MG: Performed by: NURSE ANESTHETIST, CERTIFIED REGISTERED

## 2019-05-13 PROCEDURE — 0DNW0ZZ RELEASE PERITONEUM, OPEN APPROACH: ICD-10-PCS | Performed by: SPECIALIST

## 2019-05-13 PROCEDURE — 36415 COLL VENOUS BLD VENIPUNCTURE: CPT | Performed by: NURSE PRACTITIONER

## 2019-05-13 PROCEDURE — 80074 ACUTE HEPATITIS PANEL: CPT | Performed by: INTERNAL MEDICINE

## 2019-05-13 PROCEDURE — 86850 RBC ANTIBODY SCREEN: CPT | Performed by: NURSE PRACTITIONER

## 2019-05-13 PROCEDURE — 93010 ELECTROCARDIOGRAM REPORT: CPT | Performed by: INTERNAL MEDICINE

## 2019-05-13 PROCEDURE — 25010000002 MORPHINE PER 10 MG: Performed by: NURSE ANESTHETIST, CERTIFIED REGISTERED

## 2019-05-13 PROCEDURE — 84484 ASSAY OF TROPONIN QUANT: CPT | Performed by: SPECIALIST

## 2019-05-13 PROCEDURE — 25010000002 PIPERACILLIN SOD-TAZOBACTAM PER 1 G: Performed by: SPECIALIST

## 2019-05-13 PROCEDURE — 0DU907Z SUPPLEMENT DUODENUM WITH AUTOLOGOUS TISSUE SUBSTITUTE, OPEN APPROACH: ICD-10-PCS | Performed by: SPECIALIST

## 2019-05-13 PROCEDURE — 86901 BLOOD TYPING SEROLOGIC RH(D): CPT | Performed by: NURSE PRACTITIONER

## 2019-05-13 PROCEDURE — 85025 COMPLETE CBC W/AUTO DIFF WBC: CPT | Performed by: SPECIALIST

## 2019-05-13 PROCEDURE — 82150 ASSAY OF AMYLASE: CPT | Performed by: SPECIALIST

## 2019-05-13 PROCEDURE — 25010000002 NEOSTIGMINE PER 0.5 MG: Performed by: NURSE ANESTHETIST, CERTIFIED REGISTERED

## 2019-05-13 PROCEDURE — 25010000003 MORPHINE 50 MG/ML SOLUTION: Performed by: SPECIALIST

## 2019-05-13 PROCEDURE — 0DQ90ZZ REPAIR DUODENUM, OPEN APPROACH: ICD-10-PCS | Performed by: SPECIALIST

## 2019-05-13 PROCEDURE — 83735 ASSAY OF MAGNESIUM: CPT | Performed by: INTERNAL MEDICINE

## 2019-05-13 PROCEDURE — 85730 THROMBOPLASTIN TIME PARTIAL: CPT | Performed by: NURSE PRACTITIONER

## 2019-05-13 RX ORDER — IPRATROPIUM BROMIDE AND ALBUTEROL SULFATE 2.5; .5 MG/3ML; MG/3ML
3 SOLUTION RESPIRATORY (INHALATION) ONCE AS NEEDED
Status: DISCONTINUED | OUTPATIENT
Start: 2019-05-13 | End: 2019-05-13 | Stop reason: HOSPADM

## 2019-05-13 RX ORDER — MORPHINE SULFATE 2 MG/ML
2 INJECTION, SOLUTION INTRAMUSCULAR; INTRAVENOUS
Status: DISCONTINUED | OUTPATIENT
Start: 2019-05-13 | End: 2019-05-13 | Stop reason: HOSPADM

## 2019-05-13 RX ORDER — PROPOFOL 10 MG/ML
VIAL (ML) INTRAVENOUS AS NEEDED
Status: DISCONTINUED | OUTPATIENT
Start: 2019-05-13 | End: 2019-05-13 | Stop reason: SURG

## 2019-05-13 RX ORDER — OXYCODONE AND ACETAMINOPHEN 10; 325 MG/1; MG/1
1 TABLET ORAL ONCE AS NEEDED
Status: DISCONTINUED | OUTPATIENT
Start: 2019-05-13 | End: 2019-05-13 | Stop reason: HOSPADM

## 2019-05-13 RX ORDER — NALOXONE HCL 0.4 MG/ML
0.1 VIAL (ML) INJECTION
Status: DISCONTINUED | OUTPATIENT
Start: 2019-05-13 | End: 2019-05-21 | Stop reason: HOSPADM

## 2019-05-13 RX ORDER — ONDANSETRON 4 MG/1
4 TABLET, FILM COATED ORAL EVERY 6 HOURS PRN
Status: DISCONTINUED | OUTPATIENT
Start: 2019-05-13 | End: 2019-05-21 | Stop reason: HOSPADM

## 2019-05-13 RX ORDER — MORPHINE SULFATE 1 MG/ML
INJECTION INTRAVENOUS CONTINUOUS
Status: DISCONTINUED | OUTPATIENT
Start: 2019-05-13 | End: 2019-05-20

## 2019-05-13 RX ORDER — DEXAMETHASONE SODIUM PHOSPHATE 4 MG/ML
INJECTION, SOLUTION INTRA-ARTICULAR; INTRALESIONAL; INTRAMUSCULAR; INTRAVENOUS; SOFT TISSUE AS NEEDED
Status: DISCONTINUED | OUTPATIENT
Start: 2019-05-13 | End: 2019-05-13 | Stop reason: SURG

## 2019-05-13 RX ORDER — ONDANSETRON 2 MG/ML
4 INJECTION INTRAMUSCULAR; INTRAVENOUS EVERY 6 HOURS PRN
Status: DISCONTINUED | OUTPATIENT
Start: 2019-05-13 | End: 2019-05-21 | Stop reason: HOSPADM

## 2019-05-13 RX ORDER — MEPERIDINE HYDROCHLORIDE 25 MG/ML
12.5 INJECTION INTRAMUSCULAR; INTRAVENOUS; SUBCUTANEOUS
Status: DISCONTINUED | OUTPATIENT
Start: 2019-05-13 | End: 2019-05-13 | Stop reason: HOSPADM

## 2019-05-13 RX ORDER — ONDANSETRON 2 MG/ML
4 INJECTION INTRAMUSCULAR; INTRAVENOUS AS NEEDED
Status: DISCONTINUED | OUTPATIENT
Start: 2019-05-13 | End: 2019-05-13 | Stop reason: HOSPADM

## 2019-05-13 RX ORDER — METOPROLOL TARTRATE 5 MG/5ML
2.5 INJECTION INTRAVENOUS EVERY 12 HOURS
Status: DISCONTINUED | OUTPATIENT
Start: 2019-05-13 | End: 2019-05-16

## 2019-05-13 RX ORDER — NALOXONE HCL 0.4 MG/ML
0.04 VIAL (ML) INJECTION AS NEEDED
Status: DISCONTINUED | OUTPATIENT
Start: 2019-05-13 | End: 2019-05-13 | Stop reason: HOSPADM

## 2019-05-13 RX ORDER — PANTOPRAZOLE SODIUM 40 MG/10ML
40 INJECTION, POWDER, LYOPHILIZED, FOR SOLUTION INTRAVENOUS
Status: DISCONTINUED | OUTPATIENT
Start: 2019-05-13 | End: 2019-05-16 | Stop reason: CLARIF

## 2019-05-13 RX ORDER — SODIUM CHLORIDE 9 MG/ML
INJECTION, SOLUTION INTRAVENOUS CONTINUOUS PRN
Status: DISCONTINUED | OUTPATIENT
Start: 2019-05-13 | End: 2019-05-13 | Stop reason: SURG

## 2019-05-13 RX ORDER — VASOPRESSIN 20 U/ML
INJECTION PARENTERAL AS NEEDED
Status: DISCONTINUED | OUTPATIENT
Start: 2019-05-13 | End: 2019-05-13 | Stop reason: SURG

## 2019-05-13 RX ORDER — DEXTROSE, SODIUM CHLORIDE, AND POTASSIUM CHLORIDE 5; .45; .15 G/100ML; G/100ML; G/100ML
100 INJECTION INTRAVENOUS CONTINUOUS
Status: DISCONTINUED | OUTPATIENT
Start: 2019-05-13 | End: 2019-05-13

## 2019-05-13 RX ORDER — FENTANYL CITRATE 50 UG/ML
25 INJECTION, SOLUTION INTRAMUSCULAR; INTRAVENOUS AS NEEDED
Status: DISCONTINUED | OUTPATIENT
Start: 2019-05-13 | End: 2019-05-13 | Stop reason: HOSPADM

## 2019-05-13 RX ORDER — GLYCOPYRROLATE 0.2 MG/ML
INJECTION INTRAMUSCULAR; INTRAVENOUS AS NEEDED
Status: DISCONTINUED | OUTPATIENT
Start: 2019-05-13 | End: 2019-05-13 | Stop reason: SURG

## 2019-05-13 RX ORDER — FENTANYL CITRATE 50 UG/ML
INJECTION, SOLUTION INTRAMUSCULAR; INTRAVENOUS AS NEEDED
Status: DISCONTINUED | OUTPATIENT
Start: 2019-05-13 | End: 2019-05-13 | Stop reason: SURG

## 2019-05-13 RX ORDER — ALBUMIN, HUMAN INJ 5% 5 %
SOLUTION INTRAVENOUS CONTINUOUS PRN
Status: DISCONTINUED | OUTPATIENT
Start: 2019-05-13 | End: 2019-05-13 | Stop reason: SURG

## 2019-05-13 RX ORDER — PHENYLEPHRINE HCL IN 0.9% NACL 0.8MG/10ML
SYRINGE (ML) INTRAVENOUS AS NEEDED
Status: DISCONTINUED | OUTPATIENT
Start: 2019-05-13 | End: 2019-05-13 | Stop reason: SURG

## 2019-05-13 RX ORDER — SODIUM CHLORIDE 9 MG/ML
100 INJECTION, SOLUTION INTRAVENOUS CONTINUOUS
Status: DISCONTINUED | OUTPATIENT
Start: 2019-05-13 | End: 2019-05-14

## 2019-05-13 RX ORDER — METOCLOPRAMIDE HYDROCHLORIDE 5 MG/ML
5 INJECTION INTRAMUSCULAR; INTRAVENOUS
Status: DISCONTINUED | OUTPATIENT
Start: 2019-05-13 | End: 2019-05-13 | Stop reason: HOSPADM

## 2019-05-13 RX ORDER — MAGNESIUM HYDROXIDE 1200 MG/15ML
LIQUID ORAL AS NEEDED
Status: DISCONTINUED | OUTPATIENT
Start: 2019-05-13 | End: 2019-05-13 | Stop reason: HOSPADM

## 2019-05-13 RX ORDER — LABETALOL HYDROCHLORIDE 5 MG/ML
5 INJECTION, SOLUTION INTRAVENOUS
Status: DISCONTINUED | OUTPATIENT
Start: 2019-05-13 | End: 2019-05-13 | Stop reason: HOSPADM

## 2019-05-13 RX ORDER — HYDRALAZINE HYDROCHLORIDE 20 MG/ML
5 INJECTION INTRAMUSCULAR; INTRAVENOUS
Status: DISCONTINUED | OUTPATIENT
Start: 2019-05-13 | End: 2019-05-13 | Stop reason: HOSPADM

## 2019-05-13 RX ORDER — ROCURONIUM BROMIDE 10 MG/ML
INJECTION, SOLUTION INTRAVENOUS AS NEEDED
Status: DISCONTINUED | OUTPATIENT
Start: 2019-05-13 | End: 2019-05-13 | Stop reason: SURG

## 2019-05-13 RX ORDER — FLUMAZENIL 0.1 MG/ML
0.2 INJECTION INTRAVENOUS AS NEEDED
Status: DISCONTINUED | OUTPATIENT
Start: 2019-05-13 | End: 2019-05-13 | Stop reason: HOSPADM

## 2019-05-13 RX ORDER — ONDANSETRON 2 MG/ML
INJECTION INTRAMUSCULAR; INTRAVENOUS AS NEEDED
Status: DISCONTINUED | OUTPATIENT
Start: 2019-05-13 | End: 2019-05-13 | Stop reason: SURG

## 2019-05-13 RX ADMIN — PROPOFOL 40 MG: 10 INJECTION, EMULSION INTRAVENOUS at 01:13

## 2019-05-13 RX ADMIN — VASOPRESSIN 1 UNITS: 20 INJECTION INTRAVENOUS at 01:36

## 2019-05-13 RX ADMIN — POTASSIUM CHLORIDE, DEXTROSE MONOHYDRATE AND SODIUM CHLORIDE 100 ML/HR: 150; 5; 450 INJECTION, SOLUTION INTRAVENOUS at 05:22

## 2019-05-13 RX ADMIN — TAZOBACTAM SODIUM AND PIPERACILLIN SODIUM 2.25 G: 250; 2 INJECTION, SOLUTION INTRAVENOUS at 16:28

## 2019-05-13 RX ADMIN — FENTANYL CITRATE 25 MCG: 50 INJECTION, SOLUTION INTRAMUSCULAR; INTRAVENOUS at 03:08

## 2019-05-13 RX ADMIN — PANTOPRAZOLE SODIUM 40 MG: 40 INJECTION, POWDER, FOR SOLUTION INTRAVENOUS at 06:30

## 2019-05-13 RX ADMIN — ONDANSETRON HYDROCHLORIDE 4 MG: 2 SOLUTION INTRAMUSCULAR; INTRAVENOUS at 02:39

## 2019-05-13 RX ADMIN — VASOPRESSIN 1 UNITS: 20 INJECTION INTRAVENOUS at 01:17

## 2019-05-13 RX ADMIN — FENTANYL CITRATE 50 MCG: 50 INJECTION INTRAMUSCULAR; INTRAVENOUS at 02:20

## 2019-05-13 RX ADMIN — SODIUM CHLORIDE 100 ML/HR: 9 INJECTION, SOLUTION INTRAVENOUS at 22:37

## 2019-05-13 RX ADMIN — FENTANYL CITRATE 150 MCG: 50 INJECTION INTRAMUSCULAR; INTRAVENOUS at 01:13

## 2019-05-13 RX ADMIN — HYDROMORPHONE HYDROCHLORIDE 1 MG: 1 INJECTION, SOLUTION INTRAMUSCULAR; INTRAVENOUS; SUBCUTANEOUS at 00:34

## 2019-05-13 RX ADMIN — PHENYLEPHRINE HYDROCHLORIDE 0.3 MCG/KG/MIN: 10 INJECTION INTRAVENOUS at 01:41

## 2019-05-13 RX ADMIN — DEXAMETHASONE SODIUM PHOSPHATE 4 MG: 4 INJECTION, SOLUTION INTRAMUSCULAR; INTRAVENOUS at 02:39

## 2019-05-13 RX ADMIN — MORPHINE SULFATE 2 MG: 2 INJECTION, SOLUTION INTRAMUSCULAR; INTRAVENOUS at 03:45

## 2019-05-13 RX ADMIN — MEROPENEM 1 G: 1 INJECTION, POWDER, FOR SOLUTION INTRAVENOUS at 00:52

## 2019-05-13 RX ADMIN — ROCURONIUM BROMIDE 40 MG: 10 INJECTION INTRAVENOUS at 01:13

## 2019-05-13 RX ADMIN — TAZOBACTAM SODIUM AND PIPERACILLIN SODIUM 2.25 G: 250; 2 INJECTION, SOLUTION INTRAVENOUS at 09:39

## 2019-05-13 RX ADMIN — SODIUM CHLORIDE: 9 INJECTION, SOLUTION INTRAVENOUS at 02:55

## 2019-05-13 RX ADMIN — FENTANYL CITRATE 50 MCG: 50 INJECTION INTRAMUSCULAR; INTRAVENOUS at 02:47

## 2019-05-13 RX ADMIN — METOPROLOL TARTRATE 2.5 MG: 5 INJECTION INTRAVENOUS at 06:30

## 2019-05-13 RX ADMIN — ALBUMIN HUMAN: 0.05 INJECTION, SOLUTION INTRAVENOUS at 01:11

## 2019-05-13 RX ADMIN — GLYCOPYRROLATE 0.2 MG: 0.2 INJECTION, SOLUTION INTRAMUSCULAR; INTRAVENOUS at 02:39

## 2019-05-13 RX ADMIN — Medication 80 MCG: at 01:15

## 2019-05-13 RX ADMIN — METOPROLOL TARTRATE 2.5 MG: 5 INJECTION INTRAVENOUS at 18:56

## 2019-05-13 RX ADMIN — SODIUM CHLORIDE 100 ML/HR: 9 INJECTION, SOLUTION INTRAVENOUS at 11:59

## 2019-05-13 RX ADMIN — MORPHINE SULFATE: 50 INJECTION, SOLUTION, CONCENTRATE INTRAVENOUS at 06:17

## 2019-05-13 RX ADMIN — MORPHINE SULFATE: 50 INJECTION, SOLUTION, CONCENTRATE INTRAVENOUS at 20:38

## 2019-05-13 RX ADMIN — Medication 4 MG: at 02:39

## 2019-05-13 NOTE — ED NOTES
CALLED LAB REGARDING BLOOD DRAW, STATES THEY ONLY HAD ONE PHLEBOTOMIST, AND THEY WOULD GET TO HER AS SOON AS THEY COULD.        Antonella Ladd RN  05/13/19 0000

## 2019-05-13 NOTE — H&P
Laila Rodriguez MD  H&P    Patient Care Team:  Provider, No Known as PCP - Barrett Prasad Jr, MD as PCP - Family Medicine  Moni Garza MD as Consulting Physician (Gastroenterology)  Tomasz San DO as Consulting Physician (Vascular Surgery)    Chief complaint severe abdominal pain    Subjective     Cheri Ely  is a 79 y.o. female presents with severe abdominal pain which began about 8:00 tonight.  Sudden onset.  Mostly in the right upper abdomen but diffuse now.  She is had some emesis of better gastric contents no blood.  No fevers.  Has had multiple recent admissions for GI bleeding.  She has extensive medical issues including end-stage renal disease on dialysis, history of congestive heart failure, history of valvular heart disease, history of coronary artery disease, history of peripheral vascular disease, history of obesity.  Has had multiple recent upper endoscopies and colonoscopies for bleeding she had M2A capsule endoscopy which revealed AVMs of the small bowel.  She has had prior gastric banding and sigmoid colectomy..      Review of Systems   Pertinent items are noted in HPI, all other systems reviewed and negative    History  Past Medical History:   Diagnosis Date   • Arthritis    • Carotid artery disease (CMS/HCC)    • CHF (congestive heart failure) (CMS/HCC)    • Chronic kidney disease on chronic dialysis (CMS/HCC)    • Chronic renal failure     on dialysis ON MON, WED, FRI   • Coronary artery disease    • Disease of thyroid gland    • Diverticulitis    • Gastric ulcer    • History of transfusion    • Hyperlipidemia    • Hypertension    • Injury of back    • Mesenteric artery insufficiency (CMS/HCC)    • Multilevel degenerative disc disease    • Osteoporosis    • Pancreatitis    • Pelvis fracture (CMS/HCC)    • Pneumonia      Past Surgical History:   Procedure Laterality Date   • AORTAGRAM Right 1/8/2018    Procedure: MESENTERIC ANGIOGRAM, GROIN ACCESS, MYNX CLOSURE;  Surgeon:  Tomasz San DO;  Location: [facility] OR;  Service:    • AORTIC VALVE SURGERY     • APPENDECTOMY     • BACK SURGERY     • CAPSULE ENDOSCOPY N/A 3/30/2019    Procedure: CAPSULE ENDOSCOPY M2A;  Surgeon: David Yu MD;  Location: [facility] ENDOSCOPY;  Service: Gastroenterology   • CARDIAC SURGERY     • CAROTID ENDARTERECTOMY Bilateral    • CATARACT EXTRACTION, BILATERAL     • CERVICAL SPINE SURGERY     • CHOLECYSTECTOMY     • COLON SURGERY     • COLONOSCOPY  10/01/2015   • COLONOSCOPY N/A 3/28/2019    Procedure: COLONOSCOPY WITH ANESTHESIA;  Surgeon: Rafita Merida MD;  Location: [facility] ENDOSCOPY;  Service: Gastroenterology   • CORONARY ARTERY BYPASS GRAFT     • DIALYSIS FISTULA CREATION     • ENDOSCOPY N/A 12/27/2018    Procedure: ESOPHAGOGASTRODUODENOSCOPY WITH ANESTHESIA;  Surgeon: Moni Garza MD;  Location: [facility] ENDOSCOPY;  Service: Gastroenterology   • ENDOSCOPY N/A 2/28/2019    Procedure: ESOPHAGOGASTRODUODENOSCOPY WITH ANESTHESIA;  Surgeon: Moni Garza MD;  Location: [facility] ENDOSCOPY;  Service: Gastroenterology   • ENDOSCOPY N/A 3/11/2019    Procedure: ESOPHAGOGASTRODUODENOSCOPY WITH ANESTHESIA;  Surgeon: Moni Garza MD;  Location: [facility] ENDOSCOPY;  Service: Gastroenterology   • ENDOSCOPY N/A 3/27/2019    Procedure: ESOPHAGOGASTRODUODENOSCOPY WITH ANESTHESIA;  Surgeon: Rafita Merida MD;  Location: [facility] ENDOSCOPY;  Service: Gastroenterology   • HIP TROCANTERIC NAILING WITH INTRAMEDULLARY HIP SCREW Right 2/8/2019    Procedure: HIP TROCANTERIC NAILING LONG WITH INTRAMEDULLARY HIP SCREW;  Surgeon: Boo Camacho MD;  Location: [facility] OR;  Service: Orthopedics   • JOINT REPLACEMENT      knee   • LAPAROSCOPIC GASTRIC BANDING     • LEEP     • LUMBAR FUSION     • TOE AMPUTATION Left     2nd toe   • TOTAL KNEE ARTHROPLASTY Bilateral    • TUBAL ABDOMINAL LIGATION     • UPPER GASTROINTESTINAL ENDOSCOPY  10/01/2015     Family History   Problem Relation Age of Onset   • Hypertension Mother     • Cancer Mother         stomach   • Coronary artery disease Father    • Heart attack Father    • Diabetes Brother    • Coronary artery disease Brother    • Colon cancer Neg Hx    • Colon polyps Neg Hx      Social History     Tobacco Use   • Smoking status: Never Smoker   • Smokeless tobacco: Never Used   Substance Use Topics   • Alcohol use: No   • Drug use: No       (Not in a hospital admission)  Allergies:  Patient has no known allergies.    Objective     Vital Signs  Temp:  [97.6 °F (36.4 °C)] 97.6 °F (36.4 °C)  Heart Rate:  [] 103  Resp:  [18] 18  BP: (109-144)/(40-99) 131/58    Physical Exam:      General Appearance:    Alert, cooperative, in significant pain   Head:    Normocephalic, without obvious abnormality, atraumatic   Eyes:            Lids and lashes normal, conjunctivae and sclerae normal, no   icterus, no pallor, corneas clear, PERRLA   Ears:    Ears appear intact with no abnormalities noted   Neck:   No adenopathy, supple, trachea midline   Back:     No kyphosis present, no scoliosis present, no skin lesions,      erythema or scars, no tenderness to percussion or                   palpation,   range of motion normal   Lungs:     Clear to auscultation,respirations regular, even and                  unlabored    Heart:    Regular rhythm and normal rate, normal S1 and S2, no            murmur, no gallop, no rub, no click   Chest Wall:    No abnormalities observed   Abdomen:    Diffusely tender throughout the abdomen with guarding and rigidity   Rectal:     Deferred   Extremities:   Moves all extremities well, no edema, no cyanosis, no             redness dialysis fistula left upper arm   Pulses:   Pulses palpable and equal bilaterally   Skin:   No bleeding, bruising or rash   Lymph nodes:   No palpable adenopathy   Neurologic:   No focal deficits       Results Review:      Lab Results (last 72 hours)     Procedure Component Value Units Date/Time    Protime-INR [[id number]]  (Abnormal) Collected:   05/13/19 0023    Specimen:  Blood Updated:  05/13/19 0046     Protime 18.0 Seconds      INR 1.44    aPTT [[id number]]  (Normal) Collected:  05/13/19 0023    Specimen:  Blood Updated:  05/13/19 0046     PTT 34.8 seconds     Blood Culture - Blood, Hand, Right [[id number]] Collected:  05/13/19 0023    Specimen:  Blood from Hand, Right Updated:  05/13/19 0043    BNP [[id number]] Collected:  05/12/19 2133    Specimen:  Blood Updated:  05/13/19 0036    Red Top [[id number]] Collected:  05/13/19 0023    Specimen:  Blood Updated:  05/13/19 0033    [place] Draw [[id number]] Collected:  05/12/19 2133    Specimen:  Blood Updated:  05/13/19 0023    Narrative:       The following orders were created for panel order [place] Draw.  Procedure                               Abnormality         Status                     ---------                               -----------         ------                     Light Blue Top[[id number]]                                   Final result               Green Top (Gel)[[id number]]                                  Final result               Lavender Top[[id number]]                                     Final result               Red Top[[id number]]                                          In process                   Please view results for these tests on the individual orders.    Light Blue Top [[id number]] Collected:  05/12/19 2133    Specimen:  Blood Updated:  05/12/19 2245     Extra Tube hold for add-on     Comment: Auto resulted       Green Top (Gel) [[id number]] Collected:  05/12/19 2133    Specimen:  Blood Updated:  05/12/19 2245     Extra Tube Hold for add-ons.     Comment: Auto resulted.       Lavender Top [[id number]] Collected:  05/12/19 2133    Specimen:  Blood Updated:  05/12/19 2245     Extra Tube hold for add-on     Comment: Auto resulted       Troponin [[id number]]  (Normal) Collected:  05/12/19 2133    Specimen:  Blood Updated:  05/12/19 2206     Troponin I <0.012 ng/mL     Lactic Acid, Plasma  [[id number]]  (Normal) Collected:  05/12/19 2148    Specimen:  Blood Updated:  05/12/19 2203     Lactate 1.4 mmol/L     Comprehensive Metabolic Panel [[id number]]  (Abnormal) Collected:  05/12/19 2133    Specimen:  Blood Updated:  05/12/19 2154     Glucose 92 mg/dL      BUN 27 mg/dL      Creatinine 4.51 mg/dL      Sodium 136 mmol/L      Potassium 4.0 mmol/L      Chloride 96 mmol/L      CO2 32.0 mmol/L      Calcium 8.4 mg/dL      Total Protein 6.1 g/dL      Albumin 3.00 g/dL      ALT (SGPT) 17 U/L      AST (SGOT) 35 U/L      Alkaline Phosphatase 215 U/L      Total Bilirubin 0.5 mg/dL      eGFR Non African Amer 9 mL/min/1.73      Comment: <15 Indicative of kidney failure.        eGFR   Amer -- mL/min/1.73      Comment: <15 Indicative of kidney failure.        Globulin 3.1 gm/dL      A/G Ratio 1.0 g/dL      BUN/Creatinine Ratio 6.0     Anion Gap 8.0 mmol/L     Narrative:       GFR Normal >60  Chronic Kidney Disease <60  Kidney Failure <15    Lipase [[id number]]  (Abnormal) Collected:  05/12/19 2133    Specimen:  Blood Updated:  05/12/19 2154     Lipase 14 U/L     CBC & Differential [[id number]] Collected:  05/12/19 2133    Specimen:  Blood Updated:  05/12/19 2144    Narrative:       The following orders were created for panel order CBC & Differential.  Procedure                               Abnormality         Status                     ---------                               -----------         ------                     CBC Auto Differential[[id number]]        Abnormal            Final result                 Please view results for these tests on the individual orders.    CBC Auto Differential [[id number]]  (Abnormal) Collected:  05/12/19 2133    Specimen:  Blood Updated:  05/12/19 2144     WBC 9.31 10*3/mm3      RBC 3.78 10*6/mm3      Hemoglobin 11.9 g/dL      Hematocrit 37.6 %      MCV 99.5 fL      MCH 31.5 pg      MCHC 31.6 g/dL      RDW 16.5 %      RDW-SD 60.2 fl      MPV 10.8 fL      Platelets 197 10*3/mm3       Neutrophil % 87.7 %      Lymphocyte % 6.4 %      Monocyte % 3.4 %      Eosinophil % 1.7 %      Basophil % 0.4 %      Immature Grans % 0.4 %      Neutrophils, Absolute 8.15 10*3/mm3      Lymphocytes, Absolute 0.60 10*3/mm3      Monocytes, Absolute 0.32 10*3/mm3      Eosinophils, Absolute 0.16 10*3/mm3      Basophils, Absolute 0.04 10*3/mm3      Immature Grans, Absolute 0.04 10*3/mm3      nRBC 0.0 /100 WBC         Imaging Results (last 72 hours)     Procedure Component Value Units Date/Time    XR Chest 1 View [[id number]] Updated:  05/13/19 0004    CT Abdomen Pelvis Without Contrast [[id number]] Updated:  05/12/19 2335          Assessment/Plan       * No active hospital problems. *      Her CT scan shows free air and some thickening of the gastric wall.  This probably represents perforated ulcer.  We will proceed with exposure laparotomy.  The risks of bleeding infection injury to structures cardiac complications pulmonary complications death MI prolonged ventilation strokes poor healing were discussed.  We discussed gastric resection versus oversew of an ulcer versus small bowel resection with anastomosis versus colon resection with anastomosis or ostomy.  The daughter was present during the discussion and understands the severity of her illness in the emergency nature and life saving intervention.  She is very high risk and set up for countless possible complications postoperatively.  She understands this and wishes to proceed.      Laila Rodriguez MD  05/13/19  12:50 AM

## 2019-05-13 NOTE — PROGRESS NOTES
Pharmacy Dosing Service  Antimicrobials  Zosyn    Assessment/Action/Plan:  Patient currently on Zosyn for intra-abdominal infection.   Based on recommendations provided by the drug :  Zosyn 2.25 g IV Q8H has been adjusted to 2.25 g IV Q12H for this patient due to: ESRD on intermittent HD MWF  Current end date: (originally ordered for 10 days, 9 days remaining, last dose 5/22/19)     Subjective:  Cheri Ely is a 79 y.o. female currently being treated for IAI.    Objective:    Lab Results   Component Value Date    CREATININE 4.51 (H) 05/12/2019    CREATININE 3.69 (H) 04/02/2019    CREATININE 5.90 (H) 04/01/2019    CREATININE 3.90 (H) 12/14/2017     Lab Results   Component Value Date    WBC 20.56 (H) 05/13/2019     Temp Readings from Last 1 Encounters:   05/13/19 97.7 °F (36.5 °C) (Oral)       Culture Results:  Microbiology Results (last 10 days)       ** No results found for the last 240 hours. **          Current Antimicrobials:   Anti-Infectives (From admission, onward)      Ordered     Dose/Rate Route Frequency Start Stop    05/13/19 1652  piperacillin-tazobactam (ZOSYN) in iso-osmotic dextrose IVPB 2.25 g (premix)     Ordering Provider:  Laila Rodriguez MD    2.25 g  over 30 Minutes Intravenous Every 12 Hours 05/14/19 0430 05/23/19 0429    05/12/19 2353  meropenem (MERREM) 1 g/100 mL 0.9% NS VTB (mbp)     Ordering Provider:  Ashlyn Mackey APRN    1 g  over 30 Minutes Intravenous Once 05/12/19 2351 05/13/19 0122            Aubrey Olivier, Felipa  05/13/19 4:52 PM

## 2019-05-13 NOTE — PROGRESS NOTES
Discharge Planning Assessment  [facility]     Patient Name: Cheri Ely  MRN: [id number]  Today's Date: 5/13/2019    Admit Date: 5/12/2019    Discharge Needs Assessment     Row Name 05/13/19 1112       Living Environment    Lives With  child(meagan), adult    Name(s) of Who Lives With Patient  Daughter - Lucero Morales    Current Living Arrangements  home/apartment/condo    Primary Care Provided by  child(meagan)    Provides Primary Care For  no one, unable/limited ability to care for self    Family Caregiver if Needed  child(meagan), adult    Quality of Family Relationships  supportive;helpful;involved       Resource/Environmental Concerns    Resource/Environmental Concerns  none    Transportation Concerns  car, none       Transition Planning    Patient/Family Anticipates Transition to  other (see comments) family is requesting swing bed placement    Patient/Family Anticipated Services at Transition  ;skilled nursing       Discharge Needs Assessment    Readmission Within the Last 30 Days  no previous admission in last 30 days    Concerns to be Addressed  discharge planning;care coordination/care conferences    Discharge Facility/Level of Care Needs  nursing facility, skilled    Current Discharge Risk  chronically ill    Discharge Coordination/Progress  Patient currently in CCU sleeping.  JUICE spoke with patient's daughter, Christy Langston, by phone.  Christy advised patient is now residing at home with her daughter, Lucero.  Patient went home from [facility] about mid April.  Patient is currently with [facility] [phone].  JUICE spoke with Angela at [facility] to inform of patient's admission.  Patient continues to attend dialysis M,W,F at the [facility].  Christy advised she and her sister had discussed patient possibly going to [facility] upon discharge.  Daughter states patient prefers not to return to [facility] if possible.  Will proceed with  referral to [facility] when patient medically stable.  Unsure if swing bed will be an option due to dialysis and amount of Medicare days available.        Discharge Plan    No documentation.       Destination      No service coordination in this encounter.      Durable Medical Equipment      No service coordination in this encounter.      Dialysis/Infusion      No service coordination in this encounter.      Home Medical Care      No service coordination in this encounter.      Therapy      No service coordination in this encounter.      Community Resources      No service coordination in this encounter.          Demographic Summary    No documentation.       Functional Status    No documentation.       Psychosocial    No documentation.       Abuse/Neglect    No documentation.       Legal    No documentation.       Substance Abuse    No documentation.       Patient Forms    No documentation.           ELYSSA Norman

## 2019-05-13 NOTE — NURSING NOTE
[facility] Note      Patient: Cheri Ely  MRN: [id number] : 1940         Problem List:   Patient Active Problem List    Diagnosis   • Abdominal pain [R10.9]   • AVM (arteriovenous malformation) of small bowel, acquired (CMS/HCC) [K55.8]   • Volume overload [E87.70]   • GI bleed [K92.2]   • Gastrointestinal hemorrhage [K92.2]   • (HFpEF) heart failure with preserved ejection fraction (CMS/HCC) [I50.30]   • Hypothyroid [E03.9]   • Diastolic dysfunction [I51.9]   • Diastolic heart failure (CMS/HCC) [I50.30]   • Hypotension [I95.9]   • Acute blood loss anemia [D62]   • Closed displaced intertrochanteric fracture of right femur (CMS/HCC) [S72.141A]   • Displaced intertrochanteric fracture of right femur, initial encounter for closed fracture (CMS/HCC) [S72.141A]   • Acute gastric ulcer with hemorrhage [K25.0]   • Black tarry stools [K92.1]   • Hx of gastritis [Z87.19]   • 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 female, being seen by [facility] for wound on left buttock.     Patient presents with Stage 2 PI on right buttock present on admission. Wound is dry with pink base.              Wound 19 0450 Right buttock pressure injury    Wound - Properties Group Date first assessed: 19  -JS Time first assessed: 450  -JS Present On Admission : yes;picture taken  -JS Side: Left  -JS Orientation: midline  -JS Location: perineum  -JS Type: pressure injury  -JS Stage, Pressure Injury: Stage 2  -JS    Wound Image  —  Images linked: 2  -JS  —    Dressing Appearance  open to air  -HS  open to air  -JS  —    Base  pink  -HS  pink  -JS  —    Red (%), Wound Tissue Color  100  -HS  —  —    Periwound  intact;dry  -HS   pink;blanchable pink/purple blanchable  -JS  —    Periwound Temperature  warm  -HS  warm  -JS  —    Periwound Skin Turgor  soft  -HS  soft  -JS  —    Edges  irregular  -HS  open  -JS  —    Wound Length (cm)  —  1.5 cm  -JS  —    Wound Width (cm)  —  1.5 cm  -JS  —    Drainage Amount  none  -HS  none  -JS  —    Care, Wound  cleansed with;water;barrier applied;pressure removed  -HS  —  —    Dressing Care, Wound  open to air  -HS  —  —    Periwound Care, Wound  barrier ointment applied  -HS  —  —                       Recommendations:  Please see wound-ostomy order set for recommended orders.      )Peggy Monterroso RN 5/13/2019

## 2019-05-13 NOTE — ED PROVIDER NOTES
Subjective   Patient is a 79-year-old  female that presents to the ER today from the nursing home with complaint of abdominal pain.  Patient reports that 1 hour prior to arrival arrival she developed a sudden onset pain that radiates from the right upper abdomen to the right lower abdomen.  The patient has had a previous cholecystectomy.  She believes she may have had an appendectomy in the past.  The patient reports nausea denies vomiting.  Patient denies any constipation or diarrhea.  She reports that her last bowel movement was this morning.  She denies any black, bloody, tarry looking stools.  She denies fever.  The patient states that she is not normally urinate due to end-stage renal disease.  Patient is currently on dialysis and receives dialysis Monday Wednesdays and Fridays.  She normally follows with Dr. Ramires for this.  Patient reports her last dialysis treatment was on Friday.  The patient denies any vaginal bleeding or discharge.  Patient was admitted to this facility from March 24 through April 2, 2019.  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  Patient has a history of an SMA chronic occlusion.  At that time she was on Plavix and aspirin.  Due to the GI bleed she was taken off the Plavix and left on a 1 mg aspirin.  Patient had a colonoscopy performed during that stay that showed diverticulosis, one polyp was removed.  The patient also had an EGD that was normal.  The patient had an M2A capsule.  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  The patient was discharged home and reports that she has been.  She presents here today for further evaluation.        Abdominal Pain   Pain location:  RUQ and RLQ  Pain quality: sharp and stabbing    Pain radiates to:  Does not radiate  Pain severity:  Moderate  Onset quality:  Sudden  Duration:  1 hour  Timing:  Constant  Progression:  Unchanged  Chronicity:  New  Context:  not alcohol use, not awakening from sleep, not diet changes, not eating, not laxative use, not medication withdrawal, not previous surgeries, not recent illness, not recent sexual activity, not recent travel, not retching, not sick contacts, not suspicious food intake and not trauma    Relieved by:  Nothing  Worsened by:  Nothing  Ineffective treatments:  None tried  Associated symptoms: nausea    Associated symptoms: no anorexia, no belching, no chest pain, no chills, no constipation, no cough, no diarrhea, no dysuria, no fatigue, no fever, no flatus, no hematemesis, no hematochezia, no hematuria, no melena, no shortness of breath, no sore throat, no vaginal bleeding, no vaginal discharge and no vomiting    Risk factors: recent hospitalization    Risk factors: no alcohol abuse, no aspirin use, not elderly, has not had multiple surgeries, no NSAID use, not obese and not pregnant        Review of Systems   Constitutional: Negative for chills, fatigue and fever.   HENT: Negative for sore throat.    Respiratory: Negative for cough and shortness of breath.    Cardiovascular: Negative for chest pain.   Gastrointestinal: Positive for abdominal pain and nausea. Negative for anorexia, constipation, diarrhea, flatus, hematemesis, hematochezia, melena and vomiting.   Genitourinary: Negative for dysuria, hematuria, vaginal bleeding and vaginal discharge.   All other systems reviewed and are negative.      Past Medical History:   Diagnosis Date   • Arthritis    • Carotid artery disease (CMS/HCC)    • CHF (congestive heart failure) (CMS/HCC)    • Chronic kidney disease on chronic dialysis (CMS/HCC)    • Chronic renal failure     on dialysis ON MON, WED, FRI   • Coronary artery disease    • Disease of thyroid gland    • Diverticulitis    • Gastric ulcer    • History of transfusion    • Hyperlipidemia    • Hypertension    • Injury of back    • Mesenteric artery insufficiency (CMS/HCC)    • Multilevel degenerative disc disease    •  Osteoporosis    • Pancreatitis    • Pelvis fracture (CMS/HCC)    • Pneumonia        No Known Allergies    Past Surgical History:   Procedure Laterality Date   • AORTAGRAM Right 1/8/2018    Procedure: MESENTERIC ANGIOGRAM, GROIN ACCESS, MYNX CLOSURE;  Surgeon: Tomasz San DO;  Location: [facility] OR;  Service:    • AORTIC VALVE SURGERY     • APPENDECTOMY     • BACK SURGERY     • CAPSULE ENDOSCOPY N/A 3/30/2019    Procedure: CAPSULE ENDOSCOPY M2A;  Surgeon: David Yu MD;  Location: [facility] ENDOSCOPY;  Service: Gastroenterology   • CARDIAC SURGERY     • CAROTID ENDARTERECTOMY Bilateral    • CATARACT EXTRACTION, BILATERAL     • CERVICAL SPINE SURGERY     • CHOLECYSTECTOMY     • COLON SURGERY     • COLONOSCOPY  10/01/2015   • COLONOSCOPY N/A 3/28/2019    Procedure: COLONOSCOPY WITH ANESTHESIA;  Surgeon: Rafita Merida MD;  Location: [facility] ENDOSCOPY;  Service: Gastroenterology   • CORONARY ARTERY BYPASS GRAFT     • DIALYSIS FISTULA CREATION     • ENDOSCOPY N/A 12/27/2018    Procedure: ESOPHAGOGASTRODUODENOSCOPY WITH ANESTHESIA;  Surgeon: Moni Garza MD;  Location: [facility] END[facility]COPY;  Service: Gastroenterology   • ENDOSCOPY N/A 2/28/2019    Procedure: ESOPHAGOGASTRODUODENOSCOPY WITH ANESTHESIA;  Surgeon: Moni Garza MD;  Location: [facility] END[facility]COPY;  Service: Gastroenterology   • ENDOSCOPY N/A 3/11/2019    Procedure: ESOPHAGOGASTRODUODENOSCOPY WITH ANESTHESIA;  Surgeon: Moni Garza MD;  Location: [facility] END[facility];  Service: Gastroenterology   • ENDOSCOPY N/A 3/27/2019    Procedure: ESOPHAGOGASTRODUODENOSCOPY WITH ANESTHESIA;  Surgeon: Rafita Merida MD;  Location: [facility] END[facility];  Service: Gastroenterology   • HIP TROCANTERIC NAILING WITH INTRAMEDULLARY HIP SCREW Right 2/8/2019    Procedure: HIP TROCANTERIC NAILING LONG WITH INTRAMEDULLARY HIP SCREW;  Surgeon: Boo Camacho MD;  Location: [facility] OR;  Service: Orthopedics   • JOINT REPLACEMENT      knee   • LAPAROSCOPIC GASTRIC BANDING     •  LEEP     • LUMBAR FUSION     • TOE AMPUTATION Left     2nd toe   • TOTAL KNEE ARTHROPLASTY Bilateral    • TUBAL ABDOMINAL LIGATION     • UPPER GASTROINTESTINAL ENDOSCOPY  10/01/2015       Family History   Problem Relation Age of Onset   • Hypertension Mother    • Cancer Mother         stomach   • Coronary artery disease Father    • Heart attack Father    • Diabetes Brother    • Coronary artery disease Brother    • Colon cancer Neg Hx    • Colon polyps Neg Hx        Social History     Socioeconomic History   • Marital status:      Spouse name: Not on file   • Number of children: Not on file   • Years of education: Not on file   • Highest education level: Not on file   Tobacco Use   • Smoking status: Never Smoker   • Smokeless tobacco: Never Used   Substance and Sexual Activity   • Alcohol use: No   • Drug use: No   • Sexual activity: Defer           Objective   Physical Exam   Constitutional: She is oriented to person, place, and time. She appears well-developed and well-nourished.   HENT:   Head: Normocephalic and atraumatic.   Eyes: Conjunctivae are normal. Pupils are equal, round, and reactive to light.   Cardiovascular: Normal rate, regular rhythm and normal heart sounds.   Pulmonary/Chest: Effort normal and breath sounds normal.   Abdominal: Soft. Bowel sounds are normal. There is tenderness in the right upper quadrant and right lower quadrant.   Neurological: She is alert and oriented to person, place, and time.   Skin: Skin is warm and dry. Capillary refill takes less than 2 seconds.   Psychiatric: She has a normal mood and affect.   Nursing note and vitals reviewed.      Procedures           ED Course  ED Course as of May 13 0054   Sun May 12, 2019   2354 Reviewed pt CT scan; concern for free air noted on CT scan. Call placed to [facility] for STAT read of CT scan. Order placed for [facility]. I did discuss the pt case with Dr. Tolentino who will evaluate the pt.   [LF]   Mon May 13, 2019   0022 The pt has  been evaluated by Dr. Tolentino. Call received from [facility] regarding pt CT scan abnormalities. Call placed to Dr. Rodriguez, general surgery.   [LF]   0023 Dr. Tolentino discussed pt case with Dr. Rodriguez; he will come in to see the pt. The pt and daughter were updated on all findings at this time by myself and Dr. Tolentino.   [LF]   0042 Dr. Rodriguez here to see pt.   [LF]   0049 Pt will go to the OR at this time after speaking with Dr. Rodriguez. Pt will be admitted to the OR in guarded condition.   [LF]   0051 EKG from today compared to EKG from 3/19; no significant changes noted.   [LF]      ED Course User Index  [LF] Ashlyn Mackey, APRN        XR Chest 1 View   ED Interpretation   Abnormal   Free air under diaphragm, pulmonary edema      CT Abdomen Pelvis Without Contrast    (Results Pending)     Labs Reviewed   COMPREHENSIVE METABOLIC PANEL - Abnormal; Notable for the following components:       Result Value    BUN 27 (*)     Creatinine 4.51 (*)     Chloride 96 (*)     CO2 32.0 (*)     Total Protein 6.1 (*)     Albumin 3.00 (*)     Alkaline Phosphatase 215 (*)     eGFR Non  Amer 9 (*)     A/G Ratio 1.0 (*)     BUN/Creatinine Ratio 6.0 (*)     All other components within normal limits    Narrative:     GFR Normal >60  Chronic Kidney Disease <60  Kidney Failure <15   PROTIME-INR - Abnormal; Notable for the following components:    Protime 18.0 (*)     INR 1.44 (*)     All other components within normal limits   LIPASE - Abnormal; Notable for the following components:    Lipase 14 (*)     All other components within normal limits   CBC WITH AUTO DIFFERENTIAL - Abnormal; Notable for the following components:    RBC 3.78 (*)     Hemoglobin 11.9 (*)     MCV 99.5 (*)     MCHC 31.6 (*)     RDW 16.5 (*)     RDW-SD 60.2 (*)     Neutrophil % 87.7 (*)     Lymphocyte % 6.4 (*)     Monocyte % 3.4 (*)     Lymphocytes, Absolute 0.60 (*)     All other components within normal limits   APTT - Normal   TROPONIN (IN-HOUSE)  - Normal   LACTIC ACID, PLASMA - Normal   BLOOD CULTURE   BLOOD CULTURE   RAINBOW DRAW    Narrative:     The following orders were created for panel order [facility] Draw.  Procedure                               Abnormality         Status                     ---------                               -----------         ------                     Light Blue Top[[id number]]                                   Final result               Green Top (Gel)[[id number]]                                  Final result               Lavender Top[[id number]]                                     Final result               Red Top[[id number]]                                          In process                   Please view results for these tests on the individual orders.   BNP (IN-HOUSE)   TYPE AND SCREEN   LIGHT BLUE TOP   GREEN TOP   LAVENDER TOP   CBC AND DIFFERENTIAL    Narrative:     The following orders were created for panel order CBC & Differential.  Procedure                               Abnormality         Status                     ---------                               -----------         ------                     CBC Auto Differential[[id number]]        Abnormal            Final result                 Please view results for these tests on the individual orders.   RED TOP               MDM  Number of Diagnoses or Management Options  Abdominal pain, unspecified abdominal location: new and requires workup  Intra-abdominal free air of unknown etiology: new and requires workup     Amount and/or Complexity of Data Reviewed  Clinical lab tests: ordered and reviewed  Tests in the radiology section of CPT®: ordered and reviewed  Tests in the medicine section of CPT®: ordered and reviewed  Decide to obtain previous medical records or to obtain history from someone other than the patient: yes  Discuss the patient with other providers: yes  Independent visualization of images, tracings, or specimens: yes    Risk of Complications,  Morbidity, and/or Mortality  General comments: Total Critical Care Time: 61 mins    Critical Care  Total time providing critical care: 30-74 minutes    Patient Progress  Patient progress: (guarded)        Final diagnoses:   Abdominal pain, unspecified abdominal location   Intra-abdominal free air of unknown etiology            Ashlyn Mackey, APRN  05/13/19 0054     Family/Patient

## 2019-05-13 NOTE — PROGRESS NOTES
Laila Rodriguez MD Progress Note     LOS: 0 days   Patient Care Team:  Provider, No Known as PCP - Barrett Prasad Jr, MD as PCP - Family Medicine  Moni Garza MD as Consulting Physician (Gastroenterology)  Tomasz San DO as Consulting Physician (Vascular Surgery)        Subjective     Operative findings explained.  Doing well.  Just finished dialysis.  Pressure somewhat labile    Objective     Vital Signs  Temp:  [97.6 °F (36.4 °C)-98.3 °F (36.8 °C)] 97.7 °F (36.5 °C)  Heart Rate:  [] 73  Resp:  [12-22] 14  BP: ()/(34-99) 91/59    Intake & Output (last 3 days)       05/10 0701 - 05/11 0700 05/11 0701 - 05/12 0700 05/12 0701 - 05/13 0700 05/13 0701 - 05/14 0700    P.O.    100    I.V. (mL/kg)    1196 (16)    IV Piggyback   250 100    Total Intake(mL/kg)   250 (3.3) 1396 (18.7)    Urine (mL/kg/hr)   0 0 (0)    Drains   50 85    Other    2000    Total Output   50 2085    Net   +200 -689                  Physical Exam:     General Appearance:    Alert, cooperative, in no acute distress   Lungs:     respirations regular, even and unlabored    Heart:    Regular rhythm and normal rate, normal S1 and S2, no            murmur, no gallop, no rub   Chest Wall:    No abnormalities observed   Abdomen:      Soft SATNAM serosanguineous more bloody than serous   Extremities: No edema,    Results Review:     I reviewed the patient's new clinical results.    Lab Results (last 72 hours)     Procedure Component Value Units Date/Time    Troponin [[id number]]  (Normal) Collected:  05/13/19 1407    Specimen:  Blood Updated:  05/13/19 1452     Troponin I <0.012 ng/mL     Hepatitis B Surface Antibody [[id number]]  (Abnormal) Collected:  05/13/19 0903    Specimen:  Blood Updated:  05/13/19 1023     Hepatitis Bs Ab Index <5.00     Hep B S Ab Non-Immune    Hepatitis Panel, Acute [[id number]]  (Normal) Collected:  05/13/19 0903    Specimen:  Blood Updated:  05/13/19 1023     HCV S/C Ratio 0.10     Hepatitis C Ab Negative      Hep A IgM Negative     Hep B C IgM Negative     Hepatitis B Surface Ag Negative    Blood Culture - Blood, Arm, Right [[id number]] Collected:  05/13/19 0858    Specimen:  Blood from Arm, Right Updated:  05/13/19 0946    Phosphorus [[id number]]  (Normal) Collected:  05/13/19 0858    Specimen:  Blood Updated:  05/13/19 0931     Phosphorus 4.3 mg/dL     Magnesium [[id number]]  (Normal) Collected:  05/13/19 0858    Specimen:  Blood Updated:  05/13/19 0931     Magnesium 1.6 mg/dL     CBC & Differential [[id number]] Collected:  05/13/19 0858    Specimen:  Blood Updated:  05/13/19 0924    Narrative:       The following orders were created for panel order CBC & Differential.  Procedure                               Abnormality         Status                     ---------                               -----------         ------                     CBC Auto Differential[[id number]]        Abnormal            Final result                 Please view results for these tests on the individual orders.    CBC Auto Differential [[id number]]  (Abnormal) Collected:  05/13/19 0858    Specimen:  Blood Updated:  05/13/19 0924     WBC 20.56 10*3/mm3      RBC 3.42 10*6/mm3      Hemoglobin 10.9 g/dL      Hematocrit 34.6 %      .2 fL      MCH 31.9 pg      MCHC 31.5 g/dL      RDW 16.7 %      RDW-SD 62.2 fl      MPV 10.6 fL      Platelets 147 10*3/mm3      Neutrophil % 95.5 %      Lymphocyte % 1.8 %      Monocyte % 1.8 %      Eosinophil % 0.0 %      Basophil % 0.2 %      Immature Grans % 0.7 %      Neutrophils, Absolute 19.64 10*3/mm3      Lymphocytes, Absolute 0.37 10*3/mm3      Monocytes, Absolute 0.36 10*3/mm3      Eosinophils, Absolute 0.00 10*3/mm3      Basophils, Absolute 0.04 10*3/mm3      Immature Grans, Absolute 0.15 10*3/mm3      nRBC 0.0 /100 WBC     Magnesium [[id number]]  (Normal) Collected:  05/13/19 0023    Specimen:  Blood Updated:  05/13/19 0610     Magnesium 1.6 mg/dL     Phosphorus [[id number]]  (Normal) Collected:   05/13/19 0023    Specimen:  Blood Updated:  05/13/19 0610     Phosphorus 3.6 mg/dL     Amylase [[id number]]  (Abnormal) Collected:  05/13/19 0023    Specimen:  Blood Updated:  05/13/19 0308     Amylase <30 U/L     Lipase [[id number]]  (Abnormal) Collected:  05/13/19 0023    Specimen:  Blood Updated:  05/13/19 0308     Lipase <10 U/L     [place] Draw [[id number]] Collected:  05/12/19 2133    Specimen:  Blood Updated:  05/13/19 0130    Narrative:       The following orders were created for panel order [place] Draw.  Procedure                               Abnormality         Status                     ---------                               -----------         ------                     Light Blue Top[[id number]]                                   Final result               Green Top (Gel)[[id number]]                                  Final result               Lavender Top[[id number]]                                     Final result               Red Top[[id number]]                                          Final result                 Please view results for these tests on the individual orders.    Red Top [[id number]] Collected:  05/13/19 0023    Specimen:  Blood Updated:  05/13/19 0130     Extra Tube Hold for add-ons.     Comment: Auto resulted.       BNP [[id number]]  (Abnormal) Collected:  05/12/19 2133    Specimen:  Blood Updated:  05/13/19 0054     proBNP 13,600.0 pg/mL     Protime-INR [[id number]]  (Abnormal) Collected:  05/13/19 0023    Specimen:  Blood Updated:  05/13/19 0046     Protime 18.0 Seconds      INR 1.44    aPTT [[id number]]  (Normal) Collected:  05/13/19 0023    Specimen:  Blood Updated:  05/13/19 0046     PTT 34.8 seconds     Blood Culture - Blood, Hand, Right [[id number]] Collected:  05/13/19 0023    Specimen:  Blood from Hand, Right Updated:  05/13/19 0043    Light Blue Top [[id number]] Collected:  05/12/19 2133    Specimen:  Blood Updated:  05/12/19 2245     Extra Tube hold for add-on     Comment: Auto  resulted       Green Top (Gel) [[id number]] Collected:  05/12/19 2133    Specimen:  Blood Updated:  05/12/19 2245     Extra Tube Hold for add-ons.     Comment: Auto resulted.       Lavender Top [[id number]] Collected:  05/12/19 2133    Specimen:  Blood Updated:  05/12/19 2245     Extra Tube hold for add-on     Comment: Auto resulted       Troponin [[id number]]  (Normal) Collected:  05/12/19 2133    Specimen:  Blood Updated:  05/12/19 2206     Troponin I <0.012 ng/mL     Lactic Acid, Plasma [[id number]]  (Normal) Collected:  05/12/19 2148    Specimen:  Blood Updated:  05/12/19 2203     Lactate 1.4 mmol/L     Comprehensive Metabolic Panel [[id number]]  (Abnormal) Collected:  05/12/19 2133    Specimen:  Blood Updated:  05/12/19 2154     Glucose 92 mg/dL      BUN 27 mg/dL      Creatinine 4.51 mg/dL      Sodium 136 mmol/L      Potassium 4.0 mmol/L      Chloride 96 mmol/L      CO2 32.0 mmol/L      Calcium 8.4 mg/dL      Total Protein 6.1 g/dL      Albumin 3.00 g/dL      ALT (SGPT) 17 U/L      AST (SGOT) 35 U/L      Alkaline Phosphatase 215 U/L      Total Bilirubin 0.5 mg/dL      eGFR Non African Amer 9 mL/min/1.73      Comment: <15 Indicative of kidney failure.        eGFR   Amer -- mL/min/1.73      Comment: <15 Indicative of kidney failure.        Globulin 3.1 gm/dL      A/G Ratio 1.0 g/dL      BUN/Creatinine Ratio 6.0     Anion Gap 8.0 mmol/L     Narrative:       GFR Normal >60  Chronic Kidney Disease <60  Kidney Failure <15    Lipase [[id number]]  (Abnormal) Collected:  05/12/19 2133    Specimen:  Blood Updated:  05/12/19 2154     Lipase 14 U/L     CBC & Differential [[id number]] Collected:  05/12/19 2133    Specimen:  Blood Updated:  05/12/19 2144    Narrative:       The following orders were created for panel order CBC & Differential.  Procedure                               Abnormality         Status                     ---------                               -----------         ------                     CBC Auto  Differential[[id number]]        Abnormal            Final result                 Please view results for these tests on the individual orders.    CBC Auto Differential [[id number]]  (Abnormal) Collected:  05/12/19 2133    Specimen:  Blood Updated:  05/12/19 2144     WBC 9.31 10*3/mm3      RBC 3.78 10*6/mm3      Hemoglobin 11.9 g/dL      Hematocrit 37.6 %      MCV 99.5 fL      MCH 31.5 pg      MCHC 31.6 g/dL      RDW 16.5 %      RDW-SD 60.2 fl      MPV 10.8 fL      Platelets 197 10*3/mm3      Neutrophil % 87.7 %      Lymphocyte % 6.4 %      Monocyte % 3.4 %      Eosinophil % 1.7 %      Basophil % 0.4 %      Immature Grans % 0.4 %      Neutrophils, Absolute 8.15 10*3/mm3      Lymphocytes, Absolute 0.60 10*3/mm3      Monocytes, Absolute 0.32 10*3/mm3      Eosinophils, Absolute 0.16 10*3/mm3      Basophils, Absolute 0.04 10*3/mm3      Immature Grans, Absolute 0.04 10*3/mm3      nRBC 0.0 /100 WBC         Imaging Results (last 72 hours)     Procedure Component Value Units Date/Time    CT Abdomen Pelvis Without Contrast [[id number]] Collected:  05/13/19 0752     Updated:  05/13/19 0807    Narrative:       CT ABDOMEN PELVIS WO CONTRAST- 5/12/2019 11:33 PM CDT     HISTORY: abd pain      COMPARISON: 03/09/2019      DLP: 889 mGy cm Automated exposure control was utilized to diminish  patient radiation dose.     TECHNIQUE: Noncontrast enhanced images of the abdomen and pelvis  obtained without oral contrast.      FINDINGS:   There is moderate cardiomegaly the patient is status post aortic valve  replacement with moderate cardiomegaly. No evidence of pericardial  effusion. Tiny effusions are present with bibasilar atelectasis. Some  thickening of the interlobular septa within the lower lobes is either  related to some pulmonary fibrosis or mild pulmonary venous hypertension  and interstitial edema..      LIVER: No focal liver lesion. A large amount of pneumoperitoneum is  noted within the upper abdomen and perihepatic space.      BILIARY SYSTEM: The patient's undergone cholecystectomy. No evidence of  intra or extrahepatic biliary dilatation..      PANCREAS: No focal pancreatic lesion.      SPLEEN: Unremarkable.      KIDNEYS AND ADRENALS: The adrenals are unremarkable. There is a 2.3 cm  suspected cortical cyst involving the interpolar aspect of the left  kidney. I couldn't be confirmed with ultrasound. There is diffuse  atrophy of the kidneys. No evidence of nephrolithiasis..  The ureters  are decompressed and normal in appearance.     RETROPERITONEUM: No mass, lymphadenopathy or hemorrhage.      GI TRACT: Some mild thickening of the wall is noted near the gastric  outlet. I do not see any evidence of a discrete ulceration but the  majority of the free air is noted within the upper abdomen raising the  possibility of a gastric perforation. Diverticulosis is noted of the  descending and sigmoid colon but I do not see evidence of diverticulitis  and there is no free air noted within the lower pelvis or mid abdomen.  The patient is status post gastric band placement. Mild prominence of  the gastric wall near the gastric band is also present.. The appendix is  surgically absent..     OTHER: There is no mesenteric mass, lymphadenopathy or fluid collection.  The osseous structures and soft tissues demonstrate no worrisome  lesions. No free fluid is present.      PELVIS: No mass lesion, fluid collection or significant lymphadenopathy  is seen in the pelvis. The urinary bladder is normal in appearance.       Impression:       1. Pneumoperitoneum. This is within the upper abdomen. There is some  mild thickening of the gastric wall in the region of the gastric outlet  as well as at the level of a gastric band from previous lap band  surgery. I would favor an upper abdominal perforation with no evidence  of abnormal inflammatory stranding or pneumoperitoneum within the pelvis  or mid abdomen. A small amount of free fluid is noted within  the  perihepatic space.  2. Small effusions with bibasilar atelectasis. Mild pulmonary venous  hypertension and interstitial edema are suspected.  3. Suspected cortical cyst midpole left kidney. This could be followed  up with ultrasound..  4. Diverticulosis of the descending and sigmoid colon without evidence  of acute diverticulitis.  5. Diffuse atrophy of the kidneys.        This report was finalized on 05/13/2019 08:04 by Dr. Willian Baker MD.    XR Chest 1 View [[id number]] Collected:  05/13/19 0721     Updated:  05/13/19 0725    Narrative:       EXAMINATION: Chest 1 view 05/12/2019     HISTORY: Pneumoperitoneum. Pulmonary edema.     FINDINGS: Today's exam is compared to previous study of 03/28/2019.  There is pneumoperitoneum with free air beneath the central and right  hemidiaphragm. There is right basilar atelectasis. There is moderate  cardiomegaly with mild pulmonary vascular congestion. Small effusions  are present blunting the costophrenic angles.       Impression:       1.. Pulmonary vascular congestion with small effusions. The patient is  status post percutaneous aortic valve replacement.  2. Pneumoperitoneum.  This report was finalized on 05/13/2019 07:22 by Dr. Willian Baker MD.              Assessment/Plan       Abdominal pain      Continue current treatment      Laila Rodriguez MD  05/13/19  4:42 PM

## 2019-05-13 NOTE — ANESTHESIA POSTPROCEDURE EVALUATION
"Patient: Cheri Ely    Procedure Summary     Date:  05/13/19 Room / Location:   [facility] OR 06 /  [facility]    Anesthesia Start:  0111 Anesthesia Stop:  0259    Procedure:  LAPAROTOMY EXPLORATORY, OVERSEW DUODENAL ULCER WITH FRED PATCH, KOCHER MANEUVER (N/A Abdomen) Diagnosis:  (FREE AIR IN ABDOMEN)    Surgeon:  Laila Rodriguez MD Provider:  Jhon Maier CRNA    Anesthesia Type:  general ASA Status:  3 - Emergent          Anesthesia Type: general  Last vitals  BP   (!) 95/37 (05/13/19 0920)   Temp   98.3 °F (36.8 °C) (05/13/19 0800)   Pulse   67 (05/13/19 0920)   Resp   13 (05/13/19 0905)     SpO2   93 % (05/13/19 0920)     Post Anesthesia Care and Evaluation    PONV Status: none  Comments: Patient d/c from PACU prior to anes eval based on Hilary score.  Please see RN notes for details of d/c criteria.    Blood pressure (!) 95/37, pulse 67, temperature 98.3 °F (36.8 °C), temperature source Axillary, resp. rate 13, height 149.9 cm (59\"), weight 74.8 kg (165 lb), SpO2 93 %, not currently breastfeeding.          "

## 2019-05-13 NOTE — ANESTHESIA PROCEDURE NOTES
Airway  Airway not difficult    General Information and Staff    Patient location during procedure: OR  CRNA: Jhon Maier CRNA    Indications and Patient Condition  Indications for airway management: airway protection    Preoxygenated: yes  Mask difficulty assessment: 0 - not attempted    Final Airway Details  Final airway type: endotracheal airway      Successful airway: ETT  Cuffed: yes   Successful intubation technique: direct laryngoscopy  Endotracheal tube insertion site: oral  Blade: Daniel  Blade size: 3  ETT size (mm): 7.0  Cormack-Lehane Classification: grade I - full view of glottis  Placement verified by: chest auscultation and capnometry   Cuff volume (mL): 5  Measured from: lips  ETT to lips (cm): 20  Number of attempts at approach: 1    Additional Comments  ATRAUMATIC INTUBATION

## 2019-05-13 NOTE — ED NOTES
PATIENT STATES PAIN IS ON RIGHT SIDE OF ABDOMEN, STATES RUNS ALL THE WAY UP AND DOWN ON RIGHT SIDE OF ABDOMEN.      Antonella Ladd, RN  05/12/19 2454

## 2019-05-13 NOTE — ANESTHESIA PREPROCEDURE EVALUATION
Anesthesia Evaluation     Patient summary reviewed and Nursing notes reviewed   no history of anesthetic complications:  NPO Solid Status: Waived due to emergency  NPO Liquid Status: Waived due to emergency           Airway   Mallampati: II  TM distance: >3 FB  Neck ROM: full  No difficulty expected  Dental    (+) edentulous, upper dentures and lower dentures    Pulmonary - normal exam   Cardiovascular - normal exam  Exercise tolerance: poor (<4 METS)    ECG reviewed  Patient on routine beta blocker and Beta blocker given within 24 hours of surgery    (+) hypertension, CAD, CABG >6 Months, CHF, PVD, hyperlipidemia,  carotid artery disease    ROS comment: LBBB with PAC's.   Echo 2015   The left ventricular chamber size is mildly dilated.    There is normal left ventricular systolic function.    The estimated ejection fraction is 55-60%.    [facility] Grade 2 diastolic dysfunction.    The right ventricular chamber size and systolic function are within  normal limits.    A bio-prosthetic aortic valve is present. There is a mild perivalvular  leak.    Neuro/Psych- negative ROS  GI/Hepatic/Renal/Endo    (+)  GI bleeding, renal disease (dialysis 3/27/19) dialysis, hypothyroidism,     ROS Comment: MWF dialysis    Musculoskeletal     Abdominal  - normal exam   Substance History      OB/GYN          Other   (+) arthritis                     Anesthesia Plan    ASA 3 - emergent     general     intravenous induction   Anesthetic plan, all risks, benefits, and alternatives have been provided, discussed and informed consent has been obtained with: patient.  Use of blood products discussed with patient .

## 2019-05-13 NOTE — PROGRESS NOTES
[facility] Medicine Services  INPATIENT PROGRESS NOTE    Patient Name: Cheri Ely  Date of Admission: 5/12/2019  Today's Date: 05/13/19  Length of Stay: 0  Primary Care Physician: Provider, No Known    Subjective   Chief Complaint: follow up  HPI   79-year-old man we were consulted to assist medical him management of hypertension, renal disease and coronary artery disease.  Patient was admitted by Dr. Rodriguez further management of free air in the abdomen.  He had undergone exploratory laparotomy with lysis of adhesions, mobilization of the duodenum with Kocher maneuver, oversew of duodenal ulcer with Cedric patch.      The operative laboratory showed BNP of 13,600, creatinine of 4.51.  EKG showed atrial fibrillation with left bundle branch block (chronic reportedly).      Past Medical History:   Diagnosis Date   • Arthritis     • Carotid artery disease (CMS/HCC)     • CHF (congestive heart failure) (CMS/HCC)     • Chronic kidney disease on chronic dialysis (CMS/HCC)     • Chronic renal failure       on dialysis ON MON, WED, FRI   • Coronary artery disease     • Disease of thyroid gland     • Diverticulitis     • Gastric ulcer     • History of transfusion     • Hyperlipidemia     • Hypertension     • Injury of back     • Mesenteric artery insufficiency (CMS/HCC)     • Multilevel degenerative disc disease     • Osteoporosis     • Pancreatitis     • Pelvis fracture (CMS/HCC)     • Pneumonia        Patient was recently seen this morning by Dr. Concepcion.      Review of Systems     All pertinent negatives and positives are as above. All other systems have been reviewed and are negative unless otherwise stated.     Objective    Temp:  [97.6 °F (36.4 °C)-98 °F (36.7 °C)] 98 °F (36.7 °C)  Heart Rate:  [] 72  Resp:  [14-22] 16  BP: ()/(34-99) 97/53  Physical Exam  Pleasant woman, no distress, has an NG tube in place and a CO2 texture.  She is on PCA.  She is on maintenance  fluid at 100 cc/h.  Awake, alert, oriented x3, no apparent distress  Supple neck  Anicteric sclera, external ocular muscles are intact  Normal respiratory effort, no adventitious sounds  S1-S2, irregularly irregular, rate controlled; telemetry showed A. fib rate controlled  She has a SATNAM drain; soft abdomen, diminished breath sounds; flabby abdomen  No cyanosis clubbing or edema  Warm dry skin  Good capillary refill time      Results Review:  I have reviewed the labs, radiology results, and diagnostic studies.    Laboratory Data:   Results from last 7 days   Lab Units 05/12/19 2133   WBC 10*3/mm3 9.31   HEMOGLOBIN g/dL 11.9*   HEMATOCRIT % 37.6   PLATELETS 10*3/mm3 197        Results from last 7 days   Lab Units 05/12/19  2133   SODIUM mmol/L 136   POTASSIUM mmol/L 4.0   CHLORIDE mmol/L 96*   CO2 mmol/L 32.0*   BUN mg/dL 27*   CREATININE mg/dL 4.51*   CALCIUM mg/dL 8.4   BILIRUBIN mg/dL 0.5   ALK PHOS U/L 215*   ALT (SGPT) U/L 17   AST (SGOT) U/L 35   GLUCOSE mg/dL 92       Culture Data:        Radiology Data:   Imaging Results (last 24 hours)     Procedure Component Value Units Date/Time    XR Chest 1 View [[id number]]  (Abnormal) Resulted:  05/13/19 0050     Updated:  05/13/19 0051    CT Abdomen Pelvis Without Contrast [[id number]] Updated:  05/12/19 2335          I have reviewed the patient's current medications.     Assessment/Plan     Active Hospital Problems    Diagnosis   • Abdominal pain       Pneumoperitoneum perforated duodenal ulcers status post exploratory laparotomy.  End-stage renal disease on hemodialysis  Acute blood loss anemia  History of diastolic heart failure with status post aortic valve      Nephrology has been consulted consulted  Patient has a Sorto catheter but has been anuric for the past 2 to 3 years -I think it is reasonable to discontinue the Sorto catheter as it brings more risks of infection rather than addition to her care of monitoring renal function in the setting of anuria and  end-stage renal disease  Her antihypertensive medications has been discontinued with exception of beta-blocker; will monitor closely    [START ON 5/14/2019] enoxaparin 30 mg Subcutaneous Daily   metoprolol tartrate 2.5 mg Intravenous Q12H   pantoprazole 40 mg Intravenous Q AM   piperacillin-tazobactam 2.25 g Intravenous Q8H     Continue supportive care  Other plan per other services  We will continue to follow along.        Discharge Planning: Per primary service  Fabien Espinosa MD   05/13/19   7:25 AM

## 2019-05-13 NOTE — CONSULTS
Nephrology ([facility]) Consult Note      Patient:  Cheri Ely  YOB: 1940  Date of Service: 5/13/2019  MRN: [id number]   Acct: [id number]   Primary Care Physician: Provider, No Known  Advance Directive:   Code Status and Medical Interventions:   Ordered at: 05/13/19 0438     Level Of Support Discussed With:    Patient     Code Status:    CPR     Medical Interventions (Level of Support Prior to Arrest):    Full     Admit Date: 5/12/2019       Hospital Day: 0  Referring Provider: Laila Rodriguez MD      Patient personally seen and examined.  Complete chart including Consults, Notes, Operative Reports, Labs, Cardiology, and Radiology studies reviewed as able.        Subjective:  Cheri Ely is a 79 y.o. female  whom we were consulted for end stage renal disease management. Patient has routine hemodialysis Monday Wednesday Friday at [facility].  No recent issues with dialysis.  Recurrent history of GI bleeding. Presented this time with acute onset abdominal pain; imaging showed free air in abdomen and was taken for exp lap by Dr Hughes. Had repair of perforated duodenal ulcer.  currently is awake and alert.  Mild abdominal pain, no n/v/d.    Allergies:  Patient has no known allergies.    Home Meds:  Medications Prior to Admission   Medication Sig Dispense Refill Last Dose   • acetaminophen (TYLENOL) 325 MG tablet Take 2 tablets by mouth Every 6 (Six) Hours As Needed for Mild Pain .   Past Month at Unknown time   • aspirin 81 MG EC tablet Take 1 tablet by mouth Daily.   3/23/2019   • B Complex-C-Folic Acid (JOHN-SANGEETA) tablet Take 1 tablet by mouth Daily.   3/23/2019   • carvedilol (COREG) 3.125 MG tablet Take 3.125 mg by mouth Daily. Monday, Wednesday, and Friday dose. Hold if SBP < 100.   3/22/2019   • carvedilol (COREG) 3.125 MG tablet Take 3.125 mg by mouth 2 (Two) Times a Day With Meals. Sunday, Tuesday, Thursday, and Saturday dose. Hold if SBP <100.   3/23/2019    • Cholecalciferol (VITAMIN D) 2000 units capsule Take 1 capsule by mouth Daily. 30 capsule     • docusate sodium 100 MG capsule Take 100 mg by mouth 2 (Two) Times a Day. 60 each 1 3/23/2019   • epoetin lucy (EPOGEN,PROCRIT) 23097 UNIT/ML injection Inject 1 mL under the skin into the appropriate area as directed 3 (Three) Times a Week.      • lactulose 20 GM/30ML solution solution Take 30 mL by mouth Daily As Needed (Constipation). 30 mL  3/23/2019   • levothyroxine (SYNTHROID, LEVOTHROID) 150 MCG tablet Take 150 mcg by mouth Daily.   3/23/2019   • losartan (COZAAR) 25 MG tablet Take 25 mg by mouth 3 (Three) Times a Week. Monday, Wednesday, and Friday.   3/23/2019   • ondansetron ODT (ZOFRAN-ODT) 4 MG disintegrating tablet Take 4 mg by mouth Every 8 (Eight) Hours As Needed for Nausea or Vomiting.   Past Month at Unknown time   • pantoprazole (PROTONIX) 40 MG EC tablet Take 1 tablet by mouth Daily. 30 tablet 11 3/23/2019   • Polyethyl Glyc-Propyl Glyc PF 0.4-0.3 % solution ophthalmic solution Administer 2 drops to both eyes Every 2 (Two) Hours As Needed (dry eyes).   Past Month at Unknown time   • pravastatin (PRAVACHOL) 40 MG tablet Take 40 mg by mouth Daily.   3/23/2019   • sertraline (ZOLOFT) 50 MG tablet Take 50 mg by mouth Daily.   3/23/2019   • sucralfate (CARAFATE) 1 GM/10ML suspension Take 10 mL by mouth 2 (Two) Times a Day. 1200 mL 0        Medicines:  Current Facility-Administered Medications   Medication Dose Route Frequency Provider Last Rate Last Dose   • dextrose 5 % and sodium chloride 0.45 % with KCl 20 mEq/L infusion  100 mL/hr Intravenous Continuous Laila Rodriguez  mL/hr at 05/13/19 0522 100 mL/hr at 05/13/19 0522   • [START ON 5/14/2019] enoxaparin (LOVENOX) syringe 30 mg  30 mg Subcutaneous Daily Laila Rdoriguez MD       • metoprolol tartrate (LOPRESSOR) injection 2.5 mg  2.5 mg Intravenous Q12H Jony Concepcion MD   2.5 mg at 05/13/19 0630   • Morphine sulfate PCA 1 mg/mL 30 mL  syringe   Intravenous Continuous Laila Rodriguez MD       • naloxone (NARCAN) injection 0.1 mg  0.1 mg Intravenous Q5 Min PRN Laila Rodriguez MD       • ondansetron (ZOFRAN) tablet 4 mg  4 mg Oral Q6H PRN Laila Rodriguez MD        Or   • ondansetron (ZOFRAN) injection 4 mg  4 mg Intravenous Q6H PRN Laila Rodriguez MD       • pantoprazole (PROTONIX) injection 40 mg  40 mg Intravenous Q AM Laila Rodriguez MD   40 mg at 05/13/19 0630   • piperacillin-tazobactam (ZOSYN) in iso-osmotic dextrose IVPB 2.25 g (premix)  2.25 g Intravenous Q8H Laila Rodriguez MD   2.25 g at 05/13/19 0939   • sodium chloride 0.9 % flush 10 mL  10 mL Intravenous PRN Ashlyn Mackey APRN           Past Medical History:  Past Medical History:   Diagnosis Date   • Arthritis    • Carotid artery disease (CMS/HCC)    • CHF (congestive heart failure) (CMS/HCC)    • Chronic kidney disease on chronic dialysis (CMS/HCC)    • Chronic renal failure     on dialysis ON MON, WED, FRI   • Coronary artery disease    • Disease of thyroid gland    • Diverticulitis    • Gastric ulcer    • History of transfusion    • Hyperlipidemia    • Hypertension    • Injury of back    • Mesenteric artery insufficiency (CMS/HCC)    • Multilevel degenerative disc disease    • Osteoporosis    • Pancreatitis    • Pelvis fracture (CMS/HCC)    • Pneumonia        Past Surgical History:  Past Surgical History:   Procedure Laterality Date   • AORTAGRAM Right 1/8/2018    Procedure: MESENTERIC ANGIOGRAM, GROIN ACCESS, MYNX CLOSURE;  Surgeon: Tomasz San DO;  Location: [facility] OR;  Service:    • AORTIC VALVE SURGERY     • APPENDECTOMY     • BACK SURGERY     • CAPSULE ENDOSCOPY N/A 3/30/2019    Procedure: CAPSULE ENDOSCOPY M2A;  Surgeon: David Yu MD;  Location: [facility] ENDOSCOPY;  Service: Gastroenterology   • CARDIAC SURGERY     • CAROTID ENDARTERECTOMY Bilateral    • CATARACT EXTRACTION, BILATERAL     • CERVICAL SPINE SURGERY     • CHOLECYSTECTOMY     • COLON  SURGERY     • COLONOSCOPY  10/01/2015   • COLONOSCOPY N/A 3/28/2019    Procedure: COLONOSCOPY WITH ANESTHESIA;  Surgeon: Rafita Merida MD;  Location: [facility] ENDOSCOPY;  Service: Gastroenterology   • CORONARY ARTERY BYPASS GRAFT     • DIALYSIS FISTULA CREATION     • ENDOSCOPY N/A 12/27/2018    Procedure: ESOPHAGOGASTRODUODENOSCOPY WITH ANESTHESIA;  Surgeon: Moni Garza MD;  Location: [facility] ENDOSCOPY;  Service: Gastroenterology   • ENDOSCOPY N/A 2/28/2019    Procedure: ESOPHAGOGASTRODUODENOSCOPY WITH ANESTHESIA;  Surgeon: Moni Garza MD;  Location: [facility] ENDOSCOPY;  Service: Gastroenterology   • ENDOSCOPY N/A 3/11/2019    Procedure: ESOPHAGOGASTRODUODENOSCOPY WITH ANESTHESIA;  Surgeon: Moni Garza MD;  Location: [facility] ENDOSCOPY;  Service: Gastroenterology   • ENDOSCOPY N/A 3/27/2019    Procedure: ESOPHAGOGASTRODUODENOSCOPY WITH ANESTHESIA;  Surgeon: Rafita Merida MD;  Location: [facility] ENDOSCOPY;  Service: Gastroenterology   • HIP TROCANTERIC NAILING WITH INTRAMEDULLARY HIP SCREW Right 2/8/2019    Procedure: HIP TROCANTERIC NAILING LONG WITH INTRAMEDULLARY HIP SCREW;  Surgeon: Boo Camacho MD;  Location: [facility] OR;  Service: Orthopedics   • JOINT REPLACEMENT      knee   • LAPAROSCOPIC GASTRIC BANDING     • LEEP     • LUMBAR FUSION     • TOE AMPUTATION Left     2nd toe   • TOTAL KNEE ARTHROPLASTY Bilateral    • TUBAL ABDOMINAL LIGATION     • UPPER GASTROINTESTINAL ENDOSCOPY  10/01/2015       Family History  Family History   Problem Relation Age of Onset   • Hypertension Mother    • Cancer Mother         stomach   • Coronary artery disease Father    • Heart attack Father    • Diabetes Brother    • Coronary artery disease Brother    • Colon cancer Neg Hx    • Colon polyps Neg Hx        Social History  Social History     Socioeconomic History   • Marital status:      Spouse name: Not on file   • Number of children: Not on file   • Years of education: Not on file   • Highest education level:  Not on file   Tobacco Use   • Smoking status: Never Smoker   • Smokeless tobacco: Never Used   Substance and Sexual Activity   • Alcohol use: No   • Drug use: No   • Sexual activity: Defer         Review of Systems:  History obtained from chart review and the patient  General ROS: No fever or chills  Respiratory ROS: No cough, shortness of breath, wheezing  Cardiovascular ROS: No chest pain or palpitations  Gastrointestinal ROS: +abdominal pain, no nausea/vomiting or melena  Genito-Urinary ROS: No dysuria or hematuria  Neurological ROS: no headache or dizziness  14 point ROS reviewed with the patient and negative except as noted above and in the HPI unless unable to obtain.    Objective:  Patient Vitals for the past 24 hrs:   BP Temp Temp src Pulse Resp SpO2 Height Weight   05/13/19 0920 (!) 95/37 -- -- 67 -- 93 % -- --   05/13/19 0905 (!) 107/36 -- -- 73 13 93 % -- --   05/13/19 0850 (!) 93/38 -- -- 67 -- 94 % -- --   05/13/19 0835 (!) 93/37 -- -- 73 -- 92 % -- --   05/13/19 0820 (!) 100/37 -- -- 73 -- 93 % -- --   05/13/19 0800 95/50 98.3 °F (36.8 °C) Axillary 68 15 94 % -- --   05/13/19 0745 (!) 94/35 -- -- 78 -- 92 % -- --   05/13/19 0735 (!) 89/44 -- -- 71 -- 93 % -- --   05/13/19 0730 -- -- -- 77 -- 94 % -- --   05/13/19 0720 122/93 -- -- 80 -- 97 % -- --   05/13/19 0705 (!) 102/39 -- -- 83 15 97 % -- --   05/13/19 0600 97/53 -- -- 72 16 98 % -- --   05/13/19 0535 95/58 -- -- 85 -- 99 % -- --   05/13/19 0520 94/41 -- -- 89 14 97 % -- --   05/13/19 0425 96/42 -- -- 94 20 100 % -- --   05/13/19 0415 (!) 80/38 -- -- 92 20 100 % -- --   05/13/19 0400 (!) 80/38 -- -- 95 20 100 % -- --   05/13/19 0345 (!) 85/39 -- -- 88 22 100 % -- --   05/13/19 0340 (!) 99/37 -- -- 92 20 100 % -- --   05/13/19 0330 (!) 85/40 -- -- -- 20 -- -- --   05/13/19 0315 (!) 86/38 -- -- 91 -- 93 % -- --   05/13/19 0310 (!) 91/34 -- -- 109 20 97 % -- --   05/13/19 0305 96/65 -- -- 114 20 100 % -- --   05/13/19 0300 115/65 -- -- 90 20 100 % --  "--   05/13/19 0256 109/62 98 °F (36.7 °C) Temporal 91 18 100 % -- --   05/13/19 0058 110/66 97.9 °F (36.6 °C) Oral 94 18 95 % -- --   05/13/19 0046 115/46 -- -- -- -- -- -- --   05/13/19 0030 131/58 -- -- 103 -- 96 % -- --   05/13/19 0029 -- -- -- 92 -- 92 % -- --   05/13/19 0015 -- -- -- 99 -- 96 % -- --   05/12/19 2327 -- -- -- 80 -- 99 % -- --   05/12/19 2316 114/41 -- -- -- -- -- -- --   05/12/19 2315 -- -- -- 88 -- 93 % -- --   05/12/19 2301 124/55 -- -- -- -- -- -- --   05/12/19 2300 -- -- -- 85 -- 95 % -- --   05/12/19 2230 -- -- -- 88 -- 95 % -- --   05/12/19 2201 109/40 -- -- -- -- -- -- --   05/12/19 2200 -- -- -- 71 -- 93 % -- --   05/12/19 2108 144/99 97.6 °F (36.4 °C) Oral 85 18 94 % 149.9 cm (59\") 74.8 kg (165 lb)       Intake/Output Summary (Last 24 hours) at 5/13/2019 0943  Last data filed at 5/13/2019 0939  Gross per 24 hour   Intake 300 ml   Output 75 ml   Net 225 ml     General: awake/alert    Neck: supple, no JVD  Chest:  clear to auscultation bilaterally without respiratory distress  CVS: irregularly irregular, rate controlled  Abdominal: soft, nontender, positive bowel sounds  Extremities: no cyanosis or edema  Skin: warm and dry without rash  Neuro: no focal motor deficits    Labs:  Results from last 7 days   Lab Units 05/13/19  0858 05/12/19 2133   WBC 10*3/mm3 20.56* 9.31   HEMOGLOBIN g/dL 10.9* 11.9*   HEMATOCRIT % 34.6* 37.6   PLATELETS 10*3/mm3 147 197         Results from last 7 days   Lab Units 05/12/19  2133   SODIUM mmol/L 136   POTASSIUM mmol/L 4.0   CHLORIDE mmol/L 96*   CO2 mmol/L 32.0*   BUN mg/dL 27*   CREATININE mg/dL 4.51*   CALCIUM mg/dL 8.4   BILIRUBIN mg/dL 0.5   ALK PHOS U/L 215*   ALT (SGPT) U/L 17   AST (SGOT) U/L 35   GLUCOSE mg/dL 92       Radiology:   Imaging Results (last 72 hours)     Procedure Component Value Units Date/Time    CT Abdomen Pelvis Without Contrast [[id number]] Collected:  05/13/19 0752     Updated:  05/13/19 0807    Narrative:       CT ABDOMEN PELVIS " WO CONTRAST- 5/12/2019 11:33 PM CDT     HISTORY: abd pain      COMPARISON: 03/09/2019      DLP: 889 mGy cm Automated exposure control was utilized to diminish  patient radiation dose.     TECHNIQUE: Noncontrast enhanced images of the abdomen and pelvis  obtained without oral contrast.      FINDINGS:   There is moderate cardiomegaly the patient is status post aortic valve  replacement with moderate cardiomegaly. No evidence of pericardial  effusion. Tiny effusions are present with bibasilar atelectasis. Some  thickening of the interlobular septa within the lower lobes is either  related to some pulmonary fibrosis or mild pulmonary venous hypertension  and interstitial edema..      LIVER: No focal liver lesion. A large amount of pneumoperitoneum is  noted within the upper abdomen and perihepatic space.     BILIARY SYSTEM: The patient's undergone cholecystectomy. No evidence of  intra or extrahepatic biliary dilatation..      PANCREAS: No focal pancreatic lesion.      SPLEEN: Unremarkable.      KIDNEYS AND ADRENALS: The adrenals are unremarkable. There is a 2.3 cm  suspected cortical cyst involving the interpolar aspect of the left  kidney. I couldn't be confirmed with ultrasound. There is diffuse  atrophy of the kidneys. No evidence of nephrolithiasis..  The ureters  are decompressed and normal in appearance.     RETROPERITONEUM: No mass, lymphadenopathy or hemorrhage.      GI TRACT: Some mild thickening of the wall is noted near the gastric  outlet. I do not see any evidence of a discrete ulceration but the  majority of the free air is noted within the upper abdomen raising the  possibility of a gastric perforation. Diverticulosis is noted of the  descending and sigmoid colon but I do not see evidence of diverticulitis  and there is no free air noted within the lower pelvis or mid abdomen.  The patient is status post gastric band placement. Mild prominence of  the gastric wall near the gastric band is also present..  The appendix is  surgically absent..     OTHER: There is no mesenteric mass, lymphadenopathy or fluid collection.  The osseous structures and soft tissues demonstrate no worrisome  lesions. No free fluid is present.      PELVIS: No mass lesion, fluid collection or significant lymphadenopathy  is seen in the pelvis. The urinary bladder is normal in appearance.       Impression:       1. Pneumoperitoneum. This is within the upper abdomen. There is some  mild thickening of the gastric wall in the region of the gastric outlet  as well as at the level of a gastric band from previous lap band  surgery. I would favor an upper abdominal perforation with no evidence  of abnormal inflammatory stranding or pneumoperitoneum within the pelvis  or mid abdomen. A small amount of free fluid is noted within the  perihepatic space.  2. Small effusions with bibasilar atelectasis. Mild pulmonary venous  hypertension and interstitial edema are suspected.  3. Suspected cortical cyst midpole left kidney. This could be followed  up with ultrasound..  4. Diverticulosis of the descending and sigmoid colon without evidence  of acute diverticulitis.  5. Diffuse atrophy of the kidneys.        This report was finalized on 05/13/2019 08:04 by Dr. Willian Baker MD.    XR Chest 1 View [[id number]] Collected:  05/13/19 0721     Updated:  05/13/19 0725    Narrative:       EXAMINATION: Chest 1 view 05/12/2019     HISTORY: Pneumoperitoneum. Pulmonary edema.     FINDINGS: Today's exam is compared to previous study of 03/28/2019.  There is pneumoperitoneum with free air beneath the central and right  hemidiaphragm. There is right basilar atelectasis. There is moderate  cardiomegaly with mild pulmonary vascular congestion. Small effusions  are present blunting the costophrenic angles.       Impression:       1.. Pulmonary vascular congestion with small effusions. The patient is  status post percutaneous aortic valve replacement.  2.  Pneumoperitoneum.  This report was finalized on 05/13/2019 07:22 by Dr. Willian Baker MD.          Culture:         Assessment   1.  End stage renal disease on hemodialysis MWF  2.  S/p surgical repair of perforated duodenal ulcer  3.  Chronic diastolic congestive heart failure  4.  Essential hypertension--now HYPOtensive  5.  Anemia of CKD and of acute blood loss    Plan:  1.  Dialysis today; will adjust UF as tolerated given ongoing hypotension  2.  Continue IV fluids for now; change composition   3.  Monitor labs  4.  Further assessment and plan pending Dr Hsu's evaluation of patient      Thank you for the consult, we appreciate the opportunity to provide care to your patients.  Feel free to contact me if I can be of any further assistance.      Dirk Tristan, APRN  5/13/2019  9:43 AM

## 2019-05-13 NOTE — OP NOTE
Laila Rodriguez MD Operative Note    Cheri Ely  5/12/2019 - 5/13/2019    Pre-op Diagnosis:   FREE AIR IN ABDOMEN    Post-op Diagnosis:     same    Procedure/CPT® Codes:      Procedure(s):  LAPAROTOMY EXPLORATORY, lysis of adhesions, mobilization of the duodenum with Kocher maneuver, oversew duodenal ulcer with Cedric patch    Surgeon(s):  Laila Rodriguez MD    Anesthesia: General    Staff:   Circulator: Katelyn Garibay RN  Scrub Person: Dot Arreguin  Assistant: Sandip Robledo    Estimated Blood Loss: 200 ml    Specimens:                None      Drains:   Closed/Suction Drain Anterior Abdomen Bulb 15 Fr. (Active)       Urethral Catheter Silicone 16 Fr. (Active)       Indications: Critically ill patient with free air under the diaphragm    Findings: Extensive adhesions from multiple prior operations.  Anterior duodenal perforation    Complications: none    Procedure: The patient was brought to the operating room and placed in the supine position.  After induction of general anesthesia and infusion of IV antibiotics, the patient was prepped and draped in the usual sterile fashion.  Midline incision was made in the abdomen entered.  There were adhesions from a prior cholecystectomy and gastric banding.  These were taken down with cautery.  There was bilious staining in the right upper quadrant underneath the liver.  There were adhesions from the cholecystectomy site there were taken down with cautery.  The tissue was very edematous.  The liver was somewhat fragile and friable and did bleed easily.  The duodenum was kocherized.  The perforation was in the duodenal bulb area.  There is superiorly there was rind in the tissue.  Once had this adequately exposed I placed vertical mattress sutures closing the defect vertically.  Once were all placed I tied them sequentially.  I placed a second layer of seromuscular sutures.  I then mobilized the omentum off of the transverse colon and performed a Cedric patch  covering this suture line.  Copious irrigation was performed.  Because there was some bleeding from the liver edge were taken down the adhesions I used some snow and placed it in this area to minimize bleeding.  I placed a drain through lateral stab wound and laid it along the lesser curvature underneath the liver edge.  This was after copious irrigation.  So the drain with 2-0 silk.  I closed the abdomen with interrupted #1 PDS.  Irrigated the wound and then 2-0 Vicryl and staples used on the skin.  Dressing placed patient awakened and transferred to the recovery room in stable condition tolerated procedure well.  At the end of the procedure counts were correct.    Laila Rodriguez MD     Date: 5/13/2019  Time: 2:42 AM

## 2019-05-13 NOTE — NURSING NOTE
Spoke with Darrion GRIFFITH regarding SBP 70's after rec'ing pain medication but will improve without intervention. Darrion GRIFFITH states that fine as long as it improves back to the upper 80's-90's Also asked if he would document the NG he placed Darrion GRIFFITH states he would

## 2019-05-13 NOTE — ED NOTES
PATIENT DIFFICULT STICK, BLOOD DRAWN ONLY ON RIGHT ARM, BLOOD DRAW ATTEMPTED BY 3 STAFF MEMBERS.  CALLED LAB REQUESTED PHLEBOTOMIST.      Antonella Ladd RN  05/12/19 1728       Antonella Ladd RN  05/13/19 0002

## 2019-05-14 LAB
ALBUMIN SERPL-MCNC: 2.2 G/DL (ref 3.5–5)
ALBUMIN/GLOB SERPL: 0.9 G/DL (ref 1.1–2.5)
ALP SERPL-CCNC: 137 U/L (ref 24–120)
ALT SERPL W P-5'-P-CCNC: 23 U/L (ref 0–54)
ANION GAP SERPL CALCULATED.3IONS-SCNC: 7 MMOL/L (ref 4–13)
AST SERPL-CCNC: 25 U/L (ref 7–45)
BASOPHILS # BLD AUTO: 0.01 10*3/MM3 (ref 0–0.2)
BASOPHILS NFR BLD AUTO: 0.1 % (ref 0–2)
BILIRUB SERPL-MCNC: 0.5 MG/DL (ref 0.1–1)
BUN BLD-MCNC: 19 MG/DL (ref 5–21)
BUN/CREAT SERPL: 6.4 (ref 7–25)
CALCIUM SPEC-SCNC: 7.5 MG/DL (ref 8.4–10.4)
CHLORIDE SERPL-SCNC: 100 MMOL/L (ref 98–110)
CO2 SERPL-SCNC: 29 MMOL/L (ref 24–31)
CREAT BLD-MCNC: 2.95 MG/DL (ref 0.5–1.4)
DEPRECATED RDW RBC AUTO: 62.8 FL (ref 40–54)
EOSINOPHIL # BLD AUTO: 0 10*3/MM3 (ref 0–0.7)
EOSINOPHIL NFR BLD AUTO: 0 % (ref 0–4)
ERYTHROCYTE [DISTWIDTH] IN BLOOD BY AUTOMATED COUNT: 16.9 % (ref 12–15)
GFR SERPL CREATININE-BSD FRML MDRD: 15 ML/MIN/1.73
GLOBULIN UR ELPH-MCNC: 2.5 GM/DL
GLUCOSE BLD-MCNC: 74 MG/DL (ref 70–100)
HCT VFR BLD AUTO: 32.3 % (ref 37–47)
HGB BLD-MCNC: 10.2 G/DL (ref 12–16)
IMM GRANULOCYTES # BLD AUTO: 0.08 10*3/MM3 (ref 0–0.05)
IMM GRANULOCYTES NFR BLD AUTO: 0.6 % (ref 0–5)
LYMPHOCYTES # BLD AUTO: 0.51 10*3/MM3 (ref 0.72–4.86)
LYMPHOCYTES NFR BLD AUTO: 3.6 % (ref 15–45)
MAGNESIUM SERPL-MCNC: 1.6 MG/DL (ref 1.4–2.2)
MCH RBC QN AUTO: 32.1 PG (ref 28–32)
MCHC RBC AUTO-ENTMCNC: 31.6 G/DL (ref 33–36)
MCV RBC AUTO: 101.6 FL (ref 82–98)
MONOCYTES # BLD AUTO: 0.52 10*3/MM3 (ref 0.19–1.3)
MONOCYTES NFR BLD AUTO: 3.6 % (ref 4–12)
NEUTROPHILS # BLD AUTO: 13.16 10*3/MM3 (ref 1.87–8.4)
NEUTROPHILS NFR BLD AUTO: 92.1 % (ref 39–78)
NRBC BLD AUTO-RTO: 0 /100 WBC (ref 0–0.2)
PHOSPHATE SERPL-MCNC: 3.6 MG/DL (ref 2.5–4.5)
PLATELET # BLD AUTO: 143 10*3/MM3 (ref 130–400)
PMV BLD AUTO: 10.8 FL (ref 6–12)
POTASSIUM BLD-SCNC: 4 MMOL/L (ref 3.5–5.3)
PROT SERPL-MCNC: 4.7 G/DL (ref 6.3–8.7)
RBC # BLD AUTO: 3.18 10*6/MM3 (ref 4.2–5.4)
SODIUM BLD-SCNC: 136 MMOL/L (ref 135–145)
WBC NRBC COR # BLD: 14.28 10*3/MM3 (ref 4.8–10.8)

## 2019-05-14 PROCEDURE — 80053 COMPREHEN METABOLIC PANEL: CPT | Performed by: SPECIALIST

## 2019-05-14 PROCEDURE — 93010 ELECTROCARDIOGRAM REPORT: CPT | Performed by: INTERNAL MEDICINE

## 2019-05-14 PROCEDURE — 25010000003 MORPHINE 50 MG/ML SOLUTION: Performed by: SPECIALIST

## 2019-05-14 PROCEDURE — 94760 N-INVAS EAR/PLS OXIMETRY 1: CPT

## 2019-05-14 PROCEDURE — 93005 ELECTROCARDIOGRAM TRACING: CPT | Performed by: INTERNAL MEDICINE

## 2019-05-14 PROCEDURE — 25010000002 ENOXAPARIN PER 10 MG: Performed by: SPECIALIST

## 2019-05-14 PROCEDURE — 83735 ASSAY OF MAGNESIUM: CPT | Performed by: INTERNAL MEDICINE

## 2019-05-14 PROCEDURE — 85025 COMPLETE CBC W/AUTO DIFF WBC: CPT | Performed by: SPECIALIST

## 2019-05-14 PROCEDURE — 94799 UNLISTED PULMONARY SVC/PX: CPT

## 2019-05-14 PROCEDURE — 84100 ASSAY OF PHOSPHORUS: CPT | Performed by: INTERNAL MEDICINE

## 2019-05-14 PROCEDURE — 94770: CPT

## 2019-05-14 PROCEDURE — 25010000002 PIPERACILLIN SOD-TAZOBACTAM PER 1 G: Performed by: SPECIALIST

## 2019-05-14 RX ORDER — SODIUM CHLORIDE 9 MG/ML
10 INJECTION, SOLUTION INTRAVENOUS CONTINUOUS
Status: DISCONTINUED | OUTPATIENT
Start: 2019-05-14 | End: 2019-05-21 | Stop reason: HOSPADM

## 2019-05-14 RX ADMIN — METOPROLOL TARTRATE 2.5 MG: 5 INJECTION INTRAVENOUS at 18:37

## 2019-05-14 RX ADMIN — TAZOBACTAM SODIUM AND PIPERACILLIN SODIUM 2.25 G: 250; 2 INJECTION, SOLUTION INTRAVENOUS at 04:35

## 2019-05-14 RX ADMIN — PANTOPRAZOLE SODIUM 40 MG: 40 INJECTION, POWDER, FOR SOLUTION INTRAVENOUS at 06:34

## 2019-05-14 RX ADMIN — MORPHINE SULFATE: 50 INJECTION, SOLUTION, CONCENTRATE INTRAVENOUS at 05:41

## 2019-05-14 RX ADMIN — TAZOBACTAM SODIUM AND PIPERACILLIN SODIUM 3.38 G: 375; 3 INJECTION, SOLUTION INTRAVENOUS at 14:44

## 2019-05-14 RX ADMIN — ENOXAPARIN SODIUM 30 MG: 30 INJECTION SUBCUTANEOUS at 08:05

## 2019-05-14 NOTE — PROGRESS NOTES
Continued Stay Note  AGATHA Jimenez     Patient Name: Cheri Ely  MRN: [id number]  Today's Date: 5/14/2019    Admit Date: 5/12/2019    Discharge Plan     Row Name 05/14/19 0912       Plan    Plan  Refer to [facility]    Patient/Family in Agreement with Plan  yes    Plan Comments  Patient has orders to move to medical floor.  Will proceed with referral to [facility] to see if this will be an option as patient has used Medicare days.  No therapy evaluations available but can be forwarded when available if needed.        Discharge Codes    No documentation.             ELYSSA Norman

## 2019-05-14 NOTE — PROGRESS NOTES
Nephrology ([facility]) Progress Note      Patient:  Cheri Ely  YOB: 1940  Date of Service: 5/14/2019  MRN: [id number]   Acct: [id number]   Primary Care Physician: Provider, No Known  Advance Directive:   Code Status and Medical Interventions:   Ordered at: 05/13/19 0438     Level Of Support Discussed With:    Patient     Code Status:    CPR     Medical Interventions (Level of Support Prior to Arrest):    Full     Admit Date: 5/12/2019       Hospital Day: 1  Referring Provider: Laila Rodriguez MD      Patient personally seen and examined.  Complete chart including Consults, Notes, Operative Reports, Labs, Cardiology, and Radiology studies reviewed as able.        Subjective:  Cheri Ely is a 79 y.o. female  whom we were consulted for end stage renal disease management. Patient has routine hemodialysis Monday Wednesday Friday at [facility].  No recent issues with dialysis.  Recurrent history of GI bleeding. Presented this time with acute onset abdominal pain; imaging showed free air in abdomen and was taken for exp lap by Dr Hughes. Had repair of perforated duodenal ulcer.    Tolerated dialysis well on 5/13.      Today feeling better; mild diffuse abdominal pain. Blood pressure more stable today. No new overnight issues    Allergies:  Patient has no known allergies.    Home Meds:  Medications Prior to Admission   Medication Sig Dispense Refill Last Dose   • acetaminophen (TYLENOL) 325 MG tablet Take 2 tablets by mouth Every 6 (Six) Hours As Needed for Mild Pain .   Past Month at Unknown time   • aspirin 81 MG EC tablet Take 1 tablet by mouth Daily.   3/23/2019   • B Complex-C-Folic Acid (JOHN-SANGEETA) tablet Take 1 tablet by mouth Daily.   3/23/2019   • carvedilol (COREG) 3.125 MG tablet Take 3.125 mg by mouth Daily. Monday, Wednesday, and Friday dose. Hold if SBP < 100.   3/22/2019   • carvedilol (COREG) 3.125 MG tablet Take 3.125 mg by mouth 2 (Two) Times a Day  With Meals. Sunday, Tuesday, Thursday, and Saturday dose. Hold if SBP <100.   3/23/2019   • Cholecalciferol (VITAMIN D) 2000 units capsule Take 1 capsule by mouth Daily. 30 capsule     • docusate sodium 100 MG capsule Take 100 mg by mouth 2 (Two) Times a Day. 60 each 1 3/23/2019   • epoetin lucy (EPOGEN,PROCRIT) 25652 UNIT/ML injection Inject 1 mL under the skin into the appropriate area as directed 3 (Three) Times a Week.      • lactulose 20 GM/30ML solution solution Take 30 mL by mouth Daily As Needed (Constipation). 30 mL  3/23/2019   • levothyroxine (SYNTHROID, LEVOTHROID) 150 MCG tablet Take 150 mcg by mouth Daily.   3/23/2019   • losartan (COZAAR) 25 MG tablet Take 25 mg by mouth 3 (Three) Times a Week. Monday, Wednesday, and Friday.   3/23/2019   • ondansetron ODT (ZOFRAN-ODT) 4 MG disintegrating tablet Take 4 mg by mouth Every 8 (Eight) Hours As Needed for Nausea or Vomiting.   Past Month at Unknown time   • pantoprazole (PROTONIX) 40 MG EC tablet Take 1 tablet by mouth Daily. 30 tablet 11 3/23/2019   • Polyethyl Glyc-Propyl Glyc PF 0.4-0.3 % solution ophthalmic solution Administer 2 drops to both eyes Every 2 (Two) Hours As Needed (dry eyes).   Past Month at Unknown time   • pravastatin (PRAVACHOL) 40 MG tablet Take 40 mg by mouth Daily.   3/23/2019   • sertraline (ZOLOFT) 50 MG tablet Take 50 mg by mouth Daily.   3/23/2019   • sucralfate (CARAFATE) 1 GM/10ML suspension Take 10 mL by mouth 2 (Two) Times a Day. 1200 mL 0        Medicines:  Current Facility-Administered Medications   Medication Dose Route Frequency Provider Last Rate Last Dose   • enoxaparin (LOVENOX) syringe 30 mg  30 mg Subcutaneous Daily Laila Rodriguez MD   30 mg at 05/14/19 0805   • metoprolol tartrate (LOPRESSOR) injection 2.5 mg  2.5 mg Intravenous Q12H Jony Concepcion MD   2.5 mg at 05/13/19 1856   • Morphine sulfate PCA 1 mg/mL 30 mL syringe   Intravenous Continuous Laila Rodriguez MD       • naloxone (NARCAN) injection 0.1 mg   0.1 mg Intravenous Q5 Min PRN Laila Rodriguez MD       • ondansetron (ZOFRAN) tablet 4 mg  4 mg Oral Q6H PRN Laila Rodriguez MD        Or   • ondansetron (ZOFRAN) injection 4 mg  4 mg Intravenous Q6H PRN Laila Rodriguez MD       • pantoprazole (PROTONIX) injection 40 mg  40 mg Intravenous Q AM Laila Rodriguez MD   40 mg at 05/14/19 0634   • piperacillin-tazobactam (ZOSYN) in iso-osmotic dextrose IVPB 2.25 g (premix)  2.25 g Intravenous Q12H Laila Rodriguez MD   2.25 g at 05/14/19 0435   • sodium chloride 0.9 % flush 10 mL  10 mL Intravenous PRN Ashlyn Mackey APRN       • sodium chloride 0.9 % infusion  100 mL/hr Intravenous Continuous Dirk Tristan APRN 100 mL/hr at 05/13/19 2237 100 mL/hr at 05/13/19 2237       Past Medical History:  Past Medical History:   Diagnosis Date   • Arthritis    • Carotid artery disease (CMS/HCC)    • CHF (congestive heart failure) (CMS/HCC)    • Chronic kidney disease on chronic dialysis (CMS/HCC)    • Chronic renal failure     on dialysis ON MON, WED, FRI   • Coronary artery disease    • Disease of thyroid gland    • Diverticulitis    • Gastric ulcer    • History of transfusion    • Hyperlipidemia    • Hypertension    • Injury of back    • Mesenteric artery insufficiency (CMS/HCC)    • Multilevel degenerative disc disease    • Osteoporosis    • Pancreatitis    • Pelvis fracture (CMS/HCC)    • Pneumonia        Past Surgical History:  Past Surgical History:   Procedure Laterality Date   • AORTAGRAM Right 1/8/2018    Procedure: MESENTERIC ANGIOGRAM, GROIN ACCESS, MYNX CLOSURE;  Surgeon: Tomasz San DO;  Location: [facility] OR;  Service:    • AORTIC VALVE SURGERY     • APPENDECTOMY     • BACK SURGERY     • CAPSULE ENDOSCOPY N/A 3/30/2019    Procedure: CAPSULE ENDOSCOPY M2A;  Surgeon: David Yu MD;  Location: [facility] ENDOSCOPY;  Service: Gastroenterology   • CARDIAC SURGERY     • CAROTID ENDARTERECTOMY Bilateral    • CATARACT EXTRACTION, BILATERAL     • CERVICAL  SPINE SURGERY     • CHOLECYSTECTOMY     • COLON SURGERY     • COLONOSCOPY  10/01/2015   • COLONOSCOPY N/A 3/28/2019    Procedure: COLONOSCOPY WITH ANESTHESIA;  Surgeon: Rafita Merida MD;  Location: [facility] ENDOSCOPY;  Service: Gastroenterology   • CORONARY ARTERY BYPASS GRAFT     • DIALYSIS FISTULA CREATION     • ENDOSCOPY N/A 12/27/2018    Procedure: ESOPHAGOGASTRODUODENOSCOPY WITH ANESTHESIA;  Surgeon: Moni Garza MD;  Location: [facility] ENDOSCOPY;  Service: Gastroenterology   • ENDOSCOPY N/A 2/28/2019    Procedure: ESOPHAGOGASTRODUODENOSCOPY WITH ANESTHESIA;  Surgeon: Moni Garza MD;  Location: [facility] ENDOSCOPY;  Service: Gastroenterology   • ENDOSCOPY N/A 3/11/2019    Procedure: ESOPHAGOGASTRODUODENOSCOPY WITH ANESTHESIA;  Surgeon: Moni Garza MD;  Location: [facility] ENDOSCOPY;  Service: Gastroenterology   • ENDOSCOPY N/A 3/27/2019    Procedure: ESOPHAGOGASTRODUODENOSCOPY WITH ANESTHESIA;  Surgeon: Rafita Merida MD;  Location: [facility] ENDOSCOPY;  Service: Gastroenterology   • EXPLORATORY LAPAROTOMY N/A 5/13/2019    Procedure: LAPAROTOMY EXPLORATORY, OVERSEW DUODENAL ULCER WITH FRED PATCH, KOCHER MANEUVER;  Surgeon: Laila Rodriguez MD;  Location: [facility] OR;  Service: General   • HIP TROCANTERIC NAILING WITH INTRAMEDULLARY HIP SCREW Right 2/8/2019    Procedure: HIP TROCANTERIC NAILING LONG WITH INTRAMEDULLARY HIP SCREW;  Surgeon: Boo Cmaacho MD;  Location: [facility] OR;  Service: Orthopedics   • JOINT REPLACEMENT      knee   • LAPAROSCOPIC GASTRIC BANDING     • LEEP     • LUMBAR FUSION     • TOE AMPUTATION Left     2nd toe   • TOTAL KNEE ARTHROPLASTY Bilateral    • TUBAL ABDOMINAL LIGATION     • UPPER GASTROINTESTINAL ENDOSCOPY  10/01/2015       Family History  Family History   Problem Relation Age of Onset   • Hypertension Mother    • Cancer Mother         stomach   • Coronary artery disease Father    • Heart attack Father    • Diabetes Brother    • Coronary artery disease Brother    • Colon  cancer Neg Hx    • Colon polyps Neg Hx        Social History  Social History     Socioeconomic History   • Marital status:      Spouse name: Not on file   • Number of children: Not on file   • Years of education: Not on file   • Highest education level: Not on file   Tobacco Use   • Smoking status: Never Smoker   • Smokeless tobacco: Never Used   Substance and Sexual Activity   • Alcohol use: No   • Drug use: No   • Sexual activity: Defer         Review of Systems:  History obtained from chart review and the patient  General ROS: No fever or chills  Respiratory ROS: No cough, shortness of breath, wheezing  Cardiovascular ROS: No chest pain or palpitations  Gastrointestinal ROS: No abdominal pain or melena  Genito-Urinary ROS: No dysuria or hematuria  Neurological ROS: no headache or dizziness    Objective:  Patient Vitals for the past 24 hrs:   BP Temp Temp src Pulse Resp SpO2 Weight   05/14/19 0820 (!) 123/112 -- -- 89 -- 99 % --   05/14/19 0802 -- -- -- -- 17 -- --   05/14/19 0750 116/92 -- -- (!) 121 -- 100 % --   05/14/19 0730 90/64 98 °F (36.7 °C) Oral 100 16 98 % --   05/14/19 0650 90/41 -- -- 88 14 99 % --   05/14/19 0635 92/54 -- -- 96 -- 98 % --   05/14/19 0625 -- -- -- 102 -- 96 % --   05/14/19 0610 -- -- -- -- -- -- 71.9 kg (158 lb 9.6 oz)   05/14/19 0600 93/77 -- -- 101 17 95 % --   05/14/19 0545 93/51 -- -- 82 16 95 % --   05/14/19 0520 100/60 -- -- 91 -- 96 % --   05/14/19 0505 (!) 99/39 -- -- 82 16 96 % --   05/14/19 0435 98/53 -- -- 80 -- 98 % --   05/14/19 0415 97/57 -- -- 88 -- 95 % --   05/14/19 0400 (!) 98/39 98.2 °F (36.8 °C) Oral 95 14 95 % --   05/14/19 0305 107/50 -- -- 96 16 96 % --   05/14/19 0215 (!) 92/35 -- -- 83 14 92 % --   05/14/19 0150 100/42 -- -- 84 16 96 % --   05/14/19 0105 100/45 98.2 °F (36.8 °C) Oral 78 15 96 % --   05/14/19 0005 101/52 -- -- 79 12 96 % --   05/13/19 2245 105/62 -- -- 80 15 100 % --   05/13/19 2150 107/49 -- -- 81 14 98 % --   05/13/19 2107 -- -- -- 71  17 99 % --   05/13/19 2020 107/52 -- -- 81 15 97 % --   05/13/19 1920 (!) 98/38 -- -- 77 16 94 % --   05/13/19 1900 (!) 77/47 -- -- 86 -- 97 % --   05/13/19 1720 (!) 73/60 -- -- 74 -- 94 % --   05/13/19 1700 93/50 -- -- 76 17 94 % --   05/13/19 1650 (!) 80/64 -- -- 75 -- 93 % --   05/13/19 1635 (!) 80/47 -- -- 79 -- 95 % --   05/13/19 1620 115/95 -- -- 79 -- 97 % --   05/13/19 1605 123/63 -- -- 76 13 95 % --   05/13/19 1550 119/63 -- -- 87 -- 94 % --   05/13/19 1535 130/51 -- -- 81 -- 95 % --   05/13/19 1515 96/51 -- -- 76 -- 91 % --   05/13/19 1500 (!) 96/36 98.8 °F (37.1 °C) Oral 76 19 91 % --   05/13/19 1450 96/49 -- -- 84 -- 93 % --   05/13/19 1435 91/59 -- -- 73 -- 94 % --   05/13/19 1420 (!) 82/39 -- -- 80 -- (!) 88 % --   05/13/19 1405 98/53 -- -- 78 14 96 % --   05/13/19 1350 94/45 -- -- 73 -- (!) 89 % --   05/13/19 1345 (!) 87/47 -- -- 74 -- 92 % --   05/13/19 1330 91/43 -- -- 73 -- 91 % --   05/13/19 1315 96/45 -- -- 69 -- (!) 88 % --   05/13/19 1305 109/52 -- -- 69 15 92 % --   05/13/19 1250 116/49 -- -- 63 -- 92 % --   05/13/19 1235 108/47 -- -- 66 -- 92 % --   05/13/19 1220 (!) 119/38 -- -- 75 -- 95 % --   05/13/19 1205 111/46 97.7 °F (36.5 °C) Oral 63 15 92 % --   05/13/19 1150 111/44 -- -- 70 13 91 % --   05/13/19 1130 (!) 114/39 -- -- 74 -- 92 % --   05/13/19 1105 (!) 91/39 -- -- 65 13 94 % --   05/13/19 1050 98/43 -- -- 67 -- 94 % --   05/13/19 1035 98/56 -- -- 72 -- 91 % --   05/13/19 1020 (!) 98/39 -- -- 75 -- 93 % --   05/13/19 1005 106/47 -- -- 71 12 96 % --   05/13/19 0950 116/66 -- -- 76 -- 97 % --   05/13/19 0945 (!) 63/48 -- -- 78 -- 97 % --   05/13/19 0935 105/49 -- -- 77 -- 93 % --       Intake/Output Summary (Last 24 hours) at 5/14/2019 0929  Last data filed at 5/14/2019 0719  Gross per 24 hour   Intake 2365.33 ml   Output 2265 ml   Net 100.33 ml     General: awake/alert    Neck: supple, no JVD  Chest:  clear to auscultation bilaterally without respiratory distress  CVS: regular rate and  rhythm  Abdominal: soft, nontender, positive bowel sounds  Extremities: no cyanosis or edema  Skin: warm and dry without rash  Neuro: no focal motor deficits    Labs:  Results from last 7 days   Lab Units 05/14/19  0336 05/13/19  0858 05/12/19  2133   WBC 10*3/mm3 14.28* 20.56* 9.31   HEMOGLOBIN g/dL 10.2* 10.9* 11.9*   HEMATOCRIT % 32.3* 34.6* 37.6   PLATELETS 10*3/mm3 143 147 197         Results from last 7 days   Lab Units 05/14/19  0336 05/12/19  2133   SODIUM mmol/L 136 136   POTASSIUM mmol/L 4.0 4.0   CHLORIDE mmol/L 100 96*   CO2 mmol/L 29.0 32.0*   BUN mg/dL 19 27*   CREATININE mg/dL 2.95* 4.51*   CALCIUM mg/dL 7.5* 8.4   BILIRUBIN mg/dL 0.5 0.5   ALK PHOS U/L 137* 215*   ALT (SGPT) U/L 23 17   AST (SGOT) U/L 25 35   GLUCOSE mg/dL 74 92       Radiology:   Imaging Results (last 72 hours)     Procedure Component Value Units Date/Time    CT Abdomen Pelvis Without Contrast [[id number]] Collected:  05/13/19 0752     Updated:  05/13/19 0807    Narrative:       CT ABDOMEN PELVIS WO CONTRAST- 5/12/2019 11:33 PM CDT     HISTORY: abd pain      COMPARISON: 03/09/2019      DLP: 889 mGy cm Automated exposure control was utilized to diminish  patient radiation dose.     TECHNIQUE: Noncontrast enhanced images of the abdomen and pelvis  obtained without oral contrast.      FINDINGS:   There is moderate cardiomegaly the patient is status post aortic valve  replacement with moderate cardiomegaly. No evidence of pericardial  effusion. Tiny effusions are present with bibasilar atelectasis. Some  thickening of the interlobular septa within the lower lobes is either  related to some pulmonary fibrosis or mild pulmonary venous hypertension  and interstitial edema..      LIVER: No focal liver lesion. A large amount of pneumoperitoneum is  noted within the upper abdomen and perihepatic space.     BILIARY SYSTEM: The patient's undergone cholecystectomy. No evidence of  intra or extrahepatic biliary dilatation..      PANCREAS: No focal  pancreatic lesion.      SPLEEN: Unremarkable.      KIDNEYS AND ADRENALS: The adrenals are unremarkable. There is a 2.3 cm  suspected cortical cyst involving the interpolar aspect of the left  kidney. I couldn't be confirmed with ultrasound. There is diffuse  atrophy of the kidneys. No evidence of nephrolithiasis..  The ureters  are decompressed and normal in appearance.     RETROPERITONEUM: No mass, lymphadenopathy or hemorrhage.      GI TRACT: Some mild thickening of the wall is noted near the gastric  outlet. I do not see any evidence of a discrete ulceration but the  majority of the free air is noted within the upper abdomen raising the  possibility of a gastric perforation. Diverticulosis is noted of the  descending and sigmoid colon but I do not see evidence of diverticulitis  and there is no free air noted within the lower pelvis or mid abdomen.  The patient is status post gastric band placement. Mild prominence of  the gastric wall near the gastric band is also present.. The appendix is  surgically absent..     OTHER: There is no mesenteric mass, lymphadenopathy or fluid collection.  The osseous structures and soft tissues demonstrate no worrisome  lesions. No free fluid is present.      PELVIS: No mass lesion, fluid collection or significant lymphadenopathy  is seen in the pelvis. The urinary bladder is normal in appearance.       Impression:       1. Pneumoperitoneum. This is within the upper abdomen. There is some  mild thickening of the gastric wall in the region of the gastric outlet  as well as at the level of a gastric band from previous lap band  surgery. I would favor an upper abdominal perforation with no evidence  of abnormal inflammatory stranding or pneumoperitoneum within the pelvis  or mid abdomen. A small amount of free fluid is noted within the  perihepatic space.  2. Small effusions with bibasilar atelectasis. Mild pulmonary venous  hypertension and interstitial edema are suspected.  3.  Suspected cortical cyst midpole left kidney. This could be followed  up with ultrasound..  4. Diverticulosis of the descending and sigmoid colon without evidence  of acute diverticulitis.  5. Diffuse atrophy of the kidneys.        This report was finalized on 05/13/2019 08:04 by Dr. Willian Baker MD.    XR Chest 1 View [[id number]] Collected:  05/13/19 0721     Updated:  05/13/19 0725    Narrative:       EXAMINATION: Chest 1 view 05/12/2019     HISTORY: Pneumoperitoneum. Pulmonary edema.     FINDINGS: Today's exam is compared to previous study of 03/28/2019.  There is pneumoperitoneum with free air beneath the central and right  hemidiaphragm. There is right basilar atelectasis. There is moderate  cardiomegaly with mild pulmonary vascular congestion. Small effusions  are present blunting the costophrenic angles.       Impression:       1.. Pulmonary vascular congestion with small effusions. The patient is  status post percutaneous aortic valve replacement.  2. Pneumoperitoneum.  This report was finalized on 05/13/2019 07:22 by Dr. Willian Baker MD.          Culture:  Blood Culture   Date Value Ref Range Status   05/13/2019 No growth at 24 hours  Preliminary         Assessment   1.  End stage renal disease on hemodialysis MWF  2.  S/p surgical repair of perforated duodenal ulcer  3.  Chronic diastolic congestive heart failure  4.  Essential hypertension--now HYPOtensive  5.  Anemia of CKD and of acute blood loss    Plan:  1.  Dialysis next due 5/15  2.  Hold IV fluids  3.  OK to move to medical floor from renal standpoint      Dirk Tristan, ABILIO  5/14/2019  9:29 AM

## 2019-05-14 NOTE — PROGRESS NOTES
Laila Rodriguez MD Progress Note     LOS: 1 day   Patient Care Team:  Provider, No Known as PCP - Barrett Prasad Jr, MD as PCP - Family Medicine  Moni Garza MD as Consulting Physician (Gastroenterology)  Tomasz San DO as Consulting Physician (Vascular Surgery)        Subjective     Doing very well.  Monitor showing atrial fibrillation    Objective     Vital Signs  Temp:  [97.7 °F (36.5 °C)-98.8 °F (37.1 °C)] 98 °F (36.7 °C)  Heart Rate:  [] 89  Resp:  [12-19] 17  BP: ()/() 123/112    Intake & Output (last 3 days)       05/11 0701 - 05/12 0700 05/12 0701 - 05/13 0700 05/13 0701 - 05/14 0700 05/14 0701 - 05/15 0700    I.V. (mL/kg)   1835.3 (25.5) 430 (6)    IV Piggyback  250 100     Total Intake(mL/kg)  250 (3.3) 1935.3 (26.9) 430 (6)    Urine (mL/kg/hr)  0 0 (0)     Emesis/NG output   150 0    Drains  50 140     Other   2000     Total Output  50 2290 0    Net  +200 -354.7 +430                  Physical Exam:     General Appearance:    Alert, cooperative, in no acute distress   Lungs:     respirations regular, even and unlabored    Heart:   Irregular rhythm   Chest Wall:    No abnormalities observed   Abdomen:      Soft no significant tenderness wound dressed SATNAM serosanguineous NG tube not excessive, bilious   Extremities: No edema,    Results Review:     I reviewed the patient's new clinical results.    Lab Results (last 72 hours)     Procedure Component Value Units Date/Time    Comprehensive Metabolic Panel [[id number]]  (Abnormal) Collected:  05/14/19 0336    Specimen:  Blood Updated:  05/14/19 0411     Glucose 74 mg/dL      BUN 19 mg/dL      Creatinine 2.95 mg/dL      Sodium 136 mmol/L      Potassium 4.0 mmol/L      Chloride 100 mmol/L      CO2 29.0 mmol/L      Calcium 7.5 mg/dL      Total Protein 4.7 g/dL      Albumin 2.20 g/dL      ALT (SGPT) 23 U/L      AST (SGOT) 25 U/L      Alkaline Phosphatase 137 U/L      Total Bilirubin 0.5 mg/dL      eGFR Non African Amer 15  mL/min/1.73      Globulin 2.5 gm/dL      A/G Ratio 0.9 g/dL      BUN/Creatinine Ratio 6.4     Anion Gap 7.0 mmol/L     Narrative:       GFR Normal >60  Chronic Kidney Disease <60  Kidney Failure <15    Magnesium [[id number]]  (Normal) Collected:  05/14/19 0336    Specimen:  Blood Updated:  05/14/19 0411     Magnesium 1.6 mg/dL     Phosphorus [[id number]]  (Normal) Collected:  05/14/19 0336    Specimen:  Blood Updated:  05/14/19 0411     Phosphorus 3.6 mg/dL     CBC & Differential [[id number]] Collected:  05/14/19 0336    Specimen:  Blood Updated:  05/14/19 0403    Narrative:       The following orders were created for panel order CBC & Differential.  Procedure                               Abnormality         Status                     ---------                               -----------         ------                     CBC Auto Differential[[id number]]        Abnormal            Final result                 Please view results for these tests on the individual orders.    CBC Auto Differential [[id number]]  (Abnormal) Collected:  05/14/19 0336    Specimen:  Blood Updated:  05/14/19 0403     WBC 14.28 10*3/mm3      RBC 3.18 10*6/mm3      Hemoglobin 10.2 g/dL      Hematocrit 32.3 %      .6 fL      MCH 32.1 pg      MCHC 31.6 g/dL      RDW 16.9 %      RDW-SD 62.8 fl      MPV 10.8 fL      Platelets 143 10*3/mm3      Neutrophil % 92.1 %      Lymphocyte % 3.6 %      Monocyte % 3.6 %      Eosinophil % 0.0 %      Basophil % 0.1 %      Immature Grans % 0.6 %      Neutrophils, Absolute 13.16 10*3/mm3      Lymphocytes, Absolute 0.51 10*3/mm3      Monocytes, Absolute 0.52 10*3/mm3      Eosinophils, Absolute 0.00 10*3/mm3      Basophils, Absolute 0.01 10*3/mm3      Immature Grans, Absolute 0.08 10*3/mm3      nRBC 0.0 /100 WBC     Blood Culture - Blood, Hand, Right [[id number]] Collected:  05/13/19 0023    Specimen:  Blood from Hand, Right Updated:  05/14/19 0045     Blood Culture No growth at 24 hours    Troponin [[id number]]   (Normal) Collected:  05/13/19 2051    Specimen:  Blood Updated:  05/13/19 2131     Troponin I <0.012 ng/mL     Troponin [[id number]]  (Normal) Collected:  05/13/19 1407    Specimen:  Blood Updated:  05/13/19 1452     Troponin I <0.012 ng/mL     Hepatitis B Surface Antibody [[id number]]  (Abnormal) Collected:  05/13/19 0903    Specimen:  Blood Updated:  05/13/19 1023     Hepatitis Bs Ab Index <5.00     Hep B S Ab Non-Immune    Hepatitis Panel, Acute [[id number]]  (Normal) Collected:  05/13/19 0903    Specimen:  Blood Updated:  05/13/19 1023     HCV S/C Ratio 0.10     Hepatitis C Ab Negative     Hep A IgM Negative     Hep B C IgM Negative     Hepatitis B Surface Ag Negative    Blood Culture - Blood, Arm, Right [[id number]] Collected:  05/13/19 0858    Specimen:  Blood from Arm, Right Updated:  05/13/19 0946    Phosphorus [[id number]]  (Normal) Collected:  05/13/19 0858    Specimen:  Blood Updated:  05/13/19 0931     Phosphorus 4.3 mg/dL     Magnesium [[id number]]  (Normal) Collected:  05/13/19 0858    Specimen:  Blood Updated:  05/13/19 0931     Magnesium 1.6 mg/dL     CBC & Differential [[id number]] Collected:  05/13/19 0858    Specimen:  Blood Updated:  05/13/19 0924    Narrative:       The following orders were created for panel order CBC & Differential.  Procedure                               Abnormality         Status                     ---------                               -----------         ------                     CBC Auto Differential[[id number]]        Abnormal            Final result                 Please view results for these tests on the individual orders.    CBC Auto Differential [[id number]]  (Abnormal) Collected:  05/13/19 0858    Specimen:  Blood Updated:  05/13/19 0924     WBC 20.56 10*3/mm3      RBC 3.42 10*6/mm3      Hemoglobin 10.9 g/dL      Hematocrit 34.6 %      .2 fL      MCH 31.9 pg      MCHC 31.5 g/dL      RDW 16.7 %      RDW-SD 62.2 fl      MPV 10.6 fL      Platelets 147 10*3/mm3       Neutrophil % 95.5 %      Lymphocyte % 1.8 %      Monocyte % 1.8 %      Eosinophil % 0.0 %      Basophil % 0.2 %      Immature Grans % 0.7 %      Neutrophils, Absolute 19.64 10*3/mm3      Lymphocytes, Absolute 0.37 10*3/mm3      Monocytes, Absolute 0.36 10*3/mm3      Eosinophils, Absolute 0.00 10*3/mm3      Basophils, Absolute 0.04 10*3/mm3      Immature Grans, Absolute 0.15 10*3/mm3      nRBC 0.0 /100 WBC     Magnesium [[id number]]  (Normal) Collected:  05/13/19 0023    Specimen:  Blood Updated:  05/13/19 0610     Magnesium 1.6 mg/dL     Phosphorus [[id number]]  (Normal) Collected:  05/13/19 0023    Specimen:  Blood Updated:  05/13/19 0610     Phosphorus 3.6 mg/dL     Amylase [[id number]]  (Abnormal) Collected:  05/13/19 0023    Specimen:  Blood Updated:  05/13/19 0308     Amylase <30 U/L     Lipase [[id number]]  (Abnormal) Collected:  05/13/19 0023    Specimen:  Blood Updated:  05/13/19 0308     Lipase <10 U/L     [place] Draw [[id number]] Collected:  05/12/19 2133    Specimen:  Blood Updated:  05/13/19 0130    Narrative:       The following orders were created for panel order [place] Draw.  Procedure                               Abnormality         Status                     ---------                               -----------         ------                     Light Blue Top[[id number]]                                   Final result               Green Top (Gel)[[id number]]                                  Final result               Lavender Top[[id number]]                                     Final result               Red Top[[id number]]                                          Final result                 Please view results for these tests on the individual orders.    Red Top [[id number]] Collected:  05/13/19 0023    Specimen:  Blood Updated:  05/13/19 0130     Extra Tube Hold for add-ons.     Comment: Auto resulted.       BNP [[id number]]  (Abnormal) Collected:  05/12/19 2133    Specimen:  Blood Updated:  05/13/19 0054      proBNP 13,600.0 pg/mL     Protime-INR [[id number]]  (Abnormal) Collected:  05/13/19 0023    Specimen:  Blood Updated:  05/13/19 0046     Protime 18.0 Seconds      INR 1.44    aPTT [[id number]]  (Normal) Collected:  05/13/19 0023    Specimen:  Blood Updated:  05/13/19 0046     PTT 34.8 seconds     Light Blue Top [[id number]] Collected:  05/12/19 2133    Specimen:  Blood Updated:  05/12/19 2245     Extra Tube hold for add-on     Comment: Auto resulted       Green Top (Gel) [[id number]] Collected:  05/12/19 2133    Specimen:  Blood Updated:  05/12/19 2245     Extra Tube Hold for add-ons.     Comment: Auto resulted.       Lavender Top [[id number]] Collected:  05/12/19 2133    Specimen:  Blood Updated:  05/12/19 2245     Extra Tube hold for add-on     Comment: Auto resulted       Troponin [[id number]]  (Normal) Collected:  05/12/19 2133    Specimen:  Blood Updated:  05/12/19 2206     Troponin I <0.012 ng/mL     Lactic Acid, Plasma [[id number]]  (Normal) Collected:  05/12/19 2148    Specimen:  Blood Updated:  05/12/19 2203     Lactate 1.4 mmol/L     Comprehensive Metabolic Panel [[id number]]  (Abnormal) Collected:  05/12/19 2133    Specimen:  Blood Updated:  05/12/19 2154     Glucose 92 mg/dL      BUN 27 mg/dL      Creatinine 4.51 mg/dL      Sodium 136 mmol/L      Potassium 4.0 mmol/L      Chloride 96 mmol/L      CO2 32.0 mmol/L      Calcium 8.4 mg/dL      Total Protein 6.1 g/dL      Albumin 3.00 g/dL      ALT (SGPT) 17 U/L      AST (SGOT) 35 U/L      Alkaline Phosphatase 215 U/L      Total Bilirubin 0.5 mg/dL      eGFR Non African Amer 9 mL/min/1.73      Comment: <15 Indicative of kidney failure.        eGFR   Amer -- mL/min/1.73      Comment: <15 Indicative of kidney failure.        Globulin 3.1 gm/dL      A/G Ratio 1.0 g/dL      BUN/Creatinine Ratio 6.0     Anion Gap 8.0 mmol/L     Narrative:       GFR Normal >60  Chronic Kidney Disease <60  Kidney Failure <15    Lipase [[id number]]  (Abnormal) Collected:  05/12/19  2133    Specimen:  Blood Updated:  05/12/19 2154     Lipase 14 U/L     CBC & Differential [[id number]] Collected:  05/12/19 2133    Specimen:  Blood Updated:  05/12/19 2144    Narrative:       The following orders were created for panel order CBC & Differential.  Procedure                               Abnormality         Status                     ---------                               -----------         ------                     CBC Auto Differential[[id number]]        Abnormal            Final result                 Please view results for these tests on the individual orders.    CBC Auto Differential [[id number]]  (Abnormal) Collected:  05/12/19 2133    Specimen:  Blood Updated:  05/12/19 2144     WBC 9.31 10*3/mm3      RBC 3.78 10*6/mm3      Hemoglobin 11.9 g/dL      Hematocrit 37.6 %      MCV 99.5 fL      MCH 31.5 pg      MCHC 31.6 g/dL      RDW 16.5 %      RDW-SD 60.2 fl      MPV 10.8 fL      Platelets 197 10*3/mm3      Neutrophil % 87.7 %      Lymphocyte % 6.4 %      Monocyte % 3.4 %      Eosinophil % 1.7 %      Basophil % 0.4 %      Immature Grans % 0.4 %      Neutrophils, Absolute 8.15 10*3/mm3      Lymphocytes, Absolute 0.60 10*3/mm3      Monocytes, Absolute 0.32 10*3/mm3      Eosinophils, Absolute 0.16 10*3/mm3      Basophils, Absolute 0.04 10*3/mm3      Immature Grans, Absolute 0.04 10*3/mm3      nRBC 0.0 /100 WBC         Imaging Results (last 72 hours)     Procedure Component Value Units Date/Time    CT Abdomen Pelvis Without Contrast [[id number]] Collected:  05/13/19 0752     Updated:  05/13/19 0807    Narrative:       CT ABDOMEN PELVIS WO CONTRAST- 5/12/2019 11:33 PM CDT     HISTORY: abd pain      COMPARISON: 03/09/2019      DLP: 889 mGy cm Automated exposure control was utilized to diminish  patient radiation dose.     TECHNIQUE: Noncontrast enhanced images of the abdomen and pelvis  obtained without oral contrast.      FINDINGS:   There is moderate cardiomegaly the patient is status post aortic  valve  replacement with moderate cardiomegaly. No evidence of pericardial  effusion. Tiny effusions are present with bibasilar atelectasis. Some  thickening of the interlobular septa within the lower lobes is either  related to some pulmonary fibrosis or mild pulmonary venous hypertension  and interstitial edema..      LIVER: No focal liver lesion. A large amount of pneumoperitoneum is  noted within the upper abdomen and perihepatic space.     BILIARY SYSTEM: The patient's undergone cholecystectomy. No evidence of  intra or extrahepatic biliary dilatation..      PANCREAS: No focal pancreatic lesion.      SPLEEN: Unremarkable.      KIDNEYS AND ADRENALS: The adrenals are unremarkable. There is a 2.3 cm  suspected cortical cyst involving the interpolar aspect of the left  kidney. I couldn't be confirmed with ultrasound. There is diffuse  atrophy of the kidneys. No evidence of nephrolithiasis..  The ureters  are decompressed and normal in appearance.     RETROPERITONEUM: No mass, lymphadenopathy or hemorrhage.      GI TRACT: Some mild thickening of the wall is noted near the gastric  outlet. I do not see any evidence of a discrete ulceration but the  majority of the free air is noted within the upper abdomen raising the  possibility of a gastric perforation. Diverticulosis is noted of the  descending and sigmoid colon but I do not see evidence of diverticulitis  and there is no free air noted within the lower pelvis or mid abdomen.  The patient is status post gastric band placement. Mild prominence of  the gastric wall near the gastric band is also present.. The appendix is  surgically absent..     OTHER: There is no mesenteric mass, lymphadenopathy or fluid collection.  The osseous structures and soft tissues demonstrate no worrisome  lesions. No free fluid is present.      PELVIS: No mass lesion, fluid collection or significant lymphadenopathy  is seen in the pelvis. The urinary bladder is normal in appearance.        Impression:       1. Pneumoperitoneum. This is within the upper abdomen. There is some  mild thickening of the gastric wall in the region of the gastric outlet  as well as at the level of a gastric band from previous lap band  surgery. I would favor an upper abdominal perforation with no evidence  of abnormal inflammatory stranding or pneumoperitoneum within the pelvis  or mid abdomen. A small amount of free fluid is noted within the  perihepatic space.  2. Small effusions with bibasilar atelectasis. Mild pulmonary venous  hypertension and interstitial edema are suspected.  3. Suspected cortical cyst midpole left kidney. This could be followed  up with ultrasound..  4. Diverticulosis of the descending and sigmoid colon without evidence  of acute diverticulitis.  5. Diffuse atrophy of the kidneys.        This report was finalized on 05/13/2019 08:04 by Dr. Willian Baker MD.    XR Chest 1 View [[id number]] Collected:  05/13/19 0721     Updated:  05/13/19 0725    Narrative:       EXAMINATION: Chest 1 view 05/12/2019     HISTORY: Pneumoperitoneum. Pulmonary edema.     FINDINGS: Today's exam is compared to previous study of 03/28/2019.  There is pneumoperitoneum with free air beneath the central and right  hemidiaphragm. There is right basilar atelectasis. There is moderate  cardiomegaly with mild pulmonary vascular congestion. Small effusions  are present blunting the costophrenic angles.       Impression:       1.. Pulmonary vascular congestion with small effusions. The patient is  status post percutaneous aortic valve replacement.  2. Pneumoperitoneum.  This report was finalized on 05/13/2019 07:22 by Dr. Willian Baker MD.              Assessment/Plan       Abdominal pain      Transfer to floor.  Discussed with Dr. Espinosa regarding anticoagulation in the setting of A. fib with GI bleed history and SMA atherosclerotic disease.  Will defer to him regarding anticoagulation.  I do have her on 30 mg subcu of  Lovenox prophylactic.  I am not concerned about bleeding from my surgery site as much as from AVMs or other GI sites.  She had been taken off her anticoagulation after last admission with GI bleeding but that was before atrial fibrillation      Laila Rodriguez MD  05/14/19  8:30 AM

## 2019-05-14 NOTE — PLAN OF CARE
Problem: Patient Care Overview  Goal: Plan of Care Review  Outcome: Ongoing (interventions implemented as appropriate)   05/14/19 1814   OTHER   Outcome Summary Received patient from the unit. Reports minimal pain however PCA in place and patient is resting very well. Dsg intact w/ JPx1. Denies nausea. NPO. NG in place. Q2H turns to prevent further breakdown to bottom, as it appears to be healing nicely. Will cont to monitor.   Coping/Psychosocial   Plan of Care Reviewed With patient   Plan of Care Review   Progress improving     Goal: Individualization and Mutuality  Outcome: Ongoing (interventions implemented as appropriate)    Goal: Discharge Needs Assessment  Outcome: Ongoing (interventions implemented as appropriate)      Problem: Fall Risk (Adult)  Goal: Identify Related Risk Factors and Signs and Symptoms  Outcome: Ongoing (interventions implemented as appropriate)   05/13/19 1730   Fall Risk (Adult)   Related Risk Factors (Fall Risk) age-related changes;fatigue/slow reaction;fear of falling;inadequate lighting;sensory deficits   Signs and Symptoms (Fall Risk) presence of risk factors     Goal: Absence of Fall  Outcome: Ongoing (interventions implemented as appropriate)      Problem: Skin Injury Risk (Adult)  Goal: Identify Related Risk Factors and Signs and Symptoms  Outcome: Ongoing (interventions implemented as appropriate)   05/13/19 1730   Skin Injury Risk (Adult)   Related Risk Factors (Skin Injury Risk) advanced age;mechanical forces;nutritional deficiencies;mobility impaired     Goal: Skin Health and Integrity  Outcome: Ongoing (interventions implemented as appropriate)      Problem: Wound (Includes Pressure Injury) (Adult)  Goal: Signs and Symptoms of Listed Potential Problems Will be Absent, Minimized or Managed (Wound)  Outcome: Ongoing (interventions implemented as appropriate)   05/14/19 1814   Goal/Outcome Evaluation   Problems Assessed (Wound) all   Problems Present (Wound) pain

## 2019-05-14 NOTE — DISCHARGE PLACEMENT REQUEST
"To: [facility] Bed    From: Cynthia Blum [phone]        ElyCheri (79 y.o. Female)     Date of Birth Social Security Number Address Home Phone MRN    1940  [id number]  [place] [facility] [address] [phone] [id number]    Restoration Marital Status          Caodaism        Admission Date Admission Type Admitting Provider Attending Provider Department, Room/Bed    5/12/19 Emergency Laila Rodriguez MD Tyrrell, Dana R, MD [facility] CARDIAC CARE, C003/1    Discharge Date Discharge Disposition Discharge Destination                       Attending Provider:  Laila Rodriguez MD    Allergies:  No Known Allergies    Isolation:  None   Infection:  None   Code Status:  CPR    Ht:  149.9 cm (59\")   Wt:  71.9 kg (158 lb 9.6 oz)    Admission Cmt:  None   Principal Problem:  None                Active Insurance as of 5/12/2019     Primary Coverage     Payor Plan Insurance Group Employer/Plan Group    MEDICARE MEDICARE A & B      Payor Plan Address Payor Plan Phone Number Payor Plan Fax Number Effective Dates    [address] [phone]  4/1/1998 - None Entered    [facility] [id number]       Subscriber Name Subscriber Birth Date Member ID       CHERI ELY 1940 [id number]           Secondary Coverage     Payor Plan Insurance Group Employer/Plan Group    COMBINED INSURANCE CO COMBINED INSURANCE CO      Payor Plan Address Payor Plan Phone Number Payor Plan Fax Number Effective Dates    [address] [phone]  1/1/2017 - None Entered    [facility] [id number]       Subscriber Name Subscriber Birth Date Member ID       CHERI ELY 1940 [id number]                 Emergency Contacts      (Rel.) Home Phone Work Phone Mobile Phone    Christy Langston (Daughter) -- -- [phone]    Lucero Morales (Daughter) -- -- [phone]               History & Physical      Laila Rodriguez MD at 5/13/2019 12:50 AM            Laila Rodriguez MD  H&P    Patient Care " Team:  Provider, No Known as PCP - Barrett Prasad Jr, MD as PCP - Family Medicine  Moni Garza MD as Consulting Physician (Gastroenterology)  Tomasz San DO as Consulting Physician (Vascular Surgery)    Chief complaint severe abdominal pain    Val Ely  is a 79 y.o. female presents with severe abdominal pain which began about 8:00 tonight.  Sudden onset.  Mostly in the right upper abdomen but diffuse now.  She is had some emesis of better gastric contents no blood.  No fevers.  Has had multiple recent admissions for GI bleeding.  She has extensive medical issues including end-stage renal disease on dialysis, history of congestive heart failure, history of valvular heart disease, history of coronary artery disease, history of peripheral vascular disease, history of obesity.  Has had multiple recent upper endoscopies and colonoscopies for bleeding she had M2A capsule endoscopy which revealed AVMs of the small bowel.  She has had prior gastric banding and sigmoid colectomy..      Review of Systems   Pertinent items are noted in HPI, all other systems reviewed and negative    History  Past Medical History:   Diagnosis Date   • Arthritis    • Carotid artery disease (CMS/HCC)    • CHF (congestive heart failure) (CMS/HCC)    • Chronic kidney disease on chronic dialysis (CMS/HCC)    • Chronic renal failure     on dialysis ON MON, WED, FRI   • Coronary artery disease    • Disease of thyroid gland    • Diverticulitis    • Gastric ulcer    • History of transfusion    • Hyperlipidemia    • Hypertension    • Injury of back    • Mesenteric artery insufficiency (CMS/HCC)    • Multilevel degenerative disc disease    • Osteoporosis    • Pancreatitis    • Pelvis fracture (CMS/HCC)    • Pneumonia      Past Surgical History:   Procedure Laterality Date   • AORTAGRAM Right 1/8/2018    Procedure: MESENTERIC ANGIOGRAM, GROIN ACCESS, MYNX CLOSURE;  Surgeon: Tomasz San DO;  Location:   [facility] OR;  Service:    • AORTIC VALVE SURGERY     • APPENDECTOMY     • BACK SURGERY     • CAPSULE ENDOSCOPY N/A 3/30/2019    Procedure: CAPSULE ENDOSCOPY M2A;  Surgeon: David Yu MD;  Location: [facility] ENDOSCOPY;  Service: Gastroenterology   • CARDIAC SURGERY     • CAROTID ENDARTERECTOMY Bilateral    • CATARACT EXTRACTION, BILATERAL     • CERVICAL SPINE SURGERY     • CHOLECYSTECTOMY     • COLON SURGERY     • COLONOSCOPY  10/01/2015   • COLONOSCOPY N/A 3/28/2019    Procedure: COLONOSCOPY WITH ANESTHESIA;  Surgeon: Rafita Merida MD;  Location: [facility] ENDOSCOPY;  Service: Gastroenterology   • CORONARY ARTERY BYPASS GRAFT     • DIALYSIS FISTULA CREATION     • ENDOSCOPY N/A 12/27/2018    Procedure: ESOPHAGOGASTRODUODENOSCOPY WITH ANESTHESIA;  Surgeon: Moni Garza MD;  Location: [facility] ENDOSCOPY;  Service: Gastroenterology   • ENDOSCOPY N/A 2/28/2019    Procedure: ESOPHAGOGASTRODUODENOSCOPY WITH ANESTHESIA;  Surgeon: Moni Garza MD;  Location: [facility] ENDOSCOPY;  Service: Gastroenterology   • ENDOSCOPY N/A 3/11/2019    Procedure: ESOPHAGOGASTRODUODENOSCOPY WITH ANESTHESIA;  Surgeon: Moni Garza MD;  Location: [facility] ENDOSCOPY;  Service: Gastroenterology   • ENDOSCOPY N/A 3/27/2019    Procedure: ESOPHAGOGASTRODUODENOSCOPY WITH ANESTHESIA;  Surgeon: Rafita Merida MD;  Location: [facility] ENDOSCOPY;  Service: Gastroenterology   • HIP TROCANTERIC NAILING WITH INTRAMEDULLARY HIP SCREW Right 2/8/2019    Procedure: HIP TROCANTERIC NAILING LONG WITH INTRAMEDULLARY HIP SCREW;  Surgeon: Boo Camacho MD;  Location: [facility] OR;  Service: Orthopedics   • JOINT REPLACEMENT      knee   • LAPAROSCOPIC GASTRIC BANDING     • LEEP     • LUMBAR FUSION     • TOE AMPUTATION Left     2nd toe   • TOTAL KNEE ARTHROPLASTY Bilateral    • TUBAL ABDOMINAL LIGATION     • UPPER GASTROINTESTINAL ENDOSCOPY  10/01/2015     Family History   Problem Relation Age of Onset   • Hypertension Mother    • Cancer Mother         stomach   •  Coronary artery disease Father    • Heart attack Father    • Diabetes Brother    • Coronary artery disease Brother    • Colon cancer Neg Hx    • Colon polyps Neg Hx      Social History     Tobacco Use   • Smoking status: Never Smoker   • Smokeless tobacco: Never Used   Substance Use Topics   • Alcohol use: No   • Drug use: No       (Not in a hospital admission)  Allergies:  Patient has no known allergies.    Objective     Vital Signs  Temp:  [97.6 °F (36.4 °C)] 97.6 °F (36.4 °C)  Heart Rate:  [] 103  Resp:  [18] 18  BP: (109-144)/(40-99) 131/58    Physical Exam:      General Appearance:    Alert, cooperative, in significant pain   Head:    Normocephalic, without obvious abnormality, atraumatic   Eyes:            Lids and lashes normal, conjunctivae and sclerae normal, no   icterus, no pallor, corneas clear, PERRLA   Ears:    Ears appear intact with no abnormalities noted   Neck:   No adenopathy, supple, trachea midline   Back:     No kyphosis present, no scoliosis present, no skin lesions,      erythema or scars, no tenderness to percussion or                   palpation,   range of motion normal   Lungs:     Clear to auscultation,respirations regular, even and                  unlabored    Heart:    Regular rhythm and normal rate, normal S1 and S2, no            murmur, no gallop, no rub, no click   Chest Wall:    No abnormalities observed   Abdomen:    Diffusely tender throughout the abdomen with guarding and rigidity   Rectal:     Deferred   Extremities:   Moves all extremities well, no edema, no cyanosis, no             redness dialysis fistula left upper arm   Pulses:   Pulses palpable and equal bilaterally   Skin:   No bleeding, bruising or rash   Lymph nodes:   No palpable adenopathy   Neurologic:   No focal deficits       Results Review:      Lab Results (last 72 hours)     Procedure Component Value Units Date/Time    Protime-INR [[id number]]  (Abnormal) Collected:  05/13/19 0023    Specimen:  Blood  Updated:  05/13/19 0046     Protime 18.0 Seconds      INR 1.44    aPTT [[id number]]  (Normal) Collected:  05/13/19 0023    Specimen:  Blood Updated:  05/13/19 0046     PTT 34.8 seconds     Blood Culture - Blood, Hand, Right [[id number]] Collected:  05/13/19 0023    Specimen:  Blood from Hand, Right Updated:  05/13/19 0043    BNP [[id number]] Collected:  05/12/19 2133    Specimen:  Blood Updated:  05/13/19 0036    Red Top [[id number]] Collected:  05/13/19 0023    Specimen:  Blood Updated:  05/13/19 0033    [place] Draw [[id number]] Collected:  05/12/19 2133    Specimen:  Blood Updated:  05/13/19 0023    Narrative:       The following orders were created for panel order [place] Draw.  Procedure                               Abnormality         Status                     ---------                               -----------         ------                     Light Blue Top[[id number]]                                   Final result               Green Top (Gel)[[id number]]                                  Final result               Lavender Top[[id number]]                                     Final result               Red Top[[id number]]                                          In process                   Please view results for these tests on the individual orders.    Light Blue Top [[id number]] Collected:  05/12/19 2133    Specimen:  Blood Updated:  05/12/19 2245     Extra Tube hold for add-on     Comment: Auto resulted       Green Top (Gel) [[id number]] Collected:  05/12/19 2133    Specimen:  Blood Updated:  05/12/19 2245     Extra Tube Hold for add-ons.     Comment: Auto resulted.       Lavender Top [[id number]] Collected:  05/12/19 2133    Specimen:  Blood Updated:  05/12/19 2245     Extra Tube hold for add-on     Comment: Auto resulted       Troponin [[id number]]  (Normal) Collected:  05/12/19 2133    Specimen:  Blood Updated:  05/12/19 2206     Troponin I <0.012 ng/mL     Lactic Acid, Plasma [[id number]]  (Normal) Collected:   05/12/19 2148    Specimen:  Blood Updated:  05/12/19 2203     Lactate 1.4 mmol/L     Comprehensive Metabolic Panel [[id number]]  (Abnormal) Collected:  05/12/19 2133    Specimen:  Blood Updated:  05/12/19 2154     Glucose 92 mg/dL      BUN 27 mg/dL      Creatinine 4.51 mg/dL      Sodium 136 mmol/L      Potassium 4.0 mmol/L      Chloride 96 mmol/L      CO2 32.0 mmol/L      Calcium 8.4 mg/dL      Total Protein 6.1 g/dL      Albumin 3.00 g/dL      ALT (SGPT) 17 U/L      AST (SGOT) 35 U/L      Alkaline Phosphatase 215 U/L      Total Bilirubin 0.5 mg/dL      eGFR Non African Amer 9 mL/min/1.73      Comment: <15 Indicative of kidney failure.        eGFR   Amer -- mL/min/1.73      Comment: <15 Indicative of kidney failure.        Globulin 3.1 gm/dL      A/G Ratio 1.0 g/dL      BUN/Creatinine Ratio 6.0     Anion Gap 8.0 mmol/L     Narrative:       GFR Normal >60  Chronic Kidney Disease <60  Kidney Failure <15    Lipase [[id number]]  (Abnormal) Collected:  05/12/19 2133    Specimen:  Blood Updated:  05/12/19 2154     Lipase 14 U/L     CBC & Differential [[id number]] Collected:  05/12/19 2133    Specimen:  Blood Updated:  05/12/19 2144    Narrative:       The following orders were created for panel order CBC & Differential.  Procedure                               Abnormality         Status                     ---------                               -----------         ------                     CBC Auto Differential[[id number]]        Abnormal            Final result                 Please view results for these tests on the individual orders.    CBC Auto Differential [[id number]]  (Abnormal) Collected:  05/12/19 2133    Specimen:  Blood Updated:  05/12/19 2144     WBC 9.31 10*3/mm3      RBC 3.78 10*6/mm3      Hemoglobin 11.9 g/dL      Hematocrit 37.6 %      MCV 99.5 fL      MCH 31.5 pg      MCHC 31.6 g/dL      RDW 16.5 %      RDW-SD 60.2 fl      MPV 10.8 fL      Platelets 197 10*3/mm3      Neutrophil % 87.7 %       Lymphocyte % 6.4 %      Monocyte % 3.4 %      Eosinophil % 1.7 %      Basophil % 0.4 %      Immature Grans % 0.4 %      Neutrophils, Absolute 8.15 10*3/mm3      Lymphocytes, Absolute 0.60 10*3/mm3      Monocytes, Absolute 0.32 10*3/mm3      Eosinophils, Absolute 0.16 10*3/mm3      Basophils, Absolute 0.04 10*3/mm3      Immature Grans, Absolute 0.04 10*3/mm3      nRBC 0.0 /100 WBC         Imaging Results (last 72 hours)     Procedure Component Value Units Date/Time    XR Chest 1 View [[id number]] Updated:  05/13/19 0004    CT Abdomen Pelvis Without Contrast [[id number]] Updated:  05/12/19 2335          Assessment/Plan       * No active hospital problems. *      Her CT scan shows free air and some thickening of the gastric wall.  This probably represents perforated ulcer.  We will proceed with exposure laparotomy.  The risks of bleeding infection injury to structures cardiac complications pulmonary complications death MI prolonged ventilation strokes poor healing were discussed.  We discussed gastric resection versus oversew of an ulcer versus small bowel resection with anastomosis versus colon resection with anastomosis or ostomy.  The daughter was present during the discussion and understands the severity of her illness in the emergency nature and life saving intervention.  She is very high risk and set up for countless possible complications postoperatively.  She understands this and wishes to proceed.      Laila Rodriguez MD  05/13/19  12:50 AM          Electronically signed by Laila Rodriguez MD at 5/13/2019 12:54 AM       Hospital Medications (active)       Dose Frequency Start End    enoxaparin (LOVENOX) syringe 30 mg 30 mg Daily 5/14/2019     Sig - Route: Inject 0.3 mL under the skin into the appropriate area as directed Daily. - Subcutaneous    metoprolol tartrate (LOPRESSOR) injection 2.5 mg 2.5 mg Every 12 Hours 5/13/2019     Sig - Route: Infuse 2.5 mL into a venous catheter Every 12 (Twelve) Hours. -  "Intravenous    Morphine sulfate PCA 1 mg/mL 30 mL syringe  Continuous 5/13/2019 5/27/2019    Sig - Route: Infuse  into a venous catheter Continuous. - Intravenous    naloxone (NARCAN) injection 0.1 mg 0.1 mg Every 5 Minutes PRN 5/13/2019     Sig - Route: Infuse 0.25 mL into a venous catheter Every 5 (Five) Minutes As Needed for Opioid Reversal or Respiratory Depression (see administration instructions). - Intravenous    ondansetron (ZOFRAN) injection 4 mg 4 mg Every 6 Hours PRN 5/13/2019     Sig - Route: Infuse 2 mL into a venous catheter Every 6 (Six) Hours As Needed for Nausea or Vomiting. - Intravenous    Linked Group 1:  \"Or\" Linked Group Details        ondansetron (ZOFRAN) tablet 4 mg 4 mg Every 6 Hours PRN 5/13/2019     Sig - Route: Take 1 tablet by mouth Every 6 (Six) Hours As Needed for Nausea or Vomiting. - Oral    Linked Group 1:  \"Or\" Linked Group Details        pantoprazole (PROTONIX) injection 40 mg 40 mg Every Early Morning 5/13/2019     Sig - Route: Infuse 10 mL into a venous catheter Every Morning. - Intravenous    piperacillin-tazobactam (ZOSYN) in iso-osmotic dextrose IVPB 2.25 g (premix) 2.25 g Every 12 Hours 5/14/2019 5/23/2019    Sig - Route: Infuse 50 mL into a venous catheter Every 12 (Twelve) Hours. - Intravenous    Cosign for Ordering: Accepted by Laila Rodriguez MD on 5/14/2019  7:35 AM    sodium chloride 0.9 % flush 10 mL 10 mL As Needed 5/12/2019     Sig - Route: Infuse 10 mL into a venous catheter As Needed for Line Care. - Intravenous    Cosign for Ordering: Accepted by Alton Tolentino MD on 5/13/2019  3:47 AM    Linked Group 2:  \"And\" Linked Group Details        sodium chloride 0.9 % infusion 100 mL/hr Continuous 5/13/2019     Sig - Route: Infuse 100 mL/hr into a venous catheter Continuous. - Intravenous    dextrose 5 % and sodium chloride 0.45 % with KCl 20 mEq/L infusion (Discontinued) 100 mL/hr Continuous 5/13/2019 5/13/2019    Sig - Route: Infuse 100 mL/hr into a venous " catheter Continuous. - Intravenous    piperacillin-tazobactam (ZOSYN) in iso-osmotic dextrose IVPB 2.25 g (premix) (Discontinued) 2.25 g Every 8 Hours 5/13/2019 5/13/2019    Sig - Route: Infuse 50 mL into a venous catheter Every 8 (Eight) Hours. - Intravenous             Operative/Procedure Notes (all)      Laila Rodriguez MD at 5/13/2019 12:00 AM  Version 1 of 1         Laila Rodriguez MD Operative Note    Cheri FUENTES Ely  5/12/2019 - 5/13/2019    Pre-op Diagnosis:   FREE AIR IN ABDOMEN    Post-op Diagnosis:     same    Procedure/CPT® Codes:      Procedure(s):  LAPAROTOMY EXPLORATORY, lysis of adhesions, mobilization of the duodenum with Kocher maneuver, oversew duodenal ulcer with Cedric patch    Surgeon(s):  Laila Rodriguez MD    Anesthesia: General    Staff:   Circulator: Katelyn Garibay RN  Scrub Person: Dot Arreguin  Assistant: Sandip Robledo    Estimated Blood Loss: 200 ml    Specimens:                None      Drains:   Closed/Suction Drain Anterior Abdomen Bulb 15 Fr. (Active)       Urethral Catheter Silicone 16 Fr. (Active)       Indications: Critically ill patient with free air under the diaphragm    Findings: Extensive adhesions from multiple prior operations.  Anterior duodenal perforation    Complications: none    Procedure: The patient was brought to the operating room and placed in the supine position.  After induction of general anesthesia and infusion of IV antibiotics, the patient was prepped and draped in the usual sterile fashion.  Midline incision was made in the abdomen entered.  There were adhesions from a prior cholecystectomy and gastric banding.  These were taken down with cautery.  There was bilious staining in the right upper quadrant underneath the liver.  There were adhesions from the cholecystectomy site there were taken down with cautery.  The tissue was very edematous.  The liver was somewhat fragile and friable and did bleed easily.  The duodenum was kocherized.  The  perforation was in the duodenal bulb area.  There is superiorly there was rind in the tissue.  Once had this adequately exposed I placed vertical mattress sutures closing the defect vertically.  Once were all placed I tied them sequentially.  I placed a second layer of seromuscular sutures.  I then mobilized the omentum off of the transverse colon and performed a Cedric patch covering this suture line.  Copious irrigation was performed.  Because there was some bleeding from the liver edge were taken down the adhesions I used some snow and placed it in this area to minimize bleeding.  I placed a drain through lateral stab wound and laid it along the lesser curvature underneath the liver edge.  This was after copious irrigation.  So the drain with 2-0 silk.  I closed the abdomen with interrupted #1 PDS.  Irrigated the wound and then 2-0 Vicryl and staples used on the skin.  Dressing placed patient awakened and transferred to the recovery room in stable condition tolerated procedure well.  At the end of the procedure counts were correct.    Laila Rodriguez MD     Date: 5/13/2019  Time: 2:42 AM      Electronically signed by Laila Rodriguez MD at 5/13/2019  2:46 AM          Physician Progress Notes (most recent note)      Laila Rodriguez MD at 5/14/2019  8:30 AM          Laila Rodriguez MD Progress Note     LOS: 1 day   Patient Care Team:  Provider, No Known as PCP - Barrett Prasad Jr, MD as PCP - Family Medicine  Moni Garza MD as Consulting Physician (Gastroenterology)  Tomasz San DO as Consulting Physician (Vascular Surgery)        Subjective     Doing very well.  Monitor showing atrial fibrillation    Objective     Vital Signs  Temp:  [97.7 °F (36.5 °C)-98.8 °F (37.1 °C)] 98 °F (36.7 °C)  Heart Rate:  [] 89  Resp:  [12-19] 17  BP: ()/() 123/112    Intake & Output (last 3 days)       05/11 0701 - 05/12 0700 05/12 0701 - 05/13 0700 05/13 0701 - 05/14 0700 05/14 0701 - 05/15 0700     I.V. (mL/kg)   1835.3 (25.5) 430 (6)    IV Piggyback  250 100     Total Intake(mL/kg)  250 (3.3) 1935.3 (26.9) 430 (6)    Urine (mL/kg/hr)  0 0 (0)     Emesis/NG output   150 0    Drains  50 140     Other   2000     Total Output  50 2290 0    Net  +200 -354.7 +430                  Physical Exam:     General Appearance:    Alert, cooperative, in no acute distress   Lungs:     respirations regular, even and unlabored    Heart:   Irregular rhythm   Chest Wall:    No abnormalities observed   Abdomen:      Soft no significant tenderness wound dressed SATNAM serosanguineous NG tube not excessive, bilious   Extremities: No edema,    Results Review:     I reviewed the patient's new clinical results.    Lab Results (last 72 hours)     Procedure Component Value Units Date/Time    Comprehensive Metabolic Panel [[id number]]  (Abnormal) Collected:  05/14/19 0336    Specimen:  Blood Updated:  05/14/19 0411     Glucose 74 mg/dL      BUN 19 mg/dL      Creatinine 2.95 mg/dL      Sodium 136 mmol/L      Potassium 4.0 mmol/L      Chloride 100 mmol/L      CO2 29.0 mmol/L      Calcium 7.5 mg/dL      Total Protein 4.7 g/dL      Albumin 2.20 g/dL      ALT (SGPT) 23 U/L      AST (SGOT) 25 U/L      Alkaline Phosphatase 137 U/L      Total Bilirubin 0.5 mg/dL      eGFR Non African Amer 15 mL/min/1.73      Globulin 2.5 gm/dL      A/G Ratio 0.9 g/dL      BUN/Creatinine Ratio 6.4     Anion Gap 7.0 mmol/L     Narrative:       GFR Normal >60  Chronic Kidney Disease <60  Kidney Failure <15    Magnesium [[id number]]  (Normal) Collected:  05/14/19 0336    Specimen:  Blood Updated:  05/14/19 0411     Magnesium 1.6 mg/dL     Phosphorus [[id number]]  (Normal) Collected:  05/14/19 0336    Specimen:  Blood Updated:  05/14/19 0411     Phosphorus 3.6 mg/dL     CBC & Differential [[id number]] Collected:  05/14/19 0336    Specimen:  Blood Updated:  05/14/19 0403    Narrative:       The following orders were created for panel order CBC & Differential.  Procedure                                Abnormality         Status                     ---------                               -----------         ------                     CBC Auto Differential[[id number]]        Abnormal            Final result                 Please view results for these tests on the individual orders.    CBC Auto Differential [[id number]]  (Abnormal) Collected:  05/14/19 0336    Specimen:  Blood Updated:  05/14/19 0403     WBC 14.28 10*3/mm3      RBC 3.18 10*6/mm3      Hemoglobin 10.2 g/dL      Hematocrit 32.3 %      .6 fL      MCH 32.1 pg      MCHC 31.6 g/dL      RDW 16.9 %      RDW-SD 62.8 fl      MPV 10.8 fL      Platelets 143 10*3/mm3      Neutrophil % 92.1 %      Lymphocyte % 3.6 %      Monocyte % 3.6 %      Eosinophil % 0.0 %      Basophil % 0.1 %      Immature Grans % 0.6 %      Neutrophils, Absolute 13.16 10*3/mm3      Lymphocytes, Absolute 0.51 10*3/mm3      Monocytes, Absolute 0.52 10*3/mm3      Eosinophils, Absolute 0.00 10*3/mm3      Basophils, Absolute 0.01 10*3/mm3      Immature Grans, Absolute 0.08 10*3/mm3      nRBC 0.0 /100 WBC     Blood Culture - Blood, Hand, Right [[id number]] Collected:  05/13/19 0023    Specimen:  Blood from Hand, Right Updated:  05/14/19 0045     Blood Culture No growth at 24 hours    Troponin [[id number]]  (Normal) Collected:  05/13/19 2051    Specimen:  Blood Updated:  05/13/19 2131     Troponin I <0.012 ng/mL     Troponin [[id number]]  (Normal) Collected:  05/13/19 1407    Specimen:  Blood Updated:  05/13/19 1452     Troponin I <0.012 ng/mL     Hepatitis B Surface Antibody [[id number]]  (Abnormal) Collected:  05/13/19 0903    Specimen:  Blood Updated:  05/13/19 1023     Hepatitis Bs Ab Index <5.00     Hep B S Ab Non-Immune    Hepatitis Panel, Acute [[id number]]  (Normal) Collected:  05/13/19 0903    Specimen:  Blood Updated:  05/13/19 1023     HCV S/C Ratio 0.10     Hepatitis C Ab Negative     Hep A IgM Negative     Hep B C IgM Negative     Hepatitis B Surface Ag  Negative    Blood Culture - Blood, Arm, Right [[id number]] Collected:  05/13/19 0858    Specimen:  Blood from Arm, Right Updated:  05/13/19 0946    Phosphorus [[id number]]  (Normal) Collected:  05/13/19 0858    Specimen:  Blood Updated:  05/13/19 0931     Phosphorus 4.3 mg/dL     Magnesium [[id number]]  (Normal) Collected:  05/13/19 0858    Specimen:  Blood Updated:  05/13/19 0931     Magnesium 1.6 mg/dL     CBC & Differential [[id number]] Collected:  05/13/19 0858    Specimen:  Blood Updated:  05/13/19 0924    Narrative:       The following orders were created for panel order CBC & Differential.  Procedure                               Abnormality         Status                     ---------                               -----------         ------                     CBC Auto Differential[[id number]]        Abnormal            Final result                 Please view results for these tests on the individual orders.    CBC Auto Differential [[id number]]  (Abnormal) Collected:  05/13/19 0858    Specimen:  Blood Updated:  05/13/19 0924     WBC 20.56 10*3/mm3      RBC 3.42 10*6/mm3      Hemoglobin 10.9 g/dL      Hematocrit 34.6 %      .2 fL      MCH 31.9 pg      MCHC 31.5 g/dL      RDW 16.7 %      RDW-SD 62.2 fl      MPV 10.6 fL      Platelets 147 10*3/mm3      Neutrophil % 95.5 %      Lymphocyte % 1.8 %      Monocyte % 1.8 %      Eosinophil % 0.0 %      Basophil % 0.2 %      Immature Grans % 0.7 %      Neutrophils, Absolute 19.64 10*3/mm3      Lymphocytes, Absolute 0.37 10*3/mm3      Monocytes, Absolute 0.36 10*3/mm3      Eosinophils, Absolute 0.00 10*3/mm3      Basophils, Absolute 0.04 10*3/mm3      Immature Grans, Absolute 0.15 10*3/mm3      nRBC 0.0 /100 WBC     Magnesium [[id number]]  (Normal) Collected:  05/13/19 0023    Specimen:  Blood Updated:  05/13/19 0610     Magnesium 1.6 mg/dL     Phosphorus [[id number]]  (Normal) Collected:  05/13/19 0023    Specimen:  Blood Updated:  05/13/19 0610     Phosphorus 3.6  mg/dL     Amylase [[id number]]  (Abnormal) Collected:  05/13/19 0023    Specimen:  Blood Updated:  05/13/19 0308     Amylase <30 U/L     Lipase [[id number]]  (Abnormal) Collected:  05/13/19 0023    Specimen:  Blood Updated:  05/13/19 0308     Lipase <10 U/L     [place] Draw [[id number]] Collected:  05/12/19 2133    Specimen:  Blood Updated:  05/13/19 0130    Narrative:       The following orders were created for panel order [place] Draw.  Procedure                               Abnormality         Status                     ---------                               -----------         ------                     Light Blue Top[[id number]]                                   Final result               Green Top (Gel)[[id number]]                                  Final result               Lavender Top[[id number]]                                     Final result               Red Top[[id number]]                                          Final result                 Please view results for these tests on the individual orders.    Red Top [[id number]] Collected:  05/13/19 0023    Specimen:  Blood Updated:  05/13/19 0130     Extra Tube Hold for add-ons.     Comment: Auto resulted.       BNP [[id number]]  (Abnormal) Collected:  05/12/19 2133    Specimen:  Blood Updated:  05/13/19 0054     proBNP 13,600.0 pg/mL     Protime-INR [[id number]]  (Abnormal) Collected:  05/13/19 0023    Specimen:  Blood Updated:  05/13/19 0046     Protime 18.0 Seconds      INR 1.44    aPTT [[id number]]  (Normal) Collected:  05/13/19 0023    Specimen:  Blood Updated:  05/13/19 0046     PTT 34.8 seconds     Light Blue Top [[id number]] Collected:  05/12/19 2133    Specimen:  Blood Updated:  05/12/19 2245     Extra Tube hold for add-on     Comment: Auto resulted       Green Top (Gel) [[id number]] Collected:  05/12/19 2133    Specimen:  Blood Updated:  05/12/19 2245     Extra Tube Hold for add-ons.     Comment: Auto resulted.       Lavender Top [[id number]] Collected:   05/12/19 2133    Specimen:  Blood Updated:  05/12/19 2245     Extra Tube hold for add-on     Comment: Auto resulted       Troponin [[id number]]  (Normal) Collected:  05/12/19 2133    Specimen:  Blood Updated:  05/12/19 2206     Troponin I <0.012 ng/mL     Lactic Acid, Plasma [[id number]]  (Normal) Collected:  05/12/19 2148    Specimen:  Blood Updated:  05/12/19 2203     Lactate 1.4 mmol/L     Comprehensive Metabolic Panel [[id number]]  (Abnormal) Collected:  05/12/19 2133    Specimen:  Blood Updated:  05/12/19 2154     Glucose 92 mg/dL      BUN 27 mg/dL      Creatinine 4.51 mg/dL      Sodium 136 mmol/L      Potassium 4.0 mmol/L      Chloride 96 mmol/L      CO2 32.0 mmol/L      Calcium 8.4 mg/dL      Total Protein 6.1 g/dL      Albumin 3.00 g/dL      ALT (SGPT) 17 U/L      AST (SGOT) 35 U/L      Alkaline Phosphatase 215 U/L      Total Bilirubin 0.5 mg/dL      eGFR Non African Amer 9 mL/min/1.73      Comment: <15 Indicative of kidney failure.        eGFR   Amer -- mL/min/1.73      Comment: <15 Indicative of kidney failure.        Globulin 3.1 gm/dL      A/G Ratio 1.0 g/dL      BUN/Creatinine Ratio 6.0     Anion Gap 8.0 mmol/L     Narrative:       GFR Normal >60  Chronic Kidney Disease <60  Kidney Failure <15    Lipase [[id number]]  (Abnormal) Collected:  05/12/19 2133    Specimen:  Blood Updated:  05/12/19 2154     Lipase 14 U/L     CBC & Differential [[id number]] Collected:  05/12/19 2133    Specimen:  Blood Updated:  05/12/19 2144    Narrative:       The following orders were created for panel order CBC & Differential.  Procedure                               Abnormality         Status                     ---------                               -----------         ------                     CBC Auto Differential[[id number]]        Abnormal            Final result                 Please view results for these tests on the individual orders.    CBC Auto Differential [[id number]]  (Abnormal) Collected:  05/12/19 2133     Specimen:  Blood Updated:  05/12/19 2144     WBC 9.31 10*3/mm3      RBC 3.78 10*6/mm3      Hemoglobin 11.9 g/dL      Hematocrit 37.6 %      MCV 99.5 fL      MCH 31.5 pg      MCHC 31.6 g/dL      RDW 16.5 %      RDW-SD 60.2 fl      MPV 10.8 fL      Platelets 197 10*3/mm3      Neutrophil % 87.7 %      Lymphocyte % 6.4 %      Monocyte % 3.4 %      Eosinophil % 1.7 %      Basophil % 0.4 %      Immature Grans % 0.4 %      Neutrophils, Absolute 8.15 10*3/mm3      Lymphocytes, Absolute 0.60 10*3/mm3      Monocytes, Absolute 0.32 10*3/mm3      Eosinophils, Absolute 0.16 10*3/mm3      Basophils, Absolute 0.04 10*3/mm3      Immature Grans, Absolute 0.04 10*3/mm3      nRBC 0.0 /100 WBC         Imaging Results (last 72 hours)     Procedure Component Value Units Date/Time    CT Abdomen Pelvis Without Contrast [[id number]] Collected:  05/13/19 0752     Updated:  05/13/19 0807    Narrative:       CT ABDOMEN PELVIS WO CONTRAST- 5/12/2019 11:33 PM CDT     HISTORY: abd pain      COMPARISON: 03/09/2019      DLP: 889 mGy cm Automated exposure control was utilized to diminish  patient radiation dose.     TECHNIQUE: Noncontrast enhanced images of the abdomen and pelvis  obtained without oral contrast.      FINDINGS:   There is moderate cardiomegaly the patient is status post aortic valve  replacement with moderate cardiomegaly. No evidence of pericardial  effusion. Tiny effusions are present with bibasilar atelectasis. Some  thickening of the interlobular septa within the lower lobes is either  related to some pulmonary fibrosis or mild pulmonary venous hypertension  and interstitial edema..      LIVER: No focal liver lesion. A large amount of pneumoperitoneum is  noted within the upper abdomen and perihepatic space.     BILIARY SYSTEM: The patient's undergone cholecystectomy. No evidence of  intra or extrahepatic biliary dilatation..      PANCREAS: No focal pancreatic lesion.      SPLEEN: Unremarkable.      KIDNEYS AND ADRENALS: The  adrenals are unremarkable. There is a 2.3 cm  suspected cortical cyst involving the interpolar aspect of the left  kidney. I couldn't be confirmed with ultrasound. There is diffuse  atrophy of the kidneys. No evidence of nephrolithiasis..  The ureters  are decompressed and normal in appearance.     RETROPERITONEUM: No mass, lymphadenopathy or hemorrhage.      GI TRACT: Some mild thickening of the wall is noted near the gastric  outlet. I do not see any evidence of a discrete ulceration but the  majority of the free air is noted within the upper abdomen raising the  possibility of a gastric perforation. Diverticulosis is noted of the  descending and sigmoid colon but I do not see evidence of diverticulitis  and there is no free air noted within the lower pelvis or mid abdomen.  The patient is status post gastric band placement. Mild prominence of  the gastric wall near the gastric band is also present.. The appendix is  surgically absent..     OTHER: There is no mesenteric mass, lymphadenopathy or fluid collection.  The osseous structures and soft tissues demonstrate no worrisome  lesions. No free fluid is present.      PELVIS: No mass lesion, fluid collection or significant lymphadenopathy  is seen in the pelvis. The urinary bladder is normal in appearance.       Impression:       1. Pneumoperitoneum. This is within the upper abdomen. There is some  mild thickening of the gastric wall in the region of the gastric outlet  as well as at the level of a gastric band from previous lap band  surgery. I would favor an upper abdominal perforation with no evidence  of abnormal inflammatory stranding or pneumoperitoneum within the pelvis  or mid abdomen. A small amount of free fluid is noted within the  perihepatic space.  2. Small effusions with bibasilar atelectasis. Mild pulmonary venous  hypertension and interstitial edema are suspected.  3. Suspected cortical cyst midpole left kidney. This could be followed  up with  ultrasound..  4. Diverticulosis of the descending and sigmoid colon without evidence  of acute diverticulitis.  5. Diffuse atrophy of the kidneys.        This report was finalized on 05/13/2019 08:04 by Dr. Willian Baker MD.    XR Chest 1 View [[id number]] Collected:  05/13/19 0721     Updated:  05/13/19 0725    Narrative:       EXAMINATION: Chest 1 view 05/12/2019     HISTORY: Pneumoperitoneum. Pulmonary edema.     FINDINGS: Today's exam is compared to previous study of 03/28/2019.  There is pneumoperitoneum with free air beneath the central and right  hemidiaphragm. There is right basilar atelectasis. There is moderate  cardiomegaly with mild pulmonary vascular congestion. Small effusions  are present blunting the costophrenic angles.       Impression:       1.. Pulmonary vascular congestion with small effusions. The patient is  status post percutaneous aortic valve replacement.  2. Pneumoperitoneum.  This report was finalized on 05/13/2019 07:22 by Dr. Willian Baker MD.              Assessment/Plan       Abdominal pain      Transfer to floor.  Discussed with Dr. Espinosa regarding anticoagulation in the setting of A. fib with GI bleed history and SMA atherosclerotic disease.  Will defer to him regarding anticoagulation.  I do have her on 30 mg subcu of Lovenox prophylactic.  I am not concerned about bleeding from my surgery site as much as from AVMs or other GI sites.  She had been taken off her anticoagulation after last admission with GI bleeding but that was before atrial fibrillation      Laila Rodriguez MD  05/14/19  8:30 AM        Electronically signed by Laila Rodriguez MD at 5/14/2019  8:33 AM          Consult Notes (most recent note)      Dirk Tristan, APRN at 5/13/2019  9:43 AM      Consult Orders    1. Inpatient Nephrology Consult [[id number]] ordered by Laila Rodriguez MD at 05/13/19 0252           Attestation signed by Chris Hsu MD at 5/13/2019  9:39 PM (Updated)       I have independently interviewed and examined the patient and reviewed the laboratory, imaging, notes and all other records as available.  I have discussed key elements of the care plan with the APRN and agree with the findings and care plan documented above except as noted.      Subjective:  Initially consulted for end stage renal disease management. Patient has routine hemodialysis Monday Wednesday Friday at [facility].  No recent issues with dialysis.  Recurrent history of GI bleeding. Presented this time with acute onset abdominal pain; imaging showed free air in abdomen and was taken for exploratory laparotomy by Dr Hughes. Had repair of perforated duodenal ulcer.      Today, tolerating HD.  No new events.  On morphine PCA. +afib    Dialysis   Pt was seen on RRT at 1330  Modality: Hemodialysis  Access: Arterial Venous Fistula  Location: left upper  QB: 400  QD: 600  UF: 760        Objective:  Vitals/labs/studies/notes all reviewed  Exam independently verified with additional comments or findings as noted:  CV: irrr    Assessment:  ESRD  Chronic diastolic CHF  HTN with recent hypotension  Perforated duodenal ulcer s/p surgical repair  Anemia - CKD/acute blood loss    Plan:  HD today with limited UF  HD MWF  Monitor labs  Wean IVF when able by hemodynamics  D/w RNs    Chris Hsu MD  5/13/2019  9:36 PM                    Nephrology ([facility]) Consult Note      Patient:  Cheri Ely  YOB: 1940  Date of Service: 5/13/2019  MRN: [id number]   Acct: [id number]   Primary Care Physician: Provider, No Known  Advance Directive:   Code Status and Medical Interventions:   Ordered at: 05/13/19 0438     Level Of Support Discussed With:    Patient     Code Status:    CPR     Medical Interventions (Level of Support Prior to Arrest):    Full     Admit Date: 5/12/2019       Hospital Day: 0  Referring Provider: Laila Rodriguez MD      Patient personally seen and  examined.  Complete chart including Consults, Notes, Operative Reports, Labs, Cardiology, and Radiology studies reviewed as able.        Subjective:  Cheri Ely is a 79 y.o. female  whom we were consulted for end stage renal disease management. Patient has routine hemodialysis Monday Wednesday Friday at [facility].  No recent issues with dialysis.  Recurrent history of GI bleeding. Presented this time with acute onset abdominal pain; imaging showed free air in abdomen and was taken for exp lap by Dr Hughes. Had repair of perforated duodenal ulcer.  currently is awake and alert.  Mild abdominal pain, no n/v/d.    Allergies:  Patient has no known allergies.    Home Meds:  Medications Prior to Admission   Medication Sig Dispense Refill Last Dose   • acetaminophen (TYLENOL) 325 MG tablet Take 2 tablets by mouth Every 6 (Six) Hours As Needed for Mild Pain .   Past Month at Unknown time   • aspirin 81 MG EC tablet Take 1 tablet by mouth Daily.   3/23/2019   • B Complex-C-Folic Acid (JOHN-SANGEETA) tablet Take 1 tablet by mouth Daily.   3/23/2019   • carvedilol (COREG) 3.125 MG tablet Take 3.125 mg by mouth Daily. Monday, Wednesday, and Friday dose. Hold if SBP < 100.   3/22/2019   • carvedilol (COREG) 3.125 MG tablet Take 3.125 mg by mouth 2 (Two) Times a Day With Meals. Sunday, Tuesday, Thursday, and Saturday dose. Hold if SBP <100.   3/23/2019   • Cholecalciferol (VITAMIN D) 2000 units capsule Take 1 capsule by mouth Daily. 30 capsule     • docusate sodium 100 MG capsule Take 100 mg by mouth 2 (Two) Times a Day. 60 each 1 3/23/2019   • epoetin lucy (EPOGEN,PROCRIT) 08370 UNIT/ML injection Inject 1 mL under the skin into the appropriate area as directed 3 (Three) Times a Week.      • lactulose 20 GM/30ML solution solution Take 30 mL by mouth Daily As Needed (Constipation). 30 mL  3/23/2019   • levothyroxine (SYNTHROID, LEVOTHROID) 150 MCG tablet Take 150 mcg by mouth Daily.   3/23/2019   • losartan (COZAAR) 25 MG  tablet Take 25 mg by mouth 3 (Three) Times a Week. Monday, Wednesday, and Friday.   3/23/2019   • ondansetron ODT (ZOFRAN-ODT) 4 MG disintegrating tablet Take 4 mg by mouth Every 8 (Eight) Hours As Needed for Nausea or Vomiting.   Past Month at Unknown time   • pantoprazole (PROTONIX) 40 MG EC tablet Take 1 tablet by mouth Daily. 30 tablet 11 3/23/2019   • Polyethyl Glyc-Propyl Glyc PF 0.4-0.3 % solution ophthalmic solution Administer 2 drops to both eyes Every 2 (Two) Hours As Needed (dry eyes).   Past Month at Unknown time   • pravastatin (PRAVACHOL) 40 MG tablet Take 40 mg by mouth Daily.   3/23/2019   • sertraline (ZOLOFT) 50 MG tablet Take 50 mg by mouth Daily.   3/23/2019   • sucralfate (CARAFATE) 1 GM/10ML suspension Take 10 mL by mouth 2 (Two) Times a Day. 1200 mL 0        Medicines:  Current Facility-Administered Medications   Medication Dose Route Frequency Provider Last Rate Last Dose   • dextrose 5 % and sodium chloride 0.45 % with KCl 20 mEq/L infusion  100 mL/hr Intravenous Continuous Laila Rodriguez  mL/hr at 05/13/19 0522 100 mL/hr at 05/13/19 0522   • [START ON 5/14/2019] enoxaparin (LOVENOX) syringe 30 mg  30 mg Subcutaneous Daily Laila Rodriguez MD       • metoprolol tartrate (LOPRESSOR) injection 2.5 mg  2.5 mg Intravenous Q12H Jony Concepcion MD   2.5 mg at 05/13/19 0630   • Morphine sulfate PCA 1 mg/mL 30 mL syringe   Intravenous Continuous Laila Rodriguez MD       • naloxone (NARCAN) injection 0.1 mg  0.1 mg Intravenous Q5 Min PRN Laila Rodriguez MD       • ondansetron (ZOFRAN) tablet 4 mg  4 mg Oral Q6H PRN Laila Rodriguez MD        Or   • ondansetron (ZOFRAN) injection 4 mg  4 mg Intravenous Q6H PRN Laila Rodriguez MD       • pantoprazole (PROTONIX) injection 40 mg  40 mg Intravenous Q AM Laila Rodriguez MD   40 mg at 05/13/19 0630   • piperacillin-tazobactam (ZOSYN) in iso-osmotic dextrose IVPB 2.25 g (premix)  2.25 g Intravenous Q8H Laila Rodriguez MD   2.25 g at 05/13/19  0939   • sodium chloride 0.9 % flush 10 mL  10 mL Intravenous PRN Ashlyn Mackey, ABILIO           Past Medical History:  Past Medical History:   Diagnosis Date   • Arthritis    • Carotid artery disease (CMS/HCC)    • CHF (congestive heart failure) (CMS/HCC)    • Chronic kidney disease on chronic dialysis (CMS/HCC)    • Chronic renal failure     on dialysis ON MON, WED, FRI   • Coronary artery disease    • Disease of thyroid gland    • Diverticulitis    • Gastric ulcer    • History of transfusion    • Hyperlipidemia    • Hypertension    • Injury of back    • Mesenteric artery insufficiency (CMS/HCC)    • Multilevel degenerative disc disease    • Osteoporosis    • Pancreatitis    • Pelvis fracture (CMS/HCC)    • Pneumonia        Past Surgical History:  Past Surgical History:   Procedure Laterality Date   • AORTAGRAM Right 1/8/2018    Procedure: MESENTERIC ANGIOGRAM, GROIN ACCESS, MYNX CLOSURE;  Surgeon: Tomasz San DO;  Location: [facility] OR;  Service:    • AORTIC VALVE SURGERY     • APPENDECTOMY     • BACK SURGERY     • CAPSULE ENDOSCOPY N/A 3/30/2019    Procedure: CAPSULE ENDOSCOPY M2A;  Surgeon: David Yu MD;  Location: [facility] ENDOSCOPY;  Service: Gastroenterology   • CARDIAC SURGERY     • CAROTID ENDARTERECTOMY Bilateral    • CATARACT EXTRACTION, BILATERAL     • CERVICAL SPINE SURGERY     • CHOLECYSTECTOMY     • COLON SURGERY     • COLONOSCOPY  10/01/2015   • COLONOSCOPY N/A 3/28/2019    Procedure: COLONOSCOPY WITH ANESTHESIA;  Surgeon: Rafita Merida MD;  Location: [facility] ENDOSCOPY;  Service: Gastroenterology   • CORONARY ARTERY BYPASS GRAFT     • DIALYSIS FISTULA CREATION     • ENDOSCOPY N/A 12/27/2018    Procedure: ESOPHAGOGASTRODUODENOSCOPY WITH ANESTHESIA;  Surgeon: Moni Garza MD;  Location: [facility] ENDOSCOPY;  Service: Gastroenterology   • ENDOSCOPY N/A 2/28/2019    Procedure: ESOPHAGOGASTRODUODENOSCOPY WITH ANESTHESIA;  Surgeon: Moni Garza MD;  Location: [facility] ENDOSCOPY;   Service: Gastroenterology   • ENDOSCOPY N/A 3/11/2019    Procedure: ESOPHAGOGASTRODUODENOSCOPY WITH ANESTHESIA;  Surgeon: Moni Garza MD;  Location: [facility] ENDOSCOPY;  Service: Gastroenterology   • ENDOSCOPY N/A 3/27/2019    Procedure: ESOPHAGOGASTRODUODENOSCOPY WITH ANESTHESIA;  Surgeon: Rafita Merida MD;  Location: [facility] ENDOSCOPY;  Service: Gastroenterology   • HIP TROCANTERIC NAILING WITH INTRAMEDULLARY HIP SCREW Right 2/8/2019    Procedure: HIP TROCANTERIC NAILING LONG WITH INTRAMEDULLARY HIP SCREW;  Surgeon: Boo Camacho MD;  Location: [facility] OR;  Service: Orthopedics   • JOINT REPLACEMENT      knee   • LAPAROSCOPIC GASTRIC BANDING     • LEEP     • LUMBAR FUSION     • TOE AMPUTATION Left     2nd toe   • TOTAL KNEE ARTHROPLASTY Bilateral    • TUBAL ABDOMINAL LIGATION     • UPPER GASTROINTESTINAL ENDOSCOPY  10/01/2015       Family History  Family History   Problem Relation Age of Onset   • Hypertension Mother    • Cancer Mother         stomach   • Coronary artery disease Father    • Heart attack Father    • Diabetes Brother    • Coronary artery disease Brother    • Colon cancer Neg Hx    • Colon polyps Neg Hx        Social History  Social History     Socioeconomic History   • Marital status:      Spouse name: Not on file   • Number of children: Not on file   • Years of education: Not on file   • Highest education level: Not on file   Tobacco Use   • Smoking status: Never Smoker   • Smokeless tobacco: Never Used   Substance and Sexual Activity   • Alcohol use: No   • Drug use: No   • Sexual activity: Defer         Review of Systems:  History obtained from chart review and the patient  General ROS: No fever or chills  Respiratory ROS: No cough, shortness of breath, wheezing  Cardiovascular ROS: No chest pain or palpitations  Gastrointestinal ROS: +abdominal pain, no nausea/vomiting or melena  Genito-Urinary ROS: No dysuria or hematuria  Neurological ROS: no headache or dizziness  14 point ROS  reviewed with the patient and negative except as noted above and in the HPI unless unable to obtain.    Objective:  Patient Vitals for the past 24 hrs:   BP Temp Temp src Pulse Resp SpO2 Height Weight   05/13/19 0920 (!) 95/37 -- -- 67 -- 93 % -- --   05/13/19 0905 (!) 107/36 -- -- 73 13 93 % -- --   05/13/19 0850 (!) 93/38 -- -- 67 -- 94 % -- --   05/13/19 0835 (!) 93/37 -- -- 73 -- 92 % -- --   05/13/19 0820 (!) 100/37 -- -- 73 -- 93 % -- --   05/13/19 0800 95/50 98.3 °F (36.8 °C) Axillary 68 15 94 % -- --   05/13/19 0745 (!) 94/35 -- -- 78 -- 92 % -- --   05/13/19 0735 (!) 89/44 -- -- 71 -- 93 % -- --   05/13/19 0730 -- -- -- 77 -- 94 % -- --   05/13/19 0720 122/93 -- -- 80 -- 97 % -- --   05/13/19 0705 (!) 102/39 -- -- 83 15 97 % -- --   05/13/19 0600 97/53 -- -- 72 16 98 % -- --   05/13/19 0535 95/58 -- -- 85 -- 99 % -- --   05/13/19 0520 94/41 -- -- 89 14 97 % -- --   05/13/19 0425 96/42 -- -- 94 20 100 % -- --   05/13/19 0415 (!) 80/38 -- -- 92 20 100 % -- --   05/13/19 0400 (!) 80/38 -- -- 95 20 100 % -- --   05/13/19 0345 (!) 85/39 -- -- 88 22 100 % -- --   05/13/19 0340 (!) 99/37 -- -- 92 20 100 % -- --   05/13/19 0330 (!) 85/40 -- -- -- 20 -- -- --   05/13/19 0315 (!) 86/38 -- -- 91 -- 93 % -- --   05/13/19 0310 (!) 91/34 -- -- 109 20 97 % -- --   05/13/19 0305 96/65 -- -- 114 20 100 % -- --   05/13/19 0300 115/65 -- -- 90 20 100 % -- --   05/13/19 0256 109/62 98 °F (36.7 °C) Temporal 91 18 100 % -- --   05/13/19 0058 110/66 97.9 °F (36.6 °C) Oral 94 18 95 % -- --   05/13/19 0046 115/46 -- -- -- -- -- -- --   05/13/19 0030 131/58 -- -- 103 -- 96 % -- --   05/13/19 0029 -- -- -- 92 -- 92 % -- --   05/13/19 0015 -- -- -- 99 -- 96 % -- --   05/12/19 2327 -- -- -- 80 -- 99 % -- --   05/12/19 2316 114/41 -- -- -- -- -- -- --   05/12/19 2315 -- -- -- 88 -- 93 % -- --   05/12/19 2301 124/55 -- -- -- -- -- -- --   05/12/19 2300 -- -- -- 85 -- 95 % -- --   05/12/19 2230 -- -- -- 88 -- 95 % -- --   05/12/19 2201  "109/40 -- -- -- -- -- -- --   05/12/19 2200 -- -- -- 71 -- 93 % -- --   05/12/19 2108 144/99 97.6 °F (36.4 °C) Oral 85 18 94 % 149.9 cm (59\") 74.8 kg (165 lb)       Intake/Output Summary (Last 24 hours) at 5/13/2019 0943  Last data filed at 5/13/2019 0939  Gross per 24 hour   Intake 300 ml   Output 75 ml   Net 225 ml     General: awake/alert    Neck: supple, no JVD  Chest:  clear to auscultation bilaterally without respiratory distress  CVS: irregularly irregular, rate controlled  Abdominal: soft, nontender, positive bowel sounds  Extremities: no cyanosis or edema  Skin: warm and dry without rash  Neuro: no focal motor deficits    Labs:  Results from last 7 days   Lab Units 05/13/19  0858 05/12/19  2133   WBC 10*3/mm3 20.56* 9.31   HEMOGLOBIN g/dL 10.9* 11.9*   HEMATOCRIT % 34.6* 37.6   PLATELETS 10*3/mm3 147 197         Results from last 7 days   Lab Units 05/12/19  2133   SODIUM mmol/L 136   POTASSIUM mmol/L 4.0   CHLORIDE mmol/L 96*   CO2 mmol/L 32.0*   BUN mg/dL 27*   CREATININE mg/dL 4.51*   CALCIUM mg/dL 8.4   BILIRUBIN mg/dL 0.5   ALK PHOS U/L 215*   ALT (SGPT) U/L 17   AST (SGOT) U/L 35   GLUCOSE mg/dL 92       Radiology:   Imaging Results (last 72 hours)     Procedure Component Value Units Date/Time    CT Abdomen Pelvis Without Contrast [[id number]] Collected:  05/13/19 0752     Updated:  05/13/19 0807    Narrative:       CT ABDOMEN PELVIS WO CONTRAST- 5/12/2019 11:33 PM CDT     HISTORY: abd pain      COMPARISON: 03/09/2019      DLP: 889 mGy cm Automated exposure control was utilized to diminish  patient radiation dose.     TECHNIQUE: Noncontrast enhanced images of the abdomen and pelvis  obtained without oral contrast.      FINDINGS:   There is moderate cardiomegaly the patient is status post aortic valve  replacement with moderate cardiomegaly. No evidence of pericardial  effusion. Tiny effusions are present with bibasilar atelectasis. Some  thickening of the interlobular septa within the lower lobes is " either  related to some pulmonary fibrosis or mild pulmonary venous hypertension  and interstitial edema..      LIVER: No focal liver lesion. A large amount of pneumoperitoneum is  noted within the upper abdomen and perihepatic space.     BILIARY SYSTEM: The patient's undergone cholecystectomy. No evidence of  intra or extrahepatic biliary dilatation..      PANCREAS: No focal pancreatic lesion.      SPLEEN: Unremarkable.      KIDNEYS AND ADRENALS: The adrenals are unremarkable. There is a 2.3 cm  suspected cortical cyst involving the interpolar aspect of the left  kidney. I couldn't be confirmed with ultrasound. There is diffuse  atrophy of the kidneys. No evidence of nephrolithiasis..  The ureters  are decompressed and normal in appearance.     RETROPERITONEUM: No mass, lymphadenopathy or hemorrhage.      GI TRACT: Some mild thickening of the wall is noted near the gastric  outlet. I do not see any evidence of a discrete ulceration but the  majority of the free air is noted within the upper abdomen raising the  possibility of a gastric perforation. Diverticulosis is noted of the  descending and sigmoid colon but I do not see evidence of diverticulitis  and there is no free air noted within the lower pelvis or mid abdomen.  The patient is status post gastric band placement. Mild prominence of  the gastric wall near the gastric band is also present.. The appendix is  surgically absent..     OTHER: There is no mesenteric mass, lymphadenopathy or fluid collection.  The osseous structures and soft tissues demonstrate no worrisome  lesions. No free fluid is present.      PELVIS: No mass lesion, fluid collection or significant lymphadenopathy  is seen in the pelvis. The urinary bladder is normal in appearance.       Impression:       1. Pneumoperitoneum. This is within the upper abdomen. There is some  mild thickening of the gastric wall in the region of the gastric outlet  as well as at the level of a gastric band from  previous lap band  surgery. I would favor an upper abdominal perforation with no evidence  of abnormal inflammatory stranding or pneumoperitoneum within the pelvis  or mid abdomen. A small amount of free fluid is noted within the  perihepatic space.  2. Small effusions with bibasilar atelectasis. Mild pulmonary venous  hypertension and interstitial edema are suspected.  3. Suspected cortical cyst midpole left kidney. This could be followed  up with ultrasound..  4. Diverticulosis of the descending and sigmoid colon without evidence  of acute diverticulitis.  5. Diffuse atrophy of the kidneys.        This report was finalized on 05/13/2019 08:04 by Dr. Willian Baker MD.    XR Chest 1 View [[id number]] Collected:  05/13/19 0721     Updated:  05/13/19 0725    Narrative:       EXAMINATION: Chest 1 view 05/12/2019     HISTORY: Pneumoperitoneum. Pulmonary edema.     FINDINGS: Today's exam is compared to previous study of 03/28/2019.  There is pneumoperitoneum with free air beneath the central and right  hemidiaphragm. There is right basilar atelectasis. There is moderate  cardiomegaly with mild pulmonary vascular congestion. Small effusions  are present blunting the costophrenic angles.       Impression:       1.. Pulmonary vascular congestion with small effusions. The patient is  status post percutaneous aortic valve replacement.  2. Pneumoperitoneum.  This report was finalized on 05/13/2019 07:22 by Dr. Willian Baker MD.          Culture:         Assessment   1.  End stage renal disease on hemodialysis MWF  2.  S/p surgical repair of perforated duodenal ulcer  3.  Chronic diastolic congestive heart failure  4.  Essential hypertension--now HYPOtensive  5.  Anemia of CKD and of acute blood loss    Plan:  1.  Dialysis today; will adjust UF as tolerated given ongoing hypotension  2.  Continue IV fluids for now; change composition   3.  Monitor labs  4.  Further assessment and plan pending Dr Hsu's evaluation of  patient      Thank you for the consult, we appreciate the opportunity to provide care to your patients.  Feel free to contact me if I can be of any further assistance.      Dirk Tristan, APRN  5/13/2019  9:43 AM    Electronically signed by Chris Hsu MD at 5/13/2019  9:39 PM       Nutrition Notes (most recent note)     No notes of this type exist for this encounter.

## 2019-05-14 NOTE — PROGRESS NOTES
1           [facility] Medicine Services  INPATIENT PROGRESS NOTE    Patient Name: Cheri Ely  Date of Admission: 5/12/2019  Today's Date: 05/14/19  Length of Stay: 1  Primary Care Physician: Provider, No Known    Subjective   Chief Complaint: Follow-up  HPI   Hospitalist service consulted for management of hypertension, renal disease, coronary artery disease.  It of initial consult was May 13, 2019.    He underwent exploratory laparotomy with lysis of adhesions, mobilization of the duodenum on Kocher maneuver, oversew of duodenal ulcer with Cedric patch on the day of admission      WBC trending down; currently on empiric IV antibiotic; blood cultures remain no growth to date; stable hemoglobin    Creatinine improved.  She had dialysis yesterday.  She has known history of end-stage renal disease on hemodialysis Monday Wednesdays and Fridays.  Patient unaware of prior history of atrial fibrillation but states that she has had irregular heartbeat in the past.  Rates her pain as tolerable.  She used 10 mg in the past 12 hours from morphine PCA   Review of Systems     All pertinent negatives and positives are as above. All other systems have been reviewed and are negative unless otherwise stated.     Objective    Temp:  [97.7 °F (36.5 °C)-98.8 °F (37.1 °C)] 98 °F (36.7 °C)  Heart Rate:  [] 100  Resp:  [12-19] 16  BP: ()/(35-95) 90/64  Physical Exam   She looks better and stronger.  She is actually working on something in her bag.  Is more awake and alert  Pleasant woman, no distress, has an NG tube in place and a CO2 texture.  She is on PCA.  She is on maintenance fluid at 100 cc/h.  Awake, alert, oriented x3, no apparent distress  Supple neck  Anicteric sclera, external ocular muscles are intact  Normal respiratory effort, no adventitious sounds  S1-S2, irregularly irregular, rate controlled; telemetry showed A. fib rate controlled  She has a SATNAM drain; soft abdomen,  diminished breath sounds; flabby abdomen  No cyanosis clubbing or edema  Warm dry skin  Good capillary refill time             Results Review:  I have reviewed the labs, radiology results, and diagnostic studies.    Laboratory Data:   Results from last 7 days   Lab Units 05/14/19  0336 05/13/19  0858 05/12/19  2133   WBC 10*3/mm3 14.28* 20.56* 9.31   HEMOGLOBIN g/dL 10.2* 10.9* 11.9*   HEMATOCRIT % 32.3* 34.6* 37.6   PLATELETS 10*3/mm3 143 147 197        Results from last 7 days   Lab Units 05/14/19  0336 05/12/19  2133   SODIUM mmol/L 136 136   POTASSIUM mmol/L 4.0 4.0   CHLORIDE mmol/L 100 96*   CO2 mmol/L 29.0 32.0*   BUN mg/dL 19 27*   CREATININE mg/dL 2.95* 4.51*   CALCIUM mg/dL 7.5* 8.4   BILIRUBIN mg/dL 0.5 0.5   ALK PHOS U/L 137* 215*   ALT (SGPT) U/L 23 17   AST (SGOT) U/L 25 35   GLUCOSE mg/dL 74 92       Culture Data:   Blood Culture   Date Value Ref Range Status   05/13/2019 No growth at 24 hours  Preliminary       Radiology Data:   Imaging Results (last 24 hours)     Procedure Component Value Units Date/Time    CT Abdomen Pelvis Without Contrast [[id number]] Collected:  05/13/19 0752     Updated:  05/13/19 0807    Narrative:       CT ABDOMEN PELVIS WO CONTRAST- 5/12/2019 11:33 PM CDT     HISTORY: abd pain      COMPARISON: 03/09/2019      DLP: 889 mGy cm Automated exposure control was utilized to diminish  patient radiation dose.     TECHNIQUE: Noncontrast enhanced images of the abdomen and pelvis  obtained without oral contrast.      FINDINGS:   There is moderate cardiomegaly the patient is status post aortic valve  replacement with moderate cardiomegaly. No evidence of pericardial  effusion. Tiny effusions are present with bibasilar atelectasis. Some  thickening of the interlobular septa within the lower lobes is either  related to some pulmonary fibrosis or mild pulmonary venous hypertension  and interstitial edema..      LIVER: No focal liver lesion. A large amount of pneumoperitoneum is  noted within  the upper abdomen and perihepatic space.     BILIARY SYSTEM: The patient's undergone cholecystectomy. No evidence of  intra or extrahepatic biliary dilatation..      PANCREAS: No focal pancreatic lesion.      SPLEEN: Unremarkable.      KIDNEYS AND ADRENALS: The adrenals are unremarkable. There is a 2.3 cm  suspected cortical cyst involving the interpolar aspect of the left  kidney. I couldn't be confirmed with ultrasound. There is diffuse  atrophy of the kidneys. No evidence of nephrolithiasis..  The ureters  are decompressed and normal in appearance.     RETROPERITONEUM: No mass, lymphadenopathy or hemorrhage.      GI TRACT: Some mild thickening of the wall is noted near the gastric  outlet. I do not see any evidence of a discrete ulceration but the  majority of the free air is noted within the upper abdomen raising the  possibility of a gastric perforation. Diverticulosis is noted of the  descending and sigmoid colon but I do not see evidence of diverticulitis  and there is no free air noted within the lower pelvis or mid abdomen.  The patient is status post gastric band placement. Mild prominence of  the gastric wall near the gastric band is also present.. The appendix is  surgically absent..     OTHER: There is no mesenteric mass, lymphadenopathy or fluid collection.  The osseous structures and soft tissues demonstrate no worrisome  lesions. No free fluid is present.      PELVIS: No mass lesion, fluid collection or significant lymphadenopathy  is seen in the pelvis. The urinary bladder is normal in appearance.       Impression:       1. Pneumoperitoneum. This is within the upper abdomen. There is some  mild thickening of the gastric wall in the region of the gastric outlet  as well as at the level of a gastric band from previous lap band  surgery. I would favor an upper abdominal perforation with no evidence  of abnormal inflammatory stranding or pneumoperitoneum within the pelvis  or mid abdomen. A small amount  of free fluid is noted within the  perihepatic space.  2. Small effusions with bibasilar atelectasis. Mild pulmonary venous  hypertension and interstitial edema are suspected.  3. Suspected cortical cyst midpole left kidney. This could be followed  up with ultrasound..  4. Diverticulosis of the descending and sigmoid colon without evidence  of acute diverticulitis.  5. Diffuse atrophy of the kidneys.        This report was finalized on 05/13/2019 08:04 by Dr. Willian Baker MD.          I have reviewed the patient's current medications.     Assessment/Plan     Active Hospital Problems    Diagnosis   • Abdominal pain         Pneumoperitoneum secondary to perforated duodenal ulcer status post exploratory laparotomy on May 13  End-stage renal disease on hemodialysis Monday Wednesdays and Fridays  Postoperative blood loss anemia  History of diastolic heart failure with status post aortic valve replacement  Atrial fibrillation with left bundle branch block on EKG; telemetry showed atrial fibrillation  History of hypertension  Coronary artery disease stable -serial troponin is negative  Chronic diastolic heart failure      enoxaparin 30 mg Subcutaneous Daily   metoprolol tartrate 2.5 mg Intravenous Q12H   pantoprazole 40 mg Intravenous Q AM   piperacillin-tazobactam 2.25 g Intravenous Q12H     Recommend anticoagulation when appropriate from primary service  I will take  the liberty to request for physical therapy  Monitor closely blood pressure  Morphine  PCA  Transfuse as needed if hemoglobin less than 8  Continue present management.  Other plan per other services  Discharge Planning: Per primary service  Fabien Espinosa MD   05/14/19   7:56 AM

## 2019-05-14 NOTE — PROGRESS NOTES
Continued Stay Note   Tony     Patient Name: Cheri Ely  MRN: [id number]  Today's Date: 5/14/2019    Admit Date: 5/12/2019    Discharge Plan     Row Name 05/14/19 1200       Plan    Plan  Home with [facility] vs SNF    Patient/Family in Agreement with Plan  yes    Plan Comments  Received call from Rafaela Erazo with [facility] who advised they are unable to accept dialysis patients as they do not provide dialysis.  SW informed patient's daughter, Christy Langston, that [facility] is unable to accept.  Daughter advised they prefer patient return home if possible.  Daughter states patient resides with her other daughter, Lucero, and they feel between the two of them they can care for patient at home.  Will follow patient's progress with therapy to ensure home with  is appropriate level of care upon discharge.    Row Name 05/14/19 0912       Plan    Plan  Refer to [facility]    Patient/Family in Agreement with Plan  yes    Plan Comments  Patient has orders to move to medical floor.  Will proceed with referral to [facility] to see if this will be an option as patient has used Medicare days.  No therapy evaluations available but can be forwarded when available if needed.        Discharge Codes    No documentation.             ELYSSA Norman

## 2019-05-15 PROBLEM — E16.2 HYPOGLYCEMIA: Status: ACTIVE | Noted: 2019-05-15

## 2019-05-15 PROBLEM — I48.0 PAROXYSMAL ATRIAL FIBRILLATION (HCC): Status: ACTIVE | Noted: 2019-05-15

## 2019-05-15 LAB
ALBUMIN SERPL-MCNC: 2.2 G/DL (ref 3.5–5)
ALBUMIN/GLOB SERPL: 0.8 G/DL (ref 1.1–2.5)
ALP SERPL-CCNC: 126 U/L (ref 24–120)
ALT SERPL W P-5'-P-CCNC: 16 U/L (ref 0–54)
ANION GAP SERPL CALCULATED.3IONS-SCNC: 11 MMOL/L (ref 4–13)
AST SERPL-CCNC: 23 U/L (ref 7–45)
BASOPHILS # BLD AUTO: 0.02 10*3/MM3 (ref 0–0.2)
BASOPHILS NFR BLD AUTO: 0.2 % (ref 0–2)
BILIRUB SERPL-MCNC: 0.6 MG/DL (ref 0.1–1)
BUN BLD-MCNC: 36 MG/DL (ref 5–21)
BUN/CREAT SERPL: 8.6 (ref 7–25)
CALCIUM SPEC-SCNC: 7.8 MG/DL (ref 8.4–10.4)
CHLORIDE SERPL-SCNC: 101 MMOL/L (ref 98–110)
CO2 SERPL-SCNC: 24 MMOL/L (ref 24–31)
CREAT BLD-MCNC: 4.18 MG/DL (ref 0.5–1.4)
DEPRECATED RDW RBC AUTO: 62.1 FL (ref 40–54)
EOSINOPHIL # BLD AUTO: 0.09 10*3/MM3 (ref 0–0.7)
EOSINOPHIL NFR BLD AUTO: 0.9 % (ref 0–4)
ERYTHROCYTE [DISTWIDTH] IN BLOOD BY AUTOMATED COUNT: 17.1 % (ref 12–15)
GFR SERPL CREATININE-BSD FRML MDRD: 10 ML/MIN/1.73
GFR SERPL CREATININE-BSD FRML MDRD: ABNORMAL ML/MIN/1.73
GLOBULIN UR ELPH-MCNC: 2.7 GM/DL
GLUCOSE BLD-MCNC: 40 MG/DL (ref 70–100)
GLUCOSE BLDC GLUCOMTR-MCNC: 107 MG/DL (ref 70–130)
GLUCOSE BLDC GLUCOMTR-MCNC: 110 MG/DL (ref 70–130)
GLUCOSE BLDC GLUCOMTR-MCNC: 173 MG/DL (ref 70–130)
GLUCOSE BLDC GLUCOMTR-MCNC: 42 MG/DL (ref 70–130)
GLUCOSE BLDC GLUCOMTR-MCNC: 45 MG/DL (ref 70–130)
GLUCOSE BLDC GLUCOMTR-MCNC: 55 MG/DL (ref 70–130)
HCT VFR BLD AUTO: 32.5 % (ref 37–47)
HGB BLD-MCNC: 10.1 G/DL (ref 12–16)
IMM GRANULOCYTES # BLD AUTO: 0.06 10*3/MM3 (ref 0–0.05)
IMM GRANULOCYTES NFR BLD AUTO: 0.6 % (ref 0–5)
LYMPHOCYTES # BLD AUTO: 0.52 10*3/MM3 (ref 0.72–4.86)
LYMPHOCYTES NFR BLD AUTO: 5.2 % (ref 15–45)
MAGNESIUM SERPL-MCNC: 1.7 MG/DL (ref 1.4–2.2)
MCH RBC QN AUTO: 31 PG (ref 28–32)
MCHC RBC AUTO-ENTMCNC: 31.1 G/DL (ref 33–36)
MCV RBC AUTO: 99.7 FL (ref 82–98)
MONOCYTES # BLD AUTO: 0.33 10*3/MM3 (ref 0.19–1.3)
MONOCYTES NFR BLD AUTO: 3.3 % (ref 4–12)
NEUTROPHILS # BLD AUTO: 8.97 10*3/MM3 (ref 1.87–8.4)
NEUTROPHILS NFR BLD AUTO: 89.8 % (ref 39–78)
NRBC BLD AUTO-RTO: 0.2 /100 WBC (ref 0–0.2)
PHOSPHATE SERPL-MCNC: 4.6 MG/DL (ref 2.5–4.5)
PLATELET # BLD AUTO: 152 10*3/MM3 (ref 130–400)
PMV BLD AUTO: 10.8 FL (ref 6–12)
POTASSIUM BLD-SCNC: 4.1 MMOL/L (ref 3.5–5.3)
PROT SERPL-MCNC: 4.9 G/DL (ref 6.3–8.7)
RBC # BLD AUTO: 3.26 10*6/MM3 (ref 4.2–5.4)
SODIUM BLD-SCNC: 136 MMOL/L (ref 135–145)
WBC NRBC COR # BLD: 9.99 10*3/MM3 (ref 4.8–10.8)

## 2019-05-15 PROCEDURE — 5A1D70Z PERFORMANCE OF URINARY FILTRATION, INTERMITTENT, LESS THAN 6 HOURS PER DAY: ICD-10-PCS | Performed by: INTERNAL MEDICINE

## 2019-05-15 PROCEDURE — 83735 ASSAY OF MAGNESIUM: CPT | Performed by: INTERNAL MEDICINE

## 2019-05-15 PROCEDURE — 84100 ASSAY OF PHOSPHORUS: CPT | Performed by: INTERNAL MEDICINE

## 2019-05-15 PROCEDURE — 85025 COMPLETE CBC W/AUTO DIFF WBC: CPT | Performed by: SPECIALIST

## 2019-05-15 PROCEDURE — 93010 ELECTROCARDIOGRAM REPORT: CPT | Performed by: INTERNAL MEDICINE

## 2019-05-15 PROCEDURE — 94760 N-INVAS EAR/PLS OXIMETRY 1: CPT

## 2019-05-15 PROCEDURE — 25010000002 PIPERACILLIN SOD-TAZOBACTAM PER 1 G: Performed by: SPECIALIST

## 2019-05-15 PROCEDURE — 94799 UNLISTED PULMONARY SVC/PX: CPT

## 2019-05-15 PROCEDURE — 82962 GLUCOSE BLOOD TEST: CPT

## 2019-05-15 PROCEDURE — 80053 COMPREHEN METABOLIC PANEL: CPT | Performed by: SPECIALIST

## 2019-05-15 PROCEDURE — 25010000002 ENOXAPARIN PER 10 MG: Performed by: SPECIALIST

## 2019-05-15 PROCEDURE — 93005 ELECTROCARDIOGRAM TRACING: CPT | Performed by: INTERNAL MEDICINE

## 2019-05-15 RX ORDER — LIDOCAINE 50 MG/G
1 PATCH TOPICAL
Status: DISCONTINUED | OUTPATIENT
Start: 2019-05-15 | End: 2019-05-21 | Stop reason: HOSPADM

## 2019-05-15 RX ORDER — DEXTROSE MONOHYDRATE 100 MG/ML
100 INJECTION, SOLUTION INTRAVENOUS CONTINUOUS
Status: DISCONTINUED | OUTPATIENT
Start: 2019-05-15 | End: 2019-05-17

## 2019-05-15 RX ORDER — DEXTROSE MONOHYDRATE 25 G/50ML
50 INJECTION, SOLUTION INTRAVENOUS ONCE
Status: COMPLETED | OUTPATIENT
Start: 2019-05-15 | End: 2019-05-15

## 2019-05-15 RX ORDER — NICOTINE POLACRILEX 4 MG
15 LOZENGE BUCCAL
Status: DISCONTINUED | OUTPATIENT
Start: 2019-05-15 | End: 2019-05-21 | Stop reason: HOSPADM

## 2019-05-15 RX ORDER — DEXTROSE MONOHYDRATE 25 G/50ML
50 INJECTION, SOLUTION INTRAVENOUS ONCE
Status: DISCONTINUED | OUTPATIENT
Start: 2019-05-15 | End: 2019-05-15

## 2019-05-15 RX ORDER — DEXTROSE MONOHYDRATE 25 G/50ML
25 INJECTION, SOLUTION INTRAVENOUS
Status: DISCONTINUED | OUTPATIENT
Start: 2019-05-15 | End: 2019-05-21 | Stop reason: HOSPADM

## 2019-05-15 RX ADMIN — METOPROLOL TARTRATE 2.5 MG: 5 INJECTION INTRAVENOUS at 06:28

## 2019-05-15 RX ADMIN — DEXTROSE MONOHYDRATE 50 ML: 25 INJECTION, SOLUTION INTRAVENOUS at 08:06

## 2019-05-15 RX ADMIN — SODIUM CHLORIDE 10 ML/HR: 9 INJECTION, SOLUTION INTRAVENOUS at 00:52

## 2019-05-15 RX ADMIN — DEXTROSE MONOHYDRATE 25 G: 25 INJECTION, SOLUTION INTRAVENOUS at 12:21

## 2019-05-15 RX ADMIN — DEXTROSE MONOHYDRATE 100 ML/HR: 100 INJECTION, SOLUTION INTRAVENOUS at 12:32

## 2019-05-15 RX ADMIN — METOPROLOL TARTRATE 2.5 MG: 5 INJECTION INTRAVENOUS at 17:31

## 2019-05-15 RX ADMIN — TAZOBACTAM SODIUM AND PIPERACILLIN SODIUM 3.38 G: 375; 3 INJECTION, SOLUTION INTRAVENOUS at 00:51

## 2019-05-15 RX ADMIN — DEXTROSE MONOHYDRATE 100 ML/HR: 100 INJECTION, SOLUTION INTRAVENOUS at 21:42

## 2019-05-15 RX ADMIN — PANTOPRAZOLE SODIUM 40 MG: 40 INJECTION, POWDER, FOR SOLUTION INTRAVENOUS at 06:28

## 2019-05-15 RX ADMIN — ENOXAPARIN SODIUM 30 MG: 30 INJECTION SUBCUTANEOUS at 08:05

## 2019-05-15 RX ADMIN — LIDOCAINE 1 PATCH: 50 PATCH CUTANEOUS at 10:15

## 2019-05-15 NOTE — PLAN OF CARE
Problem: Patient Care Overview  Goal: Plan of Care Review  Outcome: Ongoing (interventions implemented as appropriate)   05/15/19 8421   OTHER   Outcome Summary PCA pump in place, pt has used infrequently. Midline incision dsg c/d/i, JPx1. NG in place LIWS w/ low output. NPO.  Denies any nausea. Encouraged and assisted w/ weight shift and turns q 2h. IVF and IV abx as ordered. Pt rested well through shift. Cont to monitor.    Coping/Psychosocial   Plan of Care Reviewed With patient   Plan of Care Review   Progress improving     Goal: Individualization and Mutuality  Outcome: Ongoing (interventions implemented as appropriate)    Goal: Discharge Needs Assessment  Outcome: Ongoing (interventions implemented as appropriate)    Goal: Interprofessional Rounds/Family Conf  Outcome: Ongoing (interventions implemented as appropriate)      Problem: Fall Risk (Adult)  Goal: Identify Related Risk Factors and Signs and Symptoms  Outcome: Ongoing (interventions implemented as appropriate)    Goal: Absence of Fall  Outcome: Ongoing (interventions implemented as appropriate)      Problem: Skin Injury Risk (Adult)  Goal: Identify Related Risk Factors and Signs and Symptoms  Outcome: Ongoing (interventions implemented as appropriate)    Goal: Skin Health and Integrity  Outcome: Ongoing (interventions implemented as appropriate)      Problem: Wound (Includes Pressure Injury) (Adult)  Goal: Signs and Symptoms of Listed Potential Problems Will be Absent, Minimized or Managed (Wound)  Outcome: Ongoing (interventions implemented as appropriate)

## 2019-05-15 NOTE — PLAN OF CARE
Problem: Patient Care Overview  Goal: Plan of Care Review  Outcome: Ongoing (interventions implemented as appropriate)   05/15/19 7752   OTHER   Outcome Summary Received orders, reviewed chart. Attempted eval. Pt gone for dialysis. Will follow up on 5/16/19.   Coping/Psychosocial   Plan of Care Reviewed With other (see comments)  (reviewed chart)   Plan of Care Review   Progress (per RN and MD)

## 2019-05-15 NOTE — PROGRESS NOTES
Laila Rodriguez MD Progress Note     LOS: 2 days   Patient Care Team:  Provider, No Known as PCP - Barrett Prasad Jr, MD as PCP - Family Medicine  Moni Garza MD as Consulting Physician (Gastroenterology)  Tomasz San DO as Consulting Physician (Vascular Surgery)        Subjective     Doing well.    Objective     Vital Signs  Temp:  [97.5 °F (36.4 °C)-98.1 °F (36.7 °C)] 98.1 °F (36.7 °C)  Heart Rate:  [] 81  Resp:  [13-18] 18  BP: ()/(38-97) 94/39    Intake & Output (last 3 days)       05/12 0701 - 05/13 0700 05/13 0701 - 05/14 0700 05/14 0701 - 05/15 0700 05/15 0701 - 05/16 0700    I.V. (mL/kg)  1835.3 (25.5) 556.2 (7.7)     IV Piggyback 250 100 100     Total Intake(mL/kg) 250 (3.3) 1935.3 (26.9) 656.2 (9.1)     Urine (mL/kg/hr) 0 0 (0)      Emesis/NG output  150 300     Drains 50 140 80     Other  2000      Stool   0     Total Output 50 2290 380     Net +200 -354.7 +276.2                   Physical Exam:     General Appearance:    Alert, cooperative, in no acute distress   Lungs:     respirations regular, even and unlabored    Heart:    Regular rhythm and normal rate, normal S1 and S2, no            murmur, no gallop, no rub   Chest Wall:    No abnormalities observed   Abdomen:      Soft nontender nondistended SATNAM becoming more serous   Extremities: No edema,    Results Review:     I reviewed the patient's new clinical results.    Lab Results (last 72 hours)     Procedure Component Value Units Date/Time    POC Glucose Once [[id number]]  (Abnormal) Collected:  05/15/19 0754    Specimen:  Blood Updated:  05/15/19 0805     Glucose 42 mg/dL      Comment: : [id number] Timothy MillerniferMeter ID: [id number]       POC Glucose Once [[id number]]  (Abnormal) Collected:  05/15/19 0752    Specimen:  Blood Updated:  05/15/19 0804     Glucose 45 mg/dL      Comment: CONFIRMED WITHLABOperator: [id number] Timothy GuptaferMeter ID: [id number]       Phosphorus [[id number]]  (Abnormal) Collected:  05/15/19 0653     Specimen:  Blood Updated:  05/15/19 0756     Phosphorus 4.6 mg/dL     Comprehensive Metabolic Panel [[id number]]  (Abnormal) Collected:  05/15/19 0655    Specimen:  Blood Updated:  05/15/19 0750     Glucose 40 mg/dL      BUN 36 mg/dL      Creatinine 4.18 mg/dL      Sodium 136 mmol/L      Potassium 4.1 mmol/L      Chloride 101 mmol/L      CO2 24.0 mmol/L      Calcium 7.8 mg/dL      Total Protein 4.9 g/dL      Albumin 2.20 g/dL      ALT (SGPT) 16 U/L      AST (SGOT) 23 U/L      Alkaline Phosphatase 126 U/L      Total Bilirubin 0.6 mg/dL      eGFR Non African Amer 10 mL/min/1.73      Comment: <15 Indicative of kidney failure.        eGFR   Amer -- mL/min/1.73      Comment: <15 Indicative of kidney failure.        Globulin 2.7 gm/dL      A/G Ratio 0.8 g/dL      BUN/Creatinine Ratio 8.6     Anion Gap 11.0 mmol/L     Narrative:       GFR Normal >60  Chronic Kidney Disease <60  Kidney Failure <15    Magnesium [[id number]]  (Normal) Collected:  05/15/19 0655    Specimen:  Blood Updated:  05/15/19 0747     Magnesium 1.7 mg/dL     CBC & Differential [[id number]] Collected:  05/15/19 0655    Specimen:  Blood Updated:  05/15/19 0723    Narrative:       The following orders were created for panel order CBC & Differential.  Procedure                               Abnormality         Status                     ---------                               -----------         ------                     CBC Auto Differential[[id number]]        Abnormal            Final result                 Please view results for these tests on the individual orders.    CBC Auto Differential [[id number]]  (Abnormal) Collected:  05/15/19 0655    Specimen:  Blood Updated:  05/15/19 0723     WBC 9.99 10*3/mm3      RBC 3.26 10*6/mm3      Hemoglobin 10.1 g/dL      Hematocrit 32.5 %      MCV 99.7 fL      MCH 31.0 pg      MCHC 31.1 g/dL      RDW 17.1 %      RDW-SD 62.1 fl      MPV 10.8 fL      Platelets 152 10*3/mm3      Neutrophil % 89.8 %       Lymphocyte % 5.2 %      Monocyte % 3.3 %      Eosinophil % 0.9 %      Basophil % 0.2 %      Immature Grans % 0.6 %      Neutrophils, Absolute 8.97 10*3/mm3      Lymphocytes, Absolute 0.52 10*3/mm3      Monocytes, Absolute 0.33 10*3/mm3      Eosinophils, Absolute 0.09 10*3/mm3      Basophils, Absolute 0.02 10*3/mm3      Immature Grans, Absolute 0.06 10*3/mm3      nRBC 0.2 /100 WBC     Blood Culture - Blood, Hand, Right [[id number]] Collected:  05/13/19 0023    Specimen:  Blood from Hand, Right Updated:  05/15/19 0045     Blood Culture No growth at 2 days    Blood Culture - Blood, Arm, Right [[id number]] Collected:  05/13/19 0858    Specimen:  Blood from Arm, Right Updated:  05/14/19 1000     Blood Culture No growth at 24 hours    Comprehensive Metabolic Panel [[id number]]  (Abnormal) Collected:  05/14/19 0336    Specimen:  Blood Updated:  05/14/19 0411     Glucose 74 mg/dL      BUN 19 mg/dL      Creatinine 2.95 mg/dL      Sodium 136 mmol/L      Potassium 4.0 mmol/L      Chloride 100 mmol/L      CO2 29.0 mmol/L      Calcium 7.5 mg/dL      Total Protein 4.7 g/dL      Albumin 2.20 g/dL      ALT (SGPT) 23 U/L      AST (SGOT) 25 U/L      Alkaline Phosphatase 137 U/L      Total Bilirubin 0.5 mg/dL      eGFR Non African Amer 15 mL/min/1.73      Globulin 2.5 gm/dL      A/G Ratio 0.9 g/dL      BUN/Creatinine Ratio 6.4     Anion Gap 7.0 mmol/L     Narrative:       GFR Normal >60  Chronic Kidney Disease <60  Kidney Failure <15    Magnesium [[id number]]  (Normal) Collected:  05/14/19 0336    Specimen:  Blood Updated:  05/14/19 0411     Magnesium 1.6 mg/dL     Phosphorus [[id number]]  (Normal) Collected:  05/14/19 0336    Specimen:  Blood Updated:  05/14/19 0411     Phosphorus 3.6 mg/dL     CBC & Differential [[id number]] Collected:  05/14/19 0336    Specimen:  Blood Updated:  05/14/19 0403    Narrative:       The following orders were created for panel order CBC & Differential.  Procedure                               Abnormality          Status                     ---------                               -----------         ------                     CBC Auto Differential[[id number]]        Abnormal            Final result                 Please view results for these tests on the individual orders.    CBC Auto Differential [[id number]]  (Abnormal) Collected:  05/14/19 0336    Specimen:  Blood Updated:  05/14/19 0403     WBC 14.28 10*3/mm3      RBC 3.18 10*6/mm3      Hemoglobin 10.2 g/dL      Hematocrit 32.3 %      .6 fL      MCH 32.1 pg      MCHC 31.6 g/dL      RDW 16.9 %      RDW-SD 62.8 fl      MPV 10.8 fL      Platelets 143 10*3/mm3      Neutrophil % 92.1 %      Lymphocyte % 3.6 %      Monocyte % 3.6 %      Eosinophil % 0.0 %      Basophil % 0.1 %      Immature Grans % 0.6 %      Neutrophils, Absolute 13.16 10*3/mm3      Lymphocytes, Absolute 0.51 10*3/mm3      Monocytes, Absolute 0.52 10*3/mm3      Eosinophils, Absolute 0.00 10*3/mm3      Basophils, Absolute 0.01 10*3/mm3      Immature Grans, Absolute 0.08 10*3/mm3      nRBC 0.0 /100 WBC     Troponin [[id number]]  (Normal) Collected:  05/13/19 2051    Specimen:  Blood Updated:  05/13/19 2131     Troponin I <0.012 ng/mL     Troponin [[id number]]  (Normal) Collected:  05/13/19 1407    Specimen:  Blood Updated:  05/13/19 1452     Troponin I <0.012 ng/mL     Hepatitis B Surface Antibody [[id number]]  (Abnormal) Collected:  05/13/19 0903    Specimen:  Blood Updated:  05/13/19 1023     Hepatitis Bs Ab Index <5.00     Hep B S Ab Non-Immune    Hepatitis Panel, Acute [[id number]]  (Normal) Collected:  05/13/19 0903    Specimen:  Blood Updated:  05/13/19 1023     HCV S/C Ratio 0.10     Hepatitis C Ab Negative     Hep A IgM Negative     Hep B C IgM Negative     Hepatitis B Surface Ag Negative    Phosphorus [[id number]]  (Normal) Collected:  05/13/19 0858    Specimen:  Blood Updated:  05/13/19 0931     Phosphorus 4.3 mg/dL     Magnesium [[id number]]  (Normal) Collected:  05/13/19 0858    Specimen:  Blood  Updated:  05/13/19 0931     Magnesium 1.6 mg/dL     CBC & Differential [[id number]] Collected:  05/13/19 0858    Specimen:  Blood Updated:  05/13/19 0924    Narrative:       The following orders were created for panel order CBC & Differential.  Procedure                               Abnormality         Status                     ---------                               -----------         ------                     CBC Auto Differential[[id number]]        Abnormal            Final result                 Please view results for these tests on the individual orders.    CBC Auto Differential [[id number]]  (Abnormal) Collected:  05/13/19 0858    Specimen:  Blood Updated:  05/13/19 0924     WBC 20.56 10*3/mm3      RBC 3.42 10*6/mm3      Hemoglobin 10.9 g/dL      Hematocrit 34.6 %      .2 fL      MCH 31.9 pg      MCHC 31.5 g/dL      RDW 16.7 %      RDW-SD 62.2 fl      MPV 10.6 fL      Platelets 147 10*3/mm3      Neutrophil % 95.5 %      Lymphocyte % 1.8 %      Monocyte % 1.8 %      Eosinophil % 0.0 %      Basophil % 0.2 %      Immature Grans % 0.7 %      Neutrophils, Absolute 19.64 10*3/mm3      Lymphocytes, Absolute 0.37 10*3/mm3      Monocytes, Absolute 0.36 10*3/mm3      Eosinophils, Absolute 0.00 10*3/mm3      Basophils, Absolute 0.04 10*3/mm3      Immature Grans, Absolute 0.15 10*3/mm3      nRBC 0.0 /100 WBC     Magnesium [[id number]]  (Normal) Collected:  05/13/19 0023    Specimen:  Blood Updated:  05/13/19 0610     Magnesium 1.6 mg/dL     Phosphorus [[id number]]  (Normal) Collected:  05/13/19 0023    Specimen:  Blood Updated:  05/13/19 0610     Phosphorus 3.6 mg/dL     Amylase [[id number]]  (Abnormal) Collected:  05/13/19 0023    Specimen:  Blood Updated:  05/13/19 0308     Amylase <30 U/L     Lipase [[id number]]  (Abnormal) Collected:  05/13/19 0023    Specimen:  Blood Updated:  05/13/19 0308     Lipase <10 U/L     [place] Draw [[id number]] Collected:  05/12/19 2133    Specimen:  Blood Updated:  05/13/19 0130     Narrative:       The following orders were created for panel order [place] Draw.  Procedure                               Abnormality         Status                     ---------                               -----------         ------                     Light Blue Top[[id number]]                                   Final result               Green Top (Gel)[[id number]]                                  Final result               Lavender Top[[id number]]                                     Final result               Red Top[[id number]]                                          Final result                 Please view results for these tests on the individual orders.    Red Top [[id number]] Collected:  05/13/19 0023    Specimen:  Blood Updated:  05/13/19 0130     Extra Tube Hold for add-ons.     Comment: Auto resulted.       BNP [[id number]]  (Abnormal) Collected:  05/12/19 2133    Specimen:  Blood Updated:  05/13/19 0054     proBNP 13,600.0 pg/mL     Protime-INR [[id number]]  (Abnormal) Collected:  05/13/19 0023    Specimen:  Blood Updated:  05/13/19 0046     Protime 18.0 Seconds      INR 1.44    aPTT [[id number]]  (Normal) Collected:  05/13/19 0023    Specimen:  Blood Updated:  05/13/19 0046     PTT 34.8 seconds     Light Blue Top [[id number]] Collected:  05/12/19 2133    Specimen:  Blood Updated:  05/12/19 2245     Extra Tube hold for add-on     Comment: Auto resulted       Green Top (Gel) [[id number]] Collected:  05/12/19 2133    Specimen:  Blood Updated:  05/12/19 2245     Extra Tube Hold for add-ons.     Comment: Auto resulted.       Lavender Top [[id number]] Collected:  05/12/19 2133    Specimen:  Blood Updated:  05/12/19 2245     Extra Tube hold for add-on     Comment: Auto resulted       Troponin [[id number]]  (Normal) Collected:  05/12/19 2133    Specimen:  Blood Updated:  05/12/19 2206     Troponin I <0.012 ng/mL     Lactic Acid, Plasma [[id number]]  (Normal) Collected:  05/12/19 2148    Specimen:  Blood Updated:   05/12/19 2203     Lactate 1.4 mmol/L     Comprehensive Metabolic Panel [[id number]]  (Abnormal) Collected:  05/12/19 2133    Specimen:  Blood Updated:  05/12/19 2154     Glucose 92 mg/dL      BUN 27 mg/dL      Creatinine 4.51 mg/dL      Sodium 136 mmol/L      Potassium 4.0 mmol/L      Chloride 96 mmol/L      CO2 32.0 mmol/L      Calcium 8.4 mg/dL      Total Protein 6.1 g/dL      Albumin 3.00 g/dL      ALT (SGPT) 17 U/L      AST (SGOT) 35 U/L      Alkaline Phosphatase 215 U/L      Total Bilirubin 0.5 mg/dL      eGFR Non African Amer 9 mL/min/1.73      Comment: <15 Indicative of kidney failure.        eGFR   Amer -- mL/min/1.73      Comment: <15 Indicative of kidney failure.        Globulin 3.1 gm/dL      A/G Ratio 1.0 g/dL      BUN/Creatinine Ratio 6.0     Anion Gap 8.0 mmol/L     Narrative:       GFR Normal >60  Chronic Kidney Disease <60  Kidney Failure <15    Lipase [[id number]]  (Abnormal) Collected:  05/12/19 2133    Specimen:  Blood Updated:  05/12/19 2154     Lipase 14 U/L     CBC & Differential [[id number]] Collected:  05/12/19 2133    Specimen:  Blood Updated:  05/12/19 2144    Narrative:       The following orders were created for panel order CBC & Differential.  Procedure                               Abnormality         Status                     ---------                               -----------         ------                     CBC Auto Differential[[id number]]        Abnormal            Final result                 Please view results for these tests on the individual orders.    CBC Auto Differential [[id number]]  (Abnormal) Collected:  05/12/19 2133    Specimen:  Blood Updated:  05/12/19 2144     WBC 9.31 10*3/mm3      RBC 3.78 10*6/mm3      Hemoglobin 11.9 g/dL      Hematocrit 37.6 %      MCV 99.5 fL      MCH 31.5 pg      MCHC 31.6 g/dL      RDW 16.5 %      RDW-SD 60.2 fl      MPV 10.8 fL      Platelets 197 10*3/mm3      Neutrophil % 87.7 %      Lymphocyte % 6.4 %      Monocyte % 3.4 %       Eosinophil % 1.7 %      Basophil % 0.4 %      Immature Grans % 0.4 %      Neutrophils, Absolute 8.15 10*3/mm3      Lymphocytes, Absolute 0.60 10*3/mm3      Monocytes, Absolute 0.32 10*3/mm3      Eosinophils, Absolute 0.16 10*3/mm3      Basophils, Absolute 0.04 10*3/mm3      Immature Grans, Absolute 0.04 10*3/mm3      nRBC 0.0 /100 WBC         Imaging Results (last 72 hours)     Procedure Component Value Units Date/Time    CT Abdomen Pelvis Without Contrast [[id number]] Collected:  05/13/19 0752     Updated:  05/13/19 0807    Narrative:       CT ABDOMEN PELVIS WO CONTRAST- 5/12/2019 11:33 PM CDT     HISTORY: abd pain      COMPARISON: 03/09/2019      DLP: 889 mGy cm Automated exposure control was utilized to diminish  patient radiation dose.     TECHNIQUE: Noncontrast enhanced images of the abdomen and pelvis  obtained without oral contrast.      FINDINGS:   There is moderate cardiomegaly the patient is status post aortic valve  replacement with moderate cardiomegaly. No evidence of pericardial  effusion. Tiny effusions are present with bibasilar atelectasis. Some  thickening of the interlobular septa within the lower lobes is either  related to some pulmonary fibrosis or mild pulmonary venous hypertension  and interstitial edema..      LIVER: No focal liver lesion. A large amount of pneumoperitoneum is  noted within the upper abdomen and perihepatic space.     BILIARY SYSTEM: The patient's undergone cholecystectomy. No evidence of  intra or extrahepatic biliary dilatation..      PANCREAS: No focal pancreatic lesion.      SPLEEN: Unremarkable.      KIDNEYS AND ADRENALS: The adrenals are unremarkable. There is a 2.3 cm  suspected cortical cyst involving the interpolar aspect of the left  kidney. I couldn't be confirmed with ultrasound. There is diffuse  atrophy of the kidneys. No evidence of nephrolithiasis..  The ureters  are decompressed and normal in appearance.     RETROPERITONEUM: No mass, lymphadenopathy or  hemorrhage.      GI TRACT: Some mild thickening of the wall is noted near the gastric  outlet. I do not see any evidence of a discrete ulceration but the  majority of the free air is noted within the upper abdomen raising the  possibility of a gastric perforation. Diverticulosis is noted of the  descending and sigmoid colon but I do not see evidence of diverticulitis  and there is no free air noted within the lower pelvis or mid abdomen.  The patient is status post gastric band placement. Mild prominence of  the gastric wall near the gastric band is also present.. The appendix is  surgically absent..     OTHER: There is no mesenteric mass, lymphadenopathy or fluid collection.  The osseous structures and soft tissues demonstrate no worrisome  lesions. No free fluid is present.      PELVIS: No mass lesion, fluid collection or significant lymphadenopathy  is seen in the pelvis. The urinary bladder is normal in appearance.       Impression:       1. Pneumoperitoneum. This is within the upper abdomen. There is some  mild thickening of the gastric wall in the region of the gastric outlet  as well as at the level of a gastric band from previous lap band  surgery. I would favor an upper abdominal perforation with no evidence  of abnormal inflammatory stranding or pneumoperitoneum within the pelvis  or mid abdomen. A small amount of free fluid is noted within the  perihepatic space.  2. Small effusions with bibasilar atelectasis. Mild pulmonary venous  hypertension and interstitial edema are suspected.  3. Suspected cortical cyst midpole left kidney. This could be followed  up with ultrasound..  4. Diverticulosis of the descending and sigmoid colon without evidence  of acute diverticulitis.  5. Diffuse atrophy of the kidneys.        This report was finalized on 05/13/2019 08:04 by Dr. Willian Baker MD.    XR Chest 1 View [[id number]] Collected:  05/13/19 0721     Updated:  05/13/19 0725    Narrative:       EXAMINATION:  Chest 1 view 05/12/2019     HISTORY: Pneumoperitoneum. Pulmonary edema.     FINDINGS: Today's exam is compared to previous study of 03/28/2019.  There is pneumoperitoneum with free air beneath the central and right  hemidiaphragm. There is right basilar atelectasis. There is moderate  cardiomegaly with mild pulmonary vascular congestion. Small effusions  are present blunting the costophrenic angles.       Impression:       1.. Pulmonary vascular congestion with small effusions. The patient is  status post percutaneous aortic valve replacement.  2. Pneumoperitoneum.  This report was finalized on 05/13/2019 07:22 by Dr. Willian Baker MD.              Assessment/Plan       Abdominal pain      We will clamp NG tube as there is bile within the NG tube aspirate      Laila Rodriguez MD  05/15/19  8:49 AM

## 2019-05-15 NOTE — PLAN OF CARE
Problem: Patient Care Overview  Goal: Plan of Care Review  Outcome: Ongoing (interventions implemented as appropriate)   05/15/19 1423   OTHER   Outcome Summary Patient reports pain to left shoulder and occasional minimal pain to incision. PCA in place. NG clamped. To check residual and if <100ml after 4 hrs d/c NG and resume clear liquid diet. Patient left for dialysis before residual could be checked. Will check once returned. SATNAM x1. Turn q2h. Patients blood glucose was noted as being low today. Given D50 x2 times. Begin IVF D10 @ 100. Hopeful to begin diet to prevent this. Accu checks are q4h in the meantime. Lidaderm patch placed to left shoulder, as this is her shoulder that was hurting prior to admission. Will cont to monitor.   Coping/Psychosocial   Plan of Care Reviewed With patient   Plan of Care Review   Progress no change     Goal: Individualization and Mutuality  Outcome: Ongoing (interventions implemented as appropriate)    Goal: Discharge Needs Assessment  Outcome: Ongoing (interventions implemented as appropriate)      Problem: Fall Risk (Adult)  Goal: Identify Related Risk Factors and Signs and Symptoms  Outcome: Ongoing (interventions implemented as appropriate)   05/15/19 0428 05/15/19 1423   Fall Risk (Adult)   Related Risk Factors (Fall Risk) age-related changes;fatigue/slow reaction;gait/mobility problems;inadequate lighting;environment unfamiliar --    Signs and Symptoms (Fall Risk) --  presence of risk factors     Goal: Absence of Fall  Outcome: Ongoing (interventions implemented as appropriate)      Problem: Skin Injury Risk (Adult)  Goal: Identify Related Risk Factors and Signs and Symptoms  Outcome: Ongoing (interventions implemented as appropriate)   05/15/19 0428   Skin Injury Risk (Adult)   Related Risk Factors (Skin Injury Risk) advanced age;mechanical forces;nutritional deficiencies;mobility impaired     Goal: Skin Health and Integrity  Outcome: Ongoing (interventions implemented as  appropriate)      Problem: Wound (Includes Pressure Injury) (Adult)  Goal: Signs and Symptoms of Listed Potential Problems Will be Absent, Minimized or Managed (Wound)  Outcome: Ongoing (interventions implemented as appropriate)   05/15/19 8718   Goal/Outcome Evaluation   Problems Assessed (Wound) all   Problems Present (Wound) none

## 2019-05-15 NOTE — PROGRESS NOTES
Addended by: Jeff Schwarz on: 1/23/2018 02:26 PM     Modules accepted: Orders Nephrology ([facility]) Progress Note      Patient:  Cheri Ely  YOB: 1940  Date of Service: 5/15/2019  MRN: [id number]   Acct: [id number]   Primary Care Physician: Provider, No Known  Advance Directive:   Code Status and Medical Interventions:   Ordered at: 05/13/19 0438     Level Of Support Discussed With:    Patient     Code Status:    CPR     Medical Interventions (Level of Support Prior to Arrest):    Full     Admit Date: 5/12/2019       Hospital Day: 2  Referring Provider: Laila Rodriguez MD      Patient personally seen and examined.  Complete chart including Consults, Notes, Operative Reports, Labs, Cardiology, and Radiology studies reviewed as able.        Subjective:  Cheri Ely is a 79 y.o. female  whom we were consulted for end stage renal disease management. Patient has routine hemodialysis Monday Wednesday Friday at [facility].  No recent issues with dialysis.  Recurrent history of GI bleeding. Presented this time with acute onset abdominal pain; imaging showed free air in abdomen and was taken for exp lap by Dr Hughes. Had repair of perforated duodenal ulcer.    Tolerated dialysis well on 5/13.  Moved to medical floor on 5/14    Today feeling about the same. Episode of low blood glucose overnight.     Allergies:  Patient has no known allergies.    Home Meds:  Medications Prior to Admission   Medication Sig Dispense Refill Last Dose   • acetaminophen (TYLENOL) 325 MG tablet Take 2 tablets by mouth Every 6 (Six) Hours As Needed for Mild Pain .   Past Month at Unknown time   • aspirin 81 MG EC tablet Take 1 tablet by mouth Daily.   3/23/2019   • B Complex-C-Folic Acid (JOHN-SANGEETA) tablet Take 1 tablet by mouth Daily.   3/23/2019   • carvedilol (COREG) 3.125 MG tablet Take 3.125 mg by mouth Daily. Monday, Wednesday, and Friday dose. Hold if SBP < 100.   3/22/2019   • carvedilol (COREG) 3.125 MG tablet Take 3.125 mg by mouth 2 (Two) Times a Day With  Meals. Sunday, Tuesday, Thursday, and Saturday dose. Hold if SBP <100.   3/23/2019   • Cholecalciferol (VITAMIN D) 2000 units capsule Take 1 capsule by mouth Daily. 30 capsule     • docusate sodium 100 MG capsule Take 100 mg by mouth 2 (Two) Times a Day. 60 each 1 3/23/2019   • epoetin lucy (EPOGEN,PROCRIT) 17236 UNIT/ML injection Inject 1 mL under the skin into the appropriate area as directed 3 (Three) Times a Week.      • lactulose 20 GM/30ML solution solution Take 30 mL by mouth Daily As Needed (Constipation). 30 mL  3/23/2019   • levothyroxine (SYNTHROID, LEVOTHROID) 150 MCG tablet Take 150 mcg by mouth Daily.   3/23/2019   • losartan (COZAAR) 25 MG tablet Take 25 mg by mouth 3 (Three) Times a Week. Monday, Wednesday, and Friday.   3/23/2019   • ondansetron ODT (ZOFRAN-ODT) 4 MG disintegrating tablet Take 4 mg by mouth Every 8 (Eight) Hours As Needed for Nausea or Vomiting.   Past Month at Unknown time   • pantoprazole (PROTONIX) 40 MG EC tablet Take 1 tablet by mouth Daily. 30 tablet 11 3/23/2019   • Polyethyl Glyc-Propyl Glyc PF 0.4-0.3 % solution ophthalmic solution Administer 2 drops to both eyes Every 2 (Two) Hours As Needed (dry eyes).   Past Month at Unknown time   • pravastatin (PRAVACHOL) 40 MG tablet Take 40 mg by mouth Daily.   3/23/2019   • sertraline (ZOLOFT) 50 MG tablet Take 50 mg by mouth Daily.   3/23/2019   • sucralfate (CARAFATE) 1 GM/10ML suspension Take 10 mL by mouth 2 (Two) Times a Day. 1200 mL 0        Medicines:  Current Facility-Administered Medications   Medication Dose Route Frequency Provider Last Rate Last Dose   • dextrose (D50W) 25 g/ 50mL Intravenous Solution 25 g  25 g Intravenous Q15 Min PRFabien Simmons MD       • dextrose (GLUTOSE) oral gel 15 g  15 g Oral Q15 Min PRFabien Simmons MD       • enoxaparin (LOVENOX) syringe 30 mg  30 mg Subcutaneous Daily Laila Rodriguez MD   30 mg at 05/15/19 0805   • glucagon (human recombinant) (GLUCAGEN DIAGNOSTIC)  injection 1 mg  1 mg Subcutaneous Q15 Min PRN Fabien Espinosa MD       • lidocaine (LIDODERM) 5 % 1 patch  1 patch Transdermal Q24H Fabien Espinosa MD   1 patch at 05/15/19 1015   • metoprolol tartrate (LOPRESSOR) injection 2.5 mg  2.5 mg Intravenous Q12H Jony Concepcion MD   2.5 mg at 05/15/19 0628   • Morphine sulfate PCA 1 mg/mL 30 mL syringe   Intravenous Continuous Laila Rodriguez MD       • naloxone (NARCAN) injection 0.1 mg  0.1 mg Intravenous Q5 Min PRN Laila Rodriguez MD       • ondansetron (ZOFRAN) tablet 4 mg  4 mg Oral Q6H PRN Laila Rodriguez MD        Or   • ondansetron (ZOFRAN) injection 4 mg  4 mg Intravenous Q6H PRN Laila Rodriguez MD       • pantoprazole (PROTONIX) injection 40 mg  40 mg Intravenous Q AM Laila Rodriguez MD   40 mg at 05/15/19 0628   • piperacillin-tazobactam (ZOSYN) 3.375 g in iso-osmotic dextrose 50 ml (premix)  3.375 g Intravenous Q12H Laila Rodriguez MD   Stopped at 05/15/19 0506   • sodium chloride 0.9 % flush 10 mL  10 mL Intravenous PRN Ashlyn Mackey APRN       • sodium chloride 0.9 % infusion  10 mL/hr Intravenous Continuous Laila Rodriguez MD 10 mL/hr at 05/15/19 0052 10 mL/hr at 05/15/19 0052       Past Medical History:  Past Medical History:   Diagnosis Date   • Arthritis    • Carotid artery disease (CMS/HCC)    • CHF (congestive heart failure) (CMS/HCC)    • Chronic kidney disease on chronic dialysis (CMS/HCC)    • Chronic renal failure     on dialysis ON MON, WED, FRI   • Coronary artery disease    • Disease of thyroid gland    • Diverticulitis    • Gastric ulcer    • History of transfusion    • Hyperlipidemia    • Hypertension    • Injury of back    • Mesenteric artery insufficiency (CMS/HCC)    • Multilevel degenerative disc disease    • Osteoporosis    • Pancreatitis    • Pelvis fracture (CMS/HCC)    • Pneumonia        Past Surgical History:  Past Surgical History:   Procedure Laterality Date   • AORTAGRAM Right 1/8/2018     Procedure: MESENTERIC ANGIOGRAM, GROIN ACCESS, MYNX CLOSURE;  Surgeon: Tomasz San DO;  Location: [facility] OR;  Service:    • AORTIC VALVE SURGERY     • APPENDECTOMY     • BACK SURGERY     • CAPSULE ENDOSCOPY N/A 3/30/2019    Procedure: CAPSULE ENDOSCOPY M2A;  Surgeon: David Yu MD;  Location: [facility] ENDOSCOPY;  Service: Gastroenterology   • CARDIAC SURGERY     • CAROTID ENDARTERECTOMY Bilateral    • CATARACT EXTRACTION, BILATERAL     • CERVICAL SPINE SURGERY     • CHOLECYSTECTOMY     • COLON SURGERY     • COLONOSCOPY  10/01/2015   • COLONOSCOPY N/A 3/28/2019    Procedure: COLONOSCOPY WITH ANESTHESIA;  Surgeon: Rafita Merida MD;  Location: [facility] ENDOSCOPY;  Service: Gastroenterology   • CORONARY ARTERY BYPASS GRAFT     • DIALYSIS FISTULA CREATION     • ENDOSCOPY N/A 12/27/2018    Procedure: ESOPHAGOGASTRODUODENOSCOPY WITH ANESTHESIA;  Surgeon: Moni Garza MD;  Location: [facility] ENDOSCOPY;  Service: Gastroenterology   • ENDOSCOPY N/A 2/28/2019    Procedure: ESOPHAGOGASTRODUODENOSCOPY WITH ANESTHESIA;  Surgeon: Moni Garza MD;  Location: [facility] ENDOSCOPY;  Service: Gastroenterology   • ENDOSCOPY N/A 3/11/2019    Procedure: ESOPHAGOGASTRODUODENOSCOPY WITH ANESTHESIA;  Surgeon: Moni Garza MD;  Location: [facility] ENDOSCOPY;  Service: Gastroenterology   • ENDOSCOPY N/A 3/27/2019    Procedure: ESOPHAGOGASTRODUODENOSCOPY WITH ANESTHESIA;  Surgeon: Rafita Merida MD;  Location: [facility] ENDOSCOPY;  Service: Gastroenterology   • EXPLORATORY LAPAROTOMY N/A 5/13/2019    Procedure: LAPAROTOMY EXPLORATORY, OVERSEW DUODENAL ULCER WITH FRED PATCH, KOCHER MANEUVER;  Surgeon: Laila Rodriguez MD;  Location: [facility] OR;  Service: General   • HIP TROCANTERIC NAILING WITH INTRAMEDULLARY HIP SCREW Right 2/8/2019    Procedure: HIP TROCANTERIC NAILING LONG WITH INTRAMEDULLARY HIP SCREW;  Surgeon: Boo Camacho MD;  Location: [facility] OR;  Service: Orthopedics   • JOINT REPLACEMENT      knee   • LAPAROSCOPIC  GASTRIC BANDING     • LEEP     • LUMBAR FUSION     • TOE AMPUTATION Left     2nd toe   • TOTAL KNEE ARTHROPLASTY Bilateral    • TUBAL ABDOMINAL LIGATION     • UPPER GASTROINTESTINAL ENDOSCOPY  10/01/2015       Family History  Family History   Problem Relation Age of Onset   • Hypertension Mother    • Cancer Mother         stomach   • Coronary artery disease Father    • Heart attack Father    • Diabetes Brother    • Coronary artery disease Brother    • Colon cancer Neg Hx    • Colon polyps Neg Hx        Social History  Social History     Socioeconomic History   • Marital status:      Spouse name: Not on file   • Number of children: Not on file   • Years of education: Not on file   • Highest education level: Not on file   Tobacco Use   • Smoking status: Never Smoker   • Smokeless tobacco: Never Used   Substance and Sexual Activity   • Alcohol use: No   • Drug use: No   • Sexual activity: Defer         Review of Systems:  History obtained from chart review and the patient  General ROS: No fever or chills  Respiratory ROS: No cough, shortness of breath, wheezing  Cardiovascular ROS: No chest pain or palpitations  Gastrointestinal ROS: No abdominal pain or melena  Genito-Urinary ROS: No dysuria or hematuria  Neurological ROS: no headache or dizziness    Objective:  Patient Vitals for the past 24 hrs:   BP Temp Temp src Pulse Resp SpO2   05/15/19 0932 (!) 102/32 -- -- -- -- --   05/15/19 0928 (!) 93/34 97.6 °F (36.4 °C) -- 88 16 98 %   05/15/19 0659 -- -- -- 81 18 99 %   05/15/19 0429 (!) 94/39 98.1 °F (36.7 °C) Oral 89 17 96 %   05/14/19 2344 96/42 97.6 °F (36.4 °C) Oral 93 13 99 %   05/14/19 2058 -- -- -- -- -- 95 %   05/14/19 1940 (!) 81/45 97.5 °F (36.4 °C) Oral 84 14 96 %   05/14/19 1601 -- -- -- -- 18 --   05/14/19 1544 118/97 97.5 °F (36.4 °C) Oral 113 13 93 %   05/14/19 1405 (!) 83/48 -- -- 105 -- (!) 88 %   05/14/19 1305 (!) 95/38 -- -- 107 -- (!) 74 %   05/14/19 1205 -- 98.1 °F (36.7 °C) Oral -- 16 --        Intake/Output Summary (Last 24 hours) at 5/15/2019 1059  Last data filed at 5/15/2019 0928  Gross per 24 hour   Intake 226.17 ml   Output 390 ml   Net -163.83 ml     General: awake/alert    Neck: supple, no JVD  Chest:  clear to auscultation bilaterally without respiratory distress  CVS: regular rate and rhythm  Abdominal: soft, nontender, positive bowel sounds  Extremities: no cyanosis or edema  Skin: warm and dry without rash  Neuro: no focal motor deficits    Labs:  Results from last 7 days   Lab Units 05/15/19  0655 05/14/19  0336 05/13/19  0858   WBC 10*3/mm3 9.99 14.28* 20.56*   HEMOGLOBIN g/dL 10.1* 10.2* 10.9*   HEMATOCRIT % 32.5* 32.3* 34.6*   PLATELETS 10*3/mm3 152 143 147         Results from last 7 days   Lab Units 05/15/19  0655 05/14/19  0336 05/12/19  2133   SODIUM mmol/L 136 136 136   POTASSIUM mmol/L 4.1 4.0 4.0   CHLORIDE mmol/L 101 100 96*   CO2 mmol/L 24.0 29.0 32.0*   BUN mg/dL 36* 19 27*   CREATININE mg/dL 4.18* 2.95* 4.51*   CALCIUM mg/dL 7.8* 7.5* 8.4   BILIRUBIN mg/dL 0.6 0.5 0.5   ALK PHOS U/L 126* 137* 215*   ALT (SGPT) U/L 16 23 17   AST (SGOT) U/L 23 25 35   GLUCOSE mg/dL 40* 74 92       Radiology:   Imaging Results (last 72 hours)     Procedure Component Value Units Date/Time    CT Abdomen Pelvis Without Contrast [[id number]] Collected:  05/13/19 0752     Updated:  05/13/19 0807    Narrative:       CT ABDOMEN PELVIS WO CONTRAST- 5/12/2019 11:33 PM CDT     HISTORY: abd pain      COMPARISON: 03/09/2019      DLP: 889 mGy cm Automated exposure control was utilized to diminish  patient radiation dose.     TECHNIQUE: Noncontrast enhanced images of the abdomen and pelvis  obtained without oral contrast.      FINDINGS:   There is moderate cardiomegaly the patient is status post aortic valve  replacement with moderate cardiomegaly. No evidence of pericardial  effusion. Tiny effusions are present with bibasilar atelectasis. Some  thickening of the interlobular septa within the lower lobes is  either  related to some pulmonary fibrosis or mild pulmonary venous hypertension  and interstitial edema..      LIVER: No focal liver lesion. A large amount of pneumoperitoneum is  noted within the upper abdomen and perihepatic space.     BILIARY SYSTEM: The patient's undergone cholecystectomy. No evidence of  intra or extrahepatic biliary dilatation..      PANCREAS: No focal pancreatic lesion.      SPLEEN: Unremarkable.      KIDNEYS AND ADRENALS: The adrenals are unremarkable. There is a 2.3 cm  suspected cortical cyst involving the interpolar aspect of the left  kidney. I couldn't be confirmed with ultrasound. There is diffuse  atrophy of the kidneys. No evidence of nephrolithiasis..  The ureters  are decompressed and normal in appearance.     RETROPERITONEUM: No mass, lymphadenopathy or hemorrhage.      GI TRACT: Some mild thickening of the wall is noted near the gastric  outlet. I do not see any evidence of a discrete ulceration but the  majority of the free air is noted within the upper abdomen raising the  possibility of a gastric perforation. Diverticulosis is noted of the  descending and sigmoid colon but I do not see evidence of diverticulitis  and there is no free air noted within the lower pelvis or mid abdomen.  The patient is status post gastric band placement. Mild prominence of  the gastric wall near the gastric band is also present.. The appendix is  surgically absent..     OTHER: There is no mesenteric mass, lymphadenopathy or fluid collection.  The osseous structures and soft tissues demonstrate no worrisome  lesions. No free fluid is present.      PELVIS: No mass lesion, fluid collection or significant lymphadenopathy  is seen in the pelvis. The urinary bladder is normal in appearance.       Impression:       1. Pneumoperitoneum. This is within the upper abdomen. There is some  mild thickening of the gastric wall in the region of the gastric outlet  as well as at the level of a gastric band from  previous lap band  surgery. I would favor an upper abdominal perforation with no evidence  of abnormal inflammatory stranding or pneumoperitoneum within the pelvis  or mid abdomen. A small amount of free fluid is noted within the  perihepatic space.  2. Small effusions with bibasilar atelectasis. Mild pulmonary venous  hypertension and interstitial edema are suspected.  3. Suspected cortical cyst midpole left kidney. This could be followed  up with ultrasound..  4. Diverticulosis of the descending and sigmoid colon without evidence  of acute diverticulitis.  5. Diffuse atrophy of the kidneys.        This report was finalized on 05/13/2019 08:04 by Dr. Willian Baker MD.    XR Chest 1 View [[id number]] Collected:  05/13/19 0721     Updated:  05/13/19 0725    Narrative:       EXAMINATION: Chest 1 view 05/12/2019     HISTORY: Pneumoperitoneum. Pulmonary edema.     FINDINGS: Today's exam is compared to previous study of 03/28/2019.  There is pneumoperitoneum with free air beneath the central and right  hemidiaphragm. There is right basilar atelectasis. There is moderate  cardiomegaly with mild pulmonary vascular congestion. Small effusions  are present blunting the costophrenic angles.       Impression:       1.. Pulmonary vascular congestion with small effusions. The patient is  status post percutaneous aortic valve replacement.  2. Pneumoperitoneum.  This report was finalized on 05/13/2019 07:22 by Dr. Willian Baker MD.          Culture:  Blood Culture   Date Value Ref Range Status   05/13/2019 No growth at 24 hours  Preliminary         Assessment   1.  End stage renal disease on hemodialysis MWF  2.  S/p surgical repair of perforated duodenal ulcer  3.  Chronic diastolic congestive heart failure  4.  Essential hypertension  5.  Anemia of CKD and of acute blood loss    Plan:  1.  Dialysis today  2.  Monitor labs      Dirk Tristan, ABILIO  5/15/2019  10:59 AM

## 2019-05-15 NOTE — PROGRESS NOTES
Continued Stay Note  AGATHA Jimenez     Patient Name: Cheri Ely  MRN: [id number]  Today's Date: 5/15/2019    Admit Date: 5/12/2019    Discharge Plan     Row Name 05/15/19 1037       Plan    Plan  Home with [facility]    Patient/Family in Agreement with Plan  yes    Plan Comments  Daughter prefers to take pt home with home health, if possible. Therapy has been ordered but have not evaluated pt yet. Will follow after their evaluation to make sure home is appropriate for pt.         Discharge Codes    No documentation.             MALIA Trevizo

## 2019-05-15 NOTE — PROGRESS NOTES
[facility] Medicine Services  INPATIENT PROGRESS NOTE    Patient Name: Cheri Ely  Date of Admission: 5/12/2019  Today's Date: 05/15/19  Length of Stay: 2  Primary Care Physician: Provider, No Known    Subjective   Chief Complaint: f/u  HPI   She has been found with blood sugar 40.  She carries no history of diabetes.  She is currently n.p.o. status post ex lap for perforated peptic ulcer disease.  She is only on KVO IV fluid to assist with the morphine PCA compared to yesterday  She is asymptomatic.  She denies any tremors, diaphoresis nor does she have any change in mental status  She is due for dialysis today  Review of Systems     All pertinent negatives and positives are as above. All other systems have been reviewed and are negative unless otherwise stated.     Objective    Temp:  [97.5 °F (36.4 °C)-98.1 °F (36.7 °C)] 98.1 °F (36.7 °C)  Heart Rate:  [] 81  Resp:  [13-18] 18  BP: ()/() 94/39  Physical Exam  Laying comfortably on bed reading the newspaper.    awake and alert and oriented x3  Pleasant woman, no distress, has an NG tube in place and a CO2 detector.  She is on PCA.  The canister is at least half filled  Awake, alert, oriented x3, no apparent distress  Supple neck  Anicteric sclera, external ocular muscles are intact  Normal respiratory effort, no adventitious sounds  S1-S2, appears regular; she is off telemetry  She has a SATNAM drain; soft abdomen, diminished breath sounds; flabby abdomen  No cyanosis clubbing or edema  Warm dry skin  Good capillary refill time           Results Review:  I have reviewed the labs, radiology results, and diagnostic studies.    Laboratory Data:   Results from last 7 days   Lab Units 05/15/19  0655 05/14/19  0336 05/13/19  0858   WBC 10*3/mm3 9.99 14.28* 20.56*   HEMOGLOBIN g/dL 10.1* 10.2* 10.9*   HEMATOCRIT % 32.5* 32.3* 34.6*   PLATELETS 10*3/mm3 152 143 147        Results from last 7 days   Lab Units  05/15/19  0655 05/14/19  0336 05/12/19  2133   SODIUM mmol/L 136 136 136   POTASSIUM mmol/L 4.1 4.0 4.0   CHLORIDE mmol/L 101 100 96*   CO2 mmol/L 24.0 29.0 32.0*   BUN mg/dL 36* 19 27*   CREATININE mg/dL 4.18* 2.95* 4.51*   CALCIUM mg/dL 7.8* 7.5* 8.4   BILIRUBIN mg/dL 0.6 0.5 0.5   ALK PHOS U/L 126* 137* 215*   ALT (SGPT) U/L 16 23 17   AST (SGOT) U/L 23 25 35   GLUCOSE mg/dL 40* 74 92       Culture Data:   Blood Culture   Date Value Ref Range Status   05/13/2019 No growth at 24 hours  Preliminary   05/13/2019 No growth at 2 days  Preliminary       Radiology Data:   Imaging Results (last 24 hours)     ** No results found for the last 24 hours. **          I have reviewed the patient's current medications.     Assessment/Plan     Active Hospital Problems    Diagnosis   • Abdominal pain     Pneumoperitoneum secondary to perforated duodenal ulcer status post exploratory laparotomy on May 13  End-stage renal disease on hemodialysis Monday Wednesdays and Fridays  Postoperative blood loss anemia - stable hgb  History of diastolic heart failure with status post aortic valve replacement  Atrial fibrillation with left bundle branch block on EKG; telemetry showed atrial fibrillation  History of hypertension  Coronary artery disease stable -serial troponin is negative             d50 now; place on hypoglycemic protocol; concern that betabloker IV may be masking hypoglycemic symptom  Will consider dextrose if patient remains NPO; check blood sugar q4H  Telemetry reordered - d/c during transfer to floor  Discussed with Dr. Rodriguez re anticoagulation.  He mentioned she had prior bleeding episodes and also has AVM.  Will defer to attending weighing risk benefit on anticoagulation given recent surgery, hx of GI bleed/avm  Discussed with nurse Dhillon          Discharge Planning: per attending  Fabien Espinosa MD   05/15/19   8:00 AM

## 2019-05-16 LAB
ALBUMIN SERPL-MCNC: 2.2 G/DL (ref 3.5–5)
ALBUMIN/GLOB SERPL: 0.9 G/DL (ref 1.1–2.5)
ALP SERPL-CCNC: 117 U/L (ref 24–120)
ALT SERPL W P-5'-P-CCNC: 15 U/L (ref 0–54)
ANION GAP SERPL CALCULATED.3IONS-SCNC: 5 MMOL/L (ref 4–13)
ANISOCYTOSIS BLD QL: ABNORMAL
AST SERPL-CCNC: 17 U/L (ref 7–45)
BASOPHILS # BLD MANUAL: 0.09 10*3/MM3 (ref 0–0.2)
BASOPHILS NFR BLD AUTO: 1 % (ref 0–2)
BILIRUB SERPL-MCNC: 0.5 MG/DL (ref 0.1–1)
BUN BLD-MCNC: 12 MG/DL (ref 5–21)
BUN/CREAT SERPL: 5.4 (ref 7–25)
CALCIUM SPEC-SCNC: 7.1 MG/DL (ref 8.4–10.4)
CHLORIDE SERPL-SCNC: 95 MMOL/L (ref 98–110)
CO2 SERPL-SCNC: 30 MMOL/L (ref 24–31)
CREAT BLD-MCNC: 2.21 MG/DL (ref 0.5–1.4)
DEPRECATED RDW RBC AUTO: 61.1 FL (ref 40–54)
ELLIPTOCYTES BLD QL SMEAR: ABNORMAL
ERYTHROCYTE [DISTWIDTH] IN BLOOD BY AUTOMATED COUNT: 17 % (ref 12–15)
GFR SERPL CREATININE-BSD FRML MDRD: 21 ML/MIN/1.73
GLOBULIN UR ELPH-MCNC: 2.5 GM/DL
GLUCOSE BLD-MCNC: 178 MG/DL (ref 70–100)
GLUCOSE BLDC GLUCOMTR-MCNC: 140 MG/DL (ref 70–130)
GLUCOSE BLDC GLUCOMTR-MCNC: 143 MG/DL (ref 70–130)
GLUCOSE BLDC GLUCOMTR-MCNC: 144 MG/DL (ref 70–130)
GLUCOSE BLDC GLUCOMTR-MCNC: 158 MG/DL (ref 70–130)
GLUCOSE BLDC GLUCOMTR-MCNC: 167 MG/DL (ref 70–130)
GLUCOSE BLDC GLUCOMTR-MCNC: 82 MG/DL (ref 70–130)
HCT VFR BLD AUTO: 30.5 % (ref 37–47)
HGB BLD-MCNC: 9.7 G/DL (ref 12–16)
LYMPHOCYTES # BLD MANUAL: 1.37 10*3/MM3 (ref 0.72–4.86)
LYMPHOCYTES NFR BLD MANUAL: 15.2 % (ref 15–45)
LYMPHOCYTES NFR BLD MANUAL: 4 % (ref 4–12)
MACROCYTES BLD QL SMEAR: ABNORMAL
MAGNESIUM SERPL-MCNC: 1.6 MG/DL (ref 1.4–2.2)
MCH RBC QN AUTO: 31.9 PG (ref 28–32)
MCHC RBC AUTO-ENTMCNC: 31.8 G/DL (ref 33–36)
MCV RBC AUTO: 100.3 FL (ref 82–98)
MONOCYTES # BLD AUTO: 0.36 10*3/MM3 (ref 0.19–1.3)
NEUTROPHILS # BLD AUTO: 7.21 10*3/MM3 (ref 1.87–8.4)
NEUTROPHILS NFR BLD MANUAL: 79.8 % (ref 39–78)
NRBC SPEC MANUAL: 1 /100 WBC (ref 0–0.2)
PHOSPHATE SERPL-MCNC: 2.1 MG/DL (ref 2.5–4.5)
PLAT MORPH BLD: NORMAL
PLATELET # BLD AUTO: 152 10*3/MM3 (ref 130–400)
PMV BLD AUTO: 10.6 FL (ref 6–12)
POIKILOCYTOSIS BLD QL SMEAR: ABNORMAL
POTASSIUM BLD-SCNC: 3.2 MMOL/L (ref 3.5–5.3)
PROT SERPL-MCNC: 4.7 G/DL (ref 6.3–8.7)
RBC # BLD AUTO: 3.04 10*6/MM3 (ref 4.2–5.4)
SODIUM BLD-SCNC: 130 MMOL/L (ref 135–145)
WBC MORPH BLD: NORMAL
WBC NRBC COR # BLD: 9.04 10*3/MM3 (ref 4.8–10.8)

## 2019-05-16 PROCEDURE — 94799 UNLISTED PULMONARY SVC/PX: CPT

## 2019-05-16 PROCEDURE — 80053 COMPREHEN METABOLIC PANEL: CPT | Performed by: SPECIALIST

## 2019-05-16 PROCEDURE — 93005 ELECTROCARDIOGRAM TRACING: CPT | Performed by: INTERNAL MEDICINE

## 2019-05-16 PROCEDURE — 82962 GLUCOSE BLOOD TEST: CPT

## 2019-05-16 PROCEDURE — 97162 PT EVAL MOD COMPLEX 30 MIN: CPT

## 2019-05-16 PROCEDURE — 85025 COMPLETE CBC W/AUTO DIFF WBC: CPT | Performed by: SPECIALIST

## 2019-05-16 PROCEDURE — 25010000003 MORPHINE 50 MG/ML SOLUTION: Performed by: SPECIALIST

## 2019-05-16 PROCEDURE — 25010000002 ENOXAPARIN PER 10 MG: Performed by: SPECIALIST

## 2019-05-16 PROCEDURE — 25010000002 PIPERACILLIN SOD-TAZOBACTAM PER 1 G: Performed by: SPECIALIST

## 2019-05-16 PROCEDURE — 97116 GAIT TRAINING THERAPY: CPT

## 2019-05-16 PROCEDURE — 93010 ELECTROCARDIOGRAM REPORT: CPT | Performed by: INTERNAL MEDICINE

## 2019-05-16 PROCEDURE — 85007 BL SMEAR W/DIFF WBC COUNT: CPT | Performed by: SPECIALIST

## 2019-05-16 PROCEDURE — 83735 ASSAY OF MAGNESIUM: CPT | Performed by: INTERNAL MEDICINE

## 2019-05-16 PROCEDURE — 84100 ASSAY OF PHOSPHORUS: CPT | Performed by: INTERNAL MEDICINE

## 2019-05-16 RX ORDER — PANTOPRAZOLE SODIUM 40 MG/1
40 TABLET, DELAYED RELEASE ORAL
Status: DISCONTINUED | OUTPATIENT
Start: 2019-05-17 | End: 2019-05-21 | Stop reason: HOSPADM

## 2019-05-16 RX ORDER — LEVOTHYROXINE SODIUM 0.15 MG/1
150 TABLET ORAL
Status: DISCONTINUED | OUTPATIENT
Start: 2019-05-16 | End: 2019-05-21 | Stop reason: HOSPADM

## 2019-05-16 RX ORDER — CARVEDILOL 3.12 MG/1
3.12 TABLET ORAL
Status: DISCONTINUED | OUTPATIENT
Start: 2019-05-16 | End: 2019-05-21 | Stop reason: HOSPADM

## 2019-05-16 RX ORDER — CARVEDILOL 3.12 MG/1
3.12 TABLET ORAL
Status: DISCONTINUED | OUTPATIENT
Start: 2019-05-17 | End: 2019-05-21 | Stop reason: HOSPADM

## 2019-05-16 RX ORDER — POTASSIUM CHLORIDE 750 MG/1
20 CAPSULE, EXTENDED RELEASE ORAL ONCE
Status: COMPLETED | OUTPATIENT
Start: 2019-05-16 | End: 2019-05-16

## 2019-05-16 RX ADMIN — METOPROLOL TARTRATE 2.5 MG: 5 INJECTION INTRAVENOUS at 05:41

## 2019-05-16 RX ADMIN — LEVOTHYROXINE SODIUM 150 MCG: 150 TABLET ORAL at 13:54

## 2019-05-16 RX ADMIN — DEXTROSE MONOHYDRATE 100 ML/HR: 100 INJECTION, SOLUTION INTRAVENOUS at 12:34

## 2019-05-16 RX ADMIN — PANTOPRAZOLE SODIUM 40 MG: 40 INJECTION, POWDER, FOR SOLUTION INTRAVENOUS at 05:41

## 2019-05-16 RX ADMIN — LIDOCAINE 1 PATCH: 50 PATCH CUTANEOUS at 10:46

## 2019-05-16 RX ADMIN — ENOXAPARIN SODIUM 30 MG: 30 INJECTION SUBCUTANEOUS at 10:46

## 2019-05-16 RX ADMIN — TAZOBACTAM SODIUM AND PIPERACILLIN SODIUM 3.38 G: 375; 3 INJECTION, SOLUTION INTRAVENOUS at 13:19

## 2019-05-16 RX ADMIN — MORPHINE SULFATE: 50 INJECTION, SOLUTION, CONCENTRATE INTRAVENOUS at 12:34

## 2019-05-16 RX ADMIN — TAZOBACTAM SODIUM AND PIPERACILLIN SODIUM 3.38 G: 375; 3 INJECTION, SOLUTION INTRAVENOUS at 01:26

## 2019-05-16 RX ADMIN — POTASSIUM CHLORIDE 20 MEQ: 750 CAPSULE, EXTENDED RELEASE ORAL at 11:16

## 2019-05-16 RX ADMIN — DEXTROSE MONOHYDRATE 100 ML/HR: 100 INJECTION, SOLUTION INTRAVENOUS at 22:20

## 2019-05-16 NOTE — PLAN OF CARE
Problem: Patient Care Overview  Goal: Plan of Care Review  Outcome: Ongoing (interventions implemented as appropriate)   05/16/19 6907   OTHER   Outcome Summary PT eval completed. Pt w/ c/os pain at abdomen and chronic pain at L shld noting weakness and decrease ROM B shlds. Pt w/ longstanding weakness at R ankle also w/ generalized weakness throughout. Pt w/ flexed kyphotic posture and unable to stand upright. Pt performs bed mobility and tfers w/ mod to max of 1. Slowed pace w/ narrow RONI w/ decrease foot clearance and step length w/ high risk for falls. Pt will benefit from continued PT services to improve overall strength, balance, activity tolerance, safety awareness, reduciing fall risk increasing I w/ functional mobility. Feel pt needs 24/7 assistance at this time. Would recommend subacute care in swing bed or SNF at discharge. However, if unable to accomadate due to dialysis, home w/ assist and HH.   Coping/Psychosocial   Plan of Care Reviewed With patient   Plan of Care Review   Progress improving

## 2019-05-16 NOTE — PLAN OF CARE
Problem: Patient Care Overview  Goal: Plan of Care Review  Outcome: Ongoing (interventions implemented as appropriate)   05/16/19 7369   OTHER   Outcome Summary Diet has been upgraded to full liquids today. She has consumed 50% of the last 2 meals. Pt has increased nutrient needs r/t ESRD, SBD, and recent surgery. Recommend to start Boost glucose control BID with meals. Cont to follow for diet advancement and tolerance.   Coping/Psychosocial   Plan of Care Reviewed With patient   Plan of Care Review   Progress improving

## 2019-05-16 NOTE — PLAN OF CARE
Problem: Patient Care Overview  Goal: Plan of Care Review  Outcome: Ongoing (interventions implemented as appropriate)   05/16/19 1409   OTHER   Outcome Summary Patient reports constant shoulder pain and intermittent pain to incision. dsg c/d/i. Denies nausea. Tolerating diet. JPx1. IVF and pca cont. VAT started IV this afternoon. accu q4h remain. up in chair half the shift today, otherwise turn q2h. Up x 1 w/ walker and gait belt. Will cont to monitor.    Coping/Psychosocial   Plan of Care Reviewed With patient   Plan of Care Review   Progress improving     Goal: Individualization and Mutuality  Outcome: Ongoing (interventions implemented as appropriate)    Goal: Discharge Needs Assessment  Outcome: Ongoing (interventions implemented as appropriate)      Problem: Fall Risk (Adult)  Goal: Identify Related Risk Factors and Signs and Symptoms  Outcome: Ongoing (interventions implemented as appropriate)   05/15/19 0428 05/15/19 1423   Fall Risk (Adult)   Related Risk Factors (Fall Risk) age-related changes;fatigue/slow reaction;gait/mobility problems;inadequate lighting;environment unfamiliar --    Signs and Symptoms (Fall Risk) --  presence of risk factors     Goal: Absence of Fall  Outcome: Ongoing (interventions implemented as appropriate)      Problem: Skin Injury Risk (Adult)  Goal: Identify Related Risk Factors and Signs and Symptoms  Outcome: Ongoing (interventions implemented as appropriate)   05/15/19 0428   Skin Injury Risk (Adult)   Related Risk Factors (Skin Injury Risk) advanced age;mechanical forces;nutritional deficiencies;mobility impaired     Goal: Skin Health and Integrity  Outcome: Ongoing (interventions implemented as appropriate)      Problem: Wound (Includes Pressure Injury) (Adult)  Goal: Signs and Symptoms of Listed Potential Problems Will be Absent, Minimized or Managed (Wound)  Outcome: Ongoing (interventions implemented as appropriate)   05/16/19 1409   Goal/Outcome Evaluation   Problems  Assessed (Wound) all   Problems Present (Wound) pain

## 2019-05-16 NOTE — PROGRESS NOTES
[facility] Medicine Services  INPATIENT PROGRESS NOTE    Patient Name: Cheri Ely  Date of Admission: 5/12/2019  Today's Date: 05/16/19  Length of Stay: 3  Primary Care Physician: Provider, No Known    Subjective   Chief Complaint: f/u   HPI   Had recurrent hypoglycemia yesterday.  Started on d10 IVF   Diet initiated and now on full liquid diet  accu check 178 and 144 today.       Review of Systems     All pertinent negatives and positives are as above. All other systems have been reviewed and are negative unless otherwise stated.     Objective    Temp:  [97.7 °F (36.5 °C)-98.1 °F (36.7 °C)] 98.1 °F (36.7 °C)  Heart Rate:  [] 57  Resp:  [16] 16  BP: ()/(37-47) 110/42  Physical Exam  Laying comfortably on bed reading the newspaper.    awake and alert and oriented x3  Pleasant woman, no distress, has an NG tube in place and a CO2 detector.  She is on PCA.  The canister is at least half filled  Awake, alert, oriented x3, no apparent distress  Supple neck  Anicteric sclera, external ocular muscles are intact  Normal respiratory effort, no adventitious sounds  S1-S2, appears regular; she is off telemetry  She has a SATNAM drain; soft abdomen, diminished breath sounds; flabby abdomen  No cyanosis clubbing or edema  Warm dry skin  Good capillary refill time             Results Review:  I have reviewed the labs, radiology results, and diagnostic studies.    Laboratory Data:   Results from last 7 days   Lab Units 05/16/19  0510 05/15/19  0655 05/14/19  0336   WBC 10*3/mm3 9.04 9.99 14.28*   HEMOGLOBIN g/dL 9.7* 10.1* 10.2*   HEMATOCRIT % 30.5* 32.5* 32.3*   PLATELETS 10*3/mm3 152 152 143        Results from last 7 days   Lab Units 05/16/19  0510 05/15/19  0655 05/14/19  0336   SODIUM mmol/L 130* 136 136   POTASSIUM mmol/L 3.2* 4.1 4.0   CHLORIDE mmol/L 95* 101 100   CO2 mmol/L 30.0 24.0 29.0   BUN mg/dL 12 36* 19   CREATININE mg/dL 2.21* 4.18* 2.95*   CALCIUM mg/dL 7.1* 7.8*  7.5*   BILIRUBIN mg/dL 0.5 0.6 0.5   ALK PHOS U/L 117 126* 137*   ALT (SGPT) U/L 15 16 23   AST (SGOT) U/L 17 23 25   GLUCOSE mg/dL 178* 40* 74       Culture Data:   Blood Culture   Date Value Ref Range Status   05/13/2019 No growth at 3 days  Preliminary   05/13/2019 No growth at 3 days  Preliminary       Radiology Data:   Imaging Results (last 24 hours)     ** No results found for the last 24 hours. **          I have reviewed the patient's current medications.     Assessment/Plan     Active Hospital Problems    Diagnosis   • Hypoglycemia   • Paroxysmal atrial fibrillation (CMS/HCC)   • Abdominal pain         Pneumoperitoneum secondary to perforated duodenal ulcer status post exploratory laparotomy on May 13  End-stage renal disease on hemodialysis Monday Wednesdays and Fridays  Postoperative blood loss anemia - stable hgb  History of diastolic heart failure with status post aortic valve replacement  Atrial fibrillation with left bundle branch block on EKG; telemetry showed atrial fibrillation  History of hypertension  Coronary artery disease stable -serial troponin is negative   postoperative hypoglycemia      Continue q4 sugar monitoring and if remains stable, will change to AC/HS now that she has been started on full liquid diet; IV access is out.      d50 now; place on hypoglycemic protocol; change to po beta blocker per home med; monitor bp  Replace potassium and management of hyponatremia per renal service  Telemetry reordered - afib rate controlled; occ pause < 2 sec  Discussed with Dr. Rodriguez re anticoagulation.  He mentioned she had prior bleeding episodes and also has AVM May 14.  Will defer to attending weighing risk benefit on anticoagulation given recent surgery, hx of GI bleed/avm  Discussed with nurse Dhillon          Discharge Planning: per primary service  Fabien Espinosa MD   05/16/19   11:15 AM

## 2019-05-16 NOTE — PROGRESS NOTES
Continued Stay Note  [facility]     Patient Name: Cheri Ely  MRN: [id number]  Today's Date: 5/16/2019    Admit Date: 5/12/2019    Discharge Plan     Row Name 05/16/19 1359       Plan    Plan Comments  SW spoke with pt after PT eval. Pt is requesting for referrals to be sent to [facility] and [facility]. [facility] [place] has offered a bed on pt. SW is waiting for [facility] to offer a bed or deny. SW will follow.        Discharge Codes    No documentation.             Eleanor Mireles

## 2019-05-16 NOTE — DISCHARGE PLACEMENT REQUEST
"Cheri Ely (79 y.o. Female)     Date of Birth Social Security Number Address Home Phone MRN    1940  [id number]  [facility] [place] [facility] [address] [phone] [id number]    [employer] Status          [employer]ism        Admission Date Admission Type Admitting Provider Attending Provider Department, Room/Bed    5/12/19 Emergency Laila Rodriguez MD Tyrrell, Dana R, MD [facility] 3C, 391/1    Discharge Date Discharge Disposition Discharge Destination                       Attending Provider:  Laila Rodriguez MD    Allergies:  No Known Allergies    Isolation:  None   Infection:  None   Code Status:  CPR    Ht:  149.9 cm (59\")   Wt:  71.9 kg (158 lb 9.6 oz)    Admission Cmt:  None   Principal Problem:  None                Active Insurance as of 5/12/2019     Primary Coverage     Payor Plan Insurance Group Employer/Plan Group    MEDICARE MEDICARE A & B      Payor Plan Address Payor Plan Phone Number Payor Plan Fax Number Effective Dates    [address] [phone]  4/1/1998 - None Entered    [facility] [id number]       Subscriber Name Subscriber Birth Date Member ID       CHERI ELY 1940 [id number]           Secondary Coverage     Payor Plan Insurance Group Employer/Plan Group    COMBINED INSURANCE CO COMBINED INSURANCE CO      Payor Plan Address Payor Plan Phone Number Payor Plan Fax Number Effective Dates    [address] [phone]  1/1/2017 - None Entered    [facility] [id number]       Subscriber Name Subscriber Birth Date Member ID       CHERI ELY 1940 [id number]                 Emergency Contacts      (Rel.) Home Phone Work Phone Mobile Phone    Christy Langston (Daughter) -- -- [phone]    Lucero Morales (Daughter) -- -- [phone]               History & Physical      Laila Rodriguez MD at 5/13/2019 12:50 AM            Laila Rodriguez MD  H&P    Patient Care Team:  Provider, No Known as PCP - General  Barrett Mckenzie Jr, MD as PCP - Family " Medicine  Moni Garza MD as Consulting Physician (Gastroenterology)  Tomasz San DO as Consulting Physician (Vascular Surgery)    Chief complaint severe abdominal pain    Val Ely  is a 79 y.o. female presents with severe abdominal pain which began about 8:00 tonight.  Sudden onset.  Mostly in the right upper abdomen but diffuse now.  She is had some emesis of better gastric contents no blood.  No fevers.  Has had multiple recent admissions for GI bleeding.  She has extensive medical issues including end-stage renal disease on dialysis, history of congestive heart failure, history of valvular heart disease, history of coronary artery disease, history of peripheral vascular disease, history of obesity.  Has had multiple recent upper endoscopies and colonoscopies for bleeding she had M2A capsule endoscopy which revealed AVMs of the small bowel.  She has had prior gastric banding and sigmoid colectomy..      Review of Systems   Pertinent items are noted in HPI, all other systems reviewed and negative    History  Past Medical History:   Diagnosis Date   • Arthritis    • Carotid artery disease (CMS/HCC)    • CHF (congestive heart failure) (CMS/HCC)    • Chronic kidney disease on chronic dialysis (CMS/HCC)    • Chronic renal failure     on dialysis ON MON, WED, FRI   • Coronary artery disease    • Disease of thyroid gland    • Diverticulitis    • Gastric ulcer    • History of transfusion    • Hyperlipidemia    • Hypertension    • Injury of back    • Mesenteric artery insufficiency (CMS/HCC)    • Multilevel degenerative disc disease    • Osteoporosis    • Pancreatitis    • Pelvis fracture (CMS/HCC)    • Pneumonia      Past Surgical History:   Procedure Laterality Date   • AORTAGRAM Right 1/8/2018    Procedure: MESENTERIC ANGIOGRAM, GROIN ACCESS, MYNX CLOSURE;  Surgeon: Tomasz San DO;  Location: [facility] OR;  Service:    • AORTIC VALVE SURGERY     • APPENDECTOMY     • BACK SURGERY      • CAPSULE ENDOSCOPY N/A 3/30/2019    Procedure: CAPSULE ENDOSCOPY M2A;  Surgeon: David Yu MD;  Location: [facility] ENDOSCOPY;  Service: Gastroenterology   • CARDIAC SURGERY     • CAROTID ENDARTERECTOMY Bilateral    • CATARACT EXTRACTION, BILATERAL     • CERVICAL SPINE SURGERY     • CHOLECYSTECTOMY     • COLON SURGERY     • COLONOSCOPY  10/01/2015   • COLONOSCOPY N/A 3/28/2019    Procedure: COLONOSCOPY WITH ANESTHESIA;  Surgeon: Rafita Merida MD;  Location: [facility] ENDOSCOPY;  Service: Gastroenterology   • CORONARY ARTERY BYPASS GRAFT     • DIALYSIS FISTULA CREATION     • ENDOSCOPY N/A 12/27/2018    Procedure: ESOPHAGOGASTRODUODENOSCOPY WITH ANESTHESIA;  Surgeon: Moni Garza MD;  Location: [facility] ENDOSCOPY;  Service: Gastroenterology   • ENDOSCOPY N/A 2/28/2019    Procedure: ESOPHAGOGASTRODUODENOSCOPY WITH ANESTHESIA;  Surgeon: Moni Garza MD;  Location: [facility] ENDOSCOPY;  Service: Gastroenterology   • ENDOSCOPY N/A 3/11/2019    Procedure: ESOPHAGOGASTRODUODENOSCOPY WITH ANESTHESIA;  Surgeon: Moni Garza MD;  Location: [facility] ENDOSCOPY;  Service: Gastroenterology   • ENDOSCOPY N/A 3/27/2019    Procedure: ESOPHAGOGASTRODUODENOSCOPY WITH ANESTHESIA;  Surgeon: Rafita Merida MD;  Location: [facility] ENDOSCOPY;  Service: Gastroenterology   • HIP TROCANTERIC NAILING WITH INTRAMEDULLARY HIP SCREW Right 2/8/2019    Procedure: HIP TROCANTERIC NAILING LONG WITH INTRAMEDULLARY HIP SCREW;  Surgeon: Boo Camacho MD;  Location: [facility] OR;  Service: Orthopedics   • JOINT REPLACEMENT      knee   • LAPAROSCOPIC GASTRIC BANDING     • LEEP     • LUMBAR FUSION     • TOE AMPUTATION Left     2nd toe   • TOTAL KNEE ARTHROPLASTY Bilateral    • TUBAL ABDOMINAL LIGATION     • UPPER GASTROINTESTINAL ENDOSCOPY  10/01/2015     Family History   Problem Relation Age of Onset   • Hypertension Mother    • Cancer Mother         stomach   • Coronary artery disease Father    • Heart attack Father    • Diabetes Brother    •  Coronary artery disease Brother    • Colon cancer Neg Hx    • Colon polyps Neg Hx      Social History     Tobacco Use   • Smoking status: Never Smoker   • Smokeless tobacco: Never Used   Substance Use Topics   • Alcohol use: No   • Drug use: No       (Not in a hospital admission)  Allergies:  Patient has no known allergies.    Objective     Vital Signs  Temp:  [97.6 °F (36.4 °C)] 97.6 °F (36.4 °C)  Heart Rate:  [] 103  Resp:  [18] 18  BP: (109-144)/(40-99) 131/58    Physical Exam:      General Appearance:    Alert, cooperative, in significant pain   Head:    Normocephalic, without obvious abnormality, atraumatic   Eyes:            Lids and lashes normal, conjunctivae and sclerae normal, no   icterus, no pallor, corneas clear, PERRLA   Ears:    Ears appear intact with no abnormalities noted   Neck:   No adenopathy, supple, trachea midline   Back:     No kyphosis present, no scoliosis present, no skin lesions,      erythema or scars, no tenderness to percussion or                   palpation,   range of motion normal   Lungs:     Clear to auscultation,respirations regular, even and                  unlabored    Heart:    Regular rhythm and normal rate, normal S1 and S2, no            murmur, no gallop, no rub, no click   Chest Wall:    No abnormalities observed   Abdomen:    Diffusely tender throughout the abdomen with guarding and rigidity   Rectal:     Deferred   Extremities:   Moves all extremities well, no edema, no cyanosis, no             redness dialysis fistula left upper arm   Pulses:   Pulses palpable and equal bilaterally   Skin:   No bleeding, bruising or rash   Lymph nodes:   No palpable adenopathy   Neurologic:   No focal deficits       Results Review:      Lab Results (last 72 hours)     Procedure Component Value Units Date/Time    Protime-INR [[id number]]  (Abnormal) Collected:  05/13/19 0023    Specimen:  Blood Updated:  05/13/19 0046     Protime 18.0 Seconds      INR 1.44    aPTT [[id number]]   (Normal) Collected:  05/13/19 0023    Specimen:  Blood Updated:  05/13/19 0046     PTT 34.8 seconds     Blood Culture - Blood, Hand, Right [[id number]] Collected:  05/13/19 0023    Specimen:  Blood from Hand, Right Updated:  05/13/19 0043    BNP [[id number]] Collected:  05/12/19 2133    Specimen:  Blood Updated:  05/13/19 0036    Red Top [[id number]] Collected:  05/13/19 0023    Specimen:  Blood Updated:  05/13/19 0033    [place] Draw [[id number]] Collected:  05/12/19 2133    Specimen:  Blood Updated:  05/13/19 0023    Narrative:       The following orders were created for panel order [place] Draw.  Procedure                               Abnormality         Status                     ---------                               -----------         ------                     Light Blue Top[[id number]]                                   Final result               Green Top (Gel)[[id number]]                                  Final result               Lavender Top[[id number]]                                     Final result               Red Top[[id number]]                                          In process                   Please view results for these tests on the individual orders.    Light Blue Top [[id number]] Collected:  05/12/19 2133    Specimen:  Blood Updated:  05/12/19 2245     Extra Tube hold for add-on     Comment: Auto resulted       Green Top (Gel) [[id number]] Collected:  05/12/19 2133    Specimen:  Blood Updated:  05/12/19 2245     Extra Tube Hold for add-ons.     Comment: Auto resulted.       Lavender Top [[id number]] Collected:  05/12/19 2133    Specimen:  Blood Updated:  05/12/19 2245     Extra Tube hold for add-on     Comment: Auto resulted       Troponin [[id number]]  (Normal) Collected:  05/12/19 2133    Specimen:  Blood Updated:  05/12/19 2206     Troponin I <0.012 ng/mL     Lactic Acid, Plasma [[id number]]  (Normal) Collected:  05/12/19 2148    Specimen:  Blood Updated:  05/12/19 2203     Lactate 1.4 mmol/L      Comprehensive Metabolic Panel [[id number]]  (Abnormal) Collected:  05/12/19 2133    Specimen:  Blood Updated:  05/12/19 2154     Glucose 92 mg/dL      BUN 27 mg/dL      Creatinine 4.51 mg/dL      Sodium 136 mmol/L      Potassium 4.0 mmol/L      Chloride 96 mmol/L      CO2 32.0 mmol/L      Calcium 8.4 mg/dL      Total Protein 6.1 g/dL      Albumin 3.00 g/dL      ALT (SGPT) 17 U/L      AST (SGOT) 35 U/L      Alkaline Phosphatase 215 U/L      Total Bilirubin 0.5 mg/dL      eGFR Non African Amer 9 mL/min/1.73      Comment: <15 Indicative of kidney failure.        eGFR   Amer -- mL/min/1.73      Comment: <15 Indicative of kidney failure.        Globulin 3.1 gm/dL      A/G Ratio 1.0 g/dL      BUN/Creatinine Ratio 6.0     Anion Gap 8.0 mmol/L     Narrative:       GFR Normal >60  Chronic Kidney Disease <60  Kidney Failure <15    Lipase [[id number]]  (Abnormal) Collected:  05/12/19 2133    Specimen:  Blood Updated:  05/12/19 2154     Lipase 14 U/L     CBC & Differential [[id number]] Collected:  05/12/19 2133    Specimen:  Blood Updated:  05/12/19 2144    Narrative:       The following orders were created for panel order CBC & Differential.  Procedure                               Abnormality         Status                     ---------                               -----------         ------                     CBC Auto Differential[[id number]]        Abnormal            Final result                 Please view results for these tests on the individual orders.    CBC Auto Differential [[id number]]  (Abnormal) Collected:  05/12/19 2133    Specimen:  Blood Updated:  05/12/19 2144     WBC 9.31 10*3/mm3      RBC 3.78 10*6/mm3      Hemoglobin 11.9 g/dL      Hematocrit 37.6 %      MCV 99.5 fL      MCH 31.5 pg      MCHC 31.6 g/dL      RDW 16.5 %      RDW-SD 60.2 fl      MPV 10.8 fL      Platelets 197 10*3/mm3      Neutrophil % 87.7 %      Lymphocyte % 6.4 %      Monocyte % 3.4 %      Eosinophil % 1.7 %      Basophil % 0.4 %       Immature Grans % 0.4 %      Neutrophils, Absolute 8.15 10*3/mm3      Lymphocytes, Absolute 0.60 10*3/mm3      Monocytes, Absolute 0.32 10*3/mm3      Eosinophils, Absolute 0.16 10*3/mm3      Basophils, Absolute 0.04 10*3/mm3      Immature Grans, Absolute 0.04 10*3/mm3      nRBC 0.0 /100 WBC         Imaging Results (last 72 hours)     Procedure Component Value Units Date/Time    XR Chest 1 View [[id number]] Updated:  05/13/19 0004    CT Abdomen Pelvis Without Contrast [[id number]] Updated:  05/12/19 2335          Assessment/Plan       * No active hospital problems. *      Her CT scan shows free air and some thickening of the gastric wall.  This probably represents perforated ulcer.  We will proceed with exposure laparotomy.  The risks of bleeding infection injury to structures cardiac complications pulmonary complications death MI prolonged ventilation strokes poor healing were discussed.  We discussed gastric resection versus oversew of an ulcer versus small bowel resection with anastomosis versus colon resection with anastomosis or ostomy.  The daughter was present during the discussion and understands the severity of her illness in the emergency nature and life saving intervention.  She is very high risk and set up for countless possible complications postoperatively.  She understands this and wishes to proceed.      Laila Rodriguez MD  05/13/19  12:50 AM          Electronically signed by Laila Rodriguez MD at 5/13/2019 12:54 AM          Physician Progress Notes (most recent note)      Laila Rodriguez MD at 5/16/2019  8:26 AM          Laila Rodriguez MD Progress Note     LOS: 3 days   Patient Care Team:  Provider, No Known as PCP - Barrett Prasad Jr, MD as PCP - Family Medicine  Moni Garza MD as Consulting Physician (Gastroenterology)  Tomasz San DO as Consulting Physician (Vascular Surgery)        Subjective     Tolerating liquids.  No flatus or bowel movement yet    Objective     Vital  Signs  Temp:  [97.6 °F (36.4 °C)-98.1 °F (36.7 °C)] 98.1 °F (36.7 °C)  Heart Rate:  [] 57  Resp:  [16] 16  BP: ()/(32-47) 110/42    Intake & Output (last 3 days)       05/13 0701 - 05/14 0700 05/14 0701 - 05/15 0700 05/15 0701 - 05/16 0700 05/16 0701 - 05/17 0700    P.O.   120     I.V. (mL/kg) 1835.3 (25.5) 556.2 (7.7) 980 (13.6)     IV Piggyback 100 100 685     Total Intake(mL/kg) 1935.3 (26.9) 656.2 (9.1) 1785 (24.8)     Urine (mL/kg/hr) 0 (0)       Emesis/NG output 150 300      Drains 140 80 70     Other 2000       Stool  0 0     Total Output 2290 380 70     Net -354.7 +276.2 +1715                   Physical Exam:     General Appearance:    Alert, cooperative, in no acute distress   Lungs:     respirations regular, even and unlabored    Heart:    Regular rhythm and normal rate, normal S1 and S2, no            murmur, no gallop, no rub   Chest Wall:    No abnormalities observed   Abdomen:      Soft SATNAM serous   Extremities: No edema,    Results Review:     I reviewed the patient's new clinical results.    Lab Results (last 72 hours)     Procedure Component Value Units Date/Time    POC Glucose Once [[id number]]  (Abnormal) Collected:  05/16/19 0749    Specimen:  Blood Updated:  05/16/19 0800     Glucose 144 mg/dL      Comment: : [id number] Timothy MillerniferMeter ID: [id number]       CBC & Differential [[id number]] Collected:  05/16/19 0510    Specimen:  Blood Updated:  05/16/19 0631    Narrative:       The following orders were created for panel order CBC & Differential.  Procedure                               Abnormality         Status                     ---------                               -----------         ------                     CBC Auto Differential[[id number]]        Abnormal            Final result                 Please view results for these tests on the individual orders.    CBC Auto Differential [[id number]]  (Abnormal) Collected:  05/16/19 0510    Specimen:  Blood Updated:  05/16/19  0631     WBC 9.04 10*3/mm3      RBC 3.04 10*6/mm3      Hemoglobin 9.7 g/dL      Hematocrit 30.5 %      .3 fL      MCH 31.9 pg      MCHC 31.8 g/dL      RDW 17.0 %      RDW-SD 61.1 fl      MPV 10.6 fL      Platelets 152 10*3/mm3     Manual Differential [[id number]]  (Abnormal) Collected:  05/16/19 0510    Specimen:  Blood Updated:  05/16/19 0631     Neutrophil % 79.8 %      Lymphocyte % 15.2 %      Monocyte % 4.0 %      Basophil % 1.0 %      Neutrophils Absolute 7.21 10*3/mm3      Lymphocytes Absolute 1.37 10*3/mm3      Monocytes Absolute 0.36 10*3/mm3      Basophils Absolute 0.09 10*3/mm3      nRBC 1.0 /100 WBC      Anisocytosis Slight/1+     Elliptocytes Slight/1+     Macrocytes Mod/2+     Poikilocytes Slight/1+     WBC Morphology Normal     Platelet Morphology Normal    Phosphorus [[id number]]  (Abnormal) Collected:  05/16/19 0510    Specimen:  Blood Updated:  05/16/19 0621     Phosphorus 2.1 mg/dL     Comprehensive Metabolic Panel [[id number]]  (Abnormal) Collected:  05/16/19 0510    Specimen:  Blood Updated:  05/16/19 0618     Glucose 178 mg/dL      BUN 12 mg/dL      Creatinine 2.21 mg/dL      Sodium 130 mmol/L      Potassium 3.2 mmol/L      Chloride 95 mmol/L      CO2 30.0 mmol/L      Calcium 7.1 mg/dL      Total Protein 4.7 g/dL      Albumin 2.20 g/dL      ALT (SGPT) 15 U/L      AST (SGOT) 17 U/L      Alkaline Phosphatase 117 U/L      Total Bilirubin 0.5 mg/dL      eGFR Non African Amer 21 mL/min/1.73      Globulin 2.5 gm/dL      A/G Ratio 0.9 g/dL      BUN/Creatinine Ratio 5.4     Anion Gap 5.0 mmol/L     Narrative:       GFR Normal >60  Chronic Kidney Disease <60  Kidney Failure <15    Magnesium [[id number]]  (Normal) Collected:  05/16/19 0510    Specimen:  Blood Updated:  05/16/19 0605     Magnesium 1.6 mg/dL     POC Glucose Once [[id number]]  (Abnormal) Collected:  05/16/19 0345    Specimen:  Blood Updated:  05/16/19 0357     Glucose 158 mg/dL      Comment: : [id number] Dar GannonMeter ID: [id number]        Blood Culture - Blood, Hand, Right [[id number]] Collected:  05/13/19 0023    Specimen:  Blood from Hand, Right Updated:  05/16/19 0045     Blood Culture No growth at 3 days    POC Glucose Once [[id number]]  (Abnormal) Collected:  05/15/19 2353    Specimen:  Blood Updated:  05/16/19 0015     Glucose 167 mg/dL      Comment: : [id number] Dar MontoyayceeMeter ID: [id number]       POC Glucose Once [[id number]]  (Abnormal) Collected:  05/15/19 2011    Specimen:  Blood Updated:  05/15/19 2040     Glucose 173 mg/dL      Comment: : [id number] Dar KayceeMeter ID: [id number]       POC Glucose Once [[id number]]  (Normal) Collected:  05/15/19 1712    Specimen:  Blood Updated:  05/15/19 1723     Glucose 110 mg/dL      Comment: : [id number] Timothy GuptaferMeter ID: [id number]       POC Glucose Once [[id number]]  (Abnormal) Collected:  05/15/19 1210    Specimen:  Blood Updated:  05/15/19 1222     Glucose 55 mg/dL      Comment: : [id number] Timothy GuptaferMeter ID: [id number]       Blood Culture - Blood, Arm, Right [[id number]] Collected:  05/13/19 0858    Specimen:  Blood from Arm, Right Updated:  05/15/19 1000     Blood Culture No growth at 2 days    POC Glucose Once [[id number]]  (Normal) Collected:  05/15/19 0917    Specimen:  Blood Updated:  05/15/19 0928     Glucose 107 mg/dL      Comment: : [id number] Timothy GuptaferMeter ID: [id number]       POC Glucose Once [[id number]]  (Abnormal) Collected:  05/15/19 0754    Specimen:  Blood Updated:  05/15/19 0805     Glucose 42 mg/dL      Comment: : [id number] Timothy GuptaferMeter ID: [id number]       POC Glucose Once [[id number]]  (Abnormal) Collected:  05/15/19 0752    Specimen:  Blood Updated:  05/15/19 0804     Glucose 45 mg/dL      Comment: CONFIRMED WITHLABOperator: [id number] Timothy GuptaferMeter ID: [id number]       Phosphorus [[id number]]  (Abnormal) Collected:  05/15/19 0655    Specimen:  Blood Updated:  05/15/19 0756     Phosphorus 4.6 mg/dL     Comprehensive  Metabolic Panel [[id number]]  (Abnormal) Collected:  05/15/19 0655    Specimen:  Blood Updated:  05/15/19 0750     Glucose 40 mg/dL      BUN 36 mg/dL      Creatinine 4.18 mg/dL      Sodium 136 mmol/L      Potassium 4.1 mmol/L      Chloride 101 mmol/L      CO2 24.0 mmol/L      Calcium 7.8 mg/dL      Total Protein 4.9 g/dL      Albumin 2.20 g/dL      ALT (SGPT) 16 U/L      AST (SGOT) 23 U/L      Alkaline Phosphatase 126 U/L      Total Bilirubin 0.6 mg/dL      eGFR Non African Amer 10 mL/min/1.73      Comment: <15 Indicative of kidney failure.        eGFR   Amer -- mL/min/1.73      Comment: <15 Indicative of kidney failure.        Globulin 2.7 gm/dL      A/G Ratio 0.8 g/dL      BUN/Creatinine Ratio 8.6     Anion Gap 11.0 mmol/L     Narrative:       GFR Normal >60  Chronic Kidney Disease <60  Kidney Failure <15    Magnesium [[id number]]  (Normal) Collected:  05/15/19 0655    Specimen:  Blood Updated:  05/15/19 0747     Magnesium 1.7 mg/dL     CBC & Differential [[id number]] Collected:  05/15/19 0655    Specimen:  Blood Updated:  05/15/19 0723    Narrative:       The following orders were created for panel order CBC & Differential.  Procedure                               Abnormality         Status                     ---------                               -----------         ------                     CBC Auto Differential[[id number]]        Abnormal            Final result                 Please view results for these tests on the individual orders.    CBC Auto Differential [[id number]]  (Abnormal) Collected:  05/15/19 0655    Specimen:  Blood Updated:  05/15/19 0723     WBC 9.99 10*3/mm3      RBC 3.26 10*6/mm3      Hemoglobin 10.1 g/dL      Hematocrit 32.5 %      MCV 99.7 fL      MCH 31.0 pg      MCHC 31.1 g/dL      RDW 17.1 %      RDW-SD 62.1 fl      MPV 10.8 fL      Platelets 152 10*3/mm3      Neutrophil % 89.8 %      Lymphocyte % 5.2 %      Monocyte % 3.3 %      Eosinophil % 0.9 %      Basophil % 0.2 %       Immature Grans % 0.6 %      Neutrophils, Absolute 8.97 10*3/mm3      Lymphocytes, Absolute 0.52 10*3/mm3      Monocytes, Absolute 0.33 10*3/mm3      Eosinophils, Absolute 0.09 10*3/mm3      Basophils, Absolute 0.02 10*3/mm3      Immature Grans, Absolute 0.06 10*3/mm3      nRBC 0.2 /100 WBC     Comprehensive Metabolic Panel [[id number]]  (Abnormal) Collected:  05/14/19 0336    Specimen:  Blood Updated:  05/14/19 0411     Glucose 74 mg/dL      BUN 19 mg/dL      Creatinine 2.95 mg/dL      Sodium 136 mmol/L      Potassium 4.0 mmol/L      Chloride 100 mmol/L      CO2 29.0 mmol/L      Calcium 7.5 mg/dL      Total Protein 4.7 g/dL      Albumin 2.20 g/dL      ALT (SGPT) 23 U/L      AST (SGOT) 25 U/L      Alkaline Phosphatase 137 U/L      Total Bilirubin 0.5 mg/dL      eGFR Non African Amer 15 mL/min/1.73      Globulin 2.5 gm/dL      A/G Ratio 0.9 g/dL      BUN/Creatinine Ratio 6.4     Anion Gap 7.0 mmol/L     Narrative:       GFR Normal >60  Chronic Kidney Disease <60  Kidney Failure <15    Magnesium [[id number]]  (Normal) Collected:  05/14/19 0336    Specimen:  Blood Updated:  05/14/19 0411     Magnesium 1.6 mg/dL     Phosphorus [[id number]]  (Normal) Collected:  05/14/19 0336    Specimen:  Blood Updated:  05/14/19 0411     Phosphorus 3.6 mg/dL     CBC & Differential [[id number]] Collected:  05/14/19 0336    Specimen:  Blood Updated:  05/14/19 0403    Narrative:       The following orders were created for panel order CBC & Differential.  Procedure                               Abnormality         Status                     ---------                               -----------         ------                     CBC Auto Differential[[id number]]        Abnormal            Final result                 Please view results for these tests on the individual orders.    CBC Auto Differential [[id number]]  (Abnormal) Collected:  05/14/19 0336    Specimen:  Blood Updated:  05/14/19 0403     WBC 14.28 10*3/mm3      RBC 3.18 10*6/mm3       Hemoglobin 10.2 g/dL      Hematocrit 32.3 %      .6 fL      MCH 32.1 pg      MCHC 31.6 g/dL      RDW 16.9 %      RDW-SD 62.8 fl      MPV 10.8 fL      Platelets 143 10*3/mm3      Neutrophil % 92.1 %      Lymphocyte % 3.6 %      Monocyte % 3.6 %      Eosinophil % 0.0 %      Basophil % 0.1 %      Immature Grans % 0.6 %      Neutrophils, Absolute 13.16 10*3/mm3      Lymphocytes, Absolute 0.51 10*3/mm3      Monocytes, Absolute 0.52 10*3/mm3      Eosinophils, Absolute 0.00 10*3/mm3      Basophils, Absolute 0.01 10*3/mm3      Immature Grans, Absolute 0.08 10*3/mm3      nRBC 0.0 /100 WBC     Troponin [[id number]]  (Normal) Collected:  05/13/19 2051    Specimen:  Blood Updated:  05/13/19 2131     Troponin I <0.012 ng/mL     Troponin [[id number]]  (Normal) Collected:  05/13/19 1407    Specimen:  Blood Updated:  05/13/19 1452     Troponin I <0.012 ng/mL     Hepatitis B Surface Antibody [[id number]]  (Abnormal) Collected:  05/13/19 0903    Specimen:  Blood Updated:  05/13/19 1023     Hepatitis Bs Ab Index <5.00     Hep B S Ab Non-Immune    Hepatitis Panel, Acute [[id number]]  (Normal) Collected:  05/13/19 0903    Specimen:  Blood Updated:  05/13/19 1023     HCV S/C Ratio 0.10     Hepatitis C Ab Negative     Hep A IgM Negative     Hep B C IgM Negative     Hepatitis B Surface Ag Negative    Phosphorus [[id number]]  (Normal) Collected:  05/13/19 0858    Specimen:  Blood Updated:  05/13/19 0931     Phosphorus 4.3 mg/dL     Magnesium [[id number]]  (Normal) Collected:  05/13/19 0858    Specimen:  Blood Updated:  05/13/19 0931     Magnesium 1.6 mg/dL     CBC & Differential [[id number]] Collected:  05/13/19 0858    Specimen:  Blood Updated:  05/13/19 0924    Narrative:       The following orders were created for panel order CBC & Differential.  Procedure                               Abnormality         Status                     ---------                               -----------         ------                     CBC Auto  Differential[[id number]]        Abnormal            Final result                 Please view results for these tests on the individual orders.    CBC Auto Differential [[id number]]  (Abnormal) Collected:  19    Specimen:  Blood Updated:  19     WBC 20.56 10*3/mm3      RBC 3.42 10*6/mm3      Hemoglobin 10.9 g/dL      Hematocrit 34.6 %      .2 fL      MCH 31.9 pg      MCHC 31.5 g/dL      RDW 16.7 %      RDW-SD 62.2 fl      MPV 10.6 fL      Platelets 147 10*3/mm3      Neutrophil % 95.5 %      Lymphocyte % 1.8 %      Monocyte % 1.8 %      Eosinophil % 0.0 %      Basophil % 0.2 %      Immature Grans % 0.7 %      Neutrophils, Absolute 19.64 10*3/mm3      Lymphocytes, Absolute 0.37 10*3/mm3      Monocytes, Absolute 0.36 10*3/mm3      Eosinophils, Absolute 0.00 10*3/mm3      Basophils, Absolute 0.04 10*3/mm3      Immature Grans, Absolute 0.15 10*3/mm3      nRBC 0.0 /100 WBC         Imaging Results (last 72 hours)     ** No results found for the last 72 hours. **              Assessment/Plan       Abdominal pain    Hypoglycemia    Paroxysmal atrial fibrillation (CMS/HCC)      Will advance to full liquids      Laila Rodriguez MD  19  8:26 AM        Electronically signed by Laila Rodriguez MD at 2019  8:26 AM          Physical Therapy Notes (most recent note)      Melinda Floyd, PT at 2019  9:50 AM  Version 1 of 1         Acute Care - Physical Therapy Initial Evaluation  [facility]     Patient Name: Cheri Ely  : 1940  MRN: [id number]  Today's Date: 2019   Onset of Illness/Injury or Date of Surgery: 19  Date of Referral to PT: 19  Referring Physician: Dr. Dean      Admit Date: 2019    Visit Dx:     ICD-10-CM ICD-9-CM   1. Abdominal pain, unspecified abdominal location R10.9 789.00   2. Intra-abdominal free air of unknown etiology K66.8 568.89   3. Generalized weakness R53.1 780.79     Patient Active Problem List   Diagnosis   •  Hypertension   • Hyperlipidemia   • Chronic kidney disease on chronic dialysis (CMS/HCC)   • Closed fracture of ramus of left pubis (CMS/HCC)   • Occlusion of superior mesenteric artery (CMS/HCC)   • Chronic mesenteric ischemia (CMS/HCC)   • Black tarry stools   • Hx of gastritis   • Acute gastric ulcer with hemorrhage   • Closed displaced intertrochanteric fracture of right femur (CMS/HCC)   • Displaced intertrochanteric fracture of right femur, initial encounter for closed fracture (CMS/HCC)   • Hypotension   • Acute blood loss anemia   • GI bleed   • Gastrointestinal hemorrhage   • (HFpEF) heart failure with preserved ejection fraction (CMS/HCC)   • Hypothyroid   • Diastolic dysfunction   • Diastolic heart failure (CMS/HCC)   • Volume overload   • AVM (arteriovenous malformation) of small bowel, acquired (CMS/HCC)   • Abdominal pain   • Hypoglycemia   • Paroxysmal atrial fibrillation (CMS/HCC)     Past Medical History:   Diagnosis Date   • Arthritis    • Carotid artery disease (CMS/HCC)    • CHF (congestive heart failure) (CMS/HCC)    • Chronic kidney disease on chronic dialysis (CMS/HCC)    • Chronic renal failure     on dialysis ON MON, WED, FRI   • Coronary artery disease    • Disease of thyroid gland    • Diverticulitis    • Gastric ulcer    • History of transfusion    • Hyperlipidemia    • Hypertension    • Injury of back    • Mesenteric artery insufficiency (CMS/HCC)    • Multilevel degenerative disc disease    • Osteoporosis    • Pancreatitis    • Pelvis fracture (CMS/HCC)    • Pneumonia      Past Surgical History:   Procedure Laterality Date   • AORTAGRAM Right 1/8/2018    Procedure: MESENTERIC ANGIOGRAM, GROIN ACCESS, MYNX CLOSURE;  Surgeon: Tomasz San DO;  Location: [facility] OR;  Service:    • AORTIC VALVE SURGERY     • APPENDECTOMY     • BACK SURGERY     • CAPSULE ENDOSCOPY N/A 3/30/2019    Procedure: CAPSULE ENDOSCOPY M2A;  Surgeon: David Yu MD;  Location: [facility] ENDOSCOPY;  Service:  Gastroenterology   • CARDIAC SURGERY     • CAROTID ENDARTERECTOMY Bilateral    • CATARACT EXTRACTION, BILATERAL     • CERVICAL SPINE SURGERY     • CHOLECYSTECTOMY     • COLON SURGERY     • COLONOSCOPY  10/01/2015   • COLONOSCOPY N/A 3/28/2019    Procedure: COLONOSCOPY WITH ANESTHESIA;  Surgeon: Rafita Merida MD;  Location: [facility] ENDOSCOPY;  Service: Gastroenterology   • CORONARY ARTERY BYPASS GRAFT     • DIALYSIS FISTULA CREATION     • ENDOSCOPY N/A 12/27/2018    Procedure: ESOPHAGOGASTRODUODENOSCOPY WITH ANESTHESIA;  Surgeon: Moni Garza MD;  Location: [facility] ENDOSCOPY;  Service: Gastroenterology   • ENDOSCOPY N/A 2/28/2019    Procedure: ESOPHAGOGASTRODUODENOSCOPY WITH ANESTHESIA;  Surgeon: Moni Garza MD;  Location: [facility] ENDOSCOPY;  Service: Gastroenterology   • ENDOSCOPY N/A 3/11/2019    Procedure: ESOPHAGOGASTRODUODENOSCOPY WITH ANESTHESIA;  Surgeon: Moni Garza MD;  Location: [facility] ENDOSCOPY;  Service: Gastroenterology   • ENDOSCOPY N/A 3/27/2019    Procedure: ESOPHAGOGASTRODUODENOSCOPY WITH ANESTHESIA;  Surgeon: Rafita Merida MD;  Location: [facility] ENDOSCOPY;  Service: Gastroenterology   • EXPLORATORY LAPAROTOMY N/A 5/13/2019    Procedure: LAPAROTOMY EXPLORATORY, OVERSEW DUODENAL ULCER WITH FRED PATCH, KOCHER MANEUVER;  Surgeon: Laila Rodriguez MD;  Location: [facility] OR;  Service: General   • HIP TROCANTERIC NAILING WITH INTRAMEDULLARY HIP SCREW Right 2/8/2019    Procedure: HIP TROCANTERIC NAILING LONG WITH INTRAMEDULLARY HIP SCREW;  Surgeon: Boo Camacho MD;  Location: [facility] OR;  Service: Orthopedics   • JOINT REPLACEMENT      knee   • LAPAROSCOPIC GASTRIC BANDING     • LEEP     • LUMBAR FUSION     • TOE AMPUTATION Left     2nd toe   • TOTAL KNEE ARTHROPLASTY Bilateral    • TUBAL ABDOMINAL LIGATION     • UPPER GASTROINTESTINAL ENDOSCOPY  10/01/2015        PT ASSESSMENT (last 12 hours)      Physical Therapy Evaluation     Row Name 05/16/19 0800          PT Evaluation Time/Intention  "   Subjective Information  complains of \"soreness\"; can't move my L shld-been a problem the last [facility]  -     Document Type  evaluation;other (see comments) see MAR  -JE     Mode of Treatment  physical therapy  -JE     Patient Effort  good  -JE     Symptoms Noted During/After Treatment  increased pain;fatigue  -JE     Comment  per RN, pt falls frequently at home; fell in February 2019 w/ femur fx  -JE     Row Name 05/16/19 0800          General Information    Patient Profile Reviewed?  yes  -JE     Onset of Illness/Injury or Date of Surgery  05/12/19  -     Referring Physician  Dr. Dean  -     Patient Observations  alert;cooperative;agree to therapy  -JE     Patient/Family Observations  no family present  -     General Observations of Patient  sitting up in bed w/ tray in front of her, IV infusing, PCA, SCDs, telemetry, neck support  -JE     Prior Level of Function  independent:;all household mobility;gait;transfer;bed mobility;ADL's;feeding;grooming;dressing;bathing;cooking;driving;min assist:;community mobility;dependent:;cleaning;shopping  -     Equipment Currently Used at Home  rollator;wheelchair  -JE     Pertinent History of Current Functional Problem  Admitted w/ c/os abdominal pain, hypoglycemia, and paroxysmal afib.  Pt w/ perforated duodenal ulcer S/p exp lap, lysis of adhesions, mobilization of the duodenum w/ Kocher maneuver oversew duodenal ulcer w/ Cedric patch 5/13/19;  Pt also w/ end stage renal disease on hemodialysis, acute blood loss anemia, diastolic ///chf w/ bioprosthetic aortic valve.  -JE     Existing Precautions/Restrictions  fall  -JE     Limitations/Impairments  other (see comments) pain  -JE     Equipment Issued to Patient  gait belt;walker, front wheeled  -JE     Risks Reviewed  patient:;LOB;nausea/vomiting;dizziness;increased discomfort;change in vital signs;increased drainage;lines disloged;other (comment) fall risk  -JE     Benefits Reviewed  patient:;improve " function;increase independence;increase strength;increase balance;decrease pain;decrease risk of DVT;improve skin integrity;increase knowledge;other (comment) safety/falls prevention  -     Barriers to Rehab  previous functional deficit  -     Row Name 05/16/19 0800          Relationship/Environment    Primary Source of Support/Comfort  child(meagan);other (see comments) granddaughter  -     Name of Support/Comfort Primary Source  Lucero Yrn blancor  -LES     Lives With  child(meagan), adult;grandchild(meagan)  -     Name(s) of Who Lives With Patient  Lucero DUKE     Family Caregiver if Needed  child(meagan), adult  -     Row Name 05/16/19 0800          Resource/Environmental Concerns    Current Living Arrangements  home/apartment/condo  -     Row Name 05/16/19 0800          Cognitive Assessment/Intervention- PT/OT    Orientation Status (Cognition)  oriented x 4  -     Follows Commands (Cognition)  WNL  -     Safety Deficit (Cognitive)  insight into deficits/self awareness;safety precautions awareness  -     Row Name 05/16/19 0800          Safety Issues, Functional Mobility    Safety Issues Affecting Function (Mobility)  friction/shear risk;insight into deficits/self awareness;safety precaution awareness  -     Impairments Affecting Function (Mobility)  balance;endurance/activity tolerance;pain;postural/trunk control;strength  -     Row Name 05/16/19 0800          Bed Mobility Assessment/Treatment    Bed Mobility Assessment/Treatment  rolling left;rolling right;sidelying-sit  -     Rolling Left [place] (Bed Mobility)  moderate assist (50% patient effort);maximum assist (25% patient effort);verbal cues  -     Rolling Right [place] (Bed Mobility)  maximum assist (25% patient effort);verbal cues  -     Sidelying-Sit [place] (Bed Mobility)  moderate assist (50% patient effort);verbal cues  -     Bed Mobility, Safety Issues  decreased use of arms for pushing/pulling  -     Row Name 05/16/19  0800          Transfer Assessment/Treatment    Transfer Assessment/Treatment  sit-stand transfer;stand-sit transfer  -     Sit-Stand [place] (Transfers)  moderate assist (50% patient effort);verbal cues  -     Stand-Sit [place] (Transfers)  minimum assist (75% patient effort);moderate assist (50% patient effort);verbal cues  -     Row Name 05/16/19 0800          Gait/Stairs Assessment/Training    Gait/Stairs Assessment/Training  gait/ambulation independence;distance ambulated;gait pattern;gait deviations  -     [place] Level (Gait)  moderate assist (50% patient effort);verbal cues  -     Assistive Device (Gait)  -- cruises furniture, HHA by therapist  -     Distance in Feet (Gait)  14 ft  -     Pattern (Gait)  step-through  -     Deviations/Abnormal Patterns (Gait)  base of support, narrow;gait speed decreased;stride length decreased  -     Bilateral Gait Deviations  forward flexed posture;foot drop/toe drag;heel strike decreased  -     Comment (Gait/Stairs)  significantly flexed posture; near scissoring of feet w/ significant narrow RONI  -     Row Name 05/16/19 0800          General ROM    GENERAL ROM COMMENTS  AROM all 4 extremities WFLs except B shlds L worse than R w/ both being less than 20%; PROM R shld less than 50%, R ankle A and PROM for DF limited compared to L  -[facility] Name 05/16/19 0800          MMT (Manual Muscle Testing)    General MMT Comments  no formal assessment w/ resistance in the UEs, however, R shld 2/5; moves all other jts w/in available ROM; B hip flexors at least 3/5 noting increase effort on R, B knee flex/extensor 4/5, L ankle DF/PF and R ankle PF 4/5; R ankle DF 2/5; all ms groups assessed in seated position  -     Row Name 05/16/19 0800          Motor Assessment/Intervention    Additional Documentation  Balance (Group)  -     Row Name 05/16/19 0800          Balance    Balance  static sitting balance;static standing balance  -[facility] Name  05/16/19 0800          Static Sitting Balance    Level of [place] (Unsupported Sitting, Static Balance)  supervision  -     Sitting Position (Unsupported Sitting, Static Balance)  sitting on edge of bed  -     Level of [place] (Supported Sitting, Static Balance)  independent  -     Sitting Position (Supported Sitting, Static Balance)  sitting in chair  -     Row Name 05/16/19 0800          Static Standing Balance    Level of [place] (Supported Standing, Static Balance)  moderate assist, 50 to 74% patient effort  -     Assistive Device Utilized (Supported Standing, Static Balance)  other (see comments) support from staff or furniture  -     Row Name 05/16/19 0800          Sensory Assessment/Intervention    Sensory General Assessment  other (see comments) no reported or noted c/os N/T  -     Row Name 05/16/19 0800          Pain Assessment    Additional Documentation  Pain Scale: Numbers Pre/Post-Treatment (Group)  -[facility] Name 05/16/19 0800          Pain Scale: Numbers Pre/Post-Treatment    Pain Scale: Numbers, Pretreatment  4/10  -     Pain Scale: Numbers, Post-Treatment  4/10  -     Pain Location  abdomen and L shld  -[facility] Name 05/16/19 0800          Edema Assessment & Management    Location (Edema)  -- mild swelling B LEs  -     Additional Documentation  Location (Edema) (Row)  -     Row Name             Wound 05/13/19 0259 Other (See comments) abdomen incision    Wound - Properties Group Date first assessed: 05/13/19  -LC Time first assessed: 0259  -LC Side: Other (See comments)  -LC Location: abdomen  -LC Type: incision  -LC    Row Name             Wound 05/13/19 0450 Right upper gluteal pressure injury    Wound - Properties Group Date first assessed: 05/13/19  -HS Time first assessed: 0450  -HS Side: Right  -HS Orientation: upper  -HS Location: gluteal  -HS Type: pressure injury  -HS Stage, Pressure Injury: Stage 2  -HS    Row Name 05/16/19 0800          Coping     Observed Emotional State  accepting;calm;cooperative  -     Verbalized Emotional State  acceptance  -     Row Name 05/16/19 0800          Plan of Care Review    Plan of Care Reviewed With  patient  -     Row Name 05/16/19 0800          Physical Therapy Clinical Impression    Date of Referral to PT  05/14/19  -     Patient/Family Goals Statement (PT Clinical Impression)  decrease pain, improve mobility  -     Criteria for Skilled Interventions Met (PT Clinical Impression)  yes;treatment indicated  -     Impairments Found (describe specific impairments)  aerobic capacity/endurance;ergonomics and body mechanics;gait, locomotion, and balance;muscle performance;posture;ROM;other (see comments) pain  -     Rehab Potential (PT Clinical Summary)  good, to achieve stated therapy goals  -     Predicted Duration of Therapy (PT)  until discharge or goals achieved  -     Care Plan Review (PT)  evaluation/treatment results reviewed;care plan/treatment goals reviewed;risks/benefits reviewed;current/potential barriers reviewed;patient/other agree to care plan  -     Row Name 05/16/19 0800          Physical Therapy Goals    Bed Mobility Goal Selection (PT)  bed mobility, PT goal 1;bed mobility, PT goal 2  -     Transfer Goal Selection (PT)  transfer, PT goal 1  -     Gait Training Goal Selection (PT)  gait training, PT goal 1  -     Row Name 05/16/19 0800          Bed Mobility Goal 1 (PT)    Activity/Assistive Device (Bed Mobility Goal 1, PT)  rolling to left;rolling to right;sit to supine;supine to sit  -     [place] Level/Cues Needed (Bed Mobility Goal 1, PT)  contact guard assist  -     Time Frame (Bed Mobility Goal 1, PT)  long term goal (LTG);10 days  -     Progress/Outcomes (Bed Mobility Goal 1, PT)  goal ongoing  -     Row Name 05/16/19 0800          Bed Mobility Goal 2 (PT)    Activity/Assistive Device (Bed Mobility Goal 2, PT)  scooting  -     [place] Level/Cues Needed (Bed  Mobility Goal 2, PT)  minimum assist (75% or more patient effort)  -JE     Time Frame (Bed Mobility Goal 2, PT)  long term goal (LTG);10 days  -JE     Progress/Outcomes (Bed Mobility Goal 2, PT)  goal ongoing  -interspireSubmit     Row Name 05/16/19 0800          Transfer Goal 1 (PT)    Activity/Assistive Device (Transfer Goal 1, PT)  sit-to-stand/stand-to-sit;bed-to-chair/chair-to-bed;walker, rolling  -JE     [place] Level/Cues Needed (Transfer Goal 1, PT)  contact guard assist  -JE     Time Frame (Transfer Goal 1, PT)  long term goal (LTG);10 days  -JE     Progress/Outcome (Transfer Goal 1, PT)  goal ongoing  -interspireSubmit     Row Name 05/16/19 0800          Gait Training Goal 1 (PT)    Activity/Assistive Device (Gait Training Goal 1, PT)  gait (walking locomotion);assistive device use;decrease fall risk;forward stepping;backward stepping;improve balance and speed;increase endurance/gait distance;increase energy conservation;normalize weight shifts;walker, rolling  -JE     [place] Level (Gait Training Goal 1, PT)  minimum assist (75% or more patient effort);contact guard assist  -JE     Distance (Gait Goal 1, PT)  50 ft w/ less than or equal to 1 standing rest  -JE     Time Frame (Gait Training Goal 1, PT)  long term goal (LTG);10 days  -JE     Progress/Outcome (Gait Training Goal 1, PT)  goal ongoing  -interspireSubmit     Row Name 05/16/19 0800          Patient Education Goal (PT)    Activity (Patient Education Goal, PT)  activies to assist w/ edema mgmt & reduce risk associated post operatively  -JE     [place]/Cues/Accuracy (Memory Goal 2, PT)  independent;demonstrates adequately;verbalizes understanding  -JE     Time Frame (Patient Education Goal, PT)  long term goal (LTG);10 days  -JE     Progress/Outcome (Patient Education Goal, PT)  goal ongoing  -interspireSubmit     Row Name 05/16/19 0800          Positioning and Restraints    Post Treatment Position  chair  -JE     In Chair  reclined;call light within reach;encouraged to call for  assist;legs elevated  -     Row Name 05/16/19 0800          Living Environment    Home Accessibility  wheelchair accessible;other (see comments) reports she does a sponge bath  -       User Key  (r) = Recorded By, (t) = Taken By, (c) = Cosigned By    Initials Name Provider Type     Katelyn Garibay, RN Registered Nurse    Peggy Solorio RN Registered Nurse    Melinda Wiley, PT Physical Therapist        Physical Therapy Education     Title: PT OT SLP Therapies (Done)     Topic: Physical Therapy (Done)     Point: Mobility training (Done)     Learning Progress Summary           Patient Acceptance, E,TB,D, VU,DU,NR by  at 5/16/2019  9:24 AM    Comment:  Education re: purpose of PT/importance of act; educ re activities to assist w/ edema mgmt to include LE elevation; educ for improved tech w/ bed mob, tfers & gt; educ for 15-20 aps & 5 deep breaths q hour awake; educ for improved tech w/ deep breathing                   Point: Home exercise program (Done)     Learning Progress Summary           Patient Acceptance, E,TB,D, VU,DU,NR by  at 5/16/2019  9:24 AM    Comment:  Education re: purpose of PT/importance of act; educ re activities to assist w/ edema mgmt to include LE elevation; educ for improved tech w/ bed mob, tfers & gt; educ for 15-20 aps & 5 deep breaths q hour awake; educ for improved tech w/ deep breathing                   Point: Precautions (Done)     Learning Progress Summary           Patient Acceptance, E,TB,D, VU,DU,NR by  at 5/16/2019  9:24 AM    Comment:  Education re: purpose of PT/importance of act; educ re activities to assist w/ edema mgmt to include LE elevation; educ for improved tech w/ bed mob, tfers & gt; educ for 15-20 aps & 5 deep breaths q hour awake; educ for improved tech w/ deep breathing                               User Key     Initials Effective Dates Name Provider Type [facility] 08/02/18 -  Melinda Floyd, PT Physical Therapist PT              PT  Recommendation and Plan  Anticipated Discharge Disposition (PT): home with 24/7 care, home with home health, skilled nursing facility  Planned Therapy Interventions (PT Eval): balance training, bed mobility training, gait training, home exercise program, neuromuscular re-education, patient/family education, postural re-education, ROM (range of motion), strengthening, transfer training, other (see comments)(safety/falls prevention, edema/pain mgmt)  Therapy Frequency (PT Clinical Impression): 2 times/day  Outcome Summary/Treatment Plan (PT)  Anticipated Discharge Disposition (PT): home with 24/7 care, home with home health, skilled nursing facility  Plan of Care Reviewed With: patient  Progress: improving  Outcome Summary: PT eval completed.  Pt w/ c/os pain at abdomen and chronic pain at L shld noting weakness and decrease ROM B shlds.  Pt w/ longstanding weakness at R ankle also w/ generalized weakness throughout.  Pt w/ flexed kyphotic posture and unable to stand upright.  Pt performs bed mobility and tfers w/ mod to max of 1.  Slowed pace w/ narrow RONI w/ decrease foot clearance and step length w/ high risk for falls. Pt will benefit from continued PT services to improve overall strength, balance, activity tolerance, safety awareness, reduciing fall risk increasing I w/ functional mobility.  Feel pt needs 24/7 assistance at this time.  Would recommend subacute care in swing bed or SNF at discharge.  However, if unable to accomadate due to dialysis, home w/ assist and HH.  Outcome Measures     Row Name 05/16/19 0900             How much help from another person do you currently need...    Turning from your back to your side while in flat bed without using bedrails?  2  -JE      Moving from lying on back to sitting on the side of a flat bed without bedrails?  2  -JE      Moving to and from a bed to a chair (including a wheelchair)?  2  -JE      Standing up from a chair using your arms (e.g., wheelchair, bedside  chair)?  2  -JE      Climbing 3-5 steps with a railing?  1  -JE      To walk in hospital room?  2  -JE      AM-PAC 6 Clicks Score  11  -         Functional Assessment    Outcome Measure Options  AM-PAC 6 Clicks Basic Mobility (PT)  -        User Key  (r) = Recorded By, (t) = Taken By, (c) = Cosigned By    Initials Name Provider Type    Melinda Wiley, PT Physical Therapist         Time Calculation:   PT Charges     Row Name 05/16/19 0949             Time Calculation    Start Time  0829  -      Stop Time  0944  -      Time Calculation (min)  75 min  -      PT Received On  05/16/19  -      PT Goal Re-Cert Due Date  05/26/19  -        User Key  (r) = Recorded By, (t) = Taken By, (c) = Cosigned By    Initials Name Provider Type    Melinda Wiley, PT Physical Therapist        Therapy Charges for Today     Code Description Service Date Service Provider Modifiers Qty    14641744450 HC PT EVAL MOD COMPLEXITY 4 5/16/2019 Melinda Floyd, PT GP 1    55252240018 HC GAIT TRAINING EA 15 MIN 5/16/2019 Melinda Floyd, PT GP 1          PT G-Codes  Outcome Measure Options: AM-PAC 6 Clicks Basic Mobility (PT)  AM-PAC 6 Clicks Score: 11      Melinda Floyd PT  5/16/2019         Electronically signed by Melinda Floyd, PT at 5/16/2019  9:51 AM

## 2019-05-16 NOTE — THERAPY EVALUATION
Acute Care - Physical Therapy Initial Evaluation  [facility]     Patient Name: Cheri Ely  : 1940  MRN: [id number]  Today's Date: 2019   Onset of Illness/Injury or Date of Surgery: 19  Date of Referral to PT: 19  Referring Physician: Dr. Dean      Admit Date: 2019    Visit Dx:     ICD-10-CM ICD-9-CM   1. Abdominal pain, unspecified abdominal location R10.9 789.00   2. Intra-abdominal free air of unknown etiology K66.8 568.89   3. Generalized weakness R53.1 780.79     Patient Active Problem List   Diagnosis   • Hypertension   • Hyperlipidemia   • Chronic kidney disease on chronic dialysis (CMS/[facility])   • Closed fracture of ramus of left pubis (CMS/[facility])   • Occlusion of superior mesenteric artery (CMS/[facility])   • Chronic mesenteric ischemia (CMS/[facility])   • Black tarry stools   • Hx of gastritis   • Acute gastric ulcer with hemorrhage   • Closed displaced intertrochanteric fracture of right femur (CMS/[facility])   • Displaced intertrochanteric fracture of right femur, initial encounter for closed fracture (CMS/[facility])   • Hypotension   • Acute blood loss anemia   • GI bleed   • Gastrointestinal hemorrhage   • (HFpEF) heart failure with preserved ejection fraction (CMS/[facility])   • Hypothyroid   • Diastolic dysfunction   • Diastolic heart failure (CMS/[facility])   • Volume overload   • AVM (arteriovenous malformation) of small bowel, acquired (CMS/[facility])   • Abdominal pain   • Hypoglycemia   • Paroxysmal atrial fibrillation (CMS/[facility])     Past Medical History:   Diagnosis Date   • Arthritis    • Carotid artery disease (CMS/[facility])    • CHF (congestive heart failure) (CMS/[facility])    • Chronic kidney disease on chronic dialysis (CMS/[facility])    • Chronic renal failure     on dialysis ON MON, WED, FRI   • Coronary artery disease    • Disease of thyroid gland    • Diverticulitis    • Gastric ulcer    • History of transfusion    • Hyperlipidemia    • Hypertension    • Injury of back    • Mesenteric artery insufficiency  (CMS/HCC)    • Multilevel degenerative disc disease    • Osteoporosis    • Pancreatitis    • Pelvis fracture (CMS/HCC)    • Pneumonia      Past Surgical History:   Procedure Laterality Date   • AORTAGRAM Right 1/8/2018    Procedure: MESENTERIC ANGIOGRAM, GROIN ACCESS, MYNX CLOSURE;  Surgeon: Tomasz San DO;  Location: [facility] OR;  Service:    • AORTIC VALVE SURGERY     • APPENDECTOMY     • BACK SURGERY     • CAPSULE ENDOSCOPY N/A 3/30/2019    Procedure: CAPSULE ENDOSCOPY M2A;  Surgeon: David Yu MD;  Location: [facility] ENDOSCOPY;  Service: Gastroenterology   • CARDIAC SURGERY     • CAROTID ENDARTERECTOMY Bilateral    • CATARACT EXTRACTION, BILATERAL     • CERVICAL SPINE SURGERY     • CHOLECYSTECTOMY     • COLON SURGERY     • COLONOSCOPY  10/01/2015   • COLONOSCOPY N/A 3/28/2019    Procedure: COLONOSCOPY WITH ANESTHESIA;  Surgeon: Rafita Merida MD;  Location: [facility] ENDOSCOPY;  Service: Gastroenterology   • CORONARY ARTERY BYPASS GRAFT     • DIALYSIS FISTULA CREATION     • ENDOSCOPY N/A 12/27/2018    Procedure: ESOPHAGOGASTRODUODENOSCOPY WITH ANESTHESIA;  Surgeon: Moni Garza MD;  Location: [facility] ENDOSCOPY;  Service: Gastroenterology   • ENDOSCOPY N/A 2/28/2019    Procedure: ESOPHAGOGASTRODUODENOSCOPY WITH ANESTHESIA;  Surgeon: Moni Garza MD;  Location: [facility] ENDOSCOPY;  Service: Gastroenterology   • ENDOSCOPY N/A 3/11/2019    Procedure: ESOPHAGOGASTRODUODENOSCOPY WITH ANESTHESIA;  Surgeon: Moni Garza MD;  Location: [facility] ENDOSCOPY;  Service: Gastroenterology   • ENDOSCOPY N/A 3/27/2019    Procedure: ESOPHAGOGASTRODUODENOSCOPY WITH ANESTHESIA;  Surgeon: Rafita Merida MD;  Location: [facility] ENDOSCOPY;  Service: Gastroenterology   • EXPLORATORY LAPAROTOMY N/A 5/13/2019    Procedure: LAPAROTOMY EXPLORATORY, OVERSEW DUODENAL ULCER WITH FRED PATCH, KOCHER MANEUVER;  Surgeon: Laila Rodriguez MD;  Location: [facility] OR;  Service: General   • HIP TROCANTERIC NAILING WITH INTRAMEDULLARY HIP  "SCREW Right 2/8/2019    Procedure: HIP TROCANTERIC NAILING LONG WITH INTRAMEDULLARY HIP SCREW;  Surgeon: Boo Camacho MD;  Location: [facility] OR;  Service: Orthopedics   • JOINT REPLACEMENT      knee   • LAPAROSCOPIC GASTRIC BANDING     • LEEP     • LUMBAR FUSION     • TOE AMPUTATION Left     2nd toe   • TOTAL KNEE ARTHROPLASTY Bilateral    • TUBAL ABDOMINAL LIGATION     • UPPER GASTROINTESTINAL ENDOSCOPY  10/01/2015        PT ASSESSMENT (last 12 hours)      Physical Therapy Evaluation     Row Name 05/16/19 0800          PT Evaluation Time/Intention    Subjective Information  complains of \"soreness\"; can't move my L shld-been a problem the last [facility]  -     Document Type  evaluation;other (see comments) see MAR  -     Mode of Treatment  physical therapy  -     Patient Effort  good  -     Symptoms Noted During/After Treatment  increased pain;fatigue  -JE     Comment  per RN, pt falls frequently at home; fell in February 2019 w/ femur fx  -JE     Row Name 05/16/19 0800          General Information    Patient Profile Reviewed?  yes  -JE     Onset of Illness/Injury or Date of Surgery  05/12/19  -     Referring Physician  Dr. Dean  -     Patient Observations  alert;cooperative;agree to therapy  -     Patient/Family Observations  no family present  -     General Observations of Patient  sitting up in bed w/ tray in front of her, IV infusing, PCA, SCDs, telemetry, neck support  -     Prior Level of Function  independent:;all household mobility;gait;transfer;bed mobility;ADL's;feeding;grooming;dressing;bathing;cooking;driving;min assist:;community mobility;dependent:;cleaning;shopping  -     Equipment Currently Used at Home  rollator;wheelchair  -JE     Pertinent History of Current Functional Problem  Admitted w/ c/os abdominal pain, hypoglycemia, and paroxysmal afib.  Pt w/ perforated duodenal ulcer S/p exp lap, lysis of adhesions, mobilization of the duodenum w/ Kocher maneuver oversew duodenal " ulcer w/ Cedric patch 5/13/19;  Pt also w/ end stage renal disease on hemodialysis, acute blood loss anemia, diastolic ///chf w/ bioprosthetic aortic valve.  -JE     Existing Precautions/Restrictions  fall  -JE     Limitations/Impairments  other (see comments) pain  -JE     Equipment Issued to Patient  gait belt;walker, front wheeled  -JE     Risks Reviewed  patient:;LOB;nausea/vomiting;dizziness;increased discomfort;change in vital signs;increased drainage;lines disloged;other (comment) fall risk  -JE     Benefits Reviewed  patient:;improve function;increase independence;increase strength;increase balance;decrease pain;decrease risk of DVT;improve skin integrity;increase knowledge;other (comment) safety/falls prevention  -     Barriers to Rehab  previous functional deficit  -LES     Row Name 05/16/19 0800          Relationship/Environment    Primary Source of Support/Comfort  child(meagan);other (see comments) granddaughter  -LES     Name of Support/Comfort Primary Source  Lucero Pool dtr  -LES     Lives With  child(meagan), adult;grandchild(meagan)  -LES     Name(s) of Who Lives With Patient  Lucero DUKE     Family Caregiver if Needed  child(meagan), adult  -LES     Row Name 05/16/19 0800          Resource/Environmental Concerns    Current Living Arrangements  home/apartment/condo  -     Row Name 05/16/19 0800          Cognitive Assessment/Intervention- PT/OT    Orientation Status (Cognition)  oriented x 4  -JE     Follows Commands (Cognition)  WNL  -     Safety Deficit (Cognitive)  insight into deficits/self awareness;safety precautions awareness  -     Row Name 05/16/19 0800          Safety Issues, Functional Mobility    Safety Issues Affecting Function (Mobility)  friction/shear risk;insight into deficits/self awareness;safety precaution awareness  -     Impairments Affecting Function (Mobility)  balance;endurance/activity tolerance;pain;postural/trunk control;strength  -     Row Name 05/16/19 0800          Bed  Mobility Assessment/Treatment    Bed Mobility Assessment/Treatment  rolling left;rolling right;sidelying-sit  -     Rolling Left [place] (Bed Mobility)  moderate assist (50% patient effort);maximum assist (25% patient effort);verbal cues  -     Rolling Right [place] (Bed Mobility)  maximum assist (25% patient effort);verbal cues  -JE     Sidelying-Sit [place] (Bed Mobility)  moderate assist (50% patient effort);verbal cues  -     Bed Mobility, Safety Issues  decreased use of arms for pushing/pulling  -     Row Name 05/16/19 0800          Transfer Assessment/Treatment    Transfer Assessment/Treatment  sit-stand transfer;stand-sit transfer  -     Sit-Stand [place] (Transfers)  moderate assist (50% patient effort);verbal cues  -     Stand-Sit [place] (Transfers)  minimum assist (75% patient effort);moderate assist (50% patient effort);verbal cues  -     Row Name 05/16/19 0800          Gait/Stairs Assessment/Training    Gait/Stairs Assessment/Training  gait/ambulation independence;distance ambulated;gait pattern;gait deviations  -     [place] Level (Gait)  moderate assist (50% patient effort);verbal cues  -     Assistive Device (Gait)  -- cruises furniture, HHA by therapist  -     Distance in Feet (Gait)  14 ft  -     Pattern (Gait)  step-through  -     Deviations/Abnormal Patterns (Gait)  base of support, narrow;gait speed decreased;stride length decreased  -     Bilateral Gait Deviations  forward flexed posture;foot drop/toe drag;heel strike decreased  -     Comment (Gait/Stairs)  significantly flexed posture; near scissoring of feet w/ significant narrow RONI  -     Row Name 05/16/19 0800          General ROM    GENERAL ROM COMMENTS  AROM all 4 extremities WFLs except B shlds L worse than R w/ both being less than 20%; PROM R shld less than 50%, R ankle A and PROM for DF limited compared to L  -     Row Name 05/16/19 0800          MMT (Manual Muscle  Testing)    General MMT Comments  no formal assessment w/ resistance in the UEs, however, R shld 2/5; moves all other jts w/in available ROM; B hip flexors at least 3/5 noting increase effort on R, B knee flex/extensor 4/5, L ankle DF/PF and R ankle PF 4/5; R ankle DF 2/5; all ms groups assessed in seated position  -JE     Row Name 05/16/19 0800          Motor Assessment/Intervention    Additional Documentation  Balance (Group)  -JE     Row Name 05/16/19 0800          Balance    Balance  static sitting balance;static standing balance  -JE     Row Name 05/16/19 0800          Static Sitting Balance    Level of [place] (Unsupported Sitting, Static Balance)  supervision  -     Sitting Position (Unsupported Sitting, Static Balance)  sitting on edge of bed  -     Level of [place] (Supported Sitting, Static Balance)  independent  -     Sitting Position (Supported Sitting, Static Balance)  sitting in chair  -JE     Row Name 05/16/19 0800          Static Standing Balance    Level of [place] (Supported Standing, Static Balance)  moderate assist, 50 to 74% patient effort  -     Assistive Device Utilized (Supported Standing, Static Balance)  other (see comments) support from staff or furniture  -JE     Row Name 05/16/19 0800          Sensory Assessment/Intervention    Sensory General Assessment  other (see comments) no reported or noted c/os N/T  -JE     Row Name 05/16/19 0800          Pain Assessment    Additional Documentation  Pain Scale: Numbers Pre/Post-Treatment (Group)  -JE     Row Name 05/16/19 0800          Pain Scale: Numbers Pre/Post-Treatment    Pain Scale: Numbers, Pretreatment  4/10  -     Pain Scale: Numbers, Post-Treatment  4/10  -     Pain Location  abdomen and L shld  -[facility] 05/16/19 0800          Edema Assessment & Management    Location (Edema)  -- mild swelling B LEs  -     Additional Documentation  Location (Edema) (Row)  -JE     Row Name             Wound 05/13/19  0259 Other (See comments) abdomen incision    Wound - Properties Group Date first assessed: 05/13/19  -LC Time first assessed: 0259  -LC Side: Other (See comments)  -LC Location: abdomen  -LC Type: incision  -LC    Row Name             Wound 05/13/19 0450 Right upper gluteal pressure injury    Wound - Properties Group Date first assessed: 05/13/19  -HS Time first assessed: 0450  -HS Side: Right  -HS Orientation: upper  -HS Location: gluteal  -HS Type: pressure injury  -HS Stage, Pressure Injury: Stage 2  -HS    Row Name 05/16/19 0800          Coping    Observed Emotional State  accepting;calm;cooperative  -     Verbalized Emotional State  acceptance  -     Row Name 05/16/19 0800          Plan of Care Review    Plan of Care Reviewed With  patient  -     Row Name 05/16/19 0800          Physical Therapy Clinical Impression    Date of Referral to PT  05/14/19  -     Patient/Family Goals Statement (PT Clinical Impression)  decrease pain, improve mobility  -     Criteria for Skilled Interventions Met (PT Clinical Impression)  yes;treatment indicated  -     Impairments Found (describe specific impairments)  aerobic capacity/endurance;ergonomics and body mechanics;gait, locomotion, and balance;muscle performance;posture;ROM;other (see comments) pain  -     Rehab Potential (PT Clinical Summary)  good, to achieve stated therapy goals  -     Predicted Duration of Therapy (PT)  until discharge or goals achieved  -     Care Plan Review (PT)  evaluation/treatment results reviewed;care plan/treatment goals reviewed;risks/benefits reviewed;current/potential barriers reviewed;patient/other agree to care plan  -     Row Name 05/16/19 0800          Physical Therapy Goals    Bed Mobility Goal Selection (PT)  bed mobility, PT goal 1;bed mobility, PT goal 2  -     Transfer Goal Selection (PT)  transfer, PT goal 1  -     Gait Training Goal Selection (PT)  gait training, PT goal 1  -     Row Name 05/16/19 0800           Bed Mobility Goal 1 (PT)    Activity/Assistive Device (Bed Mobility Goal 1, PT)  rolling to left;rolling to right;sit to supine;supine to sit  -JE     [place] Level/Cues Needed (Bed Mobility Goal 1, PT)  contact guard assist  -JE     Time Frame (Bed Mobility Goal 1, PT)  long term goal (LTG);10 days  -JE     Progress/Outcomes (Bed Mobility Goal 1, PT)  goal ongoing  -[facility]     Row Name 05/16/19 0800          Bed Mobility Goal 2 (PT)    Activity/Assistive Device (Bed Mobility Goal 2, PT)  scooting  -JE     [place] Level/Cues Needed (Bed Mobility Goal 2, PT)  minimum assist (75% or more patient effort)  -JE     Time Frame (Bed Mobility Goal 2, PT)  long term goal (LTG);10 days  -JE     Progress/Outcomes (Bed Mobility Goal 2, PT)  goal ongoing  -[facility]     Row Name 05/16/19 0800          Transfer Goal 1 (PT)    Activity/Assistive Device (Transfer Goal 1, PT)  sit-to-stand/stand-to-sit;bed-to-chair/chair-to-bed;walker, rolling  -JE     [place] Level/Cues Needed (Transfer Goal 1, PT)  contact guard assist  -JE     Time Frame (Transfer Goal 1, PT)  long term goal (LTG);10 days  -JE     Progress/Outcome (Transfer Goal 1, PT)  goal ongoing  -[facility]     Row Name 05/16/19 0800          Gait Training Goal 1 (PT)    Activity/Assistive Device (Gait Training Goal 1, PT)  gait (walking locomotion);assistive device use;decrease fall risk;forward stepping;backward stepping;improve balance and speed;increase endurance/gait distance;increase energy conservation;normalize weight shifts;walker, rolling  -JE     [place] Level (Gait Training Goal 1, PT)  minimum assist (75% or more patient effort);contact guard assist  -JE     Distance (Gait Goal 1, PT)  50 ft w/ less than or equal to 1 standing rest  -JE     Time Frame (Gait Training Goal 1, PT)  long term goal (LTG);10 days  -JE     Progress/Outcome (Gait Training Goal 1, PT)  goal ongoing  -[facility]     Row Name 05/16/19 0800          Patient Education Goal (PT)    Activity  (Patient Education Goal, PT)  activies to assist w/ edema mgmt & reduce risk associated post operatively  -     [place]/Cues/Accuracy (Memory Goal 2, PT)  independent;demonstrates adequately;verbalizes understanding  -     Time Frame (Patient Education Goal, PT)  long term goal (LTG);10 days  -     Progress/Outcome (Patient Education Goal, PT)  goal ongoing  -LES     Row Name 05/16/19 0800          Positioning and Restraints    Post Treatment Position  chair  -     In Chair  reclined;call light within reach;encouraged to call for assist;legs elevated  -     Row Name 05/16/19 0800          Living Environment    Home Accessibility  wheelchair accessible;other (see comments) reports she does a sponge bath  -       User Key  (r) = Recorded By, (t) = Taken By, (c) = Cosigned By    Initials Name Provider Type    Katelyn Lowe, RN Registered Nurse    Peggy Solorio RN Registered Nurse    Melinda Wiley, PT Physical Therapist        Physical Therapy Education     Title: PT OT SLP Therapies (Done)     Topic: Physical Therapy (Done)     Point: Mobility training (Done)     Learning Progress Summary           Patient Acceptance, E,TB,D, GIRMA,SHELLEY,NR by LES at 5/16/2019  9:24 AM    Comment:  Education re: purpose of PT/importance of act; educ re activities to assist w/ edema mgmt to include LE elevation; educ for improved tech w/ bed mob, tfers & gt; educ for 15-20 aps & 5 deep breaths q hour awake; educ for improved tech w/ deep breathing                   Point: Home exercise program (Done)     Learning Progress Summary           Patient Acceptance, E,TB,D, GIRMA,SHELLEY,NR by  at 5/16/2019  9:24 AM    Comment:  Education re: purpose of PT/importance of act; educ re activities to assist w/ edema mgmt to include LE elevation; educ for improved tech w/ bed mob, tfers & gt; educ for 15-20 aps & 5 deep breaths q hour awake; educ for improved tech w/ deep breathing                   Point: Precautions (Done)      Learning Progress Summary           Patient Acceptance, E,TB,D, VU,DU,NR by LES at 5/16/2019  9:24 AM    Comment:  Education re: purpose of PT/importance of act; educ re activities to assist w/ edema mgmt to include LE elevation; educ for improved tech w/ bed mob, tfers & gt; educ for 15-20 aps & 5 deep breaths q hour awake; educ for improved tech w/ deep breathing                               User Key     Initials Effective Dates Name Provider Type Discipline    LES 08/02/18 -  Melinda Floyd, PT Physical Therapist PT              PT Recommendation and Plan  Anticipated Discharge Disposition (PT): home with 24/7 care, home with home health, skilled nursing facility  Planned Therapy Interventions (PT Eval): balance training, bed mobility training, gait training, home exercise program, neuromuscular re-education, patient/family education, postural re-education, ROM (range of motion), strengthening, transfer training, other (see comments)(safety/falls prevention, edema/pain mgmt)  Therapy Frequency (PT Clinical Impression): 2 times/day  Outcome Summary/Treatment Plan (PT)  Anticipated Discharge Disposition (PT): home with 24/7 care, home with home health, skilled nursing facility  Plan of Care Reviewed With: patient  Progress: improving  Outcome Summary: PT eval completed.  Pt w/ c/os pain at abdomen and chronic pain at L shld noting weakness and decrease ROM B shlds.  Pt w/ longstanding weakness at R ankle also w/ generalized weakness throughout.  Pt w/ flexed kyphotic posture and unable to stand upright.  Pt performs bed mobility and tfers w/ mod to max of 1.  Slowed pace w/ narrow RONI w/ decrease foot clearance and step length w/ high risk for falls. Pt will benefit from continued PT services to improve overall strength, balance, activity tolerance, safety awareness, reduciing fall risk increasing I w/ functional mobility.  Feel pt needs 24/7 assistance at this time.  Would recommend subacute care in swing bed  or SNF at discharge.  However, if unable to accomadate due to dialysis, home w/ assist and HH.  Outcome Measures     Row Name 05/16/19 0900             How much help from another person do you currently need...    Turning from your back to your side while in flat bed without using bedrails?  2  -JE      Moving from lying on back to sitting on the side of a flat bed without bedrails?  2  -JE      Moving to and from a bed to a chair (including a wheelchair)?  2  -JE      Standing up from a chair using your arms (e.g., wheelchair, bedside chair)?  2  -JE      Climbing 3-5 steps with a railing?  1  -JE      To walk in hospital room?  2  -JE      AM-PAC 6 Clicks Score  11  -         Functional Assessment    Outcome Measure Options  AM-PAC 6 Clicks Basic Mobility (PT)  -        User Key  (r) = Recorded By, (t) = Taken By, (c) = Cosigned By    Initials Name Provider Type    Melinda Wiley, PT Physical Therapist         Time Calculation:   PT Charges     Row Name 05/16/19 0949             Time Calculation    Start Time  0829  -      Stop Time  0944  -      Time Calculation (min)  75 min  -      PT Received On  05/16/19  -      PT Goal Re-Cert Due Date  05/26/19  -        User Key  (r) = Recorded By, (t) = Taken By, (c) = Cosigned By    Initials Name Provider Type    Melinda Wiley, PT Physical Therapist        Therapy Charges for Today     Code Description Service Date Service Provider Modifiers Qty    37517032639 HC PT EVAL MOD COMPLEXITY 4 5/16/2019 Melinda Floyd, PT GP 1    17070148349 HC GAIT TRAINING EA 15 MIN 5/16/2019 Melinda Floyd, PT GP 1          PT G-Codes  Outcome Measure Options: AM-PAC 6 Clicks Basic Mobility (PT)  AM-PAC 6 Clicks Score: 11      Melinda Floyd PT  5/16/2019

## 2019-05-16 NOTE — PROGRESS NOTES
Automatic IV to PO Pharmacy Note - General    Cheri Ely is a 79 y.o. female who meets the following criteria for IV to PO therapy conversion :     · Tolerating oral fluids or 40ml/hour of enteral nutrition and oral route not otherwise compromised  · Receiving other oral medications on a scheduled basis    Assessment/Plan  Based on this criteria, Protonix 40 mg IV QAM has been changed to Protonix 40 mg PO QAM per the directives and guidelines established by [facility] and Therapeutics Committee and [facility] Medical Executive Committee .       Bhavin Elliott, PharmD  05/16/193:27 PM

## 2019-05-16 NOTE — PROGRESS NOTES
Laila Rodriguez MD Progress Note     LOS: 3 days   Patient Care Team:  Provider, No Known as PCP - Barrett Prasad Jr, MD as PCP - Family Medicine  Moni Garza MD as Consulting Physician (Gastroenterology)  Tomasz San DO as Consulting Physician (Vascular Surgery)        Subjective     Tolerating liquids.  No flatus or bowel movement yet    Objective     Vital Signs  Temp:  [97.6 °F (36.4 °C)-98.1 °F (36.7 °C)] 98.1 °F (36.7 °C)  Heart Rate:  [] 57  Resp:  [16] 16  BP: ()/(32-47) 110/42    Intake & Output (last 3 days)       05/13 0701 - 05/14 0700 05/14 0701 - 05/15 0700 05/15 0701 - 05/16 0700 05/16 0701 - 05/17 0700    P.O.   120     I.V. (mL/kg) 1835.3 (25.5) 556.2 (7.7) 980 (13.6)     IV Piggyback 100 100 685     Total Intake(mL/kg) 1935.3 (26.9) 656.2 (9.1) 1785 (24.8)     Urine (mL/kg/hr) 0 (0)       Emesis/NG output 150 300      Drains 140 80 70     Other 2000       Stool  0 0     Total Output 2290 380 70     Net -354.7 +276.2 +1715                   Physical Exam:     General Appearance:    Alert, cooperative, in no acute distress   Lungs:     respirations regular, even and unlabored    Heart:    Regular rhythm and normal rate, normal S1 and S2, no            murmur, no gallop, no rub   Chest Wall:    No abnormalities observed   Abdomen:      Soft SATNAM serous   Extremities: No edema,    Results Review:     I reviewed the patient's new clinical results.    Lab Results (last 72 hours)     Procedure Component Value Units Date/Time    POC Glucose Once [[id number]]  (Abnormal) Collected:  05/16/19 0749    Specimen:  Blood Updated:  05/16/19 0800     Glucose 144 mg/dL      Comment: : [id number] MillerniferMeter ID: [id number]       CBC & Differential [[id number]] Collected:  05/16/19 0510    Specimen:  Blood Updated:  05/16/19 0631    Narrative:       The following orders were created for panel order CBC & Differential.  Procedure                               Abnormality          Status                     ---------                               -----------         ------                     CBC Auto Differential[[id number]]        Abnormal            Final result                 Please view results for these tests on the individual orders.    CBC Auto Differential [[id number]]  (Abnormal) Collected:  05/16/19 0510    Specimen:  Blood Updated:  05/16/19 0631     WBC 9.04 10*3/mm3      RBC 3.04 10*6/mm3      Hemoglobin 9.7 g/dL      Hematocrit 30.5 %      .3 fL      MCH 31.9 pg      MCHC 31.8 g/dL      RDW 17.0 %      RDW-SD 61.1 fl      MPV 10.6 fL      Platelets 152 10*3/mm3     Manual Differential [[id number]]  (Abnormal) Collected:  05/16/19 0510    Specimen:  Blood Updated:  05/16/19 0631     Neutrophil % 79.8 %      Lymphocyte % 15.2 %      Monocyte % 4.0 %      Basophil % 1.0 %      Neutrophils Absolute 7.21 10*3/mm3      Lymphocytes Absolute 1.37 10*3/mm3      Monocytes Absolute 0.36 10*3/mm3      Basophils Absolute 0.09 10*3/mm3      nRBC 1.0 /100 WBC      Anisocytosis Slight/1+     Elliptocytes Slight/1+     Macrocytes Mod/2+     Poikilocytes Slight/1+     WBC Morphology Normal     Platelet Morphology Normal    Phosphorus [[id number]]  (Abnormal) Collected:  05/16/19 0510    Specimen:  Blood Updated:  05/16/19 0621     Phosphorus 2.1 mg/dL     Comprehensive Metabolic Panel [[id number]]  (Abnormal) Collected:  05/16/19 0510    Specimen:  Blood Updated:  05/16/19 0618     Glucose 178 mg/dL      BUN 12 mg/dL      Creatinine 2.21 mg/dL      Sodium 130 mmol/L      Potassium 3.2 mmol/L      Chloride 95 mmol/L      CO2 30.0 mmol/L      Calcium 7.1 mg/dL      Total Protein 4.7 g/dL      Albumin 2.20 g/dL      ALT (SGPT) 15 U/L      AST (SGOT) 17 U/L      Alkaline Phosphatase 117 U/L      Total Bilirubin 0.5 mg/dL      eGFR Non African Amer 21 mL/min/1.73      Globulin 2.5 gm/dL      A/G Ratio 0.9 g/dL      BUN/Creatinine Ratio 5.4     Anion Gap 5.0 mmol/L     Narrative:       GFR Normal  >60  Chronic Kidney Disease <60  Kidney Failure <15    Magnesium [[id number]]  (Normal) Collected:  05/16/19 0510    Specimen:  Blood Updated:  05/16/19 0605     Magnesium 1.6 mg/dL     POC Glucose Once [[id number]]  (Abnormal) Collected:  05/16/19 0345    Specimen:  Blood Updated:  05/16/19 0357     Glucose 158 mg/dL      Comment: : [id number] Dar MontoyayceeMeter ID: [id number]       Blood Culture - Blood, Hand, Right [[id number]] Collected:  05/13/19 0023    Specimen:  Blood from Hand, Right Updated:  05/16/19 0045     Blood Culture No growth at 3 days    POC Glucose Once [[id number]]  (Abnormal) Collected:  05/15/19 2353    Specimen:  Blood Updated:  05/16/19 0015     Glucose 167 mg/dL      Comment: : [id number] Dar MontoyayceeMeter ID: [id number]       POC Glucose Once [[id number]]  (Abnormal) Collected:  05/15/19 2011    Specimen:  Blood Updated:  05/15/19 2040     Glucose 173 mg/dL      Comment: : [id number] Dar MontoyayceeMeter ID: [id number]       POC Glucose Once [[id number]]  (Normal) Collected:  05/15/19 1712    Specimen:  Blood Updated:  05/15/19 1723     Glucose 110 mg/dL      Comment: : [id number] Timothy GuptaferMeter ID: [id number]       POC Glucose Once [[id number]]  (Abnormal) Collected:  05/15/19 1210    Specimen:  Blood Updated:  05/15/19 1222     Glucose 55 mg/dL      Comment: : [id number] Timothy GuptaferMeter ID: [id number]       Blood Culture - Blood, Arm, Right [[id number]] Collected:  05/13/19 0858    Specimen:  Blood from Arm, Right Updated:  05/15/19 1000     Blood Culture No growth at 2 days    POC Glucose Once [[id number]]  (Normal) Collected:  05/15/19 0917    Specimen:  Blood Updated:  05/15/19 0928     Glucose 107 mg/dL      Comment: : [id number] Timothy GuptaferMeter ID: [id number]       POC Glucose Once [[id number]]  (Abnormal) Collected:  05/15/19 0754    Specimen:  Blood Updated:  05/15/19 0805     Glucose 42 mg/dL      Comment: : [id number] Timothy Jefferson ID:  [id number]       POC Glucose Once [[id number]]  (Abnormal) Collected:  05/15/19 0752    Specimen:  Blood Updated:  05/15/19 0804     Glucose 45 mg/dL      Comment: CONFIRMED WITHLABOperator: [id number] Timothy MillerniferMeter ID: [id number]       Phosphorus [[id number]]  (Abnormal) Collected:  05/15/19 0655    Specimen:  Blood Updated:  05/15/19 0756     Phosphorus 4.6 mg/dL     Comprehensive Metabolic Panel [[id number]]  (Abnormal) Collected:  05/15/19 0655    Specimen:  Blood Updated:  05/15/19 0750     Glucose 40 mg/dL      BUN 36 mg/dL      Creatinine 4.18 mg/dL      Sodium 136 mmol/L      Potassium 4.1 mmol/L      Chloride 101 mmol/L      CO2 24.0 mmol/L      Calcium 7.8 mg/dL      Total Protein 4.9 g/dL      Albumin 2.20 g/dL      ALT (SGPT) 16 U/L      AST (SGOT) 23 U/L      Alkaline Phosphatase 126 U/L      Total Bilirubin 0.6 mg/dL      eGFR Non African Amer 10 mL/min/1.73      Comment: <15 Indicative of kidney failure.        eGFR   Amer -- mL/min/1.73      Comment: <15 Indicative of kidney failure.        Globulin 2.7 gm/dL      A/G Ratio 0.8 g/dL      BUN/Creatinine Ratio 8.6     Anion Gap 11.0 mmol/L     Narrative:       GFR Normal >60  Chronic Kidney Disease <60  Kidney Failure <15    Magnesium [[id number]]  (Normal) Collected:  05/15/19 0655    Specimen:  Blood Updated:  05/15/19 0747     Magnesium 1.7 mg/dL     CBC & Differential [[id number]] Collected:  05/15/19 0655    Specimen:  Blood Updated:  05/15/19 0723    Narrative:       The following orders were created for panel order CBC & Differential.  Procedure                               Abnormality         Status                     ---------                               -----------         ------                     CBC Auto Differential[[id number]]        Abnormal            Final result                 Please view results for these tests on the individual orders.    CBC Auto Differential [[id number]]  (Abnormal) Collected:  05/15/19 0655    Specimen:   Blood Updated:  05/15/19 0723     WBC 9.99 10*3/mm3      RBC 3.26 10*6/mm3      Hemoglobin 10.1 g/dL      Hematocrit 32.5 %      MCV 99.7 fL      MCH 31.0 pg      MCHC 31.1 g/dL      RDW 17.1 %      RDW-SD 62.1 fl      MPV 10.8 fL      Platelets 152 10*3/mm3      Neutrophil % 89.8 %      Lymphocyte % 5.2 %      Monocyte % 3.3 %      Eosinophil % 0.9 %      Basophil % 0.2 %      Immature Grans % 0.6 %      Neutrophils, Absolute 8.97 10*3/mm3      Lymphocytes, Absolute 0.52 10*3/mm3      Monocytes, Absolute 0.33 10*3/mm3      Eosinophils, Absolute 0.09 10*3/mm3      Basophils, Absolute 0.02 10*3/mm3      Immature Grans, Absolute 0.06 10*3/mm3      nRBC 0.2 /100 WBC     Comprehensive Metabolic Panel [[id number]]  (Abnormal) Collected:  05/14/19 0336    Specimen:  Blood Updated:  05/14/19 0411     Glucose 74 mg/dL      BUN 19 mg/dL      Creatinine 2.95 mg/dL      Sodium 136 mmol/L      Potassium 4.0 mmol/L      Chloride 100 mmol/L      CO2 29.0 mmol/L      Calcium 7.5 mg/dL      Total Protein 4.7 g/dL      Albumin 2.20 g/dL      ALT (SGPT) 23 U/L      AST (SGOT) 25 U/L      Alkaline Phosphatase 137 U/L      Total Bilirubin 0.5 mg/dL      eGFR Non African Amer 15 mL/min/1.73      Globulin 2.5 gm/dL      A/G Ratio 0.9 g/dL      BUN/Creatinine Ratio 6.4     Anion Gap 7.0 mmol/L     Narrative:       GFR Normal >60  Chronic Kidney Disease <60  Kidney Failure <15    Magnesium [[id number]]  (Normal) Collected:  05/14/19 0336    Specimen:  Blood Updated:  05/14/19 0411     Magnesium 1.6 mg/dL     Phosphorus [[id number]]  (Normal) Collected:  05/14/19 0336    Specimen:  Blood Updated:  05/14/19 0411     Phosphorus 3.6 mg/dL     CBC & Differential [[id number]] Collected:  05/14/19 0336    Specimen:  Blood Updated:  05/14/19 0403    Narrative:       The following orders were created for panel order CBC & Differential.  Procedure                               Abnormality         Status                     ---------                                -----------         ------                     CBC Auto Differential[[id number]]        Abnormal            Final result                 Please view results for these tests on the individual orders.    CBC Auto Differential [[id number]]  (Abnormal) Collected:  05/14/19 0336    Specimen:  Blood Updated:  05/14/19 0403     WBC 14.28 10*3/mm3      RBC 3.18 10*6/mm3      Hemoglobin 10.2 g/dL      Hematocrit 32.3 %      .6 fL      MCH 32.1 pg      MCHC 31.6 g/dL      RDW 16.9 %      RDW-SD 62.8 fl      MPV 10.8 fL      Platelets 143 10*3/mm3      Neutrophil % 92.1 %      Lymphocyte % 3.6 %      Monocyte % 3.6 %      Eosinophil % 0.0 %      Basophil % 0.1 %      Immature Grans % 0.6 %      Neutrophils, Absolute 13.16 10*3/mm3      Lymphocytes, Absolute 0.51 10*3/mm3      Monocytes, Absolute 0.52 10*3/mm3      Eosinophils, Absolute 0.00 10*3/mm3      Basophils, Absolute 0.01 10*3/mm3      Immature Grans, Absolute 0.08 10*3/mm3      nRBC 0.0 /100 WBC     Troponin [[id number]]  (Normal) Collected:  05/13/19 2051    Specimen:  Blood Updated:  05/13/19 2131     Troponin I <0.012 ng/mL     Troponin [[id number]]  (Normal) Collected:  05/13/19 1407    Specimen:  Blood Updated:  05/13/19 1452     Troponin I <0.012 ng/mL     Hepatitis B Surface Antibody [[id number]]  (Abnormal) Collected:  05/13/19 0903    Specimen:  Blood Updated:  05/13/19 1023     Hepatitis Bs Ab Index <5.00     Hep B S Ab Non-Immune    Hepatitis Panel, Acute [[id number]]  (Normal) Collected:  05/13/19 0903    Specimen:  Blood Updated:  05/13/19 1023     HCV S/C Ratio 0.10     Hepatitis C Ab Negative     Hep A IgM Negative     Hep B C IgM Negative     Hepatitis B Surface Ag Negative    Phosphorus [[id number]]  (Normal) Collected:  05/13/19 0858    Specimen:  Blood Updated:  05/13/19 0931     Phosphorus 4.3 mg/dL     Magnesium [[id number]]  (Normal) Collected:  05/13/19 0858    Specimen:  Blood Updated:  05/13/19 0931     Magnesium 1.6 mg/dL     CBC &  Differential [[id number]] Collected:  05/13/19 0858    Specimen:  Blood Updated:  05/13/19 0924    Narrative:       The following orders were created for panel order CBC & Differential.  Procedure                               Abnormality         Status                     ---------                               -----------         ------                     CBC Auto Differential[[id number]]        Abnormal            Final result                 Please view results for these tests on the individual orders.    CBC Auto Differential [[id number]]  (Abnormal) Collected:  05/13/19 0858    Specimen:  Blood Updated:  05/13/19 0924     WBC 20.56 10*3/mm3      RBC 3.42 10*6/mm3      Hemoglobin 10.9 g/dL      Hematocrit 34.6 %      .2 fL      MCH 31.9 pg      MCHC 31.5 g/dL      RDW 16.7 %      RDW-SD 62.2 fl      MPV 10.6 fL      Platelets 147 10*3/mm3      Neutrophil % 95.5 %      Lymphocyte % 1.8 %      Monocyte % 1.8 %      Eosinophil % 0.0 %      Basophil % 0.2 %      Immature Grans % 0.7 %      Neutrophils, Absolute 19.64 10*3/mm3      Lymphocytes, Absolute 0.37 10*3/mm3      Monocytes, Absolute 0.36 10*3/mm3      Eosinophils, Absolute 0.00 10*3/mm3      Basophils, Absolute 0.04 10*3/mm3      Immature Grans, Absolute 0.15 10*3/mm3      nRBC 0.0 /100 WBC         Imaging Results (last 72 hours)     ** No results found for the last 72 hours. **              Assessment/Plan       Abdominal pain    Hypoglycemia    Paroxysmal atrial fibrillation (CMS/HCC)      Will advance to full liquids      Laila Rodriguez MD  05/16/19  8:26 AM

## 2019-05-16 NOTE — PROGRESS NOTES
Nephrology ([facility]) Progress Note      Patient:  Cheri Ely  YOB: 1940  Date of Service: 5/16/2019  MRN: [id number]   Acct: [id number]   Primary Care Physician: Provider, No Known  Advance Directive:   Code Status and Medical Interventions:   Ordered at: 05/13/19 0438     Level Of Support Discussed With:    Patient     Code Status:    CPR     Medical Interventions (Level of Support Prior to Arrest):    Full     Admit Date: 5/12/2019       Hospital Day: 3  Referring Provider: Laila Rodriguez MD      Patient personally seen and examined.  Complete chart including Consults, Notes, Operative Reports, Labs, Cardiology, and Radiology studies reviewed as able.        Subjective:  Cheri Ely is a 79 y.o. female  whom we were consulted for end stage renal disease management. Patient has routine hemodialysis Monday Wednesday Friday at [facility].  No recent issues with dialysis.  Recurrent history of GI bleeding. Presented this time with acute onset abdominal pain; imaging showed free air in abdomen and was taken for exp lap by Dr Hughes. Had repair of perforated duodenal ulcer.  Moved to medical floor on 5/14.  Has been tolerating HD well.    Today is fatigued but no new complaints.  No new overnight issues.       Allergies:  Patient has no known allergies.    Home Meds:  Medications Prior to Admission   Medication Sig Dispense Refill Last Dose   • acetaminophen (TYLENOL) 325 MG tablet Take 2 tablets by mouth Every 6 (Six) Hours As Needed for Mild Pain .   Past Month at Unknown time   • aspirin 81 MG EC tablet Take 1 tablet by mouth Daily.   3/23/2019   • B Complex-C-Folic Acid (JOHN-SANGEETA) tablet Take 1 tablet by mouth Daily.   3/23/2019   • carvedilol (COREG) 3.125 MG tablet Take 3.125 mg by mouth Daily. Monday, Wednesday, and Friday dose. Hold if SBP < 100.   3/22/2019   • carvedilol (COREG) 3.125 MG tablet Take 3.125 mg by mouth 2 (Two) Times a Day With Meals.  Sunday, Tuesday, Thursday, and Saturday dose. Hold if SBP <100.   3/23/2019   • Cholecalciferol (VITAMIN D) 2000 units capsule Take 1 capsule by mouth Daily. 30 capsule     • docusate sodium 100 MG capsule Take 100 mg by mouth 2 (Two) Times a Day. 60 each 1 3/23/2019   • epoetin lucy (EPOGEN,PROCRIT) 19771 UNIT/ML injection Inject 1 mL under the skin into the appropriate area as directed 3 (Three) Times a Week.      • lactulose 20 GM/30ML solution solution Take 30 mL by mouth Daily As Needed (Constipation). 30 mL  3/23/2019   • levothyroxine (SYNTHROID, LEVOTHROID) 150 MCG tablet Take 150 mcg by mouth Daily.   3/23/2019   • losartan (COZAAR) 25 MG tablet Take 25 mg by mouth 3 (Three) Times a Week. Monday, Wednesday, and Friday.   3/23/2019   • ondansetron ODT (ZOFRAN-ODT) 4 MG disintegrating tablet Take 4 mg by mouth Every 8 (Eight) Hours As Needed for Nausea or Vomiting.   Past Month at Unknown time   • pantoprazole (PROTONIX) 40 MG EC tablet Take 1 tablet by mouth Daily. 30 tablet 11 3/23/2019   • Polyethyl Glyc-Propyl Glyc PF 0.4-0.3 % solution ophthalmic solution Administer 2 drops to both eyes Every 2 (Two) Hours As Needed (dry eyes).   Past Month at Unknown time   • pravastatin (PRAVACHOL) 40 MG tablet Take 40 mg by mouth Daily.   3/23/2019   • sertraline (ZOLOFT) 50 MG tablet Take 50 mg by mouth Daily.   3/23/2019   • sucralfate (CARAFATE) 1 GM/10ML suspension Take 10 mL by mouth 2 (Two) Times a Day. 1200 mL 0        Medicines:  Current Facility-Administered Medications   Medication Dose Route Frequency Provider Last Rate Last Dose   • dextrose (D50W) 25 g/ 50mL Intravenous Solution 25 g  25 g Intravenous Q15 Min PRN Fabien Espinosa MD   25 g at 05/15/19 1221   • dextrose (GLUTOSE) oral gel 15 g  15 g Oral Q15 Min PRN Fabien Espinosa MD       • dextrose 10 % infusion  100 mL/hr Intravenous Continuous Fabien Espinosa  mL/hr at 05/15/19 2142 100 mL/hr at 05/15/19 2142   •  enoxaparin (LOVENOX) syringe 30 mg  30 mg Subcutaneous Daily Laila Rodriguez MD   30 mg at 05/15/19 0805   • glucagon (human recombinant) (GLUCAGEN DIAGNOSTIC) injection 1 mg  1 mg Subcutaneous Q15 Min PRN Fabien Espinosa MD       • lidocaine (LIDODERM) 5 % 1 patch  1 patch Transdermal Q24H Fabien Espinosa MD   1 patch at 05/15/19 1015   • metoprolol tartrate (LOPRESSOR) injection 2.5 mg  2.5 mg Intravenous Q12H Jony Concepcion MD   2.5 mg at 05/16/19 0541   • Morphine sulfate PCA 1 mg/mL 30 mL syringe   Intravenous Continuous Laila Rodriguez MD       • naloxone (NARCAN) injection 0.1 mg  0.1 mg Intravenous Q5 Min PRN Laila Rodriguez MD       • ondansetron (ZOFRAN) tablet 4 mg  4 mg Oral Q6H PRN Laila Rodriguez MD        Or   • ondansetron (ZOFRAN) injection 4 mg  4 mg Intravenous Q6H PRN Laila Rodriguez MD       • pantoprazole (PROTONIX) injection 40 mg  40 mg Intravenous Q AM Laila Rodriguez MD   40 mg at 05/16/19 0541   • piperacillin-tazobactam (ZOSYN) 3.375 g in iso-osmotic dextrose 50 ml (premix)  3.375 g Intravenous Q12H Laila Rodriguez MD 0 mL/hr at 05/15/19 0506 3.375 g at 05/16/19 0126   • sodium chloride 0.9 % flush 10 mL  10 mL Intravenous PRN Ashlyn Mackey APRN       • sodium chloride 0.9 % infusion  10 mL/hr Intravenous Continuous Laila Rodriguez MD 10 mL/hr at 05/15/19 0052 10 mL/hr at 05/15/19 0052       Past Medical History:  Past Medical History:   Diagnosis Date   • Arthritis    • Carotid artery disease (CMS/HCC)    • CHF (congestive heart failure) (CMS/HCC)    • Chronic kidney disease on chronic dialysis (CMS/HCC)    • Chronic renal failure     on dialysis ON MON, WED, FRI   • Coronary artery disease    • Disease of thyroid gland    • Diverticulitis    • Gastric ulcer    • History of transfusion    • Hyperlipidemia    • Hypertension    • Injury of back    • Mesenteric artery insufficiency (CMS/HCC)    • Multilevel degenerative disc disease    • Osteoporosis     • Pancreatitis    • Pelvis fracture (CMS/HCC)    • Pneumonia        Past Surgical History:  Past Surgical History:   Procedure Laterality Date   • AORTAGRAM Right 1/8/2018    Procedure: MESENTERIC ANGIOGRAM, GROIN ACCESS, MYNX CLOSURE;  Surgeon: Tomasz San DO;  Location: [facility] OR;  Service:    • AORTIC VALVE SURGERY     • APPENDECTOMY     • BACK SURGERY     • CAPSULE ENDOSCOPY N/A 3/30/2019    Procedure: CAPSULE ENDOSCOPY M2A;  Surgeon: David Yu MD;  Location: [facility] ENDOSCOPY;  Service: Gastroenterology   • CARDIAC SURGERY     • CAROTID ENDARTERECTOMY Bilateral    • CATARACT EXTRACTION, BILATERAL     • CERVICAL SPINE SURGERY     • CHOLECYSTECTOMY     • COLON SURGERY     • COLONOSCOPY  10/01/2015   • COLONOSCOPY N/A 3/28/2019    Procedure: COLONOSCOPY WITH ANESTHESIA;  Surgeon: Rafita Merida MD;  Location: [facility] ENDOSCOPY;  Service: Gastroenterology   • CORONARY ARTERY BYPASS GRAFT     • DIALYSIS FISTULA CREATION     • ENDOSCOPY N/A 12/27/2018    Procedure: ESOPHAGOGASTRODUODENOSCOPY WITH ANESTHESIA;  Surgeon: Moni Garza MD;  Location: [facility] ENDOSCOPY;  Service: Gastroenterology   • ENDOSCOPY N/A 2/28/2019    Procedure: ESOPHAGOGASTRODUODENOSCOPY WITH ANESTHESIA;  Surgeon: Moni Garza MD;  Location:  [facility] ENDOSCOPY;  Service: Gastroenterology   • ENDOSCOPY N/A 3/11/2019    Procedure: ESOPHAGOGASTRODUODENOSCOPY WITH ANESTHESIA;  Surgeon: Moni Garza MD;  Location: [facility] ENDOSCOPY;  Service: Gastroenterology   • ENDOSCOPY N/A 3/27/2019    Procedure: ESOPHAGOGASTRODUODENOSCOPY WITH ANESTHESIA;  Surgeon: Rafita Merida MD;  Location: [facility] ENDOSCOPY;  Service: Gastroenterology   • EXPLORATORY LAPAROTOMY N/A 5/13/2019    Procedure: LAPAROTOMY EXPLORATORY, OVERSEW DUODENAL ULCER WITH FRED PATCH, KOCHER MANEUVER;  Surgeon: Laila Rodriguez MD;  Location: [facility] OR;  Service: General   • HIP TROCANTERIC NAILING WITH INTRAMEDULLARY HIP SCREW Right 2/8/2019    Procedure: HIP  TROCANTERIC NAILING LONG WITH INTRAMEDULLARY HIP SCREW;  Surgeon: Boo Camacho MD;  Location: [facility] OR;  Service: Orthopedics   • JOINT REPLACEMENT      knee   • LAPAROSCOPIC GASTRIC BANDING     • LEEP     • LUMBAR FUSION     • TOE AMPUTATION Left     2nd toe   • TOTAL KNEE ARTHROPLASTY Bilateral    • TUBAL ABDOMINAL LIGATION     • UPPER GASTROINTESTINAL ENDOSCOPY  10/01/2015       Family History  Family History   Problem Relation Age of Onset   • Hypertension Mother    • Cancer Mother         stomach   • Coronary artery disease Father    • Heart attack Father    • Diabetes Brother    • Coronary artery disease Brother    • Colon cancer Neg Hx    • Colon polyps Neg Hx        Social History  Social History     Socioeconomic History   • Marital status:      Spouse name: Not on file   • Number of children: Not on file   • Years of education: Not on file   • Highest education level: Not on file   Tobacco Use   • Smoking status: Never Smoker   • Smokeless tobacco: Never Used   Substance and Sexual Activity   • Alcohol use: No   • Drug use: No   • Sexual activity: Defer         Review of Systems:  History obtained from chart review and the patient  General ROS: No fever or chills  Respiratory ROS: No cough, shortness of breath, wheezing  Cardiovascular ROS: No chest pain or palpitations  Gastrointestinal ROS: No abdominal pain or melena  Genito-Urinary ROS: No dysuria or hematuria  Neurological ROS: no headache or dizziness    Objective:  Patient Vitals for the past 24 hrs:   BP Temp Temp src Pulse Resp SpO2   05/16/19 0809 110/42 98.1 °F (36.7 °C) -- 57 16 96 %   05/16/19 0345 99/40 97.7 °F (36.5 °C) Oral 80 16 96 %   05/15/19 2354 (!) 97/37 97.9 °F (36.6 °C) Oral 79 16 95 %   05/15/19 2236 -- -- -- 83 16 95 %   05/15/19 2003 102/47 98.1 °F (36.7 °C) Oral 84 16 96 %   05/15/19 1705 111/41 97.7 °F (36.5 °C) -- 104 16 99 %   05/15/19 1100 (!) 100/33 97.9 °F (36.6 °C) -- 90 16 95 %       Intake/Output Summary  (Last 24 hours) at 5/16/2019 1021  Last data filed at 5/16/2019 0917  Gross per 24 hour   Intake 2025 ml   Output 60 ml   Net 1965 ml     General: awake/alert    Neck: supple, no JVD  Chest:  clear to auscultation bilaterally without respiratory distress  CVS: irregularly irregular  Abdominal: soft, nontender, positive bowel sounds  Extremities: no cyanosis or edema  Skin: warm and dry without rash  Neuro: no focal motor deficits    Labs:  Results from last 7 days   Lab Units 05/16/19  0510 05/15/19  0655 05/14/19  0336   WBC 10*3/mm3 9.04 9.99 14.28*   HEMOGLOBIN g/dL 9.7* 10.1* 10.2*   HEMATOCRIT % 30.5* 32.5* 32.3*   PLATELETS 10*3/mm3 152 152 143         Results from last 7 days   Lab Units 05/16/19  0510 05/15/19  0655 05/14/19  0336   SODIUM mmol/L 130* 136 136   POTASSIUM mmol/L 3.2* 4.1 4.0   CHLORIDE mmol/L 95* 101 100   CO2 mmol/L 30.0 24.0 29.0   BUN mg/dL 12 36* 19   CREATININE mg/dL 2.21* 4.18* 2.95*   CALCIUM mg/dL 7.1* 7.8* 7.5*   BILIRUBIN mg/dL 0.5 0.6 0.5   ALK PHOS U/L 117 126* 137*   ALT (SGPT) U/L 15 16 23   AST (SGOT) U/L 17 23 25   GLUCOSE mg/dL 178* 40* 74       Radiology:   Imaging Results (last 72 hours)     Procedure Component Value Units Date/Time    CT Abdomen Pelvis Without Contrast [[id number]] Collected:  05/13/19 0752     Updated:  05/13/19 0807    Narrative:       CT ABDOMEN PELVIS WO CONTRAST- 5/12/2019 11:33 PM CDT     HISTORY: abd pain      COMPARISON: 03/09/2019      DLP: 889 mGy cm Automated exposure control was utilized to diminish  patient radiation dose.     TECHNIQUE: Noncontrast enhanced images of the abdomen and pelvis  obtained without oral contrast.      FINDINGS:   There is moderate cardiomegaly the patient is status post aortic valve  replacement with moderate cardiomegaly. No evidence of pericardial  effusion. Tiny effusions are present with bibasilar atelectasis. Some  thickening of the interlobular septa within the lower lobes is either  related to some pulmonary  fibrosis or mild pulmonary venous hypertension  and interstitial edema..      LIVER: No focal liver lesion. A large amount of pneumoperitoneum is  noted within the upper abdomen and perihepatic space.     BILIARY SYSTEM: The patient's undergone cholecystectomy. No evidence of  intra or extrahepatic biliary dilatation..      PANCREAS: No focal pancreatic lesion.      SPLEEN: Unremarkable.      KIDNEYS AND ADRENALS: The adrenals are unremarkable. There is a 2.3 cm  suspected cortical cyst involving the interpolar aspect of the left  kidney. I couldn't be confirmed with ultrasound. There is diffuse  atrophy of the kidneys. No evidence of nephrolithiasis..  The ureters  are decompressed and normal in appearance.     RETROPERITONEUM: No mass, lymphadenopathy or hemorrhage.      GI TRACT: Some mild thickening of the wall is noted near the gastric  outlet. I do not see any evidence of a discrete ulceration but the  majority of the free air is noted within the upper abdomen raising the  possibility of a gastric perforation. Diverticulosis is noted of the  descending and sigmoid colon but I do not see evidence of diverticulitis  and there is no free air noted within the lower pelvis or mid abdomen.  The patient is status post gastric band placement. Mild prominence of  the gastric wall near the gastric band is also present.. The appendix is  surgically absent..     OTHER: There is no mesenteric mass, lymphadenopathy or fluid collection.  The osseous structures and soft tissues demonstrate no worrisome  lesions. No free fluid is present.      PELVIS: No mass lesion, fluid collection or significant lymphadenopathy  is seen in the pelvis. The urinary bladder is normal in appearance.       Impression:       1. Pneumoperitoneum. This is within the upper abdomen. There is some  mild thickening of the gastric wall in the region of the gastric outlet  as well as at the level of a gastric band from previous lap band  surgery. I would  favor an upper abdominal perforation with no evidence  of abnormal inflammatory stranding or pneumoperitoneum within the pelvis  or mid abdomen. A small amount of free fluid is noted within the  perihepatic space.  2. Small effusions with bibasilar atelectasis. Mild pulmonary venous  hypertension and interstitial edema are suspected.  3. Suspected cortical cyst midpole left kidney. This could be followed  up with ultrasound..  4. Diverticulosis of the descending and sigmoid colon without evidence  of acute diverticulitis.  5. Diffuse atrophy of the kidneys.        This report was finalized on 05/13/2019 08:04 by Dr. Willian Baker MD.    XR Chest 1 View [[id number]] Collected:  05/13/19 0721     Updated:  05/13/19 0725    Narrative:       EXAMINATION: Chest 1 view 05/12/2019     HISTORY: Pneumoperitoneum. Pulmonary edema.     FINDINGS: Today's exam is compared to previous study of 03/28/2019.  There is pneumoperitoneum with free air beneath the central and right  hemidiaphragm. There is right basilar atelectasis. There is moderate  cardiomegaly with mild pulmonary vascular congestion. Small effusions  are present blunting the costophrenic angles.       Impression:       1.. Pulmonary vascular congestion with small effusions. The patient is  status post percutaneous aortic valve replacement.  2. Pneumoperitoneum.  This report was finalized on 05/13/2019 07:22 by Dr. Willian Baker MD.          Culture:  Blood Culture   Date Value Ref Range Status   05/13/2019 No growth at 24 hours  Preliminary         Assessment   1.  End stage renal disease on hemodialysis MWF  2.  S/p surgical repair of perforated duodenal ulcer  3.  Chronic diastolic congestive heart failure  4.  Essential hypertension  5.  Anemia of CKD and of acute blood loss  6.  Hypokalemia  7.  Hyponatremia     Plan:  1.  Dialysis next due 5/17  2.  Replace potassium  3.  Monitor labs      Dirk Tristan, ABILIO  5/16/2019  10:21 AM

## 2019-05-17 LAB
ALBUMIN SERPL-MCNC: 1.9 G/DL (ref 3.5–5)
ALBUMIN/GLOB SERPL: 0.8 G/DL (ref 1.1–2.5)
ALP SERPL-CCNC: 106 U/L (ref 24–120)
ALT SERPL W P-5'-P-CCNC: <15 U/L (ref 0–54)
ANION GAP SERPL CALCULATED.3IONS-SCNC: 10 MMOL/L (ref 4–13)
AST SERPL-CCNC: 21 U/L (ref 7–45)
BASOPHILS # BLD AUTO: 0.02 10*3/MM3 (ref 0–0.2)
BASOPHILS NFR BLD AUTO: 0.3 % (ref 0–2)
BILIRUB SERPL-MCNC: 0.5 MG/DL (ref 0.1–1)
BUN BLD-MCNC: 16 MG/DL (ref 5–21)
BUN/CREAT SERPL: 5.5 (ref 7–25)
CALCIUM SPEC-SCNC: 6.9 MG/DL (ref 8.4–10.4)
CHLORIDE SERPL-SCNC: 92 MMOL/L (ref 98–110)
CO2 SERPL-SCNC: 23 MMOL/L (ref 24–31)
CREAT BLD-MCNC: 2.9 MG/DL (ref 0.5–1.4)
DEPRECATED RDW RBC AUTO: 58.2 FL (ref 40–54)
EOSINOPHIL # BLD AUTO: 0.46 10*3/MM3 (ref 0–0.7)
EOSINOPHIL NFR BLD AUTO: 6.4 % (ref 0–4)
ERYTHROCYTE [DISTWIDTH] IN BLOOD BY AUTOMATED COUNT: 16.6 % (ref 12–15)
GFR SERPL CREATININE-BSD FRML MDRD: 16 ML/MIN/1.73
GLOBULIN UR ELPH-MCNC: 2.4 GM/DL
GLUCOSE BLD-MCNC: 159 MG/DL (ref 70–100)
GLUCOSE BLDC GLUCOMTR-MCNC: 148 MG/DL (ref 70–130)
GLUCOSE BLDC GLUCOMTR-MCNC: 153 MG/DL (ref 70–130)
GLUCOSE BLDC GLUCOMTR-MCNC: 156 MG/DL (ref 70–130)
GLUCOSE BLDC GLUCOMTR-MCNC: 201 MG/DL (ref 70–130)
GLUCOSE BLDC GLUCOMTR-MCNC: 68 MG/DL (ref 70–130)
GLUCOSE BLDC GLUCOMTR-MCNC: 82 MG/DL (ref 70–130)
HCT VFR BLD AUTO: 32.5 % (ref 37–47)
HGB BLD-MCNC: 10.7 G/DL (ref 12–16)
IMM GRANULOCYTES # BLD AUTO: 0.12 10*3/MM3 (ref 0–0.05)
IMM GRANULOCYTES NFR BLD AUTO: 1.7 % (ref 0–5)
LYMPHOCYTES # BLD AUTO: 0.82 10*3/MM3 (ref 0.72–4.86)
LYMPHOCYTES NFR BLD AUTO: 11.3 % (ref 15–45)
MAGNESIUM SERPL-MCNC: 1.4 MG/DL (ref 1.4–2.2)
MCH RBC QN AUTO: 31.8 PG (ref 28–32)
MCHC RBC AUTO-ENTMCNC: 32.9 G/DL (ref 33–36)
MCV RBC AUTO: 96.4 FL (ref 82–98)
MONOCYTES # BLD AUTO: 0.5 10*3/MM3 (ref 0.19–1.3)
MONOCYTES NFR BLD AUTO: 6.9 % (ref 4–12)
NEUTROPHILS # BLD AUTO: 5.31 10*3/MM3 (ref 1.87–8.4)
NEUTROPHILS NFR BLD AUTO: 73.4 % (ref 39–78)
NRBC BLD AUTO-RTO: 0 /100 WBC (ref 0–0.2)
PHOSPHATE SERPL-MCNC: 2.3 MG/DL (ref 2.5–4.5)
PLATELET # BLD AUTO: 129 10*3/MM3 (ref 130–400)
PMV BLD AUTO: 10.8 FL (ref 6–12)
POTASSIUM BLD-SCNC: 3.4 MMOL/L (ref 3.5–5.3)
PROT SERPL-MCNC: 4.3 G/DL (ref 6.3–8.7)
RBC # BLD AUTO: 3.37 10*6/MM3 (ref 4.2–5.4)
SODIUM BLD-SCNC: 125 MMOL/L (ref 135–145)
TSH SERPL DL<=0.05 MIU/L-ACNC: 16.6 MIU/ML (ref 0.47–4.68)
WBC NRBC COR # BLD: 7.23 10*3/MM3 (ref 4.8–10.8)

## 2019-05-17 PROCEDURE — 84443 ASSAY THYROID STIM HORMONE: CPT | Performed by: INTERNAL MEDICINE

## 2019-05-17 PROCEDURE — 97110 THERAPEUTIC EXERCISES: CPT

## 2019-05-17 PROCEDURE — 84100 ASSAY OF PHOSPHORUS: CPT | Performed by: INTERNAL MEDICINE

## 2019-05-17 PROCEDURE — 83735 ASSAY OF MAGNESIUM: CPT | Performed by: INTERNAL MEDICINE

## 2019-05-17 PROCEDURE — 25010000002 PIPERACILLIN SOD-TAZOBACTAM PER 1 G: Performed by: SPECIALIST

## 2019-05-17 PROCEDURE — 94799 UNLISTED PULMONARY SVC/PX: CPT

## 2019-05-17 PROCEDURE — 97530 THERAPEUTIC ACTIVITIES: CPT

## 2019-05-17 PROCEDURE — 80053 COMPREHEN METABOLIC PANEL: CPT | Performed by: SPECIALIST

## 2019-05-17 PROCEDURE — 82962 GLUCOSE BLOOD TEST: CPT

## 2019-05-17 PROCEDURE — 97116 GAIT TRAINING THERAPY: CPT

## 2019-05-17 PROCEDURE — 25010000002 ENOXAPARIN PER 10 MG: Performed by: SPECIALIST

## 2019-05-17 PROCEDURE — 5A1D70Z PERFORMANCE OF URINARY FILTRATION, INTERMITTENT, LESS THAN 6 HOURS PER DAY: ICD-10-PCS | Performed by: INTERNAL MEDICINE

## 2019-05-17 PROCEDURE — 94760 N-INVAS EAR/PLS OXIMETRY 1: CPT

## 2019-05-17 PROCEDURE — 25010000002 ONDANSETRON PER 1 MG: Performed by: SPECIALIST

## 2019-05-17 PROCEDURE — 85025 COMPLETE CBC W/AUTO DIFF WBC: CPT | Performed by: SPECIALIST

## 2019-05-17 RX ORDER — DEXTROSE AND SODIUM CHLORIDE 5; .9 G/100ML; G/100ML
75 INJECTION, SOLUTION INTRAVENOUS CONTINUOUS
Status: DISCONTINUED | OUTPATIENT
Start: 2019-05-17 | End: 2019-05-20

## 2019-05-17 RX ADMIN — PANTOPRAZOLE SODIUM 40 MG: 40 TABLET, DELAYED RELEASE ORAL at 05:33

## 2019-05-17 RX ADMIN — LEVOTHYROXINE SODIUM 150 MCG: 150 TABLET ORAL at 05:33

## 2019-05-17 RX ADMIN — LIDOCAINE 1 PATCH: 50 PATCH CUTANEOUS at 09:11

## 2019-05-17 RX ADMIN — ENOXAPARIN SODIUM 30 MG: 30 INJECTION SUBCUTANEOUS at 09:12

## 2019-05-17 RX ADMIN — ONDANSETRON HYDROCHLORIDE 4 MG: 2 INJECTION, SOLUTION INTRAMUSCULAR; INTRAVENOUS at 09:12

## 2019-05-17 RX ADMIN — CARVEDILOL 3.12 MG: 3.12 TABLET, FILM COATED ORAL at 09:12

## 2019-05-17 RX ADMIN — TAZOBACTAM SODIUM AND PIPERACILLIN SODIUM 3.38 G: 375; 3 INJECTION, SOLUTION INTRAVENOUS at 17:52

## 2019-05-17 RX ADMIN — DEXTROSE MONOHYDRATE 100 ML/HR: 100 INJECTION, SOLUTION INTRAVENOUS at 09:12

## 2019-05-17 RX ADMIN — DEXTROSE AND SODIUM CHLORIDE 75 ML/HR: 5; 900 INJECTION, SOLUTION INTRAVENOUS at 17:52

## 2019-05-17 RX ADMIN — TAZOBACTAM SODIUM AND PIPERACILLIN SODIUM 3.38 G: 375; 3 INJECTION, SOLUTION INTRAVENOUS at 02:24

## 2019-05-17 NOTE — PROGRESS NOTES
Laila Rodriguez MD Progress Note     LOS: 4 days   Patient Care Team:  Provider, No Known as PCP - Barrett Prasad Jr, MD as PCP - Family Medicine  Moni Garza MD as Consulting Physician (Gastroenterology)  Tomasz San DO as Consulting Physician (Vascular Surgery)        Subjective     Had some vomiting early this morning    Objective     Vital Signs  Temp:  [97.6 °F (36.4 °C)-98.8 °F (37.1 °C)] 98.1 °F (36.7 °C)  Heart Rate:  [76-90] 83  Resp:  [16] 16  BP: ()/(45-52) 107/50    Intake & Output (last 3 days)       05/14 0701 - 05/15 0700 05/15 0701 - 05/16 0700 05/16 0701 - 05/17 0700 05/17 0701 - 05/18 0700    P.O.  120 240     I.V. (mL/kg) 556.2 (7.7) 980 (13.6) 1820 (25.3) 1265 (17.6)    IV Piggyback 100 685 50     Total Intake(mL/kg) 656.2 (9.1) 1785 (24.8) 2110 (29.3) 1265 (17.6)    Urine (mL/kg/hr)        Emesis/NG output 300   150    Drains 80 70 25 0    Other        Stool 0 0      Total Output 380 70 25 150    Net +276.2 +1715 +2085 +1115                  Physical Exam:     General Appearance:    Alert, cooperative, in no acute distress   Lungs:     respirations regular, even and unlabored    Heart:    Regular rhythm and normal rate, normal S1 and S2, no            murmur, no gallop, no rub   Chest Wall:    No abnormalities observed   Abdomen:      Soft SATNAM mostly serous   Extremities: No edema,    Results Review:     I reviewed the patient's new clinical results.    Lab Results (last 72 hours)     Procedure Component Value Units Date/Time    POC Glucose Once [[id number]]  (Abnormal) Collected:  05/17/19 1158    Specimen:  Blood Updated:  05/17/19 1231     Glucose 156 mg/dL      Comment: : [id number] [place] [address] DorianinaMeter ID: [id number]       Blood Culture - Blood, Arm, Right [[id number]] Collected:  05/13/19 0858    Specimen:  Blood from Arm, Right Updated:  05/17/19 1000     Blood Culture No growth at 4 days    POC Glucose Once [[id number]]  (Abnormal) Collected:  05/17/19 0819     Specimen:  Blood Updated:  05/17/19 0831     Glucose 148 mg/dL      Comment: : [id number] Ortiz Floreser ID: [id number]       Comprehensive Metabolic Panel [[id number]]  (Abnormal) Collected:  05/17/19 0629    Specimen:  Blood Updated:  05/17/19 0755     Glucose 159 mg/dL      BUN 16 mg/dL      Creatinine 2.90 mg/dL      Sodium 125 mmol/L      Potassium 3.4 mmol/L      Chloride 92 mmol/L      CO2 23.0 mmol/L      Calcium 6.9 mg/dL      Total Protein 4.3 g/dL      Albumin 1.90 g/dL      ALT (SGPT) <15 U/L      AST (SGOT) 21 U/L      Alkaline Phosphatase 106 U/L      Total Bilirubin 0.5 mg/dL      eGFR Non African Amer 16 mL/min/1.73      Globulin 2.4 gm/dL      A/G Ratio 0.8 g/dL      BUN/Creatinine Ratio 5.5     Anion Gap 10.0 mmol/L     Narrative:       GFR Normal >60  Chronic Kidney Disease <60  Kidney Failure <15    CBC & Differential [[id number]] Collected:  05/17/19 0721    Specimen:  Blood Updated:  05/17/19 0749    Narrative:       The following orders were created for panel order CBC & Differential.  Procedure                               Abnormality         Status                     ---------                               -----------         ------                     CBC Auto Differential[[id number]]        Abnormal            Final result                 Please view results for these tests on the individual orders.    CBC Auto Differential [[id number]]  (Abnormal) Collected:  05/17/19 0721    Specimen:  Blood Updated:  05/17/19 0749     WBC 7.23 10*3/mm3      RBC 3.37 10*6/mm3      Hemoglobin 10.7 g/dL      Hematocrit 32.5 %      MCV 96.4 fL      MCH 31.8 pg      MCHC 32.9 g/dL      RDW 16.6 %      RDW-SD 58.2 fl      MPV 10.8 fL      Platelets 129 10*3/mm3      Neutrophil % 73.4 %      Lymphocyte % 11.3 %      Monocyte % 6.9 %      Eosinophil % 6.4 %      Basophil % 0.3 %      Immature Grans % 1.7 %      Neutrophils, Absolute 5.31 10*3/mm3      Lymphocytes, Absolute 0.82 10*3/mm3       Monocytes, Absolute 0.50 10*3/mm3      Eosinophils, Absolute 0.46 10*3/mm3      Basophils, Absolute 0.02 10*3/mm3      Immature Grans, Absolute 0.12 10*3/mm3      nRBC 0.0 /100 WBC     TSH [[id number]]  (Abnormal) Collected:  05/17/19 0629    Specimen:  Blood Updated:  05/17/19 0732     TSH 16.600 mIU/mL     Magnesium [[id number]]  (Normal) Collected:  05/17/19 0629    Specimen:  Blood Updated:  05/17/19 0718     Magnesium 1.4 mg/dL     Phosphorus [[id number]]  (Abnormal) Collected:  05/17/19 0629    Specimen:  Blood Updated:  05/17/19 0718     Phosphorus 2.3 mg/dL     POC Glucose Once [[id number]]  (Abnormal) Collected:  05/17/19 0431    Specimen:  Blood Updated:  05/17/19 0443     Glucose 153 mg/dL      Comment: : [id number] Mandic TeresaMeter ID: [id number]       Blood Culture - Blood, Hand, Right [[id number]] Collected:  05/13/19 0023    Specimen:  Blood from Hand, Right Updated:  05/17/19 0045     Blood Culture No growth at 4 days    POC Glucose Once [[id number]]  (Abnormal) Collected:  05/17/19 0023    Specimen:  Blood Updated:  05/17/19 0035     Glucose 201 mg/dL      Comment: : [id number] Mandic TeresaMeter ID: [id number]       POC Glucose Once [[id number]]  (Abnormal) Collected:  05/16/19 1957    Specimen:  Blood Updated:  05/16/19 2008     Glucose 140 mg/dL      Comment: : [id number] Renick TeresaMeter ID: [id number]       POC Glucose Once [[id number]]  (Abnormal) Collected:  05/16/19 1634    Specimen:  Blood Updated:  05/16/19 1646     Glucose 143 mg/dL      Comment: : [id number] Timothy GuptaLookStatMeter ID: [id number]       POC Glucose Once [[id number]]  (Normal) Collected:  05/16/19 1121    Specimen:  Blood Updated:  05/16/19 1132     Glucose 82 mg/dL      Comment: : [id number] MillerniferMeter ID: [id number]       POC Glucose Once [[id number]]  (Abnormal) Collected:  05/16/19 0749    Specimen:  Blood Updated:  05/16/19 0800     Glucose 144 mg/dL      Comment: : [id number] Aguirre  Li ID: [id number]       CBC & Differential [[id number]] Collected:  05/16/19 0510    Specimen:  Blood Updated:  05/16/19 0631    Narrative:       The following orders were created for panel order CBC & Differential.  Procedure                               Abnormality         Status                     ---------                               -----------         ------                     CBC Auto Differential[[id number]]        Abnormal            Final result                 Please view results for these tests on the individual orders.    CBC Auto Differential [[id number]]  (Abnormal) Collected:  05/16/19 0510    Specimen:  Blood Updated:  05/16/19 0631     WBC 9.04 10*3/mm3      RBC 3.04 10*6/mm3      Hemoglobin 9.7 g/dL      Hematocrit 30.5 %      .3 fL      MCH 31.9 pg      MCHC 31.8 g/dL      RDW 17.0 %      RDW-SD 61.1 fl      MPV 10.6 fL      Platelets 152 10*3/mm3     Manual Differential [[id number]]  (Abnormal) Collected:  05/16/19 0510    Specimen:  Blood Updated:  05/16/19 0631     Neutrophil % 79.8 %      Lymphocyte % 15.2 %      Monocyte % 4.0 %      Basophil % 1.0 %      Neutrophils Absolute 7.21 10*3/mm3      Lymphocytes Absolute 1.37 10*3/mm3      Monocytes Absolute 0.36 10*3/mm3      Basophils Absolute 0.09 10*3/mm3      nRBC 1.0 /100 WBC      Anisocytosis Slight/1+     Elliptocytes Slight/1+     Macrocytes Mod/2+     Poikilocytes Slight/1+     WBC Morphology Normal     Platelet Morphology Normal    Phosphorus [[id number]]  (Abnormal) Collected:  05/16/19 0510    Specimen:  Blood Updated:  05/16/19 0621     Phosphorus 2.1 mg/dL     Comprehensive Metabolic Panel [[id number]]  (Abnormal) Collected:  05/16/19 0510    Specimen:  Blood Updated:  05/16/19 0618     Glucose 178 mg/dL      BUN 12 mg/dL      Creatinine 2.21 mg/dL      Sodium 130 mmol/L      Potassium 3.2 mmol/L      Chloride 95 mmol/L      CO2 30.0 mmol/L      Calcium 7.1 mg/dL      Total Protein 4.7 g/dL      Albumin 2.20 g/dL       ALT (SGPT) 15 U/L      AST (SGOT) 17 U/L      Alkaline Phosphatase 117 U/L      Total Bilirubin 0.5 mg/dL      eGFR Non African Amer 21 mL/min/1.73      Globulin 2.5 gm/dL      A/G Ratio 0.9 g/dL      BUN/Creatinine Ratio 5.4     Anion Gap 5.0 mmol/L     Narrative:       GFR Normal >60  Chronic Kidney Disease <60  Kidney Failure <15    Magnesium [[id number]]  (Normal) Collected:  05/16/19 0510    Specimen:  Blood Updated:  05/16/19 0605     Magnesium 1.6 mg/dL     POC Glucose Once [[id number]]  (Abnormal) Collected:  05/16/19 0345    Specimen:  Blood Updated:  05/16/19 0357     Glucose 158 mg/dL      Comment: : [id number] Dar DoniseeMeter ID: [id number]       POC Glucose Once [[id number]]  (Abnormal) Collected:  05/15/19 2353    Specimen:  Blood Updated:  05/16/19 0015     Glucose 167 mg/dL      Comment: : [id number] Dar DoniseeMeter ID: [id number]       POC Glucose Once [[id number]]  (Abnormal) Collected:  05/15/19 2011    Specimen:  Blood Updated:  05/15/19 2040     Glucose 173 mg/dL      Comment: : [id number] Dar DoniseeMeter ID: [id number]       POC Glucose Once [[id number]]  (Normal) Collected:  05/15/19 1712    Specimen:  Blood Updated:  05/15/19 1723     Glucose 110 mg/dL      Comment: : [id number] Timothy GuptaferMeter ID: [id number]       POC Glucose Once [[id number]]  (Abnormal) Collected:  05/15/19 1210    Specimen:  Blood Updated:  05/15/19 1222     Glucose 55 mg/dL      Comment: : [id number] Timothy GuptaferMeter ID: [id number]       POC Glucose Once [[id number]]  (Normal) Collected:  05/15/19 0917    Specimen:  Blood Updated:  05/15/19 0928     Glucose 107 mg/dL      Comment: : [id number] Timothy GuptaferMeter ID: [id number]       POC Glucose Once [[id number]]  (Abnormal) Collected:  05/15/19 0754    Specimen:  Blood Updated:  05/15/19 0805     Glucose 42 mg/dL      Comment: : [id number] Timothy MillerniferMeter ID: [id number]       POC Glucose Once [[id number]]  (Abnormal) Collected:   05/15/19 0752    Specimen:  Blood Updated:  05/15/19 0804     Glucose 45 mg/dL      Comment: CONFIRMED WITHLABOperator: [id number] Timothy GuptaferMeter ID: [id number]       Phosphorus [[id number]]  (Abnormal) Collected:  05/15/19 0655    Specimen:  Blood Updated:  05/15/19 0756     Phosphorus 4.6 mg/dL     Comprehensive Metabolic Panel [[id number]]  (Abnormal) Collected:  05/15/19 0655    Specimen:  Blood Updated:  05/15/19 0750     Glucose 40 mg/dL      BUN 36 mg/dL      Creatinine 4.18 mg/dL      Sodium 136 mmol/L      Potassium 4.1 mmol/L      Chloride 101 mmol/L      CO2 24.0 mmol/L      Calcium 7.8 mg/dL      Total Protein 4.9 g/dL      Albumin 2.20 g/dL      ALT (SGPT) 16 U/L      AST (SGOT) 23 U/L      Alkaline Phosphatase 126 U/L      Total Bilirubin 0.6 mg/dL      eGFR Non African Amer 10 mL/min/1.73      Comment: <15 Indicative of kidney failure.        eGFR   Amer -- mL/min/1.73      Comment: <15 Indicative of kidney failure.        Globulin 2.7 gm/dL      A/G Ratio 0.8 g/dL      BUN/Creatinine Ratio 8.6     Anion Gap 11.0 mmol/L     Narrative:       GFR Normal >60  Chronic Kidney Disease <60  Kidney Failure <15    Magnesium [[id number]]  (Normal) Collected:  05/15/19 0655    Specimen:  Blood Updated:  05/15/19 0747     Magnesium 1.7 mg/dL     CBC & Differential [[id number]] Collected:  05/15/19 0655    Specimen:  Blood Updated:  05/15/19 0723    Narrative:       The following orders were created for panel order CBC & Differential.  Procedure                               Abnormality         Status                     ---------                               -----------         ------                     CBC Auto Differential[[id number]]        Abnormal            Final result                 Please view results for these tests on the individual orders.    CBC Auto Differential [[id number]]  (Abnormal) Collected:  05/15/19 0655    Specimen:  Blood Updated:  05/15/19 0723     WBC 9.99 10*3/mm3      RBC 3.26  10*6/mm3      Hemoglobin 10.1 g/dL      Hematocrit 32.5 %      MCV 99.7 fL      MCH 31.0 pg      MCHC 31.1 g/dL      RDW 17.1 %      RDW-SD 62.1 fl      MPV 10.8 fL      Platelets 152 10*3/mm3      Neutrophil % 89.8 %      Lymphocyte % 5.2 %      Monocyte % 3.3 %      Eosinophil % 0.9 %      Basophil % 0.2 %      Immature Grans % 0.6 %      Neutrophils, Absolute 8.97 10*3/mm3      Lymphocytes, Absolute 0.52 10*3/mm3      Monocytes, Absolute 0.33 10*3/mm3      Eosinophils, Absolute 0.09 10*3/mm3      Basophils, Absolute 0.02 10*3/mm3      Immature Grans, Absolute 0.06 10*3/mm3      nRBC 0.2 /100 WBC         Imaging Results (last 72 hours)     ** No results found for the last 72 hours. **              Assessment/Plan       Abdominal pain    Hypoglycemia    Paroxysmal atrial fibrillation (CMS/HCC)      Keep on clears.  If unable to tolerate may need TPN      Laila Rodriguez MD  05/17/19  1:00 PM

## 2019-05-17 NOTE — PLAN OF CARE
Problem: Patient Care Overview  Goal: Plan of Care Review  Outcome: Ongoing (interventions implemented as appropriate)   05/17/19 0827 05/17/19 1424   OTHER   Outcome Summary --  Pt nauseated today, emesis x1. Now on clears. Dialysis today. C/o pain in buttocks- improved after placing waffle cusion in chair and on bed. +Flatus. C/o cramping/gas pain. Cont to monitor    Coping/Psychosocial   Plan of Care Reviewed With patient --    Plan of Care Review   Progress --  no change     Goal: Individualization and Mutuality  Outcome: Ongoing (interventions implemented as appropriate)   05/13/19 1733 05/15/19 0428   Individualization   Patient Specific Preferences wants to return home --    Patient Specific Goals (Include Timeframe) --  Improve pain control and mobility, d/c ng tube   Patient Specific Interventions --  NPO maintained, PCA pump in use, freq weight shift encouraged     Goal: Discharge Needs Assessment  Outcome: Ongoing (interventions implemented as appropriate)   05/13/19 1112 05/13/19 1732 05/14/19 1500   Discharge Needs Assessment   Readmission Within the Last 30 Days --  no previous admission in last 30 days --    Concerns to be Addressed discharge planning;care coordination/care conferences --  --    Patient/Family Anticipates Transition to --  --  home with family   Patient/Family Anticipated Services at Transition --  --  home health care   Transportation Concerns --  --  car, none   Transportation Anticipated --  --  family or friend will provide   Discharge Facility/Level of Care Needs nursing facility, skilled --  --    Current Discharge Risk chronically ill --  --    Disability   Equipment Currently Used at Home --  --  walker, standard;wheelchair, motorized;shower chair       Problem: Fall Risk (Adult)  Goal: Identify Related Risk Factors and Signs and Symptoms  Outcome: Ongoing (interventions implemented as appropriate)   05/15/19 0428 05/15/19 1423   Fall Risk (Adult)   Related Risk Factors (Fall  Risk) age-related changes;fatigue/slow reaction;gait/mobility problems;inadequate lighting;environment unfamiliar --    Signs and Symptoms (Fall Risk) --  presence of risk factors     Goal: Absence of Fall  Outcome: Ongoing (interventions implemented as appropriate)   05/16/19 1409   Fall Risk (Adult)   Absence of Fall making progress toward outcome       Problem: Skin Injury Risk (Adult)  Goal: Identify Related Risk Factors and Signs and Symptoms  Outcome: Ongoing (interventions implemented as appropriate)   05/15/19 0428   Skin Injury Risk (Adult)   Related Risk Factors (Skin Injury Risk) advanced age;mechanical forces;nutritional deficiencies;mobility impaired     Goal: Skin Health and Integrity  Outcome: Ongoing (interventions implemented as appropriate)   05/16/19 1409   Skin Injury Risk (Adult)   Skin Health and Integrity making progress toward outcome       Problem: Wound (Includes Pressure Injury) (Adult)  Goal: Signs and Symptoms of Listed Potential Problems Will be Absent, Minimized or Managed (Wound)  Outcome: Ongoing (interventions implemented as appropriate)   05/16/19 1409   Goal/Outcome Evaluation   Problems Assessed (Wound) all   Problems Present (Wound) pain

## 2019-05-17 NOTE — PLAN OF CARE
Problem: Patient Care Overview  Goal: Plan of Care Review  Outcome: Ongoing (interventions implemented as appropriate)   05/17/19 0100   OTHER   Outcome Summary last glu was 201 mg/dl; D10% continues; Full Liquid diet; lidoderm patch removed from left shoulder; left arm has dialysis fistula--MWF tx       Problem: Fall Risk (Adult)  Goal: Identify Related Risk Factors and Signs and Symptoms  Outcome: Ongoing (interventions implemented as appropriate)   05/15/19 0428 05/15/19 1423   Fall Risk (Adult)   Related Risk Factors (Fall Risk) age-related changes;fatigue/slow reaction;gait/mobility problems;inadequate lighting;environment unfamiliar --    Signs and Symptoms (Fall Risk) --  presence of risk factors       Problem: Skin Injury Risk (Adult)  Goal: Identify Related Risk Factors and Signs and Symptoms  Outcome: Ongoing (interventions implemented as appropriate)   05/15/19 0428   Skin Injury Risk (Adult)   Related Risk Factors (Skin Injury Risk) advanced age;mechanical forces;nutritional deficiencies;mobility impaired      05/15/19 0428   Skin Injury Risk (Adult)   Related Risk Factors (Skin Injury Risk) advanced age;mechanical forces;nutritional deficiencies;mobility impaired       Problem: Wound (Includes Pressure Injury) (Adult)  Goal: Signs and Symptoms of Listed Potential Problems Will be Absent, Minimized or Managed (Wound)  Outcome: Ongoing (interventions implemented as appropriate)   05/16/19 1409   Goal/Outcome Evaluation   Problems Assessed (Wound) all   Problems Present (Wound) pain

## 2019-05-17 NOTE — PROGRESS NOTES
Continued Stay Note  AGATHA Jimenez     Patient Name: Cheri Ely  MRN: [id number]  Today's Date: 5/17/2019    Admit Date: 5/12/2019    Discharge Plan     Row Name 05/17/19 1541       Plan    Plan  SNF    Patient/Family in Agreement with Plan  yes    Plan Comments  Spoke with Rosetta from [phone] and she said they will likely take pt. They are waiting for insurance benefits. [facility] also still has a bed. Noted that pt may end up on TPN. Will follow.        Discharge Codes    No documentation.             MALIA Trevizo

## 2019-05-17 NOTE — THERAPY TREATMENT NOTE
Acute Care - Physical Therapy Treatment Note  [facility]     Patient Name: Cheri Ely  : 1940  MRN: [id number]  Today's Date: 2019  Onset of Illness/Injury or Date of Surgery: 19  Date of Referral to PT: 19  Referring Physician: Dr. Dean    Admit Date: 2019    Visit Dx:    ICD-10-CM ICD-9-CM   1. Abdominal pain, unspecified abdominal location R10.9 789.00   2. Intra-abdominal free air of unknown etiology K66.8 568.89   3. Generalized weakness R53.1 780.79     Patient Active Problem List   Diagnosis   • Hypertension   • Hyperlipidemia   • Chronic kidney disease on chronic dialysis (CMS/HCC)   • Closed fracture of ramus of left pubis (CMS/HCC)   • Occlusion of superior mesenteric artery (CMS/HCC)   • Chronic mesenteric ischemia (CMS/HCC)   • Black tarry stools   • Hx of gastritis   • Acute gastric ulcer with hemorrhage   • Closed displaced intertrochanteric fracture of right femur (CMS/HCC)   • Displaced intertrochanteric fracture of right femur, initial encounter for closed fracture (CMS/HCC)   • Hypotension   • Acute blood loss anemia   • GI bleed   • Gastrointestinal hemorrhage   • (HFpEF) heart failure with preserved ejection fraction (CMS/HCC)   • Hypothyroid   • Diastolic dysfunction   • Diastolic heart failure (CMS/HCC)   • Volume overload   • AVM (arteriovenous malformation) of small bowel, acquired (CMS/HCC)   • Abdominal pain   • Hypoglycemia   • Paroxysmal atrial fibrillation (CMS/HCC)       Therapy Treatment    Rehabilitation Treatment Summary     Row Name 19 0827             Treatment Time/Intention    Discipline  physical therapy assistant  -LG      Document Type  therapy note (daily note)  -LG2      Subjective Information  complains of;pain  -LG2      Mode of Treatment  individual therapy;physical therapy  -LG2      Patient/Family Observations  no family present  -LG2      Patient Effort  adequate  -LG2      Comment  Set pt's breakfast up when up to chair.  She ate her yogurt and immediately starting vomitting back up.  Extra time spent helping pt get cleaned up after soiling gown.   -LG3      Existing Precautions/Restrictions  fall  -LG2      Recorded by [LG] James Augustine, PTA 05/17/19 0827  [LG2] James Augustine, PTA 05/17/19 0852  [LG3] James Augustine, PTA 05/17/19 0903      Row Name 05/17/19 0827             Bed Mobility Assessment/Treatment    Rolling Left [place] (Bed Mobility)  verbal cues;moderate assist (50% patient effort)  -LG      Scooting/Bridging [place] (Bed Mobility)  verbal cues;minimum assist (75% patient effort);moderate assist (50% patient effort)  -LG      Sidelying-Sit [place] (Bed Mobility)  verbal cues;moderate assist (50% patient effort)  -LG      Assistive Device (Bed Mobility)  bed rails;draw sheet;head of bed elevated  -LG      Recorded by [LG] James Augustine, PTA 05/17/19 0852      Row Name 05/17/19 0827             Bed-Chair Transfer    Bed-Chair [place] (Transfers)  verbal cues;minimum assist (75% patient effort)  -LG      Assistive Device (Bed-Chair Transfers)  -- Pt did not want to use RW due to L UE pain, held pt at waist  -LG      Recorded by [LG] James Augustine, PTA 05/17/19 0859      Row Name 05/17/19 0827             Gait/Stairs Assessment/Training    Gait/Stairs Assessment/Training  gait/ambulation assistive device  -LG      [place] Level (Gait)  minimum assist (75% patient effort)  -LG      Assistive Device (Gait)  -- 3 feet from bed to bedside chair  -LG      Distance in Feet (Gait)  3 ft  -LG      Pattern (Gait)  step-to  -LG      Deviations/Abnormal Patterns (Gait)  base of support, narrow;festinating/shuffling;stride length decreased  -LG      Bilateral Gait Deviations  forward flexed posture  -LG      Recorded by [LG] James Augustine, PTA 05/17/19 0859      Row Name 05/17/19 0827             Therapeutic Exercise    Comment (Therapeutic Exercise)  B LE A-AAROM 2 sets of 10 reps. Pt needs extra time as she  fatigues and movement causes stomach spasms.   -LG      Recorded by [LG] James Augustine, PTA 05/17/19 0859      Row Name 05/17/19 0827             Positioning and Restraints    Pre-Treatment Position  in bed  -LG      Post Treatment Position  chair  -LG      In Chair  reclined;legs elevated;call light within reach;encouraged to call for assist;notified nsg  -LG      Recorded by [LG] James Augustine, PTA 05/17/19 0859      Row Name 05/17/19 0827             Pain Scale: Numbers Pre/Post-Treatment    Pain Scale: Numbers, Pretreatment  4/10  -LG      Pain Scale: Numbers, Post-Treatment  4/10  -LG      Pain Location  abdomen and Left Shoulder  -LG      Recorded by [LG] James Augustine, PTA 05/17/19 0903      Row Name                Wound 05/13/19 0259 Other (See comments) abdomen incision    Wound - Properties Group Date first assessed: 05/13/19 [LC] Time first assessed: 0259 [LC] Side: Other (See comments) [LC] Location: abdomen [LC] Type: incision [LC] Recorded by:  [LC] Katelyn Garibay RN 05/13/19 0259    Row Name                Wound 05/13/19 0450 Right upper gluteal pressure injury    Wound - Properties Group Date first assessed: 05/13/19 [HS] Time first assessed: 0450 [HS] Side: Right [HS] Orientation: upper [HS] Location: gluteal [HS] Type: pressure injury [HS] Stage, Pressure Injury: Stage 2 [HS] Recorded by:  [HS] Peggy Monterroso RN 05/13/19 1218      User Key  (r) = Recorded By, (t) = Taken By, (c) = Cosigned By    Initials Name Effective Dates Discipline    LG James Augustine, PTA 08/02/16 -  PT    LC Katelyn Garibay RN 08/02/16 -  Nurse    HS Peggy Monterroso RN 12/27/16 -  Nurse          Wound 05/13/19 0259 Other (See comments) abdomen incision (Active)   Dressing Appearance dry;intact;no drainage 5/16/2019  8:00 PM   Closure RIYA 5/16/2019  8:00 PM   Base dressing in place, unable to visualize 5/16/2019  8:00 PM   Drainage Amount none 5/16/2019  8:00 PM   Dressing Care, Wound gauze;transparent film 5/16/2019   8:00 PM       Wound 05/13/19 0450 Right upper gluteal pressure injury (Active)   Dressing Appearance open to air 5/16/2019  8:00 PM   Closure Open to air 5/16/2019  8:00 PM   Base pink 5/16/2019  8:00 PM   Edges irregular;jagged 5/16/2019  8:00 PM           Physical Therapy Education     Title: PT OT SLP Therapies (Done)     Topic: Physical Therapy (Done)     Point: Mobility training (Done)     Learning Progress Summary           Patient Acceptance, E,TB,D, VU,DU,NR by LES at 5/16/2019  9:24 AM    Comment:  Education re: purpose of PT/importance of act; educ re activities to assist w/ edema mgmt to include LE elevation; educ for improved tech w/ bed mob, tfers & gt; educ for 15-20 aps & 5 deep breaths q hour awake; educ for improved tech w/ deep breathing                   Point: Home exercise program (Done)     Learning Progress Summary           Patient Acceptance, E,TB,D, VU,DU,NR by LES at 5/16/2019  9:24 AM    Comment:  Education re: purpose of PT/importance of act; educ re activities to assist w/ edema mgmt to include LE elevation; educ for improved tech w/ bed mob, tfers & gt; educ for 15-20 aps & 5 deep breaths q hour awake; educ for improved tech w/ deep breathing                   Point: Precautions (Done)     Learning Progress Summary           Patient Acceptance, E,TB,D, VU,DU,NR by LES at 5/16/2019  9:24 AM    Comment:  Education re: purpose of PT/importance of act; educ re activities to assist w/ edema mgmt to include LE elevation; educ for improved tech w/ bed mob, tfers & gt; educ for 15-20 aps & 5 deep breaths q hour awake; educ for improved tech w/ deep breathing                               User Key     Initials Effective Dates Name Provider Type Discipline     08/02/18 -  Melinda Floyd, PT Physical Therapist PT                PT Recommendation and Plan     Plan of Care Reviewed With: patient  Progress: improving  Outcome Summary: Pt awake/alert sitting up in bed. Reports 4/10 pain in her left  shoulder and abdomen. Pt Min A supine to EOB, Min A Sit to Stand, Min A steps from bed to bedside chair. Sat pt's breakfast up for her, she ate her yogurt and imediately started vomiting. Nsg notified.   Outcome Measures     Row Name 05/17/19 0901 05/16/19 0900          How much help from another person do you currently need...    Turning from your back to your side while in flat bed without using bedrails?  2  -LG  2  -JE     Moving from lying on back to sitting on the side of a flat bed without bedrails?  2  -LG  2  -JE     Moving to and from a bed to a chair (including a wheelchair)?  2  -LG  2  -JE     Standing up from a chair using your arms (e.g., wheelchair, bedside chair)?  2  -LG  2  -JE     Climbing 3-5 steps with a railing?  1  -LG  1  -JE     To walk in hospital room?  3  -LG  2  -JE     AM-PAC 6 Clicks Score  12  -LG  11  -JE        Functional Assessment    Outcome Measure Options  AM-PAC 6 Clicks Basic Mobility (PT)  -LG  AM-PAC 6 Clicks Basic Mobility (PT)  -       User Key  (r) = Recorded By, (t) = Taken By, (c) = Cosigned By    Initials Name Provider Type    LG James Augustine PTA Physical Therapy Assistant    Melinda Wiley, PT Physical Therapist         Time Calculation:   PT Charges     Row Name 05/17/19 0827             Time Calculation    Start Time  0827  -      Stop Time  0905  -      Time Calculation (min)  38 min  -      PT Received On  05/17/19  -      PT Goal Re-Cert Due Date  05/26/19  -         Time Calculation- PT    Total Timed Code Minutes- PT  38 minute(s)  -        User Key  (r) = Recorded By, (t) = Taken By, (c) = Cosigned By    Initials Name Provider Type    LG James Augustine PTA Physical Therapy Assistant        Therapy Charges for Today     Code Description Service Date Service Provider Modifiers Qty    31718890010 HC GAIT TRAINING EA 15 MIN 5/17/2019 James Augustine PTA GP 1    54064506080 HC PT THERAPEUTIC ACT EA 15 MIN 5/17/2019 James Augustine PTA GP 1     [id number]  PT THER PROC EA 15 MIN 5/17/2019 James Augusitne, PTA GP 1          PT G-Codes  Outcome Measure Options: AM-PAC 6 Clicks Basic Mobility (PT)  AM-PAC 6 Clicks Score: 12    James Augustine, BEATRICE  5/17/2019

## 2019-05-17 NOTE — DISCHARGE PLACEMENT REQUEST
"Alondra Mccollum \[facility] [place] [facility]\"" [phone]    Cheri Ely (79 y.o. Female)     Date of Birth Social Security Number Address Home Phone MRN    1940  [id number]  [facility] [address] [phone] [id number]    Congregational Marital Status          Denominational        Admission Date Admission Type Admitting Provider Attending Provider Department, Room/Bed    5/12/19 Emergency Laila Rodriguez MD Tyrrell, Dana R, MD [facility] 3C, 391/1    Discharge Date Discharge Disposition Discharge Destination                       Attending Provider:  Laila Rodriguez MD    Allergies:  No Known Allergies    Isolation:  None   Infection:  None   Code Status:  CPR    Ht:  149.9 cm (59\")   Wt:  71.9 kg (158 lb 9.6 oz)    Admission Cmt:  None   Principal Problem:  None                Active Insurance as of 5/12/2019     Primary Coverage     Payor Plan Insurance Group Employer/Plan Group    MEDICARE MEDICARE A & B      Payor Plan Address Payor Plan Phone Number Payor Plan Fax Number Effective Dates    [address] [phone]  4/1/1998 - None Entered    [facility] [id number]       Subscriber Name Subscriber Birth Date Member ID       CHERI ELY 1940 [id number]           Secondary Coverage     Payor Plan Insurance Group Employer/Plan Group    COMBINED INSURANCE CO COMBINED INSURANCE CO      Payor Plan Address Payor Plan Phone Number Payor Plan Fax Number Effective Dates    [address] [phone]  1/1/2017 - None Entered    [facility] [id number]       Subscriber Name Subscriber Birth Date Member ID       CHERI ELY 1940 [id number]                 Emergency Contacts      (Rel.) Home Phone Work Phone Mobile Phone    Christy Langston (Daughter) -- -- [phone]    CarmenLucero (Daughter) -- -- [phone]               History & Physical      Laila Rodriguez MD at 5/13/2019 12:50 AM            Laila Rodriguez MD  H&P    Patient Care Team:  Provider, No Known as PCP - General  Mckenzie, " Barrett BARROW MD as PCP - Family Medicine  AlnaMoni MD as Consulting Physician (Gastroenterology)  Tomasz San DO as Consulting Physician (Vascular Surgery)    Chief complaint severe abdominal pain    Val Ely  is a 79 y.o. female presents with severe abdominal pain which began about 8:00 tonight.  Sudden onset.  Mostly in the right upper abdomen but diffuse now.  She is had some emesis of better gastric contents no blood.  No fevers.  Has had multiple recent admissions for GI bleeding.  She has extensive medical issues including end-stage renal disease on dialysis, history of congestive heart failure, history of valvular heart disease, history of coronary artery disease, history of peripheral vascular disease, history of obesity.  Has had multiple recent upper endoscopies and colonoscopies for bleeding she had M2A capsule endoscopy which revealed AVMs of the small bowel.  She has had prior gastric banding and sigmoid colectomy..      Review of Systems   Pertinent items are noted in HPI, all other systems reviewed and negative    History  Past Medical History:   Diagnosis Date   • Arthritis    • Carotid artery disease (CMS/HCC)    • CHF (congestive heart failure) (CMS/HCC)    • Chronic kidney disease on chronic dialysis (CMS/HCC)    • Chronic renal failure     on dialysis ON MON, WED, FRI   • Coronary artery disease    • Disease of thyroid gland    • Diverticulitis    • Gastric ulcer    • History of transfusion    • Hyperlipidemia    • Hypertension    • Injury of back    • Mesenteric artery insufficiency (CMS/HCC)    • Multilevel degenerative disc disease    • Osteoporosis    • Pancreatitis    • Pelvis fracture (CMS/HCC)    • Pneumonia      Past Surgical History:   Procedure Laterality Date   • AORTAGRAM Right 1/8/2018    Procedure: MESENTERIC ANGIOGRAM, GROIN ACCESS, MYNX CLOSURE;  Surgeon: Tomasz San DO;  Location: [facility] OR;  Service:    • AORTIC VALVE SURGERY     •  APPENDECTOMY     • BACK SURGERY     • CAPSULE ENDOSCOPY N/A 3/30/2019    Procedure: CAPSULE ENDOSCOPY M2A;  Surgeon: David Yu MD;  Location: [facility] ENDOSCOPY;  Service: Gastroenterology   • CARDIAC SURGERY     • CAROTID ENDARTERECTOMY Bilateral    • CATARACT EXTRACTION, BILATERAL     • CERVICAL SPINE SURGERY     • CHOLECYSTECTOMY     • COLON SURGERY     • COLONOSCOPY  10/01/2015   • COLONOSCOPY N/A 3/28/2019    Procedure: COLONOSCOPY WITH ANESTHESIA;  Surgeon: Rafita Merida MD;  Location: [facility] ENDOSCOPY;  Service: Gastroenterology   • CORONARY ARTERY BYPASS GRAFT     • DIALYSIS FISTULA CREATION     • ENDOSCOPY N/A 12/27/2018    Procedure: ESOPHAGOGASTRODUODENOSCOPY WITH ANESTHESIA;  Surgeon: Moni Garza MD;  Location: [facility] ENDOSCOPY;  Service: Gastroenterology   • ENDOSCOPY N/A 2/28/2019    Procedure: ESOPHAGOGASTRODUODENOSCOPY WITH ANESTHESIA;  Surgeon: Moni Garza MD;  Location: [facility] ENDOSCOPY;  Service: Gastroenterology   • ENDOSCOPY N/A 3/11/2019    Procedure: ESOPHAGOGASTRODUODENOSCOPY WITH ANESTHESIA;  Surgeon: Moni Garza MD;  Location: [facility] ENDOSCOPY;  Service: Gastroenterology   • ENDOSCOPY N/A 3/27/2019    Procedure: ESOPHAGOGASTRODUODENOSCOPY WITH ANESTHESIA;  Surgeon: Rafita Merida MD;  Location: [facility] ENDOSCOPY;  Service: Gastroenterology   • HIP TROCANTERIC NAILING WITH INTRAMEDULLARY HIP SCREW Right 2/8/2019    Procedure: HIP TROCANTERIC NAILING LONG WITH INTRAMEDULLARY HIP SCREW;  Surgeon: Boo Camacho MD;  Location: [facility] OR;  Service: Orthopedics   • JOINT REPLACEMENT      knee   • LAPAROSCOPIC GASTRIC BANDING     • LEEP     • LUMBAR FUSION     • TOE AMPUTATION Left     2nd toe   • TOTAL KNEE ARTHROPLASTY Bilateral    • TUBAL ABDOMINAL LIGATION     • UPPER GASTROINTESTINAL ENDOSCOPY  10/01/2015     Family History   Problem Relation Age of Onset   • Hypertension Mother    • Cancer Mother         stomach   • Coronary artery disease Father    • Heart attack  Father    • Diabetes Brother    • Coronary artery disease Brother    • Colon cancer Neg Hx    • Colon polyps Neg Hx      Social History     Tobacco Use   • Smoking status: Never Smoker   • Smokeless tobacco: Never Used   Substance Use Topics   • Alcohol use: No   • Drug use: No       (Not in a hospital admission)  Allergies:  Patient has no known allergies.    Objective     Vital Signs  Temp:  [97.6 °F (36.4 °C)] 97.6 °F (36.4 °C)  Heart Rate:  [] 103  Resp:  [18] 18  BP: (109-144)/(40-99) 131/58    Physical Exam:      General Appearance:    Alert, cooperative, in significant pain   Head:    Normocephalic, without obvious abnormality, atraumatic   Eyes:            Lids and lashes normal, conjunctivae and sclerae normal, no   icterus, no pallor, corneas clear, PERRLA   Ears:    Ears appear intact with no abnormalities noted   Neck:   No adenopathy, supple, trachea midline   Back:     No kyphosis present, no scoliosis present, no skin lesions,      erythema or scars, no tenderness to percussion or                   palpation,   range of motion normal   Lungs:     Clear to auscultation,respirations regular, even and                  unlabored    Heart:    Regular rhythm and normal rate, normal S1 and S2, no            murmur, no gallop, no rub, no click   Chest Wall:    No abnormalities observed   Abdomen:    Diffusely tender throughout the abdomen with guarding and rigidity   Rectal:     Deferred   Extremities:   Moves all extremities well, no edema, no cyanosis, no             redness dialysis fistula left upper arm   Pulses:   Pulses palpable and equal bilaterally   Skin:   No bleeding, bruising or rash   Lymph nodes:   No palpable adenopathy   Neurologic:   No focal deficits       Results Review:      Lab Results (last 72 hours)     Procedure Component Value Units Date/Time    Protime-INR [[id number]]  (Abnormal) Collected:  05/13/19 0023    Specimen:  Blood Updated:  05/13/19 0046     Protime 18.0 Seconds       INR 1.44    aPTT [[id number]]  (Normal) Collected:  05/13/19 0023    Specimen:  Blood Updated:  05/13/19 0046     PTT 34.8 seconds     Blood Culture - Blood, Hand, Right [[id number]] Collected:  05/13/19 0023    Specimen:  Blood from Hand, Right Updated:  05/13/19 0043    BNP [[id number]] Collected:  05/12/19 2133    Specimen:  Blood Updated:  05/13/19 0036    Red Top [[id number]] Collected:  05/13/19 0023    Specimen:  Blood Updated:  05/13/19 0033    [facility] Draw [[id number]] Collected:  05/12/19 2133    Specimen:  Blood Updated:  05/13/19 0023    Narrative:       The following orders were created for panel order [facility] Draw.  Procedure                               Abnormality         Status                     ---------                               -----------         ------                     Light Blue Top[[id number]]                                   Final result               Green Top (Gel)[[id number]]                                  Final result               Lavender Top[[id number]]                                     Final result               Red Top[[id number]]                                          In process                   Please view results for these tests on the individual orders.    Light Blue Top [[id number]] Collected:  05/12/19 2133    Specimen:  Blood Updated:  05/12/19 2245     Extra Tube hold for add-on     Comment: Auto resulted       Green Top (Gel) [[id number]] Collected:  05/12/19 2133    Specimen:  Blood Updated:  05/12/19 2245     Extra Tube Hold for add-ons.     Comment: Auto resulted.       Lavender Top [[id number]] Collected:  05/12/19 2133    Specimen:  Blood Updated:  05/12/19 2245     Extra Tube hold for add-on     Comment: Auto resulted       Troponin [[id number]]  (Normal) Collected:  05/12/19 2133    Specimen:  Blood Updated:  05/12/19 2206     Troponin I <0.012 ng/mL     Lactic Acid, Plasma [[id number]]  (Normal) Collected:  05/12/19 2148    Specimen:  Blood Updated:  05/12/19  2203     Lactate 1.4 mmol/L     Comprehensive Metabolic Panel [[id number]]  (Abnormal) Collected:  05/12/19 2133    Specimen:  Blood Updated:  05/12/19 2154     Glucose 92 mg/dL      BUN 27 mg/dL      Creatinine 4.51 mg/dL      Sodium 136 mmol/L      Potassium 4.0 mmol/L      Chloride 96 mmol/L      CO2 32.0 mmol/L      Calcium 8.4 mg/dL      Total Protein 6.1 g/dL      Albumin 3.00 g/dL      ALT (SGPT) 17 U/L      AST (SGOT) 35 U/L      Alkaline Phosphatase 215 U/L      Total Bilirubin 0.5 mg/dL      eGFR Non African Amer 9 mL/min/1.73      Comment: <15 Indicative of kidney failure.        eGFR   Amer -- mL/min/1.73      Comment: <15 Indicative of kidney failure.        Globulin 3.1 gm/dL      A/G Ratio 1.0 g/dL      BUN/Creatinine Ratio 6.0     Anion Gap 8.0 mmol/L     Narrative:       GFR Normal >60  Chronic Kidney Disease <60  Kidney Failure <15    Lipase [[id number]]  (Abnormal) Collected:  05/12/19 2133    Specimen:  Blood Updated:  05/12/19 2154     Lipase 14 U/L     CBC & Differential [[id number]] Collected:  05/12/19 2133    Specimen:  Blood Updated:  05/12/19 2144    Narrative:       The following orders were created for panel order CBC & Differential.  Procedure                               Abnormality         Status                     ---------                               -----------         ------                     CBC Auto Differential[[id number]]        Abnormal            Final result                 Please view results for these tests on the individual orders.    CBC Auto Differential [[id number]]  (Abnormal) Collected:  05/12/19 2133    Specimen:  Blood Updated:  05/12/19 2144     WBC 9.31 10*3/mm3      RBC 3.78 10*6/mm3      Hemoglobin 11.9 g/dL      Hematocrit 37.6 %      MCV 99.5 fL      MCH 31.5 pg      MCHC 31.6 g/dL      RDW 16.5 %      RDW-SD 60.2 fl      MPV 10.8 fL      Platelets 197 10*3/mm3      Neutrophil % 87.7 %      Lymphocyte % 6.4 %      Monocyte % 3.4 %      Eosinophil %  1.7 %      Basophil % 0.4 %      Immature Grans % 0.4 %      Neutrophils, Absolute 8.15 10*3/mm3      Lymphocytes, Absolute 0.60 10*3/mm3      Monocytes, Absolute 0.32 10*3/mm3      Eosinophils, Absolute 0.16 10*3/mm3      Basophils, Absolute 0.04 10*3/mm3      Immature Grans, Absolute 0.04 10*3/mm3      nRBC 0.0 /100 WBC         Imaging Results (last 72 hours)     Procedure Component Value Units Date/Time    XR Chest 1 View [[id number]] Updated:  05/13/19 0004    CT Abdomen Pelvis Without Contrast [[id number]] Updated:  05/12/19 2335          Assessment/Plan       * No active hospital problems. *      Her CT scan shows free air and some thickening of the gastric wall.  This probably represents perforated ulcer.  We will proceed with exposure laparotomy.  The risks of bleeding infection injury to structures cardiac complications pulmonary complications death MI prolonged ventilation strokes poor healing were discussed.  We discussed gastric resection versus oversew of an ulcer versus small bowel resection with anastomosis versus colon resection with anastomosis or ostomy.  The daughter was present during the discussion and understands the severity of her illness in the emergency nature and life saving intervention.  She is very high risk and set up for countless possible complications postoperatively.  She understands this and wishes to proceed.      Laila Rodriguez MD  05/13/19  12:50 AM          Electronically signed by Laila Rodriguez MD at 5/13/2019 12:54 AM       Hospital Medications (active)       Dose Frequency Start End    carvedilol (COREG) tablet 3.125 mg 3.125 mg User Specified 5/16/2019     Sig - Route: Take 1 tablet by mouth 2 times per day on Sun Tue Thu Sat. - Oral    carvedilol (COREG) tablet 3.125 mg 3.125 mg User Specified 5/17/2019     Sig - Route: Take 1 tablet by mouth Once per day on Mon Wed Fri. - Oral    dextrose (D50W) 25 g/ 50mL Intravenous Solution 25 g 25 g Every 15 Minutes PRN 5/15/2019      Sig - Route: Infuse 50 mL into a venous catheter Every 15 (Fifteen) Minutes As Needed for Low Blood Sugar (Blood Sugar Less Than 70, Patient Has IV Access - Unresponsive, NPO or Unable To Safely Swallow). - Intravenous    dextrose (GLUTOSE) oral gel 15 g 15 g Every 15 Minutes PRN 5/15/2019     Sig - Route: Take 15 application by mouth Every 15 (Fifteen) Minutes As Needed for Low Blood Sugar (Blood Sugar Less Than 70, Patient Alert, Is Not NPO & Can Safely Swallow). - Oral    dextrose 10 % infusion 100 mL/hr Continuous 5/15/2019     Sig - Route: Infuse 100 mL/hr into a venous catheter Continuous. - Intravenous    enoxaparin (LOVENOX) syringe 30 mg 30 mg Daily 5/14/2019     Sig - Route: Inject 0.3 mL under the skin into the appropriate area as directed Daily. - Subcutaneous    glucagon (human recombinant) (GLUCAGEN DIAGNOSTIC) injection 1 mg 1 mg Every 15 Minutes PRN 5/15/2019     Sig - Route: Inject 1 mg under the skin into the appropriate area as directed Every 15 (Fifteen) Minutes As Needed (Blood Glucose Less Than 70 - Patient Without IV Access - Unresponsive, NPO or Unable To Safely Swallow). - Subcutaneous    levothyroxine (SYNTHROID, LEVOTHROID) tablet 150 mcg 150 mcg Every Early Morning 5/16/2019     Sig - Route: Take 1 tablet by mouth Every Morning. - Oral    lidocaine (LIDODERM) 5 % 1 patch 1 patch Every 24 Hours Scheduled 5/15/2019     Sig - Route: Place 1 patch on the skin as directed by provider Daily. - Transdermal    Morphine sulfate PCA 1 mg/mL 30 mL syringe  Continuous 5/13/2019 5/27/2019    Sig - Route: Infuse  into a venous catheter Continuous. - Intravenous    naloxone (NARCAN) injection 0.1 mg 0.1 mg Every 5 Minutes PRN 5/13/2019     Sig - Route: Infuse 0.25 mL into a venous catheter Every 5 (Five) Minutes As Needed for Opioid Reversal or Respiratory Depression (see administration instructions). - Intravenous    ondansetron (ZOFRAN) injection 4 mg 4 mg Every 6 Hours PRN 5/13/2019     Sig -  "Route: Infuse 2 mL into a venous catheter Every 6 (Six) Hours As Needed for Nausea or Vomiting. - Intravenous    Linked Group 1:  \"Or\" Linked Group Details        ondansetron (ZOFRAN) tablet 4 mg 4 mg Every 6 Hours PRN 5/13/2019     Sig - Route: Take 1 tablet by mouth Every 6 (Six) Hours As Needed for Nausea or Vomiting. - Oral    Linked Group 1:  \"Or\" Linked Group Details        pantoprazole (PROTONIX) EC tablet 40 mg 40 mg Every Early Morning 5/17/2019     Sig - Route: Take 1 tablet by mouth Every Morning. - Oral    piperacillin-tazobactam (ZOSYN) 3.375 g in iso-osmotic dextrose 50 ml (premix) 3.375 g Every 12 Hours 5/14/2019 5/23/2019    Sig - Route: Infuse 50 mL into a venous catheter Every 12 (Twelve) Hours. - Intravenous    sodium chloride 0.9 % flush 10 mL 10 mL As Needed 5/12/2019     Sig - Route: Infuse 10 mL into a venous catheter As Needed for Line Care. - Intravenous    Cosign for Ordering: Accepted by Alton Tolentino MD on 5/13/2019  3:47 AM    Linked Group 2:  \"And\" Linked Group Details        sodium chloride 0.9 % infusion 10 mL/hr Continuous 5/14/2019     Sig - Route: Infuse 10 mL/hr into a venous catheter Continuous. - Intravenous    pantoprazole (PROTONIX) injection 40 mg (Discontinued) 40 mg Every Early Morning 5/13/2019 5/16/2019    Sig - Route: Infuse 10 mL into a venous catheter Every Morning. - Intravenous    Reason for Discontinue: Formulary change          Physician Progress Notes (last 24 hours) (Notes from 5/16/2019 11:42 AM through 5/17/2019 11:42 AM)     No notes of this type exist for this encounter.           Physical Therapy Notes (last 24 hours) (Notes from 5/16/2019 11:42 AM through 5/17/2019 11:42 AM)      James Augustine, PTA at 5/17/2019  9:01 AM  Version 1 of 1         Problem: Patient Care Overview  Goal: Plan of Care Review  Outcome: Ongoing (interventions implemented as appropriate)   05/17/19 4084   OTHER   Outcome Summary Pt awake/alert sitting up in bed. Reports 4/10 " pain in her left shoulder and abdomen. Pt Min A supine to EOB, Min A Sit to Stand, Min A steps from bed to bedside chair. Sat pt's breakfast up for her, she ate her yogurt and imediately started vomiting. Nsg notified.    Coping/Psychosocial   Plan of Care Reviewed With patient   Plan of Care Review   Progress improving           Electronically signed by James Augustine PTA at 2019  9:01 AM     James Augustine PTA at 2019  9:07 AM  Version 1 of 1         Acute Care - Physical Therapy Treatment Note   [place]     Patient Name: Cheri Ely  : 1940  MRN: [id number]  Today's Date: 2019  Onset of Illness/Injury or Date of Surgery: 19  Date of Referral to PT: 19  Referring Physician: Dr. Dean    Admit Date: 2019    Visit Dx:    ICD-10-CM ICD-9-CM   1. Abdominal pain, unspecified abdominal location R10.9 789.00   2. Intra-abdominal free air of unknown etiology K66.8 568.89   3. Generalized weakness R53.1 780.79     Patient Active Problem List   Diagnosis   • Hypertension   • Hyperlipidemia   • Chronic kidney disease on chronic dialysis (CMS/HCC)   • Closed fracture of ramus of left pubis (CMS/HCC)   • Occlusion of superior mesenteric artery (CMS/HCC)   • Chronic mesenteric ischemia (CMS/HCC)   • Black tarry stools   • Hx of gastritis   • Acute gastric ulcer with hemorrhage   • Closed displaced intertrochanteric fracture of right femur (CMS/HCC)   • Displaced intertrochanteric fracture of right femur, initial encounter for closed fracture (CMS/HCC)   • Hypotension   • Acute blood loss anemia   • GI bleed   • Gastrointestinal hemorrhage   • (HFpEF) heart failure with preserved ejection fraction (CMS/HCC)   • Hypothyroid   • Diastolic dysfunction   • Diastolic heart failure (CMS/HCC)   • Volume overload   • AVM (arteriovenous malformation) of small bowel, acquired (CMS/HCC)   • Abdominal pain   • Hypoglycemia   • Paroxysmal atrial fibrillation (CMS/HCC)       Therapy  Treatment    Rehabilitation Treatment Summary     Row Name 05/17/19 0827             Treatment Time/Intention    Discipline  physical therapy assistant  -LG      Document Type  therapy note (daily note)  -LG2      Subjective Information  complains of;pain  -LG2      Mode of Treatment  individual therapy;physical therapy  -LG2      Patient/Family Observations  no family present  -LG2      Patient Effort  adequate  -LG2      Comment  Set pt's breakfast up when up to chair. She ate her yogurt and immediately starting vomitting back up.  Extra time spent helping pt get cleaned up after soiling gown.   -LG3      Existing Precautions/Restrictions  fall  -LG2      Recorded by [LG] James Augustine, PTA 05/17/19 0827  [LG2] James Augustine, PTA 05/17/19 0852  [LG3] James Augustine, PTA 05/17/19 0903      Row Name 05/17/19 0827             Bed Mobility Assessment/Treatment    Rolling Left Gray (Bed Mobility)  verbal cues;moderate assist (50% patient effort)  -LG      Scooting/Bridging Gray (Bed Mobility)  verbal cues;minimum assist (75% patient effort);moderate assist (50% patient effort)  -LG      Sidelying-Sit Gray (Bed Mobility)  verbal cues;moderate assist (50% patient effort)  -LG      Assistive Device (Bed Mobility)  bed rails;draw sheet;head of bed elevated  -LG      Recorded by [LG] James Augustine, PTA 05/17/19 0852      Row Name 05/17/19 0827             Bed-Chair Transfer    Bed-Chair Gray (Transfers)  verbal cues;minimum assist (75% patient effort)  -LG      Assistive Device (Bed-Chair Transfers)  -- Pt did not want to use RW due to L UE pain, held pt at waist  -LG      Recorded by [LG] James Augustine, PTA 05/17/19 0859      Row Name 05/17/19 0827             Gait/Stairs Assessment/Training    Gait/Stairs Assessment/Training  gait/ambulation assistive device  -LG      Gray Level (Gait)  minimum assist (75% patient effort)  -LG      Assistive Device (Gait)  -- 3 feet from bed to  bedside chair  -LG      Distance in Feet (Gait)  3 ft  -LG      Pattern (Gait)  step-to  -LG      Deviations/Abnormal Patterns (Gait)  base of support, narrow;festinating/shuffling;stride length decreased  -LG      Bilateral Gait Deviations  forward flexed posture  -LG      Recorded by [LG] James Augustine, PTA 05/17/19 0859      Row Name 05/17/19 0827             Therapeutic Exercise    Comment (Therapeutic Exercise)  B LE A-AAROM 2 sets of 10 reps. Pt needs extra time as she fatigues and movement causes stomach spasms.   -LG      Recorded by [LG] James Augustine, PTA 05/17/19 0859      Row Name 05/17/19 0827             Positioning and Restraints    Pre-Treatment Position  in bed  -LG      Post Treatment Position  chair  -LG      In Chair  reclined;legs elevated;call light within reach;encouraged to call for assist;notified nsg  -LG      Recorded by [LG] James Augustine, PTA 05/17/19 0859      Row Name 05/17/19 0827             Pain Scale: Numbers Pre/Post-Treatment    Pain Scale: Numbers, Pretreatment  4/10  -LG      Pain Scale: Numbers, Post-Treatment  4/10  -LG      Pain Location  abdomen and Left Shoulder  -LG      Recorded by [LG] James Augustine, PTA 05/17/19 0903      Row Name                Wound 05/13/19 0259 Other (See comments) abdomen incision    Wound - Properties Group Date first assessed: 05/13/19 [LC] Time first assessed: 0259 [LC] Side: Other (See comments) [LC] Location: abdomen [LC] Type: incision [LC] Recorded by:  [LC] Katelyn Garibay RN 05/13/19 0259    Row Name                Wound 05/13/19 0450 Right upper gluteal pressure injury    Wound - Properties Group Date first assessed: 05/13/19 [HS] Time first assessed: 0450 [HS] Side: Right [HS] Orientation: upper [HS] Location: gluteal [HS] Type: pressure injury [HS] Stage, Pressure Injury: Stage 2 [HS] Recorded by:  [HS] Peggy Monterroso RN 05/13/19 1218      User Key  (r) = Recorded By, (t) = Taken By, (c) = Cosigned By    Initials Name Effective  Dates Discipline    LG James Augustine, PTA 08/02/16 -  PT    LC Katelyn Garibay RN 08/02/16 -  Nurse    HS Peggy Monterroso RN 12/27/16 -  Nurse          Wound 05/13/19 0259 Other (See comments) abdomen incision (Active)   Dressing Appearance dry;intact;no drainage 5/16/2019  8:00 PM   Closure RIYA 5/16/2019  8:00 PM   Base dressing in place, unable to visualize 5/16/2019  8:00 PM   Drainage Amount none 5/16/2019  8:00 PM   Dressing Care, Wound gauze;transparent film 5/16/2019  8:00 PM       Wound 05/13/19 0450 Right upper gluteal pressure injury (Active)   Dressing Appearance open to air 5/16/2019  8:00 PM   Closure Open to air 5/16/2019  8:00 PM   Base pink 5/16/2019  8:00 PM   Edges irregular;jagged 5/16/2019  8:00 PM           Physical Therapy Education     Title: PT OT SLP Therapies (Done)     Topic: Physical Therapy (Done)     Point: Mobility training (Done)     Learning Progress Summary           Patient Acceptance, E,TB,D, VU,DU,NR by LES at 5/16/2019  9:24 AM    Comment:  Education re: purpose of PT/importance of act; educ re activities to assist w/ edema mgmt to include LE elevation; educ for improved tech w/ bed mob, tfers & gt; educ for 15-20 aps & 5 deep breaths q hour awake; educ for improved tech w/ deep breathing                   Point: Home exercise program (Done)     Learning Progress Summary           Patient Acceptance, E,TB,D, VU,DU,NR by LES at 5/16/2019  9:24 AM    Comment:  Education re: purpose of PT/importance of act; educ re activities to assist w/ edema mgmt to include LE elevation; educ for improved tech w/ bed mob, tfers & gt; educ for 15-20 aps & 5 deep breaths q hour awake; educ for improved tech w/ deep breathing                   Point: Precautions (Done)     Learning Progress Summary           Patient Acceptance, E,TB,D, VU,DU,NR by LES at 5/16/2019  9:24 AM    Comment:  Education re: purpose of PT/importance of act; educ re activities to assist w/ edema mgmt to include LE elevation;  educ for improved tech w/ bed mob, tfers & gt; educ for 15-20 aps & 5 deep breaths q hour awake; educ for improved tech w/ deep breathing                               User Key     Initials Effective Dates Name Provider Type Discipline    LES 08/02/18 -  Melinda Floyd, PT Physical Therapist PT                PT Recommendation and Plan     Plan of Care Reviewed With: patient  Progress: improving  Outcome Summary: Pt awake/alert sitting up in bed. Reports 4/10 pain in her left shoulder and abdomen. Pt Min A supine to EOB, Min A Sit to Stand, Min A steps from bed to bedside chair. Sat pt's breakfast up for her, she ate her yogurt and imediately started vomiting. Nsg notified.   Outcome Measures     Row Name 05/17/19 0901 05/16/19 0900          How much help from another person do you currently need...    Turning from your back to your side while in flat bed without using bedrails?  2  -LG  2  -JE     Moving from lying on back to sitting on the side of a flat bed without bedrails?  2  -LG  2  -JE     Moving to and from a bed to a chair (including a wheelchair)?  2  -LG  2  -JE     Standing up from a chair using your arms (e.g., wheelchair, bedside chair)?  2  -LG  2  -JE     Climbing 3-5 steps with a railing?  1  -LG  1  -JE     To walk in hospital room?  3  -LG  2  -JE     AM-PAC 6 Clicks Score  12  -LG  11  -JE        Functional Assessment    Outcome Measure Options  AM-PAC 6 Clicks Basic Mobility (PT)  -LG  AM-PAC 6 Clicks Basic Mobility (PT)  -JE       User Key  (r) = Recorded By, (t) = Taken By, (c) = Cosigned By    Initials Name Provider Type    LG James Augustine, PTA Physical Therapy Assistant    Melinda Wiley, PT Physical Therapist         Time Calculation:   PT Charges     Row Name 05/17/19 0827             Time Calculation    Start Time  0827  -      Stop Time  0905 -      Time Calculation (min)  38 min  -LG      PT Received On  05/17/19  -      PT Goal Re-Cert Due Date  05/26/19  -          Time Calculation- PT    Total Timed Code Minutes- PT  38 minute(s)  -LG        User Key  (r) = Recorded By, (t) = Taken By, (c) = Cosigned By    Initials Name Provider Type    LG James Augustine PTA Physical Therapy Assistant        Therapy Charges for Today     Code Description Service Date Service Provider Modifiers Qty    64687519397 HC GAIT TRAINING EA 15 MIN 5/17/2019 James Augustine PTA GP 1    73786656320 HC PT THERAPEUTIC ACT EA 15 MIN 5/17/2019 James Augustine PTA GP 1    25736133134 HC PT THER PROC EA 15 MIN 5/17/2019 James Augustine PTA GP 1          PT G-Codes  Outcome Measure Options: AM-PAC 6 Clicks Basic Mobility (PT)  AM-PAC 6 Clicks Score: 12    James Augustine PTA  5/17/2019         Electronically signed by James Augustine PTA at 5/17/2019  9:07 AM

## 2019-05-17 NOTE — PROGRESS NOTES
Continued Stay Note  [facility]     Patient Name: Cheri Ely  MRN: [id number]  Today's Date: 5/17/2019    Admit Date: 5/12/2019    Discharge Plan     Row Name 05/17/19 1157       Plan    Plan  SNF    Patient/Family in Agreement with Plan  yes    Plan Comments  Daughter now says she does not want pt going to [facility]. She requests a referral to [facility]. Referral sent.     Row Name 05/17/19 1042       Plan    Plan  [facility]    Plan Comments  [facility] has declined and [facility] has offered. Awaiting d/c.         Discharge Codes    No documentation.             MALIA Trevizo

## 2019-05-17 NOTE — PLAN OF CARE
Problem: Patient Care Overview  Goal: Plan of Care Review  Outcome: Ongoing (interventions implemented as appropriate)   05/17/19 6676   OTHER   Outcome Summary Pt awake/alert sitting up in bed. Reports 4/10 pain in her left shoulder and abdomen. Pt Min A supine to EOB, Min A Sit to Stand, Min A steps from bed to bedside chair. Sat pt's breakfast up for her, she ate her yogurt and imediately started vomiting. Nsg notified.    Coping/Psychosocial   Plan of Care Reviewed With patient   Plan of Care Review   Progress improving

## 2019-05-17 NOTE — PROGRESS NOTES
[facility] Medicine Services  INPATIENT PROGRESS NOTE    Patient Name: Cheri Ely  Date of Admission: 5/12/2019  Today's Date: 05/17/19  Length of Stay: 4  Primary Care Physician: Provider, No Known    Subjective   Chief Complaint: f/u   HPI   Vomited couple of time at [facility] earlier today  Been restarted back earlier on D10 according to nurse Ronna  Diet been downgraded to clears  Had couple of BM already   Doing better compared earlier      Review of Systems     All pertinent negatives and positives are as above. All other systems have been reviewed and are negative unless otherwise stated.     Objective    Temp:  [97.6 °F (36.4 °C)-98.8 °F (37.1 °C)] 98.7 °F (37.1 °C)  Heart Rate:  [70-90] 70  Resp:  [16] 16  BP: ()/(47-88) 103/88  Physical Exam  Nurse about to clean as she moved her bowel  awake and alert and oriented x3  Pleasant woman, no distress,  Awake, alert, oriented x3, no apparent distress  Supple neck  Anicteric sclera, external ocular muscles are intact  Normal respiratory effort, no adventitious sounds  S1-S2, appears regular;   No cyanosis clubbing or edema  Warm dry skin  Good capillary refill time        Results Review:  I have reviewed the labs, radiology results, and diagnostic studies.    Laboratory Data:   Results from last 7 days   Lab Units 05/17/19  0721 05/16/19  0510 05/15/19  0655   WBC 10*3/mm3 7.23 9.04 9.99   HEMOGLOBIN g/dL 10.7* 9.7* 10.1*   HEMATOCRIT % 32.5* 30.5* 32.5*   PLATELETS 10*3/mm3 129* 152 152        Results from last 7 days   Lab Units 05/17/19  0629 05/16/19  0510 05/15/19  0655   SODIUM mmol/L 125* 130* 136   POTASSIUM mmol/L 3.4* 3.2* 4.1   CHLORIDE mmol/L 92* 95* 101   CO2 mmol/L 23.0* 30.0 24.0   BUN mg/dL 16 12 36*   CREATININE mg/dL 2.90* 2.21* 4.18*   CALCIUM mg/dL 6.9* 7.1* 7.8*   BILIRUBIN mg/dL 0.5 0.5 0.6   ALK PHOS U/L 106 117 126*   ALT (SGPT) U/L <15 15 16   AST (SGOT) U/L 21 17 23   GLUCOSE mg/dL 159*  178* 40*       Culture Data:   Blood Culture   Date Value Ref Range Status   05/13/2019 No growth at 4 days  Preliminary   05/13/2019 No growth at 4 days  Preliminary       Radiology Data:   Imaging Results (last 24 hours)     ** No results found for the last 24 hours. **          I have reviewed the patient's current medications.     Assessment/Plan     Active Hospital Problems    Diagnosis   • Hypoglycemia   • Paroxysmal atrial fibrillation (CMS/HCC)   • Abdominal pain       Hyponatremia prob from d10 received yesterday; defer to renal service  Pneumoperitoneum secondary to perforated duodenal ulcer status post exploratory laparotomy on May 13  End-stage renal disease on hemodialysis Monday Wednesdays and Fridays - per renal service  Postoperative blood loss anemia - stable hgb  History of diastolic heart failure with status post aortic valve replacement  Atrial fibrillation with left bundle branch block on EKG; telemetry showed atrial fibrillation  History of hypertension  Coronary artery disease stable -serial troponin is negative   postoperative hypoglycemia        Considering tpn per Dr Rodriguez  accu 148, 156  Tolerating antihtn  Replace potassium    carvedilol 3.125 mg Oral 2 times per day on Sun Tue Thu Sat   carvedilol 3.125 mg Oral Once per day on Mon Wed Fri   enoxaparin 30 mg Subcutaneous Daily   levothyroxine 150 mcg Oral Q AM   lidocaine 1 patch Transdermal Q24H   pantoprazole 40 mg Oral Q AM   piperacillin-tazobactam 3.375 g Intravenous Q12H   spoke to marycruz to change ivf to d5 ns        Fabien Espinosa MD   05/17/19   5:08 PM

## 2019-05-17 NOTE — PROGRESS NOTES
Continued Stay Note  [facility]     Patient Name: Cheri Ely  MRN: [id number]  Today's Date: 5/17/2019    Admit Date: 5/12/2019    Discharge Plan     Row Name 05/17/19 1042       Plan    Plan  [facility]    Plan Comments  [facility] and [facility] has offered. Awaiting d/c.         Discharge Codes    No documentation.             MALIA Trevizo

## 2019-05-17 NOTE — PROGRESS NOTES
Nephrology ([facility]) Progress Note      Patient:  Cheri Ely  YOB: 1940  Date of Service: 5/17/2019  MRN: [id number]   Acct: [id number]   Primary Care Physician: Provider, No Known  Advance Directive:   Code Status and Medical Interventions:   Ordered at: 05/13/19 0438     Level Of Support Discussed With:    Patient     Code Status:    CPR     Medical Interventions (Level of Support Prior to Arrest):    Full     Admit Date: 5/12/2019       Hospital Day: 4  Referring Provider: Laila Rodriguez MD      Patient personally seen and examined.  Complete chart including Consults, Notes, Operative Reports, Labs, Cardiology, and Radiology studies reviewed as able.        Subjective:  Cheri Ely is a 79 y.o. female  whom we were consulted for end stage renal disease management. Patient has routine hemodialysis Monday Wednesday Friday at [facility].  No recent issues with dialysis.  Recurrent history of GI bleeding. Presented this time with acute onset abdominal pain; imaging showed free air in abdomen and was taken for exploratory laparotomy by Dr Hughes. Had repair of perforated duodenal ulcer.  Moved to floor 5/14.     Today, some vomiting today.  No current n/v/d.  No issues with HD.    Dialysis   Pt was seen on RRT  Modality: Hemodialysis  Access: Arterial Venous Fistula  Location: left upper  QB: 450  QD: 600  UF: 320      Allergies:  Patient has no known allergies.    Home Meds:  Medications Prior to Admission   Medication Sig Dispense Refill Last Dose   • acetaminophen (TYLENOL) 325 MG tablet Take 2 tablets by mouth Every 6 (Six) Hours As Needed for Mild Pain .   Past Month at Unknown time   • aspirin 81 MG EC tablet Take 1 tablet by mouth Daily.   3/23/2019   • B Complex-C-Folic Acid (JOHN-SANGEETA) tablet Take 1 tablet by mouth Daily.   3/23/2019   • carvedilol (COREG) 3.125 MG tablet Take 3.125 mg by mouth Daily. Monday, Wednesday, and Friday dose. Hold if SBP <  100.   3/22/2019   • carvedilol (COREG) 3.125 MG tablet Take 3.125 mg by mouth 2 (Two) Times a Day With Meals. Sunday, Tuesday, Thursday, and Saturday dose. Hold if SBP <100.   3/23/2019   • Cholecalciferol (VITAMIN D) 2000 units capsule Take 1 capsule by mouth Daily. 30 capsule     • docusate sodium 100 MG capsule Take 100 mg by mouth 2 (Two) Times a Day. 60 each 1 3/23/2019   • epoetin lucy (EPOGEN,PROCRIT) 40464 UNIT/ML injection Inject 1 mL under the skin into the appropriate area as directed 3 (Three) Times a Week.      • lactulose 20 GM/30ML solution solution Take 30 mL by mouth Daily As Needed (Constipation). 30 mL  3/23/2019   • levothyroxine (SYNTHROID, LEVOTHROID) 150 MCG tablet Take 150 mcg by mouth Daily.   3/23/2019   • losartan (COZAAR) 25 MG tablet Take 25 mg by mouth 3 (Three) Times a Week. Monday, Wednesday, and Friday.   3/23/2019   • ondansetron ODT (ZOFRAN-ODT) 4 MG disintegrating tablet Take 4 mg by mouth Every 8 (Eight) Hours As Needed for Nausea or Vomiting.   Past Month at Unknown time   • pantoprazole (PROTONIX) 40 MG EC tablet Take 1 tablet by mouth Daily. 30 tablet 11 3/23/2019   • Polyethyl Glyc-Propyl Glyc PF 0.4-0.3 % solution ophthalmic solution Administer 2 drops to both eyes Every 2 (Two) Hours As Needed (dry eyes).   Past Month at Unknown time   • pravastatin (PRAVACHOL) 40 MG tablet Take 40 mg by mouth Daily.   3/23/2019   • sertraline (ZOLOFT) 50 MG tablet Take 50 mg by mouth Daily.   3/23/2019   • sucralfate (CARAFATE) 1 GM/10ML suspension Take 10 mL by mouth 2 (Two) Times a Day. 1200 mL 0        Medicines:  Current Facility-Administered Medications   Medication Dose Route Frequency Provider Last Rate Last Dose   • carvedilol (COREG) tablet 3.125 mg  3.125 mg Oral 2 times per day on Sun Tue Thu Fabien Stephen MD       • carvedilol (COREG) tablet 3.125 mg  3.125 mg Oral Once per day on Mon Wed Fri Fabien Espinosa MD   3.125 mg at 05/17/19 0912   • dextrose  (D50W) 25 g/ 50mL Intravenous Solution 25 g  25 g Intravenous Q15 Min PRN Fabien Espinosa MD   25 g at 05/15/19 1221   • dextrose (GLUTOSE) oral gel 15 g  15 g Oral Q15 Min PRN Fabien Espinosa MD       • dextrose 10 % infusion  100 mL/hr Intravenous Continuous Fabien Espinosa  mL/hr at 05/17/19 1159 100 mL/hr at 05/17/19 1159   • enoxaparin (LOVENOX) syringe 30 mg  30 mg Subcutaneous Daily Laila Rodriguez MD   30 mg at 05/17/19 0912   • glucagon (human recombinant) (GLUCAGEN DIAGNOSTIC) injection 1 mg  1 mg Subcutaneous Q15 Min PRN Fabien Espinosa MD       • levothyroxine (SYNTHROID, LEVOTHROID) tablet 150 mcg  150 mcg Oral Q AM Fabien Espinosa MD   150 mcg at 05/17/19 0533   • lidocaine (LIDODERM) 5 % 1 patch  1 patch Transdermal Q24H Fabien Espinosa MD   1 patch at 05/17/19 0911   • Morphine sulfate PCA 1 mg/mL 30 mL syringe   Intravenous Continuous Laila Rodriguez MD       • naloxone (NARCAN) injection 0.1 mg  0.1 mg Intravenous Q5 Min PRN Laila Rodriguez MD       • ondansetron (ZOFRAN) tablet 4 mg  4 mg Oral Q6H PRN Laila Rodriguez MD        Or   • ondansetron (ZOFRAN) injection 4 mg  4 mg Intravenous Q6H PRN Laila Rodriguez MD   4 mg at 05/17/19 0912   • pantoprazole (PROTONIX) EC tablet 40 mg  40 mg Oral Q AM Laila Rodriguez MD   40 mg at 05/17/19 0533   • piperacillin-tazobactam (ZOSYN) 3.375 g in iso-osmotic dextrose 50 ml (premix)  3.375 g Intravenous Q12H Laila Rodriguez MD 0 mL/hr at 05/15/19 0506 3.375 g at 05/17/19 0224   • sodium chloride 0.9 % flush 10 mL  10 mL Intravenous PRN Ashlyn Mackey APRN       • sodium chloride 0.9 % infusion  10 mL/hr Intravenous Continuous Laila Rodriguez MD 10 mL/hr at 05/15/19 0052 10 mL/hr at 05/15/19 0052       Past Medical History:  Past Medical History:   Diagnosis Date   • Arthritis    • Carotid artery disease (CMS/HCC)    • CHF (congestive heart failure) (CMS/HCC)    • Chronic kidney  disease on chronic dialysis (CMS/HCC)    • Chronic renal failure     on dialysis ON MON, WED, FRI   • Coronary artery disease    • Disease of thyroid gland    • Diverticulitis    • Gastric ulcer    • History of transfusion    • Hyperlipidemia    • Hypertension    • Injury of back    • Mesenteric artery insufficiency (CMS/HCC)    • Multilevel degenerative disc disease    • Osteoporosis    • Pancreatitis    • Pelvis fracture (CMS/HCC)    • Pneumonia        Past Surgical History:  Past Surgical History:   Procedure Laterality Date   • AORTAGRAM Right 1/8/2018    Procedure: MESENTERIC ANGIOGRAM, GROIN ACCESS, MYNX CLOSURE;  Surgeon: Tomasz San DO;  Location: [facility] OR;  Service:    • AORTIC VALVE SURGERY     • APPENDECTOMY     • BACK SURGERY     • CAPSULE ENDOSCOPY N/A 3/30/2019    Procedure: CAPSULE ENDOSCOPY M2A;  Surgeon: David Yu MD;  Location: [facility] ENDOSCOPY;  Service: Gastroenterology   • CARDIAC SURGERY     • CAROTID ENDARTERECTOMY Bilateral    • CATARACT EXTRACTION, BILATERAL     • CERVICAL SPINE SURGERY     • CHOLECYSTECTOMY     • COLON SURGERY     • COLONOSCOPY  10/01/2015   • COLONOSCOPY N/A 3/28/2019    Procedure: COLONOSCOPY WITH ANESTHESIA;  Surgeon: Rafita Merida MD;  Location: [facility] ENDOSCOPY;  Service: Gastroenterology   • CORONARY ARTERY BYPASS GRAFT     • DIALYSIS FISTULA CREATION     • ENDOSCOPY N/A 12/27/2018    Procedure: ESOPHAGOGASTRODUODENOSCOPY WITH ANESTHESIA;  Surgeon: Moni Garza MD;  Location: [facility] ENDOSCOPY;  Service: Gastroenterology   • ENDOSCOPY N/A 2/28/2019    Procedure: ESOPHAGOGASTRODUODENOSCOPY WITH ANESTHESIA;  Surgeon: Moni Garza MD;  Location: [facility] ENDOSCOPY;  Service: Gastroenterology   • ENDOSCOPY N/A 3/11/2019    Procedure: ESOPHAGOGASTRODUODENOSCOPY WITH ANESTHESIA;  Surgeon: Moni Garza MD;  Location: [facility] ENDOSCOPY;  Service: Gastroenterology   • ENDOSCOPY N/A 3/27/2019    Procedure: ESOPHAGOGASTRODUODENOSCOPY WITH ANESTHESIA;   Surgeon: Rafita Merida MD;  Location:  [facility] ENDOSCOPY;  Service: Gastroenterology   • EXPLORATORY LAPAROTOMY N/A 5/13/2019    Procedure: LAPAROTOMY EXPLORATORY, OVERSEW DUODENAL ULCER WITH FRED PATCH, KOCHER MANEUVER;  Surgeon: Laila Rodriguez MD;  Location: [facility] OR;  Service: General   • HIP TROCANTERIC NAILING WITH INTRAMEDULLARY HIP SCREW Right 2/8/2019    Procedure: HIP TROCANTERIC NAILING LONG WITH INTRAMEDULLARY HIP SCREW;  Surgeon: Boo Camacho MD;  Location: [facility] OR;  Service: Orthopedics   • JOINT REPLACEMENT      knee   • LAPAROSCOPIC GASTRIC BANDING     • LEEP     • LUMBAR FUSION     • TOE AMPUTATION Left     2nd toe   • TOTAL KNEE ARTHROPLASTY Bilateral    • TUBAL ABDOMINAL LIGATION     • UPPER GASTROINTESTINAL ENDOSCOPY  10/01/2015       Family History  Family History   Problem Relation Age of Onset   • Hypertension Mother    • Cancer Mother         stomach   • Coronary artery disease Father    • Heart attack Father    • Diabetes Brother    • Coronary artery disease Brother    • Colon cancer Neg Hx    • Colon polyps Neg Hx        Social History  Social History     Socioeconomic History   • Marital status:      Spouse name: Not on file   • Number of children: Not on file   • Years of education: Not on file   • Highest education level: Not on file   Tobacco Use   • Smoking status: Never Smoker   • Smokeless tobacco: Never Used   Substance and Sexual Activity   • Alcohol use: No   • Drug use: No   • Sexual activity: Defer         Review of Systems:  History obtained from chart review and the patient  General ROS: No fever or chills  Respiratory ROS: No cough, shortness of breath, wheezing  Cardiovascular ROS: No chest pain or palpitations  Gastrointestinal ROS: No abdominal pain or melena  Genito-Urinary ROS: No dysuria or hematuria    Objective:  Patient Vitals for the past 24 hrs:   BP Temp Temp src Pulse Resp SpO2   05/17/19 0801 107/50 98.1 °F (36.7 °C) -- 83 16 97 %   05/17/19  0446 115/47 98.8 °F (37.1 °C) Oral 76 16 99 %   05/17/19 0317 -- -- -- 81 16 98 %   05/17/19 0028 108/52 98.7 °F (37.1 °C) Oral 81 16 94 %   05/16/19 2021 93/49 97.6 °F (36.4 °C) Oral 90 16 94 %       Intake/Output Summary (Last 24 hours) at 5/17/2019 1547  Last data filed at 5/17/2019 1159  Gross per 24 hour   Intake 3135 ml   Output 165 ml   Net 2970 ml     General: awake/alert   Chest:  clear to auscultation bilaterally without respiratory distress  CVS: irrr  Abdominal: soft, nontender, positive bowel sounds  Extremities: no cyanosis or edema  Skin: warm and dry without rash      Labs:  Results from last 7 days   Lab Units 05/17/19  0721 05/16/19  0510 05/15/19  0655   WBC 10*3/mm3 7.23 9.04 9.99   HEMOGLOBIN g/dL 10.7* 9.7* 10.1*   HEMATOCRIT % 32.5* 30.5* 32.5*   PLATELETS 10*3/mm3 129* 152 152         Results from last 7 days   Lab Units 05/17/19  0629 05/16/19  0510 05/15/19  0655   SODIUM mmol/L 125* 130* 136   POTASSIUM mmol/L 3.4* 3.2* 4.1   CHLORIDE mmol/L 92* 95* 101   CO2 mmol/L 23.0* 30.0 24.0   BUN mg/dL 16 12 36*   CREATININE mg/dL 2.90* 2.21* 4.18*   CALCIUM mg/dL 6.9* 7.1* 7.8*   BILIRUBIN mg/dL 0.5 0.5 0.6   ALK PHOS U/L 106 117 126*   ALT (SGPT) U/L <15 15 16   AST (SGOT) U/L 21 17 23   GLUCOSE mg/dL 159* 178* 40*       Radiology:   Imaging Results (last 72 hours)     ** No results found for the last 72 hours. **          Culture:  Blood Culture   Date Value Ref Range Status   05/13/2019 No growth at 4 days  Preliminary   05/13/2019 No growth at 4 days  Preliminary         Assessment   ESRD  Chronic diastolic CHF  HTN with recent hypotension  Perforated duodenal ulcer s/p surgical repair  Anemia - CKD/acute blood loss  Hypokalemia  hyponatremia     Plan:  HD MWF  Monitor labs  D/w RN  Considering TPN      Chris Hsu MD  5/17/2019  3:47 PM

## 2019-05-18 LAB
ALBUMIN SERPL-MCNC: 2.3 G/DL (ref 3.5–5)
ALBUMIN/GLOB SERPL: 0.8 G/DL (ref 1.1–2.5)
ALP SERPL-CCNC: 130 U/L (ref 24–120)
ALT SERPL W P-5'-P-CCNC: <15 U/L (ref 0–54)
ANION GAP SERPL CALCULATED.3IONS-SCNC: 5 MMOL/L (ref 4–13)
AST SERPL-CCNC: 21 U/L (ref 7–45)
BACTERIA SPEC AEROBE CULT: NORMAL
BACTERIA SPEC AEROBE CULT: NORMAL
BASOPHILS # BLD AUTO: 0.02 10*3/MM3 (ref 0–0.2)
BASOPHILS NFR BLD AUTO: 0.3 % (ref 0–2)
BILIRUB SERPL-MCNC: 0.5 MG/DL (ref 0.1–1)
BUN BLD-MCNC: 9 MG/DL (ref 5–21)
BUN/CREAT SERPL: 4.5 (ref 7–25)
CALCIUM SPEC-SCNC: 7.3 MG/DL (ref 8.4–10.4)
CHLORIDE SERPL-SCNC: 98 MMOL/L (ref 98–110)
CO2 SERPL-SCNC: 29 MMOL/L (ref 24–31)
CREAT BLD-MCNC: 2.01 MG/DL (ref 0.5–1.4)
DEPRECATED RDW RBC AUTO: 60.4 FL (ref 40–54)
EOSINOPHIL # BLD AUTO: 0.36 10*3/MM3 (ref 0–0.7)
EOSINOPHIL NFR BLD AUTO: 5.9 % (ref 0–4)
ERYTHROCYTE [DISTWIDTH] IN BLOOD BY AUTOMATED COUNT: 16.8 % (ref 12–15)
GFR SERPL CREATININE-BSD FRML MDRD: 24 ML/MIN/1.73
GLOBULIN UR ELPH-MCNC: 2.9 GM/DL
GLUCOSE BLD-MCNC: 81 MG/DL (ref 70–100)
GLUCOSE BLDC GLUCOMTR-MCNC: 77 MG/DL (ref 70–130)
GLUCOSE BLDC GLUCOMTR-MCNC: 82 MG/DL (ref 70–130)
GLUCOSE BLDC GLUCOMTR-MCNC: 82 MG/DL (ref 70–130)
HCT VFR BLD AUTO: 33.1 % (ref 37–47)
HGB BLD-MCNC: 10.6 G/DL (ref 12–16)
LYMPHOCYTES # BLD AUTO: 0.61 10*3/MM3 (ref 0.72–4.86)
LYMPHOCYTES NFR BLD AUTO: 9.9 % (ref 15–45)
MAGNESIUM SERPL-MCNC: 1.6 MG/DL (ref 1.4–2.2)
MCH RBC QN AUTO: 31.3 PG (ref 28–32)
MCHC RBC AUTO-ENTMCNC: 32 G/DL (ref 33–36)
MCV RBC AUTO: 97.6 FL (ref 82–98)
MONOCYTES # BLD AUTO: 0.34 10*3/MM3 (ref 0.19–1.3)
MONOCYTES NFR BLD AUTO: 5.5 % (ref 4–12)
NEUTROPHILS # BLD AUTO: 4.78 10*3/MM3 (ref 1.87–8.4)
NEUTROPHILS NFR BLD AUTO: 77.7 % (ref 39–78)
PHOSPHATE SERPL-MCNC: 2.4 MG/DL (ref 2.5–4.5)
PLATELET # BLD AUTO: 130 10*3/MM3 (ref 130–400)
PMV BLD AUTO: 10.8 FL (ref 6–12)
POTASSIUM BLD-SCNC: 3.4 MMOL/L (ref 3.5–5.3)
PROT SERPL-MCNC: 5.2 G/DL (ref 6.3–8.7)
RBC # BLD AUTO: 3.39 10*6/MM3 (ref 4.2–5.4)
SODIUM BLD-SCNC: 132 MMOL/L (ref 135–145)
WBC NRBC COR # BLD: 6.15 10*3/MM3 (ref 4.8–10.8)

## 2019-05-18 PROCEDURE — 84100 ASSAY OF PHOSPHORUS: CPT | Performed by: INTERNAL MEDICINE

## 2019-05-18 PROCEDURE — 25010000002 PIPERACILLIN SOD-TAZOBACTAM PER 1 G: Performed by: SPECIALIST

## 2019-05-18 PROCEDURE — 82962 GLUCOSE BLOOD TEST: CPT

## 2019-05-18 PROCEDURE — 25010000002 ENOXAPARIN PER 10 MG: Performed by: SPECIALIST

## 2019-05-18 PROCEDURE — 85025 COMPLETE CBC W/AUTO DIFF WBC: CPT | Performed by: SPECIALIST

## 2019-05-18 PROCEDURE — 97116 GAIT TRAINING THERAPY: CPT

## 2019-05-18 PROCEDURE — 97530 THERAPEUTIC ACTIVITIES: CPT

## 2019-05-18 PROCEDURE — 80053 COMPREHEN METABOLIC PANEL: CPT | Performed by: SPECIALIST

## 2019-05-18 PROCEDURE — 83735 ASSAY OF MAGNESIUM: CPT | Performed by: INTERNAL MEDICINE

## 2019-05-18 RX ORDER — FUROSEMIDE 10 MG/ML
20 INJECTION INTRAMUSCULAR; INTRAVENOUS ONCE
Status: DISCONTINUED | OUTPATIENT
Start: 2019-05-18 | End: 2019-05-18

## 2019-05-18 RX ORDER — POTASSIUM CHLORIDE 7.45 MG/ML
10 INJECTION INTRAVENOUS
Status: DISCONTINUED | OUTPATIENT
Start: 2019-05-18 | End: 2019-05-18

## 2019-05-18 RX ADMIN — PANTOPRAZOLE SODIUM 40 MG: 40 TABLET, DELAYED RELEASE ORAL at 06:19

## 2019-05-18 RX ADMIN — DEXTROSE AND SODIUM CHLORIDE 75 ML/HR: 5; 900 INJECTION, SOLUTION INTRAVENOUS at 08:14

## 2019-05-18 RX ADMIN — TAZOBACTAM SODIUM AND PIPERACILLIN SODIUM 3.38 G: 375; 3 INJECTION, SOLUTION INTRAVENOUS at 13:28

## 2019-05-18 RX ADMIN — LIDOCAINE 1 PATCH: 50 PATCH CUTANEOUS at 09:41

## 2019-05-18 RX ADMIN — LEVOTHYROXINE SODIUM 150 MCG: 150 TABLET ORAL at 06:19

## 2019-05-18 RX ADMIN — ENOXAPARIN SODIUM 30 MG: 30 INJECTION SUBCUTANEOUS at 09:42

## 2019-05-18 RX ADMIN — CARVEDILOL 3.12 MG: 3.12 TABLET, FILM COATED ORAL at 09:42

## 2019-05-18 RX ADMIN — CARVEDILOL 3.12 MG: 3.12 TABLET, FILM COATED ORAL at 17:31

## 2019-05-18 RX ADMIN — TAZOBACTAM SODIUM AND PIPERACILLIN SODIUM 3.38 G: 375; 3 INJECTION, SOLUTION INTRAVENOUS at 01:54

## 2019-05-18 RX ADMIN — DEXTROSE AND SODIUM CHLORIDE 75 ML/HR: 5; 900 INJECTION, SOLUTION INTRAVENOUS at 22:18

## 2019-05-18 NOTE — PROGRESS NOTES
LOS: 5 days   Patient Care Team:  Provider, No Known as PCP - Barrett Prasad Jr, MD as PCP - Family Medicine  Moni Garza MD as Consulting Physician (Gastroenterology)  Tomasz San DO as Consulting Physician (Vascular Surgery)    Chief Complaint: Postoperative day 6 following admission and treatment for a perforated ulcer.  She is increasing her activity she tolerated full liquids, she is urinating well she had 2 bowel movements.  Subjective     Subjective     Postoperative day #6 following admission for treatment of a perforated ulcer, she is increasing her activity she is tolerating full liquids she is urinating and had 2 bowel movements.  Her white count is down to 6 hematocrit is stable at 33 electrolytes within normal limits except for a potassium of 3.4 and a creatinine of 2.0 but this is improved from 2.9.  Objective      Objective     Vital Signs  Temp:  [97.9 °F (36.6 °C)-98.9 °F (37.2 °C)] 97.9 °F (36.6 °C)  Heart Rate:  [64-96] 96  Resp:  [14-18] 14  BP: ()/(48-88) 129/55    Intake & Output (last 3 days)       05/15 0701 - 05/16 0700 05/16 0701 - 05/17 0700 05/17 0701 - 05/18 0700 05/18 0701 - 05/19 0700    P.O. 120 240      I.V. (mL/kg) 980 (13.6) 1820 (25.3) 1265 (17.4) 1118 (15.4)    IV Piggyback 685 50 50     Total Intake(mL/kg) 1785 (24.8) 2110 (29.3) 1315 (18.1) 1118 (15.4)    Emesis/NG output   150     Drains 70 25 10 5    Other   1450     Stool 0  0 0    Total Output 70 25 1610 5    Net +1715 +2085 -295 +1113            Stool Unmeasured Occurrence   1 x 2 x          Physical Exam:     General Appearance:    Alert, cooperative, in no acute distress   Lungs:     Clear to auscultation,respirations regular, even and                  unlabored    Heart:    Regular rhythm and normal rate, normal S1 and S2, no            murmur, no gallop, no rub, no click   Chest Wall:    No abnormalities observed   Abdomen:     Normal bowel sounds, no masses, no organomegaly, soft         non-tender, non-distended,wound clean and dry, no   erythema, no discharge         Results Review:     I reviewed the patient's new clinical results.  I reviewed the patient's new imaging results and agree with the interpretation.    Results from last 7 days   Lab Units 05/18/19  0727 05/17/19  0721 05/16/19  0510   WBC 10*3/mm3 6.15 7.23 9.04   HEMOGLOBIN g/dL 10.6* 10.7* 9.7*   HEMATOCRIT % 33.1* 32.5* 30.5*   PLATELETS 10*3/mm3 130 129* 152        Results from last 7 days   Lab Units 05/18/19  0728 05/17/19  0629 05/16/19  0510   SODIUM mmol/L 132* 125* 130*   POTASSIUM mmol/L 3.4* 3.4* 3.2*   CHLORIDE mmol/L 98 92* 95*   CO2 mmol/L 29.0 23.0* 30.0   BUN mg/dL 9 16 12   CREATININE mg/dL 2.01* 2.90* 2.21*   CALCIUM mg/dL 7.3* 6.9* 7.1*   BILIRUBIN mg/dL 0.5 0.5 0.5   ALK PHOS U/L 130* 106 117   ALT (SGPT) U/L <15 <15 15   AST (SGOT) U/L 21 21 17   GLUCOSE mg/dL 81 159* 178*       Assessment/Plan     Assessment/Plan       Abdominal pain    Hypoglycemia    Paroxysmal atrial fibrillation (CMS/HCC)      Currently will increase to a regular diet, also increase in begin food supplements, potentially home by Monday.  Replace potassium today.      Bunny Jean MD  05/18/19  4:14 PM      Time: time spent with patient 15 minutes     EMR Dragon/Transcription disclaimer: Much of this encounter note is an electronic transcription/translation of spoken language to printed text. The electronic translation of spoken language may permit erroneous, or at times, nonsensical words or phrases to be inadvertently transcribed; although I have reviewed the note for such errors, some may still exist.

## 2019-05-18 NOTE — PROGRESS NOTES
"    [facility] Medicine Services  INPATIENT PROGRESS NOTE    Patient Name: Cheri Ely  Date of Admission: 5/12/2019  Today's Date: 05/18/19  Length of Stay: 5  Primary Care Physician: Provider, No Known    Subjective   Chief Complaint: f/u   HPI   \" I think marilee plans to start me on another diet by tomorrow.\"  States she just walked within the room with therapist.  Tolerated clears  No nausea or vomiting     Nurse Radha said soon after she gets her diet in (clears), she ends up with bowel movement    Review of Systems     All pertinent negatives and positives are as above. All other systems have been reviewed and are negative unless otherwise stated.     Objective    Temp:  [97.9 °F (36.6 °C)-98.9 °F (37.2 °C)] 97.9 °F (36.6 °C)  Heart Rate:  [64-96] 96  Resp:  [14-18] 14  BP: ()/(48-88) 129/55  Physical Exam  awake and alert and oriented x3  Pleasant woman, no distress,  She was about to pull her iv access on right forearm as the tube got caught underneath her.   Awake, alert, oriented x3, no apparent distress  Supple neck  Anicteric sclera, external ocular muscles are intact  Normal respiratory effort, no adventitious sounds  S1-S2, appears regular;   No cyanosis clubbing or edema  Warm dry skin  Good capillary refill time   she is on morphine pca  IVF on going           Results Review:  I have reviewed the labs, radiology results, and diagnostic studies.    Laboratory Data:   Results from last 7 days   Lab Units 05/18/19  0727 05/17/19  0721 05/16/19  0510   WBC 10*3/mm3 6.15 7.23 9.04   HEMOGLOBIN g/dL 10.6* 10.7* 9.7*   HEMATOCRIT % 33.1* 32.5* 30.5*   PLATELETS 10*3/mm3 130 129* 152        Results from last 7 days   Lab Units 05/18/19  0728 05/17/19  0629 05/16/19  0510   SODIUM mmol/L 132* 125* 130*   POTASSIUM mmol/L 3.4* 3.4* 3.2*   CHLORIDE mmol/L 98 92* 95*   CO2 mmol/L 29.0 23.0* 30.0   BUN mg/dL 9 16 12   CREATININE mg/dL 2.01* 2.90* 2.21*   CALCIUM mg/dL 7.3* " 6.9* 7.1*   BILIRUBIN mg/dL 0.5 0.5 0.5   ALK PHOS U/L 130* 106 117   ALT (SGPT) U/L <15 <15 15   AST (SGOT) U/L 21 21 17   GLUCOSE mg/dL 81 159* 178*       Culture Data:   Blood Culture   Date Value Ref Range Status   05/13/2019 No growth at 5 days  Final   05/13/2019 No growth at 5 days  Final       Radiology Data:   Imaging Results (last 24 hours)     ** No results found for the last 24 hours. **        accucheck 81, 82, 77 today  I have reviewed the patient's current medications.     Assessment/Plan     Active Hospital Problems    Diagnosis   • Hypoglycemia   • Paroxysmal atrial fibrillation (CMS/HCC)   • Abdominal pain       Hyponatremia prob from d10 received yesterday; defer to renal service - improve  Pneumoperitoneum secondary to perforated duodenal ulcer status post exploratory laparotomy on May 13  End-stage renal disease on hemodialysis Monday Wednesdays and Fridays - per renal service  Postoperative blood loss anemia - stable hgb  History of diastolic heart failure with status post aortic valve replacement  Atrial fibrillation with left bundle branch block on EKG; telemetry showed atrial fibrillation - hx of bleeding; at high risk of bleeding  History of hypertension - adequately controlled  Coronary artery disease stable -serial troponin is negative   postoperative hypoglycemia  Hypothyroidism - on replacement therapy         Cont supportive care; cont physical rehabilitation   Other plan per others      carvedilol 3.125 mg Oral 2 times per day on Sun Tue Thu Sat   carvedilol 3.125 mg Oral Once per day on Mon Wed Fri   enoxaparin 30 mg Subcutaneous Daily   levothyroxine 150 mcg Oral Q AM   lidocaine 1 patch Transdermal Q24H   pantoprazole 40 mg Oral Q AM   piperacillin-tazobactam 3.375 g Intravenous Q12H               Discharge Planning: per primary service    Fabien Espinosa MD   05/18/19   3:16 PM

## 2019-05-18 NOTE — PLAN OF CARE
Problem: Patient Care Overview  Goal: Plan of Care Review  Outcome: Ongoing (interventions implemented as appropriate)   05/18/19 1541 05/18/19 1831   OTHER   Outcome Summary --  Pt has had 3 large loose stools incontinently this shift. Up in chair and turned every 2 hours. For regular renal diet in am. Using PCA infrequently for pain control   Coping/Psychosocial   Plan of Care Reviewed With patient --    Plan of Care Review   Progress --  no change     Goal: Individualization and Mutuality  Outcome: Ongoing (interventions implemented as appropriate)   05/13/19 1733 05/15/19 0428 05/18/19 1831   Individualization   Patient Specific Preferences wants to return home --  --    Patient Specific Goals (Include Timeframe) --  Improve pain control and mobility, d/c ng tube --    Patient Specific Interventions --  --  Taking sips of clear liquid and for regular renal diet in am.    Mutuality/Individual Preferences   What Anxieties, Fears, Concerns, or Questions Do You Have About Your Care? --  --  Concerned about diarrhea.   What Information Would Help Us Give You More Personalized Care? --  --  Pt has dialysis MWF   How Would You and/or Your Support Person Like to Participate in Your Care? --  --  Keep updated on treatment plan   Mutuality/Individual Preferences   How to Address Anxieties/Fears --  --  Answer any questions.       Problem: Fall Risk (Adult)  Goal: Identify Related Risk Factors and Signs and Symptoms  Outcome: Ongoing (interventions implemented as appropriate)   05/15/19 0428 05/15/19 1423   Fall Risk (Adult)   Related Risk Factors (Fall Risk) age-related changes;fatigue/slow reaction;gait/mobility problems;inadequate lighting;environment unfamiliar --    Signs and Symptoms (Fall Risk) --  presence of risk factors     Goal: Absence of Fall  Outcome: Ongoing (interventions implemented as appropriate)   05/18/19 1831   Fall Risk (Adult)   Absence of Fall making progress toward outcome       Problem: Skin  Injury Risk (Adult)  Goal: Identify Related Risk Factors and Signs and Symptoms  Outcome: Ongoing (interventions implemented as appropriate)   05/15/19 0428   Skin Injury Risk (Adult)   Related Risk Factors (Skin Injury Risk) advanced age;mechanical forces;nutritional deficiencies;mobility impaired     Goal: Skin Health and Integrity  Outcome: Ongoing (interventions implemented as appropriate)   05/18/19 1831   Skin Injury Risk (Adult)   Skin Health and Integrity making progress toward outcome       Problem: Wound (Includes Pressure Injury) (Adult)  Goal: Signs and Symptoms of Listed Potential Problems Will be Absent, Minimized or Managed (Wound)  Outcome: Ongoing (interventions implemented as appropriate)   05/18/19 0132   Goal/Outcome Evaluation   Problems Assessed (Wound) all   Problems Present (Wound) pain

## 2019-05-18 NOTE — PLAN OF CARE
Problem: Patient Care Overview  Goal: Plan of Care Review  Outcome: Ongoing (interventions implemented as appropriate)   05/18/19 0132   OTHER   Outcome Summary Clear liquid diet; glu = 82;loose BM x 3 on dayshift; very weak after dialysis; had to use lift team to get transferred from [facility] back to bed   Coping/Psychosocial   Plan of Care Reviewed With patient   Plan of Care Review   Progress no change       Problem: Fall Risk (Adult)  Goal: Absence of Fall  Outcome: Ongoing (interventions implemented as appropriate)   05/18/19 0132   Fall Risk (Adult)   Absence of Fall making progress toward outcome       Problem: Skin Injury Risk (Adult)  Goal: Identify Related Risk Factors and Signs and Symptoms  Outcome: Ongoing (interventions implemented as appropriate)   05/15/19 0428   Skin Injury Risk (Adult)   Related Risk Factors (Skin Injury Risk) advanced age;mechanical forces;nutritional deficiencies;mobility impaired     Goal: Skin Health and Integrity  Outcome: Ongoing (interventions implemented as appropriate)   05/18/19 0132   Skin Injury Risk (Adult)   Skin Health and Integrity making progress toward outcome       Problem: Wound (Includes Pressure Injury) (Adult)  Goal: Signs and Symptoms of Listed Potential Problems Will be Absent, Minimized or Managed (Wound)  Outcome: Ongoing (interventions implemented as appropriate)   05/18/19 0132   Goal/Outcome Evaluation   Problems Assessed (Wound) all   Problems Present (Wound) pain

## 2019-05-18 NOTE — PROGRESS NOTES
Nephrology ([facility]) Progress Note      Patient:  Cheri Ely  YOB: 1940  Date of Service: 5/18/2019  MRN: [id number]   Acct: [id number]   Primary Care Physician: Provider, No Known  Advance Directive:   Code Status and Medical Interventions:   Ordered at: 05/13/19 0438     Level Of Support Discussed With:    Patient     Code Status:    CPR     Medical Interventions (Level of Support Prior to Arrest):    Full     Admit Date: 5/12/2019       Hospital Day: 5  Referring Provider: Laila Rodriguez MD      Patient personally seen and examined.  Complete chart including Consults, Notes, Operative Reports, Labs, Cardiology, and Radiology studies reviewed as able.        Subjective:  Cheri Ely is a 79 y.o. female  whom we were consulted for end stage renal disease management. Patient has routine hemodialysis Monday Wednesday Friday at [facility].  No recent issues with dialysis.  Recurrent history of GI bleeding. Presented this time with acute onset abdominal pain; imaging showed free air in abdomen and was taken for exploratory laparotomy by Dr Hughes. Had repair of perforated duodenal ulcer.  Moved to floor 5/14.     Today, just walked around the bed with therapy.  No n/v/d.  No issues with HD.      Allergies:  Patient has no known allergies.    Home Meds:  Medications Prior to Admission   Medication Sig Dispense Refill Last Dose   • acetaminophen (TYLENOL) 325 MG tablet Take 2 tablets by mouth Every 6 (Six) Hours As Needed for Mild Pain .   Past Month at Unknown time   • aspirin 81 MG EC tablet Take 1 tablet by mouth Daily.   3/23/2019   • B Complex-C-Folic Acid (JOHN-SANGEETA) tablet Take 1 tablet by mouth Daily.   3/23/2019   • carvedilol (COREG) 3.125 MG tablet Take 3.125 mg by mouth Daily. Monday, Wednesday, and Friday dose. Hold if SBP < 100.   3/22/2019   • carvedilol (COREG) 3.125 MG tablet Take 3.125 mg by mouth 2 (Two) Times a Day With Meals. Sunday, Tuesday,  Thursday, and Saturday dose. Hold if SBP <100.   3/23/2019   • Cholecalciferol (VITAMIN D) 2000 units capsule Take 1 capsule by mouth Daily. 30 capsule     • docusate sodium 100 MG capsule Take 100 mg by mouth 2 (Two) Times a Day. 60 each 1 3/23/2019   • epoetin lucy (EPOGEN,PROCRIT) 13070 UNIT/ML injection Inject 1 mL under the skin into the appropriate area as directed 3 (Three) Times a Week.      • lactulose 20 GM/30ML solution solution Take 30 mL by mouth Daily As Needed (Constipation). 30 mL  3/23/2019   • levothyroxine (SYNTHROID, LEVOTHROID) 150 MCG tablet Take 150 mcg by mouth Daily.   3/23/2019   • losartan (COZAAR) 25 MG tablet Take 25 mg by mouth 3 (Three) Times a Week. Monday, Wednesday, and Friday.   3/23/2019   • ondansetron ODT (ZOFRAN-ODT) 4 MG disintegrating tablet Take 4 mg by mouth Every 8 (Eight) Hours As Needed for Nausea or Vomiting.   Past Month at Unknown time   • pantoprazole (PROTONIX) 40 MG EC tablet Take 1 tablet by mouth Daily. 30 tablet 11 3/23/2019   • Polyethyl Glyc-Propyl Glyc PF 0.4-0.3 % solution ophthalmic solution Administer 2 drops to both eyes Every 2 (Two) Hours As Needed (dry eyes).   Past Month at Unknown time   • pravastatin (PRAVACHOL) 40 MG tablet Take 40 mg by mouth Daily.   3/23/2019   • sertraline (ZOLOFT) 50 MG tablet Take 50 mg by mouth Daily.   3/23/2019   • sucralfate (CARAFATE) 1 GM/10ML suspension Take 10 mL by mouth 2 (Two) Times a Day. 1200 mL 0        Medicines:  Current Facility-Administered Medications   Medication Dose Route Frequency Provider Last Rate Last Dose   • carvedilol (COREG) tablet 3.125 mg  3.125 mg Oral 2 times per day on Sun Tue Thu Sat Fabien Espinosa MD   3.125 mg at 05/18/19 0942   • carvedilol (COREG) tablet 3.125 mg  3.125 mg Oral Once per day on Mon Wed Fri Fabien Espinosa MD   3.125 mg at 05/17/19 0912   • dextrose (D50W) 25 g/ 50mL Intravenous Solution 25 g  25 g Intravenous Q15 Min Fabien Moran MD    25 g at 05/15/19 1221   • dextrose (GLUTOSE) oral gel 15 g  15 g Oral Q15 Min PRN Fabien Espinosa MD       • dextrose 5 % and sodium chloride 0.9 % infusion  75 mL/hr Intravenous Continuous Fabien Espinosa MD 75 mL/hr at 05/18/19 0814 75 mL/hr at 05/18/19 0814   • enoxaparin (LOVENOX) syringe 30 mg  30 mg Subcutaneous Daily Laila Rodriguez MD   30 mg at 05/18/19 0942   • furosemide (LASIX) injection 20 mg  20 mg Intravenous Once Bunny Jean MD       • glucagon (human recombinant) (GLUCAGEN DIAGNOSTIC) injection 1 mg  1 mg Subcutaneous Q15 Min PRN Fabien Espinosa MD       • levothyroxine (SYNTHROID, LEVOTHROID) tablet 150 mcg  150 mcg Oral Q AM Fabien Espinosa MD   150 mcg at 05/18/19 0619   • lidocaine (LIDODERM) 5 % 1 patch  1 patch Transdermal Q24H Fabien Espinosa MD   1 patch at 05/18/19 0941   • Morphine sulfate PCA 1 mg/mL 30 mL syringe   Intravenous Continuous Laila Rodriguez MD       • naloxone (NARCAN) injection 0.1 mg  0.1 mg Intravenous Q5 Min PRN Laila Rodriguez MD       • ondansetron (ZOFRAN) tablet 4 mg  4 mg Oral Q6H PRN Laila Rodriguez MD        Or   • ondansetron (ZOFRAN) injection 4 mg  4 mg Intravenous Q6H PRN Laila Rodriguez MD   4 mg at 05/17/19 0912   • pantoprazole (PROTONIX) EC tablet 40 mg  40 mg Oral Q AM Laila Rodriguez MD   40 mg at 05/18/19 0619   • piperacillin-tazobactam (ZOSYN) 3.375 g in iso-osmotic dextrose 50 ml (premix)  3.375 g Intravenous Q12H Laila Rodriguez MD 0 mL/hr at 05/17/19 2152 3.375 g at 05/18/19 1328   • potassium chloride 10 mEq in 100 mL IVPB  10 mEq Intravenous Q1H Bunny Jean MD       • sodium chloride 0.9 % flush 10 mL  10 mL Intravenous PRN Ashlyn Mackey APRN       • sodium chloride 0.9 % infusion  10 mL/hr Intravenous Continuous Laila Rodriguez MD 10 mL/hr at 05/15/19 0052 10 mL/hr at 05/15/19 0052       Past Medical History:  Past Medical History:   Diagnosis Date   • Arthritis    •  Carotid artery disease (CMS/HCC)    • CHF (congestive heart failure) (CMS/HCC)    • Chronic kidney disease on chronic dialysis (CMS/HCC)    • Chronic renal failure     on dialysis ON MON, WED, FRI   • Coronary artery disease    • Disease of thyroid gland    • Diverticulitis    • Gastric ulcer    • History of transfusion    • Hyperlipidemia    • Hypertension    • Injury of back    • Mesenteric artery insufficiency (CMS/HCC)    • Multilevel degenerative disc disease    • Osteoporosis    • Pancreatitis    • Pelvis fracture (CMS/HCC)    • Pneumonia        Past Surgical History:  Past Surgical History:   Procedure Laterality Date   • AORTAGRAM Right 1/8/2018    Procedure: MESENTERIC ANGIOGRAM, GROIN ACCESS, MYNX CLOSURE;  Surgeon: Tomasz San DO;  Location: [facility] OR;  Service:    • AORTIC VALVE SURGERY     • APPENDECTOMY     • BACK SURGERY     • CAPSULE ENDOSCOPY N/A 3/30/2019    Procedure: CAPSULE ENDOSCOPY M2A;  Surgeon: David Yu MD;  Location: [facility] ENDOSCOPY;  Service: Gastroenterology   • CARDIAC SURGERY     • CAROTID ENDARTERECTOMY Bilateral    • CATARACT EXTRACTION, BILATERAL     • CERVICAL SPINE SURGERY     • CHOLECYSTECTOMY     • COLON SURGERY     • COLONOSCOPY  10/01/2015   • COLONOSCOPY N/A 3/28/2019    Procedure: COLONOSCOPY WITH ANESTHESIA;  Surgeon: Rafita Merida MD;  Location: [facility] ENDOSCOPY;  Service: Gastroenterology   • CORONARY ARTERY BYPASS GRAFT     • DIALYSIS FISTULA CREATION     • ENDOSCOPY N/A 12/27/2018    Procedure: ESOPHAGOGASTRODUODENOSCOPY WITH ANESTHESIA;  Surgeon: Moni Garza MD;  Location: [facility] ENDOSCOPY;  Service: Gastroenterology   • ENDOSCOPY N/A 2/28/2019    Procedure: ESOPHAGOGASTRODUODENOSCOPY WITH ANESTHESIA;  Surgeon: Moni Garza MD;  Location: [facility] ENDOSCOPY;  Service: Gastroenterology   • ENDOSCOPY N/A 3/11/2019    Procedure: ESOPHAGOGASTRODUODENOSCOPY WITH ANESTHESIA;  Surgeon: Moni Garza MD;  Location: [facility] ENDOSCOPY;  Service:  Gastroenterology   • ENDOSCOPY N/A 3/27/2019    Procedure: ESOPHAGOGASTRODUODENOSCOPY WITH ANESTHESIA;  Surgeon: Rafita Merida MD;  Location: [facility] ENDOSCOPY;  Service: Gastroenterology   • EXPLORATORY LAPAROTOMY N/A 5/13/2019    Procedure: LAPAROTOMY EXPLORATORY, OVERSEW DUODENAL ULCER WITH FRED PATCH, KOCHER MANEUVER;  Surgeon: Laila Rodriguez MD;  Location:  [facility] OR;  Service: General   • HIP TROCANTERIC NAILING WITH INTRAMEDULLARY HIP SCREW Right 2/8/2019    Procedure: HIP TROCANTERIC NAILING LONG WITH INTRAMEDULLARY HIP SCREW;  Surgeon: Boo Camacho MD;  Location: [facility] OR;  Service: Orthopedics   • JOINT REPLACEMENT      knee   • LAPAROSCOPIC GASTRIC BANDING     • LEEP     • LUMBAR FUSION     • TOE AMPUTATION Left     2nd toe   • TOTAL KNEE ARTHROPLASTY Bilateral    • TUBAL ABDOMINAL LIGATION     • UPPER GASTROINTESTINAL ENDOSCOPY  10/01/2015       Family History  Family History   Problem Relation Age of Onset   • Hypertension Mother    • Cancer Mother         stomach   • Coronary artery disease Father    • Heart attack Father    • Diabetes Brother    • Coronary artery disease Brother    • Colon cancer Neg Hx    • Colon polyps Neg Hx        Social History  Social History     Socioeconomic History   • Marital status:      Spouse name: Not on file   • Number of children: Not on file   • Years of education: Not on file   • Highest education level: Not on file   Tobacco Use   • Smoking status: Never Smoker   • Smokeless tobacco: Never Used   Substance and Sexual Activity   • Alcohol use: No   • Drug use: No   • Sexual activity: Defer         Review of Systems:  History obtained from chart review and the patient  General ROS: No fever or chills  Respiratory ROS: No cough, shortness of breath, wheezing  Cardiovascular ROS: No chest pain or palpitations  Gastrointestinal ROS: No abdominal pain or melena  Genito-Urinary ROS: No dysuria or hematuria    Objective:  Patient Vitals for the past 24  hrs:   BP Temp Temp src Pulse Resp SpO2 Weight   05/18/19 1133 129/55 97.9 °F (36.6 °C) Oral 96 14 95 % --   05/18/19 0837 116/49 98.7 °F (37.1 °C) Oral 82 14 97 % --   05/18/19 0416 97/65 98.9 °F (37.2 °C) Oral 80 16 96 % --   05/18/19 0045 102/50 98.1 °F (36.7 °C) Axillary 64 16 94 % --   05/17/19 1956 98/48 98.2 °F (36.8 °C) Oral -- 18 96 % 72.8 kg (160 lb 6.4 oz)   05/17/19 1659 103/88 98.7 °F (37.1 °C) -- 70 16 95 % --       Intake/Output Summary (Last 24 hours) at 5/18/2019 1617  Last data filed at 5/18/2019 1001  Gross per 24 hour   Intake 1168 ml   Output 1465 ml   Net -297 ml     General: awake/alert   Chest:  clear to auscultation bilaterally without respiratory distress  CVS: irrr  Abdominal: soft, nontender, positive bowel sounds  Extremities: no cyanosis or edema  Skin: warm and dry without rash      Labs:  Results from last 7 days   Lab Units 05/18/19  0727 05/17/19  0721 05/16/19  0510   WBC 10*3/mm3 6.15 7.23 9.04   HEMOGLOBIN g/dL 10.6* 10.7* 9.7*   HEMATOCRIT % 33.1* 32.5* 30.5*   PLATELETS 10*3/mm3 130 129* 152         Results from last 7 days   Lab Units 05/18/19  0728 05/17/19  0629 05/16/19  0510   SODIUM mmol/L 132* 125* 130*   POTASSIUM mmol/L 3.4* 3.4* 3.2*   CHLORIDE mmol/L 98 92* 95*   CO2 mmol/L 29.0 23.0* 30.0   BUN mg/dL 9 16 12   CREATININE mg/dL 2.01* 2.90* 2.21*   CALCIUM mg/dL 7.3* 6.9* 7.1*   BILIRUBIN mg/dL 0.5 0.5 0.5   ALK PHOS U/L 130* 106 117   ALT (SGPT) U/L <15 <15 15   AST (SGOT) U/L 21 21 17   GLUCOSE mg/dL 81 159* 178*       Radiology:   Imaging Results (last 72 hours)     ** No results found for the last 72 hours. **          Culture:  Blood Culture   Date Value Ref Range Status   05/13/2019 No growth at 4 days  Preliminary   05/13/2019 No growth at 4 days  Preliminary         Assessment   ESRD  Chronic diastolic CHF  HTN with recent hypotension  Perforated duodenal ulcer s/p surgical repair  Anemia - CKD/acute blood loss  Hypokalemia  hyponatremia     Plan:  HD  MWF  Monitor labs  Discussed with patient, nursing  Advancing diet as tolerated  Monitor potassium on replacement closely given ESRD history      Chris Hsu MD  5/18/2019  4:17 PM

## 2019-05-18 NOTE — PLAN OF CARE
Problem: Patient Care Overview  Goal: Plan of Care Review  Outcome: Ongoing (interventions implemented as appropriate)   05/18/19 0068   OTHER   Outcome Summary Pt requires mod/max assist for bed mobility due to pain and weakness. Min/mod assist to stand. Ambulated 10' min assist with rolling walker. Will continue to benefit from therapy to increase strength and endurance.   Coping/Psychosocial   Plan of Care Reviewed With patient   Plan of Care Review   Progress no change

## 2019-05-18 NOTE — THERAPY TREATMENT NOTE
Acute Care - Physical Therapy Treatment Note  [facility]     Patient Name: Cheri Ely  : 1940  MRN: [id number]  Today's Date: 2019  Onset of Illness/Injury or Date of Surgery: 19  Date of Referral to PT: 19  Referring Physician: Dr. Dean    Admit Date: 2019    Visit Dx:    ICD-10-CM ICD-9-CM   1. Abdominal pain, unspecified abdominal location R10.9 789.00   2. Intra-abdominal free air of unknown etiology K66.8 568.89   3. Generalized weakness R53.1 780.79     Patient Active Problem List   Diagnosis   • Hypertension   • Hyperlipidemia   • Chronic kidney disease on chronic dialysis (CMS/HCC)   • Closed fracture of ramus of left pubis (CMS/HCC)   • Occlusion of superior mesenteric artery (CMS/HCC)   • Chronic mesenteric ischemia (CMS/HCC)   • Black tarry stools   • Hx of gastritis   • Acute gastric ulcer with hemorrhage   • Closed displaced intertrochanteric fracture of right femur (CMS/HCC)   • Displaced intertrochanteric fracture of right femur, initial encounter for closed fracture (CMS/HCC)   • Hypotension   • Acute blood loss anemia   • GI bleed   • Gastrointestinal hemorrhage   • (HFpEF) heart failure with preserved ejection fraction (CMS/HCC)   • Hypothyroid   • Diastolic dysfunction   • Diastolic heart failure (CMS/HCC)   • Volume overload   • AVM (arteriovenous malformation) of small bowel, acquired (CMS/HCC)   • Abdominal pain   • Hypoglycemia   • Paroxysmal atrial fibrillation (CMS/HCC)       Therapy Treatment    Rehabilitation Treatment Summary     Row Name 19 1505             Treatment Time/Intention    Discipline  physical therapy assistant  -CW      Document Type  therapy note (daily note)  -CW2      Subjective Information  complains of;weakness;fatigue;pain;swelling  -CW2      Mode of Treatment  physical therapy  -CW2      Patient/Family Observations  no family in room  -CW2      Existing Precautions/Restrictions  fall  -CW2      Recorded by [CW] Valeriy  Casandra BARROW, hospitals 05/18/19 1508  [CW2] Casandra Crooks, hospitals 05/18/19 1540      Row Name 05/18/19 1505             Bed Mobility Assessment/Treatment    Bed Mobility Assessment/Treatment  sit-sidelying  -CW      Rolling Left Vilas (Bed Mobility)  moderate assist (50% patient effort)  -CW      Rolling Right [place] (Bed Mobility)  moderate assist (50% patient effort)  -CW      Sidelying-Sit Vil[place] (Bed Mobility)  moderate assist (50% patient effort)  -CW      Sit-Sidelying Vil[place] (Bed Mobility)  moderate assist (50% patient effort);maximum assist (25% patient effort)  -CW      Bed Mobility, Safety Issues  decreased use of arms for pushing/pulling;decreased use of legs for bridging/pushing  -CW      Assistive Device (Bed Mobility)  bed rails;draw sheet  -CW      Recorded by [CW] Casandra Crooks, hospitals 05/18/19 1540      Row Name 05/18/19 1505             Sit-Stand Transfer    Sit-Stand Vil[place] (Transfers)  moderate assist (50% patient effort);verbal cues  -CW      Recorded by [CW] Casandra Crooks, hospitals 05/18/19 1540      Row Name 05/18/19 1505             Stand-Sit Transfer    Stand-Sit Vil[place] (Transfers)  minimum assist (75% patient effort);verbal cues  -CW      Recorded by [CW] Casandra Crooks, hospitals 05/18/19 1540      Row Name 05/18/19 1505             Gait/Stairs Assessment/Training    Vilas Level (Gait)  minimum assist (75% patient effort);moderate assist (50% patient effort)  -CW      Assistive Device (Gait)  walker, front-wheeled  -CW      Distance in Feet (Gait)  10  -CW      Pattern (Gait)  step-to  -CW      Deviations/Abnormal Patterns (Gait)  jose c decreased;stride length decreased  -CW      Bilateral Gait Deviations  forward flexed posture;heel strike decreased  -CW      Comment (Gait/Stairs)  Very forward flexed - cues to stay closer to walker  -CW      Recorded by [CW] Casandra Crooks, hospitals 05/18/19 1540      Row Name 05/18/19 1502              Positioning and Restraints    Pre-Treatment Position  in bed  -CW      Post Treatment Position  bed  -CW      In Bed  side lying right;call light within reach;encouraged to call for assist  -CW      Recorded by [CW] Casandra Crooks PTA 05/18/19 1540      Row Name 05/18/19 1505             Pain Scale: Numbers Pre/Post-Treatment    Pain Scale: Numbers, Pretreatment  6/10  -CW      Pain Scale: Numbers, Post-Treatment  6/10  -CW      Pain Location  abdomen and L shoulder  -CW      Recorded by [CW] Casandra Crooks PTA 05/18/19 1540      Row Name                Wound 05/13/19 0259 Other (See comments) abdomen incision    Wound - Properties Group Date first assessed: 05/13/19 [LC] Time first assessed: 0259 [LC] Side: Other (See comments) [LC] Location: abdomen [LC] Type: incision [LC] Recorded by:  [LC] Katelyn Garibay RN 05/13/19 0259    Row Name                Wound 05/13/19 0450 Right upper gluteal pressure injury    Wound - Properties Group Date first assessed: 05/13/19 [HS] Time first assessed: 0450 [HS] Side: Right [HS] Orientation: upper [HS] Location: gluteal [HS] Type: pressure injury [HS] Stage, Pressure Injury: Stage 2 [HS] Recorded by:  [HS] Peggy Monterroso RN 05/13/19 1218      User Key  (r) = Recorded By, (t) = Taken By, (c) = Cosigned By    Initials Name Effective Dates Discipline    CW Casandra Crooks PTA 06/22/15 -  PT    LC Katelyn Garibay RN 08/02/16 -  Nurse    HS Peggy Monterroso RN 12/27/16 -  Nurse          Wound 05/13/19 0259 Other (See comments) abdomen incision (Active)   Dressing Appearance dry;intact 5/18/2019  8:00 AM   Closure RIYA 5/18/2019  8:00 AM   Base dressing in place, unable to visualize 5/18/2019  8:00 AM   Drainage Amount none 5/18/2019  8:00 AM   Dressing Care, Wound gauze;transparent film 5/18/2019  8:00 AM       Wound 05/13/19 0450 Right upper gluteal pressure injury (Active)   Dressing Appearance open to air 5/18/2019  8:00 AM   Closure Open to air 5/17/2019   8:00 PM   Base pink 5/17/2019  8:00 PM   Edges irregular;jagged 5/17/2019  8:00 PM           Physical Therapy Education     Title: PT OT SLP Therapies (Done)     Topic: Physical Therapy (Done)     Point: Mobility training (Done)     Learning Progress Summary           Patient Acceptance, E,D, VU,NR by CHRISTOPHER at 5/18/2019  3:41 PM    Comment:  bed mobility, transfers, gait, benefits of activity    Acceptance, E,TB,D, VU,DU,NR by  at 5/16/2019  9:24 AM    Comment:  Education re: purpose of PT/importance of act; educ re activities to assist w/ edema mgmt to include LE elevation; educ for improved tech w/ bed mob, tfers & gt; educ for 15-20 aps & 5 deep breaths q hour awake; educ for improved tech w/ deep breathing                   Point: Home exercise program (Done)     Learning Progress Summary           Patient Acceptance, E,TB,D, VU,DU,NR by  at 5/16/2019  9:24 AM    Comment:  Education re: purpose of PT/importance of act; educ re activities to assist w/ edema mgmt to include LE elevation; educ for improved tech w/ bed mob, tfers & gt; educ for 15-20 aps & 5 deep breaths q hour awake; educ for improved tech w/ deep breathing                   Point: Precautions (Done)     Learning Progress Summary           Patient Acceptance, E,TB,D, VU,DU,NR by  at 5/16/2019  9:24 AM    Comment:  Education re: purpose of PT/importance of act; educ re activities to assist w/ edema mgmt to include LE elevation; educ for improved tech w/ bed mob, tfers & gt; educ for 15-20 aps & 5 deep breaths q hour awake; educ for improved tech w/ deep breathing                               User Key     Initials Effective Dates Name Provider Type Discipline     06/22/15 -  Casandra Crooks PTA Physical Therapy Assistant PT     08/02/18 -  Melinda Floyd PT Physical Therapist PT                PT Recommendation and Plan     Plan of Care Reviewed With: patient  Progress: no change  Outcome Summary: Pt requires mod/max assist for bed  mobility due to pain and weakness.  Min/mod assist to stand.  Ambulated 10' min assist with rolling walker. Will continue to benefit from therapy to increase strenngth and endurance.  Outcome Measures     Row Name 05/18/19 1500 05/17/19 0901 05/16/19 0900       How much help from another person do you currently need...    Turning from your back to your side while in flat bed without using bedrails?  2  -CW  2  -LG  2  -JE    Moving from lying on back to sitting on the side of a flat bed without bedrails?  2  -CW  2  -LG  2  -JE    Moving to and from a bed to a chair (including a wheelchair)?  2  -CW  2  -LG  2  -JE    Standing up from a chair using your arms (e.g., wheelchair, bedside chair)?  2  -CW  2  -LG  2  -JE    Climbing 3-5 steps with a railing?  1  -CW  1  -LG  1  -JE    To walk in hospital room?  2  -CW  3  -LG  2  -JE    AM-PAC 6 Clicks Score  11  -CW  12  -LG  11  -JE       Functional Assessment    Outcome Measure Options  AM-PAC 6 Clicks Basic Mobility (PT)  -CW  AM-PAC 6 Clicks Basic Mobility (PT)  -LG  AM-PAC 6 Clicks Basic Mobility (PT)  -JE      User Key  (r) = Recorded By, (t) = Taken By, (c) = Cosigned By    Initials Name Provider Type    LG James Augustine, PTA Physical Therapy Assistant    Casandra Carney, PTA Physical Therapy Assistant    Melinda Wiley, PT Physical Therapist         Time Calculation:   PT Charges     Row Name 05/18/19 1545             Time Calculation    Start Time  1505  -CW      Stop Time  1530  -CW      Time Calculation (min)  25 min  -CW      PT Received On  05/18/19  -CW      PT Goal Re-Cert Due Date  05/26/19  -CW         Time Calculation- PT    Total Timed Code Minutes- PT  25 minute(s)  -CW        User Key  (r) = Recorded By, (t) = Taken By, (c) = Cosigned By    Initials Name Provider Type    CW Casandra Crooks, PTA Physical Therapy Assistant        Therapy Charges for Today     Code Description Service Date Service Provider Modifiers Qty    60072245502  HC GAIT TRAINING EA 15 MIN 5/18/2019 Casandra Crooks, PTA GP 1    91052113476 HC PT THERAPEUTIC ACT EA 15 MIN 5/18/2019 Casandra Crooks, PTA GP 1          PT G-Codes  Outcome Measure Options: AM-PAC 6 Clicks Basic Mobility (PT)  AM-PAC 6 Clicks Score: 11    Casandra Crooks PTA  5/18/2019

## 2019-05-19 LAB
ALBUMIN SERPL-MCNC: 2.4 G/DL (ref 3.5–5)
ALBUMIN/GLOB SERPL: 0.8 G/DL (ref 1.1–2.5)
ALP SERPL-CCNC: 120 U/L (ref 24–120)
ALT SERPL W P-5'-P-CCNC: 18 U/L (ref 0–54)
ANION GAP SERPL CALCULATED.3IONS-SCNC: 6 MMOL/L (ref 4–13)
AST SERPL-CCNC: 22 U/L (ref 7–45)
BASOPHILS # BLD AUTO: 0.03 10*3/MM3 (ref 0–0.2)
BASOPHILS NFR BLD AUTO: 0.4 % (ref 0–2)
BILIRUB SERPL-MCNC: 0.6 MG/DL (ref 0.1–1)
BUN BLD-MCNC: 12 MG/DL (ref 5–21)
BUN/CREAT SERPL: 4.2 (ref 7–25)
CALCIUM SPEC-SCNC: 7.4 MG/DL (ref 8.4–10.4)
CHLORIDE SERPL-SCNC: 103 MMOL/L (ref 98–110)
CO2 SERPL-SCNC: 25 MMOL/L (ref 24–31)
CREAT BLD-MCNC: 2.85 MG/DL (ref 0.5–1.4)
DEPRECATED RDW RBC AUTO: 62.1 FL (ref 40–54)
EOSINOPHIL # BLD AUTO: 0.41 10*3/MM3 (ref 0–0.7)
EOSINOPHIL NFR BLD AUTO: 6.1 % (ref 0–4)
ERYTHROCYTE [DISTWIDTH] IN BLOOD BY AUTOMATED COUNT: 17.2 % (ref 12–15)
GFR SERPL CREATININE-BSD FRML MDRD: 16 ML/MIN/1.73
GLOBULIN UR ELPH-MCNC: 2.9 GM/DL
GLUCOSE BLD-MCNC: 82 MG/DL (ref 70–100)
GLUCOSE BLDC GLUCOMTR-MCNC: 118 MG/DL (ref 70–130)
GLUCOSE BLDC GLUCOMTR-MCNC: 133 MG/DL (ref 70–130)
HCT VFR BLD AUTO: 33.9 % (ref 37–47)
HGB BLD-MCNC: 10.8 G/DL (ref 12–16)
IMM GRANULOCYTES # BLD AUTO: 0.04 10*3/MM3 (ref 0–0.05)
IMM GRANULOCYTES NFR BLD AUTO: 0.6 % (ref 0–5)
LYMPHOCYTES # BLD AUTO: 0.54 10*3/MM3 (ref 0.72–4.86)
LYMPHOCYTES NFR BLD AUTO: 8 % (ref 15–45)
MAGNESIUM SERPL-MCNC: 1.5 MG/DL (ref 1.4–2.2)
MCH RBC QN AUTO: 31.2 PG (ref 28–32)
MCHC RBC AUTO-ENTMCNC: 31.9 G/DL (ref 33–36)
MCV RBC AUTO: 98 FL (ref 82–98)
MONOCYTES # BLD AUTO: 0.39 10*3/MM3 (ref 0.19–1.3)
MONOCYTES NFR BLD AUTO: 5.8 % (ref 4–12)
NEUTROPHILS # BLD AUTO: 5.33 10*3/MM3 (ref 1.87–8.4)
NEUTROPHILS NFR BLD AUTO: 79.1 % (ref 39–78)
NRBC BLD AUTO-RTO: 0 /100 WBC (ref 0–0.2)
PHOSPHATE SERPL-MCNC: 3 MG/DL (ref 2.5–4.5)
PLATELET # BLD AUTO: 159 10*3/MM3 (ref 130–400)
PMV BLD AUTO: 10.9 FL (ref 6–12)
POTASSIUM BLD-SCNC: 3.6 MMOL/L (ref 3.5–5.3)
PROT SERPL-MCNC: 5.3 G/DL (ref 6.3–8.7)
RBC # BLD AUTO: 3.46 10*6/MM3 (ref 4.2–5.4)
SODIUM BLD-SCNC: 134 MMOL/L (ref 135–145)
WBC NRBC COR # BLD: 6.74 10*3/MM3 (ref 4.8–10.8)

## 2019-05-19 PROCEDURE — 82962 GLUCOSE BLOOD TEST: CPT

## 2019-05-19 PROCEDURE — 25010000002 PIPERACILLIN SOD-TAZOBACTAM PER 1 G: Performed by: SPECIALIST

## 2019-05-19 PROCEDURE — 85025 COMPLETE CBC W/AUTO DIFF WBC: CPT | Performed by: SPECIALIST

## 2019-05-19 PROCEDURE — 25010000002 ENOXAPARIN PER 10 MG: Performed by: SPECIALIST

## 2019-05-19 PROCEDURE — 80053 COMPREHEN METABOLIC PANEL: CPT | Performed by: SPECIALIST

## 2019-05-19 PROCEDURE — 83735 ASSAY OF MAGNESIUM: CPT | Performed by: INTERNAL MEDICINE

## 2019-05-19 PROCEDURE — 84100 ASSAY OF PHOSPHORUS: CPT | Performed by: INTERNAL MEDICINE

## 2019-05-19 RX ADMIN — TAZOBACTAM SODIUM AND PIPERACILLIN SODIUM 3.38 G: 375; 3 INJECTION, SOLUTION INTRAVENOUS at 02:39

## 2019-05-19 RX ADMIN — LEVOTHYROXINE SODIUM 150 MCG: 150 TABLET ORAL at 06:49

## 2019-05-19 RX ADMIN — DEXTROSE AND SODIUM CHLORIDE 75 ML/HR: 5; 900 INJECTION, SOLUTION INTRAVENOUS at 12:06

## 2019-05-19 RX ADMIN — TAZOBACTAM SODIUM AND PIPERACILLIN SODIUM 3.38 G: 375; 3 INJECTION, SOLUTION INTRAVENOUS at 13:56

## 2019-05-19 RX ADMIN — ENOXAPARIN SODIUM 30 MG: 30 INJECTION SUBCUTANEOUS at 09:07

## 2019-05-19 RX ADMIN — PANTOPRAZOLE SODIUM 40 MG: 40 TABLET, DELAYED RELEASE ORAL at 06:49

## 2019-05-19 RX ADMIN — LIDOCAINE 1 PATCH: 50 PATCH CUTANEOUS at 09:07

## 2019-05-19 NOTE — PROGRESS NOTES
Nephrology ([facility]) Progress Note      Patient:  Cheri Ely  YOB: 1940  Date of Service: 5/19/2019  MRN: [id number]   Acct: [id number]   Primary Care Physician: Provider, No Known  Advance Directive:   Code Status and Medical Interventions:   Ordered at: 05/13/19 0438     Level Of Support Discussed With:    Patient     Code Status:    CPR     Medical Interventions (Level of Support Prior to Arrest):    Full     Admit Date: 5/12/2019       Hospital Day: 6  Referring Provider: Laila Rodriguez MD      Patient personally seen and examined.  Complete chart including Consults, Notes, Operative Reports, Labs, Cardiology, and Radiology studies reviewed as able.        Subjective:  Cheri Ely is a 79 y.o. female  whom we were consulted for end stage renal disease management. Patient has routine hemodialysis Monday Wednesday Friday at [facility].  No recent issues with dialysis.  Recurrent history of GI bleeding. Presented this time with acute onset abdominal pain; imaging showed free air in abdomen and was taken for exploratory laparotomy by Dr Hughes. Had repair of perforated duodenal ulcer.  Moved to floor 5/14.     Today, no new events.  No n/v/d.  No issues with HD.  Encouraged that her diet is advancing.      Allergies:  Patient has no known allergies.    Home Meds:  Medications Prior to Admission   Medication Sig Dispense Refill Last Dose   • acetaminophen (TYLENOL) 325 MG tablet Take 2 tablets by mouth Every 6 (Six) Hours As Needed for Mild Pain .   Past Month at Unknown time   • aspirin 81 MG EC tablet Take 1 tablet by mouth Daily.   3/23/2019   • B Complex-C-Folic Acid (JOHN-SANGEETA) tablet Take 1 tablet by mouth Daily.   3/23/2019   • carvedilol (COREG) 3.125 MG tablet Take 3.125 mg by mouth Daily. Monday, Wednesday, and Friday dose. Hold if SBP < 100.   3/22/2019   • carvedilol (COREG) 3.125 MG tablet Take 3.125 mg by mouth 2 (Two) Times a Day With Meals.  Sunday, Tuesday, Thursday, and Saturday dose. Hold if SBP <100.   3/23/2019   • Cholecalciferol (VITAMIN D) 2000 units capsule Take 1 capsule by mouth Daily. 30 capsule     • docusate sodium 100 MG capsule Take 100 mg by mouth 2 (Two) Times a Day. 60 each 1 3/23/2019   • epoetin lucy (EPOGEN,PROCRIT) 93196 UNIT/ML injection Inject 1 mL under the skin into the appropriate area as directed 3 (Three) Times a Week.      • lactulose 20 GM/30ML solution solution Take 30 mL by mouth Daily As Needed (Constipation). 30 mL  3/23/2019   • levothyroxine (SYNTHROID, LEVOTHROID) 150 MCG tablet Take 150 mcg by mouth Daily.   3/23/2019   • losartan (COZAAR) 25 MG tablet Take 25 mg by mouth 3 (Three) Times a Week. Monday, Wednesday, and Friday.   3/23/2019   • ondansetron ODT (ZOFRAN-ODT) 4 MG disintegrating tablet Take 4 mg by mouth Every 8 (Eight) Hours As Needed for Nausea or Vomiting.   Past Month at Unknown time   • pantoprazole (PROTONIX) 40 MG EC tablet Take 1 tablet by mouth Daily. 30 tablet 11 3/23/2019   • Polyethyl Glyc-Propyl Glyc PF 0.4-0.3 % solution ophthalmic solution Administer 2 drops to both eyes Every 2 (Two) Hours As Needed (dry eyes).   Past Month at Unknown time   • pravastatin (PRAVACHOL) 40 MG tablet Take 40 mg by mouth Daily.   3/23/2019   • sertraline (ZOLOFT) 50 MG tablet Take 50 mg by mouth Daily.   3/23/2019   • sucralfate (CARAFATE) 1 GM/10ML suspension Take 10 mL by mouth 2 (Two) Times a Day. 1200 mL 0        Medicines:  Current Facility-Administered Medications   Medication Dose Route Frequency Provider Last Rate Last Dose   • carvedilol (COREG) tablet 3.125 mg  3.125 mg Oral 2 times per day on Sun Tue Thu Sat Fabien Espinosa MD   3.125 mg at 05/18/19 1731   • carvedilol (COREG) tablet 3.125 mg  3.125 mg Oral Once per day on Mon Wed Fri Fabien Espinosa MD   3.125 mg at 05/17/19 0912   • dextrose (D50W) 25 g/ 50mL Intravenous Solution 25 g  25 g Intravenous Q15 Min PRN Puertollano,  Fabien Nguyen MD   25 g at 05/15/19 1221   • dextrose (GLUTOSE) oral gel 15 g  15 g Oral Q15 Min PRN Fabien Espinosa MD       • dextrose 5 % and sodium chloride 0.9 % infusion  75 mL/hr Intravenous Continuous Fabien Espinosa MD 75 mL/hr at 05/19/19 1206 75 mL/hr at 05/19/19 1206   • enoxaparin (LOVENOX) syringe 30 mg  30 mg Subcutaneous Daily Laila Rodriguez MD   30 mg at 05/19/19 0907   • glucagon (human recombinant) (GLUCAGEN DIAGNOSTIC) injection 1 mg  1 mg Subcutaneous Q15 Min PRN Fabien Espinosa MD       • levothyroxine (SYNTHROID, LEVOTHROID) tablet 150 mcg  150 mcg Oral Q AM Fabien Espinosa MD   150 mcg at 05/19/19 0649   • lidocaine (LIDODERM) 5 % 1 patch  1 patch Transdermal Q24H Fabien Espinosa MD   1 patch at 05/19/19 0907   • Morphine sulfate PCA 1 mg/mL 30 mL syringe   Intravenous Continuous Laila Rodriguez MD       • naloxone (NARCAN) injection 0.1 mg  0.1 mg Intravenous Q5 Min PRN Laila Rodriguez MD       • ondansetron (ZOFRAN) tablet 4 mg  4 mg Oral Q6H PRN Laila Rodriguez MD        Or   • ondansetron (ZOFRAN) injection 4 mg  4 mg Intravenous Q6H PRN Laila Rodriguez MD   4 mg at 05/17/19 0912   • pantoprazole (PROTONIX) EC tablet 40 mg  40 mg Oral Q AM Laila Rodriguez MD   40 mg at 05/19/19 0649   • piperacillin-tazobactam (ZOSYN) 3.375 g in iso-osmotic dextrose 50 ml (premix)  3.375 g Intravenous Q12H Laila Rodriguez MD 0 mL/hr at 05/17/19 2152 3.375 g at 05/19/19 1356   • sodium chloride 0.9 % flush 10 mL  10 mL Intravenous PRN Ashlyn Mackey APRN       • sodium chloride 0.9 % infusion  10 mL/hr Intravenous Continuous MichaelLaila coiln MD 10 mL/hr at 05/15/19 0052 10 mL/hr at 05/15/19 0052       Past Medical History:  Past Medical History:   Diagnosis Date   • Arthritis    • Carotid artery disease (CMS/HCC)    • CHF (congestive heart failure) (CMS/[facility])    • Chronic kidney disease on chronic dialysis (CMS/[facility])    • Chronic renal failure      on dialysis ON MON, WED, FRI   • Coronary artery disease    • Disease of thyroid gland    • Diverticulitis    • Gastric ulcer    • History of transfusion    • Hyperlipidemia    • Hypertension    • Injury of back    • Mesenteric artery insufficiency (CMS/HCC)    • Multilevel degenerative disc disease    • Osteoporosis    • Pancreatitis    • Pelvis fracture (CMS/HCC)    • Pneumonia        Past Surgical History:  Past Surgical History:   Procedure Laterality Date   • AORTAGRAM Right 1/8/2018    Procedure: MESENTERIC ANGIOGRAM, GROIN ACCESS, MYNX CLOSURE;  Surgeon: Tomasz San DO;  Location: [facility] OR;  Service:    • AORTIC VALVE SURGERY     • APPENDECTOMY     • BACK SURGERY     • CAPSULE ENDOSCOPY N/A 3/30/2019    Procedure: CAPSULE ENDOSCOPY M2A;  Surgeon: David Yu MD;  Location: [facility] ENDOSCOPY;  Service: Gastroenterology   • CARDIAC SURGERY     • CAROTID ENDARTERECTOMY Bilateral    • CATARACT EXTRACTION, BILATERAL     • CERVICAL SPINE SURGERY     • CHOLECYSTECTOMY     • COLON SURGERY     • COLONOSCOPY  10/01/2015   • COLONOSCOPY N/A 3/28/2019    Procedure: COLONOSCOPY WITH ANESTHESIA;  Surgeon: Rafita Merida MD;  Location: [facility] ENDOSCOPY;  Service: Gastroenterology   • CORONARY ARTERY BYPASS GRAFT     • DIALYSIS FISTULA CREATION     • ENDOSCOPY N/A 12/27/2018    Procedure: ESOPHAGOGASTRODUODENOSCOPY WITH ANESTHESIA;  Surgeon: Moni Garza MD;  Location: [facility] ENDOSCOPY;  Service: Gastroenterology   • ENDOSCOPY N/A 2/28/2019    Procedure: ESOPHAGOGASTRODUODENOSCOPY WITH ANESTHESIA;  Surgeon: Moni Garza MD;  Location: [facility] ENDOSCOPY;  Service: Gastroenterology   • ENDOSCOPY N/A 3/11/2019    Procedure: ESOPHAGOGASTRODUODENOSCOPY WITH ANESTHESIA;  Surgeon: Moni Garza MD;  Location: [facility] ENDOSCOPY;  Service: Gastroenterology   • ENDOSCOPY N/A 3/27/2019    Procedure: ESOPHAGOGASTRODUODENOSCOPY WITH ANESTHESIA;  Surgeon: Rafita Merida MD;  Location: [facility] ENDOSCOPY;  Service:  Gastroenterology   • EXPLORATORY LAPAROTOMY N/A 5/13/2019    Procedure: LAPAROTOMY EXPLORATORY, OVERSEW DUODENAL ULCER WITH FRED PATCH, KOCHER MANEUVER;  Surgeon: Laila Rodriguez MD;  Location:  [facility] OR;  Service: General   • HIP TROCANTERIC NAILING WITH INTRAMEDULLARY HIP SCREW Right 2/8/2019    Procedure: HIP TROCANTERIC NAILING LONG WITH INTRAMEDULLARY HIP SCREW;  Surgeon: Boo Camacho MD;  Location:  [facility] OR;  Service: Orthopedics   • JOINT REPLACEMENT      knee   • LAPAROSCOPIC GASTRIC BANDING     • LEEP     • LUMBAR FUSION     • TOE AMPUTATION Left     2nd toe   • TOTAL KNEE ARTHROPLASTY Bilateral    • TUBAL ABDOMINAL LIGATION     • UPPER GASTROINTESTINAL ENDOSCOPY  10/01/2015       Family History  Family History   Problem Relation Age of Onset   • Hypertension Mother    • Cancer Mother         stomach   • Coronary artery disease Father    • Heart attack Father    • Diabetes Brother    • Coronary artery disease Brother    • Colon cancer Neg Hx    • Colon polyps Neg Hx        Social History  Social History     Socioeconomic History   • Marital status:      Spouse name: Not on file   • Number of children: Not on file   • Years of education: Not on file   • Highest education level: Not on file   Tobacco Use   • Smoking status: Never Smoker   • Smokeless tobacco: Never Used   Substance and Sexual Activity   • Alcohol use: No   • Drug use: No   • Sexual activity: Defer         Review of Systems:  History obtained from chart review and the patient  General ROS: No fever or chills  Respiratory ROS: No cough, shortness of breath, wheezing  Cardiovascular ROS: No chest pain or palpitations  Gastrointestinal ROS: No abdominal pain or melena  Genito-Urinary ROS: No dysuria or hematuria    Objective:  Patient Vitals for the past 24 hrs:   BP Temp Temp src Pulse Resp SpO2   05/19/19 1543 118/46 97.8 °F (36.6 °C) Oral 90 18 96 %   05/19/19 1137 103/42 98.5 °F (36.9 °C) Oral 67 20 96 %   05/19/19 0755 (!)  107/37 98.1 °F (36.7 °C) Oral 101 16 96 %   05/19/19 0412 92/45 97.7 °F (36.5 °C) Oral 83 16 94 %   05/18/19 2338 124/66 97.8 °F (36.6 °C) Oral 88 18 96 %   05/18/19 2004 130/70 98 °F (36.7 °C) Oral 85 16 94 %       Intake/Output Summary (Last 24 hours) at 5/19/2019 1656  Last data filed at 5/19/2019 1543  Gross per 24 hour   Intake 2305 ml   Output 30 ml   Net 2275 ml     General: awake/alert   Chest:  clear to auscultation bilaterally without respiratory distress  CVS: irrr  Abdominal: soft, nontender, positive bowel sounds  Extremities: no cyanosis or edema  Skin: warm and dry without rash      Labs:  Results from last 7 days   Lab Units 05/19/19  0638 05/18/19  0727 05/17/19  0721   WBC 10*3/mm3 6.74 6.15 7.23   HEMOGLOBIN g/dL 10.8* 10.6* 10.7*   HEMATOCRIT % 33.9* 33.1* 32.5*   PLATELETS 10*3/mm3 159 130 129*         Results from last 7 days   Lab Units 05/19/19  0638 05/18/19  0728 05/17/19  0629   SODIUM mmol/L 134* 132* 125*   POTASSIUM mmol/L 3.6 3.4* 3.4*   CHLORIDE mmol/L 103 98 92*   CO2 mmol/L 25.0 29.0 23.0*   BUN mg/dL 12 9 16   CREATININE mg/dL 2.85* 2.01* 2.90*   CALCIUM mg/dL 7.4* 7.3* 6.9*   BILIRUBIN mg/dL 0.6 0.5 0.5   ALK PHOS U/L 120 130* 106   ALT (SGPT) U/L 18 <15 <15   AST (SGOT) U/L 22 21 21   GLUCOSE mg/dL 82 81 159*       Radiology:   Imaging Results (last 72 hours)     ** No results found for the last 72 hours. **          Culture:  Blood Culture   Date Value Ref Range Status   05/13/2019 No growth at 4 days  Preliminary   05/13/2019 No growth at 4 days  Preliminary         Assessment   ESRD  Chronic diastolic CHF  HTN with recent hypotension  Perforated duodenal ulcer s/p surgical repair  Anemia - CKD/acute blood loss  Hypokalemia  hyponatremia     Plan:  HD MWF  Monitor labs  Discussed with patient, nursing  Advancing diet as tolerated        Chris Hsu MD  5/19/2019  4:56 PM

## 2019-05-19 NOTE — PROGRESS NOTES
LOS: 6 days   Patient Care Team:  Provider, No Known as PCP - Barrett Prasad Jr, MD as PCP - Family Medicine  Moni Garza MD as Consulting Physician (Gastroenterology)  Tomasz San DO as Consulting Physician (Vascular Surgery)    Chief Complaint: Postoperative day 7 following admission for treatment of a perforated ulcer  Subjective     Subjective     Hospital day #7 following treatment of a perforated ulcer she has minimal activity moving around in her bed.  She tolerated full liquids yesterday we are trying to increase her diet today with regular diet.  She had 5 bowel movements yesterday.  She does not urinate she is an uric on dialysis Monday Wednesday Friday.  Objective      Objective     Vital Signs  Temp:  [97.7 °F (36.5 °C)-98.2 °F (36.8 °C)] 98.1 °F (36.7 °C)  Heart Rate:  [] 101  Resp:  [14-18] 16  BP: ()/(37-70) 107/37    Intake & Output (last 3 days)       05/16 0701 - 05/17 0700 05/17 0701 - 05/18 0700 05/18 0701 - 05/19 0700 05/19 0701 - 05/20 0700    P.O. 240  120     I.V. (mL/kg) 1820 (25.3) 1265 (17.3) 2123 (29)     IV Piggyback 50 50      Total Intake(mL/kg) 2110 (29.3) 1315 (18) 2243 (30.6)     Emesis/NG output  150      Drains 25 10 15     Other  1450      Stool  0 0     Total Output 25 1610 15     Net +2085 -295 +2228             Stool Unmeasured Occurrence  1 x 5 x           Physical Exam:     General Appearance:    Alert, cooperative, in no acute distress   Lungs:     Clear to auscultation,respirations regular, even and                  unlabored    Heart:    Regular rhythm and normal rate, normal S1 and S2, no            murmur, no gallop, no rub, no click   Chest Wall:    No abnormalities observed   Abdomen:    Abdomen is flat, occasional bowel sounds, drains 15 cc.  No discharge white count is 6 hematocrit 34 BUN and creatinine of 12 and 2.8.  She receives dialysis on Monday.  Her potassium elevated to 3.6.        Results Review:     I reviewed the  patient's new clinical results.  I reviewed the patient's new imaging results and agree with the interpretation.    Results from last 7 days   Lab Units 05/19/19  0638 05/18/19  0727 05/17/19  0721   WBC 10*3/mm3 6.74 6.15 7.23   HEMOGLOBIN g/dL 10.8* 10.6* 10.7*   HEMATOCRIT % 33.9* 33.1* 32.5*   PLATELETS 10*3/mm3 159 130 129*        Results from last 7 days   Lab Units 05/19/19  0638 05/18/19  0728 05/17/19  0629   SODIUM mmol/L 134* 132* 125*   POTASSIUM mmol/L 3.6 3.4* 3.4*   CHLORIDE mmol/L 103 98 92*   CO2 mmol/L 25.0 29.0 23.0*   BUN mg/dL 12 9 16   CREATININE mg/dL 2.85* 2.01* 2.90*   CALCIUM mg/dL 7.4* 7.3* 6.9*   BILIRUBIN mg/dL 0.6 0.5 0.5   ALK PHOS U/L 120 130* 106   ALT (SGPT) U/L 18 <15 <15   AST (SGOT) U/L 22 21 21   GLUCOSE mg/dL 82 81 159*       Assessment/Plan     Assessment/Plan       Abdominal pain    Hypoglycemia    Paroxysmal atrial fibrillation (CMS/HCC)      With the above problem we will continue to increase to regular diet, also try food supplements, dialysis on Monday home middle part of this week?      Bunny Jean MD  05/19/19  10:09 AM      Time: time spent with patient 15 minutes     EMR Dragon/Transcription disclaimer: Much of this encounter note is an electronic transcription/translation of spoken language to printed text. The electronic translation of spoken language may permit erroneous, or at times, nonsensical words or phrases to be inadvertently transcribed; although I have reviewed the note for such errors, some may still exist.

## 2019-05-19 NOTE — PLAN OF CARE
Problem: Patient Care Overview  Goal: Plan of Care Review  Outcome: Ongoing (interventions implemented as appropriate)   05/19/19 0757 05/19/19 1509   OTHER   Outcome Summary --  Pt has used PCA infrequently. Turned q 2 hours and up in chair. BM X 1 this shift. Tolerated Dental soft diet.   Coping/Psychosocial   Plan of Care Reviewed With patient --    Plan of Care Review   Progress --  improving     Goal: Individualization and Mutuality  Outcome: Ongoing (interventions implemented as appropriate)   05/13/19 1733 05/18/19 1831 05/19/19 1509   Individualization   Patient Specific Preferences wants to return home --  --    Patient Specific Goals (Include Timeframe) --  --  Improve pain control and mobility   Patient Specific Interventions --  --  Regular soft and renal diet. IV fluids, abx and PCA. Up in chair    Mutuality/Individual Preferences   What Anxieties, Fears, Concerns, or Questions Do You Have About Your Care? --  Concerned about diarrhea. --    What Information Would Help Us Give You More Personalized Care? --  Pt has dialysis MWF --    How Would You and/or Your Support Person Like to Participate in Your Care? --  Keep updated on treatment plan --    Mutuality/Individual Preferences   How to Address Anxieties/Fears --  Answer any questions. --        Problem: Fall Risk (Adult)  Goal: Identify Related Risk Factors and Signs and Symptoms  Outcome: Ongoing (interventions implemented as appropriate)   05/15/19 0428 05/15/19 1423   Fall Risk (Adult)   Related Risk Factors (Fall Risk) age-related changes;fatigue/slow reaction;gait/mobility problems;inadequate lighting;environment unfamiliar --    Signs and Symptoms (Fall Risk) --  presence of risk factors     Goal: Absence of Fall  Outcome: Ongoing (interventions implemented as appropriate)   05/19/19 1509   Fall Risk (Adult)   Absence of Fall making progress toward outcome       Problem: Skin Injury Risk (Adult)  Goal: Identify Related Risk Factors and Signs  and Symptoms  Outcome: Ongoing (interventions implemented as appropriate)   05/19/19 0317   Skin Injury Risk (Adult)   Related Risk Factors (Skin Injury Risk) mobility impaired;advanced age;body weight extremes     Goal: Skin Health and Integrity  Outcome: Ongoing (interventions implemented as appropriate)   05/19/19 1509   Skin Injury Risk (Adult)   Skin Health and Integrity making progress toward outcome       Problem: Wound (Includes Pressure Injury) (Adult)  Goal: Signs and Symptoms of Listed Potential Problems Will be Absent, Minimized or Managed (Wound)  Outcome: Ongoing (interventions implemented as appropriate)   05/18/19 0132 05/19/19 1509   Goal/Outcome Evaluation   Problems Assessed (Wound) all --    Problems Present (Wound) --  pain;situational response

## 2019-05-19 NOTE — PROGRESS NOTES
[facility] Medicine Services  INPATIENT PROGRESS NOTE    Patient Name: Cheri Ely  Date of Admission: 5/12/2019  Today's Date: 05/19/19  Length of Stay: 6  Primary Care Physician: Provider, No Known    Subjective   Chief Complaint: f/u  HPI   Still has borderline sugar at 77-82 on d5 ns IVF  primary service  Increase diet to soft texture with supplemental nutrition      Review of Systems     All pertinent negatives and positives are as above. All other systems have been reviewed and are negative unless otherwise stated.     Objective    Temp:  [97.7 °F (36.5 °C)-98.5 °F (36.9 °C)] 97.8 °F (36.6 °C)  Heart Rate:  [] 90  Resp:  [16-20] 18  BP: ()/(37-70) 118/46  Physical Exam   Status quo  awake and alert and oriented x3  Pleasant woman, no distress,  Supple neck  Anicteric sclera, external ocular muscles are intact  Normal respiratory effort, no adventitious sounds  S1-S2, appears regular;   No cyanosis clubbing or edema  Warm dry skin  Good capillary refill time   she is on morphine pca                Results Review:  I have reviewed the labs, radiology results, and diagnostic studies.    Laboratory Data:   Results from last 7 days   Lab Units 05/19/19  0638 05/18/19  0727 05/17/19  0721   WBC 10*3/mm3 6.74 6.15 7.23   HEMOGLOBIN g/dL 10.8* 10.6* 10.7*   HEMATOCRIT % 33.9* 33.1* 32.5*   PLATELETS 10*3/mm3 159 130 129*        Results from last 7 days   Lab Units 05/19/19  0638 05/18/19  0728 05/17/19  0629   SODIUM mmol/L 134* 132* 125*   POTASSIUM mmol/L 3.6 3.4* 3.4*   CHLORIDE mmol/L 103 98 92*   CO2 mmol/L 25.0 29.0 23.0*   BUN mg/dL 12 9 16   CREATININE mg/dL 2.85* 2.01* 2.90*   CALCIUM mg/dL 7.4* 7.3* 6.9*   BILIRUBIN mg/dL 0.6 0.5 0.5   ALK PHOS U/L 120 130* 106   ALT (SGPT) U/L 18 <15 <15   AST (SGOT) U/L 22 21 21   GLUCOSE mg/dL 82 81 159*       Culture Data:   Blood Culture   Date Value Ref Range Status   05/13/2019 No growth at 5 days  Final    05/13/2019 No growth at 5 days  Final       Radiology Data:   Imaging Results (last 24 hours)     ** No results found for the last 24 hours. **          I have reviewed the patient's current medications.     Assessment/Plan     Active Hospital Problems    Diagnosis   • Hypoglycemia   • Paroxysmal atrial fibrillation (CMS/HCC)   • Abdominal pain         Hyponatremia improve  Pneumoperitoneum secondary to perforated duodenal ulcer status post exploratory laparotomy on May 13  End-stage renal disease on hemodialysis Monday Wednesdays and Fridays - per renal service  Postoperative blood loss anemia - stable hgb  History of diastolic heart failure with status post aortic valve replacement  Atrial fibrillation with left bundle branch block on EKG; telemetry showed atrial fibrillation - hx of bleeding; at high risk of bleeding per discussion with Dr. Rodriguez early this week  History of hypertension - adequately controlled  Coronary artery disease stable -serial troponin is negative   postoperative hypoglycemia  Hypothyroidism - on replacement therapy          Cont supportive care  Hemodynamically stable              Discharge Planning: per primary service  Fabien Espinosa MD   05/19/19   4:50 PM

## 2019-05-19 NOTE — PLAN OF CARE
Problem: Patient Care Overview  Goal: Plan of Care Review  Outcome: Ongoing (interventions implemented as appropriate)   05/19/19 0317   OTHER   Outcome Summary Pt having minimal c/o pain; PCA remains in place. Lidoderm patch removed this shift, to be replaced later this am. IVF and IV abx cont to infuse per orders. Turn q2h. 1 BM so far this shift. On tele running a fib. VSS. Will cont to monitor.    Coping/Psychosocial   Plan of Care Reviewed With patient   Plan of Care Review   Progress no change     Goal: Discharge Needs Assessment  Outcome: Ongoing (interventions implemented as appropriate)   05/13/19 1112 05/13/19 1732 05/14/19 1500   Discharge Needs Assessment   Readmission Within the Last 30 Days --  no previous admission in last 30 days --    Concerns to be Addressed discharge planning;care coordination/care conferences --  --    Patient/Family Anticipates Transition to --  --  home with family   Patient/Family Anticipated Services at Transition --  --  home health care   Transportation Concerns --  --  car, none   Transportation Anticipated --  --  family or friend will provide   Discharge Facility/Level of Care Needs nursing facility, skilled --  --    Current Discharge Risk chronically ill --  --    Disability   Equipment Currently Used at Home --  --  walker, standard;wheelchair, motorized;shower chair     Goal: Interprofessional Rounds/Family Conf  Outcome: Ongoing (interventions implemented as appropriate)   05/19/19 0317   Interdisciplinary Rounds/Family Conf   Participants nursing;patient       Problem: Fall Risk (Adult)  Goal: Identify Related Risk Factors and Signs and Symptoms  Outcome: Ongoing (interventions implemented as appropriate)   05/15/19 0428 05/15/19 1423   Fall Risk (Adult)   Related Risk Factors (Fall Risk) age-related changes;fatigue/slow reaction;gait/mobility problems;inadequate lighting;environment unfamiliar --    Signs and Symptoms (Fall Risk) --  presence of risk factors      Goal: Absence of Fall  Outcome: Ongoing (interventions implemented as appropriate)   05/19/19 0317   Fall Risk (Adult)   Absence of Fall making progress toward outcome       Problem: Skin Injury Risk (Adult)  Goal: Identify Related Risk Factors and Signs and Symptoms  Outcome: Ongoing (interventions implemented as appropriate)   05/19/19 0317   Skin Injury Risk (Adult)   Related Risk Factors (Skin Injury Risk) mobility impaired;advanced age;body weight extremes     Goal: Skin Health and Integrity  Outcome: Ongoing (interventions implemented as appropriate)   05/19/19 0317   Skin Injury Risk (Adult)   Skin Health and Integrity making progress toward outcome       Problem: Wound (Includes Pressure Injury) (Adult)  Goal: Signs and Symptoms of Listed Potential Problems Will be Absent, Minimized or Managed (Wound)  Outcome: Ongoing (interventions implemented as appropriate)   05/18/19 0132 05/19/19 0317   Goal/Outcome Evaluation   Problems Assessed (Wound) all --    Problems Present (Wound) --  situational response

## 2019-05-20 LAB
ALBUMIN SERPL-MCNC: 2.3 G/DL (ref 3.5–5)
ALBUMIN/GLOB SERPL: 0.8 G/DL (ref 1.1–2.5)
ALP SERPL-CCNC: 129 U/L (ref 24–120)
ALT SERPL W P-5'-P-CCNC: 15 U/L (ref 0–54)
ANION GAP SERPL CALCULATED.3IONS-SCNC: 10 MMOL/L (ref 4–13)
AST SERPL-CCNC: 18 U/L (ref 7–45)
BASOPHILS # BLD AUTO: 0.02 10*3/MM3 (ref 0–0.2)
BASOPHILS NFR BLD AUTO: 0.3 % (ref 0–2)
BILIRUB SERPL-MCNC: 0.6 MG/DL (ref 0.1–1)
BUN BLD-MCNC: 14 MG/DL (ref 5–21)
BUN/CREAT SERPL: 4 (ref 7–25)
CALCIUM SPEC-SCNC: 7.4 MG/DL (ref 8.4–10.4)
CHLORIDE SERPL-SCNC: 105 MMOL/L (ref 98–110)
CO2 SERPL-SCNC: 21 MMOL/L (ref 24–31)
CREAT BLD-MCNC: 3.48 MG/DL (ref 0.5–1.4)
DEPRECATED RDW RBC AUTO: 62 FL (ref 40–54)
EOSINOPHIL # BLD AUTO: 0.35 10*3/MM3 (ref 0–0.7)
EOSINOPHIL NFR BLD AUTO: 4.8 % (ref 0–4)
ERYTHROCYTE [DISTWIDTH] IN BLOOD BY AUTOMATED COUNT: 17.1 % (ref 12–15)
GFR SERPL CREATININE-BSD FRML MDRD: 13 ML/MIN/1.73
GFR SERPL CREATININE-BSD FRML MDRD: ABNORMAL ML/MIN/1.73
GLOBULIN UR ELPH-MCNC: 2.8 GM/DL
GLUCOSE BLD-MCNC: 109 MG/DL (ref 70–100)
HCT VFR BLD AUTO: 32.9 % (ref 37–47)
HGB BLD-MCNC: 10.6 G/DL (ref 12–16)
IMM GRANULOCYTES # BLD AUTO: 0.04 10*3/MM3 (ref 0–0.05)
IMM GRANULOCYTES NFR BLD AUTO: 0.6 % (ref 0–5)
LYMPHOCYTES # BLD AUTO: 0.58 10*3/MM3 (ref 0.72–4.86)
LYMPHOCYTES NFR BLD AUTO: 8 % (ref 15–45)
MAGNESIUM SERPL-MCNC: 1.5 MG/DL (ref 1.4–2.2)
MCH RBC QN AUTO: 31.5 PG (ref 28–32)
MCHC RBC AUTO-ENTMCNC: 32.2 G/DL (ref 33–36)
MCV RBC AUTO: 97.6 FL (ref 82–98)
MONOCYTES # BLD AUTO: 0.36 10*3/MM3 (ref 0.19–1.3)
MONOCYTES NFR BLD AUTO: 5 % (ref 4–12)
NEUTROPHILS # BLD AUTO: 5.87 10*3/MM3 (ref 1.87–8.4)
NEUTROPHILS NFR BLD AUTO: 81.3 % (ref 39–78)
NRBC BLD AUTO-RTO: 0 /100 WBC (ref 0–0.2)
PHOSPHATE SERPL-MCNC: 3.5 MG/DL (ref 2.5–4.5)
PLATELET # BLD AUTO: 170 10*3/MM3 (ref 130–400)
PMV BLD AUTO: 10.7 FL (ref 6–12)
POTASSIUM BLD-SCNC: 3.5 MMOL/L (ref 3.5–5.3)
PROT SERPL-MCNC: 5.1 G/DL (ref 6.3–8.7)
RBC # BLD AUTO: 3.37 10*6/MM3 (ref 4.2–5.4)
SODIUM BLD-SCNC: 136 MMOL/L (ref 135–145)
WBC NRBC COR # BLD: 7.22 10*3/MM3 (ref 4.8–10.8)

## 2019-05-20 PROCEDURE — 97110 THERAPEUTIC EXERCISES: CPT

## 2019-05-20 PROCEDURE — 5A1D70Z PERFORMANCE OF URINARY FILTRATION, INTERMITTENT, LESS THAN 6 HOURS PER DAY: ICD-10-PCS | Performed by: INTERNAL MEDICINE

## 2019-05-20 PROCEDURE — 83735 ASSAY OF MAGNESIUM: CPT | Performed by: INTERNAL MEDICINE

## 2019-05-20 PROCEDURE — 84100 ASSAY OF PHOSPHORUS: CPT | Performed by: INTERNAL MEDICINE

## 2019-05-20 PROCEDURE — 80053 COMPREHEN METABOLIC PANEL: CPT | Performed by: SPECIALIST

## 2019-05-20 PROCEDURE — 25010000002 PIPERACILLIN SOD-TAZOBACTAM PER 1 G: Performed by: SPECIALIST

## 2019-05-20 PROCEDURE — 25010000002 ENOXAPARIN PER 10 MG: Performed by: SPECIALIST

## 2019-05-20 PROCEDURE — 97530 THERAPEUTIC ACTIVITIES: CPT

## 2019-05-20 PROCEDURE — 85025 COMPLETE CBC W/AUTO DIFF WBC: CPT | Performed by: SPECIALIST

## 2019-05-20 PROCEDURE — 25010000002 ONDANSETRON PER 1 MG: Performed by: SPECIALIST

## 2019-05-20 RX ORDER — HYDROCODONE BITARTRATE AND ACETAMINOPHEN 7.5; 325 MG/1; MG/1
1 TABLET ORAL EVERY 6 HOURS PRN
Status: DISCONTINUED | OUTPATIENT
Start: 2019-05-20 | End: 2019-05-21 | Stop reason: HOSPADM

## 2019-05-20 RX ADMIN — CARVEDILOL 3.12 MG: 3.12 TABLET, FILM COATED ORAL at 12:23

## 2019-05-20 RX ADMIN — TAZOBACTAM SODIUM AND PIPERACILLIN SODIUM 3.38 G: 375; 3 INJECTION, SOLUTION INTRAVENOUS at 13:57

## 2019-05-20 RX ADMIN — PANTOPRAZOLE SODIUM 40 MG: 40 TABLET, DELAYED RELEASE ORAL at 07:06

## 2019-05-20 RX ADMIN — LIDOCAINE 1 PATCH: 50 PATCH CUTANEOUS at 12:24

## 2019-05-20 RX ADMIN — TAZOBACTAM SODIUM AND PIPERACILLIN SODIUM 3.38 G: 375; 3 INJECTION, SOLUTION INTRAVENOUS at 01:39

## 2019-05-20 RX ADMIN — ONDANSETRON HYDROCHLORIDE 4 MG: 2 INJECTION, SOLUTION INTRAMUSCULAR; INTRAVENOUS at 07:32

## 2019-05-20 RX ADMIN — LEVOTHYROXINE SODIUM 150 MCG: 150 TABLET ORAL at 07:06

## 2019-05-20 RX ADMIN — ENOXAPARIN SODIUM 30 MG: 30 INJECTION SUBCUTANEOUS at 12:24

## 2019-05-20 NOTE — DISCHARGE PLACEMENT REQUEST
Physician Progress Notes (last 48 hours) (Notes from 5/18/2019 10:11 AM through 5/20/2019 10:11 AM)      Chris Hsu MD at 5/19/2019  4:56 PM          Nephrology ([facility]) Progress Note      Patient:  Cheri Ely  YOB: 1940  Date of Service: 5/19/2019  MRN: [id number]   Acct: [id number]   Primary Care Physician: Provider, No Known  Advance Directive:   Code Status and Medical Interventions:   Ordered at: 05/13/19 0438     Level Of Support Discussed With:    Patient     Code Status:    CPR     Medical Interventions (Level of Support Prior to Arrest):    Full     Admit Date: 5/12/2019       Hospital Day: 6  Referring Provider: Laila Rodriguez MD      Patient personally seen and examined.  Complete chart including Consults, Notes, Operative Reports, Labs, Cardiology, and Radiology studies reviewed as able.        Subjective:  Cheri Ely is a 79 y.o. female  whom we were consulted for end stage renal disease management. Patient has routine hemodialysis Monday Wednesday Friday at [facility].  No recent issues with dialysis.  Recurrent history of GI bleeding. Presented this time with acute onset abdominal pain; imaging showed free air in abdomen and was taken for exploratory laparotomy by Dr Hughes. Had repair of perforated duodenal ulcer.  Moved to floor 5/14.     Today, no new events.  No n/v/d.  No issues with HD.  Encouraged that her diet is advancing.      Allergies:  Patient has no known allergies.    Home Meds:  Medications Prior to Admission   Medication Sig Dispense Refill Last Dose   • acetaminophen (TYLENOL) 325 MG tablet Take 2 tablets by mouth Every 6 (Six) Hours As Needed for Mild Pain .   Past Month at Unknown time   • aspirin 81 MG EC tablet Take 1 tablet by mouth Daily.   3/23/2019   • B Complex-C-Folic Acid (JOHN-SANGEETA) tablet Take 1 tablet by mouth Daily.   3/23/2019   • carvedilol (COREG) 3.125 MG tablet Take 3.125 mg by  mouth Daily. Monday, Wednesday, and Friday dose. Hold if SBP < 100.   3/22/2019   • carvedilol (COREG) 3.125 MG tablet Take 3.125 mg by mouth 2 (Two) Times a Day With Meals. Sunday, Tuesday, Thursday, and Saturday dose. Hold if SBP <100.   3/23/2019   • Cholecalciferol (VITAMIN D) 2000 units capsule Take 1 capsule by mouth Daily. 30 capsule     • docusate sodium 100 MG capsule Take 100 mg by mouth 2 (Two) Times a Day. 60 each 1 3/23/2019   • epoetin lucy (EPOGEN,PROCRIT) 75674 UNIT/ML injection Inject 1 mL under the skin into the appropriate area as directed 3 (Three) Times a Week.      • lactulose 20 GM/30ML solution solution Take 30 mL by mouth Daily As Needed (Constipation). 30 mL  3/23/2019   • levothyroxine (SYNTHROID, LEVOTHROID) 150 MCG tablet Take 150 mcg by mouth Daily.   3/23/2019   • losartan (COZAAR) 25 MG tablet Take 25 mg by mouth 3 (Three) Times a Week. Monday, Wednesday, and Friday.   3/23/2019   • ondansetron ODT (ZOFRAN-ODT) 4 MG disintegrating tablet Take 4 mg by mouth Every 8 (Eight) Hours As Needed for Nausea or Vomiting.   Past Month at Unknown time   • pantoprazole (PROTONIX) 40 MG EC tablet Take 1 tablet by mouth Daily. 30 tablet 11 3/23/2019   • Polyethyl Glyc-Propyl Glyc PF 0.4-0.3 % solution ophthalmic solution Administer 2 drops to both eyes Every 2 (Two) Hours As Needed (dry eyes).   Past Month at Unknown time   • pravastatin (PRAVACHOL) 40 MG tablet Take 40 mg by mouth Daily.   3/23/2019   • sertraline (ZOLOFT) 50 MG tablet Take 50 mg by mouth Daily.   3/23/2019   • sucralfate (CARAFATE) 1 GM/10ML suspension Take 10 mL by mouth 2 (Two) Times a Day. 1200 mL 0        Medicines:  Current Facility-Administered Medications   Medication Dose Route Frequency Provider Last Rate Last Dose   • carvedilol (COREG) tablet 3.125 mg  3.125 mg Oral 2 times per day on Sun Tue Thu Fabien Stephen MD   3.125 mg at 05/18/19 1739   • carvedilol (COREG) tablet 3.125 mg  3.125 mg Oral Once per  day on Mon Wed Fri Fabien Espinosa MD   3.125 mg at 05/17/19 0912   • dextrose (D50W) 25 g/ 50mL Intravenous Solution 25 g  25 g Intravenous Q15 Min PRN Fabien Espinosa MD   25 g at 05/15/19 1221   • dextrose (GLUTOSE) oral gel 15 g  15 g Oral Q15 Min PRN Fabien Espinosa MD       • dextrose 5 % and sodium chloride 0.9 % infusion  75 mL/hr Intravenous Continuous Fabien Espinosa MD 75 mL/hr at 05/19/19 1206 75 mL/hr at 05/19/19 1206   • enoxaparin (LOVENOX) syringe 30 mg  30 mg Subcutaneous Daily Laila Rodriguez MD   30 mg at 05/19/19 0907   • glucagon (human recombinant) (GLUCAGEN DIAGNOSTIC) injection 1 mg  1 mg Subcutaneous Q15 Min PRN Fabien Espinosa MD       • levothyroxine (SYNTHROID, LEVOTHROID) tablet 150 mcg  150 mcg Oral Q AM Fabien Espinosa MD   150 mcg at 05/19/19 0649   • lidocaine (LIDODERM) 5 % 1 patch  1 patch Transdermal Q24H Fabien Espinosa MD   1 patch at 05/19/19 0907   • Morphine sulfate PCA 1 mg/mL 30 mL syringe   Intravenous Continuous Laila Rodriguez MD       • naloxone (NARCAN) injection 0.1 mg  0.1 mg Intravenous Q5 Min PRN Laila Rodriguez MD       • ondansetron (ZOFRAN) tablet 4 mg  4 mg Oral Q6H PRN Laila Rodriguez MD        Or   • ondansetron (ZOFRAN) injection 4 mg  4 mg Intravenous Q6H PRN Laila Rodriguez MD   4 mg at 05/17/19 0912   • pantoprazole (PROTONIX) EC tablet 40 mg  40 mg Oral Q AM Laila Rodriguez MD   40 mg at 05/19/19 0649   • piperacillin-tazobactam (ZOSYN) 3.375 g in iso-osmotic dextrose 50 ml (premix)  3.375 g Intravenous Q12H Laila Rodriguez MD 0 mL/hr at 05/17/19 2152 3.375 g at 05/19/19 1356   • sodium chloride 0.9 % flush 10 mL  10 mL Intravenous PRN Ashlyn Mackey APRN       • sodium chloride 0.9 % infusion  10 mL/hr Intravenous Continuous Laila Rodriguez MD 10 mL/hr at 05/15/19 0052 10 mL/hr at 05/15/19 0052       Past Medical History:  Past Medical History:   Diagnosis Date   •  Arthritis    • Carotid artery disease (CMS/HCC)    • CHF (congestive heart failure) (CMS/HCC)    • Chronic kidney disease on chronic dialysis (CMS/HCC)    • Chronic renal failure     on dialysis ON MON, WED, FRI   • Coronary artery disease    • Disease of thyroid gland    • Diverticulitis    • Gastric ulcer    • History of transfusion    • Hyperlipidemia    • Hypertension    • Injury of back    • Mesenteric artery insufficiency (CMS/HCC)    • Multilevel degenerative disc disease    • Osteoporosis    • Pancreatitis    • Pelvis fracture (CMS/HCC)    • Pneumonia        Past Surgical History:  Past Surgical History:   Procedure Laterality Date   • AORTAGRAM Right 1/8/2018    Procedure: MESENTERIC ANGIOGRAM, GROIN ACCESS, MYNX CLOSURE;  Surgeon: Tomasz San DO;  Location: [facility] OR;  Service:    • AORTIC VALVE SURGERY     • APPENDECTOMY     • BACK SURGERY     • CAPSULE ENDOSCOPY N/A 3/30/2019    Procedure: CAPSULE ENDOSCOPY M2A;  Surgeon: David Yu MD;  Location: [facility] ENDOSCOPY;  Service: Gastroenterology   • CARDIAC SURGERY     • CAROTID ENDARTERECTOMY Bilateral    • CATARACT EXTRACTION, BILATERAL     • CERVICAL SPINE SURGERY     • CHOLECYSTECTOMY     • COLON SURGERY     • COLONOSCOPY  10/01/2015   • COLONOSCOPY N/A 3/28/2019    Procedure: COLONOSCOPY WITH ANESTHESIA;  Surgeon: Rafita Merida MD;  Location: [facility] ENDOSCOPY;  Service: Gastroenterology   • CORONARY ARTERY BYPASS GRAFT     • DIALYSIS FISTULA CREATION     • ENDOSCOPY N/A 12/27/2018    Procedure: ESOPHAGOGASTRODUODENOSCOPY WITH ANESTHESIA;  Surgeon: Moni Garza MD;  Location: [facility] ENDOSCOPY;  Service: Gastroenterology   • ENDOSCOPY N/A 2/28/2019    Procedure: ESOPHAGOGASTRODUODENOSCOPY WITH ANESTHESIA;  Surgeon: Moni Garza MD;  Location: [facility] ENDOSCOPY;  Service: Gastroenterology   • ENDOSCOPY N/A 3/11/2019    Procedure: ESOPHAGOGASTRODUODENOSCOPY WITH ANESTHESIA;  Surgeon: Moni Garza MD;  Location: [facility] ENDOSCOPY;   Service: Gastroenterology   • ENDOSCOPY N/A 3/27/2019    Procedure: ESOPHAGOGASTRODUODENOSCOPY WITH ANESTHESIA;  Surgeon: Rafita Merida MD;  Location: [facility] ENDOSCOPY;  Service: Gastroenterology   • EXPLORATORY LAPAROTOMY N/A 5/13/2019    Procedure: LAPAROTOMY EXPLORATORY, OVERSEW DUODENAL ULCER WITH FRED PATCH, KOCHER MANEUVER;  Surgeon: Laila Rodriguez MD;  Location:  [facility] OR;  Service: General   • HIP TROCANTERIC NAILING WITH INTRAMEDULLARY HIP SCREW Right 2/8/2019    Procedure: HIP TROCANTERIC NAILING LONG WITH INTRAMEDULLARY HIP SCREW;  Surgeon: Boo Camacho MD;  Location: [facility] OR;  Service: Orthopedics   • JOINT REPLACEMENT      knee   • LAPAROSCOPIC GASTRIC BANDING     • LEEP     • LUMBAR FUSION     • TOE AMPUTATION Left     2nd toe   • TOTAL KNEE ARTHROPLASTY Bilateral    • TUBAL ABDOMINAL LIGATION     • UPPER GASTROINTESTINAL ENDOSCOPY  10/01/2015       Family History  Family History   Problem Relation Age of Onset   • Hypertension Mother    • Cancer Mother         stomach   • Coronary artery disease Father    • Heart attack Father    • Diabetes Brother    • Coronary artery disease Brother    • Colon cancer Neg Hx    • Colon polyps Neg Hx        Social History  Social History     Socioeconomic History   • Marital status:      Spouse name: Not on file   • Number of children: Not on file   • Years of education: Not on file   • Highest education level: Not on file   Tobacco Use   • Smoking status: Never Smoker   • Smokeless tobacco: Never Used   Substance and Sexual Activity   • Alcohol use: No   • Drug use: No   • Sexual activity: Defer         Review of Systems:  History obtained from chart review and the patient  General ROS: No fever or chills  Respiratory ROS: No cough, shortness of breath, wheezing  Cardiovascular ROS: No chest pain or palpitations  Gastrointestinal ROS: No abdominal pain or melena  Genito-Urinary ROS: No dysuria or hematuria    Objective:  Patient Vitals for the  past 24 hrs:   BP Temp Temp src Pulse Resp SpO2   05/19/19 1543 118/46 97.8 °F (36.6 °C) Oral 90 18 96 %   05/19/19 1137 103/42 98.5 °F (36.9 °C) Oral 67 20 96 %   05/19/19 0755 (!) 107/37 98.1 °F (36.7 °C) Oral 101 16 96 %   05/19/19 0412 92/45 97.7 °F (36.5 °C) Oral 83 16 94 %   05/18/19 2338 124/66 97.8 °F (36.6 °C) Oral 88 18 96 %   05/18/19 2004 130/70 98 °F (36.7 °C) Oral 85 16 94 %       Intake/Output Summary (Last 24 hours) at 5/19/2019 1656  Last data filed at 5/19/2019 1543  Gross per 24 hour   Intake 2305 ml   Output 30 ml   Net 2275 ml     General: awake/alert   Chest:  clear to auscultation bilaterally without respiratory distress  CVS: irrr  Abdominal: soft, nontender, positive bowel sounds  Extremities: no cyanosis or edema  Skin: warm and dry without rash      Labs:  Results from last 7 days   Lab Units 05/19/19  0638 05/18/19  0727 05/17/19  0721   WBC 10*3/mm3 6.74 6.15 7.23   HEMOGLOBIN g/dL 10.8* 10.6* 10.7*   HEMATOCRIT % 33.9* 33.1* 32.5*   PLATELETS 10*3/mm3 159 130 129*         Results from last 7 days   Lab Units 05/19/19  0638 05/18/19  0728 05/17/19  0629   SODIUM mmol/L 134* 132* 125*   POTASSIUM mmol/L 3.6 3.4* 3.4*   CHLORIDE mmol/L 103 98 92*   CO2 mmol/L 25.0 29.0 23.0*   BUN mg/dL 12 9 16   CREATININE mg/dL 2.85* 2.01* 2.90*   CALCIUM mg/dL 7.4* 7.3* 6.9*   BILIRUBIN mg/dL 0.6 0.5 0.5   ALK PHOS U/L 120 130* 106   ALT (SGPT) U/L 18 <15 <15   AST (SGOT) U/L 22 21 21   GLUCOSE mg/dL 82 81 159*       Radiology:   Imaging Results (last 72 hours)     ** No results found for the last 72 hours. **          Culture:  Blood Culture   Date Value Ref Range Status   05/13/2019 No growth at 4 days  Preliminary   05/13/2019 No growth at 4 days  Preliminary         Assessment   ESRD  Chronic diastolic CHF  HTN with recent hypotension  Perforated duodenal ulcer s/p surgical repair  Anemia - CKD/acute blood loss  Hypokalemia  hyponatremia     Plan:  HD MWF  Monitor labs  Discussed with patient,  nursing  Advancing diet as tolerated        Chris Hsu MD  5/19/2019  4:56 PM    Electronically signed by Chris Hsu MD at 5/19/2019  5:01 PM     Fabien Espinosa MD at 5/19/2019  4:50 PM              [facility]  INPATIENT PROGRESS NOTE    Patient Name: Cheri Ely  Date of Admission: 5/12/2019  Today's Date: 05/19/19  Length of Stay: 6  Primary Care Physician: Provider, No Known    Subjective   Chief Complaint: f/u  HPI   Still has borderline sugar at 77-82 on d5 ns IVF  primary service  Increase diet to soft texture with supplemental nutrition      Review of Systems     All pertinent negatives and positives are as above. All other systems have been reviewed and are negative unless otherwise stated.     Objective    Temp:  [97.7 °F (36.5 °C)-98.5 °F (36.9 °C)] 97.8 °F (36.6 °C)  Heart Rate:  [] 90  Resp:  [16-20] 18  BP: ()/(37-70) 118/46  Physical Exam   Status quo  awake and alert and oriented x3  Pleasant woman, no distress,  Supple neck  Anicteric sclera, external ocular muscles are intact  Normal respiratory effort, no adventitious sounds  S1-S2, appears regular;   No cyanosis clubbing or edema  Warm dry skin  Good capillary refill time   she is on morphine pca                Results Review:  I have reviewed the labs, radiology results, and diagnostic studies.    Laboratory Data:   Results from last 7 days   Lab Units 05/19/19  0638 05/18/19  0727 05/17/19  0721   WBC 10*3/mm3 6.74 6.15 7.23   HEMOGLOBIN g/dL 10.8* 10.6* 10.7*   HEMATOCRIT % 33.9* 33.1* 32.5*   PLATELETS 10*3/mm3 159 130 129*        Results from last 7 days   Lab Units 05/19/19  0638 05/18/19  0728 05/17/19  0629   SODIUM mmol/L 134* 132* 125*   POTASSIUM mmol/L 3.6 3.4* 3.4*   CHLORIDE mmol/L 103 98 92*   CO2 mmol/L 25.0 29.0 23.0*   BUN mg/dL 12 9 16   CREATININE mg/dL 2.85* 2.01* 2.90*   CALCIUM mg/dL 7.4* 7.3* 6.9*   BILIRUBIN mg/dL  0.6 0.5 0.5   ALK PHOS U/L 120 130* 106   ALT (SGPT) U/L 18 <15 <15   AST (SGOT) U/L 22 21 21   GLUCOSE mg/dL 82 81 159*       Culture Data:   Blood Culture   Date Value Ref Range Status   05/13/2019 No growth at 5 days  Final   05/13/2019 No growth at 5 days  Final       Radiology Data:   Imaging Results (last 24 hours)     ** No results found for the last 24 hours. **          I have reviewed the patient's current medications.     Assessment/Plan     Active Hospital Problems    Diagnosis   • Hypoglycemia   • Paroxysmal atrial fibrillation (CMS/HCC)   • Abdominal pain         Hyponatremia improve  Pneumoperitoneum secondary to perforated duodenal ulcer status post exploratory laparotomy on May 13  End-stage renal disease on hemodialysis Monday Wednesdays and Fridays - per renal service  Postoperative blood loss anemia - stable hgb  History of diastolic heart failure with status post aortic valve replacement  Atrial fibrillation with left bundle branch block on EKG; telemetry showed atrial fibrillation - hx of bleeding; at high risk of bleeding per discussion with Dr. Rodriguez early this week  History of hypertension - adequately controlled  Coronary artery disease stable -serial troponin is negative   postoperative hypoglycemia  Hypothyroidism - on replacement therapy          Cont supportive care  Hemodynamically stable              Discharge Planning: per primary service  Fabien Espinosa MD   05/19/19   4:50 PM      Electronically signed by Fabien Espinosa MD at 5/19/2019  5:40 PM     Bunny Jean MD at 5/19/2019 10:09 AM               LOS: 6 days   Patient Care Team:  Provider, No Known as PCP - Barrett Prasad Jr, MD as PCP - Family Medicine  Moni Garza MD as Consulting Physician (Gastroenterology)  Tomasz San DO as Consulting Physician (Vascular Surgery)    Chief Complaint: Postoperative day 7 following admission for treatment of a perforated ulcer  Subjective      Subjective     Hospital day #7 following treatment of a perforated ulcer she has minimal activity moving around in her bed.  She tolerated full liquids yesterday we are trying to increase her diet today with regular diet.  She had 5 bowel movements yesterday.  She does not urinate she is an uric on dialysis Monday Wednesday Friday.  Objective      Objective     Vital Signs  Temp:  [97.7 °F (36.5 °C)-98.2 °F (36.8 °C)] 98.1 °F (36.7 °C)  Heart Rate:  [] 101  Resp:  [14-18] 16  BP: ()/(37-70) 107/37    Intake & Output (last 3 days)       05/16 0701 - 05/17 0700 05/17 0701 - 05/18 0700 05/18 0701 - 05/19 0700 05/19 0701 - 05/20 0700    P.O. 240  120     I.V. (mL/kg) 1820 (25.3) 1265 (17.3) 2123 (29)     IV Piggyback 50 50      Total Intake(mL/kg) 2110 (29.3) 1315 (18) 2243 (30.6)     Emesis/NG output  150      Drains 25 10 15     Other  1450      Stool  0 0     Total Output 25 1610 15     Net +2085 -295 +2228             Stool Unmeasured Occurrence  1 x 5 x           Physical Exam:     General Appearance:    Alert, cooperative, in no acute distress   Lungs:     Clear to auscultation,respirations regular, even and                  unlabored    Heart:    Regular rhythm and normal rate, normal S1 and S2, no            murmur, no gallop, no rub, no click   Chest Wall:    No abnormalities observed   Abdomen:    Abdomen is flat, occasional bowel sounds, drains 15 cc.  No discharge white count is 6 hematocrit 34 BUN and creatinine of 12 and 2.8.  She receives dialysis on Monday.  Her potassium elevated to 3.6.        Results Review:     I reviewed the patient's new clinical results.  I reviewed the patient's new imaging results and agree with the interpretation.    Results from last 7 days   Lab Units 05/19/19  0638 05/18/19  0727 05/17/19  0721   WBC 10*3/mm3 6.74 6.15 7.23   HEMOGLOBIN g/dL 10.8* 10.6* 10.7*   HEMATOCRIT % 33.9* 33.1* 32.5*   PLATELETS 10*3/mm3 159 130 129*        Results from last 7 days    Lab Units 05/19/19  0638 05/18/19  0728 05/17/19  0629   SODIUM mmol/L 134* 132* 125*   POTASSIUM mmol/L 3.6 3.4* 3.4*   CHLORIDE mmol/L 103 98 92*   CO2 mmol/L 25.0 29.0 23.0*   BUN mg/dL 12 9 16   CREATININE mg/dL 2.85* 2.01* 2.90*   CALCIUM mg/dL 7.4* 7.3* 6.9*   BILIRUBIN mg/dL 0.6 0.5 0.5   ALK PHOS U/L 120 130* 106   ALT (SGPT) U/L 18 <15 <15   AST (SGOT) U/L 22 21 21   GLUCOSE mg/dL 82 81 159*       Assessment/Plan     Assessment/Plan       Abdominal pain    Hypoglycemia    Paroxysmal atrial fibrillation (CMS/HCC)      With the above problem we will continue to increase to regular diet, also try food supplements, dialysis on Monday home middle part of this week?      Bunny Jean MD  05/19/19  10:09 AM      Time: time spent with patient 15 minutes     EMR Dragon/Transcription disclaimer: Much of this encounter note is an electronic transcription/translation of spoken language to printed text. The electronic translation of spoken language may permit erroneous, or at times, nonsensical words or phrases to be inadvertently transcribed; although I have reviewed the note for such errors, some may still exist.    Electronically signed by Bunny Jean MD at 5/19/2019 10:11 AM     Chris Hsu MD at 5/18/2019  4:17 PM          Nephrology ([facility]) Progress Note      Patient:  Cheri Ely  YOB: 1940  Date of Service: 5/18/2019  MRN: [id number]   Acct: [id number]   Primary Care Physician: Provider, No Known  Advance Directive:   Code Status and Medical Interventions:   Ordered at: 05/13/19 0438     Level Of Support Discussed With:    Patient     Code Status:    CPR     Medical Interventions (Level of Support Prior to Arrest):    Full     Admit Date: 5/12/2019       Hospital Day: 5  Referring Provider: Laila Rodriguez MD      Patient personally seen and examined.  Complete chart including Consults, Notes, Operative Reports, Labs, Cardiology, and  Radiology studies reviewed as able.        Subjective:  Cheri Ely is a 79 y.o. female  whom we were consulted for end stage renal disease management. Patient has routine hemodialysis Monday Wednesday Friday at [facility].  No recent issues with dialysis.  Recurrent history of GI bleeding. Presented this time with acute onset abdominal pain; imaging showed free air in abdomen and was taken for exploratory laparotomy by Dr Hughes. Had repair of perforated duodenal ulcer.  Moved to floor 5/14.     Today, just walked around the bed with therapy.  No n/v/d.  No issues with HD.      Allergies:  Patient has no known allergies.    Home Meds:  Medications Prior to Admission   Medication Sig Dispense Refill Last Dose   • acetaminophen (TYLENOL) 325 MG tablet Take 2 tablets by mouth Every 6 (Six) Hours As Needed for Mild Pain .   Past Month at Unknown time   • aspirin 81 MG EC tablet Take 1 tablet by mouth Daily.   3/23/2019   • B Complex-C-Folic Acid (JOHN-SANGEETA) tablet Take 1 tablet by mouth Daily.   3/23/2019   • carvedilol (COREG) 3.125 MG tablet Take 3.125 mg by mouth Daily. Monday, Wednesday, and Friday dose. Hold if SBP < 100.   3/22/2019   • carvedilol (COREG) 3.125 MG tablet Take 3.125 mg by mouth 2 (Two) Times a Day With Meals. Sunday, Tuesday, Thursday, and Saturday dose. Hold if SBP <100.   3/23/2019   • Cholecalciferol (VITAMIN D) 2000 units capsule Take 1 capsule by mouth Daily. 30 capsule     • docusate sodium 100 MG capsule Take 100 mg by mouth 2 (Two) Times a Day. 60 each 1 3/23/2019   • epoetin lucy (EPOGEN,PROCRIT) 02865 UNIT/ML injection Inject 1 mL under the skin into the appropriate area as directed 3 (Three) Times a Week.      • lactulose 20 GM/30ML solution solution Take 30 mL by mouth Daily As Needed (Constipation). 30 mL  3/23/2019   • levothyroxine (SYNTHROID, LEVOTHROID) 150 MCG tablet Take 150 mcg by mouth Daily.   3/23/2019   • losartan (COZAAR) 25 MG tablet Take 25 mg by mouth 3 (Three)  Times a Week. Monday, Wednesday, and Friday.   3/23/2019   • ondansetron ODT (ZOFRAN-ODT) 4 MG disintegrating tablet Take 4 mg by mouth Every 8 (Eight) Hours As Needed for Nausea or Vomiting.   Past Month at Unknown time   • pantoprazole (PROTONIX) 40 MG EC tablet Take 1 tablet by mouth Daily. 30 tablet 11 3/23/2019   • Polyethyl Glyc-Propyl Glyc PF 0.4-0.3 % solution ophthalmic solution Administer 2 drops to both eyes Every 2 (Two) Hours As Needed (dry eyes).   Past Month at Unknown time   • pravastatin (PRAVACHOL) 40 MG tablet Take 40 mg by mouth Daily.   3/23/2019   • sertraline (ZOLOFT) 50 MG tablet Take 50 mg by mouth Daily.   3/23/2019   • sucralfate (CARAFATE) 1 GM/10ML suspension Take 10 mL by mouth 2 (Two) Times a Day. 1200 mL 0        Medicines:  Current Facility-Administered Medications   Medication Dose Route Frequency Provider Last Rate Last Dose   • carvedilol (COREG) tablet 3.125 mg  3.125 mg Oral 2 times per day on Sun Tue Thu Sat Fabien Espinosa MD   3.125 mg at 05/18/19 0942   • carvedilol (COREG) tablet 3.125 mg  3.125 mg Oral Once per day on Mon Wed Fri Fabien Espinosa MD   3.125 mg at 05/17/19 0912   • dextrose (D50W) 25 g/ 50mL Intravenous Solution 25 g  25 g Intravenous Q15 Min PRN Fabien Espinosa MD   25 g at 05/15/19 1221   • dextrose (GLUTOSE) oral gel 15 g  15 g Oral Q15 Min PRN Fabien Espinosa MD       • dextrose 5 % and sodium chloride 0.9 % infusion  75 mL/hr Intravenous Continuous Fabien Espinosa MD 75 mL/hr at 05/18/19 0814 75 mL/hr at 05/18/19 0814   • enoxaparin (LOVENOX) syringe 30 mg  30 mg Subcutaneous Daily Laila Rodriguez MD   30 mg at 05/18/19 0942   • furosemide (LASIX) injection 20 mg  20 mg Intravenous Once Bunny Jean MD       • glucagon (human recombinant) (GLUCAGEN DIAGNOSTIC) injection 1 mg  1 mg Subcutaneous Q15 Min PRN Fabien Espinosa MD       • levothyroxine (SYNTHROID, LEVOTHROID) tablet 150 mcg  150  mcg Oral Q AM Fabien Espinosa MD   150 mcg at 05/18/19 0619   • lidocaine (LIDODERM) 5 % 1 patch  1 patch Transdermal Q24H Fabien Espinosa MD   1 patch at 05/18/19 0941   • Morphine sulfate PCA 1 mg/mL 30 mL syringe   Intravenous Continuous Laila Rodriguez MD       • naloxone (NARCAN) injection 0.1 mg  0.1 mg Intravenous Q5 Min PRN Laila Rodriguez MD       • ondansetron (ZOFRAN) tablet 4 mg  4 mg Oral Q6H PRN Laila Rodriguez MD        Or   • ondansetron (ZOFRAN) injection 4 mg  4 mg Intravenous Q6H PRN Laila Rodriguez MD   4 mg at 05/17/19 0912   • pantoprazole (PROTONIX) EC tablet 40 mg  40 mg Oral Q AM Laila Rodriguez MD   40 mg at 05/18/19 0619   • piperacillin-tazobactam (ZOSYN) 3.375 g in iso-osmotic dextrose 50 ml (premix)  3.375 g Intravenous Q12H Laila Rodriguez MD 0 mL/hr at 05/17/19 2152 3.375 g at 05/18/19 1328   • potassium chloride 10 mEq in 100 mL IVPB  10 mEq Intravenous Q1H Bunny Jean MD       • sodium chloride 0.9 % flush 10 mL  10 mL Intravenous PRN Ashlyn Mackey APRN       • sodium chloride 0.9 % infusion  10 mL/hr Intravenous Continuous Laila Rodriguez MD 10 mL/hr at 05/15/19 0052 10 mL/hr at 05/15/19 0052       Past Medical History:  Past Medical History:   Diagnosis Date   • Arthritis    • Carotid artery disease (CMS/HCC)    • CHF (congestive heart failure) (CMS/HCC)    • Chronic kidney disease on chronic dialysis (CMS/HCC)    • Chronic renal failure     on dialysis ON MON, WED, FRI   • Coronary artery disease    • Disease of thyroid gland    • Diverticulitis    • Gastric ulcer    • History of transfusion    • Hyperlipidemia    • Hypertension    • Injury of back    • Mesenteric artery insufficiency (CMS/HCC)    • Multilevel degenerative disc disease    • Osteoporosis    • Pancreatitis    • Pelvis fracture (CMS/HCC)    • Pneumonia        Past Surgical History:  Past Surgical History:   Procedure Laterality Date   • AORTAGRAM Right 1/8/2018    Procedure:  MESENTERIC ANGIOGRAM, GROIN ACCESS, MYNX CLOSURE;  Surgeon: Tomasz San DO;  Location: [facility] OR;  Service:    • AORTIC VALVE SURGERY     • APPENDECTOMY     • BACK SURGERY     • CAPSULE ENDOSCOPY N/A 3/30/2019    Procedure: CAPSULE ENDOSCOPY M2A;  Surgeon: David Yu MD;  Location: [facility] ENDOSCOPY;  Service: Gastroenterology   • CARDIAC SURGERY     • CAROTID ENDARTERECTOMY Bilateral    • CATARACT EXTRACTION, BILATERAL     • CERVICAL SPINE SURGERY     • CHOLECYSTECTOMY     • COLON SURGERY     • COLONOSCOPY  10/01/2015   • COLONOSCOPY N/A 3/28/2019    Procedure: COLONOSCOPY WITH ANESTHESIA;  Surgeon: Rafita Merida MD;  Location: [facility] ENDOSCOPY;  Service: Gastroenterology   • CORONARY ARTERY BYPASS GRAFT     • DIALYSIS FISTULA CREATION     • ENDOSCOPY N/A 12/27/2018    Procedure: ESOPHAGOGASTRODUODENOSCOPY WITH ANESTHESIA;  Surgeon: Moni Garza MD;  Location: [facility] ENDOSCOPY;  Service: Gastroenterology   • ENDOSCOPY N/A 2/28/2019    Procedure: ESOPHAGOGASTRODUODENOSCOPY WITH ANESTHESIA;  Surgeon: Moni Garza MD;  Location: [facility] ENDOSCOPY;  Service: Gastroenterology   • ENDOSCOPY N/A 3/11/2019    Procedure: ESOPHAGOGASTRODUODENOSCOPY WITH ANESTHESIA;  Surgeon: Moni Garza MD;  Location: [facility] ENDOSCOPY;  Service: Gastroenterology   • ENDOSCOPY N/A 3/27/2019    Procedure: ESOPHAGOGASTRODUODENOSCOPY WITH ANESTHESIA;  Surgeon: Rafita Merida MD;  Location: [facility] ENDOSCOPY;  Service: Gastroenterology   • EXPLORATORY LAPAROTOMY N/A 5/13/2019    Procedure: LAPAROTOMY EXPLORATORY, OVERSEW DUODENAL ULCER WITH FRED PATCH, KOCHER MANEUVER;  Surgeon: Laila Rodriguez MD;  Location: [facility] OR;  Service: General   • HIP TROCANTERIC NAILING WITH INTRAMEDULLARY HIP SCREW Right 2/8/2019    Procedure: HIP TROCANTERIC NAILING LONG WITH INTRAMEDULLARY HIP SCREW;  Surgeon: Boo Camacho MD;  Location: [facility] OR;  Service: Orthopedics   • JOINT REPLACEMENT      knee   • LAPAROSCOPIC GASTRIC  BANDING     • LEEP     • LUMBAR FUSION     • TOE AMPUTATION Left     2nd toe   • TOTAL KNEE ARTHROPLASTY Bilateral    • TUBAL ABDOMINAL LIGATION     • UPPER GASTROINTESTINAL ENDOSCOPY  10/01/2015       Family History  Family History   Problem Relation Age of Onset   • Hypertension Mother    • Cancer Mother         stomach   • Coronary artery disease Father    • Heart attack Father    • Diabetes Brother    • Coronary artery disease Brother    • Colon cancer Neg Hx    • Colon polyps Neg Hx        Social History  Social History     Socioeconomic History   • Marital status:      Spouse name: Not on file   • Number of children: Not on file   • Years of education: Not on file   • Highest education level: Not on file   Tobacco Use   • Smoking status: Never Smoker   • Smokeless tobacco: Never Used   Substance and Sexual Activity   • Alcohol use: No   • Drug use: No   • Sexual activity: Defer         Review of Systems:  History obtained from chart review and the patient  General ROS: No fever or chills  Respiratory ROS: No cough, shortness of breath, wheezing  Cardiovascular ROS: No chest pain or palpitations  Gastrointestinal ROS: No abdominal pain or melena  Genito-Urinary ROS: No dysuria or hematuria    Objective:  Patient Vitals for the past 24 hrs:   BP Temp Temp src Pulse Resp SpO2 Weight   05/18/19 1133 129/55 97.9 °F (36.6 °C) Oral 96 14 95 % --   05/18/19 0837 116/49 98.7 °F (37.1 °C) Oral 82 14 97 % --   05/18/19 0416 97/65 98.9 °F (37.2 °C) Oral 80 16 96 % --   05/18/19 0045 102/50 98.1 °F (36.7 °C) Axillary 64 16 94 % --   05/17/19 1956 98/48 98.2 °F (36.8 °C) Oral -- 18 96 % 72.8 kg (160 lb 6.4 oz)   05/17/19 1659 103/88 98.7 °F (37.1 °C) -- 70 16 95 % --       Intake/Output Summary (Last 24 hours) at 5/18/2019 1617  Last data filed at 5/18/2019 1001  Gross per 24 hour   Intake 1168 ml   Output 1465 ml   Net -297 ml     General: awake/alert   Chest:  clear to auscultation bilaterally without respiratory  distress  CVS: irrr  Abdominal: soft, nontender, positive bowel sounds  Extremities: no cyanosis or edema  Skin: warm and dry without rash      Labs:  Results from last 7 days   Lab Units 05/18/19  0727 05/17/19  0721 05/16/19  0510   WBC 10*3/mm3 6.15 7.23 9.04   HEMOGLOBIN g/dL 10.6* 10.7* 9.7*   HEMATOCRIT % 33.1* 32.5* 30.5*   PLATELETS 10*3/mm3 130 129* 152         Results from last 7 days   Lab Units 05/18/19  0728 05/17/19  0629 05/16/19  0510   SODIUM mmol/L 132* 125* 130*   POTASSIUM mmol/L 3.4* 3.4* 3.2*   CHLORIDE mmol/L 98 92* 95*   CO2 mmol/L 29.0 23.0* 30.0   BUN mg/dL 9 16 12   CREATININE mg/dL 2.01* 2.90* 2.21*   CALCIUM mg/dL 7.3* 6.9* 7.1*   BILIRUBIN mg/dL 0.5 0.5 0.5   ALK PHOS U/L 130* 106 117   ALT (SGPT) U/L <15 <15 15   AST (SGOT) U/L 21 21 17   GLUCOSE mg/dL 81 159* 178*       Radiology:   Imaging Results (last 72 hours)     ** No results found for the last 72 hours. **          Culture:  Blood Culture   Date Value Ref Range Status   05/13/2019 No growth at 4 days  Preliminary   05/13/2019 No growth at 4 days  Preliminary         Assessment   ESRD  Chronic diastolic CHF  HTN with recent hypotension  Perforated duodenal ulcer s/p surgical repair  Anemia - CKD/acute blood loss  Hypokalemia  hyponatremia     Plan:  HD MWF  Monitor labs  Discussed with patient, nursing  Advancing diet as tolerated  Monitor potassium on replacement closely given ESRD history      Chris Hsu MD  5/18/2019  4:17 PM      Electronically signed by Chris Hsu MD at 5/18/2019  4:18 PM     Bunny Jean MD at 5/18/2019  4:14 PM               LOS: 5 days   Patient Care Team:  Provider, No Known as PCP - Barrett Prasad Jr, MD as PCP - Family Medicine  Moni Garza MD as Consulting Physician (Gastroenterology)  Tomasz San DO as Consulting Physician (Vascular Surgery)    Chief Complaint: Postoperative day 6 following admission and treatment for a perforated ulcer.  She  is increasing her activity she tolerated full liquids, she is urinating well she had 2 bowel movements.  Subjective     Subjective     Postoperative day #6 following admission for treatment of a perforated ulcer, she is increasing her activity she is tolerating full liquids she is urinating and had 2 bowel movements.  Her white count is down to 6 hematocrit is stable at 33 electrolytes within normal limits except for a potassium of 3.4 and a creatinine of 2.0 but this is improved from 2.9.  Objective      Objective     Vital Signs  Temp:  [97.9 °F (36.6 °C)-98.9 °F (37.2 °C)] 97.9 °F (36.6 °C)  Heart Rate:  [64-96] 96  Resp:  [14-18] 14  BP: ()/(48-88) 129/55    Intake & Output (last 3 days)       05/15 0701 - 05/16 0700 05/16 0701 - 05/17 0700 05/17 0701 - 05/18 0700 05/18 0701 - 05/19 0700    P.O. 120 240      I.V. (mL/kg) 980 (13.6) 1820 (25.3) 1265 (17.4) 1118 (15.4)    IV Piggyback 685 50 50     Total Intake(mL/kg) 1785 (24.8) 2110 (29.3) 1315 (18.1) 1118 (15.4)    Emesis/NG output   150     Drains 70 25 10 5    Other   1450     Stool 0  0 0    Total Output 70 25 1610 5    Net +1715 +2085 -295 +1113            Stool Unmeasured Occurrence   1 x 2 x          Physical Exam:     General Appearance:    Alert, cooperative, in no acute distress   Lungs:     Clear to auscultation,respirations regular, even and                  unlabored    Heart:    Regular rhythm and normal rate, normal S1 and S2, no            murmur, no gallop, no rub, no click   Chest Wall:    No abnormalities observed   Abdomen:     Normal bowel sounds, no masses, no organomegaly, soft        non-tender, non-distended,wound clean and dry, no   erythema, no discharge         Results Review:     I reviewed the patient's new clinical results.  I reviewed the patient's new imaging results and agree with the interpretation.    Results from last 7 days   Lab Units 05/18/19  0727 05/17/19  0721 05/16/19  0510   WBC 10*3/mm3 6.15 7.23 9.04  "  HEMOGLOBIN g/dL 10.6* 10.7* 9.7*   HEMATOCRIT % 33.1* 32.5* 30.5*   PLATELETS 10*3/mm3 130 129* 152        Results from last 7 days   Lab Units 05/18/19  0728 05/17/19  0629 05/16/19  0510   SODIUM mmol/L 132* 125* 130*   POTASSIUM mmol/L 3.4* 3.4* 3.2*   CHLORIDE mmol/L 98 92* 95*   CO2 mmol/L 29.0 23.0* 30.0   BUN mg/dL 9 16 12   CREATININE mg/dL 2.01* 2.90* 2.21*   CALCIUM mg/dL 7.3* 6.9* 7.1*   BILIRUBIN mg/dL 0.5 0.5 0.5   ALK PHOS U/L 130* 106 117   ALT (SGPT) U/L <15 <15 15   AST (SGOT) U/L 21 21 17   GLUCOSE mg/dL 81 159* 178*       Assessment/Plan     Assessment/Plan       Abdominal pain    Hypoglycemia    Paroxysmal atrial fibrillation (CMS/HCC)      Currently will increase to a regular diet, also increase in begin food supplements, potentially home by Monday.  Replace potassium today.      Bunny Jean MD  05/18/19  4:14 PM      Time: time spent with patient 15 minutes     EMR Dragon/Transcription disclaimer: Much of this encounter note is an electronic transcription/translation of spoken language to printed text. The electronic translation of spoken language may permit erroneous, or at times, nonsensical words or phrases to be inadvertently transcribed; although I have reviewed the note for such errors, some may still exist.    Electronically signed by Bunny Jean MD at 5/18/2019  4:15 PM     Fabien Espinosa MD at 5/18/2019  3:16 PM              [facility]  INPATIENT PROGRESS NOTE    Patient Name: Cheri Ely  Date of Admission: 5/12/2019  Today's Date: 05/18/19  Length of Stay: 5  Primary Care Physician: Provider, No Known    Subjective   Chief Complaint: f/u   HPI   \" I think marilee plans to start me on another diet by tomorrow.\"  States she just walked within the room with therapist.  Tolerated clears  No nausea or vomiting     Nurse Radha said soon after she gets her diet in (clears), she ends up with bowel movement    Review " of Systems     All pertinent negatives and positives are as above. All other systems have been reviewed and are negative unless otherwise stated.     Objective    Temp:  [97.9 °F (36.6 °C)-98.9 °F (37.2 °C)] 97.9 °F (36.6 °C)  Heart Rate:  [64-96] 96  Resp:  [14-18] 14  BP: ()/(48-88) 129/55  Physical Exam  awake and alert and oriented x3  Pleasant woman, no distress,  She was about to pull her iv access on right forearm as the tube got caught underneath her.   Awake, alert, oriented x3, no apparent distress  Supple neck  Anicteric sclera, external ocular muscles are intact  Normal respiratory effort, no adventitious sounds  S1-S2, appears regular;   No cyanosis clubbing or edema  Warm dry skin  Good capillary refill time   she is on morphine pca  IVF on going           Results Review:  I have reviewed the labs, radiology results, and diagnostic studies.    Laboratory Data:   Results from last 7 days   Lab Units 05/18/19  0727 05/17/19  0721 05/16/19  0510   WBC 10*3/mm3 6.15 7.23 9.04   HEMOGLOBIN g/dL 10.6* 10.7* 9.7*   HEMATOCRIT % 33.1* 32.5* 30.5*   PLATELETS 10*3/mm3 130 129* 152        Results from last 7 days   Lab Units 05/18/19  0728 05/17/19  0629 05/16/19  0510   SODIUM mmol/L 132* 125* 130*   POTASSIUM mmol/L 3.4* 3.4* 3.2*   CHLORIDE mmol/L 98 92* 95*   CO2 mmol/L 29.0 23.0* 30.0   BUN mg/dL 9 16 12   CREATININE mg/dL 2.01* 2.90* 2.21*   CALCIUM mg/dL 7.3* 6.9* 7.1*   BILIRUBIN mg/dL 0.5 0.5 0.5   ALK PHOS U/L 130* 106 117   ALT (SGPT) U/L <15 <15 15   AST (SGOT) U/L 21 21 17   GLUCOSE mg/dL 81 159* 178*       Culture Data:   Blood Culture   Date Value Ref Range Status   05/13/2019 No growth at 5 days  Final   05/13/2019 No growth at 5 days  Final       Radiology Data:   Imaging Results (last 24 hours)     ** No results found for the last 24 hours. **        accucheck 81, 82, 77 today  I have reviewed the patient's current medications.     Assessment/Plan     Active Hospital Problems    Diagnosis    • Hypoglycemia   • Paroxysmal atrial fibrillation (CMS/HCC)   • Abdominal pain       Hyponatremia prob from d10 received yesterday; defer to renal service - improve  Pneumoperitoneum secondary to perforated duodenal ulcer status post exploratory laparotomy on May 13  End-stage renal disease on hemodialysis Monday Wednesdays and Fridays - per renal service  Postoperative blood loss anemia - stable hgb  History of diastolic heart failure with status post aortic valve replacement  Atrial fibrillation with left bundle branch block on EKG; telemetry showed atrial fibrillation - hx of bleeding; at high risk of bleeding  History of hypertension - adequately controlled  Coronary artery disease stable -serial troponin is negative   postoperative hypoglycemia  Hypothyroidism - on replacement therapy         Cont supportive care; cont physical rehabilitation   Other plan per others      carvedilol 3.125 mg Oral 2 times per day on Sun Tue Thu Sat   carvedilol 3.125 mg Oral Once per day on Mon Wed Fri   enoxaparin 30 mg Subcutaneous Daily   levothyroxine 150 mcg Oral Q AM   lidocaine 1 patch Transdermal Q24H   pantoprazole 40 mg Oral Q AM   piperacillin-tazobactam 3.375 g Intravenous Q12H               Discharge Planning: per primary service    Fabien Espinosa MD   05/18/19   3:16 PM      Electronically signed by Fabien Espinosa MD at 5/18/2019  3:36 PM          Physical Therapy Notes (last 48 hours) (Notes from 5/18/2019 10:11 AM through 5/20/2019 10:11 AM)      Casandra Crooks PTA at 5/18/2019  3:45 PM  Version 1 of 1         Problem: Patient Care Overview  Goal: Plan of Care Review  Outcome: Ongoing (interventions implemented as appropriate)   05/18/19 1027   OTHER   Outcome Summary Pt requires mod/max assist for bed mobility due to pain and weakness. Min/mod assist to stand. Ambulated 10' min assist with rolling walker. Will continue to benefit from therapy to increase strength and endurance.    Coping/Psychosocial   Plan of Care Reviewed With patient   Plan of Care Review   Progress no change           Electronically signed by Casandra Crooks PTA at 2019  3:45 PM     Casandra Crooks PTA at 2019  3:46 PM  Version 1 of 1         Acute Care - Physical Therapy Treatment Note   Tony     Patient Name: Cheri Ely  : 1940  MRN: [id number]  Today's Date: 2019  Onset of Illness/Injury or Date of Surgery: 19  Date of Referral to PT: 19  Referring Physician: Dr. Dean    Admit Date: 2019    Visit Dx:    ICD-10-CM ICD-9-CM   1. Abdominal pain, unspecified abdominal location R10.9 789.00   2. Intra-abdominal free air of unknown etiology K66.8 568.89   3. Generalized weakness R53.1 780.79     Patient Active Problem List   Diagnosis   • Hypertension   • Hyperlipidemia   • Chronic kidney disease on chronic dialysis (CMS/HCC)   • Closed fracture of ramus of left pubis (CMS/HCC)   • Occlusion of superior mesenteric artery (CMS/HCC)   • Chronic mesenteric ischemia (CMS/HCC)   • Black tarry stools   • Hx of gastritis   • Acute gastric ulcer with hemorrhage   • Closed displaced intertrochanteric fracture of right femur (CMS/HCC)   • Displaced intertrochanteric fracture of right femur, initial encounter for closed fracture (CMS/HCC)   • Hypotension   • Acute blood loss anemia   • GI bleed   • Gastrointestinal hemorrhage   • (HFpEF) heart failure with preserved ejection fraction (CMS/HCC)   • Hypothyroid   • Diastolic dysfunction   • Diastolic heart failure (CMS/HCC)   • Volume overload   • AVM (arteriovenous malformation) of small bowel, acquired (CMS/HCC)   • Abdominal pain   • Hypoglycemia   • Paroxysmal atrial fibrillation (CMS/HCC)       Therapy Treatment    Rehabilitation Treatment Summary     Row Name 19 1505             Treatment Time/Intention    Discipline  physical therapy assistant  -CW      Document Type  therapy note (daily note)  -CW2       Subjective Information  complains of;weakness;fatigue;pain;swelling  -CW2      Mode of Treatment  physical therapy  -CW2      Patient/Family Observations  no family in room  -CW2      Existing Precautions/Restrictions  fall  -CW2      Recorded by [CW] Casandra Crooks, [facility] 05/18/19 1508  [CW2] Casandra Crooks, [facility] 05/18/19 1540      Row Name 05/18/19 1505             Bed Mobility Assessment/Treatment    Bed Mobility Assessment/Treatment  sit-sidelying  -CW      Rolling Left [place] (Bed Mobility)  moderate assist (50% patient effort)  -CW      Rolling Right [place] (Bed Mobility)  moderate assist (50% patient effort)  -CW      Sidelying-Sit [place] (Bed Mobility)  moderate assist (50% patient effort)  -CW      Sit-Sidelying [place] (Bed Mobility)  moderate assist (50% patient effort);maximum assist (25% patient effort)  -CW      Bed Mobility, Safety Issues  decreased use of arms for pushing/pulling;decreased use of legs for bridging/pushing  -CW      Assistive Device (Bed Mobility)  bed rails;draw sheet  -CW      Recorded by [CW] Casandra Crooks, [facility] 05/18/19 1540      Row Name 05/18/19 1505             Sit-Stand Transfer    Sit-Stand [place] (Transfers)  moderate assist (50% patient effort);verbal cues  -CW      Recorded by [CW] Casandra Crooks, [facility] 05/18/19 1540      Row Name 05/18/19 1505             Stand-Sit Transfer    Stand-Sit [place] (Transfers)  minimum assist (75% patient effort);verbal cues  -CW      Recorded by [CW] Casandra Crooks, [facility] 05/18/19 1540      Row Name 05/18/19 1505             Gait/Stairs Assessment/Training    [place] Level (Gait)  minimum assist (75% patient effort);moderate assist (50% patient effort)  -CW      Assistive Device (Gait)  walker, front-wheeled  -CW      Distance in Feet (Gait)  10  -CW      Pattern (Gait)  step-to  -CW      Deviations/Abnormal Patterns (Gait)  jose c decreased;stride length decreased  -CW      Bilateral  Gait Deviations  forward flexed posture;heel strike decreased  -CW      Comment (Gait/Stairs)  Very forward flexed - cues to stay closer to walker  -CW      Recorded by [CW] Casandra Crooks PTA 05/18/19 1540      Row Name 05/18/19 1505             Positioning and Restraints    Pre-Treatment Position  in bed  -CW      Post Treatment Position  bed  -CW      In Bed  side lying right;call light within reach;encouraged to call for assist  -CW      Recorded by [CW] Casandra Crooks PTA 05/18/19 1540      Row Name 05/18/19 1505             Pain Scale: Numbers Pre/Post-Treatment    Pain Scale: Numbers, Pretreatment  6/10  -CW      Pain Scale: Numbers, Post-Treatment  6/10  -CW      Pain Location  abdomen and L shoulder  -CW      Recorded by [CW] Casandra Crooks PTA 05/18/19 1540      Row Name                Wound 05/13/19 0259 Other (See comments) abdomen incision    Wound - Properties Group Date first assessed: 05/13/19 [LC] Time first assessed: 0259 [LC] Side: Other (See comments) [LC] Location: abdomen [LC] Type: incision [LC] Recorded by:  [LC] Katelyn Garibay RN 05/13/19 0259    Row Name                Wound 05/13/19 0450 Right upper gluteal pressure injury    Wound - Properties Group Date first assessed: 05/13/19 [HS] Time first assessed: 0450 [HS] Side: Right [HS] Orientation: upper [HS] Location: gluteal [HS] Type: pressure injury [HS] Stage, Pressure Injury: Stage 2 [HS] Recorded by:  [HS] Peggy Monterroso RN 05/13/19 1218      User Key  (r) = Recorded By, (t) = Taken By, (c) = Cosigned By    Initials Name Effective Dates Discipline    CW Casandra Crooks PTA 06/22/15 -  PT    LC Katelyn Garibay, RN 08/02/16 -  Nurse    HS Peggy Monterroso RN 12/27/16 -  Nurse          Wound 05/13/19 0259 Other (See comments) abdomen incision (Active)   Dressing Appearance dry;intact 5/18/2019  8:00 AM   Closure RIYA 5/18/2019  8:00 AM   Base dressing in place, unable to visualize 5/18/2019  8:00 AM   Drainage  Amount none 5/18/2019  8:00 AM   Dressing Care, Wound gauze;transparent film 5/18/2019  8:00 AM       Wound 05/13/19 0450 Right upper gluteal pressure injury (Active)   Dressing Appearance open to air 5/18/2019  8:00 AM   Closure Open to air 5/17/2019  8:00 PM   Base pink 5/17/2019  8:00 PM   Edges irregular;jagged 5/17/2019  8:00 PM           Physical Therapy Education     Title: PT OT SLP Therapies (Done)     Topic: Physical Therapy (Done)     Point: Mobility training (Done)     Learning Progress Summary           Patient Acceptance, E,D, VU,NR by CHRISTOPHER at 5/18/2019  3:41 PM    Comment:  bed mobility, transfers, gait, benefits of activity    Acceptance, E,TB,D, VU,DU,NR by LES at 5/16/2019  9:24 AM    Comment:  Education re: purpose of PT/importance of act; educ re activities to assist w/ edema mgmt to include LE elevation; educ for improved tech w/ bed mob, tfers & gt; educ for 15-20 aps & 5 deep breaths q hour awake; educ for improved tech w/ deep breathing                   Point: Home exercise program (Done)     Learning Progress Summary           Patient Acceptance, E,TB,D, VU,DU,NR by LES at 5/16/2019  9:24 AM    Comment:  Education re: purpose of PT/importance of act; educ re activities to assist w/ edema mgmt to include LE elevation; educ for improved tech w/ bed mob, tfers & gt; educ for 15-20 aps & 5 deep breaths q hour awake; educ for improved tech w/ deep breathing                   Point: Precautions (Done)     Learning Progress Summary           Patient Acceptance, E,TB,D, VU,DU,NR by LES at 5/16/2019  9:24 AM    Comment:  Education re: purpose of PT/importance of act; educ re activities to assist w/ edema mgmt to include LE elevation; educ for improved tech w/ bed mob, tfers & gt; educ for 15-20 aps & 5 deep breaths q hour awake; educ for improved tech w/ deep breathing                               User Key     Initials Effective Dates Name Provider Type Discipline    CHRISTOPHER 06/22/15 -  Casandra Crooks  PTA Physical Therapy Assistant PT    LES 08/02/18 -  Melinda Floyd, PT Physical Therapist PT                PT Recommendation and Plan     Plan of Care Reviewed With: patient  Progress: no change  Outcome Summary: Pt requires mod/max assist for bed mobility due to pain and weakness.  Min/mod assist to stand.  Ambulated 10' min assist with rolling walker. Will continue to benefit from therapy to increase strenngth and endurance.  Outcome Measures     Row Name 05/18/19 1500 05/17/19 0901 05/16/19 0900       How much help from another person do you currently need...    Turning from your back to your side while in flat bed without using bedrails?  2  -CW  2  -LG  2  -JE    Moving from lying on back to sitting on the side of a flat bed without bedrails?  2  -CW  2  -LG  2  -JE    Moving to and from a bed to a chair (including a wheelchair)?  2  -CW  2  -LG  2  -JE    Standing up from a chair using your arms (e.g., wheelchair, bedside chair)?  2  -CW  2  -LG  2  -JE    Climbing 3-5 steps with a railing?  1  -CW  1  -LG  1  -JE    To walk in hospital room?  2  -CW  3  -LG  2  -JE    AM-PAC 6 Clicks Score  11  -CW  12  -LG  11  -JE       Functional Assessment    Outcome Measure Options  AM-PAC 6 Clicks Basic Mobility (PT)  -CW  AM-PAC 6 Clicks Basic Mobility (PT)  -LG  AM-PAC 6 Clicks Basic Mobility (PT)  -JE      User Key  (r) = Recorded By, (t) = Taken By, (c) = Cosigned By    Initials Name Provider Type    LG James Augustine, PTA Physical Therapy Assistant    Casandra Carney, PTA Physical Therapy Assistant    Melinda Wiley, PT Physical Therapist         Time Calculation:   PT Charges     Row Name 05/18/19 1545             Time Calculation    Start Time  1505  -CW      Stop Time  1530  -CW      Time Calculation (min)  25 min  -CW      PT Received On  05/18/19  -CW      PT Goal Re-Cert Due Date  05/26/19  -CW         Time Calculation- PT    Total Timed Code Minutes- PT  25 minute(s)  -CW        User Key  (r) =  Recorded By, (t) = Taken By, (c) = Cosigned By    Initials Name Provider Type    CW Casandra Crooks PTA Physical Therapy Assistant        Therapy Charges for Today     Code Description Service Date Service Provider Modifiers Qty    74599881104 HC GAIT TRAINING EA 15 MIN 5/18/2019 Casandra Crooks PTA GP 1    88295285275 HC PT THERAPEUTIC ACT EA 15 MIN 5/18/2019 Casandra Crooks PTA GP 1          PT G-Codes  Outcome Measure Options: AM-PAC 6 Clicks Basic Mobility (PT)  AM-PAC 6 Clicks Score: 11    Casandra Crooks PTA  5/18/2019         Electronically signed by Casandra Crooks PTA at 5/18/2019  3:46 PM

## 2019-05-20 NOTE — PROGRESS NOTES
"Patient:  Cheri Ely  YOB: 1940  Date of Service: 5/20/2019  MRN: [id number]   Acct: [id number]   Primary Care Physician: Provider, No Known  Advance Directive:   Code Status and Medical Interventions:   Ordered at: 05/13/19 0438     Level Of Support Discussed With:    Patient     Code Status:    CPR     Medical Interventions (Level of Support Prior to Arrest):    Full     Admit Date: 5/12/2019       Hospital Day: 7  Referring Provider: Laila Rodriguez MD      Subjective:  Cheri Ely is a 79 y.o. female  whom we were consulted for end stage renal disease management. Patient has routine hemodialysis Monday Wednesday Friday at [facility].  No recent issues with dialysis.  Recurrent history of GI bleeding. Presented this time with acute onset abdominal pain; imaging showed free air in abdomen and was taken for exploratory laparotomy by Dr Hughes. Had repair of perforated duodenal ulcer.  Moved to floor 5/14.     Today, seen on HT, has no new events.     Pt was seen on RRT  Modality: Hemodialysis  Access: Arterial Venous Fistula  Location: left upper  QB: 400  QD: 600  UF: 2.5 Liters      Review of Systems:  History obtained from chart review and the patient  General ROS: No fever or chills  Respiratory ROS: No cough, shortness of breath, wheezing  Cardiovascular ROS: no chest pain or dyspnea on exertion  Gastrointestinal ROS: No abdominal pain or melena  Genito-Urinary ROS: No dysuria or hematuria  Musculoskeletal: negative  Skin: negative    Objective:  /42 (BP Location: Right arm, Patient Position: Lying)   Pulse 107   Temp 98.2 °F (36.8 °C) (Oral)   Resp 16   Ht 149.9 cm (59\")   Wt 76.8 kg (169 lb 6.4 oz)   SpO2 97%   BMI 34.21 kg/m²     Intake/Output Summary (Last 24 hours) at 5/20/2019 1045  Last data filed at 5/20/2019 0020  Gross per 24 hour   Intake 1300 ml   Output 20 ml   Net 1280 ml       Physical examination:  General: awake/alert   Chest:  clear to auscultation " bilaterally without respiratory distress  CVS: regular rate and rhythm  Abdominal: soft, nontender, normal bowel sounds  Extremities:1+ leg edema   Skin: warm and dry without rash  Neuro: No focal motor deficits    Labs:  Lab Results (last 24 hours)     Procedure Component Value Units Date/Time    Comprehensive Metabolic Panel [[id number]]  (Abnormal) Collected:  05/20/19 0719    Specimen:  Blood Updated:  05/20/19 0808     Glucose 109 mg/dL      BUN 14 mg/dL      Creatinine 3.48 mg/dL      Sodium 136 mmol/L      Potassium 3.5 mmol/L      Chloride 105 mmol/L      CO2 21.0 mmol/L      Calcium 7.4 mg/dL      Total Protein 5.1 g/dL      Albumin 2.30 g/dL      ALT (SGPT) 15 U/L      AST (SGOT) 18 U/L      Alkaline Phosphatase 129 U/L      Total Bilirubin 0.6 mg/dL      eGFR Non African Amer 13 mL/min/1.73      Comment: <15 Indicative of kidney failure.        eGFR   Amer -- mL/min/1.73      Comment: <15 Indicative of kidney failure.        Globulin 2.8 gm/dL      A/G Ratio 0.8 g/dL      BUN/Creatinine Ratio 4.0     Anion Gap 10.0 mmol/L     Narrative:       GFR Normal >60  Chronic Kidney Disease <60  Kidney Failure <15    Magnesium [[id number]]  (Normal) Collected:  05/20/19 0719    Specimen:  Blood Updated:  05/20/19 0808     Magnesium 1.5 mg/dL     Phosphorus [[id number]]  (Normal) Collected:  05/20/19 0719    Specimen:  Blood Updated:  05/20/19 0808     Phosphorus 3.5 mg/dL     CBC & Differential [[id number]] Collected:  05/20/19 0719    Specimen:  Blood Updated:  05/20/19 0746    Narrative:       The following orders were created for panel order CBC & Differential.  Procedure                               Abnormality         Status                     ---------                               -----------         ------                     CBC Auto Differential[[id number]]        Abnormal            Final result                 Please view results for these tests on the individual orders.    CBC Auto Differential  [[id number]]  (Abnormal) Collected:  05/20/19 0719    Specimen:  Blood Updated:  05/20/19 0746     WBC 7.22 10*3/mm3      RBC 3.37 10*6/mm3      Hemoglobin 10.6 g/dL      Hematocrit 32.9 %      MCV 97.6 fL      MCH 31.5 pg      MCHC 32.2 g/dL      RDW 17.1 %      RDW-SD 62.0 fl      MPV 10.7 fL      Platelets 170 10*3/mm3      Neutrophil % 81.3 %      Lymphocyte % 8.0 %      Monocyte % 5.0 %      Eosinophil % 4.8 %      Basophil % 0.3 %      Immature Grans % 0.6 %      Neutrophils, Absolute 5.87 10*3/mm3      Lymphocytes, Absolute 0.58 10*3/mm3      Monocytes, Absolute 0.36 10*3/mm3      Eosinophils, Absolute 0.35 10*3/mm3      Basophils, Absolute 0.02 10*3/mm3      Immature Grans, Absolute 0.04 10*3/mm3      nRBC 0.0 /100 WBC     POC Glucose Once [[id number]]  (Abnormal) Collected:  05/19/19 2229    Specimen:  Blood Updated:  05/19/19 2242     Glucose 133 mg/dL      Comment: : [id number] Adalid SunshineenMeter ID: [id number]       POC Glucose Once [[id number]]  (Normal) Collected:  05/19/19 1656    Specimen:  Blood Updated:  05/19/19 1708     Glucose 118 mg/dL      Comment: : [id number] Jigar Garcia  LMeter ID: [id number]             Radiology:   Imaging Results (last 24 hours)     ** No results found for the last 24 hours. **              Assessment   ESRD  Chronic diastolic CHF  HTN with recent hypotension  Perforated duodenal ulcer s/p surgical repair  Anemia - CKD/acute blood loss  Hypokalemia  hyponatremia      Plan:  Dialysis as above. Follow up labs.       Faisal Sevilla MD  5/20/2019  10:45 AM

## 2019-05-20 NOTE — PROGRESS NOTES
Laila Rodriguez MD Progress Note     LOS: 7 days   Patient Care Team:  Provider, No Known as PCP - Barrett Prasad Jr, MD as PCP - Family Medicine  Moni Garza MD as Consulting Physician (Gastroenterology)  Tomasz San DO as Consulting Physician (Vascular Surgery)        Subjective     Having trouble with her dentures.    Objective     Vital Signs  Temp:  [97.6 °F (36.4 °C)-98.8 °F (37.1 °C)] 97.6 °F (36.4 °C)  Heart Rate:  [] 119  Resp:  [16-18] 16  BP: ()/(41-67) 95/46    Intake & Output (last 3 days)       05/17 0701 - 05/18 0700 05/18 0701 - 05/19 0700 05/19 0701 - 05/20 0700 05/20 0701 - 05/21 0700    P.O.  120 600     I.V. (mL/kg) 1265 (17.3) 2123 (29) 940 (12.2)     IV Piggyback 50       Total Intake(mL/kg) 1315 (18) 2243 (30.6) 1540 (20.1)     Emesis/NG output 150       Drains 10 15 20     Other 1450       Stool 0 0 0     Total Output 1610 15 20     Net -295 +2228 +1520             Stool Unmeasured Occurrence 1 x 5 x 2 x 1 x          Physical Exam:     General Appearance:    Alert, cooperative, in no acute distress   Lungs:     respirations regular, even and unlabored    Heart:    Regular rhythm and normal rate, normal S1 and S2, no            murmur, no gallop, no rub   Chest Wall:    No abnormalities observed   Abdomen:      Soft SATNAM minimal   Extremities: No edema,    Results Review:     I reviewed the patient's new clinical results.    Lab Results (last 72 hours)     Procedure Component Value Units Date/Time    Comprehensive Metabolic Panel [[id number]]  (Abnormal) Collected:  05/20/19 0719    Specimen:  Blood Updated:  05/20/19 0808     Glucose 109 mg/dL      BUN 14 mg/dL      Creatinine 3.48 mg/dL      Sodium 136 mmol/L      Potassium 3.5 mmol/L      Chloride 105 mmol/L      CO2 21.0 mmol/L      Calcium 7.4 mg/dL      Total Protein 5.1 g/dL      Albumin 2.30 g/dL      ALT (SGPT) 15 U/L      AST (SGOT) 18 U/L      Alkaline Phosphatase 129 U/L      Total Bilirubin 0.6 mg/dL       eGFR Non African Amer 13 mL/min/1.73      Comment: <15 Indicative of kidney failure.        eGFR   Amer -- mL/min/1.73      Comment: <15 Indicative of kidney failure.        Globulin 2.8 gm/dL      A/G Ratio 0.8 g/dL      BUN/Creatinine Ratio 4.0     Anion Gap 10.0 mmol/L     Narrative:       GFR Normal >60  Chronic Kidney Disease <60  Kidney Failure <15    Magnesium [[id number]]  (Normal) Collected:  05/20/19 0719    Specimen:  Blood Updated:  05/20/19 0808     Magnesium 1.5 mg/dL     Phosphorus [[id number]]  (Normal) Collected:  05/20/19 0719    Specimen:  Blood Updated:  05/20/19 0808     Phosphorus 3.5 mg/dL     CBC & Differential [[id number]] Collected:  05/20/19 0719    Specimen:  Blood Updated:  05/20/19 0746    Narrative:       The following orders were created for panel order CBC & Differential.  Procedure                               Abnormality         Status                     ---------                               -----------         ------                     CBC Auto Differential[[id number]]        Abnormal            Final result                 Please view results for these tests on the individual orders.    CBC Auto Differential [[id number]]  (Abnormal) Collected:  05/20/19 0719    Specimen:  Blood Updated:  05/20/19 0746     WBC 7.22 10*3/mm3      RBC 3.37 10*6/mm3      Hemoglobin 10.6 g/dL      Hematocrit 32.9 %      MCV 97.6 fL      MCH 31.5 pg      MCHC 32.2 g/dL      RDW 17.1 %      RDW-SD 62.0 fl      MPV 10.7 fL      Platelets 170 10*3/mm3      Neutrophil % 81.3 %      Lymphocyte % 8.0 %      Monocyte % 5.0 %      Eosinophil % 4.8 %      Basophil % 0.3 %      Immature Grans % 0.6 %      Neutrophils, Absolute 5.87 10*3/mm3      Lymphocytes, Absolute 0.58 10*3/mm3      Monocytes, Absolute 0.36 10*3/mm3      Eosinophils, Absolute 0.35 10*3/mm3      Basophils, Absolute 0.02 10*3/mm3      Immature Grans, Absolute 0.04 10*3/mm3      nRBC 0.0 /100 WBC     POC Glucose Once [[id number]]   (Abnormal) Collected:  05/19/19 2229    Specimen:  Blood Updated:  05/19/19 2242     Glucose 133 mg/dL      Comment: : [id number] Adalid Mullinser ID: [id number]       POC Glucose Once [[id number]]  (Normal) Collected:  05/19/19 1656    Specimen:  Blood Updated:  05/19/19 1708     Glucose 118 mg/dL      Comment: : [id number] Jigar Garcia  LMeter ID: [id number]       Phosphorus [[id number]]  (Normal) Collected:  05/19/19 0638    Specimen:  Blood Updated:  05/19/19 0729     Phosphorus 3.0 mg/dL     Comprehensive Metabolic Panel [[id number]]  (Abnormal) Collected:  05/19/19 0638    Specimen:  Blood Updated:  05/19/19 0727     Glucose 82 mg/dL      BUN 12 mg/dL      Creatinine 2.85 mg/dL      Sodium 134 mmol/L      Potassium 3.6 mmol/L      Chloride 103 mmol/L      CO2 25.0 mmol/L      Calcium 7.4 mg/dL      Total Protein 5.3 g/dL      Albumin 2.40 g/dL      ALT (SGPT) 18 U/L      AST (SGOT) 22 U/L      Alkaline Phosphatase 120 U/L      Total Bilirubin 0.6 mg/dL      eGFR Non African Amer 16 mL/min/1.73      Globulin 2.9 gm/dL      A/G Ratio 0.8 g/dL      BUN/Creatinine Ratio 4.2     Anion Gap 6.0 mmol/L     Narrative:       GFR Normal >60  Chronic Kidney Disease <60  Kidney Failure <15    Magnesium [[id number]]  (Normal) Collected:  05/19/19 0638    Specimen:  Blood Updated:  05/19/19 0727     Magnesium 1.5 mg/dL     CBC & Differential [[id number]] Collected:  05/19/19 0638    Specimen:  Blood Updated:  05/19/19 0716    Narrative:       The following orders were created for panel order CBC & Differential.  Procedure                               Abnormality         Status                     ---------                               -----------         ------                     CBC Auto Differential[[id number]]        Abnormal            Final result                 Please view results for these tests on the individual orders.    CBC Auto Differential [[id number]]  (Abnormal) Collected:  05/19/19 0638    Specimen:   Blood Updated:  05/19/19 0716     WBC 6.74 10*3/mm3      RBC 3.46 10*6/mm3      Hemoglobin 10.8 g/dL      Hematocrit 33.9 %      MCV 98.0 fL      MCH 31.2 pg      MCHC 31.9 g/dL      RDW 17.2 %      RDW-SD 62.1 fl      MPV 10.9 fL      Platelets 159 10*3/mm3      Neutrophil % 79.1 %      Lymphocyte % 8.0 %      Monocyte % 5.8 %      Eosinophil % 6.1 %      Basophil % 0.4 %      Immature Grans % 0.6 %      Neutrophils, Absolute 5.33 10*3/mm3      Lymphocytes, Absolute 0.54 10*3/mm3      Monocytes, Absolute 0.39 10*3/mm3      Eosinophils, Absolute 0.41 10*3/mm3      Basophils, Absolute 0.03 10*3/mm3      Immature Grans, Absolute 0.04 10*3/mm3      nRBC 0.0 /100 WBC     POC Glucose Once [[id number]]  (Normal) Collected:  05/18/19 2222    Specimen:  Blood Updated:  05/18/19 2241     Glucose 82 mg/dL      Comment: : [id number] Adalid Mullinser ID: [id number]       POC Glucose Once [[id number]]  (Normal) Collected:  05/18/19 1135    Specimen:  Blood Updated:  05/18/19 1146     Glucose 77 mg/dL      Comment: : [id number] Jigar Garcia  LMeter ID: [id number]       Blood Culture - Blood, Arm, Right [[id number]] Collected:  05/13/19 0858    Specimen:  Blood from Arm, Right Updated:  05/18/19 1000     Blood Culture No growth at 5 days    POC Glucose Once [[id number]]  (Normal) Collected:  05/18/19 0812    Specimen:  Blood Updated:  05/18/19 0823     Glucose 82 mg/dL      Comment: : [id number] Jigar Garcia  LMeter ID: [id number]       CBC & Differential [[id number]] Collected:  05/18/19 0727    Specimen:  Blood Updated:  05/18/19 0814    Narrative:       The following orders were created for panel order CBC & Differential.  Procedure                               Abnormality         Status                     ---------                               -----------         ------                     CBC Auto Differential[[id number]]        Abnormal            Final result                 Please view results for these  tests on the individual orders.    CBC Auto Differential [[id number]]  (Abnormal) Collected:  05/18/19 0727    Specimen:  Blood Updated:  05/18/19 0814     WBC 6.15 10*3/mm3      RBC 3.39 10*6/mm3      Hemoglobin 10.6 g/dL      Hematocrit 33.1 %      MCV 97.6 fL      MCH 31.3 pg      MCHC 32.0 g/dL      RDW 16.8 %      RDW-SD 60.4 fl      MPV 10.8 fL      Platelets 130 10*3/mm3      Neutrophil % 77.7 %      Lymphocyte % 9.9 %      Monocyte % 5.5 %      Eosinophil % 5.9 %      Basophil % 0.3 %      Neutrophils, Absolute 4.78 10*3/mm3      Lymphocytes, Absolute 0.61 10*3/mm3      Monocytes, Absolute 0.34 10*3/mm3      Eosinophils, Absolute 0.36 10*3/mm3      Basophils, Absolute 0.02 10*3/mm3     Comprehensive Metabolic Panel [[id number]]  (Abnormal) Collected:  05/18/19 0728    Specimen:  Blood Updated:  05/18/19 0813     Glucose 81 mg/dL      BUN 9 mg/dL      Creatinine 2.01 mg/dL      Sodium 132 mmol/L      Potassium 3.4 mmol/L      Chloride 98 mmol/L      CO2 29.0 mmol/L      Calcium 7.3 mg/dL      Total Protein 5.2 g/dL      Albumin 2.30 g/dL      ALT (SGPT) <15 U/L      AST (SGOT) 21 U/L      Alkaline Phosphatase 130 U/L      Total Bilirubin 0.5 mg/dL      eGFR Non African Amer 24 mL/min/1.73      Globulin 2.9 gm/dL      A/G Ratio 0.8 g/dL      BUN/Creatinine Ratio 4.5     Anion Gap 5.0 mmol/L     Narrative:       GFR Normal >60  Chronic Kidney Disease <60  Kidney Failure <15    Magnesium [[id number]]  (Normal) Collected:  05/18/19 0728    Specimen:  Blood Updated:  05/18/19 0810     Magnesium 1.6 mg/dL     Phosphorus [[id number]]  (Abnormal) Collected:  05/18/19 0728    Specimen:  Blood Updated:  05/18/19 0810     Phosphorus 2.4 mg/dL     Blood Culture - Blood, Hand, Right [[id number]] Collected:  05/13/19 0023    Specimen:  Blood from Hand, Right Updated:  05/18/19 0045     Blood Culture No growth at 5 days    POC Glucose Once [[id number]]  (Normal) Collected:  05/17/19 2016    Specimen:  Blood Updated:  05/17/19  2027     Glucose 82 mg/dL      Comment: : [id number] Geri BoucheraMeter ID: [id number]       POC Glucose Once [[id number]]  (Abnormal) Collected:  05/17/19 1714    Specimen:  Blood Updated:  05/17/19 1727     Glucose 68 mg/dL      Comment: : [id number] Ortiz ChristinaMeter ID: [id number]           Imaging Results (last 72 hours)     ** No results found for the last 72 hours. **              Assessment/Plan       Abdominal pain    Hypoglycemia    Paroxysmal atrial fibrillation (CMS/HCC)      Will DC SATNAM and switch to p.o. pain meds, Hep-Lock IV fluids      Laila Rodriguez MD  05/20/19  2:32 PM

## 2019-05-20 NOTE — PLAN OF CARE
Problem: Patient Care Overview  Goal: Plan of Care Review  Outcome: Ongoing (interventions implemented as appropriate)   05/20/19 0521   OTHER   Outcome Summary Pt having some c/o pain this shift; minimal use of PCA this shift. IVF and ext Zosyn cont per orders. On tele running in a fib. Dialysis M/W/F. Drsg remains in place to abd c/d/i. SATNAM with very little output. No BM or urine output this shift. VSS. Will cont to monitor.    Coping/Psychosocial   Plan of Care Reviewed With patient   Plan of Care Review   Progress no change     Goal: Discharge Needs Assessment  Outcome: Ongoing (interventions implemented as appropriate)   05/13/19 1112 05/13/19 1732 05/14/19 1500   Discharge Needs Assessment   Readmission Within the Last 30 Days --  no previous admission in last 30 days --    Concerns to be Addressed discharge planning;care coordination/care conferences --  --    Patient/Family Anticipates Transition to --  --  home with family   Patient/Family Anticipated Services at Transition --  --  home health care   Transportation Concerns --  --  car, none   Transportation Anticipated --  --  family or friend will provide   Discharge Facility/Level of Care Needs nursing facility, skilled --  --    Current Discharge Risk chronically ill --  --    Disability   Equipment Currently Used at Home --  --  walker, standard;wheelchair, motorized;shower chair     Goal: Interprofessional Rounds/Family Conf  Outcome: Ongoing (interventions implemented as appropriate)   05/20/19 0521   Interdisciplinary Rounds/Family Conf   Participants nursing;patient       Problem: Fall Risk (Adult)  Goal: Identify Related Risk Factors and Signs and Symptoms  Outcome: Ongoing (interventions implemented as appropriate)   05/15/19 0428 05/15/19 1423   Fall Risk (Adult)   Related Risk Factors (Fall Risk) age-related changes;fatigue/slow reaction;gait/mobility problems;inadequate lighting;environment unfamiliar --    Signs and Symptoms (Fall Risk) --   presence of risk factors     Goal: Absence of Fall  Outcome: Ongoing (interventions implemented as appropriate)   05/20/19 0521   Fall Risk (Adult)   Absence of Fall making progress toward outcome       Problem: Skin Injury Risk (Adult)  Goal: Identify Related Risk Factors and Signs and Symptoms  Outcome: Ongoing (interventions implemented as appropriate)   05/19/19 0317   Skin Injury Risk (Adult)   Related Risk Factors (Skin Injury Risk) mobility impaired;advanced age;body weight extremes     Goal: Skin Health and Integrity  Outcome: Ongoing (interventions implemented as appropriate)   05/20/19 0521   Skin Injury Risk (Adult)   Skin Health and Integrity making progress toward outcome       Problem: Wound (Includes Pressure Injury) (Adult)  Goal: Signs and Symptoms of Listed Potential Problems Will be Absent, Minimized or Managed (Wound)  Outcome: Ongoing (interventions implemented as appropriate)   05/18/19 0132 05/19/19 1509   Goal/Outcome Evaluation   Problems Assessed (Wound) all --    Problems Present (Wound) --  pain;situational response

## 2019-05-20 NOTE — PROGRESS NOTES
Continued Stay Note  AGATHA Jimenez     Patient Name: Cheri Ely  MRN: [id number]  Today's Date: 5/20/2019    Admit Date: 5/12/2019    Discharge Plan     Row Name 05/20/19 1012       Plan    Plan  SNf    Patient/Family in Agreement with Plan  yes    Plan Comments  Rosetta with [facility] [phone] requested an update. Faxed updated information to her at [phone].        Discharge Codes    No documentation.             MALIA Trevizo

## 2019-05-20 NOTE — PLAN OF CARE
Problem: Patient Care Overview  Goal: Plan of Care Review  Outcome: Ongoing (interventions implemented as appropriate)   05/20/19 1441   OTHER   Outcome Summary pt trans to EOB mod-max assist, sit-stand mod assist, pt took steps to BSC about 5 feet with rollator min-mod assist, then 5 feet to chair, pt performed BLE exercises x 20 reps, pt would benefit from SNF   Coping/Psychosocial   Plan of Care Reviewed With patient   Plan of Care Review   Progress no change

## 2019-05-20 NOTE — THERAPY TREATMENT NOTE
Acute Care - Physical Therapy Treatment Note  [facility]     Patient Name: Cheri Ely  : 1940  MRN: [id number]  Today's Date: 2019  Onset of Illness/Injury or Date of Surgery: 19  Date of Referral to PT: 19  Referring Physician: Dr. Dean    Admit Date: 2019    Visit Dx:    ICD-10-CM ICD-9-CM   1. Abdominal pain, unspecified abdominal location R10.9 789.00   2. Intra-abdominal free air of unknown etiology K66.8 568.89   3. Generalized weakness R53.1 780.79     Patient Active Problem List   Diagnosis   • Hypertension   • Hyperlipidemia   • Chronic kidney disease on chronic dialysis (CMS/HCC)   • Closed fracture of ramus of left pubis (CMS/HCC)   • Occlusion of superior mesenteric artery (CMS/HCC)   • Chronic mesenteric ischemia (CMS/HCC)   • Black tarry stools   • Hx of gastritis   • Acute gastric ulcer with hemorrhage   • Closed displaced intertrochanteric fracture of right femur (CMS/HCC)   • Displaced intertrochanteric fracture of right femur, initial encounter for closed fracture (CMS/HCC)   • Hypotension   • Acute blood loss anemia   • GI bleed   • Gastrointestinal hemorrhage   • (HFpEF) heart failure with preserved ejection fraction (CMS/HCC)   • Hypothyroid   • Diastolic dysfunction   • Diastolic heart failure (CMS/HCC)   • Volume overload   • AVM (arteriovenous malformation) of small bowel, acquired (CMS/HCC)   • Abdominal pain   • Hypoglycemia   • Paroxysmal atrial fibrillation (CMS/HCC)       Therapy Treatment    Rehabilitation Treatment Summary     Row Name 19 1357 19 0800          Treatment Time/Intention    Discipline  physical therapy assistant  -  physical therapy assistant  -     Document Type  therapy note (daily note)  -2  --     Subjective Information  complains of;weakness;pain  -2  --     Comment  --  dialysis  -     Reason Treatment Not Performed  --  unavailable for treatment  -     Existing Precautions/Restrictions  fall SATNAM drain   -AH2  --     Recorded by [] Idania Sibley, [facility] 05/20/19 1358  [AH2] Idania Sibley, [facility] 05/20/19 1435 [] Idania Sibley, [facility] 05/20/19 0841     Row Name 05/20/19 1357             Bed Mobility Assessment/Treatment    Rolling Left [place] (Bed Mobility)  maximum assist (25% patient effort);verbal cues  -      Sidelying-Sit [place] (Bed Mobility)  moderate assist (50% patient effort);maximum assist (25% patient effort);verbal cues  -      Sit-Sidelying [place] (Bed Mobility)  -- chair  -      Bed Mobility, Safety Issues  decreased use of arms for pushing/pulling;decreased use of legs for bridging/pushing  -      Recorded by [] Idania Sibley, [facility] 05/20/19 1440      Row Name 05/20/19 1357             Transfer Assessment/Treatment    Transfer Assessment/Treatment  toilet transfer  -AH      Recorded by [] Idania Sibley, [facility] 05/20/19 1440      Row Name 05/20/19 1357             Sit-Stand Transfer    Sit-Stand [place] (Transfers)  moderate assist (50% patient effort);verbal cues  -      Recorded by [] Idania Sibley, [facility] 05/20/19 1440      Row Name 05/20/19 1357             Stand-Sit Transfer    Stand-Sit [place] (Transfers)  moderate assist (50% patient effort);verbal cues  -AH      Recorded by [] Idania Sibley, [facility] 05/20/19 1440      Row Name 05/20/19 1357             Toilet Transfer    [place]io Level (Toilet Transfer)  moderate assist (50% patient effort);verbal cues  -      Assistive Device (Toilet Transfer)  walker, 4-wheeled  -AH      Recorded by [] Idania Sibley, [facility] 05/20/19 1440      Row Name 05/20/19 1357             Gait/Stairs Assessment/Training    [place] Level (Gait)  minimum assist (75% patient effort);moderate assist (50% patient effort);verbal cues  -      Assistive Device (Gait)  walker, 4-wheeled  -      Distance in Feet (Gait)  5 x 2  -AH      Deviations/Abnormal Patterns (Gait)  jose c decreased;stride length decreased  -       Bilateral Gait Deviations  forward flexed posture;heel strike decreased  -      Comment (Gait/Stairs)  pt took few steps to BSC then steps to chair  -AH      Recorded by [] Idania Sibley, [facility] 05/20/19 1440      Row Name 05/20/19 1357             Therapeutic Exercise    Lower Extremity (Therapeutic Exercise)  heel slides, bilateral;quad sets, bilateral;LAQ (long arc quad), bilateral  -      Lower Extremity Range of Motion (Therapeutic Exercise)  hip abduction/adduction, bilateral;ankle dorsiflexion/plantar flexion, bilateral  -      Exercise Type (Therapeutic Exercise)  AROM (active range of motion);AAROM (active assistive range of motion)  -      Sets/Reps (Therapeutic Exercise)  20  -AH      Recorded by [] Idania Sibley, [facility] 05/20/19 1440      Row Name 05/20/19 1357             Positioning and Restraints    Pre-Treatment Position  in bed  -AH      Post Treatment Position  chair  -AH      In Chair  reclined;call light within reach;encouraged to call for assist;waffle cushion  -      Recorded by [] Idania Sibley, [facility] 05/20/19 1440      Row Name 05/20/19 1357             Pain Assessment    Additional Documentation  Pain Scale: Numbers Pre/Post-Treatment (Group)  -AH      Recorded by [] Idania Sibley, [facility] 05/20/19 1440      Row Name 05/20/19 1357             Pain Scale: Numbers Pre/Post-Treatment    Pain Scale: Numbers, Pretreatment  7/10  -      Pain Scale: Numbers, Post-Treatment  7/10  -AH      Pain Location  abdomen and L shld  -      Pain Intervention(s)  Medication (See MAR);Repositioned  -AH      Recorded by [] Idania Sibley, PTA 05/20/19 1440      Row Name                Wound 05/13/19 0259 Other (See comments) abdomen incision    Wound - Properties Group Date first assessed: 05/13/19 [LC] Time first assessed: 0259 [LC] Side: Other (See comments) [LC] Location: abdomen [LC] Type: incision [LC] Recorded by:  [LC] Katelyn Garibay RN 05/13/19 0259    Row Name                 Wound 05/13/19 0450 Right upper gluteal pressure injury    Wound - Properties Group Date first assessed: 05/13/19 [HS] Time first assessed: 0450 [HS] Side: Right [HS] Orientation: upper [HS] Location: gluteal [HS] Type: pressure injury [HS] Stage, Pressure Injury: Stage 2 [HS] Recorded by:  [HS] Peggy Monterroso RN 05/13/19 1218      User Key  (r) = Recorded By, (t) = Taken By, (c) = Cosigned By    Initials Name Effective Dates Discipline     Idania Sibley, PTA 08/02/16 -  PT    Katelyn Lowe RN 08/02/16 -  Nurse    HS Peggy Monterroso RN 12/27/16 -  Nurse          Wound 05/13/19 0259 Other (See comments) abdomen incision (Active)   Dressing Appearance dry;intact 5/20/2019 12:37 PM   Closure RIYA 5/20/2019 12:37 PM   Base dressing in place, unable to visualize 5/20/2019 12:37 PM   Periwound dry;intact 5/20/2019 12:37 PM   Periwound Temperature warm 5/20/2019 12:37 PM   Periwound Skin Turgor soft 5/20/2019 12:37 PM   Drainage Amount none 5/20/2019 12:37 PM   Dressing Care, Wound gauze;transparent film 5/20/2019 12:37 PM       Wound 05/13/19 0450 Right upper gluteal pressure injury (Active)   Dressing Appearance open to air 5/20/2019 12:37 PM   Closure Open to air 5/20/2019 12:37 PM   Base pink;blanchable 5/20/2019 12:37 PM   Dressing Care, Wound skin barrier agent applied 5/20/2019 12:37 PM           Physical Therapy Education     Title: PT OT SLP Therapies (Done)     Topic: Physical Therapy (Done)     Point: Mobility training (Done)     Learning Progress Summary           Patient Acceptance, E,TB, VU,NR by  at 5/20/2019  2:40 PM    Comment:  log rolling,bed mobility    Acceptance, E,D, VU,NR by CHRISTOPHER at 5/18/2019  3:41 PM    Comment:  bed mobility, transfers, gait, benefits of activity    Acceptance, E,TB,D, VU,DU,NR by LES at 5/16/2019  9:24 AM    Comment:  Education re: purpose of PT/importance of act; educ re activities to assist w/ edema mgmt to include LE elevation; educ for improved tech w/ bed mob,  tfers & gt; educ for 15-20 aps & 5 deep breaths q hour awake; educ for improved tech w/ deep breathing                   Point: Home exercise program (Done)     Learning Progress Summary           Patient Acceptance, E,TB,D, GIRMA,DU,NR by LES at 5/16/2019  9:24 AM    Comment:  Education re: purpose of PT/importance of act; educ re activities to assist w/ edema mgmt to include LE elevation; educ for improved tech w/ bed mob, tfers & gt; educ for 15-20 aps & 5 deep breaths q hour awake; educ for improved tech w/ deep breathing                   Point: Precautions (Done)     Learning Progress Summary           Patient Acceptance, E,TB,D, GIRMA,SHELLEY,NR by LES at 5/16/2019  9:24 AM    Comment:  Education re: purpose of PT/importance of act; educ re activities to assist w/ edema mgmt to include LE elevation; educ for improved tech w/ bed mob, tfers & gt; educ for 15-20 aps & 5 deep breaths q hour awake; educ for improved tech w/ deep breathing                               User Key     Initials Effective Dates Name Provider Type Discipline     08/02/16 -  Idania Sibley, PTA Physical Therapy Assistant PT     06/22/15 -  Casandra Crooks PTA Physical Therapy Assistant PT     08/02/18 -  Melinda Floyd, PT Physical Therapist PT                PT Recommendation and Plan     Plan of Care Reviewed With: patient  Progress: no change  Outcome Summary: pt trans to EOB mod-max assist, sit-stand mod assist, pt took steps to BSC about 5 feet with rollator min-mod assist, then 5 feet to chair, pt performed BLE exercises x 20 reps, pt would benefit from SNF  Outcome Measures     Row Name 05/18/19 1500             How much help from another person do you currently need...    Turning from your back to your side while in flat bed without using bedrails?  2  -CW      Moving from lying on back to sitting on the side of a flat bed without bedrails?  2  -CW      Moving to and from a bed to a chair (including a wheelchair)?  2  -CW       Standing up from a chair using your arms (e.g., wheelchair, bedside chair)?  2  -CW      Climbing 3-5 steps with a railing?  1  -CW      To walk in hospital room?  2  -CW      AM-PAC 6 Clicks Score  11  -CW         Functional Assessment    Outcome Measure Options  AM-PAC 6 Clicks Basic Mobility (PT)  -CW        User Key  (r) = Recorded By, (t) = Taken By, (c) = Cosigned By    Initials Name Provider Type    CW Casandra Crooks PTA Physical Therapy Assistant         Time Calculation:   PT Charges     Row Name 05/20/19 1443             Time Calculation    Start Time  1357  -      Stop Time  1427  -      Time Calculation (min)  30 min  -      PT Received On  05/20/19  -         Time Calculation- PT    Total Timed Code Minutes- PT  30 minute(s)  -         Timed Charges    54676 - PT Therapeutic Exercise Minutes  15  -AH      19047 - PT Therapeutic Activity Minutes  15  -AH        User Key  (r) = Recorded By, (t) = Taken By, (c) = Cosigned By    Initials Name Provider Type     Idania Sibley PTA Physical Therapy Assistant        Therapy Charges for Today     Code Description Service Date Service Provider Modifiers Qty    58733642103 HC PT THER PROC EA 15 MIN 5/20/2019 Idania Sibley PTA GP 1    05641251928 HC PT THERAPEUTIC ACT EA 15 MIN 5/20/2019 Idania Sibley PTA GP 1          PT G-Codes  Outcome Measure Options: AM-PAC 6 Clicks Basic Mobility (PT)  AM-PAC 6 Clicks Score: 11    Idania Sibley PTA  5/20/2019

## 2019-05-20 NOTE — PROGRESS NOTES
[facility] Medicine Services  INPATIENT PROGRESS NOTE    Patient Name: Cheri Ely  Date of Admission: 5/12/2019  Today's Date: 05/20/19  Length of Stay: 7  Primary Care Physician: Provider, No Known    Subjective   Chief Complaint: Feels better.  Sitting up in chair.  HPI   Plans on going home tomorrow.  No nausea or vomiting no chest pain no shortness of breath            Review of Systems      All pertinent negatives and positives are as above. All other systems have been reviewed and are negative unless otherwise stated.     Objective    Temp:  [97.6 °F (36.4 °C)-98.8 °F (37.1 °C)] 98.3 °F (36.8 °C)  Heart Rate:  [] 98  Resp:  [16] 16  BP: ()/(41-83) 110/83  Physical Exam   Constitutional: She is oriented to person, place, and time. She appears well-developed and well-nourished.   Eyes: Conjunctivae and EOM are normal. Pupils are equal, round, and reactive to light.   Neck: Neck supple.   Cardiovascular: Normal rate and regular rhythm. Exam reveals no gallop and no friction rub.   No murmur heard.  Pulmonary/Chest: Effort normal and breath sounds normal.   Abdominal: Soft. Bowel sounds are normal. There is no hepatosplenomegaly. There is no tenderness.   Musculoskeletal: Normal range of motion.   Neurological: She is alert and oriented to person, place, and time. No cranial nerve deficit.   Skin: Skin is warm and dry.   Psychiatric: She has a normal mood and affect. Her behavior is normal.   Nursing note and vitals reviewed.        Results Review:  I have reviewed the labs, radiology results, and diagnostic studies.    Laboratory Data:   Results from last 7 days   Lab Units 05/20/19  0719 05/19/19  0638 05/18/19  0727   WBC 10*3/mm3 7.22 6.74 6.15   HEMOGLOBIN g/dL 10.6* 10.8* 10.6*   HEMATOCRIT % 32.9* 33.9* 33.1*   PLATELETS 10*3/mm3 170 159 130        Results from last 7 days   Lab Units 05/20/19  0719 05/19/19  0638 05/18/19  0728   SODIUM mmol/L 136 134*  132*   POTASSIUM mmol/L 3.5 3.6 3.4*   CHLORIDE mmol/L 105 103 98   CO2 mmol/L 21.0* 25.0 29.0   BUN mg/dL 14 12 9   CREATININE mg/dL 3.48* 2.85* 2.01*   CALCIUM mg/dL 7.4* 7.4* 7.3*   BILIRUBIN mg/dL 0.6 0.6 0.5   ALK PHOS U/L 129* 120 130*   ALT (SGPT) U/L 15 18 <15   AST (SGOT) U/L 18 22 21   GLUCOSE mg/dL 109* 82 81       Culture Data:        Radiology Data:   Imaging Results (last 24 hours)     ** No results found for the last 24 hours. **          I have reviewed the patient's current medications.     Assessment/Plan     Active Hospital Problems    Diagnosis   • Hypoglycemia   • Paroxysmal atrial fibrillation (CMS/HCC)   • Abdominal pain       Assessment    Hyponatremia resolved   pneumoperitoneum secondary to perforated ulcer  Status post exploratory laparotomy 5/13/2019  End-stage renal disease on hemodialysis  Postop blood loss anemia  History of diastolic heart failure  History of aortic valvular disease status post replacement  Atrial fibrillation   History of hypertension coronary artery disease hypothyroidism    Plan    Patient remains hemodynamically stable  Hyponatremia has resolved  Plans appear discharge in the morning  Will sign off thank you for allowing us to care for your patient        Jackeline Zheng DO   05/20/19   5:46 PM  \

## 2019-05-20 NOTE — PLAN OF CARE
Problem: Patient Care Overview  Goal: Plan of Care Review  Outcome: Ongoing (interventions implemented as appropriate)  Pt awake and alert. Pt returned to unit from Hemodialysis. Pt tolerated treatment well. Pt is currently awake, sitting up in bed eating lunch. Pt c/o mild left shoulder pain. Lidoderm patch was applied. Pt denies nausea and is tolerating diet. Dressing to abdomen is clean dry and intact. Atrial fib on remote tele. Pt is up with assist x 2 and walker. Encourage activity. Anuric. Continue to monitor.    05/20/19 1245   Coping/Psychosocial   Plan of Care Reviewed With patient   Plan of Care Review   Progress no change     Goal: Individualization and Mutuality  Outcome: Ongoing (interventions implemented as appropriate)   05/20/19 1245   Individualization   Patient Specific Goals (Include Timeframe) Pain kept at patient's acceptable pain rating. D/C to nursing home.    Patient Specific Interventions Soft, regular, renal diet. Hemodialysis. IVF. IV antibiotics. PRN pain medication.      Goal: Discharge Needs Assessment  Outcome: Ongoing (interventions implemented as appropriate)      Problem: Fall Risk (Adult)  Goal: Identify Related Risk Factors and Signs and Symptoms  Outcome: Ongoing (interventions implemented as appropriate)   05/20/19 1245   Fall Risk (Adult)   Related Risk Factors (Fall Risk) age-related changes;fatigue/slow reaction;gait/mobility problems;environment unfamiliar;impaired vision   Signs and Symptoms (Fall Risk) presence of risk factors     Goal: Absence of Fall  Outcome: Ongoing (interventions implemented as appropriate)   05/20/19 1245   Fall Risk (Adult)   Absence of Fall making progress toward outcome       Problem: Skin Injury Risk (Adult)  Goal: Identify Related Risk Factors and Signs and Symptoms  Outcome: Ongoing (interventions implemented as appropriate)   05/20/19 1245   Skin Injury Risk (Adult)   Related Risk Factors (Skin Injury Risk) advanced age;body weight  extremes;hospitalization prolonged;mobility impaired;moisture     Goal: Skin Health and Integrity  Outcome: Ongoing (interventions implemented as appropriate)   05/20/19 1245   Skin Injury Risk (Adult)   Skin Health and Integrity making progress toward outcome       Problem: Wound (Includes Pressure Injury) (Adult)  Goal: Signs and Symptoms of Listed Potential Problems Will be Absent, Minimized or Managed (Wound)  Outcome: Ongoing (interventions implemented as appropriate)   05/20/19 1245   Goal/Outcome Evaluation   Problems Assessed (Wound) all   Problems Present (Wound) situational response;pain       Problem: Hemodialysis (Adult)  Goal: Signs and Symptoms of Listed Potential Problems Will be Absent, Minimized or Managed (Hemodialysis)  Outcome: Ongoing (interventions implemented as appropriate)   05/20/19 1245   Goal/Outcome Evaluation   Problems Assessed (Hemodialysis) all   Problems Present (Hemodialysis) none       Problem: Surgery Nonspecified (Adult)  Goal: Signs and Symptoms of Listed Potential Problems Will be Absent, Minimized or Managed (Surgery Nonspecified)  Outcome: Ongoing (interventions implemented as appropriate)   05/20/19 1245   Goal/Outcome Evaluation   Problems Assessed (Surgery) all   Problems Present (Surgery) pain;situational response     Goal: Anesthesia/Sedation Recovery  Outcome: Outcome(s) achieved Date Met: 05/20/19 05/20/19 1245   Goal/Outcome Evaluation   Anesthesia/Sedation Recovery criteria met for discharge

## 2019-05-21 VITALS
HEIGHT: 59 IN | TEMPERATURE: 99.1 F | HEART RATE: 102 BPM | WEIGHT: 172 LBS | DIASTOLIC BLOOD PRESSURE: 48 MMHG | OXYGEN SATURATION: 97 % | SYSTOLIC BLOOD PRESSURE: 101 MMHG | BODY MASS INDEX: 34.68 KG/M2 | RESPIRATION RATE: 16 BRPM

## 2019-05-21 LAB
ALBUMIN SERPL-MCNC: 2.4 G/DL (ref 3.5–5)
ALBUMIN/GLOB SERPL: 0.8 G/DL (ref 1.1–2.5)
ALP SERPL-CCNC: 146 U/L (ref 24–120)
ALT SERPL W P-5'-P-CCNC: 16 U/L (ref 0–54)
ANION GAP SERPL CALCULATED.3IONS-SCNC: 9 MMOL/L (ref 4–13)
AST SERPL-CCNC: 20 U/L (ref 7–45)
BASOPHILS # BLD AUTO: 0.05 10*3/MM3 (ref 0–0.2)
BASOPHILS NFR BLD AUTO: 0.7 % (ref 0–2)
BILIRUB SERPL-MCNC: 0.7 MG/DL (ref 0.1–1)
BUN BLD-MCNC: 10 MG/DL (ref 5–21)
BUN/CREAT SERPL: 3.9 (ref 7–25)
CALCIUM SPEC-SCNC: 7.7 MG/DL (ref 8.4–10.4)
CHLORIDE SERPL-SCNC: 102 MMOL/L (ref 98–110)
CO2 SERPL-SCNC: 24 MMOL/L (ref 24–31)
CREAT BLD-MCNC: 2.59 MG/DL (ref 0.5–1.4)
DEPRECATED RDW RBC AUTO: 61.8 FL (ref 40–54)
EOSINOPHIL # BLD AUTO: 0.41 10*3/MM3 (ref 0–0.7)
EOSINOPHIL NFR BLD AUTO: 6.1 % (ref 0–4)
ERYTHROCYTE [DISTWIDTH] IN BLOOD BY AUTOMATED COUNT: 16.9 % (ref 12–15)
GFR SERPL CREATININE-BSD FRML MDRD: 18 ML/MIN/1.73
GLOBULIN UR ELPH-MCNC: 2.9 GM/DL
GLUCOSE BLD-MCNC: 66 MG/DL (ref 70–100)
GLUCOSE BLDC GLUCOMTR-MCNC: 58 MG/DL (ref 70–130)
HCT VFR BLD AUTO: 36.7 % (ref 37–47)
HGB BLD-MCNC: 11.7 G/DL (ref 12–16)
IMM GRANULOCYTES # BLD AUTO: 0.05 10*3/MM3 (ref 0–0.05)
IMM GRANULOCYTES NFR BLD AUTO: 0.7 % (ref 0–5)
LYMPHOCYTES # BLD AUTO: 0.85 10*3/MM3 (ref 0.72–4.86)
LYMPHOCYTES NFR BLD AUTO: 12.6 % (ref 15–45)
MAGNESIUM SERPL-MCNC: 1.7 MG/DL (ref 1.4–2.2)
MCH RBC QN AUTO: 31.6 PG (ref 28–32)
MCHC RBC AUTO-ENTMCNC: 31.9 G/DL (ref 33–36)
MCV RBC AUTO: 99.2 FL (ref 82–98)
MONOCYTES # BLD AUTO: 0.45 10*3/MM3 (ref 0.19–1.3)
MONOCYTES NFR BLD AUTO: 6.6 % (ref 4–12)
NEUTROPHILS # BLD AUTO: 4.96 10*3/MM3 (ref 1.87–8.4)
NEUTROPHILS NFR BLD AUTO: 73.3 % (ref 39–78)
NRBC BLD AUTO-RTO: 0 /100 WBC (ref 0–0.2)
PHOSPHATE SERPL-MCNC: 3.2 MG/DL (ref 2.5–4.5)
PLATELET # BLD AUTO: 179 10*3/MM3 (ref 130–400)
PMV BLD AUTO: 10.7 FL (ref 6–12)
POTASSIUM BLD-SCNC: 3.7 MMOL/L (ref 3.5–5.3)
PROT SERPL-MCNC: 5.3 G/DL (ref 6.3–8.7)
RBC # BLD AUTO: 3.7 10*6/MM3 (ref 4.2–5.4)
SODIUM BLD-SCNC: 135 MMOL/L (ref 135–145)
WBC NRBC COR # BLD: 6.77 10*3/MM3 (ref 4.8–10.8)

## 2019-05-21 PROCEDURE — 85025 COMPLETE CBC W/AUTO DIFF WBC: CPT | Performed by: SPECIALIST

## 2019-05-21 PROCEDURE — 84100 ASSAY OF PHOSPHORUS: CPT | Performed by: INTERNAL MEDICINE

## 2019-05-21 PROCEDURE — 83735 ASSAY OF MAGNESIUM: CPT | Performed by: INTERNAL MEDICINE

## 2019-05-21 PROCEDURE — 80053 COMPREHEN METABOLIC PANEL: CPT | Performed by: SPECIALIST

## 2019-05-21 PROCEDURE — 25010000002 ENOXAPARIN PER 10 MG: Performed by: SPECIALIST

## 2019-05-21 PROCEDURE — 25010000002 PIPERACILLIN SOD-TAZOBACTAM PER 1 G: Performed by: SPECIALIST

## 2019-05-21 RX ORDER — HYDROCODONE BITARTRATE AND ACETAMINOPHEN 7.5; 325 MG/1; MG/1
1 TABLET ORAL EVERY 4 HOURS PRN
Qty: 30 TABLET | Refills: 0 | Status: SHIPPED | OUTPATIENT
Start: 2019-05-21 | End: 2019-07-06 | Stop reason: HOSPADM

## 2019-05-21 RX ADMIN — ENOXAPARIN SODIUM 30 MG: 30 INJECTION SUBCUTANEOUS at 09:48

## 2019-05-21 RX ADMIN — PANTOPRAZOLE SODIUM 40 MG: 40 TABLET, DELAYED RELEASE ORAL at 06:35

## 2019-05-21 RX ADMIN — CARVEDILOL 3.12 MG: 3.12 TABLET, FILM COATED ORAL at 09:48

## 2019-05-21 RX ADMIN — LEVOTHYROXINE SODIUM 150 MCG: 150 TABLET ORAL at 06:35

## 2019-05-21 RX ADMIN — LIDOCAINE 1 PATCH: 50 PATCH CUTANEOUS at 09:48

## 2019-05-21 RX ADMIN — TAZOBACTAM SODIUM AND PIPERACILLIN SODIUM 3.38 G: 375; 3 INJECTION, SOLUTION INTRAVENOUS at 03:34

## 2019-05-21 NOTE — DISCHARGE PLACEMENT REQUEST
"Cheri Ely (79 y.o. Female)     Date of Birth Social Security Number Address Home Phone MRN    1940  [address] [facility] [address]  [place] [address] [phone] [id number]    Latter-day Marital Status          Jain        Admission Date Admission Type Admitting Provider Attending Provider Department, Room/Bed    19 Emergency Laila Rodriguez MD Tyrrell, Dana R, MD [address], 391/1    Discharge Date Discharge Disposition Discharge Destination         Home or Self Care              Attending Provider:  Laila Rodriguez MD    Allergies:  No Known Allergies    Isolation:  None   Infection:  None   Code Status:  CPR    Ht:  149.9 cm (59\")   Wt:  78 kg (172 lb)    Admission Cmt:  None   Principal Problem:  None                Active Insurance as of 2019     Primary Coverage     Payor Plan Insurance Group Employer/Plan Group    MEDICARE MEDICARE A & B      Payor Plan Address Payor Plan Phone Number Payor Plan Fax Number Effective Dates    [address] [phone]  1998 - None Entered    [facility] [id number]       Subscriber Name Subscriber Birth Date Member ID       CHERI ELY 1940 [id number]           Secondary Coverage     Payor Plan Insurance Group Employer/Plan Group    COMBINED INSURANCE CO COMBINED INSURANCE CO      Payor Plan Address Payor Plan Phone Number Payor Plan Fax Number Effective Dates    [address] [phone]  2017 - None Entered    [facility] [id number]       Subscriber Name Subscriber Birth Date Member ID       CHERI ELY 1940 [id number]                 Emergency Contacts      (Rel.) Home Phone Work Phone Mobile Phone    Christy Langston (Daughter) -- -- [phone]    Lucero Morales (Daughter) -- -- [phone]        [facility] [facility] [address]  Phone:  [phone]  Fax:          Patient:     Cheri Ely MRN:  [id number]   [address]  [facility] [address] :  1940  SSN: "    Phone: [phone] Sex:  F      INSURANCE PAYOR PLAN GROUP # SUBSCRIBER ID   Primary:  Secondary:    MEDICARE  COMBINED INSURANCE CO [id number]  [id number]      [id number]  [id number]   Admitting Diagnosis: Abdominal pain, unspecified abdominal location [R10.9]  Order Date:  May 21, 2019         Case Management  Consult       (Order ID: [id number])     Diagnosis:         Priority:  Routine Expected Date:   Expiration Date:        Interval:  Once Count:    Reason for Consult? HOME HEALTH FOR SKILLED NURSING AND PHYSICAL THERAPY     Specimen Type:   Specimen Source:   Specimen Taken Date:   Specimen Taken Time:                   Verbal Order Mode: Verbal with readback   Authorizing Provider: Laila Rodriguez MD  Authorizing Provider's NPI: [id number]     Order Entered By: Georges Schwab RN 5/21/2019  8:51 AM     Electronically signed by:                 History & Physical      Laila Rodriguez MD at 5/13/2019 12:50 AM            Laila Rodriguez MD  H&P    Patient Care Team:  Provider, No Known as PCP - Barrett Prasad Jr, MD as PCP - Family Medicine  Moni Garza MD as Consulting Physician (Gastroenterology)  Tomasz San DO as Consulting Physician (Vascular Surgery)    Chief complaint severe abdominal pain    Val Ely  is a 79 y.o. female presents with severe abdominal pain which began about 8:00 tonight.  Sudden onset.  Mostly in the right upper abdomen but diffuse now.  She is had some emesis of better gastric contents no blood.  No fevers.  Has had multiple recent admissions for GI bleeding.  She has extensive medical issues including end-stage renal disease on dialysis, history of congestive heart failure, history of valvular heart disease, history of coronary artery disease, history of peripheral vascular disease, history of obesity.  Has had multiple recent upper endoscopies and colonoscopies for bleeding she had M2A capsule endoscopy which  revealed AVMs of the small bowel.  She has had prior gastric banding and sigmoid colectomy..      Review of Systems   Pertinent items are noted in HPI, all other systems reviewed and negative    History  Past Medical History:   Diagnosis Date   • Arthritis    • Carotid artery disease (CMS/HCC)    • CHF (congestive heart failure) (CMS/HCC)    • Chronic kidney disease on chronic dialysis (CMS/HCC)    • Chronic renal failure     on dialysis ON MON, WED, FRI   • Coronary artery disease    • Disease of thyroid gland    • Diverticulitis    • Gastric ulcer    • History of transfusion    • Hyperlipidemia    • Hypertension    • Injury of back    • Mesenteric artery insufficiency (CMS/HCC)    • Multilevel degenerative disc disease    • Osteoporosis    • Pancreatitis    • Pelvis fracture (CMS/HCC)    • Pneumonia      Past Surgical History:   Procedure Laterality Date   • AORTAGRAM Right 1/8/2018    Procedure: MESENTERIC ANGIOGRAM, GROIN ACCESS, MYNX CLOSURE;  Surgeon: Tomasz San DO;  Location: [facility] OR;  Service:    • AORTIC VALVE SURGERY     • APPENDECTOMY     • BACK SURGERY     • CAPSULE ENDOSCOPY N/A 3/30/2019    Procedure: CAPSULE ENDOSCOPY M2A;  Surgeon: David Yu MD;  Location: [facility] ENDOSCOPY;  Service: Gastroenterology   • CARDIAC SURGERY     • CAROTID ENDARTERECTOMY Bilateral    • CATARACT EXTRACTION, BILATERAL     • CERVICAL SPINE SURGERY     • CHOLECYSTECTOMY     • COLON SURGERY     • COLONOSCOPY  10/01/2015   • COLONOSCOPY N/A 3/28/2019    Procedure: COLONOSCOPY WITH ANESTHESIA;  Surgeon: Rafita Merida MD;  Location: [facility] ENDOSCOPY;  Service: Gastroenterology   • CORONARY ARTERY BYPASS GRAFT     • DIALYSIS FISTULA CREATION     • ENDOSCOPY N/A 12/27/2018    Procedure: ESOPHAGOGASTRODUODENOSCOPY WITH ANESTHESIA;  Surgeon: Moni Garza MD;  Location: [facility] ENDOSCOPY;  Service: Gastroenterology   • ENDOSCOPY N/A 2/28/2019    Procedure: ESOPHAGOGASTRODUODENOSCOPY WITH ANESTHESIA;  Surgeon:  Moni Garza MD;  Location: [facility] ENDOSCOPY;  Service: Gastroenterology   • ENDOSCOPY N/A 3/11/2019    Procedure: ESOPHAGOGASTRODUODENOSCOPY WITH ANESTHESIA;  Surgeon: Moni Garza MD;  Location: [facility] ENDOSCOPY;  Service: Gastroenterology   • ENDOSCOPY N/A 3/27/2019    Procedure: ESOPHAGOGASTRODUODENOSCOPY WITH ANESTHESIA;  Surgeon: Rafita Merida MD;  Location: [facility] ENDOSCOPY;  Service: Gastroenterology   • HIP TROCANTERIC NAILING WITH INTRAMEDULLARY HIP SCREW Right 2/8/2019    Procedure: HIP TROCANTERIC NAILING LONG WITH INTRAMEDULLARY HIP SCREW;  Surgeon: Boo Camacho MD;  Location: [facility] OR;  Service: Orthopedics   • JOINT REPLACEMENT      knee   • LAPAROSCOPIC GASTRIC BANDING     • LEEP     • LUMBAR FUSION     • TOE AMPUTATION Left     2nd toe   • TOTAL KNEE ARTHROPLASTY Bilateral    • TUBAL ABDOMINAL LIGATION     • UPPER GASTROINTESTINAL ENDOSCOPY  10/01/2015     Family History   Problem Relation Age of Onset   • Hypertension Mother    • Cancer Mother         stomach   • Coronary artery disease Father    • Heart attack Father    • Diabetes Brother    • Coronary artery disease Brother    • Colon cancer Neg Hx    • Colon polyps Neg Hx      Social History     Tobacco Use   • Smoking status: Never Smoker   • Smokeless tobacco: Never Used   Substance Use Topics   • Alcohol use: No   • Drug use: No       (Not in a hospital admission)  Allergies:  Patient has no known allergies.    Objective     Vital Signs  Temp:  [97.6 °F (36.4 °C)] 97.6 °F (36.4 °C)  Heart Rate:  [] 103  Resp:  [18] 18  BP: (109-144)/(40-99) 131/58    Physical Exam:      General Appearance:    Alert, cooperative, in significant pain   Head:    Normocephalic, without obvious abnormality, atraumatic   Eyes:            Lids and lashes normal, conjunctivae and sclerae normal, no   icterus, no pallor, corneas clear, PERRLA   Ears:    Ears appear intact with no abnormalities noted   Neck:   No adenopathy, supple, trachea midline    Back:     No kyphosis present, no scoliosis present, no skin lesions,      erythema or scars, no tenderness to percussion or                   palpation,   range of motion normal   Lungs:     Clear to auscultation,respirations regular, even and                  unlabored    Heart:    Regular rhythm and normal rate, normal S1 and S2, no            murmur, no gallop, no rub, no click   Chest Wall:    No abnormalities observed   Abdomen:    Diffusely tender throughout the abdomen with guarding and rigidity   Rectal:     Deferred   Extremities:   Moves all extremities well, no edema, no cyanosis, no             redness dialysis fistula left upper arm   Pulses:   Pulses palpable and equal bilaterally   Skin:   No bleeding, bruising or rash   Lymph nodes:   No palpable adenopathy   Neurologic:   No focal deficits       Results Review:      Lab Results (last 72 hours)     Procedure Component Value Units Date/Time    Protime-INR [[id number]]  (Abnormal) Collected:  05/13/19 0023    Specimen:  Blood Updated:  05/13/19 0046     Protime 18.0 Seconds      INR 1.44    aPTT [[id number]]  (Normal) Collected:  05/13/19 0023    Specimen:  Blood Updated:  05/13/19 0046     PTT 34.8 seconds     Blood Culture - Blood, Hand, Right [[id number]] Collected:  05/13/19 0023    Specimen:  Blood from Hand, Right Updated:  05/13/19 0043    BNP [[id number]] Collected:  05/12/19 2133    Specimen:  Blood Updated:  05/13/19 0036    Red Top [[id number]] Collected:  05/13/19 0023    Specimen:  Blood Updated:  05/13/19 0033    [place] Draw [[id number]] Collected:  05/12/19 2133    Specimen:  Blood Updated:  05/13/19 0023    Narrative:       The following orders were created for panel order [place] Draw.  Procedure                               Abnormality         Status                     ---------                               -----------         ------                     Light Blue Top[[id number]]                                   Final result                Green Top (Gel)[[id number]]                                  Final result               Lavender Top[[id number]]                                     Final result               Red Top[[id number]]                                          In process                   Please view results for these tests on the individual orders.    Light Blue Top [[id number]] Collected:  05/12/19 2133    Specimen:  Blood Updated:  05/12/19 2245     Extra Tube hold for add-on     Comment: Auto resulted       Green Top (Gel) [[id number]] Collected:  05/12/19 2133    Specimen:  Blood Updated:  05/12/19 2245     Extra Tube Hold for add-ons.     Comment: Auto resulted.       Lavender Top [[id number]] Collected:  05/12/19 2133    Specimen:  Blood Updated:  05/12/19 2245     Extra Tube hold for add-on     Comment: Auto resulted       Troponin [[id number]]  (Normal) Collected:  05/12/19 2133    Specimen:  Blood Updated:  05/12/19 2206     Troponin I <0.012 ng/mL     Lactic Acid, Plasma [[id number]]  (Normal) Collected:  05/12/19 2148    Specimen:  Blood Updated:  05/12/19 2203     Lactate 1.4 mmol/L     Comprehensive Metabolic Panel [[id number]]  (Abnormal) Collected:  05/12/19 2133    Specimen:  Blood Updated:  05/12/19 2154     Glucose 92 mg/dL      BUN 27 mg/dL      Creatinine 4.51 mg/dL      Sodium 136 mmol/L      Potassium 4.0 mmol/L      Chloride 96 mmol/L      CO2 32.0 mmol/L      Calcium 8.4 mg/dL      Total Protein 6.1 g/dL      Albumin 3.00 g/dL      ALT (SGPT) 17 U/L      AST (SGOT) 35 U/L      Alkaline Phosphatase 215 U/L      Total Bilirubin 0.5 mg/dL      eGFR Non African Amer 9 mL/min/1.73      Comment: <15 Indicative of kidney failure.        eGFR   Amer -- mL/min/1.73      Comment: <15 Indicative of kidney failure.        Globulin 3.1 gm/dL      A/G Ratio 1.0 g/dL      BUN/Creatinine Ratio 6.0     Anion Gap 8.0 mmol/L     Narrative:       GFR Normal >60  Chronic Kidney Disease <60  Kidney Failure <15    Lipase [[id number]]   (Abnormal) Collected:  05/12/19 2133    Specimen:  Blood Updated:  05/12/19 2154     Lipase 14 U/L     CBC & Differential [[id number]] Collected:  05/12/19 2133    Specimen:  Blood Updated:  05/12/19 2144    Narrative:       The following orders were created for panel order CBC & Differential.  Procedure                               Abnormality         Status                     ---------                               -----------         ------                     CBC Auto Differential[[id number]]        Abnormal            Final result                 Please view results for these tests on the individual orders.    CBC Auto Differential [[id number]]  (Abnormal) Collected:  05/12/19 2133    Specimen:  Blood Updated:  05/12/19 2144     WBC 9.31 10*3/mm3      RBC 3.78 10*6/mm3      Hemoglobin 11.9 g/dL      Hematocrit 37.6 %      MCV 99.5 fL      MCH 31.5 pg      MCHC 31.6 g/dL      RDW 16.5 %      RDW-SD 60.2 fl      MPV 10.8 fL      Platelets 197 10*3/mm3      Neutrophil % 87.7 %      Lymphocyte % 6.4 %      Monocyte % 3.4 %      Eosinophil % 1.7 %      Basophil % 0.4 %      Immature Grans % 0.4 %      Neutrophils, Absolute 8.15 10*3/mm3      Lymphocytes, Absolute 0.60 10*3/mm3      Monocytes, Absolute 0.32 10*3/mm3      Eosinophils, Absolute 0.16 10*3/mm3      Basophils, Absolute 0.04 10*3/mm3      Immature Grans, Absolute 0.04 10*3/mm3      nRBC 0.0 /100 WBC         Imaging Results (last 72 hours)     Procedure Component Value Units Date/Time    XR Chest 1 View [[id number]] Updated:  05/13/19 0004    CT Abdomen Pelvis Without Contrast [[id number]] Updated:  05/12/19 2335          Assessment/Plan       * No active hospital problems. *      Her CT scan shows free air and some thickening of the gastric wall.  This probably represents perforated ulcer.  We will proceed with exposure laparotomy.  The risks of bleeding infection injury to structures cardiac complications pulmonary complications death MI prolonged  ventilation strokes poor healing were discussed.  We discussed gastric resection versus oversew of an ulcer versus small bowel resection with anastomosis versus colon resection with anastomosis or ostomy.  The daughter was present during the discussion and understands the severity of her illness in the emergency nature and life saving intervention.  She is very high risk and set up for countless possible complications postoperatively.  She understands this and wishes to proceed.      Laila Rodriguez MD  05/13/19  12:50 AM          Electronically signed by Laila Rodriguez MD at 5/13/2019 12:54 AM          Discharge Summary      Laila Rodriguez MD at 5/21/2019  8:23 AM          Laila Rodriguez MD Discharge Summary    Date of Admission: 5/12/2019  Date of Discharge:  5/21/2019    Discharge Diagnosis: Perforated duodenal ulcer, end-stage renal disease, deconditioning    Presenting Problem/History of Present Illness    History and Physical as outlined in the chart    Hospital Course  Patient was admitted after undergoing the above operation.  Hospital course postoperatively was prolonged due to her comorbidities but otherwise uneventful.  Patient will be discharged to home with home health.  We did discuss nursing home placement but she is adamant about going home.  She lives with her daughter and granddaughter who is very helpful to her..  See medication reconciliation for medications at discharge.    Procedures Performed  Procedure(s):  LAPAROTOMY EXPLORATORY, OVERSEW DUODENAL ULCER WITH FRED PATCH, KOCHER MANEUVER       Consults:   Consults     Date and Time Order Name Status Description    5/13/2019 0252 Inpatient Nephrology Consult Completed     5/13/2019 0252 Inpatient Hospitalist Consult Completed           Condition on Discharge:  stable      Discharge Disposition  Home or Self Care    Discharge Medications     Discharge Medications      New Medications      Instructions Start Date   HYDROcodone-acetaminophen  7.5-325 MG per tablet  Commonly known as:  NORCO   1 tablet, Oral, Every 4 Hours PRN         Continue These Medications      Instructions Start Date   acetaminophen 325 MG tablet  Commonly known as:  TYLENOL   650 mg, Oral, Every 6 Hours PRN      aspirin 81 MG EC tablet   81 mg, Oral, Daily      carvedilol 3.125 MG tablet  Commonly known as:  COREG   3.125 mg, Oral, Daily, Monday, Wednesday, and Friday dose. Hold if SBP < 100.       carvedilol 3.125 MG tablet  Commonly known as:  COREG   3.125 mg, Oral, 2 Times Daily With Meals, Sunday, Tuesday, Thursday, and Saturday dose. Hold if SBP <100.       docusate sodium 100 MG capsule   100 mg, Oral, 2 Times Daily      epoetin lucy 39920 UNIT/ML injection  Commonly known as:  EPOGEN,PROCRIT   10,000 Units, Subcutaneous, 3 Times Weekly      lactulose 20 GM/30ML solution solution   20 g, Oral, Daily PRN      levothyroxine 150 MCG tablet  Commonly known as:  SYNTHROID, LEVOTHROID   150 mcg, Oral, Daily      losartan 25 MG tablet  Commonly known as:  COZAAR   25 mg, Oral, 3 Times Weekly, Monday, Wednesday, and Friday.      ondansetron ODT 4 MG disintegrating tablet  Commonly known as:  ZOFRAN-ODT   4 mg, Oral, Every 8 Hours PRN      pantoprazole 40 MG EC tablet  Commonly known as:  PROTONIX   40 mg, Oral, Daily      Polyethyl Glyc-Propyl Glyc PF 0.4-0.3 % solution ophthalmic solution   2 drops, Both Eyes, Every 2 Hours PRN      pravastatin 40 MG tablet  Commonly known as:  PRAVACHOL   40 mg, Oral, Daily      JOHN-SANGEETA tablet   1 tablet, Oral, Daily      sertraline 50 MG tablet  Commonly known as:  ZOLOFT   50 mg, Oral, Daily      sucralfate 1 GM/10ML suspension  Commonly known as:  CARAFATE   1 g, Oral, 2 Times Daily      Vitamin D 2000 units capsule   2,000 Units, Oral, Daily             Discharge Diet:     Activity at Discharge:     Follow-up Appointments  Future Appointments   Date Time Provider Department Center   7/25/2019  8:30 AM Tomasz San DO [facility] None      Additional Instructions for the Follow-ups that You Need to Schedule     Discharge Follow-up with Specified Provider: me; 3 Weeks   As directed      To:  me    Follow Up:  3 Weeks               Test Results Pending at Discharge   Order Current Status    Comprehensive Metabolic Panel In process    Magnesium In process    Phosphorus In process           Laila Rodriguez MD  05/21/19  8:23 AM    Time: Time spent at discharge 30 minutes             Electronically signed by Laila Rodriguez MD at 5/21/2019  8:24 AM

## 2019-05-21 NOTE — PROGRESS NOTES
Continued Stay Note   [place]     Patient Name: Cheri Ely  MRN: [id number]  Today's Date: 5/21/2019    Admit Date: 5/12/2019    Discharge Plan     Row Name 05/21/19 0852       Plan    Final Discharge Disposition Code  06 - home with home health care    Final Note  Pt has now decided to not go to snf and wants to go home with home health. She has had [facility] in the past and that is who she wants again. Faxing referral to them at [phone].    Row Name 05/21/19 0851       Plan    Plan  Home with [facility]    Patient/Family in Agreement with Plan  yes    Plan Comments  Pt has now decided to not to go to snf and wants to go home with home health. She has had [facility] in the past and that is who she wants again. Faxing referral to them at [phone]        Discharge Codes    No documentation.       Expected Discharge Date and Time     Expected Discharge Date Expected Discharge Time    May 21, 2019             MALIA Trevizo

## 2019-05-21 NOTE — DISCHARGE SUMMARY
Laila Rodriguez MD Discharge Summary    Date of Admission: 5/12/2019  Date of Discharge:  5/21/2019    Discharge Diagnosis: Perforated duodenal ulcer, end-stage renal disease, deconditioning    Presenting Problem/History of Present Illness    History and Physical as outlined in the chart    Hospital Course  Patient was admitted after undergoing the above operation.  Hospital course postoperatively was prolonged due to her comorbidities but otherwise uneventful.  Patient will be discharged to home with home health.  We did discuss nursing home placement but she is adamant about going home.  She lives with her daughter and granddaughter who is very helpful to her..  See medication reconciliation for medications at discharge.    Procedures Performed  Procedure(s):  LAPAROTOMY EXPLORATORY, OVERSEW DUODENAL ULCER WITH FRED PATCH, KOCHER MANEUVER       Consults:   Consults     Date and Time Order Name Status Description    5/13/2019 0252 Inpatient Nephrology Consult Completed     5/13/2019 0252 Inpatient Hospitalist Consult Completed           Condition on Discharge:  stable      Discharge Disposition  Home or Self Care    Discharge Medications     Discharge Medications      New Medications      Instructions Start Date   HYDROcodone-acetaminophen 7.5-325 MG per tablet  Commonly known as:  NORCO   1 tablet, Oral, Every 4 Hours PRN         Continue These Medications      Instructions Start Date   acetaminophen 325 MG tablet  Commonly known as:  TYLENOL   650 mg, Oral, Every 6 Hours PRN      aspirin 81 MG EC tablet   81 mg, Oral, Daily      carvedilol 3.125 MG tablet  Commonly known as:  COREG   3.125 mg, Oral, Daily, Monday, Wednesday, and Friday dose. Hold if SBP < 100.       carvedilol 3.125 MG tablet  Commonly known as:  COREG   3.125 mg, Oral, 2 Times Daily With Meals, Sunday, Tuesday, Thursday, and Saturday dose. Hold if SBP <100.       docusate sodium 100 MG capsule   100 mg, Oral, 2 Times Daily      epoetin lucy  84267 UNIT/ML injection  Commonly known as:  EPOGEN,PROCRIT   10,000 Units, Subcutaneous, 3 Times Weekly      lactulose 20 GM/30ML solution solution   20 g, Oral, Daily PRN      levothyroxine 150 MCG tablet  Commonly known as:  SYNTHROID, LEVOTHROID   150 mcg, Oral, Daily      losartan 25 MG tablet  Commonly known as:  COZAAR   25 mg, Oral, 3 Times Weekly, Monday, Wednesday, and Friday.      ondansetron ODT 4 MG disintegrating tablet  Commonly known as:  ZOFRAN-ODT   4 mg, Oral, Every 8 Hours PRN      pantoprazole 40 MG EC tablet  Commonly known as:  PROTONIX   40 mg, Oral, Daily      Polyethyl Glyc-Propyl Glyc PF 0.4-0.3 % solution ophthalmic solution   2 drops, Both Eyes, Every 2 Hours PRN      pravastatin 40 MG tablet  Commonly known as:  PRAVACHOL   40 mg, Oral, Daily      JOHN-SANGEETA tablet   1 tablet, Oral, Daily      sertraline 50 MG tablet  Commonly known as:  ZOLOFT   50 mg, Oral, Daily      sucralfate 1 GM/10ML suspension  Commonly known as:  CARAFATE   1 g, Oral, 2 Times Daily      Vitamin D 2000 units capsule   2,000 Units, Oral, Daily             Discharge Diet:     Activity at Discharge:     Follow-up Appointments  Future Appointments   Date Time Provider Department Center   7/25/2019  8:30 AM Tomasz San, DO [facility] None     Additional Instructions for the Follow-ups that You Need to Schedule     Discharge Follow-up with Specified Provider: me; 3 Weeks   As directed      To:  me    Follow Up:  3 Weeks               Test Results Pending at Discharge   Order Current Status    Comprehensive Metabolic Panel In process    Magnesium In process    Phosphorus In process           Laila Rodriguez MD  05/21/19  8:23 AM    Time: Time spent at discharge 30 minutes

## 2019-05-21 NOTE — THERAPY DISCHARGE NOTE
Acute Care - Physical Therapy Discharge Summary  [facility]       Patient Name: Cheri Ely  : 1940  MRN: [id number]    Today's Date: 2019  Onset of Illness/Injury or Date of Surgery: 19    Date of Referral to PT: 19  Referring Physician: Dr. Dean      Admit Date: 2019      PT Recommendation and Plan    Visit Dx:    ICD-10-CM ICD-9-CM   1. Abdominal pain, unspecified abdominal location R10.9 789.00   2. Intra-abdominal free air of unknown etiology K66.8 568.89   3. Generalized weakness R53.1 780.79               Rehab Goal Summary     Row Name 19 1517             Physical Therapy Goals    Bed Mobility Goal Selection (PT)  bed mobility, PT goal 1;bed mobility, PT goal 2  -CW      Transfer Goal Selection (PT)  transfer, PT goal 1  -CW      Gait Training Goal Selection (PT)  gait training, PT goal 1  -CW         Bed Mobility Goal 1 (PT)    Activity/Assistive Device (Bed Mobility Goal 1, PT)  rolling to left;rolling to right;sit to supine;supine to sit  -CW      [place] Level/Cues Needed (Bed Mobility Goal 1, PT)  contact guard assist  -CW      Time Frame (Bed Mobility Goal 1, PT)  long term goal (LTG);10 days  -CW      Progress/Outcomes (Bed Mobility Goal 1, PT)  goal not met  -CW         Bed Mobility Goal 2 (PT)    Activity/Assistive Device (Bed Mobility Goal 2, PT)  scooting  -CW      [place] Level/Cues Needed (Bed Mobility Goal 2, PT)  minimum assist (75% or more patient effort)  -CW      Time Frame (Bed Mobility Goal 2, PT)  long term goal (LTG);10 days  -CW      Progress/Outcomes (Bed Mobility Goal 2, PT)  goal not met  -CW         Transfer Goal 1 (PT)    Activity/Assistive Device (Transfer Goal 1, PT)  sit-to-stand/stand-to-sit;bed-to-chair/chair-to-bed;walker, rolling  -CW      [place] Level/Cues Needed (Transfer Goal 1, PT)  contact guard assist  -CW      Time Frame (Transfer Goal 1, PT)  long term goal (LTG);10 days  -CW      Progress/Outcome  (Transfer Goal 1, PT)  goal not met  -CW         Gait Training Goal 1 (PT)    Activity/Assistive Device (Gait Training Goal 1, PT)  gait (walking locomotion);assistive device use;decrease fall risk;forward stepping;backward stepping;improve balance and speed;increase endurance/gait distance;increase energy conservation;normalize weight shifts;walker, rolling  -CW      [place] Level (Gait Training Goal 1, PT)  minimum assist (75% or more patient effort);contact guard assist  -CW      Distance (Gait Goal 1, PT)  50 ft w/ less than or equal to 1 standing rest  -CW      Time Frame (Gait Training Goal 1, PT)  long term goal (LTG);10 days  -CW      Progress/Outcome (Gait Training Goal 1, PT)  goal not met  -CW         Patient Education Goal (PT)    Activity (Patient Education Goal, PT)  activies to assist w/ edema mgmt & reduce risk associated post operatively  -CW      [place]/Cues/Accuracy (Memory Goal 2, PT)  independent;demonstrates adequately;verbalizes understanding  -CW      Time Frame (Patient Education Goal, PT)  long term goal (LTG);10 days  -CW      Progress/Outcome (Patient Education Goal, PT)  goal not met  -CW        User Key  (r) = Recorded By, (t) = Taken By, (c) = Cosigned By    Initials Name Provider Type Discipline    Casandra Carney PTA Physical Therapy Assistant PT              PT Discharge Summary  Reason for Discharge: Discharge from facility  Outcomes Achieved: Refer to plan of care for updates on goals achieved  Discharge Destination: Home      Casandra Crooks PTA   5/21/2019

## 2019-05-22 ENCOUNTER — READMISSION MANAGEMENT (OUTPATIENT)
Dept: CALL CENTER | Facility: HOSPITAL | Age: 79
End: 2019-05-22

## 2019-05-22 NOTE — OUTREACH NOTE
Prep Survey      Responses   Facility patient discharged from?  [place]   Is patient eligible?  Yes   Discharge diagnosis  LAPAROTOMY EXPLORATORY, OVERSEW DUODENAL ULCER WITH FRED PATCH, KOCHER MANEUVER   Does the patient have one of the following disease processes/diagnoses(primary or secondary)?  General Surgery   Does the patient have Home health ordered?  Yes   What is the Home health agency?   [facility]   Is there a DME ordered?  No   Comments regarding appointments  see AVS   Prep survey completed?  Yes          Sherrie Casillas RN

## 2019-05-23 ENCOUNTER — READMISSION MANAGEMENT (OUTPATIENT)
Dept: CALL CENTER | Facility: HOSPITAL | Age: 79
End: 2019-05-23

## 2019-05-23 NOTE — OUTREACH NOTE
General Surgery Week 1 Survey      Responses   Facility patient discharged from?  [place]   Does the patient have one of the following disease processes/diagnoses(primary or secondary)?  General Surgery   Is there a successful TCM telephone encounter documented?  No   Week 1 attempt successful?  No   Unsuccessful attempts  Attempt 1          Urmila Neville, RN

## 2019-05-24 ENCOUNTER — READMISSION MANAGEMENT (OUTPATIENT)
Dept: CALL CENTER | Facility: HOSPITAL | Age: 79
End: 2019-05-24

## 2019-05-24 NOTE — OUTREACH NOTE
General Surgery Week 1 Survey      Responses   Facility patient discharged from?  [facility]   Does the patient have one of the following disease processes/diagnoses(primary or secondary)?  General Surgery   Is there a successful TCM telephone encounter documented?  No   Week 1 attempt successful?  Yes   Call start time  1836   Call end time  1841   Discharge diagnosis  LAPAROTOMY EXPLORATORY, OVERSEW DUODENAL ULCER WITH FRED PATCH, KOCHER MANEUVER   Is patient permission given to speak with other caregiver?  Yes   List who call center can speak with  Daughters   Meds reviewed with patient/caregiver?  Yes   Is the patient having any side effects they believe may be caused by any medication additions or changes?  No   Does the patient have all medications related to this admission filled (includes all antibiotics, pain medications, etc.)  Yes   Is the patient taking all medications as directed (includes completed medication regime)?  Yes   Does the patient have a follow up appointment scheduled with their surgeon?  Yes   Has the patient kept scheduled appointments due by today?  No   Nursing Interventions  Advised patient to keep appointment   Comments  Patient states she simply did not know she had an appt today.    What is the Home health agency?   [facility]   Has home health visited the patient within 72 hours of discharge?  Yes   Psychosocial issues?  No   Did the patient receive a copy of their discharge instructions?  Yes   Nursing interventions  Reviewed instructions with patient   What is the patient's perception of their health status since discharge?  Improving   Is the patient /caregiver able to teach back basic post-op care?  Continue use of incentive spirometry at least 1 week post discharge, Practice 'cough and deep breath', Drive as instructed by MD in discharge instructions, Take showers only when approved by MD-sponge bathe until then, No tub bath, swimming, or hot tub until instructed by MD,  Keep incision areas clean,dry and protected, Do not remove steri-strips, Lifting as instructed by MD in discharge instructions   Is the patient/caregiver able to teach back signs and symptoms of incisional infection?  Increased redness, swelling or pain at the incisonal site, Increased drainage or bleeding, Incisional warmth, Pus or odor from incision, Fever   Is the patient/caregiver able to teach back steps to recovery at home?  Set small, achievable goals for return to baseline health, Rest and rebuild strength, gradually increase activity, Practice good oral hygiene, Weigh daily, Eat a well-balance diet, Make a list of questions for surgeon's appointment   Is the patient/caregiver able to teach back the hierarchy of who to call/visit for symptoms/problems? PCP, Specialist, Home health nurse, Urgent Care, ED, 911  Yes   Week 1 call completed?  Yes   Wrap up additional comments  PT and nursing working with her at home. Dialysis has been arranged.            Carolin Sharma RN

## 2019-05-28 ENCOUNTER — TELEPHONE (OUTPATIENT)
Dept: GASTROENTEROLOGY | Facility: HOSPITAL | Age: 79
End: 2019-05-28

## 2019-05-28 ENCOUNTER — TELEPHONE (OUTPATIENT)
Dept: GASTROENTEROLOGY | Facility: CLINIC | Age: 79
End: 2019-05-28

## 2019-05-28 NOTE — TELEPHONE ENCOUNTER
Pt's daughter, Nette(Christy), just called me about pt's endo that she missed this morning. Nette states they completely forgot about it-she looked at her calendar a little while ago and realized they had missed the appt.     She tells me that about 2 weeks ago pt had emergency surgery for a perforated duodenum by Dr. Rodriguez. Nette states they have just had a lot going on and they are very sorry she missed her appt.     They were wanting to r/s-but I wanted to check with you first since she just had a perforation? Is it ok to r/s?

## 2019-05-29 NOTE — TELEPHONE ENCOUNTER
Tried to call Nette re: Dr. Garza's recommendations-was unable to reach her so I left  asking her to call me back.

## 2019-05-29 NOTE — TELEPHONE ENCOUNTER
Pt's daughter, Nette, called me back-she VU of Dr. Garza's recommendations. I transferred her to [facility] to schedule f/u OV with Candace in about 2 months. She was advised to call back between now and then with any questions/problems.

## 2019-05-29 NOTE — TELEPHONE ENCOUNTER
She has had so much going on lately.  Do not schedule any procedure.  Please have her return to the office in approximately 2 months time to regroup.  I am glad that she did not keep her appointment because I would have canceled it yesterday when I got that history.    Moni Garza MD

## 2019-05-29 NOTE — TELEPHONE ENCOUNTER
Tried to call Nette again to discuss-was unable to reach her so I left another VM asking her to call me back.

## 2019-06-03 ENCOUNTER — READMISSION MANAGEMENT (OUTPATIENT)
Dept: CALL CENTER | Facility: HOSPITAL | Age: 79
End: 2019-06-03

## 2019-06-03 NOTE — OUTREACH NOTE
"General Surgery Week 2 Survey      Responses   Facility patient discharged from?  [place]   Does the patient have one of the following disease processes/diagnoses(primary or secondary)?  General Surgery   Week 2 attempt successful?  Yes   Call start time  1505   Call end time  1511   Discharge diagnosis  LAPAROTOMY EXPLORATORY, OVERSEW DUODENAL ULCER WITH FRED PATCH, KOCHER MANEUVER   Meds reviewed with patient/caregiver?  Yes   Is the patient having any side effects they believe may be caused by any medication additions or changes?  No   Does the patient have all medications related to this admission filled (includes all antibiotics, pain medications, etc.)  Yes   Is the patient taking all medications as directed (includes completed medication regime)?  Yes   Does the patient have a follow up appointment scheduled with their surgeon?  Yes   Has the patient kept scheduled appointments due by today?  Yes   What is the Home health agency?   [facility]   Has home health visited the patient within 72 hours of discharge?  Yes   Psychosocial issues?  No   Comments  Pt been having N/V. Eating small amts. Trying to eat \"bland food\". Today has been able to keep all food and drink down today. Had HD today. Incision had staples removed, no s/sx of inf.    What is the patient's perception of their health status since discharge?  Improving   Nursing interventions  Nurse provided patient education   Is the patient /caregiver able to teach back basic post-op care?  Practice 'cough and deep breath', No tub bath, swimming, or hot tub until instructed by MD, Do not remove steri-strips   Is the patient/caregiver able to teach back signs and symptoms of incisional infection?  Increased drainage or bleeding, Incisional warmth   Is the patient/caregiver able to teach back steps to recovery at home?  Practice good oral hygiene, Set small, achievable goals for return to baseline health   Is the patient/caregiver able to teach " back the hierarchy of who to call/visit for symptoms/problems? PCP, Specialist, Home health nurse, Urgent Care, ED, 911  Yes   Week 2 call completed?  Yes   Wrap up additional comments  .          Swapna Gomez RN

## 2019-06-11 ENCOUNTER — READMISSION MANAGEMENT (OUTPATIENT)
Dept: CALL CENTER | Facility: HOSPITAL | Age: 79
End: 2019-06-11

## 2019-06-11 NOTE — OUTREACH NOTE
General Surgery Week 3 Survey      Responses   Facility patient discharged from?  [facility]   Does the patient have one of the following disease processes/diagnoses(primary or secondary)?  General Surgery   Week 3 attempt successful?  Yes   Call start time  1719   Call end time  1721   Discharge diagnosis  LAPAROTOMY EXPLORATORY, OVERSEW DUODENAL ULCER WITH FRED PATCH, KOCHER MANEUVER   Meds reviewed with patient/caregiver?  Yes   Is the patient having any side effects they believe may be caused by any medication additions or changes?  No   Does the patient have all medications related to this admission filled (includes all antibiotics, pain medications, etc.)  Yes   Is the patient taking all medications as directed (includes completed medication regime)?  Yes   Does the patient have a follow up appointment scheduled with their surgeon?  Yes   Has the patient kept scheduled appointments due by today?  Yes   What is the Home health agency?   [facility]   Has home health visited the patient within 72 hours of discharge?  Yes   Psychosocial issues?  No   Did the patient receive a copy of their discharge instructions?  Yes   Nursing interventions  Reviewed instructions with patient   What is the patient's perception of their health status since discharge?  Improving   Nursing interventions  Nurse provided patient education   Is the patient /caregiver able to teach back basic post-op care?  Practice 'cough and deep breath', No tub bath, swimming, or hot tub until instructed by MD, Do not remove steri-strips   Is the patient/caregiver able to teach back signs and symptoms of incisional infection?  Increased drainage or bleeding, Incisional warmth, Fever   Is the patient/caregiver able to teach back steps to recovery at home?  Practice good oral hygiene, Set small, achievable goals for return to baseline health, Make a list of questions for surgeon's appointment, Eat a well-balance diet   Is the patient/caregiver  able to teach back the hierarchy of who to call/visit for symptoms/problems? PCP, Specialist, Home health nurse, Urgent Care, ED, 911  Yes   Additional teach back comments  Still weak, eating better, incision is improving.   Week 3 call completed?  Yes          Constanza Linn RN

## 2019-06-18 ENCOUNTER — READMISSION MANAGEMENT (OUTPATIENT)
Dept: CALL CENTER | Facility: HOSPITAL | Age: 79
End: 2019-06-18

## 2019-06-18 NOTE — OUTREACH NOTE
General Surgery Week 4 Survey      Responses   Facility patient discharged from?  [place]   Does the patient have one of the following disease processes/diagnoses(primary or secondary)?  General Surgery   Week 4 attempt successful?  Yes   Call start time  1222   Call end time  1224   Discharge diagnosis  LAPAROTOMY EXPLORATORY, OVERSEW DUODENAL ULCER WITH FRED PATCH, KOCHER MANEUVER   Is the patient taking all medications as directed (includes completed medication regime)?  Yes   Has the patient kept scheduled appointments due by today?  Yes   Is the patient still receiving Home Health Services?  Yes   What is the patient's perception of their health status since discharge?  Improving   Nursing interventions  Nurse provided patient education   Is the patient/caregiver able to teach back steps to recovery at home?  Set small, achievable goals for return to baseline health, Rest and rebuild strength, gradually increase activity, Practice good oral hygiene, Eat a well-balance diet   Is the patient/caregiver able to teach back the hierarchy of who to call/visit for symptoms/problems? PCP, Specialist, Home health nurse, Urgent Care, ED, 911  Yes   Additional teach back comments  pt says she is still really weak, but is doing really good   Week 4 call completed?  Yes   Would the patient like one additional call?  No   Graduated  Yes   Did the patient feel the follow up calls were helpful during their recovery period?  Yes   Was the number of calls appropriate?  Yes          Becki Gooden RN

## 2019-06-27 ENCOUNTER — HOSPITAL ENCOUNTER (INPATIENT)
Facility: HOSPITAL | Age: 79
LOS: 9 days | Discharge: NURSING, REHAB OR LONG-TERM CARE | End: 2019-07-06
Attending: EMERGENCY MEDICINE | Admitting: FAMILY MEDICINE

## 2019-06-27 ENCOUNTER — APPOINTMENT (OUTPATIENT)
Dept: GENERAL RADIOLOGY | Facility: HOSPITAL | Age: 79
End: 2019-06-27

## 2019-06-27 ENCOUNTER — APPOINTMENT (OUTPATIENT)
Dept: CT IMAGING | Facility: HOSPITAL | Age: 79
End: 2019-06-27

## 2019-06-27 DIAGNOSIS — I95.89 HYPOTENSION DUE TO HYPOVOLEMIA: ICD-10-CM

## 2019-06-27 DIAGNOSIS — K26.5 DUODENAL ULCER WITH PERFORATION (HCC): ICD-10-CM

## 2019-06-27 DIAGNOSIS — Z74.09 MOBILITY IMPAIRED: ICD-10-CM

## 2019-06-27 DIAGNOSIS — K92.1 GASTROINTESTINAL HEMORRHAGE WITH MELENA: Primary | ICD-10-CM

## 2019-06-27 DIAGNOSIS — E86.1 HYPOTENSION DUE TO HYPOVOLEMIA: ICD-10-CM

## 2019-06-27 LAB
ABO GROUP BLD: NORMAL
ALBUMIN SERPL-MCNC: 1.8 G/DL (ref 3.5–5)
ALBUMIN/GLOB SERPL: 0.7 G/DL (ref 1.1–2.5)
ALP SERPL-CCNC: 181 U/L (ref 24–120)
ALT SERPL W P-5'-P-CCNC: 35 U/L (ref 0–54)
ANION GAP SERPL CALCULATED.3IONS-SCNC: 11 MMOL/L (ref 4–13)
APTT PPP: 43.2 SECONDS (ref 24.1–35)
AST SERPL-CCNC: 43 U/L (ref 7–45)
BASOPHILS # BLD AUTO: 0.02 10*3/MM3 (ref 0–0.2)
BASOPHILS NFR BLD AUTO: 0.2 % (ref 0–2)
BILIRUB SERPL-MCNC: 0.5 MG/DL (ref 0.1–1)
BLD GP AB SCN SERPL QL: NEGATIVE
BUN BLD-MCNC: 28 MG/DL (ref 5–21)
BUN/CREAT SERPL: 10.9 (ref 7–25)
CALCIUM SPEC-SCNC: 6.5 MG/DL (ref 8.4–10.4)
CHLORIDE SERPL-SCNC: 108 MMOL/L (ref 98–110)
CO2 SERPL-SCNC: 21 MMOL/L (ref 24–31)
CREAT BLD-MCNC: 2.57 MG/DL (ref 0.5–1.4)
D-LACTATE SERPL-SCNC: 1.4 MMOL/L (ref 0.5–2)
D-LACTATE SERPL-SCNC: 4.9 MMOL/L (ref 0.5–2)
DEPRECATED RDW RBC AUTO: 63.4 FL (ref 40–54)
DEVELOPER EXPIRATION DATE: ABNORMAL
DEVELOPER LOT NUMBER: 151
EOSINOPHIL # BLD AUTO: 0.02 10*3/MM3 (ref 0–0.7)
EOSINOPHIL NFR BLD AUTO: 0.2 % (ref 0–4)
ERYTHROCYTE [DISTWIDTH] IN BLOOD BY AUTOMATED COUNT: 18.4 % (ref 12–15)
EXPIRATION DATE: ABNORMAL
FECAL OCCULT BLOOD SCREEN, POC: POSITIVE
GFR SERPL CREATININE-BSD FRML MDRD: 18 ML/MIN/1.73
GLOBULIN UR ELPH-MCNC: 2.6 GM/DL
GLUCOSE BLD-MCNC: 110 MG/DL (ref 70–100)
HCT VFR BLD AUTO: 28 % (ref 37–47)
HCT VFR BLD AUTO: 29.5 % (ref 37–47)
HCT VFR BLD AUTO: 30.7 % (ref 37–47)
HGB BLD-MCNC: 10.1 G/DL (ref 12–16)
HGB BLD-MCNC: 10.3 G/DL (ref 12–16)
HGB BLD-MCNC: 9.2 G/DL (ref 12–16)
HOLD SPECIMEN: NORMAL
IMM GRANULOCYTES # BLD AUTO: 0.14 10*3/MM3 (ref 0–0.05)
IMM GRANULOCYTES NFR BLD AUTO: 1.5 % (ref 0–5)
INR PPP: 2.37 (ref 0.91–1.09)
LYMPHOCYTES # BLD AUTO: 0.71 10*3/MM3 (ref 0.72–4.86)
LYMPHOCYTES NFR BLD AUTO: 7.6 % (ref 15–45)
Lab: ABNORMAL
MCH RBC QN AUTO: 31.9 PG (ref 28–32)
MCHC RBC AUTO-ENTMCNC: 32.9 G/DL (ref 33–36)
MCV RBC AUTO: 97.2 FL (ref 82–98)
MONOCYTES # BLD AUTO: 0.23 10*3/MM3 (ref 0.19–1.3)
MONOCYTES NFR BLD AUTO: 2.4 % (ref 4–12)
NEGATIVE CONTROL: NEGATIVE
NEUTROPHILS # BLD AUTO: 8.27 10*3/MM3 (ref 1.87–8.4)
NEUTROPHILS NFR BLD AUTO: 88.1 % (ref 39–78)
NRBC BLD AUTO-RTO: 0.2 /100 WBC (ref 0–0.2)
PLATELET # BLD AUTO: 162 10*3/MM3 (ref 130–400)
PMV BLD AUTO: 11.3 FL (ref 6–12)
POSITIVE CONTROL: POSITIVE
POTASSIUM BLD-SCNC: 3.2 MMOL/L (ref 3.5–5.3)
PROT SERPL-MCNC: 4.4 G/DL (ref 6.3–8.7)
PROTHROMBIN TIME: 26.8 SECONDS (ref 11.9–14.6)
RBC # BLD AUTO: 2.88 10*6/MM3 (ref 4.2–5.4)
RH BLD: POSITIVE
SODIUM BLD-SCNC: 140 MMOL/L (ref 135–145)
T&S EXPIRATION DATE: NORMAL
WBC NRBC COR # BLD: 9.39 10*3/MM3 (ref 4.8–10.8)
WHOLE BLOOD HOLD SPECIMEN: NORMAL
WHOLE BLOOD HOLD SPECIMEN: NORMAL

## 2019-06-27 PROCEDURE — 86900 BLOOD TYPING SEROLOGIC ABO: CPT

## 2019-06-27 PROCEDURE — 85014 HEMATOCRIT: CPT | Performed by: INTERNAL MEDICINE

## 2019-06-27 PROCEDURE — C1751 CATH, INF, PER/CENT/MIDLINE: HCPCS

## 2019-06-27 PROCEDURE — 25010000002 ONDANSETRON PER 1 MG

## 2019-06-27 PROCEDURE — 86901 BLOOD TYPING SEROLOGIC RH(D): CPT | Performed by: EMERGENCY MEDICINE

## 2019-06-27 PROCEDURE — 87040 BLOOD CULTURE FOR BACTERIA: CPT | Performed by: EMERGENCY MEDICINE

## 2019-06-27 PROCEDURE — 93010 ELECTROCARDIOGRAM REPORT: CPT | Performed by: INTERNAL MEDICINE

## 2019-06-27 PROCEDURE — 85730 THROMBOPLASTIN TIME PARTIAL: CPT | Performed by: EMERGENCY MEDICINE

## 2019-06-27 PROCEDURE — 71045 X-RAY EXAM CHEST 1 VIEW: CPT

## 2019-06-27 PROCEDURE — 86900 BLOOD TYPING SEROLOGIC ABO: CPT | Performed by: EMERGENCY MEDICINE

## 2019-06-27 PROCEDURE — 82270 OCCULT BLOOD FECES: CPT | Performed by: EMERGENCY MEDICINE

## 2019-06-27 PROCEDURE — P9017 PLASMA 1 DONOR FRZ W/IN 8 HR: HCPCS

## 2019-06-27 PROCEDURE — 83605 ASSAY OF LACTIC ACID: CPT | Performed by: EMERGENCY MEDICINE

## 2019-06-27 PROCEDURE — 86927 PLASMA FRESH FROZEN: CPT

## 2019-06-27 PROCEDURE — 99285 EMERGENCY DEPT VISIT HI MDM: CPT

## 2019-06-27 PROCEDURE — 25010000002 LEVOFLOXACIN PER 250 MG: Performed by: INTERNAL MEDICINE

## 2019-06-27 PROCEDURE — 99223 1ST HOSP IP/OBS HIGH 75: CPT | Performed by: INTERNAL MEDICINE

## 2019-06-27 PROCEDURE — 85025 COMPLETE CBC W/AUTO DIFF WBC: CPT | Performed by: EMERGENCY MEDICINE

## 2019-06-27 PROCEDURE — 86923 COMPATIBILITY TEST ELECTRIC: CPT

## 2019-06-27 PROCEDURE — 93005 ELECTROCARDIOGRAM TRACING: CPT | Performed by: EMERGENCY MEDICINE

## 2019-06-27 PROCEDURE — P9016 RBC LEUKOCYTES REDUCED: HCPCS

## 2019-06-27 PROCEDURE — 85018 HEMOGLOBIN: CPT | Performed by: INTERNAL MEDICINE

## 2019-06-27 PROCEDURE — 80053 COMPREHEN METABOLIC PANEL: CPT | Performed by: EMERGENCY MEDICINE

## 2019-06-27 PROCEDURE — 74176 CT ABD & PELVIS W/O CONTRAST: CPT

## 2019-06-27 PROCEDURE — 99291 CRITICAL CARE FIRST HOUR: CPT

## 2019-06-27 PROCEDURE — 36430 TRANSFUSION BLD/BLD COMPNT: CPT

## 2019-06-27 PROCEDURE — 86850 RBC ANTIBODY SCREEN: CPT | Performed by: EMERGENCY MEDICINE

## 2019-06-27 PROCEDURE — 85610 PROTHROMBIN TIME: CPT | Performed by: EMERGENCY MEDICINE

## 2019-06-27 PROCEDURE — 25010000002 CEFTRIAXONE PER 250 MG: Performed by: EMERGENCY MEDICINE

## 2019-06-27 RX ORDER — LEVOFLOXACIN 5 MG/ML
500 INJECTION, SOLUTION INTRAVENOUS
Status: COMPLETED | OUTPATIENT
Start: 2019-06-27 | End: 2019-07-03

## 2019-06-27 RX ORDER — SODIUM CHLORIDE 0.9 % (FLUSH) 0.9 %
3 SYRINGE (ML) INJECTION EVERY 12 HOURS SCHEDULED
Status: DISCONTINUED | OUTPATIENT
Start: 2019-06-27 | End: 2019-07-02

## 2019-06-27 RX ORDER — ONDANSETRON 2 MG/ML
INJECTION INTRAMUSCULAR; INTRAVENOUS
Status: COMPLETED
Start: 2019-06-27 | End: 2019-06-27

## 2019-06-27 RX ORDER — SODIUM CHLORIDE 0.9 % (FLUSH) 0.9 %
3-10 SYRINGE (ML) INJECTION AS NEEDED
Status: DISCONTINUED | OUTPATIENT
Start: 2019-06-27 | End: 2019-07-02

## 2019-06-27 RX ORDER — PANTOPRAZOLE SODIUM 40 MG/10ML
40 INJECTION, POWDER, LYOPHILIZED, FOR SOLUTION INTRAVENOUS ONCE
Status: COMPLETED | OUTPATIENT
Start: 2019-06-27 | End: 2019-06-27

## 2019-06-27 RX ORDER — MEGESTROL ACETATE 40 MG/ML
400 SUSPENSION ORAL DAILY
COMMUNITY
End: 2019-07-06 | Stop reason: HOSPADM

## 2019-06-27 RX ORDER — MEGESTROL ACETATE 125 MG/ML
625 SUSPENSION ORAL DAILY
Status: ON HOLD | COMMUNITY
End: 2019-06-27

## 2019-06-27 RX ORDER — CLINDAMYCIN HYDROCHLORIDE 150 MG/1
150 CAPSULE ORAL 4 TIMES DAILY
COMMUNITY
Start: 2019-06-21 | End: 2019-07-06 | Stop reason: HOSPADM

## 2019-06-27 RX ORDER — ONDANSETRON 2 MG/ML
4 INJECTION INTRAMUSCULAR; INTRAVENOUS ONCE
Status: COMPLETED | OUTPATIENT
Start: 2019-06-27 | End: 2019-06-27

## 2019-06-27 RX ORDER — ALLOPURINOL 100 MG/1
100 TABLET ORAL DAILY
COMMUNITY

## 2019-06-27 RX ADMIN — ONDANSETRON 4 MG: 2 INJECTION INTRAMUSCULAR; INTRAVENOUS at 13:30

## 2019-06-27 RX ADMIN — LEVOFLOXACIN 500 MG: 5 INJECTION, SOLUTION INTRAVENOUS at 19:29

## 2019-06-27 RX ADMIN — PANTOPRAZOLE SODIUM 40 MG: 40 INJECTION, POWDER, FOR SOLUTION INTRAVENOUS at 12:35

## 2019-06-27 RX ADMIN — CEFTRIAXONE SODIUM 1 G: 1 INJECTION, POWDER, FOR SOLUTION INTRAMUSCULAR; INTRAVENOUS at 14:27

## 2019-06-27 RX ADMIN — METRONIDAZOLE 500 MG: 500 INJECTION, SOLUTION INTRAVENOUS at 17:57

## 2019-06-27 RX ADMIN — PANTOPRAZOLE SODIUM 8 MG/HR: 40 INJECTION, POWDER, FOR SOLUTION INTRAVENOUS at 22:04

## 2019-06-27 RX ADMIN — SODIUM CHLORIDE 1000 ML: 9 INJECTION, SOLUTION INTRAVENOUS at 12:14

## 2019-06-27 RX ADMIN — SODIUM CHLORIDE, PRESERVATIVE FREE 3 ML: 5 INJECTION INTRAVENOUS at 20:42

## 2019-06-27 RX ADMIN — ONDANSETRON HYDROCHLORIDE 4 MG: 2 SOLUTION INTRAMUSCULAR; INTRAVENOUS at 13:30

## 2019-06-27 RX ADMIN — PANTOPRAZOLE SODIUM 8 MG/HR: 40 INJECTION, POWDER, FOR SOLUTION INTRAVENOUS at 18:00

## 2019-06-28 LAB
ALBUMIN SERPL-MCNC: 2.1 G/DL (ref 3.5–5)
ALBUMIN/GLOB SERPL: 0.8 G/DL (ref 1.1–2.5)
ALP SERPL-CCNC: 179 U/L (ref 24–120)
ALT SERPL W P-5'-P-CCNC: 32 U/L (ref 0–54)
ANION GAP SERPL CALCULATED.3IONS-SCNC: 9 MMOL/L (ref 4–13)
AST SERPL-CCNC: 35 U/L (ref 7–45)
BASOPHILS # BLD AUTO: 0.02 10*3/MM3 (ref 0–0.2)
BASOPHILS NFR BLD AUTO: 0.2 % (ref 0–2)
BILIRUB SERPL-MCNC: 0.6 MG/DL (ref 0.1–1)
BUN BLD-MCNC: 38 MG/DL (ref 5–21)
BUN/CREAT SERPL: 12.6 (ref 7–25)
C DIFF TOX GENS STL QL NAA+PROBE: NEGATIVE
CALCIUM SPEC-SCNC: 7.2 MG/DL (ref 8.4–10.4)
CHLORIDE SERPL-SCNC: 106 MMOL/L (ref 98–110)
CO2 SERPL-SCNC: 26 MMOL/L (ref 24–31)
CREAT BLD-MCNC: 3.02 MG/DL (ref 0.5–1.4)
DEPRECATED RDW RBC AUTO: 62.3 FL (ref 40–54)
EOSINOPHIL # BLD AUTO: 0.03 10*3/MM3 (ref 0–0.7)
EOSINOPHIL NFR BLD AUTO: 0.3 % (ref 0–4)
ERYTHROCYTE [DISTWIDTH] IN BLOOD BY AUTOMATED COUNT: 19.4 % (ref 12–15)
GFR SERPL CREATININE-BSD FRML MDRD: 15 ML/MIN/1.73
GLOBULIN UR ELPH-MCNC: 2.8 GM/DL
GLUCOSE BLD-MCNC: 59 MG/DL (ref 70–100)
GLUCOSE BLDC GLUCOMTR-MCNC: 57 MG/DL (ref 70–130)
GLUCOSE BLDC GLUCOMTR-MCNC: 91 MG/DL (ref 70–130)
HCT VFR BLD AUTO: 29.3 % (ref 37–47)
HCT VFR BLD AUTO: 30.5 % (ref 37–47)
HCT VFR BLD AUTO: 30.5 % (ref 37–47)
HCT VFR BLD AUTO: 30.6 % (ref 37–47)
HCT VFR BLD AUTO: 30.9 % (ref 37–47)
HEMOCCULT STL QL: POSITIVE
HGB BLD-MCNC: 10.2 G/DL (ref 12–16)
HGB BLD-MCNC: 10.3 G/DL (ref 12–16)
HGB BLD-MCNC: 10.3 G/DL (ref 12–16)
HGB BLD-MCNC: 10.5 G/DL (ref 12–16)
HGB BLD-MCNC: 9.9 G/DL (ref 12–16)
IMM GRANULOCYTES # BLD AUTO: 0.05 10*3/MM3 (ref 0–0.05)
IMM GRANULOCYTES NFR BLD AUTO: 0.5 % (ref 0–5)
INR PPP: 1.76 (ref 0.91–1.09)
LYMPHOCYTES # BLD AUTO: 0.92 10*3/MM3 (ref 0.72–4.86)
LYMPHOCYTES NFR BLD AUTO: 10 % (ref 15–45)
MAGNESIUM SERPL-MCNC: 1.6 MG/DL (ref 1.4–2.2)
MCH RBC QN AUTO: 30.9 PG (ref 28–32)
MCHC RBC AUTO-ENTMCNC: 33.8 G/DL (ref 33–36)
MCV RBC AUTO: 91.6 FL (ref 82–98)
MONOCYTES # BLD AUTO: 0.26 10*3/MM3 (ref 0.19–1.3)
MONOCYTES NFR BLD AUTO: 2.8 % (ref 4–12)
NEUTROPHILS # BLD AUTO: 7.9 10*3/MM3 (ref 1.87–8.4)
NEUTROPHILS NFR BLD AUTO: 86.2 % (ref 39–78)
NRBC BLD AUTO-RTO: 0 /100 WBC (ref 0–0.2)
PHOSPHATE SERPL-MCNC: 2.5 MG/DL (ref 2.5–4.5)
PLATELET # BLD AUTO: 151 10*3/MM3 (ref 130–400)
PMV BLD AUTO: 11.5 FL (ref 6–12)
POTASSIUM BLD-SCNC: 3.5 MMOL/L (ref 3.5–5.3)
PROT SERPL-MCNC: 4.9 G/DL (ref 6.3–8.7)
PROTHROMBIN TIME: 21.1 SECONDS (ref 11.9–14.6)
RBC # BLD AUTO: 3.33 10*6/MM3 (ref 4.2–5.4)
SODIUM BLD-SCNC: 141 MMOL/L (ref 135–145)
WBC NRBC COR # BLD: 9.18 10*3/MM3 (ref 4.8–10.8)

## 2019-06-28 PROCEDURE — 85018 HEMOGLOBIN: CPT | Performed by: INTERNAL MEDICINE

## 2019-06-28 PROCEDURE — 84100 ASSAY OF PHOSPHORUS: CPT | Performed by: INTERNAL MEDICINE

## 2019-06-28 PROCEDURE — 86927 PLASMA FRESH FROZEN: CPT

## 2019-06-28 PROCEDURE — 85014 HEMATOCRIT: CPT | Performed by: INTERNAL MEDICINE

## 2019-06-28 PROCEDURE — 82272 OCCULT BLD FECES 1-3 TESTS: CPT | Performed by: EMERGENCY MEDICINE

## 2019-06-28 PROCEDURE — 25010000002 MAGNESIUM SULFATE IN D5W 1G/100ML (PREMIX) 1-5 GM/100ML-% SOLUTION: Performed by: INTERNAL MEDICINE

## 2019-06-28 PROCEDURE — 80053 COMPREHEN METABOLIC PANEL: CPT | Performed by: INTERNAL MEDICINE

## 2019-06-28 PROCEDURE — 87493 C DIFF AMPLIFIED PROBE: CPT | Performed by: INTERNAL MEDICINE

## 2019-06-28 PROCEDURE — 85025 COMPLETE CBC W/AUTO DIFF WBC: CPT | Performed by: INTERNAL MEDICINE

## 2019-06-28 PROCEDURE — 99232 SBSQ HOSP IP/OBS MODERATE 35: CPT | Performed by: INTERNAL MEDICINE

## 2019-06-28 PROCEDURE — 82962 GLUCOSE BLOOD TEST: CPT

## 2019-06-28 PROCEDURE — 83735 ASSAY OF MAGNESIUM: CPT | Performed by: INTERNAL MEDICINE

## 2019-06-28 PROCEDURE — 5A1D70Z PERFORMANCE OF URINARY FILTRATION, INTERMITTENT, LESS THAN 6 HOURS PER DAY: ICD-10-PCS | Performed by: INTERNAL MEDICINE

## 2019-06-28 PROCEDURE — 85610 PROTHROMBIN TIME: CPT | Performed by: INTERNAL MEDICINE

## 2019-06-28 PROCEDURE — P9017 PLASMA 1 DONOR FRZ W/IN 8 HR: HCPCS

## 2019-06-28 RX ORDER — MAGNESIUM SULFATE 1 G/100ML
1 INJECTION INTRAVENOUS ONCE
Status: COMPLETED | OUTPATIENT
Start: 2019-06-28 | End: 2019-06-28

## 2019-06-28 RX ORDER — DEXTROSE AND SODIUM CHLORIDE 5; .45 G/100ML; G/100ML
75 INJECTION, SOLUTION INTRAVENOUS CONTINUOUS
Status: DISCONTINUED | OUTPATIENT
Start: 2019-06-28 | End: 2019-06-28

## 2019-06-28 RX ORDER — DEXTROSE AND SODIUM CHLORIDE 5; .9 G/100ML; G/100ML
50 INJECTION, SOLUTION INTRAVENOUS CONTINUOUS
Status: DISCONTINUED | OUTPATIENT
Start: 2019-06-28 | End: 2019-07-02

## 2019-06-28 RX ORDER — DEXTROSE, SODIUM CHLORIDE, AND POTASSIUM CHLORIDE 5; .45; .15 G/100ML; G/100ML; G/100ML
75 INJECTION INTRAVENOUS CONTINUOUS
Status: DISCONTINUED | OUTPATIENT
Start: 2019-06-28 | End: 2019-06-28

## 2019-06-28 RX ORDER — DEXTROSE MONOHYDRATE 25 G/50ML
50 INJECTION, SOLUTION INTRAVENOUS
Status: DISCONTINUED | OUTPATIENT
Start: 2019-06-28 | End: 2019-07-06 | Stop reason: HOSPADM

## 2019-06-28 RX ORDER — DEXTROSE MONOHYDRATE 25 G/50ML
INJECTION, SOLUTION INTRAVENOUS
Status: COMPLETED
Start: 2019-06-28 | End: 2019-06-28

## 2019-06-28 RX ORDER — ALBUMIN (HUMAN) 12.5 G/50ML
12.5 SOLUTION INTRAVENOUS AS NEEDED
Status: CANCELLED | OUTPATIENT
Start: 2019-06-28 | End: 2019-06-29

## 2019-06-28 RX ORDER — ALBUMIN (HUMAN) 12.5 G/50ML
12.5 SOLUTION INTRAVENOUS AS NEEDED
Status: COMPLETED | OUTPATIENT
Start: 2019-06-28 | End: 2019-06-29

## 2019-06-28 RX ADMIN — DEXTROSE MONOHYDRATE 50 ML: 25 INJECTION, SOLUTION INTRAVENOUS at 07:44

## 2019-06-28 RX ADMIN — MAGNESIUM SULFATE HEPTAHYDRATE 1 G: 1 INJECTION, SOLUTION INTRAVENOUS at 13:41

## 2019-06-28 RX ADMIN — METRONIDAZOLE 500 MG: 500 INJECTION, SOLUTION INTRAVENOUS at 09:08

## 2019-06-28 RX ADMIN — POTASSIUM CHLORIDE, DEXTROSE MONOHYDRATE AND SODIUM CHLORIDE 75 ML/HR: 150; 5; 450 INJECTION, SOLUTION INTRAVENOUS at 10:13

## 2019-06-28 RX ADMIN — PANTOPRAZOLE SODIUM 8 MG/HR: 40 INJECTION, POWDER, FOR SOLUTION INTRAVENOUS at 07:42

## 2019-06-28 RX ADMIN — SODIUM CHLORIDE, PRESERVATIVE FREE 3 ML: 5 INJECTION INTRAVENOUS at 08:08

## 2019-06-28 RX ADMIN — DEXTROSE MONOHYDRATE 50 ML: 500 INJECTION PARENTERAL at 07:44

## 2019-06-28 RX ADMIN — DEXTROSE AND SODIUM CHLORIDE 50 ML/HR: 5; 900 INJECTION, SOLUTION INTRAVENOUS at 17:30

## 2019-06-28 RX ADMIN — PANTOPRAZOLE SODIUM 8 MG/HR: 40 INJECTION, POWDER, FOR SOLUTION INTRAVENOUS at 13:00

## 2019-06-28 RX ADMIN — METRONIDAZOLE 500 MG: 500 INJECTION, SOLUTION INTRAVENOUS at 17:33

## 2019-06-28 RX ADMIN — PANTOPRAZOLE SODIUM 8 MG/HR: 40 INJECTION, POWDER, FOR SOLUTION INTRAVENOUS at 17:36

## 2019-06-28 RX ADMIN — PANTOPRAZOLE SODIUM 8 MG/HR: 40 INJECTION, POWDER, FOR SOLUTION INTRAVENOUS at 22:40

## 2019-06-28 RX ADMIN — COLLAGENASE SANTYL 1 APPLICATION: 250 OINTMENT TOPICAL at 22:18

## 2019-06-28 RX ADMIN — PANTOPRAZOLE SODIUM 8 MG/HR: 40 INJECTION, POWDER, FOR SOLUTION INTRAVENOUS at 02:39

## 2019-06-28 RX ADMIN — METRONIDAZOLE 500 MG: 500 INJECTION, SOLUTION INTRAVENOUS at 01:37

## 2019-06-28 RX ADMIN — SODIUM CHLORIDE, PRESERVATIVE FREE 3 ML: 5 INJECTION INTRAVENOUS at 22:18

## 2019-06-29 LAB
ABO + RH BLD: NORMAL
ANION GAP SERPL CALCULATED.3IONS-SCNC: 6 MMOL/L (ref 4–13)
BASOPHILS # BLD AUTO: 0.02 10*3/MM3 (ref 0–0.2)
BASOPHILS NFR BLD AUTO: 0.2 % (ref 0–2)
BH BB BLOOD EXPIRATION DATE: NORMAL
BH BB BLOOD TYPE BARCODE: 6200
BH BB DISPENSE STATUS: NORMAL
BH BB PRODUCT CODE: NORMAL
BH BB UNIT NUMBER: NORMAL
BUN BLD-MCNC: 20 MG/DL (ref 5–21)
BUN/CREAT SERPL: 8.7 (ref 7–25)
CALCIUM SPEC-SCNC: 7.4 MG/DL (ref 8.4–10.4)
CHLORIDE SERPL-SCNC: 106 MMOL/L (ref 98–110)
CO2 SERPL-SCNC: 28 MMOL/L (ref 24–31)
CREAT BLD-MCNC: 2.31 MG/DL (ref 0.5–1.4)
DEPRECATED RDW RBC AUTO: 60.4 FL (ref 40–54)
EOSINOPHIL # BLD AUTO: 0.18 10*3/MM3 (ref 0–0.7)
EOSINOPHIL NFR BLD AUTO: 2.2 % (ref 0–4)
ERYTHROCYTE [DISTWIDTH] IN BLOOD BY AUTOMATED COUNT: 19.2 % (ref 12–15)
GFR SERPL CREATININE-BSD FRML MDRD: 20 ML/MIN/1.73
GLUCOSE BLD-MCNC: 96 MG/DL (ref 70–100)
GLUCOSE BLDC GLUCOMTR-MCNC: 90 MG/DL (ref 70–130)
HCT VFR BLD AUTO: 28 % (ref 37–47)
HCT VFR BLD AUTO: 28.2 % (ref 37–47)
HCT VFR BLD AUTO: 29 % (ref 37–47)
HCT VFR BLD AUTO: 29.1 % (ref 37–47)
HGB BLD-MCNC: 9.3 G/DL (ref 12–16)
HGB BLD-MCNC: 9.6 G/DL (ref 12–16)
HGB BLD-MCNC: 9.7 G/DL (ref 12–16)
HGB BLD-MCNC: 9.8 G/DL (ref 12–16)
IMM GRANULOCYTES # BLD AUTO: 0.05 10*3/MM3 (ref 0–0.05)
IMM GRANULOCYTES NFR BLD AUTO: 0.6 % (ref 0–5)
INR PPP: 1.81 (ref 0.91–1.09)
LYMPHOCYTES # BLD AUTO: 1.09 10*3/MM3 (ref 0.72–4.86)
LYMPHOCYTES NFR BLD AUTO: 13.5 % (ref 15–45)
MAGNESIUM SERPL-MCNC: 1.7 MG/DL (ref 1.4–2.2)
MCH RBC QN AUTO: 30.7 PG (ref 28–32)
MCHC RBC AUTO-ENTMCNC: 33.3 G/DL (ref 33–36)
MCV RBC AUTO: 92.1 FL (ref 82–98)
MONOCYTES # BLD AUTO: 0.33 10*3/MM3 (ref 0.19–1.3)
MONOCYTES NFR BLD AUTO: 4.1 % (ref 4–12)
NEUTROPHILS # BLD AUTO: 6.43 10*3/MM3 (ref 1.87–8.4)
NEUTROPHILS NFR BLD AUTO: 79.4 % (ref 39–78)
NRBC BLD AUTO-RTO: 0 /100 WBC (ref 0–0.2)
PHOSPHATE SERPL-MCNC: 2 MG/DL (ref 2.5–4.5)
PLATELET # BLD AUTO: 117 10*3/MM3 (ref 130–400)
PMV BLD AUTO: 12.1 FL (ref 6–12)
POTASSIUM BLD-SCNC: 3.2 MMOL/L (ref 3.5–5.3)
PROTHROMBIN TIME: 21.6 SECONDS (ref 11.9–14.6)
RBC # BLD AUTO: 3.16 10*6/MM3 (ref 4.2–5.4)
SODIUM BLD-SCNC: 140 MMOL/L (ref 135–145)
UNIT  ABO: NORMAL
UNIT  RH: NORMAL
WBC NRBC COR # BLD: 8.1 10*3/MM3 (ref 4.8–10.8)

## 2019-06-29 PROCEDURE — 5A1D70Z PERFORMANCE OF URINARY FILTRATION, INTERMITTENT, LESS THAN 6 HOURS PER DAY: ICD-10-PCS | Performed by: INTERNAL MEDICINE

## 2019-06-29 PROCEDURE — 85610 PROTHROMBIN TIME: CPT | Performed by: INTERNAL MEDICINE

## 2019-06-29 PROCEDURE — 83735 ASSAY OF MAGNESIUM: CPT | Performed by: INTERNAL MEDICINE

## 2019-06-29 PROCEDURE — 82962 GLUCOSE BLOOD TEST: CPT

## 2019-06-29 PROCEDURE — 85025 COMPLETE CBC W/AUTO DIFF WBC: CPT | Performed by: SPECIALIST

## 2019-06-29 PROCEDURE — 25010000002 LEVOFLOXACIN PER 250 MG: Performed by: INTERNAL MEDICINE

## 2019-06-29 PROCEDURE — 85014 HEMATOCRIT: CPT | Performed by: INTERNAL MEDICINE

## 2019-06-29 PROCEDURE — 80048 BASIC METABOLIC PNL TOTAL CA: CPT | Performed by: INTERNAL MEDICINE

## 2019-06-29 PROCEDURE — 84100 ASSAY OF PHOSPHORUS: CPT | Performed by: INTERNAL MEDICINE

## 2019-06-29 PROCEDURE — 85018 HEMOGLOBIN: CPT | Performed by: INTERNAL MEDICINE

## 2019-06-29 PROCEDURE — P9047 ALBUMIN (HUMAN), 25%, 50ML: HCPCS | Performed by: INTERNAL MEDICINE

## 2019-06-29 PROCEDURE — 25010000002 ALBUMIN HUMAN 25% PER 50 ML: Performed by: INTERNAL MEDICINE

## 2019-06-29 RX ADMIN — LEVOFLOXACIN 500 MG: 5 INJECTION, SOLUTION INTRAVENOUS at 17:09

## 2019-06-29 RX ADMIN — ALBUMIN HUMAN 12.5 G: 0.25 SOLUTION INTRAVENOUS at 09:08

## 2019-06-29 RX ADMIN — PANTOPRAZOLE SODIUM 8 MG/HR: 40 INJECTION, POWDER, FOR SOLUTION INTRAVENOUS at 23:01

## 2019-06-29 RX ADMIN — METRONIDAZOLE 500 MG: 500 INJECTION, SOLUTION INTRAVENOUS at 17:08

## 2019-06-29 RX ADMIN — PANTOPRAZOLE SODIUM 8 MG/HR: 40 INJECTION, POWDER, FOR SOLUTION INTRAVENOUS at 03:30

## 2019-06-29 RX ADMIN — ALBUMIN HUMAN 12.5 G: 0.25 SOLUTION INTRAVENOUS at 08:00

## 2019-06-29 RX ADMIN — PANTOPRAZOLE SODIUM 8 MG/HR: 40 INJECTION, POWDER, FOR SOLUTION INTRAVENOUS at 08:37

## 2019-06-29 RX ADMIN — SODIUM CHLORIDE, PRESERVATIVE FREE 3 ML: 5 INJECTION INTRAVENOUS at 20:28

## 2019-06-29 RX ADMIN — PANTOPRAZOLE SODIUM 8 MG/HR: 40 INJECTION, POWDER, FOR SOLUTION INTRAVENOUS at 14:25

## 2019-06-29 RX ADMIN — METRONIDAZOLE 500 MG: 500 INJECTION, SOLUTION INTRAVENOUS at 01:19

## 2019-06-29 RX ADMIN — PANTOPRAZOLE SODIUM 8 MG/HR: 40 INJECTION, POWDER, FOR SOLUTION INTRAVENOUS at 17:09

## 2019-06-29 RX ADMIN — DEXTROSE AND SODIUM CHLORIDE 50 ML/HR: 5; 900 INJECTION, SOLUTION INTRAVENOUS at 17:00

## 2019-06-29 RX ADMIN — COLLAGENASE SANTYL 1 APPLICATION: 250 OINTMENT TOPICAL at 08:37

## 2019-06-29 RX ADMIN — ALBUMIN HUMAN 12.5 G: 0.25 SOLUTION INTRAVENOUS at 09:30

## 2019-06-29 RX ADMIN — ALBUMIN HUMAN 12.5 G: 0.25 SOLUTION INTRAVENOUS at 08:01

## 2019-06-29 RX ADMIN — METRONIDAZOLE 500 MG: 500 INJECTION, SOLUTION INTRAVENOUS at 12:00

## 2019-06-30 PROBLEM — D64.9 ACUTE ON CHRONIC ANEMIA: Status: ACTIVE | Noted: 2019-06-30

## 2019-06-30 PROBLEM — K52.9 ACUTE COLITIS: Status: ACTIVE | Noted: 2019-06-30

## 2019-06-30 PROBLEM — N18.6 ESRD (END STAGE RENAL DISEASE) (HCC): Status: ACTIVE | Noted: 2019-06-30

## 2019-06-30 PROBLEM — K26.9 DUODENAL ULCER: Status: ACTIVE | Noted: 2019-06-30

## 2019-06-30 LAB
ANION GAP SERPL CALCULATED.3IONS-SCNC: 8 MMOL/L (ref 4–13)
BASOPHILS # BLD AUTO: 0.03 10*3/MM3 (ref 0–0.2)
BASOPHILS NFR BLD AUTO: 0.4 % (ref 0–2)
BUN BLD-MCNC: 10 MG/DL (ref 5–21)
BUN/CREAT SERPL: 5.7 (ref 7–25)
CALCIUM SPEC-SCNC: 7.3 MG/DL (ref 8.4–10.4)
CHLORIDE SERPL-SCNC: 106 MMOL/L (ref 98–110)
CO2 SERPL-SCNC: 27 MMOL/L (ref 24–31)
CREAT BLD-MCNC: 1.75 MG/DL (ref 0.5–1.4)
DEPRECATED RDW RBC AUTO: 62.5 FL (ref 40–54)
EOSINOPHIL # BLD AUTO: 0.17 10*3/MM3 (ref 0–0.7)
EOSINOPHIL NFR BLD AUTO: 2.2 % (ref 0–4)
ERYTHROCYTE [DISTWIDTH] IN BLOOD BY AUTOMATED COUNT: 19.2 % (ref 12–15)
GFR SERPL CREATININE-BSD FRML MDRD: 28 ML/MIN/1.73
GLUCOSE BLD-MCNC: 75 MG/DL (ref 70–100)
GLUCOSE BLDC GLUCOMTR-MCNC: 102 MG/DL (ref 70–130)
HCT VFR BLD AUTO: 25.8 % (ref 37–47)
HCT VFR BLD AUTO: 27.9 % (ref 37–47)
HGB BLD-MCNC: 8.5 G/DL (ref 12–16)
HGB BLD-MCNC: 9.2 G/DL (ref 12–16)
IMM GRANULOCYTES # BLD AUTO: 0.07 10*3/MM3 (ref 0–0.05)
IMM GRANULOCYTES NFR BLD AUTO: 0.9 % (ref 0–5)
LYMPHOCYTES # BLD AUTO: 0.94 10*3/MM3 (ref 0.72–4.86)
LYMPHOCYTES NFR BLD AUTO: 12.4 % (ref 15–45)
MAGNESIUM SERPL-MCNC: 1.7 MG/DL (ref 1.4–2.2)
MCH RBC QN AUTO: 31.4 PG (ref 28–32)
MCHC RBC AUTO-ENTMCNC: 33 G/DL (ref 33–36)
MCV RBC AUTO: 95.2 FL (ref 82–98)
MONOCYTES # BLD AUTO: 0.33 10*3/MM3 (ref 0.19–1.3)
MONOCYTES NFR BLD AUTO: 4.4 % (ref 4–12)
NEUTROPHILS # BLD AUTO: 6.02 10*3/MM3 (ref 1.87–8.4)
NEUTROPHILS NFR BLD AUTO: 79.7 % (ref 39–78)
NRBC BLD AUTO-RTO: 0 /100 WBC (ref 0–0.2)
PHOSPHATE SERPL-MCNC: 1.7 MG/DL (ref 2.5–4.5)
PLATELET # BLD AUTO: 89 10*3/MM3 (ref 130–400)
PMV BLD AUTO: 11.8 FL (ref 6–12)
POTASSIUM BLD-SCNC: 3.6 MMOL/L (ref 3.5–5.3)
RBC # BLD AUTO: 2.93 10*6/MM3 (ref 4.2–5.4)
SODIUM BLD-SCNC: 141 MMOL/L (ref 135–145)
WBC NRBC COR # BLD: 7.56 10*3/MM3 (ref 4.8–10.8)

## 2019-06-30 PROCEDURE — 99232 SBSQ HOSP IP/OBS MODERATE 35: CPT | Performed by: INTERNAL MEDICINE

## 2019-06-30 PROCEDURE — 82962 GLUCOSE BLOOD TEST: CPT

## 2019-06-30 PROCEDURE — 80048 BASIC METABOLIC PNL TOTAL CA: CPT | Performed by: INTERNAL MEDICINE

## 2019-06-30 PROCEDURE — 83735 ASSAY OF MAGNESIUM: CPT | Performed by: INTERNAL MEDICINE

## 2019-06-30 PROCEDURE — 85018 HEMOGLOBIN: CPT | Performed by: INTERNAL MEDICINE

## 2019-06-30 PROCEDURE — 85025 COMPLETE CBC W/AUTO DIFF WBC: CPT | Performed by: INTERNAL MEDICINE

## 2019-06-30 PROCEDURE — 84100 ASSAY OF PHOSPHORUS: CPT | Performed by: INTERNAL MEDICINE

## 2019-06-30 PROCEDURE — 25010000002 DIGOXIN PER 500 MCG: Performed by: INTERNAL MEDICINE

## 2019-06-30 PROCEDURE — 85014 HEMATOCRIT: CPT | Performed by: INTERNAL MEDICINE

## 2019-06-30 RX ORDER — DIGOXIN 0.25 MG/ML
125 INJECTION INTRAMUSCULAR; INTRAVENOUS ONCE
Status: COMPLETED | OUTPATIENT
Start: 2019-06-30 | End: 2019-06-30

## 2019-06-30 RX ADMIN — PANTOPRAZOLE SODIUM 8 MG/HR: 40 INJECTION, POWDER, FOR SOLUTION INTRAVENOUS at 16:36

## 2019-06-30 RX ADMIN — COLLAGENASE SANTYL 1 APPLICATION: 250 OINTMENT TOPICAL at 08:19

## 2019-06-30 RX ADMIN — DIGOXIN 125 MCG: 0.25 INJECTION INTRAMUSCULAR; INTRAVENOUS at 08:15

## 2019-06-30 RX ADMIN — DEXTROSE AND SODIUM CHLORIDE 50 ML/HR: 5; 900 INJECTION, SOLUTION INTRAVENOUS at 22:03

## 2019-06-30 RX ADMIN — METRONIDAZOLE 500 MG: 500 INJECTION, SOLUTION INTRAVENOUS at 08:19

## 2019-06-30 RX ADMIN — PANTOPRAZOLE SODIUM 8 MG/HR: 40 INJECTION, POWDER, FOR SOLUTION INTRAVENOUS at 13:05

## 2019-06-30 RX ADMIN — METRONIDAZOLE 500 MG: 500 INJECTION, SOLUTION INTRAVENOUS at 01:11

## 2019-06-30 RX ADMIN — PANTOPRAZOLE SODIUM 8 MG/HR: 40 INJECTION, POWDER, FOR SOLUTION INTRAVENOUS at 08:32

## 2019-06-30 RX ADMIN — PANTOPRAZOLE SODIUM 8 MG/HR: 40 INJECTION, POWDER, FOR SOLUTION INTRAVENOUS at 04:06

## 2019-06-30 RX ADMIN — METRONIDAZOLE 500 MG: 500 INJECTION, SOLUTION INTRAVENOUS at 16:36

## 2019-06-30 RX ADMIN — PANTOPRAZOLE SODIUM 8 MG/HR: 40 INJECTION, POWDER, FOR SOLUTION INTRAVENOUS at 22:03

## 2019-06-30 RX ADMIN — SODIUM CHLORIDE, PRESERVATIVE FREE 3 ML: 5 INJECTION INTRAVENOUS at 20:28

## 2019-07-01 ENCOUNTER — APPOINTMENT (OUTPATIENT)
Dept: CARDIOLOGY | Facility: HOSPITAL | Age: 79
End: 2019-07-01

## 2019-07-01 ENCOUNTER — APPOINTMENT (OUTPATIENT)
Dept: GENERAL RADIOLOGY | Facility: HOSPITAL | Age: 79
End: 2019-07-01

## 2019-07-01 PROBLEM — I51.89 GRADE II DIASTOLIC DYSFUNCTION: Status: ACTIVE | Noted: 2019-07-01

## 2019-07-01 LAB
ABO + RH BLD: NORMAL
ANION GAP SERPL CALCULATED.3IONS-SCNC: 7 MMOL/L (ref 4–13)
BH BB BLOOD EXPIRATION DATE: NORMAL
BH BB BLOOD TYPE BARCODE: 6200
BH BB DISPENSE STATUS: NORMAL
BH BB PRODUCT CODE: NORMAL
BH BB UNIT NUMBER: NORMAL
BH CV ECHO MEAS - AO MAX PG (FULL): 20.4 MMHG
BH CV ECHO MEAS - AO MAX PG: 22.1 MMHG
BH CV ECHO MEAS - AO MEAN PG (FULL): 6 MMHG
BH CV ECHO MEAS - AO MEAN PG: 7 MMHG
BH CV ECHO MEAS - AO ROOT AREA (BSA CORRECTED): 1.3
BH CV ECHO MEAS - AO ROOT AREA: 3.5 CM^2
BH CV ECHO MEAS - AO ROOT DIAM: 2.1 CM
BH CV ECHO MEAS - AO V2 MAX: 235 CM/SEC
BH CV ECHO MEAS - AO V2 MEAN: 114 CM/SEC
BH CV ECHO MEAS - AO V2 VTI: 36.2 CM
BH CV ECHO MEAS - AVA(I,A): 0.76 CM^2
BH CV ECHO MEAS - AVA(I,D): 0.76 CM^2
BH CV ECHO MEAS - AVA(V,A): 0.79 CM^2
BH CV ECHO MEAS - AVA(V,D): 0.79 CM^2
BH CV ECHO MEAS - BSA(HAYCOCK): 1.7 M^2
BH CV ECHO MEAS - BSA: 1.6 M^2
BH CV ECHO MEAS - BZI_BMI: 27.1 KILOGRAMS/M^2
BH CV ECHO MEAS - BZI_METRIC_HEIGHT: 152.4 CM
BH CV ECHO MEAS - BZI_METRIC_WEIGHT: 63.1 KG
BH CV ECHO MEAS - EDV(CUBED): 52.7 ML
BH CV ECHO MEAS - EDV(MOD-SP4): 73.3 ML
BH CV ECHO MEAS - EDV(TEICH): 60 ML
BH CV ECHO MEAS - EF(CUBED): 18.7 %
BH CV ECHO MEAS - EF(MOD-SP4): 27.7 %
BH CV ECHO MEAS - EF(TEICH): 15.3 %
BH CV ECHO MEAS - ESV(CUBED): 42.9 ML
BH CV ECHO MEAS - ESV(MOD-SP4): 53 ML
BH CV ECHO MEAS - ESV(TEICH): 50.9 ML
BH CV ECHO MEAS - FS: 6.7 %
BH CV ECHO MEAS - IVS/LVPW: 0.73
BH CV ECHO MEAS - IVSD: 0.88 CM
BH CV ECHO MEAS - LA DIMENSION: 4.5 CM
BH CV ECHO MEAS - LA/AO: 2.1
BH CV ECHO MEAS - LAT PEAK E' VEL: 8.8 CM/SEC
BH CV ECHO MEAS - LV DIASTOLIC VOL/BSA (35-75): 45.8 ML/M^2
BH CV ECHO MEAS - LV MASS(C)D: 122.8 GRAMS
BH CV ECHO MEAS - LV MASS(C)DI: 76.8 GRAMS/M^2
BH CV ECHO MEAS - LV MAX PG: 1.7 MMHG
BH CV ECHO MEAS - LV MEAN PG: 1 MMHG
BH CV ECHO MEAS - LV SYSTOLIC VOL/BSA (12-30): 33.1 ML/M^2
BH CV ECHO MEAS - LV V1 MAX: 65.3 CM/SEC
BH CV ECHO MEAS - LV V1 MEAN: 38.7 CM/SEC
BH CV ECHO MEAS - LV V1 VTI: 9.7 CM
BH CV ECHO MEAS - LVIDD: 3.8 CM
BH CV ECHO MEAS - LVIDS: 3.5 CM
BH CV ECHO MEAS - LVLD AP4: 7.8 CM
BH CV ECHO MEAS - LVLS AP4: 7.4 CM
BH CV ECHO MEAS - LVOT AREA (M): 2.8 CM^2
BH CV ECHO MEAS - LVOT AREA: 2.8 CM^2
BH CV ECHO MEAS - LVOT DIAM: 1.9 CM
BH CV ECHO MEAS - LVPWD: 1.2 CM
BH CV ECHO MEAS - MED PEAK E' VEL: 5.66 CM/SEC
BH CV ECHO MEAS - MV DEC TIME: 0.21 SEC
BH CV ECHO MEAS - MV E MAX VEL: 138 CM/SEC
BH CV ECHO MEAS - RAP SYSTOLE: 5 MMHG
BH CV ECHO MEAS - RVSP: 62.5 MMHG
BH CV ECHO MEAS - SI(AO): 78.4 ML/M^2
BH CV ECHO MEAS - SI(CUBED): 6.2 ML/M^2
BH CV ECHO MEAS - SI(LVOT): 17.1 ML/M^2
BH CV ECHO MEAS - SI(MOD-SP4): 12.7 ML/M^2
BH CV ECHO MEAS - SI(TEICH): 5.7 ML/M^2
BH CV ECHO MEAS - SV(AO): 125.4 ML
BH CV ECHO MEAS - SV(CUBED): 9.9 ML
BH CV ECHO MEAS - SV(LVOT): 27.4 ML
BH CV ECHO MEAS - SV(MOD-SP4): 20.3 ML
BH CV ECHO MEAS - SV(TEICH): 9.2 ML
BH CV ECHO MEAS - TR MAX VEL: 379 CM/SEC
BH CV ECHO MEASUREMENTS AVERAGE E/E' RATIO: 19.09
BUN BLD-MCNC: 15 MG/DL (ref 5–21)
BUN/CREAT SERPL: 6.1 (ref 7–25)
CALCIUM SPEC-SCNC: 7.4 MG/DL (ref 8.4–10.4)
CHLORIDE SERPL-SCNC: 109 MMOL/L (ref 98–110)
CO2 SERPL-SCNC: 27 MMOL/L (ref 24–31)
CREAT BLD-MCNC: 2.45 MG/DL (ref 0.5–1.4)
DEPRECATED RDW RBC AUTO: 62.1 FL (ref 40–54)
ERYTHROCYTE [DISTWIDTH] IN BLOOD BY AUTOMATED COUNT: 19.4 % (ref 12–15)
GFR SERPL CREATININE-BSD FRML MDRD: 19 ML/MIN/1.73
GLUCOSE BLD-MCNC: 94 MG/DL (ref 70–100)
HCT VFR BLD AUTO: 30.3 % (ref 37–47)
HGB BLD-MCNC: 9.9 G/DL (ref 12–16)
MAXIMAL PREDICTED HEART RATE: 142 BPM
MCH RBC QN AUTO: 31.1 PG (ref 28–32)
MCHC RBC AUTO-ENTMCNC: 32.7 G/DL (ref 33–36)
MCV RBC AUTO: 95.3 FL (ref 82–98)
PHOSPHATE SERPL-MCNC: 2.2 MG/DL (ref 2.5–4.5)
PLATELET # BLD AUTO: 114 10*3/MM3 (ref 130–400)
PMV BLD AUTO: 12.1 FL (ref 6–12)
POTASSIUM BLD-SCNC: 3.7 MMOL/L (ref 3.5–5.3)
RBC # BLD AUTO: 3.18 10*6/MM3 (ref 4.2–5.4)
SODIUM BLD-SCNC: 143 MMOL/L (ref 135–145)
STRESS TARGET HR: 121 BPM
T4 FREE SERPL-MCNC: 0.41 NG/DL (ref 0.78–2.19)
TSH SERPL DL<=0.05 MIU/L-ACNC: 12.4 MIU/ML (ref 0.47–4.68)
UNIT  ABO: NORMAL
UNIT  RH: NORMAL
WBC NRBC COR # BLD: 9.31 10*3/MM3 (ref 4.8–10.8)

## 2019-07-01 PROCEDURE — 25010000002 LEVOFLOXACIN PER 250 MG: Performed by: INTERNAL MEDICINE

## 2019-07-01 PROCEDURE — 97162 PT EVAL MOD COMPLEX 30 MIN: CPT

## 2019-07-01 PROCEDURE — 93306 TTE W/DOPPLER COMPLETE: CPT | Performed by: INTERNAL MEDICINE

## 2019-07-01 PROCEDURE — 74241: CPT

## 2019-07-01 PROCEDURE — 84100 ASSAY OF PHOSPHORUS: CPT | Performed by: INTERNAL MEDICINE

## 2019-07-01 PROCEDURE — 80048 BASIC METABOLIC PNL TOTAL CA: CPT | Performed by: INTERNAL MEDICINE

## 2019-07-01 PROCEDURE — 85027 COMPLETE CBC AUTOMATED: CPT | Performed by: INTERNAL MEDICINE

## 2019-07-01 PROCEDURE — 84439 ASSAY OF FREE THYROXINE: CPT | Performed by: INTERNAL MEDICINE

## 2019-07-01 PROCEDURE — 93306 TTE W/DOPPLER COMPLETE: CPT

## 2019-07-01 PROCEDURE — 99232 SBSQ HOSP IP/OBS MODERATE 35: CPT | Performed by: INTERNAL MEDICINE

## 2019-07-01 PROCEDURE — 84443 ASSAY THYROID STIM HORMONE: CPT | Performed by: INTERNAL MEDICINE

## 2019-07-01 PROCEDURE — 5A1D70Z PERFORMANCE OF URINARY FILTRATION, INTERMITTENT, LESS THAN 6 HOURS PER DAY: ICD-10-PCS | Performed by: INTERNAL MEDICINE

## 2019-07-01 RX ORDER — LIDOCAINE HYDROCHLORIDE 30 MG/G
CREAM TOPICAL DAILY PRN
Status: DISCONTINUED | OUTPATIENT
Start: 2019-07-01 | End: 2019-07-06 | Stop reason: HOSPADM

## 2019-07-01 RX ORDER — LEVOTHYROXINE SODIUM ANHYDROUS 100 UG/5ML
75 INJECTION, POWDER, LYOPHILIZED, FOR SOLUTION INTRAVENOUS ONCE
Status: COMPLETED | OUTPATIENT
Start: 2019-07-01 | End: 2019-07-01

## 2019-07-01 RX ORDER — PANTOPRAZOLE SODIUM 40 MG/10ML
40 INJECTION, POWDER, LYOPHILIZED, FOR SOLUTION INTRAVENOUS
Status: DISCONTINUED | OUTPATIENT
Start: 2019-07-01 | End: 2019-07-02

## 2019-07-01 RX ADMIN — METRONIDAZOLE 500 MG: 500 INJECTION, SOLUTION INTRAVENOUS at 10:32

## 2019-07-01 RX ADMIN — LEVOFLOXACIN 500 MG: 5 INJECTION, SOLUTION INTRAVENOUS at 17:17

## 2019-07-01 RX ADMIN — METOPROLOL TARTRATE 25 MG: 25 TABLET, FILM COATED ORAL at 16:04

## 2019-07-01 RX ADMIN — METRONIDAZOLE 500 MG: 500 INJECTION, SOLUTION INTRAVENOUS at 17:17

## 2019-07-01 RX ADMIN — SODIUM CHLORIDE, PRESERVATIVE FREE 3 ML: 5 INJECTION INTRAVENOUS at 10:33

## 2019-07-01 RX ADMIN — LEVOTHYROXINE SODIUM ANHYDROUS 75 MCG: 100 INJECTION, POWDER, LYOPHILIZED, FOR SOLUTION INTRAVENOUS at 10:32

## 2019-07-01 RX ADMIN — PANTOPRAZOLE SODIUM 8 MG/HR: 40 INJECTION, POWDER, FOR SOLUTION INTRAVENOUS at 11:31

## 2019-07-01 RX ADMIN — DEXTROSE AND SODIUM CHLORIDE 50 ML/HR: 5; 900 INJECTION, SOLUTION INTRAVENOUS at 21:25

## 2019-07-01 RX ADMIN — PANTOPRAZOLE SODIUM 40 MG: 40 INJECTION, POWDER, FOR SOLUTION INTRAVENOUS at 18:13

## 2019-07-01 RX ADMIN — PANTOPRAZOLE SODIUM 8 MG/HR: 40 INJECTION, POWDER, FOR SOLUTION INTRAVENOUS at 01:45

## 2019-07-01 RX ADMIN — COLLAGENASE SANTYL: 250 OINTMENT TOPICAL at 16:48

## 2019-07-01 RX ADMIN — METRONIDAZOLE 500 MG: 500 INJECTION, SOLUTION INTRAVENOUS at 01:45

## 2019-07-01 NOTE — PROGRESS NOTES
Patient receiving dialysis this morning.  Scheduled for upper GI today.  No further bleeding noted

## 2019-07-01 NOTE — THERAPY EVALUATION
Acute Care - Physical Therapy Initial Evaluation  [facility]     Patient Name: Cheri Ely  : 1940  MRN: [id number]  Today's Date: 2019   Onset of Illness/Injury or Date of Surgery: 19  Date of Referral to PT: 19  Referring Physician: Dr. Webber       Admit Date: 2019    Visit Dx:     ICD-10-CM ICD-9-CM   1. Gastrointestinal hemorrhage with melena K92.1 578.1   2. Duodenal ulcer with perforation (CMS/[facility]) K26.5 532.50   3. Hypotension due to hypovolemia I95.89 458.8    E86.1 276.52   4. Mobility impaired Z74.09 799.89     Patient Active Problem List   Diagnosis   • Hypertension, currently hypotensive   • Hyperlipidemia   • Chronic kidney disease on chronic dialysis (CMS/[facility])   • Closed fracture of ramus of left pubis (CMS/[facility])   • Occlusion of superior mesenteric artery (CMS/[facility])   • Chronic mesenteric ischemia (CMS/[facility])   • Black tarry stools   • Hx of gastritis   • Acute gastric ulcer with hemorrhage   • Closed displaced intertrochanteric fracture of right femur (CMS/[facility])   • Displaced intertrochanteric fracture of right femur, initial encounter for closed fracture (CMS/[facility])   • Hypotension   • Acute blood loss anemia   • GI bleed   • Gastrointestinal hemorrhage   • (HFpEF) heart failure with preserved ejection fraction (CMS/[facility])   • Hypothyroidism, TSH 12.4, FT4 0.41   • Diastolic dysfunction   • Diastolic heart failure (CMS/[facility])   • Volume overload   • AVM (arteriovenous malformation) of small bowel, acquired (CMS/[facility])   • Abdominal pain   • Hypoglycemia   • Paroxysmal atrial fibrillation (CMS/[facility])   • Acute colitis   • ESRD (end stage renal disease) (CMS/[facility])   • Acute on chronic anemia   • Duodenal ulcer   • Grade II diastolic dysfunction     Past Medical History:   Diagnosis Date   • Arthritis    • Carotid artery disease (CMS/[facility])    • CHF (congestive heart failure) (CMS/[facility])    • Chronic kidney disease on chronic dialysis (CMS/[facility])    • Chronic renal failure     on dialysis  ON MON, WED, FRI   • Coronary artery disease    • Disease of thyroid gland    • Diverticulitis    • Gastric ulcer    • History of transfusion    • Hyperlipidemia    • Hypertension    • Injury of back    • Mesenteric artery insufficiency (CMS/HCC)    • Multilevel degenerative disc disease    • Osteoporosis    • Pancreatitis    • Pelvis fracture (CMS/HCC)    • Pneumonia      Past Surgical History:   Procedure Laterality Date   • AORTAGRAM Right 1/8/2018    Procedure: MESENTERIC ANGIOGRAM, GROIN ACCESS, MYNX CLOSURE;  Surgeon: Tomasz San DO;  Location: [facility] OR;  Service:    • AORTIC VALVE SURGERY     • APPENDECTOMY     • BACK SURGERY     • CAPSULE ENDOSCOPY N/A 3/30/2019    Procedure: CAPSULE ENDOSCOPY M2A;  Surgeon: David Yu MD;  Location: [facility] ENDOSCOPY;  Service: Gastroenterology   • CARDIAC SURGERY     • CAROTID ENDARTERECTOMY Bilateral    • CATARACT EXTRACTION, BILATERAL     • CERVICAL SPINE SURGERY     • CHOLECYSTECTOMY     • COLON SURGERY     • COLONOSCOPY  10/01/2015   • COLONOSCOPY N/A 3/28/2019    Procedure: COLONOSCOPY WITH ANESTHESIA;  Surgeon: Rafita Merida MD;  Location: [facility] ENDOSCOPY;  Service: Gastroenterology   • CORONARY ARTERY BYPASS GRAFT     • DIALYSIS FISTULA CREATION     • ENDOSCOPY N/A 12/27/2018    Procedure: ESOPHAGOGASTRODUODENOSCOPY WITH ANESTHESIA;  Surgeon: Moni Garza MD;  Location: [facility] ENDOSCOPY;  Service: Gastroenterology   • ENDOSCOPY N/A 2/28/2019    Procedure: ESOPHAGOGASTRODUODENOSCOPY WITH ANESTHESIA;  Surgeon: Moni Garza MD;  Location: [facility] ENDOSCOPY;  Service: Gastroenterology   • ENDOSCOPY N/A 3/11/2019    Procedure: ESOPHAGOGASTRODUODENOSCOPY WITH ANESTHESIA;  Surgeon: Moni Garza MD;  Location: [facility] ENDOSCOPY;  Service: Gastroenterology   • ENDOSCOPY N/A 3/27/2019    Procedure: ESOPHAGOGASTRODUODENOSCOPY WITH ANESTHESIA;  Surgeon: Rafita Merida MD;  Location: [facility] ENDOSCOPY;  Service: Gastroenterology   • EXPLORATORY  LAPAROTOMY N/A 5/13/2019    Procedure: LAPAROTOMY EXPLORATORY, OVERSEW DUODENAL ULCER WITH FRED PATCH, KOCHER MANEUVER;  Surgeon: Laila Rodriguez MD;  Location:  [facility] OR;  Service: General   • HIP TROCHANTERIC NAILING WITH INTRAMEDULLARY HIP SCREW Right 2/8/2019    Procedure: HIP TROCANTERIC NAILING LONG WITH INTRAMEDULLARY HIP SCREW;  Surgeon: Boo Camacho MD;  Location:  [facility] OR;  Service: Orthopedics   • JOINT REPLACEMENT      knee   • LAPAROSCOPIC GASTRIC BANDING     • LEEP     • LUMBAR FUSION     • TOE AMPUTATION Left     2nd toe   • TOTAL KNEE ARTHROPLASTY Bilateral    • TUBAL ABDOMINAL LIGATION     • UPPER GASTROINTESTINAL ENDOSCOPY  10/01/2015        PT ASSESSMENT (last 12 hours)      Physical Therapy Evaluation     Row Name 07/01/19 1453          PT Evaluation Time/Intention    Subjective Information  complains of;weakness;fatigue  -JAZMÍN (r) SC (t) JAZMÍN (c)     Document Type  evaluation;other (see comments) See MAR  -JAZMÍN (r) SC (t) JAZMÍN (c)     Mode of Treatment  physical therapy  -JAZÍMN (r) SC (t) JAZMÍN (c)     Patient Effort  good  -JAZMÍN (r) SC (t) JAZMÍN (c)     Symptoms Noted During/After Treatment  none  -JAZMÍN (r) SC (t) JAZMÍN (c)     Row Name 07/01/19 3822          General Information    Patient Profile Reviewed?  yes  -JAZMÍN (r) SC (t) JAZMÍN (c)     Onset of Illness/Injury or Date of Surgery  06/27/19  -JAZMÍN (r) SC (t) JAZMÍN (c)     Referring Physician  Dr. Webber   -JAZMÍN (r) SC (t) JAZMÍN (c)     Patient Observations  alert;cooperative;agree to therapy  -JAZMÍN (r) SC (t) JAZMÍN (c)     Patient/Family Observations  Pt alone in room  -JAZMÍN (r) SC (t) JAZMÍN (c)     General Observations of Patient  Fowlers in bed, IV infused, A&Ox4  -JAZMÍN (r) SC (t) JAZMÍN (c)     Prior Level of Function  mod assist:;transfer;max assist:;grooming;dressing;bathing  -JAZMÍN (r) SC (t) JAZMÍN (c)     Equipment Currently Used at Home  rollator;wheelchair  -JAZMÍN (r) SC (t) JAZMÍN (c)     Pertinent History of Current Functional Problem  Peptic ulcer, Coronary artery disease, CABG,  hemodialysis, hyperlipidemia, HPTN, RA, osteoporosis,   -JAZMÍN (r) SC (t) JAZMÍN (c)     Existing Precautions/Restrictions  fall  -JAZMÍN (r) SC (t) JAZMÍN (c)     Risks Reviewed  patient:;LOB;nausea/vomiting;dizziness;increased discomfort;change in vital signs;increased drainage;lines disloged  -JAZMÍN (r) SC (t) JAZMÍN (c)     Benefits Reviewed  patient:;improve function;increase independence;increase strength;increase balance;decrease pain;decrease risk of DVT;improve skin integrity;increase knowledge  -JAZMÍN (r) SC (t) JAZMÍN (c)     Barriers to Rehab  medically complex  -JAZMÍN (r) SC (t) JAZMÍN (c)     Row Name 07/01/19 1453          Relationship/Environment    Primary Source of Support/Comfort  child(meagan)  -JAZMÍN (r) SC (t) JAZMÍN (c)     Lives With  child(meagan), adult  -JAZMÍN (r) SC (t) JAZMÍN (c)     Name(s) of Who Lives With Patient  Lucero  -JAZMÍN (r) SC (t) JAZMÍN (c)     Family Caregiver if Needed  child(meagan), adult  -JAZMÍN (r) SC (t) JAZMÍN (c)     Row Name 07/01/19 1453          Resource/Environmental Concerns    Current Living Arrangements  home/apartment/condo  -JAZMÍN (r) SC (t) JAZMÍN (c)     Resource/Environmental Concerns  none  -JAZMÍN (r) SC (t) JAZMÍN (c)     Transportation Concerns  car, none  -JAZMÍN (r) SC (t) JAZMÍN (c)     Row Name 07/01/19 1453          Cognitive Assessment/Interventions    Additional Documentation  Cognitive Assessment/Intervention (Group)  -JAZMÍN (r) SC (t) JAZMÍN (c)     Row Name 07/01/19 1453          Cognitive Assessment/Intervention- PT/OT    Affect/Mental Status (Cognitive)  WFL  -JAZMÍN (r) SC (t) JAZMÍN (c)     Orientation Status (Cognition)  oriented x 4  -JAZMÍN (r) SC (t) JAZMÍN (c)     Follows Commands (Cognition)  WFL  -JAZMÍN (r) SC (t) JAZMÍN (c)     Cognitive Function (Cognitive)  WFL  -JAZMÍN (r) SC (t) JAZMÍN (c)     Personal Safety Interventions  fall prevention program maintained;muscle strengthening facilitated;nonskid shoes/slippers when out of bed;gait belt  -JAZMÍN     Row Name 07/01/19 8653          Safety Issues, Functional Mobility    Safety Issues Affecting Function  (Mobility)  safety precaution awareness  -JAZMÍN (r) SC (t) JAZMÍN (c)     Impairments Affecting Function (Mobility)  strength  -JAZMÍN (r) SC (t) JAZMÍN (c)     [facility] Name 07/01/19 1453          Bed Mobility Assessment/Treatment    Bed Mobility Assessment/Treatment  rolling left;rolling right  -JAZMÍN (r) SC (t) JAZMÍN (c)     Rolling Left Runnels (Bed Mobility)  maximum assist (25% patient effort);1 person assist  -JAZMÍN (r) SC (t) JAZMÍN (c)     Rolling Right [place]unnels (Bed Mobility)  maximum assist (25% patient effort);1 person assist  -JAZMÍN (r) SC (t) JAZMÍN (c)     Bed Mobility, Safety Issues  decreased use of arms for pushing/pulling  -JAZMÍN (r) SC (t) JAZMÍN (c)     Assistive Device (Bed Mobility)  bed rails;draw sheet;head of bed elevated  -JAZMÍN (r) SC (t) JAZMÍN (c)     Row Name 07/01/19 1453          General ROM    GENERAL ROM COMMENTS  BLE AROM WFL  -JAZMÍN (r) SC (t) JAZMÍN (c)     Row Name 07/01/19 1453          MMT (Manual Muscle Testing)    General MMT Comments  4-/5 BLE, patient able to sustain movements against gravity and light pressure  -JAZMÍN (r) SC (t) JAZMÍN (c)     Row Name 07/01/19 1453          Motor Assessment/Intervention    Additional Documentation  --  -JAZMÍN (r) SC (t) JAZMÍN (c)     Row Name 07/01/19 1453          Balance    Balance  --  -JAZMÍN (r) SC (t) JAZMÍN (c)     Row Name 07/01/19 1453          Sensory Assessment/Intervention    Sensory General Assessment  no sensation deficits identified  -JAZMÍN (r) SC (t) JAZMÍN (c)     Row Name 07/01/19 1453          Pain Assessment    Additional Documentation  Pain Scale: Numbers Pre/Post-Treatment (Group)  -JAZMÍN (r) SC (t) JAZMÍN (c)     Row Name 07/01/19 1453          Pain Scale: Numbers Pre/Post-Treatment    Pain Scale: Numbers, Pretreatment  0/10 - no pain  -JAZMÍN (r) SC (t) JAZMÍN (c)     Pain Scale: Numbers, Post-Treatment  0/10 - no pain  -JAZMÍN (r) SC (t) JAZMÍN (c)     Row Name 07/01/19 1453          Health Promotion    Additional Documentation  Coping (Group);Plan of Care Review (Group)  -JAZMÍN     Row Name             Wound  06/27/19 1837 Left lower breast MASD (moisture associated skin damage)    Wound - Properties Group Date first assessed: 06/27/19  -MW Time first assessed: 1837  -MW Side: Left  -MW Orientation: lower  -MW, underneath  Location: breast  -MW Type: MASD (moisture associated skin damage)  -MW    Row Name             Wound 06/27/19 1838 Right anterior greater trochanter MASD (moisture associated skin damage)    Wound - Properties Group Date first assessed: 06/27/19  -MW Time first assessed: 1838  -MW Present On Admission : picture taken;yes  -MW Side: Right  -MW Orientation: anterior  -MW Location: greater trochanter  -MW Type: MASD (moisture associated skin damage)  -MW    Row Name             Wound 06/27/19 1836 Right gluteal pressure injury    Wound - Properties Group Date first assessed: 06/27/19  -HS Time first assessed: 1836  -HS Present On Admission : yes;picture taken  -HS Side: Right  -HS Location: gluteal  -HS Type: pressure injury  -HS Stage, Pressure Injury: Stage 3  -HS    Row Name             Wound 06/27/19 1836 Left gluteal pressure injury    Wound - Properties Group Date first assessed: 06/27/19  -HS Time first assessed: 1836  -HS Present On Admission : yes;picture taken  -HS Side: Left  -HS Location: gluteal  -HS Type: pressure injury  -HS Stage, Pressure Injury: Stage 3  -HS    Row Name 07/01/19 1453          Physical Therapy Clinical Impression    Date of Referral to PT  07/01/19  -JAZMÍN (r) SC (t) JAZMÍN (c)     PT Diagnosis (PT Clinical Impression)  reduced mobility  -JAZMÍN (r) SC (t) JAZMÍN (c)     Prognosis (PT Clinical Impression)  fair  -JAZMÍN (r) SC (t) JAZMÍN (c)     Functional Level at Time of Evaluation (PT Clinical Impression)  max/mod assist  -JAZMÍN (r) SC (t) JAZMÍN (c)     Patient/Family Goals Statement (PT Clinical Impression)  go to [facility] care  -JAZMÍN (r) SC (t) JAZMÍN (c)     Criteria for Skilled Interventions Met (PT Clinical Impression)  yes;treatment indicated  -JAZMÍN     Impairments Found (describe specific  impairments)  aerobic capacity/endurance;muscle performance;gait, locomotion, and balance  -JAZMÍN     Rehab Potential (PT Clinical Summary)  fair, will monitor progress closely  -JAZMÍN (r) SC (t) JAZMÍN (c)     Predicted Duration of Therapy (PT)  until d/c   -JAZÍMN (r) SC (t) JAZMÍN (c)     Care Plan Review (PT)  evaluation/treatment results reviewed;care plan/treatment goals reviewed;risks/benefits reviewed;patient/other agree to care plan;current/potential barriers reviewed  -JAZMÍN (r) SC (t) JAZMÍN (c)     Row Name 07/01/19 1453          Vital Signs    Pre Systolic BP Rehab  97  -JAZMÍN (r) SC (t) JAZMÍN (c)     Pre Treatment Diastolic BP  77  -JAZMÍN (r) SC (t) JAZMÍN (c)     Post Systolic BP Rehab  99  -JAZMÍN (r) SC (t) JAZMÍN (c)     Post Treatment Diastolic BP  81  -JAZMÍN (r) SC (t) JAZMÍN (c)     Pretreatment Heart Rate (beats/min)  126  -JAZMÍN (r) SC (t) JAZMÍN (c)     Posttreatment Heart Rate (beats/min)  136  -JAZMÍN (r) SC (t) JAZMÍN (c)     Posttreatment Resp Rate (breaths/min)  20  -JAZMÍN (r) SC (t) JAZMÍN (c)     Pre Patient Position  Supine  -JAZMÍN (r) SC (t) JAZMÍN (c)     Intra Patient Position  Supine  -JAZMÍN (r) SC (t) JAZMÍN (c)     Post Patient Position  Supine  -JAZMÍN (r) SC (t) JAZMÍN (c)     Row Name 07/01/19 1453          Physical Therapy Goals    Bed Mobility Goal Selection (PT)  bed mobility, PT goal 1  -JAZMÍN (r) SC (t) JAZMÍN (c)     Transfer Goal Selection (PT)  transfer, PT goal 1  -JAZMÍN (r) SC (t) JAZMÍN (c)     Row Name 07/01/19 1453          Bed Mobility Goal 1 (PT)    Activity/Assistive Device (Bed Mobility Goal 1, PT)  rolling to left;rolling to right;sit to supine;supine to sit  -JAZMÍN (r) SC (t) JAZMÍN (c)     [place] Level/Cues Needed (Bed Mobility Goal 1, PT)  moderate assist (50-74% patient effort)  -JAZMÍN     Time Frame (Bed Mobility Goal 1, PT)  long term goal (LTG);10 days  -JAZMÍN (r) SC (t) JAZMÍN (c)     Progress/Outcomes (Bed Mobility Goal 1, PT)  goal ongoing  -JAZMÍN (r) SC (t) JAZMÍN (c)     Row Name 07/01/19 0329          Transfer Goal 1 (PT)    Activity/Assistive Device (Transfer Goal 1, PT)   bed-to-chair/chair-to-bed;lateral transfer  -JAZMÍN     [place] Level/Cues Needed (Transfer Goal 1, PT)  moderate assist (50-74% patient effort)  -JAZMÍN     Time Frame (Transfer Goal 1, PT)  long term goal (LTG);10 days  -JAZMÍN (r) SC (t) JAZMÍN (c)     Progress/Outcome (Transfer Goal 1, PT)  goal ongoing  -JAZMÍN (r) SC (t) JAZMÍN (c)     Row Name 07/01/19 1453          Positioning and Restraints    Pre-Treatment Position  in bed  -JAZMÍN (r) SC (t) JAZMÍN (c)     Post Treatment Position  bed  -JAZMÍN (r) SC (t) JAZMÍN (c)     In Bed  notified nsg;fowlers;call light within reach;encouraged to call for assist;patient within staff view;side rails up x3  -JAZMÍN (r) SC (t) JAZMÍN (c)     Row Name 07/01/19 1453          Living Environment    Home Accessibility  wheelchair accessible  -JAZMÍN (r) SC (t) JAZMÍN (c)       User Key  (r) = Recorded By, (t) = Taken By, (c) = Cosigned By    Initials Name Provider Type    JAZMÍN Je Vale, PT DPT Physical Therapist    Hetal García, RN Registered Nurse    Peggy Solorio RN Registered Nurse    Ko Olguin PT Student PT Student        Physical Therapy Education     Title: PT OT SLP Therapies (Done)     Topic: Physical Therapy (Done)     Point: Mobility training (Done)     Learning Progress Summary           Patient Acceptance, E, VU by SC at 7/1/2019  3:53 PM    Comment:  Pt is educated and knows her deficits at this time                   Point: Home exercise program (Done)     Learning Progress Summary           Patient Acceptance, E, VU by SC at 7/1/2019  3:53 PM    Comment:  Pt is educated and knows her deficits at this time                   Point: Body mechanics (Done)     Learning Progress Summary           Patient Acceptance, E, VU by SC at 7/1/2019  3:53 PM    Comment:  Pt is educated and knows her deficits at this time                   Point: Precautions (Done)     Learning Progress Summary           Patient Acceptance, E, VU by SC at 7/1/2019  3:53 PM    Comment:  Pt is educated and  knows her deficits at this time                               User Key     Initials Effective Dates Name Provider Type Discipline    SC 05/15/19 -  Ko Vail, PT Student PT Student PT              PT Recommendation and Plan  Therapy Frequency (PT Clinical Impression): daily  Plan of Care Reviewed With: patient  Outcome Summary: PT chery complete. A&Ox4. Pt fowlers in bed. Needed max assit for rolling, but claims she will get stronger when she gets to [facility] back towards her home. She said she is independent at home with transfers from dialysis chair to her wheelchair if everything is close. Her strength in BLE is good, balance in bed shows deficits and endurance was compromised. Pt will benefit from skilled therapy while in hospital to increase strength and decrease endurance deficit. Pt wishes to return to [facility] post hospital stay.   Outcome Measures     Row Name 07/01/19 6748             How much help from another person do you currently need...    Turning from your back to your side while in flat bed without using bedrails?  3  -JAZMÍN (r) SC (t) JAZMÍN (c)      Moving from lying on back to sitting on the side of a flat bed without bedrails?  1  -JAZMÍN (r) SC (t) JAZMÍN (c)      Moving to and from a bed to a chair (including a wheelchair)?  1  -JAZMÍN (r) SC (t) JAZMÍN (c)      Standing up from a chair using your arms (e.g., wheelchair, bedside chair)?  1  -JAZMÍN (r) SC (t) JAZMÍN (c)      Climbing 3-5 steps with a railing?  1  -JAZMÍN (r) SC (t) JAZMÍN (c)      To walk in hospital room?  1  -JAZMÍN (r) SC (t) JAZMÍN (c)      AM-PAC 6 Clicks Score  8  -JAZMÍN (r) SC (t)         Functional Assessment    Outcome Measure Options  AM-PAC 6 Clicks Basic Mobility (PT)  -JAZMÍN (r) SC (t) JAZMÍN (c)        User Key  (r) = Recorded By, (t) = Taken By, (c) = Cosigned By    Initials Name Provider Type    Je Youssef, PT DPT Physical Therapist    SC Ko Vail, PT Student PT Student         Time Calculation:   PT Charges     Row Name  07/01/19 1555             Time Calculation    Start Time  1453  -JAZMÍN (r) SC (t) JAZMÍN (c)      Stop Time  1526 13 minutes for chart review  -JAZMÍN (r) SC (t) JAZMÍN (c)      Time Calculation (min)  33 min  -JAZMÍN (r) SC (t)      PT Received On  07/01/19  -JAZMÍN (r) SC (t) JAZMÍN (c)      PT Goal Re-Cert Due Date  07/11/19  -JAZMÍN (r) SC (t) JAZMÍN (c)         Time Calculation- PT    Total Timed Code Minutes- PT  46 minute(s)  -JAZMÍN (r) SC (t) JAZMÍN (c)        User Key  (r) = Recorded By, (t) = Taken By, (c) = Cosigned By    Initials Name Provider Type    Je Youssef, PT DPT Physical Therapist    Ko Olguin, PT Student PT Student        Therapy Charges for Today     Code Description Service Date Service Provider Modifiers Qty    [id number] HC PT EVAL MOD COMPLEXITY 3 7/1/2019 Ko Vail, PT Student GP 1          PT G-Codes  Outcome Measure Options: AM-PAC 6 Clicks Basic Mobility (PT)  AM-PAC 6 Clicks Score: 8      Ko Vail PT Student  7/1/2019

## 2019-07-01 NOTE — PROGRESS NOTES
[facility] Medicine Services  INPATIENT PROGRESS NOTE    Patient Name: Cheri Ely  Date of Admission: 6/27/2019  Today's Date: 07/01/19  Length of Stay: 4  Primary Care Physician: Manny Peters APRN    Subjective   Chief Complaint: GI bleed  HPI     Patient was seen and examined at bedside.  Patient was just started on dialysis when I was in her evaluating her.  Patient has had no more dark stools, she had a streak last night, that was normal.  And a small bowel movement yesterday afternoon.  Patient denies any nausea this morning, she did have an episode of nausea yesterday that self resolving.  Patient denies any fever or chills.  Patient denies any abdominal pain at this time.  Patient undergo endoscopy per Dr. Hughes.        Review of Systems   Constitutional: Positive for chills. Negative for activity change, diaphoresis, fatigue, fever and unexpected weight change.   Respiratory: Negative for cough, chest tightness, shortness of breath and wheezing.    Cardiovascular: Negative for chest pain and palpitations.   Gastrointestinal: Positive for nausea (yesterday, self-resolved). Negative for abdominal distention, abdominal pain, diarrhea and vomiting.        All pertinent negatives and positives are as above. All other systems have been reviewed and are negative unless otherwise stated.     Objective    Temp:  [97.4 °F (36.3 °C)-97.9 °F (36.6 °C)] 97.4 °F (36.3 °C)  Heart Rate:  [] 119  Resp:  [15-25] 25  BP: ()/(53-80) 95/80  Physical Exam   Constitutional: She is oriented to person, place, and time. No distress.   Currently receiving dialysis   HENT:   Head: Normocephalic and atraumatic.   Eyes: Conjunctivae are normal. No scleral icterus.   Neck: Neck supple. No JVD present.   Cardiovascular: Exam reveals no gallop and no friction rub.   Murmur heard.  Irregularly irregular, tachycardia; left-sided fistula   Pulmonary/Chest: Effort normal and breath  sounds normal. No stridor. No respiratory distress.   Abdominal: Soft. Bowel sounds are normal.   Musculoskeletal: She exhibits no edema.   Neurological: She is alert and oriented to person, place, and time.   Skin: Skin is dry. No rash noted. She is not diaphoretic. No pallor.   Cool upper and lower extremities   Psychiatric: She has a normal mood and affect. Her behavior is normal.   Nursing note and vitals reviewed.          Results Review:  I have reviewed the labs, radiology results, and diagnostic studies.    Laboratory Data:   Results from last 7 days   Lab Units 07/01/19  0225 06/30/19  1532 06/30/19  0221  06/29/19  0708   WBC 10*3/mm3 9.31  --  7.56  --  8.10   HEMOGLOBIN g/dL 9.9* 8.5* 9.2*   < > 9.7*  9.8*   HEMATOCRIT % 30.3* 25.8* 27.9*   < > 29.1*  29.0*   PLATELETS 10*3/mm3 114*  --  89*  --  117*    < > = values in this interval not displayed.        Results from last 7 days   Lab Units 07/01/19  0225 06/30/19  0221 06/29/19  0708 06/28/19  0631 06/27/19  1245   SODIUM mmol/L 143 141 140 141 140   POTASSIUM mmol/L 3.7 3.6 3.2* 3.5 3.2*   CHLORIDE mmol/L 109 106 106 106 108   CO2 mmol/L 27.0 27.0 28.0 26.0 21.0*   BUN mg/dL 15 10 20 38* 28*   CREATININE mg/dL 2.45* 1.75* 2.31* 3.02* 2.57*   CALCIUM mg/dL 7.4* 7.3* 7.4* 7.2* 6.5*   BILIRUBIN mg/dL  --   --   --  0.6 0.5   ALK PHOS U/L  --   --   --  179* 181*   ALT (SGPT) U/L  --   --   --  32 35   AST (SGOT) U/L  --   --   --  35 43   GLUCOSE mg/dL 94 75 96 59* 110*       Culture Data:   Blood Culture   Date Value Ref Range Status   06/27/2019 No growth at 3 days  Preliminary   06/27/2019 No growth at 3 days  Preliminary       Radiology Data:   Imaging Results (last 24 hours)     ** No results found for the last 24 hours. **          I have reviewed the patient's current medications.     Assessment/Plan     Active Hospital Problems    Diagnosis   • Grade II diastolic dysfunction   • Acute colitis   • ESRD (end stage renal disease) (CMS/HCC)   •  Acute on chronic anemia   • Duodenal ulcer     Abnormal CT at the duodenal bulb, microperforation noted.  Recent duodenal ulcer with perforation 5/2019 s/p surgical repair     • Paroxysmal atrial fibrillation (CMS/HCC)   • Gastrointestinal hemorrhage   • (HFpEF) heart failure with preserved ejection fraction (CMS/HCC)   • Hypothyroidism, TSH 12.4, FT4 0.41   • Hypertension, currently hypotensive       Plan:  1.  Plans for upper GI today per General Surgery recommendations  2.  NPO  3.  NG tube in place  4.  IV PPI gtt  5.  Hold outpatient anti-HTN medications; monitor BP trend - Remains hypotensive with tachycardia today   6. Metronidazole and levofloxacin for now, will complete 7 day course; possible mild colitis noted on CT A/P (new circumferential wall thickening along the descending and proximal sigmoid colon, suggestive of mild colitis.  7.  GI  and general surgery following; appreciate their assistance  8.  Dialysis regimen per Nephrology - Receiving dialysis today; Lidocaine cream to dialysis site prior to access, per patient she normally does this as outpatient  9.   TSH and FT4 are consistent with poor controlled hypothyroidism.  Will give a dose of IV levothyroxine 75 mcg today since NPO  10.  Digoxin IV X 1 yesterday; patient reports she normally takes carvedilol for HR control (BPs will not allow for carvedilol at this time)  11.   Will give trial of a low-dose metoprolol after endoscopy today if able to take PO   12.  Daily hemoglobin and renal function panel   13.  Echocardiogram due to persistent atrial fibrillation and no echo reported since 2015  14.  PT   15.  Received 1 unit of pRBCs and 4 units of FFP upon arrival         Lab review:  Results for CARLOS PICKARD (MRN [id number]) as of 7/1/2019 07:03   Ref. Range 6/29/2019 11:43 6/29/2019 17:58 6/30/2019 02:21 6/30/2019 15:32 7/1/2019 02:25   Hemoglobin Latest Ref Range: 12.0 - 16.0 g/dL 9.6 (L) 9.3 (L) 9.2 (L) 8.5 (L) 9.9 (L)      Ref. Range  7/1/2019 02:25   TSH Baseline Latest Ref Range: 0.470 - 4.680 mIU/mL 12.400 (H)   Free T4 Latest Ref Range: 0.78 - 2.19 ng/dL 0.41 (L)             Discharge Planning: I expect the patient to be discharged to ? In ? Days.  Patient remains critically ill and will remain in the ICU for today, will transfer out tomorrow pending the results of the endoscopy.     Madhav Webber MD   07/01/19   7:04 AM

## 2019-07-01 NOTE — PLAN OF CARE
Problem: Skin Injury Risk (Adult)  Goal: Skin Health and Integrity  Outcome: Ongoing (interventions implemented as appropriate)      Problem: Fall Risk (Adult)  Goal: Absence of Fall  Outcome: Ongoing (interventions implemented as appropriate)      Problem: Wound (Includes Pressure Injury) (Adult)  Goal: Signs and Symptoms of Listed Potential Problems Will be Absent, Minimized or Managed (Wound)  Outcome: Ongoing (interventions implemented as appropriate)

## 2019-07-01 NOTE — PLAN OF CARE
Problem: Patient Care Overview  Goal: Plan of Care Review  Outcome: Ongoing (interventions implemented as appropriate)   07/01/19 4536   Coping/Psychosocial   Plan of Care Reviewed With patient   OTHER   Outcome Summary PT chery complete. A&Ox4. Pt fowlers in bed. Needed max assit for rolling, but claims she will get stronger when she gets to [facility] back towards her home. She said she is independent at home with transfers from dialysis chair to her wheelchair if everything is close. Her strength in BLE is good, balance in bed shows deficits and endurance was compromised. Pt will benefit from skilled therapy while in hospital to increase strength and decrease endurance deficit. Pt wishes to return to [facility] post hospital stay.

## 2019-07-01 NOTE — PROGRESS NOTES
Discharge Planning Assessment  [facility]     Patient Name: Cheri Ely  MRN: [id number]  Today's Date: 7/1/2019    Admit Date: 6/27/2019    Discharge Needs Assessment     Row Name 07/01/19 1047       Living Environment    Lives With  child(meagan), adult;grandchild(meagan)    Name(s) of Who Lives With Patient  bellar Yrn Barker    Current Living Arrangements  home/apartment/condo    Primary Care Provided by  child(meagan)    Provides Primary Care For  no one, unable/limited ability to care for self    Family Caregiver if Needed  child(meagan), adult    Quality of Family Relationships  supportive;helpful;involved    Able to Return to Prior Arrangements  yes       Resource/Environmental Concerns    Resource/Environmental Concerns  none    Transportation Concerns  car, none       Transition Planning    Patient/Family Anticipates Transition to  home with help/services;inpatient rehabilitation facility;long term care facility    Patient/Family Anticipated Services at Transition  ;home health care;skilled nursing    Transportation Anticipated  family or friend will provide       Discharge Needs Assessment    Readmission Within the Last 30 Days  no previous admission in last 30 days    Concerns to be Addressed  discharge planning    Equipment Currently Used at Home  wheelchair;wheelchair, motorized;walker, rolling;commode;shower chair    Outpatient/Agency/Support Group Needs  skilled nursing facility;homecare agency    Discharge Facility/Level of Care Needs  home with home health;nursing facility, skilled    Current Discharge Risk  chronically ill    Discharge Coordination/Progress  JUICE spoke with patient's daughter, Lucero [phone], regarding discharge plan/needs.  Patient currently resides at home with her daughter, Lucero.  Patient currently receives services from [facility] [phone].  JUICE spoke with Angela and informed of patient's admission.  Angela advised if patient is to return home with  she will  need a new referral/orders as her cert has ended.  Patient's daughter, Lucero, stated she felt patient would need rehab prior to returning home and they would prefer [facility].  Can speak to patient when she is available regarding possible rehab placement.  Patient attends dialysis M,W,F at the [facility].  Patient's daughters take her to and from dialysis.  Patient has a PCP and RX coverage.        Discharge Plan    No documentation.       Destination      No service coordination in this encounter.      Durable Medical Equipment      No service coordination in this encounter.      Dialysis/Infusion      No service coordination in this encounter.      Home Medical Care      No service coordination in this encounter.      Therapy      No service coordination in this encounter.      Community Resources      No service coordination in this encounter.          Demographic Summary    No documentation.       Functional Status    No documentation.       Psychosocial    No documentation.       Abuse/Neglect    No documentation.       Legal    No documentation.       Substance Abuse    No documentation.       Patient Forms    No documentation.           ELYSSA Norman

## 2019-07-01 NOTE — PROGRESS NOTES
[facility] Gastroenterology  Inpatient Progress Note     TODAY'S DATE: 07/01/19  Cheri Ely  1940      Reason for Follow Up:  GI bleed , recent duodenal ulcer with perforation 5/2019.       Subjective:   She denies any abdominal pain.  There is been no nausea vomiting.  She has an NG in place but no output as it is not connected to drainage.  States that she has not been sleeping very much.  She is hoping that she can get the NG tube out today.  She does feel little better today.    No Known Allergies    Current Facility-Administered Medications:   •  collagenase ointment, , Topical, Q24H, William Alfaro MD, 1 application at 06/30/19 0819  •  dextrose (D50W) 25 g/ 50mL Intravenous Solution 50 mL, 50 mL, Intravenous, Q1H PRN, William Alfaro MD, 50 mL at 06/28/19 0744  •  dextrose 5 % and sodium chloride 0.9 % infusion, 50 mL/hr, Intravenous, Continuous, William Alfaro MD, Last Rate: 50 mL/hr at 06/30/19 2203, 50 mL/hr at 06/30/19 2203  •  levoFLOXacin (LEVAQUIN) 500 mg/100 mL D5W (premix) (LEVAQUIN) 500 mg, 500 mg, Intravenous, Q48H, William Alfaro MD, 500 mg at 06/29/19 1709  •  metroNIDAZOLE (FLAGYL) IVPB 500 mg, 500 mg, Intravenous, Q8H, William Alfaro MD, 500 mg at 07/01/19 0145  •  pantoprazole (PROTONIX) 40 mg/100 mL (0.4 mg/mL) in 0.9% NS IVPB, 8 mg/hr, Intravenous, Continuous, William Alfaro MD, Last Rate: 20 mL/hr at 07/01/19 0145, 8 mg/hr at 07/01/19 0145  •  sodium chloride 0.9 % bolus 100 mL, 100 mL, Intravenous, PRN, Chris Hsu MD  •  sodium chloride 0.9 % flush 3 mL, 3 mL, Intravenous, Q12H, William Alfaro MD, 3 mL at 06/30/19 2028  •  sodium chloride 0.9 % flush 3-10 mL, 3-10 mL, Intravenous, PRN, William Alfaro MD    Review of Systems   Constitutional: Negative for chills.   Respiratory: Negative for cough, shortness of breath and wheezing.    Cardiovascular: Negative for chest pain and palpitations.    Gastrointestinal: Negative for abdominal distention, constipation, diarrhea, nausea and vomiting.       Objective     Vital Signs  Temp:  [97.4 °F (36.3 °C)-97.9 °F (36.6 °C)] 97.4 °F (36.3 °C)  Heart Rate:  [] 119  Resp:  [15-25] 25  BP: ()/(53-80) 95/80  Body mass index is 28.16 kg/m².    Intake/Output Summary (Last 24 hours) at 7/1/2019 0655  Last data filed at 7/1/2019 0432  Gross per 24 hour   Intake 2101 ml   Output --   Net 2101 ml     I/O this shift:  In: 2101 [I.V.:1317; IV Piggyback:784]  Out: -        PHYSICAL EXAM  Physical Exam   Constitutional: She appears well-developed and well-nourished. No distress.   Cardiovascular: Normal heart sounds.   Irregularly irregular    Pulmonary/Chest: Effort normal and breath sounds normal.   Abdominal: Soft. Bowel sounds are normal. She exhibits no distension. There is tenderness (mild tenderness betsy ).   Musculoskeletal: She exhibits edema (lower extremities bilaterally).   Neurological: She is alert.   Skin: Skin is warm and dry.   Psychiatric: She has a normal mood and affect. Her behavior is normal.   Vitals reviewed.          Results Review:   I have reviewed all of the patient's current test results    Results from last 7 days   Lab Units 07/01/19 0225 06/30/19  1532 06/30/19 0221 06/29/19  0708   WBC 10*3/mm3 9.31  --  7.56  --  8.10   HEMOGLOBIN g/dL 9.9* 8.5* 9.2*   < > 9.7*  9.8*   HEMATOCRIT % 30.3* 25.8* 27.9*   < > 29.1*  29.0*   PLATELETS 10*3/mm3 114*  --  89*  --  117*    < > = values in this interval not displayed.       Results from last 7 days   Lab Units 07/01/19 0225 06/30/19  0221 06/29/19  0708 06/28/19  0631 06/27/19  1245   SODIUM mmol/L 143 141 140 141 140   POTASSIUM mmol/L 3.7 3.6 3.2* 3.5 3.2*   CHLORIDE mmol/L 109 106 106 106 108   CO2 mmol/L 27.0 27.0 28.0 26.0 21.0*   BUN mg/dL 15 10 20 38* 28*   CREATININE mg/dL 2.45* 1.75* 2.31* 3.02* 2.57*   CALCIUM mg/dL 7.4* 7.3* 7.4* 7.2* 6.5*   BILIRUBIN mg/dL  --   --   --   0.6 0.5   ALK PHOS U/L  --   --   --  179* 181*   ALT (SGPT) U/L  --   --   --  32 35   AST (SGOT) U/L  --   --   --  35 43   GLUCOSE mg/dL 94 75 96 59* 110*       Results from last 7 days   Lab Units 06/29/19  0708 06/28/19  0631 06/27/19  1245   INR  1.81* 1.76* 2.37*       Lab Results   Lab Value Date/Time    LIPASE <10 (L) 05/13/2019 0023    LIPASE 14 (L) 05/12/2019 2133    LIPASE 28 03/24/2019 1018    LIPASE 28 09/15/2017 2059       Radiology Review:  Imaging Results (last 24 hours)     ** No results found for the last 24 hours. 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   • Chronic mesenteric ischemia (CMS/HCC) K55.1   • Black tarry stools K92.1   • Hx of gastritis Z87.19   • Acute gastric ulcer with hemorrhage K25.0   • Closed displaced intertrochanteric fracture of right femur (CMS/[facility]) S72.141A   • Displaced intertrochanteric fracture of right femur, initial encounter for closed fracture (CMS/HCC) S72.141A   • Hypotension I95.9   • Acute blood loss anemia D62   • GI bleed K92.2   • Gastrointestinal hemorrhage K92.2   • (HFpEF) heart failure with preserved ejection fraction (CMS/[facility]) I50.30   • Hypothyroid E03.9   • Diastolic dysfunction I51.9   • Diastolic heart failure (CMS/[facility]) I50.30   • Volume overload E87.70   • AVM (arteriovenous malformation) of small bowel, 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 Melena   2. Coffee ground emesis  3. Abnormal ct at the duodenal bulb, microperforation noted.   4. Recent  duodenal ulcer with perforation 5/2019 s/p surgical repair.     Clinically stable.  Without signs of acute GI bleeding.  We will plan to continue to treat with medical care including PPI therapy.  She is scheduled for an upper GI today.  This is to be coordinated around dialysis.  Await study results.  Continue to follow H&H.    I discussed the patients findings and my recommendations with patient and nursing staff      EMR Dragon/transcription disclaimer:  Much of this encounter note is electronic transcription/translation of spoken language to printed text.  The electronic translation of spoken language may be erroneous, or at times, nonsensical words or phrases may be inadvertently transcribed.  Although I have reviewed the note for such errors, some may still exist.      David Yu MD  07/01/19  6:55 AM

## 2019-07-01 NOTE — PLAN OF CARE
Problem: Patient Care Overview  Goal: Plan of Care Review  Outcome: Ongoing (interventions implemented as appropriate)   07/01/19 2620   Coping/Psychosocial   Plan of Care Reviewed With patient   Plan of Care Review   Progress improving   OTHER   Outcome Summary Upper GI endo with fluoroscopy completed. NG d/c'd. Clear liquid diet. Metoprolol given for increased HR. VSS. Multiple BM this afternoon. 2500 ml drawn during dialysis today.

## 2019-07-01 NOTE — PROGRESS NOTES
Nephrology ([facility]) Progress Note      Patient:  Cheri Ely  YOB: 1940  Date of Service: 7/1/2019  MRN: [id number]   Acct: [id number]   Primary Care Physician: Manny Peters, ABILIO  Advance Directive:   Code Status and Medical Interventions:   Ordered at: 06/27/19 1635     Level Of Support Discussed With:    Patient     Code Status:    CPR     Medical Interventions (Level of Support Prior to Arrest):    Full     Admit Date: 6/27/2019       Hospital Day: 4  Referring Provider: William Alfaro MD      Patient personally seen and examined.  Complete chart including Consults, Notes, Operative Reports, Labs, Cardiology, and Radiology studies reviewed as able.        Subjective:  Cheri Ely is a 79 y.o. female  whom we were consulted for end stage renal disease.  Routine hemodialysis Monday Wednesday Friday at [facility].  No recent issues with dialysis.  Recurrent history of GI bleeding. Had laparoscopic repair of perforated duodenal ulcer 5/12/19.  This time presented with melena and several episodes of dark emesis.  Was feeling very weak and lightheaded.  Initially was significantly hypotensive; improved following IV fluid resuscitation and PRBC transfusion    Today seen during dialysis. No new overnight issues. Planning for upper GI today  Dialysis   Pt was seen on RRT; tolerating well  Modality: Hemodialysis  Access: Arterial Venous Fistula  Location: left upper  QB: 415  QD: 600  UF: 2500    Allergies:  Patient has no known allergies.    Home Meds:  Medications Prior to Admission   Medication Sig Dispense Refill Last Dose   • acetaminophen (TYLENOL) 325 MG tablet Take 2 tablets by mouth Every 6 (Six) Hours As Needed for Mild Pain .   Past Week at Unknown time   • allopurinol (ZYLOPRIM) 100 MG tablet Take 100 mg by mouth Daily.   Past Month at Unknown time   • aspirin 81 MG EC tablet Take 1 tablet by mouth Daily.   Past Week at Unknown time   • B  Complex-C-Folic Acid (JOHN-SANGEETA) tablet Take 1 tablet by mouth Daily.   Past Week at Unknown time   • carvedilol (COREG) 12.5 MG tablet Take 12.5 mg by mouth Daily. Monday, Wednesday, and Friday dose. Hold if SBP < 100.    Past Week at Unknown time   • carvedilol (COREG) 12.5 MG tablet Take 12.5 mg by mouth 2 (Two) Times a Day With Meals. Sunday, Tuesday, Thursday, and Saturday dose. Hold if SBP <100.    Past Week at Unknown time   • Cholecalciferol (VITAMIN D) 2000 units capsule Take 1 capsule by mouth Daily. 30 capsule  Past Week at Unknown time   • clindamycin (CLEOCIN) 150 MG capsule Take 150 mg by mouth 4 (Four) Times a Day. 10 day therapy.  Start on 6/21/2019.   Past Week at Unknown time   • epoetin lucy (EPOGEN,PROCRIT) 56052 UNIT/ML injection Inject 1 mL under the skin into the appropriate area as directed 3 (Three) Times a Week.   Past Week at Unknown time   • HYDROcodone-acetaminophen (NORCO) 7.5-325 MG per tablet Take 1 tablet by mouth Every 4 (Four) Hours As Needed for Moderate Pain  for up to 30 doses. 30 tablet 0 Past Week at Unknown time   • levothyroxine (SYNTHROID, LEVOTHROID) 112 MCG tablet Take 112 mcg by mouth Daily.   Past Week at Unknown time   • losartan (COZAAR) 25 MG tablet Take 25 mg by mouth 3 (Three) Times a Week. Monday, Wednesday, and Friday.   Past Week at Unknown time   • megestrol (MEGACE) 40 MG/ML suspension Take 400 mg by mouth Daily.   Past Week at Unknown time   • ondansetron ODT (ZOFRAN-ODT) 4 MG disintegrating tablet Take 4 mg by mouth Every 8 (Eight) Hours As Needed for Nausea or Vomiting.   6/26/2019 at Unknown time   • Polyethyl Glyc-Propyl Glyc PF 0.4-0.3 % solution ophthalmic solution Administer 2 drops to both eyes Every 2 (Two) Hours As Needed (dry eyes).   Past Week at Unknown time   • pravastatin (PRAVACHOL) 40 MG tablet Take 40 mg by mouth Daily.   Past Week at Unknown time   • sertraline (ZOLOFT) 50 MG tablet Take 50 mg by mouth Daily.   Past Week at Unknown time   •  sucralfate (CARAFATE) 1 GM/10ML suspension Take 10 mL by mouth 2 (Two) Times a Day. 1200 mL 0 Past Week at Unknown time       Medicines:  Current Facility-Administered Medications   Medication Dose Route Frequency Provider Last Rate Last Dose   • collagenase ointment   Topical Q24H William Alfaro MD   1 application at 06/30/19 0819   • dextrose (D50W) 25 g/ 50mL Intravenous Solution 50 mL  50 mL Intravenous Q1H PRN William Alfaro MD   50 mL at 06/28/19 0744   • dextrose 5 % and sodium chloride 0.9 % infusion  50 mL/hr Intravenous Continuous William Alfaro MD 50 mL/hr at 06/30/19 2203 50 mL/hr at 06/30/19 2203   • levoFLOXacin (LEVAQUIN) 500 mg/100 mL D5W (premix) (LEVAQUIN) 500 mg  500 mg Intravenous Q48H Madhav Webber MD   500 mg at 06/29/19 1709   • levothyroxine sodium injection 75 mcg  75 mcg Intravenous Once Madhav Webber MD       • lidocaine 3 % cream   Apply externally Daily PRN Madhav Webber MD       • metroNIDAZOLE (FLAGYL) IVPB 500 mg  500 mg Intravenous Q8H Madhav Webber MD   500 mg at 07/01/19 0145   • pantoprazole (PROTONIX) 40 mg/100 mL (0.4 mg/mL) in 0.9% NS IVPB  8 mg/hr Intravenous Continuous William Alfaro MD 20 mL/hr at 07/01/19 0145 8 mg/hr at 07/01/19 0145   • sodium chloride 0.9 % bolus 100 mL  100 mL Intravenous PRN Chris Hsu MD       • sodium chloride 0.9 % flush 3 mL  3 mL Intravenous Q12H William Alfaro MD   3 mL at 06/30/19 2028   • sodium chloride 0.9 % flush 3-10 mL  3-10 mL Intravenous PRN William Alfaro MD           Past Medical History:  Past Medical History:   Diagnosis Date   • Arthritis    • Carotid artery disease (CMS/HCC)    • CHF (congestive heart failure) (CMS/HCC)    • Chronic kidney disease on chronic dialysis (CMS/HCC)    • Chronic renal failure     on dialysis ON MON, WED, FRI   • Coronary artery disease    • Disease of thyroid gland    • Diverticulitis    • Gastric ulcer    • History  of transfusion    • Hyperlipidemia    • Hypertension    • Injury of back    • Mesenteric artery insufficiency (CMS/HCC)    • Multilevel degenerative disc disease    • Osteoporosis    • Pancreatitis    • Pelvis fracture (CMS/HCC)    • Pneumonia        Past Surgical History:  Past Surgical History:   Procedure Laterality Date   • AORTAGRAM Right 1/8/2018    Procedure: MESENTERIC ANGIOGRAM, GROIN ACCESS, MYNX CLOSURE;  Surgeon: Tomasz San DO;  Location: [facility] OR;  Service:    • AORTIC VALVE SURGERY     • APPENDECTOMY     • BACK SURGERY     • CAPSULE ENDOSCOPY N/A 3/30/2019    Procedure: CAPSULE ENDOSCOPY M2A;  Surgeon: David Yu MD;  Location: [facility] ENDOSCOPY;  Service: Gastroenterology   • CARDIAC SURGERY     • CAROTID ENDARTERECTOMY Bilateral    • CATARACT EXTRACTION, BILATERAL     • CERVICAL SPINE SURGERY     • CHOLECYSTECTOMY     • COLON SURGERY     • COLONOSCOPY  10/01/2015   • COLONOSCOPY N/A 3/28/2019    Procedure: COLONOSCOPY WITH ANESTHESIA;  Surgeon: Rafita Merida MD;  Location: [facility] ENDOSCOPY;  Service: Gastroenterology   • CORONARY ARTERY BYPASS GRAFT     • DIALYSIS FISTULA CREATION     • ENDOSCOPY N/A 12/27/2018    Procedure: ESOPHAGOGASTRODUODENOSCOPY WITH ANESTHESIA;  Surgeon: Moni Garza MD;  Location: [facility] ENDOSCOPY;  Service: Gastroenterology   • ENDOSCOPY N/A 2/28/2019    Procedure: ESOPHAGOGASTRODUODENOSCOPY WITH ANESTHESIA;  Surgeon: Moni Garza MD;  Location: [facility] ENDOSCOPY;  Service: Gastroenterology   • ENDOSCOPY N/A 3/11/2019    Procedure: ESOPHAGOGASTRODUODENOSCOPY WITH ANESTHESIA;  Surgeon: Moni Garza MD;  Location: [facility] ENDOSCOPY;  Service: Gastroenterology   • ENDOSCOPY N/A 3/27/2019    Procedure: ESOPHAGOGASTRODUODENOSCOPY WITH ANESTHESIA;  Surgeon: Rafita Merida MD;  Location: [facility] ENDOSCOPY;  Service: Gastroenterology   • EXPLORATORY LAPAROTOMY N/A 5/13/2019    Procedure: LAPAROTOMY EXPLORATORY, OVERSEW DUODENAL ULCER WITH FRED PATCH,  KOCHER MANEUVER;  Surgeon: Laila Rodriguez MD;  Location:  [facility] OR;  Service: General   • HIP TROCHANTERIC NAILING WITH INTRAMEDULLARY HIP SCREW Right 2/8/2019    Procedure: HIP TROCANTERIC NAILING LONG WITH INTRAMEDULLARY HIP SCREW;  Surgeon: Boo Camacho MD;  Location:  [facility] OR;  Service: Orthopedics   • JOINT REPLACEMENT      knee   • LAPAROSCOPIC GASTRIC BANDING     • LEEP     • LUMBAR FUSION     • TOE AMPUTATION Left     2nd toe   • TOTAL KNEE ARTHROPLASTY Bilateral    • TUBAL ABDOMINAL LIGATION     • UPPER GASTROINTESTINAL ENDOSCOPY  10/01/2015       Family History  Family History   Problem Relation Age of Onset   • Hypertension Mother    • Cancer Mother         stomach   • Coronary artery disease Father    • Heart attack Father    • Diabetes Brother    • Coronary artery disease Brother    • Colon cancer Neg Hx    • Colon polyps Neg Hx        Social History  Social History     Socioeconomic History   • Marital status:      Spouse name: Not on file   • Number of children: Not on file   • Years of education: Not on file   • Highest education level: Not on file   Tobacco Use   • Smoking status: Never Smoker   • Smokeless tobacco: Never Used   Substance and Sexual Activity   • Alcohol use: No   • Drug use: No   • Sexual activity: Defer         Review of Systems:  History obtained from chart review and the patient  General ROS: No fever or chills  Respiratory ROS: No cough, shortness of breath, wheezing  Cardiovascular ROS: No chest pain or palpitations  Gastrointestinal ROS: positive for - blood in stools  Genito-Urinary ROS: No dysuria or hematuria    Objective:  Patient Vitals for the past 24 hrs:   BP Temp Temp src Pulse Resp SpO2 Weight   07/01/19 0800 102/63 98.3 °F (36.8 °C) Axillary (!) 134 20 97 % --   07/01/19 0713 102/61 97.5 °F (36.4 °C) Oral (!) 136 20 96 % --   07/01/19 0630 95/80 -- -- -- -- 100 % --   07/01/19 0620 -- -- -- 119 -- 99 % --   07/01/19 0605 -- -- -- -- 25 100 % --    07/01/19 0500 98/73 -- -- 95 20 100 % --   07/01/19 0445 96/56 -- -- 112 15 98 % --   07/01/19 0432 -- 97.4 °F (36.3 °C) Oral -- -- -- 65.4 kg (144 lb 3.2 oz)   07/01/19 0300 110/73 -- -- 118 19 93 % --   07/01/19 0225 -- -- -- 118 -- 95 % --   07/01/19 0200 108/75 -- -- -- 22 94 % --   07/01/19 0100 94/55 -- -- -- -- 92 % --   07/01/19 0052 -- -- -- 119 -- 90 % --   07/01/19 0010 97/53 -- -- -- 21 94 % --   07/01/19 0005 -- -- -- 119 -- -- --   06/30/19 2353 -- 97.5 °F (36.4 °C) Oral -- -- -- --   06/30/19 2344 -- -- -- 106 -- 100 % --   06/30/19 2305 -- -- -- -- 18 -- --   06/30/19 2300 103/79 -- -- 116 -- 100 % --   06/30/19 2255 -- -- -- 113 -- 100 % --   06/30/19 2200 103/71 -- -- 114 22 -- --   06/30/19 2155 -- -- -- -- -- 97 % --   06/30/19 2105 107/54 -- -- 117 19 94 % --   06/30/19 2000 102/64 -- -- 110 17 -- --   06/30/19 1923 -- 97.9 °F (36.6 °C) Oral -- -- -- --   06/30/19 1915 92/58 -- -- 120 21 98 % --   06/30/19 1805 100/59 -- -- (!) 129 -- 98 % --   06/30/19 1615 102/68 -- -- 118 -- -- --   06/30/19 1505 (!) 83/68 -- -- 110 -- -- --   06/30/19 1400 (!) 84/59 -- -- 120 -- -- --   06/30/19 1200 108/77 -- -- 117 -- 94 % --   06/30/19 1105 101/76 -- -- (!) 124 -- -- --   06/30/19 0940 (!) 88/69 -- -- 118 -- -- --       Intake/Output Summary (Last 24 hours) at 7/1/2019 0936  Last data filed at 7/1/2019 0800  Gross per 24 hour   Intake 2387 ml   Output --   Net 2387 ml     General: awake/alert    Neck: supple, no JVD  Chest:  clear to auscultation bilaterally without respiratory distress  CVS: regular rate and rhythm  Abdominal: soft, nontender, positive bowel sounds  Extremities: bilateral trace edema  Skin: warm and dry without rash  Neuro: no focal motor deficits    Labs:  Results from last 7 days   Lab Units 07/01/19  0225 06/30/19  1532 06/30/19  0221  06/29/19  0708   WBC 10*3/mm3 9.31  --  7.56  --  8.10   HEMOGLOBIN g/dL 9.9* 8.5* 9.2*   < > 9.7*  9.8*   HEMATOCRIT % 30.3* 25.8* 27.9*   < >  29.1*  29.0*   PLATELETS 10*3/mm3 114*  --  89*  --  117*    < > = values in this interval not displayed.         Results from last 7 days   Lab Units 07/01/19  0225 06/30/19  0221 06/29/19  0708 06/28/19  0631 06/27/19  1245   SODIUM mmol/L 143 141 140 141 140   POTASSIUM mmol/L 3.7 3.6 3.2* 3.5 3.2*   CHLORIDE mmol/L 109 106 106 106 108   CO2 mmol/L 27.0 27.0 28.0 26.0 21.0*   BUN mg/dL 15 10 20 38* 28*   CREATININE mg/dL 2.45* 1.75* 2.31* 3.02* 2.57*   CALCIUM mg/dL 7.4* 7.3* 7.4* 7.2* 6.5*   BILIRUBIN mg/dL  --   --   --  0.6 0.5   ALK PHOS U/L  --   --   --  179* 181*   ALT (SGPT) U/L  --   --   --  32 35   AST (SGOT) U/L  --   --   --  35 43   GLUCOSE mg/dL 94 75 96 59* 110*       Radiology:   Imaging Results (last 72 hours)     ** No results found for the last 72 hours. **          Culture:  Blood Culture   Date Value Ref Range Status   06/27/2019 No growth at 3 days  Preliminary   06/27/2019 No growth at 3 days  Preliminary         Assessment   1.  End stage renal disease on hemodialysis MWF  2.  Hypertension with recent HYPOtension  3.  Anemia of CKD and acute GI blood loss  4.  GI bleeding  5.  Secondary hyperparathyroidism    Plan:  1.  Dialysis today  2.  Monitor labs  3.  GI procedure planned for today      Dirk Tristan, APRN  7/1/2019  9:36 AM

## 2019-07-01 NOTE — PLAN OF CARE
Problem: Patient Care Overview  Goal: Plan of Care Review  Outcome: Ongoing (interventions implemented as appropriate)   07/01/19 0243   Coping/Psychosocial   Plan of Care Reviewed With patient   Plan of Care Review   Progress no change   OTHER   Outcome Summary Patient is still having difficulty sleeping, edema has improved, NPO, consent signed for test today. Patient is anxious to get some answers. Has c/o some generalized aches and pains that have been relieved with ice or repositioning. Afib still, no c/o chest discomfort, no shortness of breath. Safety maintained.       Problem: Skin Injury Risk (Adult)  Goal: Skin Health and Integrity  Outcome: Ongoing (interventions implemented as appropriate)      Problem: Fall Risk (Adult)  Goal: Absence of Fall  Outcome: Ongoing (interventions implemented as appropriate)      Problem: Wound (Includes Pressure Injury) (Adult)  Goal: Signs and Symptoms of Listed Potential Problems Will be Absent, Minimized or Managed (Wound)  Outcome: Ongoing (interventions implemented as appropriate)

## 2019-07-01 NOTE — PLAN OF CARE
Problem: Patient Care Overview  Goal: Plan of Care Review   06/30/19 1911   OTHER   Outcome Summary Bedside assessment and neuro done with [facility], patient has had no neuro change.

## 2019-07-02 ENCOUNTER — APPOINTMENT (OUTPATIENT)
Dept: GENERAL RADIOLOGY | Facility: HOSPITAL | Age: 79
End: 2019-07-02

## 2019-07-02 PROBLEM — I50.42 CHRONIC COMBINED SYSTOLIC AND DIASTOLIC CHF (CONGESTIVE HEART FAILURE) (HCC): Status: ACTIVE | Noted: 2019-07-02

## 2019-07-02 LAB
ALBUMIN SERPL-MCNC: 2.4 G/DL (ref 3.5–5)
ANION GAP SERPL CALCULATED.3IONS-SCNC: 5 MMOL/L (ref 4–13)
BACTERIA SPEC AEROBE CULT: NORMAL
BACTERIA SPEC AEROBE CULT: NORMAL
BASOPHILS # BLD AUTO: 0.02 10*3/MM3 (ref 0–0.2)
BASOPHILS NFR BLD AUTO: 0.2 % (ref 0–2)
BUN BLD-MCNC: 11 MG/DL (ref 5–21)
BUN/CREAT SERPL: 4.7 (ref 7–25)
CALCIUM SPEC-SCNC: 7.4 MG/DL (ref 8.4–10.4)
CHLORIDE SERPL-SCNC: 111 MMOL/L (ref 98–110)
CO2 SERPL-SCNC: 25 MMOL/L (ref 24–31)
CREAT BLD-MCNC: 2.32 MG/DL (ref 0.5–1.4)
DEPRECATED RDW RBC AUTO: 64.7 FL (ref 40–54)
EOSINOPHIL # BLD AUTO: 0.13 10*3/MM3 (ref 0–0.7)
EOSINOPHIL NFR BLD AUTO: 1.2 % (ref 0–4)
ERYTHROCYTE [DISTWIDTH] IN BLOOD BY AUTOMATED COUNT: 19.8 % (ref 12–15)
GFR SERPL CREATININE-BSD FRML MDRD: 20 ML/MIN/1.73
GLUCOSE BLD-MCNC: 91 MG/DL (ref 70–100)
HCT VFR BLD AUTO: 29.4 % (ref 37–47)
HGB BLD-MCNC: 9.6 G/DL (ref 12–16)
INR PPP: 2.77 (ref 0.91–1.09)
LYMPHOCYTES # BLD AUTO: 0.8 10*3/MM3 (ref 0.72–4.86)
LYMPHOCYTES NFR BLD AUTO: 7.6 % (ref 15–45)
MCH RBC QN AUTO: 31.5 PG (ref 28–32)
MCHC RBC AUTO-ENTMCNC: 32.7 G/DL (ref 33–36)
MCV RBC AUTO: 96.4 FL (ref 82–98)
MONOCYTES # BLD AUTO: 0.34 10*3/MM3 (ref 0.19–1.3)
MONOCYTES NFR BLD AUTO: 3.2 % (ref 4–12)
NEUTROPHILS # BLD AUTO: 9.14 10*3/MM3 (ref 1.87–8.4)
NEUTROPHILS NFR BLD AUTO: 86.9 % (ref 39–78)
PHOSPHATE SERPL-MCNC: 1.7 MG/DL (ref 2.5–4.5)
PLATELET # BLD AUTO: 136 10*3/MM3 (ref 130–400)
PMV BLD AUTO: 12.2 FL (ref 6–12)
POTASSIUM BLD-SCNC: 3.4 MMOL/L (ref 3.5–5.3)
PROTHROMBIN TIME: 30.3 SECONDS (ref 11.9–14.6)
RBC # BLD AUTO: 3.05 10*6/MM3 (ref 4.2–5.4)
SODIUM BLD-SCNC: 141 MMOL/L (ref 135–145)
WBC NRBC COR # BLD: 10.52 10*3/MM3 (ref 4.8–10.8)

## 2019-07-02 PROCEDURE — 74018 RADEX ABDOMEN 1 VIEW: CPT

## 2019-07-02 PROCEDURE — 99232 SBSQ HOSP IP/OBS MODERATE 35: CPT | Performed by: NURSE PRACTITIONER

## 2019-07-02 PROCEDURE — 80069 RENAL FUNCTION PANEL: CPT | Performed by: INTERNAL MEDICINE

## 2019-07-02 PROCEDURE — 85610 PROTHROMBIN TIME: CPT | Performed by: INTERNAL MEDICINE

## 2019-07-02 PROCEDURE — 97530 THERAPEUTIC ACTIVITIES: CPT

## 2019-07-02 PROCEDURE — 97110 THERAPEUTIC EXERCISES: CPT

## 2019-07-02 PROCEDURE — 85025 COMPLETE CBC W/AUTO DIFF WBC: CPT | Performed by: INTERNAL MEDICINE

## 2019-07-02 RX ORDER — METOPROLOL TARTRATE 50 MG/1
50 TABLET, FILM COATED ORAL EVERY 12 HOURS SCHEDULED
Status: DISCONTINUED | OUTPATIENT
Start: 2019-07-02 | End: 2019-07-06 | Stop reason: HOSPADM

## 2019-07-02 RX ORDER — LEVOTHYROXINE SODIUM 0.12 MG/1
125 TABLET ORAL DAILY
Status: DISCONTINUED | OUTPATIENT
Start: 2019-07-02 | End: 2019-07-06 | Stop reason: HOSPADM

## 2019-07-02 RX ORDER — POTASSIUM CHLORIDE 20MEQ/15ML
40 LIQUID (ML) ORAL DAILY
Status: DISCONTINUED | OUTPATIENT
Start: 2019-07-02 | End: 2019-07-05

## 2019-07-02 RX ORDER — ACETAMINOPHEN 325 MG/1
650 TABLET ORAL EVERY 6 HOURS PRN
Status: DISCONTINUED | OUTPATIENT
Start: 2019-07-02 | End: 2019-07-06 | Stop reason: HOSPADM

## 2019-07-02 RX ORDER — PANTOPRAZOLE SODIUM 40 MG/1
40 TABLET, DELAYED RELEASE ORAL
Status: DISCONTINUED | OUTPATIENT
Start: 2019-07-02 | End: 2019-07-06 | Stop reason: HOSPADM

## 2019-07-02 RX ADMIN — PANTOPRAZOLE SODIUM 40 MG: 40 TABLET, DELAYED RELEASE ORAL at 17:06

## 2019-07-02 RX ADMIN — METRONIDAZOLE 500 MG: 500 INJECTION, SOLUTION INTRAVENOUS at 17:06

## 2019-07-02 RX ADMIN — SODIUM PHOSPHATE, MONOBASIC, MONOHYDRATE 15 MMOL: 276; 142 INJECTION, SOLUTION INTRAVENOUS at 09:17

## 2019-07-02 RX ADMIN — COLLAGENASE SANTYL: 250 OINTMENT TOPICAL at 16:44

## 2019-07-02 RX ADMIN — METOPROLOL TARTRATE 50 MG: 50 TABLET ORAL at 09:15

## 2019-07-02 RX ADMIN — PANTOPRAZOLE SODIUM 40 MG: 40 TABLET, DELAYED RELEASE ORAL at 09:15

## 2019-07-02 RX ADMIN — METRONIDAZOLE 500 MG: 500 INJECTION, SOLUTION INTRAVENOUS at 09:17

## 2019-07-02 RX ADMIN — METRONIDAZOLE 500 MG: 500 INJECTION, SOLUTION INTRAVENOUS at 02:13

## 2019-07-02 RX ADMIN — POTASSIUM CHLORIDE 40 MEQ: 20 SOLUTION ORAL at 09:16

## 2019-07-02 RX ADMIN — ACETAMINOPHEN 650 MG: 325 TABLET, FILM COATED ORAL at 01:52

## 2019-07-02 RX ADMIN — LEVOTHYROXINE SODIUM 125 MCG: 125 TABLET ORAL at 09:15

## 2019-07-02 RX ADMIN — METOPROLOL TARTRATE 50 MG: 50 TABLET ORAL at 20:35

## 2019-07-02 NOTE — PLAN OF CARE
Problem: Patient Care Overview  Goal: Plan of Care Review  Outcome: Ongoing (interventions implemented as appropriate)   07/02/19 0899   OTHER   Outcome Summary Pt with Clear liquid diet day two. Pt with multiple wounds, adding Boost Breeze BID. Con to follow for diet tolerance and advancment.        Problem: Nutrition, Imbalanced: Inadequate Oral Intake (Adult)  Goal: Identify Related Risk Factors and Signs and Symptoms  Outcome: Ongoing (interventions implemented as appropriate)    Goal: Improved Oral Intake  Outcome: Ongoing (interventions implemented as appropriate)    Goal: Prevent Further Weight Loss  Outcome: Ongoing (interventions implemented as appropriate)

## 2019-07-02 NOTE — DISCHARGE PLACEMENT REQUEST
"To: [facility]    From: Judi Newell [phone]        Cheri Ely (79 y.o. Female)     Date of Birth Social Security Number Address Home Phone MRN    1940  [id number]  [facility] [address] [phone] [id number]    Gnosticism Marital Status          Mandaeism        Admission Date Admission Type Admitting Provider Attending Provider Department, Room/Bed    6/27/19 Emergency Madhav Webber MD Fleming, John Eric, MD [facility] CARDIAC CARE, C006/1    Discharge Date Discharge Disposition Discharge Destination                       Attending Provider:  Madhav Webber MD    Allergies:  No Known Allergies    Isolation:  None   Infection:  None   Code Status:  CPR    Ht:  152.4 cm (60\")   Wt:  65.5 kg (144 lb 6.4 oz)    Admission Cmt:  None   Principal Problem:  None                Active Insurance as of 6/27/2019     Primary Coverage     Payor Plan Insurance Group Employer/Plan Group    MEDICARE MEDICARE A & B      Payor Plan Address Payor Plan Phone Number Payor Plan Fax Number Effective Dates    [address] [phone]  4/1/1998 - None Entered    [facility] [id number]       Subscriber Name Subscriber Birth Date Member ID       CHERI ELY 1940 [id number]           Secondary Coverage     Payor Plan Insurance Group Employer/Plan Group    COMBINED INSURANCE CO COMBINED INSURANCE CO      Payor Plan Address Payor Plan Phone Number Payor Plan Fax Number Effective Dates    [address] [phone]  1/1/2017 - None Entered    [facility] [id number]       Subscriber Name Subscriber Birth Date Member ID       CHERI ELY 1940 [id number]                 Emergency Contacts      (Rel.) Home Phone Work Phone Mobile Phone    Christy Langston (Daughter) -- -- [phone]    CarmenLucero (Daughter) -- -- [phone]               History & Physical      William Alfaro MD at 6/27/2019  3:52 PM              [facility] " [facility] Medicine Services  HISTORY AND PHYSICAL    Date of Admission: 6/27/2019  Primary Care Physician: Manny Peters APRN    Subjective     Chief Complaint: Diarrhea and abdominal pain associated with dark tarry stools    History of Present Illness  Patient is a 79-year-old female  with past medical history significant for previous GI bleed with peptic ulcer disease, coronary artery disease status post CABG, aortic valve disease status post aortic surgery end-stage renal disease on hemodialysis Monday, Wednesday and Friday, hyperlipidemia, hypertension, osteoporosis who came in via the emergency room with chief complaint of black tarry stool.  Patient is on hemodialysis had usual hemodialysis a day prior to presentation.  According to the patient and family early hours of the morning patient had a large bowel movement.  Patient developed anterior abdominal wall tiny abscess at old incisional scar site and patient was on clindamycin, according to the family members, patient started the last couple of days having persistent diarrhea.  On the day of presentation, patient had a black tarry stool and they became concerned and decided to bring the patient in patient. She was complaining of generalized weakness easy fatigability.On presentation to the emergency room patient was found to be severely hypotensive, patient was found to have severe anemia secondary to blood loss, patient was type and cross has been started on transfusion and transferred to the intensive care unit to be admitted under the services of hospitalist medicine with GI consultation.  Patient has a history of similar manifestation in the past was on Plavix which was discontinued currently only on aspirin.  Patient denies being on any form of anticoagulation but INR was found to be elevated.        Review of Systems   Constitutional: Positive for fatigue.   Gastrointestinal: Positive for abdominal pain and diarrhea.    Neurological: Positive for dizziness, weakness and light-headedness.      Otherwise complete ROS reviewed and negative except as mentioned in the HPI.    Past Medical History:   Past Medical History:   Diagnosis Date   • Arthritis    • Carotid artery disease (CMS/HCC)    • CHF (congestive heart failure) (CMS/HCC)    • Chronic kidney disease on chronic dialysis (CMS/HCC)    • Chronic renal failure     on dialysis ON MON, WED, FRI   • Coronary artery disease    • Disease of thyroid gland    • Diverticulitis    • Gastric ulcer    • History of transfusion    • Hyperlipidemia    • Hypertension    • Injury of back    • Mesenteric artery insufficiency (CMS/HCC)    • Multilevel degenerative disc disease    • Osteoporosis    • Pancreatitis    • Pelvis fracture (CMS/HCC)    • Pneumonia      Past Surgical History:  Past Surgical History:   Procedure Laterality Date   • AORTAGRAM Right 1/8/2018    Procedure: MESENTERIC ANGIOGRAM, GROIN ACCESS, MYNX CLOSURE;  Surgeon: Tomasz San DO;  Location: [facility] OR;  Service:    • AORTIC VALVE SURGERY     • APPENDECTOMY     • BACK SURGERY     • CAPSULE ENDOSCOPY N/A 3/30/2019    Procedure: CAPSULE ENDOSCOPY M2A;  Surgeon: David Yu MD;  Location: [facility] ENDOSCOPY;  Service: Gastroenterology   • CARDIAC SURGERY     • CAROTID ENDARTERECTOMY Bilateral    • CATARACT EXTRACTION, BILATERAL     • CERVICAL SPINE SURGERY     • CHOLECYSTECTOMY     • COLON SURGERY     • COLONOSCOPY  10/01/2015   • COLONOSCOPY N/A 3/28/2019    Procedure: COLONOSCOPY WITH ANESTHESIA;  Surgeon: Rafita Merida MD;  Location: [facility] ENDOSCOPY;  Service: Gastroenterology   • CORONARY ARTERY BYPASS GRAFT     • DIALYSIS FISTULA CREATION     • ENDOSCOPY N/A 12/27/2018    Procedure: ESOPHAGOGASTRODUODENOSCOPY WITH ANESTHESIA;  Surgeon: Moni Garza MD;  Location: [facility] ENDOSCOPY;  Service: Gastroenterology   • ENDOSCOPY N/A 2/28/2019    Procedure: ESOPHAGOGASTRODUODENOSCOPY WITH ANESTHESIA;  Surgeon:  Moni Garza MD;  Location: [facility] ENDOSCOPY;  Service: Gastroenterology   • ENDOSCOPY N/A 3/11/2019    Procedure: ESOPHAGOGASTRODUODENOSCOPY WITH ANESTHESIA;  Surgeon: Moni Garza MD;  Location: [facility] ENDOSCOPY;  Service: Gastroenterology   • ENDOSCOPY N/A 3/27/2019    Procedure: ESOPHAGOGASTRODUODENOSCOPY WITH ANESTHESIA;  Surgeon: Rafita Merida MD;  Location: [facility] ENDOSCOPY;  Service: Gastroenterology   • EXPLORATORY LAPAROTOMY N/A 5/13/2019    Procedure: LAPAROTOMY EXPLORATORY, OVERSEW DUODENAL ULCER WITH FRED PATCH, KOCHER MANEUVER;  Surgeon: Laila Rodriguez MD;  Location: [facility] OR;  Service: General   • HIP TROCHANTERIC NAILING WITH INTRAMEDULLARY HIP SCREW Right 2/8/2019    Procedure: HIP TROCANTERIC NAILING LONG WITH INTRAMEDULLARY HIP SCREW;  Surgeon: Boo Camacho MD;  Location: [facility] OR;  Service: Orthopedics   • JOINT REPLACEMENT      knee   • LAPAROSCOPIC GASTRIC BANDING     • LEEP     • LUMBAR FUSION     • TOE AMPUTATION Left     2nd toe   • TOTAL KNEE ARTHROPLASTY Bilateral    • TUBAL ABDOMINAL LIGATION     • UPPER GASTROINTESTINAL ENDOSCOPY  10/01/2015     Social History:  reports that she has never smoked. She has never used smokeless tobacco. She reports that she does not drink alcohol or use drugs.    Family History: family history includes Cancer in her mother; Coronary artery disease in her brother and father; Diabetes in her brother; Heart attack in her father; Hypertension in her mother.      Allergies:  No Known Allergies  Medications:  Prior to Admission medications    Medication Sig Start Date End Date Taking? Authorizing Provider   acetaminophen (TYLENOL) 325 MG tablet Take 2 tablets by mouth Every 6 (Six) Hours As Needed for Mild Pain . 3/12/19  Yes Jacquie Gasca APRN   allopurinol (ZYLOPRIM) 100 MG tablet Take 100 mg by mouth Daily.   Yes Provider, MD Radha   aspirin 81 MG EC tablet Take 1 tablet by mouth Daily. 3/12/19  Yes Jacquie Gasca  APRN   B Complex-C-Folic Acid (JOHN-SANGEETA) tablet Take 1 tablet by mouth Daily.   Yes Radha Kruse MD   carvedilol (COREG) 12.5 MG tablet Take 12.5 mg by mouth Daily. Monday, Wednesday, and Friday dose. Hold if SBP < 100.    Yes Radha Kruse MD   carvedilol (COREG) 12.5 MG tablet Take 12.5 mg by mouth 2 (Two) Times a Day With Meals. Sunday, Tuesday, Thursday, and Saturday dose. Hold if SBP <100.    Yes Radha Kruse MD   Cholecalciferol (VITAMIN D) 2000 units capsule Take 1 capsule by mouth Daily. 4/2/19  Yes Jeffrey Zamora MD   clindamycin (CLEOCIN) 150 MG capsule Take 150 mg by mouth 4 (Four) Times a Day. 10 day therapy.  Start on 6/21/2019. 6/21/19  Yes Radha Kruse MD   epoetin lucy (EPOGEN,PROCRIT) 22592 UNIT/ML injection Inject 1 mL under the skin into the appropriate area as directed 3 (Three) Times a Week. 4/3/19  Yes Jeffrey Zamora MD   HYDROcodone-acetaminophen (NORCO) 7.5-325 MG per tablet Take 1 tablet by mouth Every 4 (Four) Hours As Needed for Moderate Pain  for up to 30 doses. 5/21/19  Yes Laila Rodriguez MD   levothyroxine (SYNTHROID, LEVOTHROID) 112 MCG tablet Take 112 mcg by mouth Daily.   Yes Radha Kruse MD   losartan (COZAAR) 25 MG tablet Take 25 mg by mouth 3 (Three) Times a Week. Monday, Wednesday, and Friday.   Yes Radha Kruse MD   megestrol (MEGACE) 40 MG/ML suspension Take 400 mg by mouth Daily.   Yes Radha Kruse MD   ondansetron ODT (ZOFRAN-ODT) 4 MG disintegrating tablet Take 4 mg by mouth Every 8 (Eight) Hours As Needed for Nausea or Vomiting.   Yes Radha Kruse MD   Polyethyl Glyc-Propyl Glyc PF 0.4-0.3 % solution ophthalmic solution Administer 2 drops to both eyes Every 2 (Two) Hours As Needed (dry eyes). 3/12/19  Yes Gasca, Jacquie Sandy, APRN   pravastatin (PRAVACHOL) 40 MG tablet Take 40 mg by mouth Daily.   Yes Provider, Radha, MD   sertraline (ZOLOFT) 50 MG tablet Take 50 mg by mouth Daily.   Yes  "ProviderRadha MD   sucralfate (CARAFATE) 1 GM/10ML suspension Take 10 mL by mouth 2 (Two) Times a Day. 4/2/19  Yes Jeffrey Zamora MD   Diphenoxylate-Atropine (LOMOTIL PO) Take  by mouth.  6/27/19 Yes Radha Kruse MD   docusate sodium 100 MG capsule Take 100 mg by mouth 2 (Two) Times a Day. 2/11/19 6/27/19 Yes Boo Camacho MD   megestrol (MEGACE ES) 625 MG/5ML suspension Take 625 mg by mouth Daily.  6/27/19 Yes Radha Kruse MD   pantoprazole (PROTONIX) 40 MG EC tablet Take 1 tablet by mouth Daily. 12/27/18 6/27/19 Yes Moni Garza MD   lactulose 20 GM/30ML solution solution Take 30 mL by mouth Daily As Needed (Constipation). 2/11/19 6/27/19  Lilly Adan, APRN     Objective     Vital Signs: BP 92/73   Pulse (!) 131   Temp 97.2 °F (36.2 °C)   Resp 26   Ht 157.5 cm (62\")   Wt 69.5 kg (153 lb 4.8 oz)   SpO2 96%   BMI 28.04 kg/m²       Physical Examination  Constitutional: She is oriented to person, place, and time. She appears well-developed and well-nourished.   HENT:   Head: Normocephalic and atraumatic.   Eyes: Conjunctivae are normal.   Neck: Normal range of motion.   Cardiovascular: Intact distal pulses.   Irregular tachycardia   Pulmonary/Chest: Effort normal and breath sounds normal. No respiratory distress.   Abdominal: Soft. She exhibits no distension.  Mild epigastric tenderness   Musculoskeletal: She exhibits no edema.   Neurological: She is alert and oriented to person, place, and time.   Skin: Skin is warm and dry.   Psychiatric: She has a normal mood and affect.   Nursing note and vitals reviewed.          Results Reviewed:  Lab Results (last 24 hours)     Procedure Component Value Units Date/Time    Blood Culture - Blood, Blood, Central Line [[id number]] Collected:  06/27/19 1416    Specimen:  Blood, Central Line Updated:  06/27/19 5312    [place] Draw [[id number]] Collected:  06/27/19 1245    Specimen:  Blood Updated:  06/27/19 1345    Narrative:       The " following orders were created for panel order [place] Draw.  Procedure                               Abnormality         Status                     ---------                               -----------         ------                     Light Blue Top[[id number]]                                   Final result               Green Top (Gel)[[id number]]                                  Final result               Lavender Top[[id number]]                                     Final result               Red Top[[id number]]                                          Final result                 Please view results for these tests on the individual orders.    Light Blue Top [[id number]] Collected:  06/27/19 1245    Specimen:  Blood Updated:  06/27/19 1345     Extra Tube hold for add-on     Comment: Auto resulted       Lavender Top [[id number]] Collected:  06/27/19 1245    Specimen:  Blood Updated:  06/27/19 1345     Extra Tube hold for add-on     Comment: Auto resulted       Red Top [[id number]] Collected:  06/27/19 1245    Specimen:  Blood Updated:  06/27/19 1345     Extra Tube Hold for add-ons.     Comment: Auto resulted.       Green Top (Gel) [[id number]] Collected:  06/27/19 1245    Specimen:  Blood Updated:  06/27/19 1345     Extra Tube Hold for add-ons.     Comment: Auto resulted.       POC Occult Blood Stool [[id number]]  (Abnormal) Collected:  06/27/19 1333    Specimen:  Stool Updated:  06/27/19 1334     Fecal Occult Blood Positive     Lot Number \[id number]\     Expiration Date \2/29/2020\     DEVELOPER LOT NUMBER \[id number]\     DEVELOPER EXPIRATION DATE \2/29/2020\     Positive Control Positive     Negative Control Negative    Comprehensive Metabolic Panel [[id number]]  (Abnormal) Collected:  06/27/19 1245    Specimen:  Blood Updated:  06/27/19 1321     Glucose 110 mg/dL      BUN 28 mg/dL      Creatinine 2.57 mg/dL      Sodium 140 mmol/L      Potassium 3.2 mmol/L      Chloride 108 mmol/L      CO2 21.0 mmol/L      Calcium 6.5 mg/dL       Total Protein 4.4 g/dL      Albumin 1.80 g/dL      ALT (SGPT) 35 U/L      AST (SGOT) 43 U/L      Alkaline Phosphatase 181 U/L      Total Bilirubin 0.5 mg/dL      eGFR Non African Amer 18 mL/min/1.73      Globulin 2.6 gm/dL      A/G Ratio 0.7 g/dL      BUN/Creatinine Ratio 10.9     Anion Gap 11.0 mmol/L     Narrative:       GFR Normal >60  Chronic Kidney Disease <60  Kidney Failure <15    Lactic Acid, Plasma [[id number]]  (Abnormal) Collected:  06/27/19 1245    Specimen:  Blood, Central Line Updated:  06/27/19 1320     Lactate 4.9 mmol/L     Lactic Acid, Reflex Timer (This will reflex a repeat order 3-3:15 hours after ordered.) [[id number]] Collected:  06/27/19 1245    Specimen:  Blood, Central Line Updated:  06/27/19 1320    Protime-INR [[id number]]  (Abnormal) Collected:  06/27/19 1245    Specimen:  Blood Updated:  06/27/19 1320     Protime 26.8 Seconds      INR 2.37    aPTT [[id number]]  (Abnormal) Collected:  06/27/19 1245    Specimen:  Blood Updated:  06/27/19 1320     PTT 43.2 seconds     CBC & Differential [[id number]] Collected:  06/27/19 1245    Specimen:  Blood Updated:  06/27/19 1307    Narrative:       The following orders were created for panel order CBC & Differential.  Procedure                               Abnormality         Status                     ---------                               -----------         ------                     CBC Auto Differential[[id number]]        Abnormal            Final result                 Please view results for these tests on the individual orders.    CBC Auto Differential [[id number]]  (Abnormal) Collected:  06/27/19 1245    Specimen:  Blood Updated:  06/27/19 1307     WBC 9.39 10*3/mm3      RBC 2.88 10*6/mm3      Hemoglobin 9.2 g/dL      Hematocrit 28.0 %      MCV 97.2 fL      MCH 31.9 pg      MCHC 32.9 g/dL      RDW 18.4 %      RDW-SD 63.4 fl      MPV 11.3 fL      Platelets 162 10*3/mm3      Neutrophil % 88.1 %      Lymphocyte % 7.6 %      Monocyte % 2.4 %       Eosinophil % 0.2 %      Basophil % 0.2 %      Immature Grans % 1.5 %      Neutrophils, Absolute 8.27 10*3/mm3      Lymphocytes, Absolute 0.71 10*3/mm3      Monocytes, Absolute 0.23 10*3/mm3      Eosinophils, Absolute 0.02 10*3/mm3      Basophils, Absolute 0.02 10*3/mm3      Immature Grans, Absolute 0.14 10*3/mm3      nRBC 0.2 /100 WBC     Blood Culture - Blood, Blood, Central Line [[id number]] Collected:  06/27/19 1245    Specimen:  Blood, Central Line Updated:  06/27/19 1305        Imaging Results (last 24 hours)     Procedure Component Value Units Date/Time    CT Abdomen Pelvis Without Contrast [[id number]] Collected:  06/27/19 1339     Updated:  06/27/19 1350    Narrative:       CT ABDOMEN AND PELVIS without contrast 6/27/2019 1:15 PM CDT     HISTORY: History of duodenal perforation, now with melena and epigastric  pain     COMPARISON: CT scan dated 05/12/2019      DLP: 721 mGy cm     TECHNIQUE: Noncontrast enhanced images of the abdomen and pelvis  obtained without oral contrast.      FINDINGS:   Linear scarring in both bases. No consolidation. Bilateral trace  layering effusions.      LIVER: Grossly normal.      BILIARY SYSTEM: The gallbladder has been removed. There is no  intrahepatic or extrahepatic ductal dilation.      PANCREAS: Diffuse atrophy. No focal lesion.      SPLEEN: Unremarkable.      KIDNEYS AND ADRENALS: Adrenal glands are unremarkable. Bilateral severe  renal atrophy. No hydronephrosis.  The ureters are decompressed and  normal in appearance.     RETROPERITONEUM: No adenopathy or mass.      GI TRACT: Prior gastric band. The stomach is mildly distended. There are  foci of high density material identified within the gastric lumen, near  the antrum (series 3-image 38). I suspect this is hemorrhage. There is a  small focus of extraluminal gas in the region of the duodenal bulb  (series 3-image 27). Mild fat stranding in this area as well. The  duodenum is nondistended. In general, the small bowel is  decompressed.  There is a mild circumferential wall thickening along the descending and  proximal sigmoid colon. The colon is nondistended.     OTHER: There is no gross free air as was seen on the recent comparison  exam. No gross intraperitoneal free fluid. Surgical changes along the  anterior abdominal wall. Prior lumbar spinal fusion. Prior right hip  fracture fixation.     PELVIS: No mass lesion, fluid collection or significant lymphadenopathy  is seen in the pelvis. The urinary bladder is normal in appearance.       Impression:       1. There is a small amount of high-density material within the gastric  lumen in the region of the antrum, thought to reflect hemorrhage.   2. There is a small focus of extraluminal air adjacent to the duodenal  bulb. This may reflect a recurrent microperforation. No gross free air  as was seen on the recent comparison exam. No gross intraperitoneal free  fluid.  3. There is a new circumferential wall thickening along the descending  and proximal sigmoid colon, the appearance of which suggests a mild  colitis.     The results of this exam were discussed with Elder Worley at the time of  this dictation on 06/27/2019 at 1347 hours        This report was finalized on 06/27/2019 13:47 by Dr Ronan Varner, .    XR Chest 1 View [[id number]] Updated:  06/27/19 1334        I have personally reviewed and interpreted the radiology studies and ECG obtained at time of admission.     Assessment / Plan     Assessment:   Active Hospital Problems    Diagnosis   • Gastrointestinal hemorrhage   GI bleeding  Severe anemia secondary to acute blood loss  Recurrent microperforation near the duodenal bulb  Acute colitis involving the descending and proximal sigmoid colon  Coagulopathy etiology unknown with INR of 2.3  Artery disease status post CABG  End-stage renal disease on hemodialysis  Coronary artery disease status post CABG  Hyperlipidemia  History of hypertension with hypotensive  episode  Osteoporosis  History of gastric ulcers  Multilevel degenerative disc disease  Mesenteric arterial insufficiency  Chronic lower back pain      Plan:   Keep patient n.p.o. for now  Patient was type and cross receiving blood transfusion now  Hemoglobin and hematocrit level every 6 hourly  Transfuse to keep hemoglobin greater than 7  INR is elevated 2.3 will transfuse FFP  Keep on IV fluid  Patient was seen in consultation by gastroenterologist  End-stage renal disease nephrology consultation for maintenance hemodialysis  Acute colitis managed with empiric antibiotic Levaquin and Flagyl  Patient on Protonix drip  Patient received empiric antibiotic Rocephin in the emergency room  Hold antihypertensive medication for now             Code Status: full code     I discussed the patient's findings and my recommendations with the patient and family    Estimated length of stay 4-5 days    William Alfaro MD   06/27/19   3:52 PM              Electronically signed by William Alfaro MD at 6/27/2019  4:25 PM       Hospital Medications (active)       Dose Frequency Start End    acetaminophen (TYLENOL) tablet 650 mg 650 mg Every 6 Hours PRN 7/2/2019     Sig - Route: Take 2 tablets by mouth Every 6 (Six) Hours As Needed for Mild Pain . - Oral    collagenase ointment  Every 24 Hours Scheduled 6/28/2019     Sig - Route: Apply  topically to the appropriate area as directed Daily. - Topical    dextrose (D50W) 25 g/ 50mL Intravenous Solution 50 mL 50 mL Every 1 Hour PRN 6/28/2019     Sig - Route: Infuse 50 mL into a venous catheter Every 1 (One) Hour As Needed for Low Blood Sugar. - Intravenous    levoFLOXacin (LEVAQUIN) 500 mg/100 mL D5W (premix) (LEVAQUIN) 500 mg 500 mg Every 48 Hours 6/27/2019 7/5/2019    Sig - Route: Infuse 100 mL into a venous catheter Every Other Day. - Intravenous    levothyroxine (SYNTHROID, LEVOTHROID) tablet 125 mcg 125 mcg Daily 7/2/2019     Sig - Route: Take 1 tablet by mouth Daily. -  Oral    levothyroxine sodium injection 75 mcg 75 mcg Once 7/1/2019 7/1/2019    Sig - Route: Infuse 3.75 mL into a venous catheter 1 (One) Time. - Intravenous    lidocaine 3 % cream  Daily PRN 7/1/2019     Sig - Route: Apply  topically Daily As Needed (prior to dialysis access). - Apply externally    metoprolol tartrate (LOPRESSOR) tablet 50 mg 50 mg Every 12 Hours Scheduled 7/2/2019     Sig - Route: Take 1 tablet by mouth Every 12 (Twelve) Hours. - Oral    metroNIDAZOLE (FLAGYL) IVPB 500 mg 500 mg Every 8 Hours 6/27/2019 7/4/2019    Sig - Route: Infuse 100 mL into a venous catheter Every 8 (Eight) Hours. - Intravenous    pantoprazole (PROTONIX) EC tablet 40 mg 40 mg 2 Times Daily Before Meals 7/2/2019     Sig - Route: Take 1 tablet by mouth 2 (Two) Times a Day Before Meals. - Oral    potassium chloride (KAYCIEL) 20 MEQ/15ML (10%) solution 40 mEq 40 mEq Daily 7/2/2019     Sig - Route: Take 30 mL by mouth Daily. - Oral    sodium chloride 0.9 % bolus 100 mL 100 mL As Needed 6/28/2019     Sig - Route: Infuse 100 mL into a venous catheter As Needed (hypotension). - Intravenous    sodium phosphates 15 mmol in sodium chloride 0.9 % 250 mL IVPB 15 mmol Once 7/2/2019     Sig - Route: Infuse 15 mmol into a venous catheter 1 (One) Time. - Intravenous    dextrose 5 % and sodium chloride 0.9 % infusion (Discontinued) 50 mL/hr Continuous 6/28/2019 7/2/2019    Sig - Route: Infuse 50 mL/hr into a venous catheter Continuous. - Intravenous    metoprolol tartrate (LOPRESSOR) tablet 25 mg (Discontinued) 25 mg Every 12 Hours Scheduled 7/1/2019 7/2/2019    Sig - Route: Take 1 tablet by mouth Every 12 (Twelve) Hours. - Oral    pantoprazole (PROTONIX) 40 mg/100 mL (0.4 mg/mL) in 0.9% NS IVPB (Discontinued) 8 mg/hr Continuous 6/27/2019 7/1/2019    Sig - Route: Infuse 8 mg/hr into a venous catheter Continuous. - Intravenous    pantoprazole (PROTONIX) injection 40 mg (Discontinued) 40 mg 2 Times Daily Before Meals 7/1/2019 7/2/2019    Sig -  Route: Infuse 10 mL into a venous catheter 2 (Two) Times a Day Before Meals. - Intravenous    sodium chloride 0.9 % flush 3 mL (Discontinued) 3 mL Every 12 Hours Scheduled 6/27/2019 7/2/2019    Sig - Route: Infuse 3 mL into a venous catheter Every 12 (Twelve) Hours. - Intravenous    sodium chloride 0.9 % flush 3-10 mL (Discontinued) 3-10 mL As Needed 6/27/2019 7/2/2019    Sig - Route: Infuse 3-10 mL into a venous catheter As Needed for Line Care. - Intravenous          Operative/Procedure Notes (all)     No notes of this type exist for this encounter.           Physician Progress Notes (most recent note)      Dirk Tristan APRN at 7/2/2019  9:33 AM          Nephrology ([facility]) Progress Note      Patient:  Cheri Ely  YOB: 1940  Date of Service: 7/2/2019  MRN: [id number]   Acct: [id number]   Primary Care Physician: Manny Peters APRN  Advance Directive:   Code Status and Medical Interventions:   Ordered at: 06/27/19 1635     Level Of Support Discussed With:    Patient     Code Status:    CPR     Medical Interventions (Level of Support Prior to Arrest):    Full     Admit Date: 6/27/2019       Hospital Day: 5  Referring Provider: William Alfaro MD      Patient personally seen and examined.  Complete chart including Consults, Notes, Operative Reports, Labs, Cardiology, and Radiology studies reviewed as able.        Subjective:  Cheri Ely is a 79 y.o. female  whom we were consulted for end stage renal disease.  Routine hemodialysis Monday Wednesday Friday at [facility].  No recent issues with dialysis.  Recurrent history of GI bleeding. Had laparoscopic repair of perforated duodenal ulcer 5/12/19.  This time presented with melena and several episodes of dark emesis.  Was feeling very weak and lightheaded.  Initially was significantly hypotensive; improved following IV fluid resuscitation and PRBC transfusion.  Upper GI study on 7/01 showed no  perforation.    Today feeling better. No new overnight issues.     Allergies:  Patient has no known allergies.    Home Meds:  Medications Prior to Admission   Medication Sig Dispense Refill Last Dose   • acetaminophen (TYLENOL) 325 MG tablet Take 2 tablets by mouth Every 6 (Six) Hours As Needed for Mild Pain .   Past Week at Unknown time   • allopurinol (ZYLOPRIM) 100 MG tablet Take 100 mg by mouth Daily.   Past Month at Unknown time   • aspirin 81 MG EC tablet Take 1 tablet by mouth Daily.   Past Week at Unknown time   • B Complex-C-Folic Acid (JOHN-SANGEETA) tablet Take 1 tablet by mouth Daily.   Past Week at Unknown time   • carvedilol (COREG) 12.5 MG tablet Take 12.5 mg by mouth Daily. Monday, Wednesday, and Friday dose. Hold if SBP < 100.    Past Week at Unknown time   • carvedilol (COREG) 12.5 MG tablet Take 12.5 mg by mouth 2 (Two) Times a Day With Meals. Sunday, Tuesday, Thursday, and Saturday dose. Hold if SBP <100.    Past Week at Unknown time   • Cholecalciferol (VITAMIN D) 2000 units capsule Take 1 capsule by mouth Daily. 30 capsule  Past Week at Unknown time   • clindamycin (CLEOCIN) 150 MG capsule Take 150 mg by mouth 4 (Four) Times a Day. 10 day therapy.  Start on 6/21/2019.   Past Week at Unknown time   • epoetin lucy (EPOGEN,PROCRIT) 53864 UNIT/ML injection Inject 1 mL under the skin into the appropriate area as directed 3 (Three) Times a Week.   Past Week at Unknown time   • HYDROcodone-acetaminophen (NORCO) 7.5-325 MG per tablet Take 1 tablet by mouth Every 4 (Four) Hours As Needed for Moderate Pain  for up to 30 doses. 30 tablet 0 Past Week at Unknown time   • levothyroxine (SYNTHROID, LEVOTHROID) 112 MCG tablet Take 112 mcg by mouth Daily.   Past Week at Unknown time   • losartan (COZAAR) 25 MG tablet Take 25 mg by mouth 3 (Three) Times a Week. Monday, Wednesday, and Friday.   Past Week at Unknown time   • megestrol (MEGACE) 40 MG/ML suspension Take 400 mg by mouth Daily.   Past Week at Unknown time    • ondansetron ODT (ZOFRAN-ODT) 4 MG disintegrating tablet Take 4 mg by mouth Every 8 (Eight) Hours As Needed for Nausea or Vomiting.   6/26/2019 at Unknown time   • Polyethyl Glyc-Propyl Glyc PF 0.4-0.3 % solution ophthalmic solution Administer 2 drops to both eyes Every 2 (Two) Hours As Needed (dry eyes).   Past Week at Unknown time   • pravastatin (PRAVACHOL) 40 MG tablet Take 40 mg by mouth Daily.   Past Week at Unknown time   • sertraline (ZOLOFT) 50 MG tablet Take 50 mg by mouth Daily.   Past Week at Unknown time   • sucralfate (CARAFATE) 1 GM/10ML suspension Take 10 mL by mouth 2 (Two) Times a Day. 1200 mL 0 Past Week at Unknown time       Medicines:  Current Facility-Administered Medications   Medication Dose Route Frequency Provider Last Rate Last Dose   • acetaminophen (TYLENOL) tablet 650 mg  650 mg Oral Q6H PRN Azalia Shaver DO   650 mg at 07/02/19 0152   • collagenase ointment   Topical Q24H William Alfaro MD       • dextrose (D50W) 25 g/ 50mL Intravenous Solution 50 mL  50 mL Intravenous Q1H PRN William Alfaro MD   50 mL at 06/28/19 0744   • levoFLOXacin (LEVAQUIN) 500 mg/100 mL D5W (premix) (LEVAQUIN) 500 mg  500 mg Intravenous Q48H Madhav Webber MD   500 mg at 07/01/19 1717   • levothyroxine (SYNTHROID, LEVOTHROID) tablet 125 mcg  125 mcg Oral Daily Madhav Webber MD   125 mcg at 07/02/19 0915   • lidocaine 3 % cream   Apply externally Daily PRN Madhav Webber MD       • metoprolol tartrate (LOPRESSOR) tablet 50 mg  50 mg Oral Q12H Madhav Webber MD   50 mg at 07/02/19 0915   • metroNIDAZOLE (FLAGYL) IVPB 500 mg  500 mg Intravenous Q8H Madhav Webber MD   500 mg at 07/02/19 0917   • pantoprazole (PROTONIX) EC tablet 40 mg  40 mg Oral BID AC Madhav Webber MD   40 mg at 07/02/19 0915   • potassium chloride (KAYCIEL) 20 MEQ/15ML (10%) solution 40 mEq  40 mEq Oral Daily Madhav Webber MD   40 mEq at 07/02/19 0916   • sodium chloride 0.9 % bolus  100 mL  100 mL Intravenous PRN Chris Hsu MD       • sodium phosphates 15 mmol in sodium chloride 0.9 % 250 mL IVPB  15 mmol Intravenous Once Madhav Webber MD 62.5 mL/hr at 07/02/19 0917 15 mmol at 07/02/19 0917       Past Medical History:  Past Medical History:   Diagnosis Date   • Arthritis    • Carotid artery disease (CMS/HCC)    • CHF (congestive heart failure) (CMS/HCC)    • Chronic kidney disease on chronic dialysis (CMS/HCC)    • Chronic renal failure     on dialysis ON MON, WED, FRI   • Coronary artery disease    • Disease of thyroid gland    • Diverticulitis    • Gastric ulcer    • History of transfusion    • Hyperlipidemia    • Hypertension    • Injury of back    • Mesenteric artery insufficiency (CMS/HCC)    • Multilevel degenerative disc disease    • Osteoporosis    • Pancreatitis    • Pelvis fracture (CMS/HCC)    • Pneumonia        Past Surgical History:  Past Surgical History:   Procedure Laterality Date   • AORTAGRAM Right 1/8/2018    Procedure: MESENTERIC ANGIOGRAM, GROIN ACCESS, MYNX CLOSURE;  Surgeon: Tomasz San DO;  Location: [facility] OR;  Service:    • AORTIC VALVE SURGERY     • APPENDECTOMY     • BACK SURGERY     • CAPSULE ENDOSCOPY N/A 3/30/2019    Procedure: CAPSULE ENDOSCOPY M2A;  Surgeon: David Yu MD;  Location: [facility] ENDOSCOPY;  Service: Gastroenterology   • CARDIAC SURGERY     • CAROTID ENDARTERECTOMY Bilateral    • CATARACT EXTRACTION, BILATERAL     • CERVICAL SPINE SURGERY     • CHOLECYSTECTOMY     • COLON SURGERY     • COLONOSCOPY  10/01/2015   • COLONOSCOPY N/A 3/28/2019    Procedure: COLONOSCOPY WITH ANESTHESIA;  Surgeon: Rafita Merida MD;  Location: [facility] ENDOSCOPY;  Service: Gastroenterology   • CORONARY ARTERY BYPASS GRAFT     • DIALYSIS FISTULA CREATION     • ENDOSCOPY N/A 12/27/2018    Procedure: ESOPHAGOGASTRODUODENOSCOPY WITH ANESTHESIA;  Surgeon: Moni Garza MD;  Location: [facility] ENDOSCOPY;  Service: Gastroenterology   • ENDOSCOPY  N/A 2/28/2019    Procedure: ESOPHAGOGASTRODUODENOSCOPY WITH ANESTHESIA;  Surgeon: Moni Garza MD;  Location: [facility] ENDOSCOPY;  Service: Gastroenterology   • ENDOSCOPY N/A 3/11/2019    Procedure: ESOPHAGOGASTRODUODENOSCOPY WITH ANESTHESIA;  Surgeon: Moni Garza MD;  Location: [facility] ENDOSCOPY;  Service: Gastroenterology   • ENDOSCOPY N/A 3/27/2019    Procedure: ESOPHAGOGASTRODUODENOSCOPY WITH ANESTHESIA;  Surgeon: Rafita Merida MD;  Location: [facility] ENDOSCOPY;  Service: Gastroenterology   • EXPLORATORY LAPAROTOMY N/A 5/13/2019    Procedure: LAPAROTOMY EXPLORATORY, OVERSEW DUODENAL ULCER WITH FRED PATCH, KOCHER MANEUVER;  Surgeon: Laila Rodriguez MD;  Location: [facility] OR;  Service: General   • HIP TROCHANTERIC NAILING WITH INTRAMEDULLARY HIP SCREW Right 2/8/2019    Procedure: HIP TROCANTERIC NAILING LONG WITH INTRAMEDULLARY HIP SCREW;  Surgeon: Boo Camacho MD;  Location: [facility] OR;  Service: Orthopedics   • JOINT REPLACEMENT      knee   • LAPAROSCOPIC GASTRIC BANDING     • LEEP     • LUMBAR FUSION     • TOE AMPUTATION Left     2nd toe   • TOTAL KNEE ARTHROPLASTY Bilateral    • TUBAL ABDOMINAL LIGATION     • UPPER GASTROINTESTINAL ENDOSCOPY  10/01/2015       Family History  Family History   Problem Relation Age of Onset   • Hypertension Mother    • Cancer Mother         stomach   • Coronary artery disease Father    • Heart attack Father    • Diabetes Brother    • Coronary artery disease Brother    • Colon cancer Neg Hx    • Colon polyps Neg Hx        Social History  Social History     Socioeconomic History   • Marital status:      Spouse name: Not on file   • Number of children: Not on file   • Years of education: Not on file   • Highest education level: Not on file   Tobacco Use   • Smoking status: Never Smoker   • Smokeless tobacco: Never Used   Substance and Sexual Activity   • Alcohol use: No   • Drug use: No   • Sexual activity: Defer         Review of Systems:  History obtained from  "chart review and the patient  General ROS: No fever or chills  Respiratory ROS: No cough, shortness of breath, wheezing  Cardiovascular ROS: No chest pain or palpitations  Gastrointestinal ROS: positive for - blood in stools  Genito-Urinary ROS: No dysuria or hematuria    Objective:  Patient Vitals for the past 24 hrs:   BP Temp Temp src Pulse Resp SpO2 Height Weight   07/02/19 0800 100/60 -- -- 105 18 98 % -- --   07/02/19 0702 96/49 -- -- 113 19 100 % -- --   07/02/19 0627 96/43 -- -- 109 20 100 % -- --   07/02/19 0602 -- -- -- -- -- -- -- 65.5 kg (144 lb 6.4 oz)   07/02/19 0502 91/69 -- -- 109 19 100 % -- --   07/02/19 0435 92/48 97.9 °F (36.6 °C) Oral 103 20 93 % -- --   07/02/19 0302 108/61 -- -- 101 19 94 % -- --   07/02/19 0202 103/68 -- -- 101 15 99 % -- --   07/02/19 0102 102/70 -- -- 101 17 95 % -- --   07/02/19 0002 102/52 98.5 °F (36.9 °C) Oral 105 16 95 % -- --   07/01/19 2302 110/66 -- -- 105 15 96 % -- --   07/01/19 2207 108/59 -- -- 103 20 100 % -- --   07/01/19 2132 91/68 -- -- 113 20 100 % -- --   07/01/19 2004 99/66 97.8 °F (36.6 °C) Oral 103 22 (!) 89 % -- --   07/01/19 1932 105/55 -- -- 103 20 99 % -- --   07/01/19 1902 (!) 85/63 -- -- 111 22 94 % -- --   07/01/19 1802 102/58 -- -- 113 -- 94 % -- --   07/01/19 1801 102/58 -- -- 102 20 -- -- --   07/01/19 1711 (!) 89/68 -- -- 114 20 94 % -- --   07/01/19 1605 97/77 -- -- -- -- -- 152.4 cm (60\") 63.4 kg (139 lb 12.4 oz)   07/01/19 1602 94/80 98 °F (36.7 °C) Oral (!) 130 20 95 % -- --   07/01/19 1502 97/77 -- -- (!) 134 -- -- -- --   07/01/19 1500 97/77 -- -- (!) 124 18 94 % -- --   07/01/19 1402 110/76 -- -- (!) 148 -- 95 % -- --   07/01/19 1400 110/76 -- -- (!) 126 18 97 % -- --   07/01/19 1307 104/54 -- -- (!) 134 18 93 % -- --   07/01/19 1200 (!) 86/70 98.3 °F (36.8 °C) Oral (!) 125 20 93 % -- --   07/01/19 1100 (!) 89/63 -- -- (!) 135 20 94 % -- --   07/01/19 1035 -- 97.6 °F (36.4 °C) Temporal -- 20 -- -- 63.4 kg (139 lb 12.4 oz)   07/01/19 " 1000 102/69 -- -- (!) 125 18 -- -- --       Intake/Output Summary (Last 24 hours) at 7/2/2019 0933  Last data filed at 7/2/2019 0400  Gross per 24 hour   Intake 1417.41 ml   Output 2400 ml   Net -982.59 ml     General: awake/alert    Neck: supple, no JVD  Chest:  clear to auscultation bilaterally without respiratory distress  CVS: regular rate and rhythm  Abdominal: soft, nontender, positive bowel sounds  Extremities: bilateral trace edema  Skin: warm and dry without rash  Neuro: no focal motor deficits    Labs:  Results from last 7 days   Lab Units 07/02/19 0319 07/01/19 0225 06/30/19  1532 06/30/19  0221   WBC 10*3/mm3 10.52 9.31  --  7.56   HEMOGLOBIN g/dL 9.6* 9.9* 8.5* 9.2*   HEMATOCRIT % 29.4* 30.3* 25.8* 27.9*   PLATELETS 10*3/mm3 136 114*  --  89*         Results from last 7 days   Lab Units 07/02/19 0319 07/01/19 0225 06/30/19  0221  06/28/19  0631 06/27/19  1245   SODIUM mmol/L 141 143 141   < > 141 140   POTASSIUM mmol/L 3.4* 3.7 3.6   < > 3.5 3.2*   CHLORIDE mmol/L 111* 109 106   < > 106 108   CO2 mmol/L 25.0 27.0 27.0   < > 26.0 21.0*   BUN mg/dL 11 15 10   < > 38* 28*   CREATININE mg/dL 2.32* 2.45* 1.75*   < > 3.02* 2.57*   CALCIUM mg/dL 7.4* 7.4* 7.3*   < > 7.2* 6.5*   BILIRUBIN mg/dL  --   --   --   --  0.6 0.5   ALK PHOS U/L  --   --   --   --  179* 181*   ALT (SGPT) U/L  --   --   --   --  32 35   AST (SGOT) U/L  --   --   --   --  35 43   GLUCOSE mg/dL 91 94 75   < > 59* 110*    < > = values in this interval not displayed.       Radiology:   Imaging Results (last 72 hours)     ** No results found for the last 72 hours. **          Culture:  Blood Culture   Date Value Ref Range Status   06/27/2019 No growth at 3 days  Preliminary   06/27/2019 No growth at 3 days  Preliminary         Assessment   1.  End stage renal disease on hemodialysis MWF  2.  Hypertension with recent HYPOtension  3.  Anemia of CKD and acute GI blood loss  4.  GI bleeding  5.  Secondary hyperparathyroidism    Plan:  1.   Dialysis next due 7/03  2.  Monitor labs        ABILIO Meraz  7/2/2019  9:33 AM    Electronically signed by Dirk Tristan APRN at 7/2/2019  9:35 AM          Consult Notes (most recent note)      Laila Rodriguez MD at 6/28/2019  1:21 PM          Laila Rodriguez MD Consult Note      Patient Care Team:  Manny Peters APRN as PCP - General (Gerontology)  Moni Garza MD as Consulting Physician (Gastroenterology)  Tomasz San DO as Consulting Physician (Vascular Surgery)    Chief complaint upper GI bleed    Subjective .     History of present illness: Patient well-known to me status post oversew of perforated duodenal ulcer back in May.  She had an uneventful course.  She was sent out on lifelong proton pump inhibitor medicine.  She states she is been taking that.  She had blood in her stool was admitted to the hospital.  CT scan showed possible free air pocket around the duodenal sweep.  Also question of contrast pooling in the stomach    Review of Systems  Pertinent items are noted in HPI, all other systems reviewed and negative    History  Past Medical History:   Diagnosis Date   • Arthritis    • Carotid artery disease (CMS/HCC)    • CHF (congestive heart failure) (CMS/HCC)    • Chronic kidney disease on chronic dialysis (CMS/HCC)    • Chronic renal failure     on dialysis ON MON, WED, FRI   • Coronary artery disease    • Disease of thyroid gland    • Diverticulitis    • Gastric ulcer    • History of transfusion    • Hyperlipidemia    • Hypertension    • Injury of back    • Mesenteric artery insufficiency (CMS/HCC)    • Multilevel degenerative disc disease    • Osteoporosis    • Pancreatitis    • Pelvis fracture (CMS/HCC)    • Pneumonia    , Past Surgical History:   Procedure Laterality Date   • AORTAGRAM Right 1/8/2018    Procedure: MESENTERIC ANGIOGRAM, GROIN ACCESS, MYNX CLOSURE;  Surgeon: Tomasz San DO;  Location: [facility] OR;  Service:    • AORTIC VALVE SURGERY     •  APPENDECTOMY     • BACK SURGERY     • CAPSULE ENDOSCOPY N/A 3/30/2019    Procedure: CAPSULE ENDOSCOPY M2A;  Surgeon: David Yu MD;  Location: [facility] ENDOSCOPY;  Service: Gastroenterology   • CARDIAC SURGERY     • CAROTID ENDARTERECTOMY Bilateral    • CATARACT EXTRACTION, BILATERAL     • CERVICAL SPINE SURGERY     • CHOLECYSTECTOMY     • COLON SURGERY     • COLONOSCOPY  10/01/2015   • COLONOSCOPY N/A 3/28/2019    Procedure: COLONOSCOPY WITH ANESTHESIA;  Surgeon: Rafita Merida MD;  Location: [facility] ENDOSCOPY;  Service: Gastroenterology   • CORONARY ARTERY BYPASS GRAFT     • DIALYSIS FISTULA CREATION     • ENDOSCOPY N/A 12/27/2018    Procedure: ESOPHAGOGASTRODUODENOSCOPY WITH ANESTHESIA;  Surgeon: Moni Garza MD;  Location: [facility] ENDOSCOPY;  Service: Gastroenterology   • ENDOSCOPY N/A 2/28/2019    Procedure: ESOPHAGOGASTRODUODENOSCOPY WITH ANESTHESIA;  Surgeon: Moni Garza MD;  Location: [facility] ENDOSCOPY;  Service: Gastroenterology   • ENDOSCOPY N/A 3/11/2019    Procedure: ESOPHAGOGASTRODUODENOSCOPY WITH ANESTHESIA;  Surgeon: Moni Garza MD;  Location: [facility] ENDOSCOPY;  Service: Gastroenterology   • ENDOSCOPY N/A 3/27/2019    Procedure: ESOPHAGOGASTRODUODENOSCOPY WITH ANESTHESIA;  Surgeon: Rafita Merida MD;  Location: [facility] ENDOSCOPY;  Service: Gastroenterology   • EXPLORATORY LAPAROTOMY N/A 5/13/2019    Procedure: LAPAROTOMY EXPLORATORY, OVERSEW DUODENAL ULCER WITH FRED PATCH, KOCHER MANEUVER;  Surgeon: Laila Rodriguez MD;  Location: [facility] OR;  Service: General   • HIP TROCHANTERIC NAILING WITH INTRAMEDULLARY HIP SCREW Right 2/8/2019    Procedure: HIP TROCANTERIC NAILING LONG WITH INTRAMEDULLARY HIP SCREW;  Surgeon: Boo Camacho MD;  Location: [facility] OR;  Service: Orthopedics   • JOINT REPLACEMENT      knee   • LAPAROSCOPIC GASTRIC BANDING     • LEEP     • LUMBAR FUSION     • TOE AMPUTATION Left     2nd toe   • TOTAL KNEE ARTHROPLASTY Bilateral    • TUBAL ABDOMINAL LIGATION     •  UPPER GASTROINTESTINAL ENDOSCOPY  10/01/2015   , Family History   Problem Relation Age of Onset   • Hypertension Mother    • Cancer Mother         stomach   • Coronary artery disease Father    • Heart attack Father    • Diabetes Brother    • Coronary artery disease Brother    • Colon cancer Neg Hx    • Colon polyps Neg Hx    , Social History     Tobacco Use   • Smoking status: Never Smoker   • Smokeless tobacco: Never Used   Substance Use Topics   • Alcohol use: No   • Drug use: No   , Medications Prior to Admission   Medication Sig Dispense Refill Last Dose   • acetaminophen (TYLENOL) 325 MG tablet Take 2 tablets by mouth Every 6 (Six) Hours As Needed for Mild Pain .   Past Week at Unknown time   • allopurinol (ZYLOPRIM) 100 MG tablet Take 100 mg by mouth Daily.   Past Month at Unknown time   • aspirin 81 MG EC tablet Take 1 tablet by mouth Daily.   Past Week at Unknown time   • B Complex-C-Folic Acid (JOHN-SANGEETA) tablet Take 1 tablet by mouth Daily.   Past Week at Unknown time   • carvedilol (COREG) 12.5 MG tablet Take 12.5 mg by mouth Daily. Monday, Wednesday, and Friday dose. Hold if SBP < 100.    Past Week at Unknown time   • carvedilol (COREG) 12.5 MG tablet Take 12.5 mg by mouth 2 (Two) Times a Day With Meals. Sunday, Tuesday, Thursday, and Saturday dose. Hold if SBP <100.    Past Week at Unknown time   • Cholecalciferol (VITAMIN D) 2000 units capsule Take 1 capsule by mouth Daily. 30 capsule  Past Week at Unknown time   • clindamycin (CLEOCIN) 150 MG capsule Take 150 mg by mouth 4 (Four) Times a Day. 10 day therapy.  Start on 6/21/2019.   Past Week at Unknown time   • epoetin lucy (EPOGEN,PROCRIT) 21702 UNIT/ML injection Inject 1 mL under the skin into the appropriate area as directed 3 (Three) Times a Week.   Past Week at Unknown time   • HYDROcodone-acetaminophen (NORCO) 7.5-325 MG per tablet Take 1 tablet by mouth Every 4 (Four) Hours As Needed for Moderate Pain  for up to 30 doses. 30 tablet 0 Past Week  at Unknown time   • levothyroxine (SYNTHROID, LEVOTHROID) 112 MCG tablet Take 112 mcg by mouth Daily.   Past Week at Unknown time   • losartan (COZAAR) 25 MG tablet Take 25 mg by mouth 3 (Three) Times a Week. Monday, Wednesday, and Friday.   Past Week at Unknown time   • megestrol (MEGACE) 40 MG/ML suspension Take 400 mg by mouth Daily.   Past Week at Unknown time   • ondansetron ODT (ZOFRAN-ODT) 4 MG disintegrating tablet Take 4 mg by mouth Every 8 (Eight) Hours As Needed for Nausea or Vomiting.   6/26/2019 at Unknown time   • Polyethyl Glyc-Propyl Glyc PF 0.4-0.3 % solution ophthalmic solution Administer 2 drops to both eyes Every 2 (Two) Hours As Needed (dry eyes).   Past Week at Unknown time   • pravastatin (PRAVACHOL) 40 MG tablet Take 40 mg by mouth Daily.   Past Week at Unknown time   • sertraline (ZOLOFT) 50 MG tablet Take 50 mg by mouth Daily.   Past Week at Unknown time   • sucralfate (CARAFATE) 1 GM/10ML suspension Take 10 mL by mouth 2 (Two) Times a Day. 1200 mL 0 Past Week at Unknown time   , Scheduled Meds:    levoFLOXacin 500 mg Intravenous Q48H   magnesium sulfate 1 g Intravenous Once   metroNIDAZOLE 500 mg Intravenous Q8H   sodium chloride 3 mL Intravenous Q12H   , Continuous Infusions:    dextrose 5 % and sodium chloride 0.45 % with KCl 20 mEq/L 75 mL/hr Last Rate: 75 mL/hr (06/28/19 1013)   pantoprazole 8 mg/hr Last Rate: 8 mg/hr (06/28/19 1300)   , PRN Meds:  •  albumin human  •  dextrose  •  sodium chloride  •  sodium chloride and Allergies:  Patient has no known allergies.    Objective     Vital Signs   Temp:  [97.2 °F (36.2 °C)-100 °F (37.8 °C)] 100 °F (37.8 °C)  Heart Rate:  [] 108  Resp:  [13-26] 19  BP: ()/(37-97) 110/65    Intake & Output (last 3 days)       06/25 0701 - 06/26 0700 06/26 0701 - 06/27 0700 06/27 0701 - 06/28 0700 06/28 0701 - 06/29 0700    Blood   629     IV Piggyback   354 100    Total Intake(mL/kg)   983 (14.9) 100 (1.5)    Other   250     Total Output   250      Net   +733 +100            Stool Unmeasured Occurrence    1 x           Physical Exam:     General Appearance:    Alert, cooperative, in no acute distress   Head:    Normocephalic, without obvious abnormality, atraumatic   Eyes:            Lids and lashes normal, conjunctivae and sclerae normal, no   icterus, no pallor, corneas clear, PERRLA   Ears:    Ears appear intact with no abnormalities noted   Neck:   No adenopathy, supple, trachea midline   Back:     No kyphosis present, no scoliosis present, no skin lesions,      erythema or scars, no tenderness to percussion or                   palpation,  range of motion normal   Lungs:     Clear to auscultation,respirations regular, even and                  unlabored    Heart:    Regular rhythm and normal rate, normal S1 and S2, no            murmur, no gallop, no rub, no click   Chest Wall:    No abnormalities observed   Abdomen:    Soft nontender wound well-healed.  There is a small amount of clear fluid expressible in the lower aspect of her incision   Rectal:     Deferred   Extremities:  Edematous.   Pulses:   Pulses palpable and equal bilaterally   Skin:   No bleeding, bruising or rash   Lymph nodes:   No palpable adenopathy   Neurologic:   No focal deficits       Lab Results (last 72 hours)     Procedure Component Value Units Date/Time    Blood Culture - Blood, Blood, Central Line [[id number]] Collected:  06/27/19 1245    Specimen:  Blood, Central Line Updated:  06/28/19 1316     Blood Culture No growth at 24 hours    Hemoglobin & Hematocrit, Blood [[id number]]  (Abnormal) Collected:  06/28/19 1145    Specimen:  Blood Updated:  06/28/19 1200     Hemoglobin 10.2 g/dL      Hematocrit 30.9 %     Clostridium Difficile Toxin - Stool, Per Rectum [[id number]] Collected:  06/28/19 0735    Specimen:  Stool from Per Rectum Updated:  06/28/19 1120    Narrative:       The following orders were created for panel order Clostridium Difficile Toxin - Stool, Per  Rectum.  Procedure                               Abnormality         Status                     ---------                               -----------         ------                     Clostridium Difficile To...[[id number]]  Normal              Final result                 Please view results for these tests on the individual orders.    Clostridium Difficile Toxin, PCR - Stool, Per Rectum [[id number]]  (Normal) Collected:  06/28/19 0735    Specimen:  Stool from Per Rectum Updated:  06/28/19 1120     C. Difficile Toxins by PCR Negative    Narrative:       Performance characteristics of test not established for patients <2 years of age.    Occult Blood X 1, Stool - Stool, Per Rectum [[id number]]  (Abnormal) Collected:  06/28/19 0735    Specimen:  Stool from Per Rectum Updated:  06/28/19 0837     Fecal Occult Blood Positive    POC Glucose Once [[id number]]  (Normal) Collected:  06/28/19 0806    Specimen:  Blood Updated:  06/28/19 0816     Glucose 91 mg/dL      Comment: : [id number] Aguilera AprilMeter ID: [id number]       POC Glucose Once [[id number]]  (Abnormal) Collected:  06/28/19 0728    Specimen:  Blood Updated:  06/28/19 0739     Glucose 57 mg/dL      Comment: : [id number] [facility] [id number]       Protime-INR [[id number]]  (Abnormal) Collected:  06/28/19 0631    Specimen:  Blood Updated:  06/28/19 0732     Protime 21.1 Seconds      INR 1.76    Comprehensive Metabolic Panel [[id number]]  (Abnormal) Collected:  06/28/19 0631    Specimen:  Blood Updated:  06/28/19 0717     Glucose 59 mg/dL      BUN 38 mg/dL      Creatinine 3.02 mg/dL      Sodium 141 mmol/L      Potassium 3.5 mmol/L      Chloride 106 mmol/L      CO2 26.0 mmol/L      Calcium 7.2 mg/dL      Total Protein 4.9 g/dL      Albumin 2.10 g/dL      ALT (SGPT) 32 U/L      AST (SGOT) 35 U/L      Alkaline Phosphatase 179 U/L      Total Bilirubin 0.6 mg/dL      eGFR Non African Amer 15 mL/min/1.73      Globulin 2.8 gm/dL      A/G Ratio 0.8 g/dL       BUN/Creatinine Ratio 12.6     Anion Gap 9.0 mmol/L     Narrative:       GFR Normal >60  Chronic Kidney Disease <60  Kidney Failure <15    Phosphorus [[id number]]  (Normal) Collected:  06/28/19 0631    Specimen:  Blood Updated:  06/28/19 0715     Phosphorus 2.5 mg/dL     Magnesium [[id number]]  (Normal) Collected:  06/28/19 0631    Specimen:  Blood Updated:  06/28/19 0715     Magnesium 1.6 mg/dL     CBC & Differential [[id number]] Collected:  06/28/19 0631    Specimen:  Blood Updated:  06/28/19 0703    Narrative:       The following orders were created for panel order CBC & Differential.  Procedure                               Abnormality         Status                     ---------                               -----------         ------                     CBC Auto Differential[[id number]]        Abnormal            Final result                 Please view results for these tests on the individual orders.    CBC Auto Differential [[id number]]  (Abnormal) Collected:  06/28/19 0631    Specimen:  Blood Updated:  06/28/19 0703     WBC 9.18 10*3/mm3      RBC 3.33 10*6/mm3      Hemoglobin 10.3 g/dL      Hematocrit 30.5 %      MCV 91.6 fL      MCH 30.9 pg      MCHC 33.8 g/dL      RDW 19.4 %      RDW-SD 62.3 fl      MPV 11.5 fL      Platelets 151 10*3/mm3      Neutrophil % 86.2 %      Lymphocyte % 10.0 %      Monocyte % 2.8 %      Eosinophil % 0.3 %      Basophil % 0.2 %      Immature Grans % 0.5 %      Neutrophils, Absolute 7.90 10*3/mm3      Lymphocytes, Absolute 0.92 10*3/mm3      Monocytes, Absolute 0.26 10*3/mm3      Eosinophils, Absolute 0.03 10*3/mm3      Basophils, Absolute 0.02 10*3/mm3      Immature Grans, Absolute 0.05 10*3/mm3      nRBC 0.0 /100 WBC     Hemoglobin & Hematocrit, Blood [[id number]]  (Abnormal) Collected:  06/28/19 0631    Specimen:  Blood Updated:  06/28/19 0702     Hemoglobin 10.3 g/dL      Hematocrit 30.5 %     Hemoglobin & Hematocrit, Blood [[id number]]  (Abnormal) Collected:  06/27/19 7720     Specimen:  Blood Updated:  06/27/19 2341     Hemoglobin 10.1 g/dL      Hematocrit 29.5 %     Hemoglobin & Hematocrit, Blood [[id number]]  (Abnormal) Collected:  06/27/19 1851    Specimen:  Blood Updated:  06/27/19 1915     Hemoglobin 10.3 g/dL      Hematocrit 30.7 %     Lactic Acid, Reflex [[id number]]  (Normal) Collected:  06/27/19 1642    Specimen:  Blood Updated:  06/27/19 1704     Lactate 1.4 mmol/L     Lactic Acid, Reflex Timer (This will reflex a repeat order 3-3:15 hours after ordered.) [[id number]] Collected:  06/27/19 1245    Specimen:  Blood, Central Line Updated:  06/27/19 1630     Extra Tube Hold for add-ons.     Comment: Auto resulted.       Blood Culture - Blood, Blood, Central Line [[id number]] Collected:  06/27/19 1416    Specimen:  Blood, Central Line Updated:  06/27/19 1452    [place] Draw [[id number]] Collected:  06/27/19 1245    Specimen:  Blood Updated:  06/27/19 1345    Narrative:       The following orders were created for panel order [place] Draw.  Procedure                               Abnormality         Status                     ---------                               -----------         ------                     Light Blue Top[[id number]]                                   Final result               Green Top (Gel)[[id number]]                                  Final result               Lavender Top[[id number]]                                     Final result               Red Top[[id number]]                                          Final result                 Please view results for these tests on the individual orders.    Light Blue Top [[id number]] Collected:  06/27/19 1245    Specimen:  Blood Updated:  06/27/19 1345     Extra Tube hold for add-on     Comment: Auto resulted       Lavender Top [[id number]] Collected:  06/27/19 1245    Specimen:  Blood Updated:  06/27/19 1345     Extra Tube hold for add-on     Comment: Auto resulted       Red Top [[id number]] Collected:  06/27/19 1245    Specimen:   Blood Updated:  06/27/19 1345     Extra Tube Hold for add-ons.     Comment: Auto resulted.       Green Top (Gel) [[id number]] Collected:  06/27/19 1245    Specimen:  Blood Updated:  06/27/19 1345     Extra Tube Hold for add-ons.     Comment: Auto resulted.       POC Occult Blood Stool [[id number]]  (Abnormal) Collected:  06/27/19 1333    Specimen:  Stool Updated:  06/27/19 1334     Fecal Occult Blood Positive     Lot Number \[id number]\     Expiration Date \2/29/2020\     DEVELOPER LOT NUMBER \151\     DEVELOPER EXPIRATION DATE \2/29/2020\     Positive Control Positive     Negative Control Negative    Comprehensive Metabolic Panel [[id number]]  (Abnormal) Collected:  06/27/19 1245    Specimen:  Blood Updated:  06/27/19 1321     Glucose 110 mg/dL      BUN 28 mg/dL      Creatinine 2.57 mg/dL      Sodium 140 mmol/L      Potassium 3.2 mmol/L      Chloride 108 mmol/L      CO2 21.0 mmol/L      Calcium 6.5 mg/dL      Total Protein 4.4 g/dL      Albumin 1.80 g/dL      ALT (SGPT) 35 U/L      AST (SGOT) 43 U/L      Alkaline Phosphatase 181 U/L      Total Bilirubin 0.5 mg/dL      eGFR Non African Amer 18 mL/min/1.73      Globulin 2.6 gm/dL      A/G Ratio 0.7 g/dL      BUN/Creatinine Ratio 10.9     Anion Gap 11.0 mmol/L     Narrative:       GFR Normal >60  Chronic Kidney Disease <60  Kidney Failure <15    Lactic Acid, Plasma [[id number]]  (Abnormal) Collected:  06/27/19 1245    Specimen:  Blood, Central Line Updated:  06/27/19 1320     Lactate 4.9 mmol/L     Protime-INR [[id number]]  (Abnormal) Collected:  06/27/19 1245    Specimen:  Blood Updated:  06/27/19 1320     Protime 26.8 Seconds      INR 2.37    aPTT [[id number]]  (Abnormal) Collected:  06/27/19 1245    Specimen:  Blood Updated:  06/27/19 1320     PTT 43.2 seconds     CBC & Differential [[id number]] Collected:  06/27/19 1245    Specimen:  Blood Updated:  06/27/19 1307    Narrative:       The following orders were created for panel order CBC & Differential.  Procedure                                Abnormality         Status                     ---------                               -----------         ------                     CBC Auto Differential[[id number]]        Abnormal            Final result                 Please view results for these tests on the individual orders.    CBC Auto Differential [[id number]]  (Abnormal) Collected:  06/27/19 1245    Specimen:  Blood Updated:  06/27/19 1307     WBC 9.39 10*3/mm3      RBC 2.88 10*6/mm3      Hemoglobin 9.2 g/dL      Hematocrit 28.0 %      MCV 97.2 fL      MCH 31.9 pg      MCHC 32.9 g/dL      RDW 18.4 %      RDW-SD 63.4 fl      MPV 11.3 fL      Platelets 162 10*3/mm3      Neutrophil % 88.1 %      Lymphocyte % 7.6 %      Monocyte % 2.4 %      Eosinophil % 0.2 %      Basophil % 0.2 %      Immature Grans % 1.5 %      Neutrophils, Absolute 8.27 10*3/mm3      Lymphocytes, Absolute 0.71 10*3/mm3      Monocytes, Absolute 0.23 10*3/mm3      Eosinophils, Absolute 0.02 10*3/mm3      Basophils, Absolute 0.02 10*3/mm3      Immature Grans, Absolute 0.14 10*3/mm3      nRBC 0.2 /100 WBC         Imaging Results (last 72 hours)     Procedure Component Value Units Date/Time    XR Chest 1 View [[id number]] Collected:  06/27/19 1628     Updated:  06/27/19 1632    Narrative:       XR CHEST 1 VW- 6/27/2019 1:19 PM CDT     HISTORY: CENTRAL LINE PLACEMENT       COMPARISON: Chest exam dated 05/12/2019.     FINDINGS:   New right subclavian central line with tip projecting over the right  atrium. Underlying cardiomegaly which is stable. Pulmonary vasculature  are unremarkable. There are fibrotic changes throughout the  interstitium. No consolidation. No pleural effusion or pneumothorax.  Sternal wires are identified. Additional valvular prosthesis.       Impression:       1. New right subclavian central line with tip projecting over the right  atrium. Otherwise stable exam.        This report was finalized on 06/27/2019 16:28 by Dr Ronan Varner, .    CT Abdomen  Pelvis Without Contrast [[id number]] Collected:  06/27/19 1339     Updated:  06/27/19 1350    Narrative:       CT ABDOMEN AND PELVIS without contrast 6/27/2019 1:15 PM CDT     HISTORY: History of duodenal perforation, now with melena and epigastric  pain     COMPARISON: CT scan dated 05/12/2019      DLP: 721 mGy cm     TECHNIQUE: Noncontrast enhanced images of the abdomen and pelvis  obtained without oral contrast.      FINDINGS:   Linear scarring in both bases. No consolidation. Bilateral trace  layering effusions.      LIVER: Grossly normal.      BILIARY SYSTEM: The gallbladder has been removed. There is no  intrahepatic or extrahepatic ductal dilation.      PANCREAS: Diffuse atrophy. No focal lesion.      SPLEEN: Unremarkable.      KIDNEYS AND ADRENALS: Adrenal glands are unremarkable. Bilateral severe  renal atrophy. No hydronephrosis.  The ureters are decompressed and  normal in appearance.     RETROPERITONEUM: No adenopathy or mass.      GI TRACT: Prior gastric band. The stomach is mildly distended. There are  foci of high density material identified within the gastric lumen, near  the antrum (series 3-image 38). I suspect this is hemorrhage. There is a  small focus of extraluminal gas in the region of the duodenal bulb  (series 3-image 27). Mild fat stranding in this area as well. The  duodenum is nondistended. In general, the small bowel is decompressed.  There is a mild circumferential wall thickening along the descending and  proximal sigmoid colon. The colon is nondistended.     OTHER: There is no gross free air as was seen on the recent comparison  exam. No gross intraperitoneal free fluid. Surgical changes along the  anterior abdominal wall. Prior lumbar spinal fusion. Prior right hip  fracture fixation.     PELVIS: No mass lesion, fluid collection or significant lymphadenopathy  is seen in the pelvis. The urinary bladder is normal in appearance.       Impression:       1. There is a small amount of  high-density material within the gastric  lumen in the region of the antrum, thought to reflect hemorrhage.   2. There is a small focus of extraluminal air adjacent to the duodenal  bulb. This may reflect a recurrent microperforation. No gross free air  as was seen on the recent comparison exam. No gross intraperitoneal free  fluid.  3. There is a new circumferential wall thickening along the descending  and proximal sigmoid colon, the appearance of which suggests a mild  colitis.     The results of this exam were discussed with Elder Worley at the time of  this dictation on 06/27/2019 at 1347 hours        This report was finalized on 06/27/2019 13:47 by Dr Ronan Varner, .                Assessment/Plan       Gastrointestinal hemorrhage      Will support and monitor.  Probably needs an upper GI at some point.  She seems hemodynamically and clinically stable at this time.  We very difficult to address surgically if this area is leaking again.      Laila Rodriguez MD  06/28/19  1:21 PM              Electronically signed by Laila Rodriguez MD at 6/28/2019  1:24 PM          Physical Therapy Notes (all)      Je Vale, PT DPT at 7/1/2019  4:09 PM  Version 1 of 1         Problem: Patient Care Overview  Goal: Plan of Care Review  Outcome: Ongoing (interventions implemented as appropriate)   07/01/19 7523   Coping/Psychosocial   Plan of Care Reviewed With patient   OTHER   Outcome Summary PT eval complete. A&Ox4. Pt fowlers in bed. Needed max assit for rolling, but claims she will get stronger when she gets to [facility] back towards her home. She said she is independent at home with transfers from dialysis chair to her wheelchair if everything is close. Her strength in BLE is good, balance in bed shows deficits and endurance was compromised. Pt will benefit from skilled therapy while in hospital to increase strength and decrease endurance deficit. Pt wishes to return to [facility] post hospital stay.             Electronically signed by Je Vale PT DPT at 2019  4:09 PM     Ko Vail, DINORAH Student at 2019  4:09 PM  Version 1 of 1         Acute Care - Physical Therapy Initial Evaluation  [facility]     Patient Name: Cheri Ely  : 1940  MRN: [id number]  Today's Date: 2019   Onset of Illness/Injury or Date of Surgery: 19  Date of Referral to PT: 19  Referring Physician: Dr. Webber       Admit Date: 2019    Visit Dx:     ICD-10-CM ICD-9-CM   1. Gastrointestinal hemorrhage with melena K92.1 578.1   2. Duodenal ulcer with perforation (CMS/[facility]) K26.5 532.50   3. Hypotension due to hypovolemia I95.89 458.8    E86.1 276.52   4. Mobility impaired Z74.09 799.89     Patient Active Problem List   Diagnosis   • Hypertension, currently hypotensive   • Hyperlipidemia   • Chronic kidney disease on chronic dialysis (CMS/[facility])   • Closed fracture of ramus of left pubis (CMS/HCC)   • Occlusion of superior mesenteric artery (CMS/HCC)   • Chronic mesenteric ischemia (CMS/HCC)   • Black tarry stools   • Hx of gastritis   • Acute gastric ulcer with hemorrhage   • Closed displaced intertrochanteric fracture of right femur (CMS/HCC)   • Displaced intertrochanteric fracture of right femur, initial encounter for closed fracture (CMS/[facility])   • Hypotension   • Acute blood loss anemia   • GI bleed   • Gastrointestinal hemorrhage   • (HFpEF) heart failure with preserved ejection fraction (CMS/HCC)   • Hypothyroidism, TSH 12.4, FT4 0.41   • Diastolic dysfunction   • Diastolic heart failure (CMS/HCC)   • Volume overload   • AVM (arteriovenous malformation) of small bowel, acquired (CMS/HCC)   • Abdominal pain   • Hypoglycemia   • Paroxysmal atrial fibrillation (CMS/HCC)   • Acute colitis   • ESRD (end stage renal disease) (CMS/[facility])   • Acute on chronic anemia   • Duodenal ulcer   • Grade II diastolic dysfunction     Past Medical History:   Diagnosis Date   • Arthritis    • Carotid artery  disease (CMS/HCC)    • CHF (congestive heart failure) (CMS/HCC)    • Chronic kidney disease on chronic dialysis (CMS/HCC)    • Chronic renal failure     on dialysis ON MON, WED, FRI   • Coronary artery disease    • Disease of thyroid gland    • Diverticulitis    • Gastric ulcer    • History of transfusion    • Hyperlipidemia    • Hypertension    • Injury of back    • Mesenteric artery insufficiency (CMS/HCC)    • Multilevel degenerative disc disease    • Osteoporosis    • Pancreatitis    • Pelvis fracture (CMS/HCC)    • Pneumonia      Past Surgical History:   Procedure Laterality Date   • AORTAGRAM Right 1/8/2018    Procedure: MESENTERIC ANGIOGRAM, GROIN ACCESS, MYNX CLOSURE;  Surgeon: Tomasz San DO;  Location: [facility] OR;  Service:    • AORTIC VALVE SURGERY     • APPENDECTOMY     • BACK SURGERY     • CAPSULE ENDOSCOPY N/A 3/30/2019    Procedure: CAPSULE ENDOSCOPY M2A;  Surgeon: David Yu MD;  Location: [facility] ENDOSCOPY;  Service: Gastroenterology   • CARDIAC SURGERY     • CAROTID ENDARTERECTOMY Bilateral    • CATARACT EXTRACTION, BILATERAL     • CERVICAL SPINE SURGERY     • CHOLECYSTECTOMY     • COLON SURGERY     • COLONOSCOPY  10/01/2015   • COLONOSCOPY N/A 3/28/2019    Procedure: COLONOSCOPY WITH ANESTHESIA;  Surgeon: Rafita Merida MD;  Location: [facility] ENDOSCOPY;  Service: Gastroenterology   • CORONARY ARTERY BYPASS GRAFT     • DIALYSIS FISTULA CREATION     • ENDOSCOPY N/A 12/27/2018    Procedure: ESOPHAGOGASTRODUODENOSCOPY WITH ANESTHESIA;  Surgeon: Moni Garza MD;  Location: [facility] ENDOSCOPY;  Service: Gastroenterology   • ENDOSCOPY N/A 2/28/2019    Procedure: ESOPHAGOGASTRODUODENOSCOPY WITH ANESTHESIA;  Surgeon: Moni Garza MD;  Location: [facility] ENDOSCOPY;  Service: Gastroenterology   • ENDOSCOPY N/A 3/11/2019    Procedure: ESOPHAGOGASTRODUODENOSCOPY WITH ANESTHESIA;  Surgeon: Moni Garza MD;  Location: [facility] ENDOSCOPY;  Service: Gastroenterology   • ENDOSCOPY N/A 3/27/2019     Procedure: ESOPHAGOGASTRODUODENOSCOPY WITH ANESTHESIA;  Surgeon: Rafita Merida MD;  Location:  [facility] ENDOSCOPY;  Service: Gastroenterology   • EXPLORATORY LAPAROTOMY N/A 5/13/2019    Procedure: LAPAROTOMY EXPLORATORY, OVERSEW DUODENAL ULCER WITH FRED PATCH, KOCHER MANEUVER;  Surgeon: Laila Rodriguez MD;  Location: [facility] OR;  Service: General   • HIP TROCHANTERIC NAILING WITH INTRAMEDULLARY HIP SCREW Right 2/8/2019    Procedure: HIP TROCANTERIC NAILING LONG WITH INTRAMEDULLARY HIP SCREW;  Surgeon: Boo Camacho MD;  Location: [facility] OR;  Service: Orthopedics   • JOINT REPLACEMENT      knee   • LAPAROSCOPIC GASTRIC BANDING     • LEEP     • LUMBAR FUSION     • TOE AMPUTATION Left     2nd toe   • TOTAL KNEE ARTHROPLASTY Bilateral    • TUBAL ABDOMINAL LIGATION     • UPPER GASTROINTESTINAL ENDOSCOPY  10/01/2015        PT ASSESSMENT (last 12 hours)      Physical Therapy Evaluation     Row Name 07/01/19 1453          PT Evaluation Time/Intention    Subjective Information  complains of;weakness;fatigue  -JAZMÍN (r) SC (t) JAZMÍN (c)     Document Type  evaluation;other (see comments) See MAR  -JAZMÍN (r) SC (t) JAZMÍN (c)     Mode of Treatment  physical therapy  -JAZMÍN (r) SC (t) JAZMÍN (c)     Patient Effort  good  -JAZMÍN (r) SC (t) JAZMÍN (c)     Symptoms Noted During/After Treatment  none  -JAZMÍN (r) SC (t) JAZMÍN (c)     Row Name 07/01/19 4347          General Information    Patient Profile Reviewed?  yes  -JAZMÍN (r) SC (t) JAZMÍN (c)     Onset of Illness/Injury or Date of Surgery  06/27/19  -JAZMÍN (r) SC (t) JAZMÍN (c)     Referring Physician  Dr. Webber   -JAZMÍN (r) SC (t) JAZMÍN (c)     Patient Observations  alert;cooperative;agree to therapy  -JAZMÍN (r) SC (t) JAZMÍN (c)     Patient/Family Observations  Pt alone in room  -JAZMÍN (r) SC (t) JAZMÍN (c)     General Observations of Patient  Fowlers in bed, IV infused, A&Ox4  -JAZMÍN (r) SC (t) JAZMÍN (c)     Prior Level of Function  mod assist:;transfer;max assist:;grooming;dressing;bathing  -JAZMÍN (r) SC (t) JAZMÍN (c)     Equipment Currently  Used at Home  rollator;wheelchair  -JAZMÍN (r) SC (t) JAZMÍN (c)     Pertinent History of Current Functional Problem  Peptic ulcer, Coronary artery disease, CABG, hemodialysis, hyperlipidemia, HPTN, RA, osteoporosis,   -JAZMÍN (r) SC (t) JAZMÍN (c)     Existing Precautions/Restrictions  fall  -JAZMÍN (r) SC (t) JAZMÍN (c)     Risks Reviewed  patient:;LOB;nausea/vomiting;dizziness;increased discomfort;change in vital signs;increased drainage;lines disloged  -JAZMÍN (r) SC (t) JAZMÍN (c)     Benefits Reviewed  patient:;improve function;increase independence;increase strength;increase balance;decrease pain;decrease risk of DVT;improve skin integrity;increase knowledge  -JAZMÍN (r) SC (t) JAZMÍN (c)     Barriers to Rehab  medically complex  -JAZMÍN (r) SC (t) JAZMÍN (c)     Row Name 07/01/19 1453          Relationship/Environment    Primary Source of Support/Comfort  child(meagan)  -JAZMÍN (r) SC (t) JAZMÍN (c)     Lives With  child(meagan), adult  -JAZMÍN (r) SC (t) JAZMÍN (c)     Name(s) of Who Lives With Patient  Lucero  -JAZMÍN (r) SC (t) JAZMÍN (c)     Family Caregiver if Needed  child(meagan), adult  -JAZMÍN (r) SC (t) JAZMÍN (c)     Row Name 07/01/19 1453          Resource/Environmental Concerns    Current Living Arrangements  home/apartment/condo  -JAZMÍN (r) SC (t) JAZMÍN (c)     Resource/Environmental Concerns  none  -JAZMÍN (r) SC (t) JAZMÍN (c)     Transportation Concerns  car, none  -JAZMÍN (r) SC (t) JAZMÍN (c)     Row Name 07/01/19 1453          Cognitive Assessment/Interventions    Additional Documentation  Cognitive Assessment/Intervention (Group)  -JAZMÍN (r) SC (t) JAZMÍN (c)     Row Name 07/01/19 1453          Cognitive Assessment/Intervention- PT/OT    Affect/Mental Status (Cognitive)  WFL  -JAZMÍN (r) SC (t) JAZMÍN (c)     Orientation Status (Cognition)  oriented x 4  -JAZMÍN (r) SC (t) JAZMÍN (c)     Follows Commands (Cognition)  WFL  -JAZMÍN (r) SC (t) JAZMÍN (c)     Cognitive Function (Cognitive)  WFL  -JAZMÍN (r) SC (t) JAZMÍN (c)     Personal Safety Interventions  fall prevention program maintained;muscle strengthening facilitated;nonskid  shoes/slippers when out of bed;gait belt  -JAZMÍN     Row Name 07/01/19 1453          Safety Issues, Functional Mobility    Safety Issues Affecting Function (Mobility)  safety precaution awareness  -JAZMÍN (r) SC (t) JAZMÍN (c)     Impairments Affecting Function (Mobility)  strength  -JAZMÍN (r) SC (t) JAZMÍN (c)     Row Name 07/01/19 1453          Bed Mobility Assessment/Treatment    Bed Mobility Assessment/Treatment  rolling left;rolling right  -JAZMÍN (r) SC (t) JAZMÍN (c)     Rolling Left [place] (Bed Mobility)  maximum assist (25% patient effort);1 person assist  -JAZMÍN (r) SC (t) JAZMÍN (c)     Rolling Right [place] (Bed Mobility)  maximum assist (25% patient effort);1 person assist  -JAZMÍN (r) SC (t) JAZMÍN (c)     Bed Mobility, Safety Issues  decreased use of arms for pushing/pulling  -JAZMÍN (r) SC (t) JAZMÍN (c)     Assistive Device (Bed Mobility)  bed rails;draw sheet;head of bed elevated  -JAZMÍN (r) SC (t) JAZMÍN (c)     Row Name 07/01/19 1453          General ROM    GENERAL ROM COMMENTS  BLE AROM WFL  -JAZMÍN (r) SC (t) JAZMÍN (c)     Row Name 07/01/19 1453          MMT (Manual Muscle Testing)    General MMT Comments  4-/5 BLE, patient able to sustain movements against gravity and light pressure  -JAZMÍN (r) SC (t) JAZMÍN (c)     Row Name 07/01/19 1453          Motor Assessment/Intervention    Additional Documentation  --  -JAZMÍN (r) SC (t) JAZMÍN (c)     Row Name 07/01/19 1453          Balance    Balance  --  -JAZMÍN (r) SC (t) JAZMÍN (c)     Row Name 07/01/19 1453          Sensory Assessment/Intervention    Sensory General Assessment  no sensation deficits identified  -JAZMÍN (r) SC (t) JAZMÍN (c)     Row Name 07/01/19 1453          Pain Assessment    Additional Documentation  Pain Scale: Numbers Pre/Post-Treatment (Group)  -JAZMÍN (r) SC (t) JAZMÍN (c)     Row Name 07/01/19 1453          Pain Scale: Numbers Pre/Post-Treatment    Pain Scale: Numbers, Pretreatment  0/10 - no pain  -JAZMÍN (r) SC (t) JAZMÍN (c)     Pain Scale: Numbers, Post-Treatment  0/10 - no pain  -JAZMÍN (r) SC (t) JAZMÍN (c)     Row Name  07/01/19 1453          Health Promotion    Additional Documentation  Coping (Group);Plan of Care Review (Group)  -JAZMÍN     Row Name             Wound 06/27/19 1837 Left lower breast MASD (moisture associated skin damage)    Wound - Properties Group Date first assessed: 06/27/19  -MW Time first assessed: 1837  -MW Side: Left  -MW Orientation: lower  -MW, underneath  Location: breast  -MW Type: MASD (moisture associated skin damage)  -MW    Row Name             Wound 06/27/19 1838 Right anterior greater trochanter MASD (moisture associated skin damage)    Wound - Properties Group Date first assessed: 06/27/19  -MW Time first assessed: 1838  -MW Present On Admission : picture taken;yes  -MW Side: Right  -MW Orientation: anterior  -MW Location: greater trochanter  -MW Type: MASD (moisture associated skin damage)  -MW    Row Name             Wound 06/27/19 1836 Right gluteal pressure injury    Wound - Properties Group Date first assessed: 06/27/19  -HS Time first assessed: 1836  -HS Present On Admission : yes;picture taken  -HS Side: Right  -HS Location: gluteal  -HS Type: pressure injury  -HS Stage, Pressure Injury: Stage 3  -HS    Row Name             Wound 06/27/19 1836 Left gluteal pressure injury    Wound - Properties Group Date first assessed: 06/27/19  -HS Time first assessed: 1836  -HS Present On Admission : yes;picture taken  -HS Side: Left  -HS Location: gluteal  -HS Type: pressure injury  -HS Stage, Pressure Injury: Stage 3  -HS    Row Name 07/01/19 1453          Physical Therapy Clinical Impression    Date of Referral to PT  07/01/19  -JAZMÍN (r) SC (t) JAZMÍN (c)     PT Diagnosis (PT Clinical Impression)  reduced mobility  -JAZMÍN (r) SC (t) JAZMÍN (c)     Prognosis (PT Clinical Impression)  fair  -JAZMÍN (r) SC (t) JAZMÍN (c)     Functional Level at Time of Evaluation (PT Clinical Impression)  max/mod assist  -JAZMÍN (r) SC (t) JAZMÍN (c)     Patient/Family Goals Statement (PT Clinical Impression)  go to [facility]  -JAZMÍN (r) SC (t) JAZMÍN  (c)     Criteria for Skilled Interventions Met (PT Clinical Impression)  yes;treatment indicated  -JAZMÍN     Impairments Found (describe specific impairments)  aerobic capacity/endurance;muscle performance;gait, locomotion, and balance  -JAZMÍN     Rehab Potential (PT Clinical Summary)  fair, will monitor progress closely  -JAZMÍN (r) SC (t) JAZMÍN (c)     Predicted Duration of Therapy (PT)  until d/c   -JAZMÍN (r) SC (t) JAZMÍN (c)     Care Plan Review (PT)  evaluation/treatment results reviewed;care plan/treatment goals reviewed;risks/benefits reviewed;patient/other agree to care plan;current/potential barriers reviewed  -JAZMÍN (r) SC (t) JAZMÍN (c)     Row Name 07/01/19 1453          Vital Signs    Pre Systolic BP Rehab  97  -JAZMÍN (r) SC (t) JAZMÍN (c)     Pre Treatment Diastolic BP  77  -JAZMÍN (r) SC (t) JAZMÍN (c)     Post Systolic BP Rehab  99  -JAZMÍN (r) SC (t) JAZMÍN (c)     Post Treatment Diastolic BP  81  -JAZMÍN (r) SC (t) JAZMÍN (c)     Pretreatment Heart Rate (beats/min)  126  -JAZMÍN (r) SC (t) JAZMÍN (c)     Posttreatment Heart Rate (beats/min)  136  -JAZMÍN (r) SC (t) JAZMÍN (c)     Posttreatment Resp Rate (breaths/min)  20  -JAZMÍN (r) SC (t) JAZMÍN (c)     Pre Patient Position  Supine  -JAZMÍN (r) SC (t) JAZMÍN (c)     Intra Patient Position  Supine  -JAZMÍN (r) SC (t) JAZMÍN (c)     Post Patient Position  Supine  -JAZMÍN (r) SC (t) JAZMÍN (c)     Row Name 07/01/19 1458          Physical Therapy Goals    Bed Mobility Goal Selection (PT)  bed mobility, PT goal 1  -JAZMÍN (r) SC (t) JAZMÍN (c)     Transfer Goal Selection (PT)  transfer, PT goal 1  -JAZMÍN (r) SC (t) JAZMÍN (c)     Row Name 07/01/19 1453          Bed Mobility Goal 1 (PT)    Activity/Assistive Device (Bed Mobility Goal 1, PT)  rolling to left;rolling to right;sit to supine;supine to sit  -JAZMÍN (r) SC (t) JAZMÍN (c)     [place] Level/Cues Needed (Bed Mobility Goal 1, PT)  moderate assist (50-74% patient effort)  -JAZMÍN     Time Frame (Bed Mobility Goal 1, PT)  long term goal (LTG);10 days  -JAZMÍN (r) SC (t) JAZMÍN (c)     Progress/Outcomes (Bed Mobility Goal 1, PT)   goal ongoing  -JAZMÍN (r) SC (t) JAZMÍN (c)     Row Name 07/01/19 1453          Transfer Goal 1 (PT)    Activity/Assistive Device (Transfer Goal 1, PT)  bed-to-chair/chair-to-bed;lateral transfer  -JAZMÍN     [place] Level/Cues Needed (Transfer Goal 1, PT)  moderate assist (50-74% patient effort)  -JAZMÍN     Time Frame (Transfer Goal 1, PT)  long term goal (LTG);10 days  -JAZMÍN (r) SC (t) JAZMÍN (c)     Progress/Outcome (Transfer Goal 1, PT)  goal ongoing  -JAZMÍN (r) SC (t) JAZMÍN (c)     Row Name 07/01/19 1453          Positioning and Restraints    Pre-Treatment Position  in bed  -JAZMÍN (r) SC (t) JAZMÍN (c)     Post Treatment Position  bed  -JAZMÍN (r) SC (t) JAZMÍN (c)     In Bed  notified nsg;fowlers;call light within reach;encouraged to call for assist;patient within staff view;side rails up x3  -JAZMÍN (r) SC (t) JAZMÍN (c)     Row Name 07/01/19 1453          Living Environment    Home Accessibility  wheelchair accessible  -JAZMÍN (r) SC (t) JAZMÍN (c)       User Key  (r) = Recorded By, (t) = Taken By, (c) = Cosigned By    Initials Name Provider Type    Je oYussef, PT DPT Physical Therapist    Hetal García, RN Registered Nurse     Peggy Monterroso RN Registered Nurse    Ko Olguin PT Student PT Student        Physical Therapy Education     Title: PT OT SLP Therapies (Done)     Topic: Physical Therapy (Done)     Point: Mobility training (Done)     Learning Progress Summary           Patient Acceptance, E, VU by SC at 7/1/2019  3:53 PM    Comment:  Pt is educated and knows her deficits at this time                   Point: Home exercise program (Done)     Learning Progress Summary           Patient Acceptance, E, VU by SC at 7/1/2019  3:53 PM    Comment:  Pt is educated and knows her deficits at this time                   Point: Body mechanics (Done)     Learning Progress Summary           Patient Acceptance, E, VU by SC at 7/1/2019  3:53 PM    Comment:  Pt is educated and knows her deficits at this time                   Point:  Precautions (Done)     Learning Progress Summary           Patient Acceptance, ANGELICA, VU by SC at 7/1/2019  3:53 PM    Comment:  Pt is educated and knows her deficits at this time                               User Key     Initials Effective Dates Name Provider Type Discipline    SC 05/15/19 -  Ko [place], PT Student PT Student PT              PT Recommendation and Plan  Therapy Frequency (PT Clinical Impression): daily  Plan of Care Reviewed With: patient  Outcome Summary: PT eval complete. A&Ox4. Pt fowlers in bed. Needed max assit for rolling, but claims she will get stronger when she gets to [facility] back towards her home. She said she is independent at home with transfers from dialysis chair to her wheelchair if everything is close. Her strength in BLE is good, balance in bed shows deficits and endurance was compromised. Pt will benefit from skilled therapy while in hospital to increase strength and decrease endurance deficit. Pt wishes to return to [facility] post hospital stay.   Outcome Measures     Row Name 07/01/19 1453             How much help from another person do you currently need...    Turning from your back to your side while in flat bed without using bedrails?  3  -JAZMÍN (r) SC (t) JAZMÍN (c)      Moving from lying on back to sitting on the side of a flat bed without bedrails?  1  -JAZMÍN (r) SC (t) JAZMÍN (c)      Moving to and from a bed to a chair (including a wheelchair)?  1  -JAZMÍN (r) SC (t) JAZMÍN (c)      Standing up from a chair using your arms (e.g., wheelchair, bedside chair)?  1  -JAZMÍN (r) SC (t) JAZMÍN (c)      Climbing 3-5 steps with a railing?  1  -JAZMÍN (r) SC (t) JAZMÍN (c)      To walk in hospital room?  1  -JAZMÍN (r) SC (t) JAZMÍN (c)      AM-PAC 6 Clicks Score  8  -JAZMÍN (r) SC (t)         Functional Assessment    Outcome Measure Options  AM-PAC 6 Clicks Basic Mobility (PT)  -JAZMÍN (r) SC (t) JAZMÍN (c)        User Key  (r) = Recorded By, (t) = Taken By, (c) = Cosigned By    Initials Name Provider Type    JAZMÍN  Je Vale, PT DPT Physical Therapist    Ko Olguin, PT Student PT Student         Time Calculation:   PT Charges     Row Name 07/01/19 1555             Time Calculation    Start Time  1453  -JAZMÍN (r) SC (t) JAZMÍN (c)      Stop Time  1526 13 minutes for chart review  -JAZMÍN (r) SC (t) JAZMÍN (c)      Time Calculation (min)  33 min  -JAZMÍN (r) SC (t)      PT Received On  07/01/19  -JAZMÍN (r) SC (t) JAZMÍN (c)      PT Goal Re-Cert Due Date  07/11/19  -JAZMÍN (r) SC (t) JAZMÍN (c)         Time Calculation- PT    Total Timed Code Minutes- PT  46 minute(s)  -JAZMÍN (r) SC (t) JAZMÍN (c)        User Key  (r) = Recorded By, (t) = Taken By, (c) = Cosigned By    Initials Name Provider Type    Je Youssef, PT DPT Physical Therapist    Ko Olguin, PT Student PT Student        Therapy Charges for Today     Code Description Service Date Service Provider Modifiers Qty    48504316467 HC PT EVAL MOD COMPLEXITY 3 7/1/2019 Ko [place], PT Student GP 1          PT G-Codes  Outcome Measure Options: AM-PAC 6 Clicks Basic Mobility (PT)  AM-PAC 6 Clicks Score: 8      Ko Vail PT Student  7/1/2019         Electronically signed by Je Vale PT DPT at 7/1/2019  4:10 PM     Gilda Mackey PTA at 7/2/2019  9:17 AM  Version 1 of 1         Problem: Patient Care Overview  Goal: Plan of Care Review  Outcome: Ongoing (interventions implemented as appropriate)   07/02/19 0829   Coping/Psychosocial   Plan of Care Reviewed With patient   Plan of Care Review   Progress improving   OTHER   Outcome Summary Pt. agreeable to therapy. Pt. was able to get to EOB with minimal help and take a few steps to chair with assist of 2. Will continue to work on strengthening and progression of activity.            Electronically signed by Gilda Mackey PTA at 7/2/2019  9:17 AM     Gilda Mackey PTA at 7/2/2019  9:18 AM  Version 1 of 1         Acute Care - Physical Therapy Treatment Note  AGATHA Jimenez     Patient Name:  Cheri Ely  : 1940  MRN: [id number]  Today's Date: 2019  Onset of Illness/Injury or Date of Surgery: 19  Date of Referral to PT: 19  Referring Physician: Dr. Webber     Admit Date: 2019    Visit Dx:    ICD-10-CM ICD-9-CM   1. Gastrointestinal hemorrhage with melena K92.1 578.1   2. Duodenal ulcer with perforation (CMS/[facility]) K26.5 532.50   3. Hypotension due to hypovolemia I95.89 458.8    E86.1 276.52   4. Mobility impaired Z74.09 799.89     Patient Active Problem List   Diagnosis   • Hypertension, currently hypotensive   • Hyperlipidemia   • Chronic kidney disease on chronic dialysis (CMS/[facility])   • Closed fracture of ramus of left pubis (CMS/[facility])   • Occlusion of superior mesenteric artery (CMS/[facility])   • Chronic mesenteric ischemia (CMS/[facility])   • Black tarry stools   • Hx of gastritis   • Acute gastric ulcer with hemorrhage   • Closed displaced intertrochanteric fracture of right femur (CMS/[facility])   • Displaced intertrochanteric fracture of right femur, initial encounter for closed fracture (CMS/[facility])   • Hypotension   • Acute blood loss anemia   • GI bleed   • Gastrointestinal hemorrhage   • (HFpEF) heart failure with preserved ejection fraction (CMS/[facility])   • Hypothyroidism, TSH 12.4, FT4 0.41   • Diastolic dysfunction   • Diastolic heart failure (CMS/[facility])   • Volume overload   • AVM (arteriovenous malformation) of small bowel, acquired (CMS/[facility])   • Abdominal pain   • Hypoglycemia   • Paroxysmal atrial fibrillation (CMS/[facility])   • Acute colitis   • ESRD (end stage renal disease) (CMS/[facility])   • Acute on chronic anemia   • Duodenal ulcer   • Grade II diastolic dysfunction   • Chronic combined systolic and diastolic CHF (congestive heart failure) (CMS/[facility])       Therapy Treatment    Rehabilitation Treatment Summary     Row Name 19 0829             Treatment Time/Intention    Discipline  physical therapy assistant  -      Document Type  therapy note (daily note)  -MF2      Subjective  Information  no complaints  -MF2      Mode of Treatment  physical therapy  -MF2      Existing Precautions/Restrictions  fall  -MF2      Recorded by [] Gilda Mackey, PTA 07/02/19 0829  [MF2] Gilda Mackey, PTA 07/02/19 0916      Row Name 07/02/19 0829             Bed Mobility Assessment/Treatment    Supine-Sit [place] (Bed Mobility)  verbal cues;minimum assist (75% patient effort)  -      Bed Mobility, Safety Issues  decreased use of arms for pushing/pulling  -MF      Assistive Device (Bed Mobility)  head of bed elevated  -MF      Recorded by [MF] Gilda Mackey, PTA 07/02/19 0916      Row Name 07/02/19 0829             Transfer Assessment/Treatment    Comment (Transfers)  stood x 2. Pt. pivoted to BSC and then side steps to wheelchair.   -MF      Recorded by [MF] Gilda aMckey, PTA 07/02/19 0916      Row Name 07/02/19 0829             Sit-Stand Transfer    Sit-Stand [place] (Transfers)  verbal cues;minimum assist (75% patient effort);2 person assist  -MF      Recorded by [MF] Gilda Mackey, PTA 07/02/19 0916      Row Name 07/02/19 0829             Stand-Sit Transfer    Stand-Sit [place] (Transfers)  verbal cues;minimum assist (75% patient effort);2 person assist  -MF      Recorded by [MF] Gilda Mackey, PTA 07/02/19 0916      Row Name 07/02/19 0829             Toilet Transfer    Type (Toilet Transfer)  stand pivot/stand step  -      [place] Level (Toilet Transfer)  verbal cues;minimum assist (75% patient effort);2 person assist  -      Recorded by [MF] Gilda Mackey, PTA 07/02/19 0916      Row Name 07/02/19 0829             Therapeutic Exercise    Lower Extremity (Therapeutic Exercise)  LAQ (long arc quad), bilateral  -      Lower Extremity Range of Motion (Therapeutic Exercise)  hip flexion/extension, bilateral;hip abduction/adduction, bilateral;ankle dorsiflexion/plantar flexion, bilateral  -      Exercise Type (Therapeutic Exercise)  AROM  (active range of motion)  -MF      Position (Therapeutic Exercise)  seated  -      Sets/Reps (Therapeutic Exercise)  20  -MF      Recorded by [MF] Gilda Mackey, [facility] 07/02/19 0916      Row Name 07/02/19 0829             Positioning and Restraints    Pre-Treatment Position  in bed  -MF      Post Treatment Position  wheelchair  -MF      In Wheelchair  sitting;call light within reach;encouraged to call for assist  -MF      Recorded by [MF] Gilda Mackey, [facility] 07/02/19 0916      Row Name 07/02/19 0829             Pain Scale: Numbers Pre/Post-Treatment    Pain Scale: Numbers, Pretreatment  0/10 - no pain  -      Pain Scale: Numbers, Post-Treatment  0/10 - no pain  -      Recorded by [MF] Gilda Mackey, [facility] 07/02/19 0916      Row Name                Wound 06/27/19 1837 Left lower breast MASD (moisture associated skin damage)    Wound - Properties Group Date first assessed: 06/27/19 [MW] Time first assessed: 1837 [MW] Side: Left [MW] Orientation: lower [MW], underneath  Location: breast [MW] Type: MASD (moisture associated skin damage) [MW] Recorded by:  [MW] Hetal Casiano RN 06/27/19 1838    Row Name                Wound 06/27/19 1838 Right anterior greater trochanter MASD (moisture associated skin damage)    Wound - Properties Group Date first assessed: 06/27/19 [MW] Time first assessed: 1838 [MW] Present On Admission : picture taken;yes [MW] Side: Right [MW] Orientation: anterior [MW] Location: greater trochanter [MW] Type: MASD (moisture associated skin damage) [MW] Recorded by:  [MW] Hetal Casiano RN 06/27/19 1838    Row Name                Wound 06/27/19 1836 Right gluteal pressure injury    Wound - Properties Group Date first assessed: 06/27/19 [HS] Time first assessed: 1836 [HS] Present On Admission : yes;picture taken [HS] Side: Right [HS] Location: gluteal [HS] Type: pressure injury [HS] Stage, Pressure Injury: Stage 3 [HS] Recorded by:  [HS] Peggy Monterroso RN 06/28/19 1421    Robert  Name                Wound 06/27/19 1836 Left gluteal pressure injury    Wound - Properties Group Date first assessed: 06/27/19 [HS] Time first assessed: 1836 [HS] Present On Admission : yes;picture taken [HS] Side: Left [HS] Location: gluteal [HS] Type: pressure injury [HS] Stage, Pressure Injury: Stage 3 [HS] Recorded by:  [HS] Peggy Monterroso RN 06/28/19 1422      User Key  (r) = Recorded By, (t) = Taken By, (c) = Cosigned By    Initials Name Effective Dates Discipline    MW Hetal Casiano RN 08/02/16 -  Nurse    Gilda Pierre PTA 08/02/16 -  PT    HS Peggy Monterroso RN 12/27/16 -  Nurse          Wound 06/27/19 1837 Left lower breast MASD (moisture associated skin damage) (Active)   Dressing Appearance open to air 7/2/2019  4:00 AM   Base pink 7/2/2019  4:00 AM   Red (%), Wound Tissue Color 100 7/1/2019  8:04 PM   Periwound pink 7/2/2019  4:00 AM   Periwound Temperature warm 7/2/2019  4:00 AM   Periwound Skin Turgor soft 7/2/2019  4:00 AM   Edges irregular 7/2/2019  4:00 AM   Drainage Amount none 7/2/2019  4:00 AM   Care, Wound cleansed with;soap and water 7/2/2019  4:00 AM   Dressing Care, Wound open to air 7/2/2019  4:00 AM       Wound 06/27/19 1838 Right anterior greater trochanter MASD (moisture associated skin damage) (Active)   Dressing Appearance open to air 7/2/2019  4:00 AM   Base pink 7/2/2019  4:00 AM   Red (%), Wound Tissue Color 100 7/1/2019  8:04 PM   Periwound blanchable 7/2/2019  4:00 AM   Periwound Temperature warm 7/2/2019  4:00 AM   Periwound Skin Turgor soft 7/2/2019  4:00 AM   Edges irregular 7/2/2019  4:00 AM   Drainage Amount none 7/2/2019  4:00 AM   Care, Wound cleansed with;soap and water 7/2/2019  4:00 AM   Dressing Care, Wound open to air 7/2/2019  4:00 AM       Wound 06/27/19 1836 Right gluteal pressure injury (Active)   Dressing Appearance dry;intact 7/2/2019  4:00 AM   Base slough;pink 7/2/2019  4:00 AM   Periwound Temperature warm 7/2/2019  4:00 AM   Periwound Skin  Turgor soft 7/2/2019  4:00 AM   Edges irregular 7/2/2019  4:00 AM   Drainage Characteristics/Odor serous 7/2/2019 12:08 AM   Drainage Amount none 7/2/2019  4:00 AM   Dressing Care, Wound dressing reinforced 7/1/2019  4:00 PM       Wound 06/27/19 1836 Left gluteal pressure injury (Active)   Dressing Appearance dry;intact 7/2/2019  4:00 AM   Base slough;red 7/2/2019  4:00 AM   Periwound pink;blanchable 7/2/2019  4:00 AM   Periwound Temperature warm 7/2/2019  4:00 AM   Periwound Skin Turgor soft 7/2/2019  4:00 AM   Edges irregular 7/2/2019  4:00 AM   Drainage Characteristics/Odor serous 7/2/2019 12:08 AM   Drainage Amount none 7/2/2019  4:00 AM   Dressing Care, Wound dressing reinforced 7/1/2019  4:00 PM           Physical Therapy Education     Title: PT OT SLP Therapies (Done)     Topic: Physical Therapy (Done)     Point: Mobility training (Done)     Learning Progress Summary           Patient Acceptance, E, VU by SC at 7/1/2019  3:53 PM    Comment:  Pt is educated and knows her deficits at this time                   Point: Home exercise program (Done)     Learning Progress Summary           Patient Acceptance, E, VU by SC at 7/1/2019  3:53 PM    Comment:  Pt is educated and knows her deficits at this time                   Point: Body mechanics (Done)     Learning Progress Summary           Patient Acceptance, E, VU by SC at 7/1/2019  3:53 PM    Comment:  Pt is educated and knows her deficits at this time                   Point: Precautions (Done)     Learning Progress Summary           Patient Acceptance, E, VU by SC at 7/1/2019  3:53 PM    Comment:  Pt is educated and knows her deficits at this time                               User Key     Initials Effective Dates Name Provider Type Discipline    SC 05/15/19 -  Ko V[place] PT Student PT Student PT                PT Recommendation and Plan     Plan of Care Reviewed With: patient  Progress: improving  Outcome Summary: Pt. agreeable to therapy. Pt. was  able to get to EOB with minimal help and take a few steps to chair with assist of 2. Will continue to work on strengthening and progression of activity.   Outcome Measures     Row Name 07/01/19 1453             How much help from another person do you currently need...    Turning from your back to your side while in flat bed without using bedrails?  3  -JAZMÍN (r) SC (t) JAZMÍN (c)      Moving from lying on back to sitting on the side of a flat bed without bedrails?  1  -JAZMÍN (r) SC (t) JAZMÍN (c)      Moving to and from a bed to a chair (including a wheelchair)?  1  -JAZMÍN (r) SC (t) JAZMÍN (c)      Standing up from a chair using your arms (e.g., wheelchair, bedside chair)?  1  -JAZMÍN (r) SC (t) JAZMÍN (c)      Climbing 3-5 steps with a railing?  1  -JAZMÍN (r) SC (t) JAZMÍN (c)      To walk in hospital room?  1  -JAZMÍN (r) SC (t) JAZMÍN (c)      AM-PAC 6 Clicks Score (PT)  8  -JAZMÍN (r) SC (t)         Functional Assessment    Outcome Measure Options  AM-PAC 6 Clicks Basic Mobility (PT)  -JAZMÍN (r) SC (t) JAZMÍN (c)        User Key  (r) = Recorded By, (t) = Taken By, (c) = Cosigned By    Initials Name Provider Type    Je Youssef, PT DPT Physical Therapist    SC Ko [place], PT Student PT Student         Time Calculation:   PT Charges     Row Name 07/02/19 0917             Time Calculation    Start Time  0829  -      Stop Time  0909  -      Time Calculation (min)  40 min  -      PT Received On  07/02/19  -      PT Goal Re-Cert Due Date  07/11/19  -         Time Calculation- PT    Total Timed Code Minutes- PT  40 minute(s)  -         Timed Charges    81626 - PT Therapeutic Activity Minutes  40  -MF        User Key  (r) = Recorded By, (t) = Taken By, (c) = Cosigned By    Initials Name Provider Type    Gilda Pierre PTA Physical Therapy Assistant        Therapy Charges for Today     Code Description Service Date Service Provider Modifiers Qty    79833594184  PT THERAPEUTIC ACT EA 15 MIN 7/2/2019 Gilda Mackey PTA GP 3           PT G-Codes  Outcome Measure Options: AM-PAC 6 Clicks Basic Mobility (PT)  AM-PAC 6 Clicks Score (PT): 8    Gilda Mackey, BEATRICE  7/2/2019         Electronically signed by Gilda Mackey PTA at 7/2/2019  9:18 AM       Occupational Therapy Notes (all)     No notes of this type exist for this encounter.

## 2019-07-02 NOTE — PLAN OF CARE
Problem: Patient Care Overview  Goal: Plan of Care Review  Outcome: Ongoing (interventions implemented as appropriate)   07/02/19 7227   Coping/Psychosocial   Plan of Care Reviewed With patient   Plan of Care Review   Progress no change   OTHER   Outcome Summary Patient transferred from the unit to [facility]. Patient denies pain. Wound Care completed per orders. ABX continued. VSS. Safety Maintained. Will continue to monitor.        Problem: Skin Injury Risk (Adult)  Goal: Skin Health and Integrity  Outcome: Ongoing (interventions implemented as appropriate)      Problem: Fall Risk (Adult)  Goal: Absence of Fall  Outcome: Ongoing (interventions implemented as appropriate)      Problem: Wound (Includes Pressure Injury) (Adult)  Goal: Signs and Symptoms of Listed Potential Problems Will be Absent, Minimized or Managed (Wound)  Outcome: Ongoing (interventions implemented as appropriate)      Problem: Nutrition, Imbalanced: Inadequate Oral Intake (Adult)  Goal: Improved Oral Intake  Outcome: Ongoing (interventions implemented as appropriate)    Goal: Prevent Further Weight Loss  Outcome: Ongoing (interventions implemented as appropriate)

## 2019-07-02 NOTE — THERAPY TREATMENT NOTE
Acute Care - Physical Therapy Treatment Note  [facility]     Patient Name: Cheri Ely  : 1940  MRN: [id number]  Today's Date: 2019  Onset of Illness/Injury or Date of Surgery: 19  Date of Referral to PT: 19  Referring Physician: Dr. Webber     Admit Date: 2019    Visit Dx:    ICD-10-CM ICD-9-CM   1. Gastrointestinal hemorrhage with melena K92.1 578.1   2. Duodenal ulcer with perforation (CMS/[facility]) K26.5 532.50   3. Hypotension due to hypovolemia I95.89 458.8    E86.1 276.52   4. Mobility impaired Z74.09 799.89     Patient Active Problem List   Diagnosis   • Hypertension, currently hypotensive   • Hyperlipidemia   • Chronic kidney disease on chronic dialysis (CMS/[facility])   • Closed fracture of ramus of left pubis (CMS/H[facility])   • Occlusion of superior mesenteric artery (CMS/[facility])   • Chronic mesenteric ischemia (CMS/[facility])   • Black tarry stools   • Hx of gastritis   • Acute gastric ulcer with hemorrhage   • Closed displaced intertrochanteric fracture of right femur (CMS/[facility])   • Displaced intertrochanteric fracture of right femur, initial encounter for closed fracture (CMS/[facility])   • Hypotension   • Acute blood loss anemia   • GI bleed   • Gastrointestinal hemorrhage   • (HFpEF) heart failure with preserved ejection fraction (CMS/[facility])   • Hypothyroidism, TSH 12.4, FT4 0.41   • Diastolic dysfunction   • Diastolic heart failure (CMS/[facility])   • Volume overload   • AVM (arteriovenous malformation) of small bowel, acquired (CMS/[facility])   • Abdominal pain   • Hypoglycemia   • Paroxysmal atrial fibrillation (CMS/[facility])   • Acute colitis   • ESRD (end stage renal disease) (CMS/[facility])   • Acute on chronic anemia   • Duodenal ulcer   • Grade II diastolic dysfunction   • Chronic combined systolic and diastolic CHF (congestive heart failure) (CMS/[facility])       Therapy Treatment    Rehabilitation Treatment Summary     Row Name 19 0829             Treatment Time/Intention    Discipline  physical therapy assistant   -      Document Type  therapy note (daily note)  -MF2      Subjective Information  no complaints  -MF2      Mode of Treatment  physical therapy  -2      Existing Precautions/Restrictions  fall  -2      Recorded by [] Gilda Mackey, [facility] 07/02/19 0829  [MF2] Gilda Mackey, PTA 07/02/19 0916      Row Name 07/02/19 0829             Bed Mobility Assessment/Treatment    Supine-Sit [place] (Bed Mobility)  verbal cues;minimum assist (75% patient effort)  -      Bed Mobility, Safety Issues  decreased use of arms for pushing/pulling  -      Assistive Device (Bed Mobility)  head of bed elevated  -      Recorded by [] Gilda Mackey, PTA 07/02/19 0916      Row Name 07/02/19 0829             Transfer Assessment/Treatment    Comment (Transfers)  stood x 2. Pt. pivoted to BSC and then side steps to wheelchair.   -MF      Recorded by [] Gilda Mackey, PTA 07/02/19 0916      Row Name 07/02/19 0829             Sit-Stand Transfer    Sit-Stand [place] (Transfers)  verbal cues;minimum assist (75% patient effort);2 person assist  -      Recorded by [MF] Gilda Mackey, PTA 07/02/19 0916      Row Name 07/02/19 0829             Stand-Sit Transfer    Stand-Sit [place] (Transfers)  verbal cues;minimum assist (75% patient effort);2 person assist  -      Recorded by [] Gilda Mackey, PTA 07/02/19 0916      Row Name 07/02/19 0829             Toilet Transfer    Type (Toilet Transfer)  stand pivot/stand step  -      [place] Level (Toilet Transfer)  verbal cues;minimum assist (75% patient effort);2 person assist  -      Recorded by [] Gilda Mackey, PTA 07/02/19 0916      Row Name 07/02/19 0829             Therapeutic Exercise    Lower Extremity (Therapeutic Exercise)  LAQ (long arc quad), bilateral  -      Lower Extremity Range of Motion (Therapeutic Exercise)  hip flexion/extension, bilateral;hip abduction/adduction, bilateral;ankle dorsiflexion/plantar  flexion, bilateral  -      Exercise Type (Therapeutic Exercise)  AROM (active range of motion)  -      Position (Therapeutic Exercise)  seated  -      Sets/Reps (Therapeutic Exercise)  20  -MF      Recorded by [] Gilda Mackey, [facility] 07/02/19 0916      Row Name 07/02/19 0829             Positioning and Restraints    Pre-Treatment Position  in bed  -      Post Treatment Position  wheelchair  -MF      In Wheelchair  sitting;call light within reach;encouraged to call for assist  -      Recorded by [MF] Gilda Mackey, [facility] 07/02/19 0916      Row Name 07/02/19 0829             Pain Scale: Numbers Pre/Post-Treatment    Pain Scale: Numbers, Pretreatment  0/10 - no pain  -      Pain Scale: Numbers, Post-Treatment  0/10 - no pain  -      Recorded by [MF] Gilda Mackey, [facility] 07/02/19 0916      Row Name                Wound 06/27/19 1837 Left lower breast MASD (moisture associated skin damage)    Wound - Properties Group Date first assessed: 06/27/19 [MW] Time first assessed: 1837 [MW] Side: Left [MW] Orientation: lower [MW], underneath  Location: breast [MW] Type: MASD (moisture associated skin damage) [MW] Recorded by:  [MW] Hetal Casiano RN 06/27/19 1838    Row Name                Wound 06/27/19 1838 Right anterior greater trochanter MASD (moisture associated skin damage)    Wound - Properties Group Date first assessed: 06/27/19 [MW] Time first assessed: 1838 [MW] Present On Admission : picture taken;yes [MW] Side: Right [MW] Orientation: anterior [MW] Location: greater trochanter [MW] Type: MASD (moisture associated skin damage) [MW] Recorded by:  [MW] Hetal Casiano RN 06/27/19 1838    Row Name                Wound 06/27/19 1836 Right gluteal pressure injury    Wound - Properties Group Date first assessed: 06/27/19 [HS] Time first assessed: 1836 [HS] Present On Admission : yes;picture taken [HS] Side: Right [HS] Location: gluteal [HS] Type: pressure injury [HS] Stage, Pressure Injury:  Stage 3 [HS] Recorded by:  [HS] Peggy Monterroso RN 06/28/19 1421    Row Name                Wound 06/27/19 1836 Left gluteal pressure injury    Wound - Properties Group Date first assessed: 06/27/19 [HS] Time first assessed: 1836 [HS] Present On Admission : yes;picture taken [HS] Side: Left [HS] Location: gluteal [HS] Type: pressure injury [HS] Stage, Pressure Injury: Stage 3 [HS] Recorded by:  [HS] Peggy Monterroso RN 06/28/19 1422      User Key  (r) = Recorded By, (t) = Taken By, (c) = Cosigned By    Initials Name Effective Dates Discipline    MW Hetal Casiano RN 08/02/16 -  Nurse     Gilda Mackey, PTA 08/02/16 -  PT    HS Peggy Monterroso RN 12/27/16 -  Nurse          Wound 06/27/19 1837 Left lower breast MASD (moisture associated skin damage) (Active)   Dressing Appearance open to air 7/2/2019  4:00 AM   Base pink 7/2/2019  4:00 AM   Red (%), Wound Tissue Color 100 7/1/2019  8:04 PM   Periwound pink 7/2/2019  4:00 AM   Periwound Temperature warm 7/2/2019  4:00 AM   Periwound Skin Turgor soft 7/2/2019  4:00 AM   Edges irregular 7/2/2019  4:00 AM   Drainage Amount none 7/2/2019  4:00 AM   Care, Wound cleansed with;soap and water 7/2/2019  4:00 AM   Dressing Care, Wound open to air 7/2/2019  4:00 AM       Wound 06/27/19 1838 Right anterior greater trochanter MASD (moisture associated skin damage) (Active)   Dressing Appearance open to air 7/2/2019  4:00 AM   Base pink 7/2/2019  4:00 AM   Red (%), Wound Tissue Color 100 7/1/2019  8:04 PM   Periwound blanchable 7/2/2019  4:00 AM   Periwound Temperature warm 7/2/2019  4:00 AM   Periwound Skin Turgor soft 7/2/2019  4:00 AM   Edges irregular 7/2/2019  4:00 AM   Drainage Amount none 7/2/2019  4:00 AM   Care, Wound cleansed with;soap and water 7/2/2019  4:00 AM   Dressing Care, Wound open to air 7/2/2019  4:00 AM       Wound 06/27/19 1836 Right gluteal pressure injury (Active)   Dressing Appearance dry;intact 7/2/2019  4:00 AM   Base slough;pink  7/2/2019  4:00 AM   Periwound Temperature warm 7/2/2019  4:00 AM   Periwound Skin Turgor soft 7/2/2019  4:00 AM   Edges irregular 7/2/2019  4:00 AM   Drainage Characteristics/Odor serous 7/2/2019 12:08 AM   Drainage Amount none 7/2/2019  4:00 AM   Dressing Care, Wound dressing reinforced 7/1/2019  4:00 PM       Wound 06/27/19 1836 Left gluteal pressure injury (Active)   Dressing Appearance dry;intact 7/2/2019  4:00 AM   Base slough;red 7/2/2019  4:00 AM   Periwound pink;blanchable 7/2/2019  4:00 AM   Periwound Temperature warm 7/2/2019  4:00 AM   Periwound Skin Turgor soft 7/2/2019  4:00 AM   Edges irregular 7/2/2019  4:00 AM   Drainage Characteristics/Odor serous 7/2/2019 12:08 AM   Drainage Amount none 7/2/2019  4:00 AM   Dressing Care, Wound dressing reinforced 7/1/2019  4:00 PM           Physical Therapy Education     Title: PT OT SLP Therapies (Done)     Topic: Physical Therapy (Done)     Point: Mobility training (Done)     Learning Progress Summary           Patient Acceptance, E, VU by SC at 7/1/2019  3:53 PM    Comment:  Pt is educated and knows her deficits at this time                   Point: Home exercise program (Done)     Learning Progress Summary           Patient Acceptance, E, VU by SC at 7/1/2019  3:53 PM    Comment:  Pt is educated and knows her deficits at this time                   Point: Body mechanics (Done)     Learning Progress Summary           Patient Acceptance, E, VU by SC at 7/1/2019  3:53 PM    Comment:  Pt is educated and knows her deficits at this time                   Point: Precautions (Done)     Learning Progress Summary           Patient Acceptance, E, VU by SC at 7/1/2019  3:53 PM    Comment:  Pt is educated and knows her deficits at this time                               User Key     Initials Effective Dates Name Provider Type Discipline    SC 05/15/19 -  Ko, PT Student PT Student PT                PT Recommendation and Plan     Plan of Care Reviewed With:  patient  Progress: improving  Outcome Summary: Pt. agreeable to therapy. Pt. was able to get to EOB with minimal help and take a few steps to chair with assist of 2. Will continue to work on strengthening and progression of activity.   Outcome Measures     Row Name 07/01/19 7389             How much help from another person do you currently need...    Turning from your back to your side while in flat bed without using bedrails?  3  -JAZMÍN (r) SC (t) JAZMÍN (c)      Moving from lying on back to sitting on the side of a flat bed without bedrails?  1  -JAZMÍN (r) SC (t) JAZMÍN (c)      Moving to and from a bed to a chair (including a wheelchair)?  1  -JAZMÍN (r) SC (t) JAZMÍN (c)      Standing up from a chair using your arms (e.g., wheelchair, bedside chair)?  1  -JAZMÍN (r) SC (t) JAZMÍN (c)      Climbing 3-5 steps with a railing?  1  -JAZMÍN (r) SC (t) JAZMÍN (c)      To walk in hospital room?  1  -JAZMÍN (r) SC (t) JAZMÍN (c)      AM-PAC 6 Clicks Score (PT)  8  -JAZMÍN (r) SC (t)         Functional Assessment    Outcome Measure Options  AM-PAC 6 Clicks Basic Mobility (PT)  -JAZMÍN (r) SC (t) JAZMÍN (c)        User Key  (r) = Recorded By, (t) = Taken By, (c) = Cosigned By    Initials Name Provider Type    Je Youssef, PT DPT Physical Therapist    SC Ko [place], PT Student PT Student         Time Calculation:   PT Charges     Row Name 07/02/19 0917             Time Calculation    Start Time  0829  -      Stop Time  0909  -      Time Calculation (min)  40 min  -      PT Received On  07/02/19  -      PT Goal Re-Cert Due Date  07/11/19  -         Time Calculation- PT    Total Timed Code Minutes- PT  40 minute(s)  -MF         Timed Charges    90768 - PT Therapeutic Activity Minutes  40  -MF        User Key  (r) = Recorded By, (t) = Taken By, (c) = Cosigned By    Initials Name Provider Type    Gilda Pierre PTA Physical Therapy Assistant        Therapy Charges for Today     Code Description Service Date Service Provider Modifiers Qty     [id number]  PT THERAPEUTIC ACT EA 15 MIN 7/2/2019 Gilda Mackey, PTA GP 3          PT G-Codes  Outcome Measure Options: AM-PAC 6 Clicks Basic Mobility (PT)  AM-PAC 6 Clicks Score (PT): 8    Gilda Mackey, BEATRICE  7/2/2019

## 2019-07-02 NOTE — PLAN OF CARE
Problem: Patient Care Overview  Goal: Plan of Care Review  Outcome: Ongoing (interventions implemented as appropriate)   07/02/19 0330   Coping/Psychosocial   Plan of Care Reviewed With patient   OTHER   Outcome Summary A&O X4. VSS. No further loose BM's this shift. C/o pain to left shoulder. Tylenol improved pain. Tolerating clear liquid well.     Goal: Individualization and Mutuality  Outcome: Ongoing (interventions implemented as appropriate)    Goal: Discharge Needs Assessment  Outcome: Ongoing (interventions implemented as appropriate)    Goal: Interprofessional Rounds/Family Conf  Outcome: Ongoing (interventions implemented as appropriate)      Problem: Skin Injury Risk (Adult)  Goal: Skin Health and Integrity  Outcome: Ongoing (interventions implemented as appropriate)      Problem: Fall Risk (Adult)  Goal: Absence of Fall  Outcome: Ongoing (interventions implemented as appropriate)      Problem: Wound (Includes Pressure Injury) (Adult)  Goal: Signs and Symptoms of Listed Potential Problems Will be Absent, Minimized or Managed (Wound)  Outcome: Ongoing (interventions implemented as appropriate)

## 2019-07-02 NOTE — PROGRESS NOTES
Nephrology ([facility]) Progress Note      Patient:  Cheri Ely  YOB: 1940  Date of Service: 7/2/2019  MRN: [id number]   Acct: [id number]   Primary Care Physician: Manny Peters APRN  Advance Directive:   Code Status and Medical Interventions:   Ordered at: 06/27/19 1635     Level Of Support Discussed With:    Patient     Code Status:    CPR     Medical Interventions (Level of Support Prior to Arrest):    Full     Admit Date: 6/27/2019       Hospital Day: 5  Referring Provider: William Alfaro MD      Patient personally seen and examined.  Complete chart including Consults, Notes, Operative Reports, Labs, Cardiology, and Radiology studies reviewed as able.        Subjective:  Cheri Ely is a 79 y.o. female  whom we were consulted for end stage renal disease.  Routine hemodialysis Monday Wednesday Friday at [facility].  No recent issues with dialysis.  Recurrent history of GI bleeding. Had laparoscopic repair of perforated duodenal ulcer 5/12/19.  This time presented with melena and several episodes of dark emesis.  Was feeling very weak and lightheaded.  Initially was significantly hypotensive; improved following IV fluid resuscitation and PRBC transfusion.  Upper GI study on 7/01 showed no perforation.    Today feeling better. No new overnight issues.     Allergies:  Patient has no known allergies.    Home Meds:  Medications Prior to Admission   Medication Sig Dispense Refill Last Dose   • acetaminophen (TYLENOL) 325 MG tablet Take 2 tablets by mouth Every 6 (Six) Hours As Needed for Mild Pain .   Past Week at Unknown time   • allopurinol (ZYLOPRIM) 100 MG tablet Take 100 mg by mouth Daily.   Past Month at Unknown time   • aspirin 81 MG EC tablet Take 1 tablet by mouth Daily.   Past Week at Unknown time   • B Complex-C-Folic Acid (JOHN-SANGEETA) tablet Take 1 tablet by mouth Daily.   Past Week at Unknown time   • carvedilol (COREG) 12.5 MG tablet Take  12.5 mg by mouth Daily. Monday, Wednesday, and Friday dose. Hold if SBP < 100.    Past Week at Unknown time   • carvedilol (COREG) 12.5 MG tablet Take 12.5 mg by mouth 2 (Two) Times a Day With Meals. Sunday, Tuesday, Thursday, and Saturday dose. Hold if SBP <100.    Past Week at Unknown time   • Cholecalciferol (VITAMIN D) 2000 units capsule Take 1 capsule by mouth Daily. 30 capsule  Past Week at Unknown time   • clindamycin (CLEOCIN) 150 MG capsule Take 150 mg by mouth 4 (Four) Times a Day. 10 day therapy.  Start on 6/21/2019.   Past Week at Unknown time   • epoetin lucy (EPOGEN,PROCRIT) 10879 UNIT/ML injection Inject 1 mL under the skin into the appropriate area as directed 3 (Three) Times a Week.   Past Week at Unknown time   • HYDROcodone-acetaminophen (NORCO) 7.5-325 MG per tablet Take 1 tablet by mouth Every 4 (Four) Hours As Needed for Moderate Pain  for up to 30 doses. 30 tablet 0 Past Week at Unknown time   • levothyroxine (SYNTHROID, LEVOTHROID) 112 MCG tablet Take 112 mcg by mouth Daily.   Past Week at Unknown time   • losartan (COZAAR) 25 MG tablet Take 25 mg by mouth 3 (Three) Times a Week. Monday, Wednesday, and Friday.   Past Week at Unknown time   • megestrol (MEGACE) 40 MG/ML suspension Take 400 mg by mouth Daily.   Past Week at Unknown time   • ondansetron ODT (ZOFRAN-ODT) 4 MG disintegrating tablet Take 4 mg by mouth Every 8 (Eight) Hours As Needed for Nausea or Vomiting.   6/26/2019 at Unknown time   • Polyethyl Glyc-Propyl Glyc PF 0.4-0.3 % solution ophthalmic solution Administer 2 drops to both eyes Every 2 (Two) Hours As Needed (dry eyes).   Past Week at Unknown time   • pravastatin (PRAVACHOL) 40 MG tablet Take 40 mg by mouth Daily.   Past Week at Unknown time   • sertraline (ZOLOFT) 50 MG tablet Take 50 mg by mouth Daily.   Past Week at Unknown time   • sucralfate (CARAFATE) 1 GM/10ML suspension Take 10 mL by mouth 2 (Two) Times a Day. 1200 mL 0 Past Week at Unknown time        Medicines:  Current Facility-Administered Medications   Medication Dose Route Frequency Provider Last Rate Last Dose   • acetaminophen (TYLENOL) tablet 650 mg  650 mg Oral Q6H PRN Azalia Shaver DO   650 mg at 07/02/19 0152   • collagenase ointment   Topical Q24H William Alfaro MD       • dextrose (D50W) 25 g/ 50mL Intravenous Solution 50 mL  50 mL Intravenous Q1H PRN William Alfaro MD   50 mL at 06/28/19 0744   • levoFLOXacin (LEVAQUIN) 500 mg/100 mL D5W (premix) (LEVAQUIN) 500 mg  500 mg Intravenous Q48H Madhav Webber MD   500 mg at 07/01/19 1717   • levothyroxine (SYNTHROID, LEVOTHROID) tablet 125 mcg  125 mcg Oral Daily Madhav Webber MD   125 mcg at 07/02/19 0915   • lidocaine 3 % cream   Apply externally Daily PRN Madhav Webber MD       • metoprolol tartrate (LOPRESSOR) tablet 50 mg  50 mg Oral Q12H Madhav Webber MD   50 mg at 07/02/19 0915   • metroNIDAZOLE (FLAGYL) IVPB 500 mg  500 mg Intravenous Q8H Madhav Webber MD   500 mg at 07/02/19 0917   • pantoprazole (PROTONIX) EC tablet 40 mg  40 mg Oral BID AC Madhav Webber MD   40 mg at 07/02/19 0915   • potassium chloride (KAYCIEL) 20 MEQ/15ML (10%) solution 40 mEq  40 mEq Oral Daily Madhav Webber MD   40 mEq at 07/02/19 0916   • sodium chloride 0.9 % bolus 100 mL  100 mL Intravenous PRN Chris Hsu MD       • sodium phosphates 15 mmol in sodium chloride 0.9 % 250 mL IVPB  15 mmol Intravenous Once Madhav Webber MD 62.5 mL/hr at 07/02/19 0917 15 mmol at 07/02/19 0917       Past Medical History:  Past Medical History:   Diagnosis Date   • Arthritis    • Carotid artery disease (CMS/HCC)    • CHF (congestive heart failure) (CMS/HCC)    • Chronic kidney disease on chronic dialysis (CMS/HCC)    • Chronic renal failure     on dialysis ON MON, WED, FRI   • Coronary artery disease    • Disease of thyroid gland    • Diverticulitis    • Gastric ulcer    • History of transfusion    •  Hyperlipidemia    • Hypertension    • Injury of back    • Mesenteric artery insufficiency (CMS/HCC)    • Multilevel degenerative disc disease    • Osteoporosis    • Pancreatitis    • Pelvis fracture (CMS/HCC)    • Pneumonia        Past Surgical History:  Past Surgical History:   Procedure Laterality Date   • AORTAGRAM Right 1/8/2018    Procedure: MESENTERIC ANGIOGRAM, GROIN ACCESS, MYNX CLOSURE;  Surgeon: Tomasz San DO;  Location: [facility] OR;  Service:    • AORTIC VALVE SURGERY     • APPENDECTOMY     • BACK SURGERY     • CAPSULE ENDOSCOPY N/A 3/30/2019    Procedure: CAPSULE ENDOSCOPY M2A;  Surgeon: David Yu MD;  Location: [facility] ENDOSCOPY;  Service: Gastroenterology   • CARDIAC SURGERY     • CAROTID ENDARTERECTOMY Bilateral    • CATARACT EXTRACTION, BILATERAL     • CERVICAL SPINE SURGERY     • CHOLECYSTECTOMY     • COLON SURGERY     • COLONOSCOPY  10/01/2015   • COLONOSCOPY N/A 3/28/2019    Procedure: COLONOSCOPY WITH ANESTHESIA;  Surgeon: Rafita Merida MD;  Location: [facility] ENDOSCOPY;  Service: Gastroenterology   • CORONARY ARTERY BYPASS GRAFT     • DIALYSIS FISTULA CREATION     • ENDOSCOPY N/A 12/27/2018    Procedure: ESOPHAGOGASTRODUODENOSCOPY WITH ANESTHESIA;  Surgeon: Moni Garza MD;  Location: [facility] ENDOSCOPY;  Service: Gastroenterology   • ENDOSCOPY N/A 2/28/2019    Procedure: ESOPHAGOGASTRODUODENOSCOPY WITH ANESTHESIA;  Surgeon: Moni Garza MD;  Location: [facility] ENDOSCOPY;  Service: Gastroenterology   • ENDOSCOPY N/A 3/11/2019    Procedure: ESOPHAGOGASTRODUODENOSCOPY WITH ANESTHESIA;  Surgeon: Moni Garza MD;  Location: [facility] ENDOSCOPY;  Service: Gastroenterology   • ENDOSCOPY N/A 3/27/2019    Procedure: ESOPHAGOGASTRODUODENOSCOPY WITH ANESTHESIA;  Surgeon: Rafita Merida MD;  Location: [facility] ENDOSCOPY;  Service: Gastroenterology   • EXPLORATORY LAPAROTOMY N/A 5/13/2019    Procedure: LAPAROTOMY EXPLORATORY, OVERSEW DUODENAL ULCER WITH FRED PATCH, KOCHER MANEUVER;   Surgeon: Laila Rodriguez MD;  Location:  [facility] OR;  Service: General   • HIP TROCHANTERIC NAILING WITH INTRAMEDULLARY HIP SCREW Right 2/8/2019    Procedure: HIP TROCANTERIC NAILING LONG WITH INTRAMEDULLARY HIP SCREW;  Surgeon: Boo Camacho MD;  Location:  [facility] OR;  Service: Orthopedics   • JOINT REPLACEMENT      knee   • LAPAROSCOPIC GASTRIC BANDING     • LEEP     • LUMBAR FUSION     • TOE AMPUTATION Left     2nd toe   • TOTAL KNEE ARTHROPLASTY Bilateral    • TUBAL ABDOMINAL LIGATION     • UPPER GASTROINTESTINAL ENDOSCOPY  10/01/2015       Family History  Family History   Problem Relation Age of Onset   • Hypertension Mother    • Cancer Mother         stomach   • Coronary artery disease Father    • Heart attack Father    • Diabetes Brother    • Coronary artery disease Brother    • Colon cancer Neg Hx    • Colon polyps Neg Hx        Social History  Social History     Socioeconomic History   • Marital status:      Spouse name: Not on file   • Number of children: Not on file   • Years of education: Not on file   • Highest education level: Not on file   Tobacco Use   • Smoking status: Never Smoker   • Smokeless tobacco: Never Used   Substance and Sexual Activity   • Alcohol use: No   • Drug use: No   • Sexual activity: Defer         Review of Systems:  History obtained from chart review and the patient  General ROS: No fever or chills  Respiratory ROS: No cough, shortness of breath, wheezing  Cardiovascular ROS: No chest pain or palpitations  Gastrointestinal ROS: positive for - blood in stools  Genito-Urinary ROS: No dysuria or hematuria    Objective:  Patient Vitals for the past 24 hrs:   BP Temp Temp src Pulse Resp SpO2 Height Weight   07/02/19 0800 100/60 -- -- 105 18 98 % -- --   07/02/19 0702 96/49 -- -- 113 19 100 % -- --   07/02/19 0627 96/43 -- -- 109 20 100 % -- --   07/02/19 0602 -- -- -- -- -- -- -- 65.5 kg (144 lb 6.4 oz)   07/02/19 0502 91/69 -- -- 109 19 100 % -- --   07/02/19 0435 92/48  "97.9 °F (36.6 °C) Oral 103 20 93 % -- --   07/02/19 0302 108/61 -- -- 101 19 94 % -- --   07/02/19 0202 103/68 -- -- 101 15 99 % -- --   07/02/19 0102 102/70 -- -- 101 17 95 % -- --   07/02/19 0002 102/52 98.5 °F (36.9 °C) Oral 105 16 95 % -- --   07/01/19 2302 110/66 -- -- 105 15 96 % -- --   07/01/19 2207 108/59 -- -- 103 20 100 % -- --   07/01/19 2132 91/68 -- -- 113 20 100 % -- --   07/01/19 2004 99/66 97.8 °F (36.6 °C) Oral 103 22 (!) 89 % -- --   07/01/19 1932 105/55 -- -- 103 20 99 % -- --   07/01/19 1902 (!) 85/63 -- -- 111 22 94 % -- --   07/01/19 1802 102/58 -- -- 113 -- 94 % -- --   07/01/19 1801 102/58 -- -- 102 20 -- -- --   07/01/19 1711 (!) 89/68 -- -- 114 20 94 % -- --   07/01/19 1605 97/77 -- -- -- -- -- 152.4 cm (60\") 63.4 kg (139 lb 12.4 oz)   07/01/19 1602 94/80 98 °F (36.7 °C) Oral (!) 130 20 95 % -- --   07/01/19 1502 97/77 -- -- (!) 134 -- -- -- --   07/01/19 1500 97/77 -- -- (!) 124 18 94 % -- --   07/01/19 1402 110/76 -- -- (!) 148 -- 95 % -- --   07/01/19 1400 110/76 -- -- (!) 126 18 97 % -- --   07/01/19 1307 104/54 -- -- (!) 134 18 93 % -- --   07/01/19 1200 (!) 86/70 98.3 °F (36.8 °C) Oral (!) 125 20 93 % -- --   07/01/19 1100 (!) 89/63 -- -- (!) 135 20 94 % -- --   07/01/19 1035 -- 97.6 °F (36.4 °C) Temporal -- 20 -- -- 63.4 kg (139 lb 12.4 oz)   07/01/19 1000 102/69 -- -- (!) 125 18 -- -- --       Intake/Output Summary (Last 24 hours) at 7/2/2019 0933  Last data filed at 7/2/2019 0400  Gross per 24 hour   Intake 1417.41 ml   Output 2400 ml   Net -982.59 ml     General: awake/alert    Neck: supple, no JVD  Chest:  clear to auscultation bilaterally without respiratory distress  CVS: regular rate and rhythm  Abdominal: soft, nontender, positive bowel sounds  Extremities: bilateral trace edema  Skin: warm and dry without rash  Neuro: no focal motor deficits    Labs:  Results from last 7 days   Lab Units 07/02/19  0319 07/01/19  0225 06/30/19  1532 06/30/19  0221   WBC 10*3/mm3 10.52 9.31  " --  7.56   HEMOGLOBIN g/dL 9.6* 9.9* 8.5* 9.2*   HEMATOCRIT % 29.4* 30.3* 25.8* 27.9*   PLATELETS 10*3/mm3 136 114*  --  89*         Results from last 7 days   Lab Units 07/02/19  0319 07/01/19 0225 06/30/19  0221  06/28/19  0631 06/27/19  1245   SODIUM mmol/L 141 143 141   < > 141 140   POTASSIUM mmol/L 3.4* 3.7 3.6   < > 3.5 3.2*   CHLORIDE mmol/L 111* 109 106   < > 106 108   CO2 mmol/L 25.0 27.0 27.0   < > 26.0 21.0*   BUN mg/dL 11 15 10   < > 38* 28*   CREATININE mg/dL 2.32* 2.45* 1.75*   < > 3.02* 2.57*   CALCIUM mg/dL 7.4* 7.4* 7.3*   < > 7.2* 6.5*   BILIRUBIN mg/dL  --   --   --   --  0.6 0.5   ALK PHOS U/L  --   --   --   --  179* 181*   ALT (SGPT) U/L  --   --   --   --  32 35   AST (SGOT) U/L  --   --   --   --  35 43   GLUCOSE mg/dL 91 94 75   < > 59* 110*    < > = values in this interval not displayed.       Radiology:   Imaging Results (last 72 hours)     ** No results found for the last 72 hours. **          Culture:  Blood Culture   Date Value Ref Range Status   06/27/2019 No growth at 3 days  Preliminary   06/27/2019 No growth at 3 days  Preliminary         Assessment   1.  End stage renal disease on hemodialysis MWF  2.  Hypertension with recent HYPOtension  3.  Anemia of CKD and acute GI blood loss  4.  GI bleeding  5.  Secondary hyperparathyroidism    Plan:  1.  Dialysis next due 7/03  2.  Monitor labs        Dirk Tristan, APRN  7/2/2019  9:33 AM

## 2019-07-02 NOTE — PROGRESS NOTES
Laila Rodriguez MD Progress Note     LOS: 5 days   Patient Care Team:  Manny Peters APRN as PCP - General (Gerontology)  Moni Garza MD as Consulting Physician (Gastroenterology)  Tomasz San DO as Consulting Physician (Vascular Surgery)        Subjective     Tolerating clear liquids.  No nausea vomiting or abdominal discomfort.  Passing liquidy stool    Objective     Vital Signs  Temp:  [97.6 °F (36.4 °C)-98.5 °F (36.9 °C)] 97.9 °F (36.6 °C)  Heart Rate:  [101-148] 113  Resp:  [15-22] 19  BP: ()/(43-80) 96/49    Intake & Output (last 3 days)       06/29 0701 - 06/30 0700 06/30 0701 - 07/01 0700 07/01 0701 - 07/02 0700 07/02 0701 - 07/03 0700    P.O.   240     I.V. (mL/kg) 1230.8 (18.4) 1317 (20.1) 1117.3 (17.1)     Blood        IV Piggyback 764 784 346.1     Total Intake(mL/kg) 1994.8 (29.9) 2101 (32.1) 1703.4 (26)     Other 2000  2300     Stool 0  100     Total Output 2000  2400     Net -5.2 +2101 -696.6             Stool Unmeasured Occurrence 2 x 1 x 7 x           Physical Exam:     General Appearance:    Alert, cooperative, in no acute distress   Lungs:     respirations regular, even and unlabored    Heart:    Regular rhythm and normal rate, normal S1 and S2, no            murmur, no gallop, no rub   Chest Wall:    No abnormalities observed   Abdomen:      Soft minimal upper abdominal tenderness   Extremities: No edema,    Results Review:     I reviewed the patient's new clinical results.    Lab Results (last 72 hours)     Procedure Component Value Units Date/Time    Protime-INR [[id number]]  (Abnormal) Collected:  07/02/19 0319    Specimen:  Blood Updated:  07/02/19 0426     Protime 30.3 Seconds      INR 2.77    Renal Function Panel [[id number]]  (Abnormal) Collected:  07/02/19 0319    Specimen:  Blood Updated:  07/02/19 0401     Glucose 91 mg/dL      BUN 11 mg/dL      Creatinine 2.32 mg/dL      Sodium 141 mmol/L      Potassium 3.4 mmol/L      Chloride 111 mmol/L      CO2 25.0 mmol/L       Calcium 7.4 mg/dL      Albumin 2.40 g/dL      Phosphorus 1.7 mg/dL      Anion Gap 5.0 mmol/L      BUN/Creatinine Ratio 4.7     eGFR Non African Amer 20 mL/min/1.73     Narrative:       GFR Normal >60  Chronic Kidney Disease <60  Kidney Failure <15    CBC & Differential [[id number]] Collected:  07/02/19 0319    Specimen:  Blood Updated:  07/02/19 0349    Narrative:       The following orders were created for panel order CBC & Differential.  Procedure                               Abnormality         Status                     ---------                               -----------         ------                     CBC Auto Differential[[id number]]        Abnormal            Final result                 Please view results for these tests on the individual orders.    CBC Auto Differential [[id number]]  (Abnormal) Collected:  07/02/19 0319    Specimen:  Blood Updated:  07/02/19 0349     WBC 10.52 10*3/mm3      RBC 3.05 10*6/mm3      Hemoglobin 9.6 g/dL      Hematocrit 29.4 %      MCV 96.4 fL      MCH 31.5 pg      MCHC 32.7 g/dL      RDW 19.8 %      RDW-SD 64.7 fl      MPV 12.2 fL      Platelets 136 10*3/mm3      Neutrophil % 86.9 %      Lymphocyte % 7.6 %      Monocyte % 3.2 %      Eosinophil % 1.2 %      Basophil % 0.2 %      Neutrophils, Absolute 9.14 10*3/mm3      Lymphocytes, Absolute 0.80 10*3/mm3      Monocytes, Absolute 0.34 10*3/mm3      Eosinophils, Absolute 0.13 10*3/mm3      Basophils, Absolute 0.02 10*3/mm3     Blood Culture - Blood, Blood, Central Line [[id number]] Collected:  06/27/19 1416    Specimen:  Blood, Central Line Updated:  07/01/19 1500     Blood Culture No growth at 4 days    Blood Culture - Blood, Blood, Central Line [[id number]] Collected:  06/27/19 1245    Specimen:  Blood, Central Line Updated:  07/01/19 1315     Blood Culture No growth at 4 days    TSH [[id number]]  (Abnormal) Collected:  07/01/19 0225    Specimen:  Blood Updated:  07/01/19 0333     TSH 12.400 mIU/mL     T4, Free [[id number]]   (Abnormal) Collected:  07/01/19 0225    Specimen:  Blood Updated:  07/01/19 0319     Free T4 0.41 ng/dL     Basic Metabolic Panel [[id number]]  (Abnormal) Collected:  07/01/19 0225    Specimen:  Blood Updated:  07/01/19 0308     Glucose 94 mg/dL      BUN 15 mg/dL      Creatinine 2.45 mg/dL      Sodium 143 mmol/L      Potassium 3.7 mmol/L      Chloride 109 mmol/L      CO2 27.0 mmol/L      Calcium 7.4 mg/dL      eGFR Non African Amer 19 mL/min/1.73      BUN/Creatinine Ratio 6.1     Anion Gap 7.0 mmol/L     Narrative:       GFR Normal >60  Chronic Kidney Disease <60  Kidney Failure <15    Phosphorus [[id number]]  (Abnormal) Collected:  07/01/19 0225    Specimen:  Blood Updated:  07/01/19 0307     Phosphorus 2.2 mg/dL     CBC (No Diff) [[id number]]  (Abnormal) Collected:  07/01/19 0225    Specimen:  Blood Updated:  07/01/19 0252     WBC 9.31 10*3/mm3      RBC 3.18 10*6/mm3      Hemoglobin 9.9 g/dL      Hematocrit 30.3 %      MCV 95.3 fL      MCH 31.1 pg      MCHC 32.7 g/dL      RDW 19.4 %      RDW-SD 62.1 fl      MPV 12.1 fL      Platelets 114 10*3/mm3     Hemoglobin & Hematocrit, Blood [[id number]]  (Abnormal) Collected:  06/30/19 1532    Specimen:  Blood Updated:  06/30/19 1552     Hemoglobin 8.5 g/dL      Hematocrit 25.8 %     POC Glucose Once [[id number]]  (Normal) Collected:  06/30/19 1325    Specimen:  Blood Updated:  06/30/19 1337     Glucose 102 mg/dL      Comment: : [id number] Oates AprilMeter ID: [id number]       Basic Metabolic Panel [[id number]]  (Abnormal) Collected:  06/30/19 0221    Specimen:  Blood Updated:  06/30/19 0321     Glucose 75 mg/dL      BUN 10 mg/dL      Creatinine 1.75 mg/dL      Sodium 141 mmol/L      Potassium 3.6 mmol/L      Chloride 106 mmol/L      CO2 27.0 mmol/L      Calcium 7.3 mg/dL      eGFR Non African Amer 28 mL/min/1.73      BUN/Creatinine Ratio 5.7     Anion Gap 8.0 mmol/L     Narrative:       GFR Normal >60  Chronic Kidney Disease <60  Kidney Failure <15    CBC & Differential  [[id number]] Collected:  06/30/19 0221    Specimen:  Blood Updated:  06/30/19 0315    Narrative:       The following orders were created for panel order CBC & Differential.  Procedure                               Abnormality         Status                     ---------                               -----------         ------                     CBC Auto Differential[[id number]]        Abnormal            Final result                 Please view results for these tests on the individual orders.    CBC Auto Differential [[id number]]  (Abnormal) Collected:  06/30/19 0221    Specimen:  Blood Updated:  06/30/19 0315     WBC 7.56 10*3/mm3      RBC 2.93 10*6/mm3      Hemoglobin 9.2 g/dL      Hematocrit 27.9 %      MCV 95.2 fL      MCH 31.4 pg      MCHC 33.0 g/dL      RDW 19.2 %      RDW-SD 62.5 fl      MPV 11.8 fL      Platelets 89 10*3/mm3      Neutrophil % 79.7 %      Lymphocyte % 12.4 %      Monocyte % 4.4 %      Eosinophil % 2.2 %      Basophil % 0.4 %      Immature Grans % 0.9 %      Neutrophils, Absolute 6.02 10*3/mm3      Lymphocytes, Absolute 0.94 10*3/mm3      Monocytes, Absolute 0.33 10*3/mm3      Eosinophils, Absolute 0.17 10*3/mm3      Basophils, Absolute 0.03 10*3/mm3      Immature Grans, Absolute 0.07 10*3/mm3      nRBC 0.0 /100 WBC     Phosphorus [[id number]]  (Abnormal) Collected:  06/30/19 0221    Specimen:  Blood Updated:  06/30/19 0313     Phosphorus 1.7 mg/dL     Magnesium [[id number]]  (Normal) Collected:  06/30/19 0221    Specimen:  Blood Updated:  06/30/19 0313     Magnesium 1.7 mg/dL     Hemoglobin & Hematocrit, Blood [[id number]]  (Abnormal) Collected:  06/29/19 1758    Specimen:  Blood Updated:  06/29/19 1806     Hemoglobin 9.3 g/dL      Hematocrit 28.2 %     Hemoglobin & Hematocrit, Blood [[id number]]  (Abnormal) Collected:  06/29/19 1143    Specimen:  Blood Updated:  06/29/19 1200     Hemoglobin 9.6 g/dL      Hematocrit 28.0 %     CBC & Differential [[id number]] Collected:  06/29/19 0708    Specimen:   Blood Updated:  06/29/19 0918    Narrative:       The following orders were created for panel order CBC & Differential.  Procedure                               Abnormality         Status                     ---------                               -----------         ------                     CBC Auto Differential[[id number]]        Abnormal            Final result                 Please view results for these tests on the individual orders.    CBC Auto Differential [[id number]]  (Abnormal) Collected:  06/29/19 0708    Specimen:  Blood Updated:  06/29/19 0918     WBC 8.10 10*3/mm3      RBC 3.16 10*6/mm3      Hemoglobin 9.7 g/dL      Hematocrit 29.1 %      MCV 92.1 fL      MCH 30.7 pg      MCHC 33.3 g/dL      RDW 19.2 %      RDW-SD 60.4 fl      MPV 12.1 fL      Platelets 117 10*3/mm3      Neutrophil % 79.4 %      Lymphocyte % 13.5 %      Monocyte % 4.1 %      Eosinophil % 2.2 %      Basophil % 0.2 %      Immature Grans % 0.6 %      Neutrophils, Absolute 6.43 10*3/mm3      Lymphocytes, Absolute 1.09 10*3/mm3      Monocytes, Absolute 0.33 10*3/mm3      Eosinophils, Absolute 0.18 10*3/mm3      Basophils, Absolute 0.02 10*3/mm3      Immature Grans, Absolute 0.05 10*3/mm3      nRBC 0.0 /100 WBC         Imaging Results (last 72 hours)     Procedure Component Value Units Date/Time    FL Upper GI Water Soluble [[id number]] Collected:  07/01/19 1320     Updated:  07/01/19 1324    Narrative:       EXAMINATION: FL UPPER GI WATER SOLUBLE-     7/1/2019 12:45 PM CDT     HISTORY: Possible leak from prior oversew duodenal ulcer; K92.1-Melena;  K26.5-Chronic or unspecified duodenal ulcer with perforation;  I95.89-Other hypotension; E86.1-Hypovolemia     Water-soluble upper GI.  Number of images = 8.  Fluoroscopy time = 1.9 minutes.     Water-soluble contrast was placed through the indwelling nasogastric  tube.  Normal gastric emptying.  No contrast extravasation to indicate perforation at the proximal  duodenum.     Summary:  1. No  evidence of perforation.  This report was finalized on 07/01/2019 13:21 by Dr. Adithya Pérez MD.              Assessment/Plan       Hypertension, currently hypotensive    Gastrointestinal hemorrhage    Hypothyroidism, TSH 12.4, FT4 0.41    Paroxysmal atrial fibrillation (CMS/[facility])    Acute colitis    ESRD (end stage renal disease) (CMS/[facility])    Acute on chronic anemia    Duodenal ulcer    Chronic combined systolic and diastolic CHF (congestive heart failure) (CMS/[facility])      I reviewed the upper GI today.  The official report reveals no evidence of perforation but I do have some concern of a contained air leak area.  Am going to check abdominal x-ray to see if there is any pooling of contrast outside of the duodenal sweep.  It is possible there is just an outpouching or diverticulum in this area as well.  Clinically she is doing fine her white count is normal she does not have any significant discomfort.      Laila Rodriguez MD  07/02/19  8:05 AM

## 2019-07-02 NOTE — DISCHARGE PLACEMENT REQUEST
"To: [facility]    From: Cynthia Blum     Re: Lab results      Cheri Eyl (79 y.o. Female)     Date of Birth Social Security Number Address Home Phone MRN    1940  [id number]  [facility] [address] [phone] [id number]    Quaker Marital Status          Moravian        Admission Date Admission Type Admitting Provider Attending Provider Department, Room/Bed    6/27/19 Emergency Madhav Webber MD Fleming, John Eric, MD [facility] CARDIAC CARE, C006/1    Discharge Date Discharge Disposition Discharge Destination                       Attending Provider:  Madhav Webber MD    Allergies:  No Known Allergies    Isolation:  None   Infection:  None   Code Status:  CPR    Ht:  152.4 cm (60\")   Wt:  65.5 kg (144 lb 6.4 oz)    Admission Cmt:  None   Principal Problem:  None                Active Insurance as of 6/27/2019     Primary Coverage     Payor Plan Insurance Group Employer/Plan Group    MEDICARE MEDICARE A & B      Payor Plan Address Payor Plan Phone Number Payor Plan Fax Number Effective Dates    [address] [phone]  4/1/1998 - None Entered    [facility] [id number]       Subscriber Name Subscriber Birth Date Member ID       CHERI ELY ALFREDO 1940 [id number]           Secondary Coverage     Payor Plan Insurance Group Employer/Plan Group    COMBINED INSURANCE CO COMBINED INSURANCE CO      Payor Plan Address Payor Plan Phone Number Payor Plan Fax Number Effective Dates    [address] [phone]  1/1/2017 - None Entered    [facility] [address]       Subscriber Name Subscriber Birth Date Member ID       CHERI ELY R 1940 [id number]                 Emergency Contacts      (Rel.) Home Phone Work Phone Mobile Phone    Christy Langston (Daughter) -- -- [phone]    CarmenLucero (Daughter) -- -- [phone]            Insurance Information                MEDICARE/MEDICARE A & B Phone: [phone]    Subscriber: [employer]" Cheri R Subscriber#: [id number]    Group#:  Precert#:         COMBINED INSURANCE CO/COMBINED INSURANCE CO Phone: [phone]    Subscriber: Cheri Ely Subscriber#: [id number]    Group#:  Precert#:           Results   CBC & Differential (Order [id number])   Linked Results     Procedure Abnormality Status   CBC Auto Differential Abnormal Final result   Contains abnormal data CBC & Differential   Order: [id number]   Status:  Final result   Visible to patient:  No (Not Released)         Specimen Collected: 07/02/19 03:19   Last Resulted: 07/02/19 03:49       Order Details      View Encounter      Lab and Collection Details      Routing      Result History               Contains abnormal data CBC Auto Differential   Order: [id number] - Part of Panel Order [id number]   Status:  Final result   Visible to patient:  No (Not Released)    Ref Range & Units 03:19   WBC 4.80 - 10.80 10*3/mm3 10.52    RBC 4.20 - 5.40 10*6/mm3 3.05 Abnormally low     Hemoglobin 12.0 - 16.0 g/dL 9.6 Abnormally low     Hematocrit 37.0 - 47.0 % 29.4 Abnormally low     MCV 82.0 - 98.0 fL 96.4    MCH 28.0 - 32.0 pg 31.5    MCHC 33.0 - 36.0 g/dL 32.7 Abnormally low     RDW 12.0 - 15.0 % 19.8 Abnormally high     RDW-SD 40.0 - 54.0 fl 64.7 Abnormally high     MPV 6.0 - 12.0 fL 12.2 Abnormally high     Platelets 130 - 400 10*3/mm3 136    Neutrophil % 39.0 - 78.0 % 86.9 Abnormally high     Lymphocyte % 15.0 - 45.0 % 7.6 Abnormally low     Monocyte % 4.0 - 12.0 % 3.2 Abnormally low     Eosinophil % 0.0 - 4.0 % 1.2    Basophil % 0.0 - 2.0 % 0.2    Neutrophils, Absolute 1.87 - 8.40 10*3/mm3 9.14 Abnormally high     Lymphocytes, Absolute 0.72 - 4.86 10*3/mm3 0.80    Monocytes, Absolute 0.19 - 1.30 10*3/mm3 0.34    Eosinophils, Absolute 0.00 - 0.70 10*3/mm3 0.13    Basophils, Absolute 0.00 - 0.20 10*3/mm3 0.02    Resulting Agency   [facility]         Specimen Collected: 07/02/19 03:19   Last Resulted: 07/02/19 03:49        Lab Flowsheet      Order Details       View Encounter      Lab and Collection Details      Routing      Result History                   CBC & Differential (Order [id number])   Linked Results     Procedure Abnormality Status   CBC Auto Differential Abnormal Final result       CBC Auto Differential (Order [id number])

## 2019-07-02 NOTE — PROGRESS NOTES
Continued Stay Note   [place]     Patient Name: Cheri Ely  MRN: [id number]  Today's Date: 7/2/2019    Admit Date: 6/27/2019    Discharge Plan     Row Name 07/02/19 1029       Plan    Plan  SNF - referral to [facility]    Patient/Family in Agreement with Plan  yes    Plan Comments  Patient states this am that she would like to go to [facility] again upon discharge.  SW inquired if patient would want to go to [facility] as this is the facility patient's daughter, Lucero, stated they would prefer.  Patient states she does not want to go to [place].  SW informed patient's daughter, Lucero, that patient wanted to return to [facility] and referral would be made today.  Daughter was in agreement.  Will proceed with referral.        Discharge Codes    No documentation.             LEONARDO NormanW

## 2019-07-02 NOTE — PROGRESS NOTES
[facility] Gastroenterology  Inpatient Progress Note     TODAY'S DATE: 07/02/19  Cheri Ely  1940      Reason for Follow Up: GI bleed, recent duodenal ulcer with perforation May 2019    Subjective:      Denies abdominal pain.  No nausea vomiting.  She does complain of diarrhea.  Nursing staff states stool is brown.  No fevers.    She is on clear liquid diet.  On Protonix orally twice daily.  Hemoglobin 9.6 (9.9, 8.5)      No Known Allergies    Current Facility-Administered Medications:   •  acetaminophen (TYLENOL) tablet 650 mg, 650 mg, Oral, Q6H PRN, Azalia Shaver DO, 650 mg at 07/02/19 0152  •  collagenase ointment, , Topical, Q24H, William Alfaro MD  •  dextrose (D50W) 25 g/ 50mL Intravenous Solution 50 mL, 50 mL, Intravenous, Q1H PRN, William Alfaro MD, 50 mL at 06/28/19 0744  •  levoFLOXacin (LEVAQUIN) 500 mg/100 mL D5W (premix) (LEVAQUIN) 500 mg, 500 mg, Intravenous, Q48H, Madhav Webber MD, 500 mg at 07/01/19 1717  •  levothyroxine (SYNTHROID, LEVOTHROID) tablet 125 mcg, 125 mcg, Oral, Daily, Madhav Webber MD, 125 mcg at 07/02/19 0915  •  lidocaine 3 % cream, , Apply externally, Daily PRN, Madhav Webber MD  •  metoprolol tartrate (LOPRESSOR) tablet 50 mg, 50 mg, Oral, Q12H, Madahv Webber MD, 50 mg at 07/02/19 0915  •  metroNIDAZOLE (FLAGYL) IVPB 500 mg, 500 mg, Intravenous, Q8H, Madhav Webber MD, 500 mg at 07/02/19 0917  •  pantoprazole (PROTONIX) EC tablet 40 mg, 40 mg, Oral, BID AC, Madhav Webber MD, 40 mg at 07/02/19 0915  •  potassium chloride (KAYCIEL) 20 MEQ/15ML (10%) solution 40 mEq, 40 mEq, Oral, Daily, Madhav Webber MD, 40 mEq at 07/02/19 0916  •  sodium chloride 0.9 % bolus 100 mL, 100 mL, Intravenous, PRN, Chris Hsu MD  •  sodium phosphates 15 mmol in sodium chloride 0.9 % 250 mL IVPB, 15 mmol, Intravenous, Once, Madhav Webber MD, Last Rate: 62.5 mL/hr at 07/02/19 0917, 15 mmol at 07/02/19  0917    Review of Systems   Constitutional: Negative for chills and fever.   Respiratory: Negative for cough, shortness of breath and wheezing.    Cardiovascular: Negative for chest pain and palpitations.   Gastrointestinal: Positive for diarrhea. Negative for abdominal distention, abdominal pain, anal bleeding, blood in stool, constipation, nausea and vomiting.       Objective     Vital Signs  Temp:  [97.4 °F (36.3 °C)-98.5 °F (36.9 °C)] 97.4 °F (36.3 °C)  Heart Rate:  [] 94  Resp:  [15-22] 18  BP: ()/(43-80) 98/57  Body mass index is 28.2 kg/m².    Intake/Output Summary (Last 24 hours) at 7/2/2019 1259  Last data filed at 7/2/2019 0817  Gross per 24 hour   Intake 1257.3 ml   Output 100 ml   Net 1157.3 ml     I/O this shift:  In: 120 [P.O.:120]  Out: -        PHYSICAL EXAM  Physical Exam   Constitutional: No distress.   Cardiovascular: Regular rhythm and normal heart sounds.   Tachycardic   Pulmonary/Chest: Effort normal and breath sounds normal.   Abdominal: Soft. Bowel sounds are normal. She exhibits no distension. There is no tenderness.   Neurological: She is alert.   Skin: Skin is warm and dry.   Vitals reviewed.          Results Review:   I have reviewed all of the patient's current test results    Results from last 7 days   Lab Units 07/02/19 0319 07/01/19 0225 06/30/19  1532 06/30/19  0221   WBC 10*3/mm3 10.52 9.31  --  7.56   HEMOGLOBIN g/dL 9.6* 9.9* 8.5* 9.2*   HEMATOCRIT % 29.4* 30.3* 25.8* 27.9*   PLATELETS 10*3/mm3 136 114*  --  89*       Results from last 7 days   Lab Units 07/02/19 0319 07/01/19 0225 06/30/19  0221  06/28/19  0631 06/27/19  1245   SODIUM mmol/L 141 143 141   < > 141 140   POTASSIUM mmol/L 3.4* 3.7 3.6   < > 3.5 3.2*   CHLORIDE mmol/L 111* 109 106   < > 106 108   CO2 mmol/L 25.0 27.0 27.0   < > 26.0 21.0*   BUN mg/dL 11 15 10   < > 38* 28*   CREATININE mg/dL 2.32* 2.45* 1.75*   < > 3.02* 2.57*   CALCIUM mg/dL 7.4* 7.4* 7.3*   < > 7.2* 6.5*   BILIRUBIN mg/dL  --   --    --   --  0.6 0.5   ALK PHOS U/L  --   --   --   --  179* 181*   ALT (SGPT) U/L  --   --   --   --  32 35   AST (SGOT) U/L  --   --   --   --  35 43   GLUCOSE mg/dL 91 94 75   < > 59* 110*    < > = values in this interval not displayed.       Results from last 7 days   Lab Units 07/02/19  0319 06/29/19  0708 06/28/19  0631   INR  2.77* 1.81* 1.76*       Lab Results   Lab Value Date/Time    LIPASE <10 (L) 05/13/2019 0023    LIPASE 14 (L) 05/12/2019 2133    LIPASE 28 03/24/2019 1018    LIPASE 28 09/15/2017 2059       Radiology Review:  Imaging Results (last 24 hours)     Procedure Component Value Units Date/Time    FL Upper GI Water Soluble [[id number]] Collected:  07/01/19 1320     Updated:  07/01/19 1324    Narrative:       EXAMINATION: FL UPPER GI WATER SOLUBLE-     7/1/2019 12:45 PM CDT     HISTORY: Possible leak from prior oversew duodenal ulcer; K92.1-Melena;  K26.5-Chronic or unspecified duodenal ulcer with perforation;  I95.89-Other hypotension; E86.1-Hypovolemia     Water-soluble upper GI.  Number of images = 8.  Fluoroscopy time = 1.9 minutes.     Water-soluble contrast was placed through the indwelling nasogastric  tube.  Normal gastric emptying.  No contrast extravasation to indicate perforation at the proximal  duodenum.     Summary:  1. No evidence of perforation.  This report was finalized on 07/01/2019 13:21 by Dr. Adithya Pérez MD.            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   • Chronic mesenteric ischemia (CMS/HCC) K55.1   • Black tarry stools K92.1   • Hx of gastritis Z87.19   • Acute gastric ulcer with hemorrhage K25.0   • Closed displaced intertrochanteric fracture of right femur (CMS/[facility]) S72.141A   •  Displaced intertrochanteric fracture of right femur, initial encounter for closed fracture (CMS/[facility]) S72.141A   • Hypotension I95.9   • Acute blood loss anemia D62   • GI bleed K92.2   • Gastrointestinal hemorrhage K92.2   • (HFpEF) heart failure with preserved ejection fraction (CMS/[facility]) I50.30   • Hypothyroidism, TSH 12.4, FT4 0.41 E03.9   • Diastolic dysfunction I51.9   • Diastolic heart failure (CMS/[facility]) I50.30   • Volume overload E87.70   • AVM (arteriovenous malformation) of small bowel, acquired (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  Melena  2.  Coffee-ground emesis   3.  Abnormal CT at the duodenal bulb, microperforation noted.  4.  Recent duodenal ulcer with perforation of May 2019 status post surgical repair.    No signs of active GI bleeding at this time.  Recommend continue to treat with Protonix drip. UGI done 7-1-19 with no evidence of perforation.  However Dr. Rodriguez noted concern of contained air leak area. He is going to check abdominal xray to check for any pooling of contrast outside the duodenal sweep.  Recommend continue to follow H&H and transfuse as needed.        I discussed the patients findings and my recommendations with patient and nursing staff      EMR Dragon/transcription disclaimer:  Much of this encounter note is electronic transcription/translation of spoken language to printed text.  The electronic translation of spoken language may be erroneous, or at times, nonsensical words or phrases may be inadvertently transcribed.  Although I have reviewed the note for such errors, some may still exist.      Abril Winn APRN  10:31 AM    · I have  seen the patient personally with evaluation and plan of care reviewed and developed with APRN and nursing staff. Where indicated, I have addended and/or modified the above history of present illness, physical examination, and assessment and plan to reflect my findings and impressions. Essential elements of the care plan were discussed with APRN above.  Agree with findings and assessment/plan as documented above.        David Yu MD  07/02/19  12:59 PM

## 2019-07-02 NOTE — PROGRESS NOTES
Continued Stay Note  [facility]     Patient Name: Cheri Ely  MRN: [id number]  Today's Date: 7/2/2019    Admit Date: 6/27/2019    Discharge Plan     Row Name 07/02/19 1140       Plan    Plan  [facility]    Patient/Family in Agreement with Plan  yes    Plan Comments  Patient has received bed offer from [facility] [phone], when ready for discharge.  Patient has been to this facility before.  Facility is aware patient has dialysis.    Row Name 07/02/19 1029       Plan    Plan  SNF - referral to [facility]    Patient/Family in Agreement with Plan  yes    Plan Comments  Patient states this am that she would like to go to [facility] again upon discharge.  SW inquired if patient would want to go to [facility] as this is the facility patient's daughter, Lucero, stated they would prefer.  Patient states she does not want to go to [place].  SW informed patient's daughter, Lucero, that patient wanted to return to [facility] and referral would be made today.  Daughter was in agreement.  Will proceed with referral.        Discharge Codes    No documentation.             LEONARDO NormanW

## 2019-07-02 NOTE — PROGRESS NOTES
"    [facility]  INPATIENT PROGRESS NOTE    Patient Name: Cheri Ely  Date of Admission: 6/27/2019  Today's Date: 07/02/19  Length of Stay: 5  Primary Care Physician: Manny Peters APRN    Subjective   Chief Complaint: hungry   HPI     Patient seen and examined at bedside.  Patient tolerated clear liquid diet well without any significant issues.  Patient would like to advance her diet.  Patient has had no dark stools, she indicated she had a small \"yellowish\" watery stool last night.  She tolerated dialysis well.          Review of Systems   Constitutional: Negative for activity change, appetite change, chills, diaphoresis, fatigue and fever.   Respiratory: Negative for cough and shortness of breath.    Cardiovascular: Negative for chest pain and palpitations.   Gastrointestinal: Negative for abdominal distention, abdominal pain, blood in stool, constipation, diarrhea, nausea and vomiting.        All pertinent negatives and positives are as above. All other systems have been reviewed and are negative unless otherwise stated.     Objective    Temp:  [97.5 °F (36.4 °C)-98.5 °F (36.9 °C)] 97.9 °F (36.6 °C)  Heart Rate:  [101-148] 109  Resp:  [15-22] 20  BP: ()/(43-80) 96/43  Physical Exam  Constitutional: She is oriented to person, place, and time. No distress.   HENT:   Head: Normocephalic and atraumatic.   Eyes: Conjunctivae are normal. No scleral icterus.   Neck: Neck supple. No JVD present.   Cardiovascular: Exam reveals no gallop and no friction rub.   Murmur heard.  Irregularly irregular, normal rate; left-sided fistula with palpable thrill  Pulmonary/Chest: Effort normal and breath sounds normal. No stridor. No respiratory distress.   Abdominal: Soft. Bowel sounds are normal.   Musculoskeletal: She exhibits no edema.   Neurological: She is alert and oriented to person, place, and time.   Skin: Skin is dry. No rash noted. She is not diaphoretic. No " pallor.   Cool upper and lower extremities   Psychiatric: She has a normal mood and affect. Her behavior is normal.   Nursing note and vitals reviewed.      Results Review:  I have reviewed the labs, radiology results, and diagnostic studies.    Laboratory Data:   Results from last 7 days   Lab Units 07/02/19 0319 07/01/19 0225 06/30/19  1532 06/30/19 0221   WBC 10*3/mm3 10.52 9.31  --  7.56   HEMOGLOBIN g/dL 9.6* 9.9* 8.5* 9.2*   HEMATOCRIT % 29.4* 30.3* 25.8* 27.9*   PLATELETS 10*3/mm3 136 114*  --  89*        Results from last 7 days   Lab Units 07/02/19 0319 07/01/19 0225 06/30/19 0221 06/28/19  0631 06/27/19  1245   SODIUM mmol/L 141 143 141   < > 141 140   POTASSIUM mmol/L 3.4* 3.7 3.6   < > 3.5 3.2*   CHLORIDE mmol/L 111* 109 106   < > 106 108   CO2 mmol/L 25.0 27.0 27.0   < > 26.0 21.0*   BUN mg/dL 11 15 10   < > 38* 28*   CREATININE mg/dL 2.32* 2.45* 1.75*   < > 3.02* 2.57*   CALCIUM mg/dL 7.4* 7.4* 7.3*   < > 7.2* 6.5*   BILIRUBIN mg/dL  --   --   --   --  0.6 0.5   ALK PHOS U/L  --   --   --   --  179* 181*   ALT (SGPT) U/L  --   --   --   --  32 35   AST (SGOT) U/L  --   --   --   --  35 43   GLUCOSE mg/dL 91 94 75   < > 59* 110*    < > = values in this interval not displayed.       Culture Data:   Blood Culture   Date Value Ref Range Status   06/27/2019 No growth at 4 days  Preliminary   06/27/2019 No growth at 4 days  Preliminary       Radiology Data:   Imaging Results (last 24 hours)     Procedure Component Value Units Date/Time    FL Upper GI Water Soluble [[id number]] Collected:  07/01/19 1320     Updated:  07/01/19 1324    Narrative:       EXAMINATION: FL UPPER GI WATER SOLUBLE-     7/1/2019 12:45 PM CDT     HISTORY: Possible leak from prior oversew duodenal ulcer; K92.1-Melena;  K26.5-Chronic or unspecified duodenal ulcer with perforation;  I95.89-Other hypotension; E86.1-Hypovolemia     Water-soluble upper GI.  Number of images = 8.  Fluoroscopy time = 1.9 minutes.     Water-soluble  contrast was placed through the indwelling nasogastric  tube.  Normal gastric emptying.  No contrast extravasation to indicate perforation at the proximal  duodenum.     Summary:  1. No evidence of perforation.  This report was finalized on 07/01/2019 13:21 by Dr. Adithya Pérez MD.          I have reviewed the patient's current medications.     Assessment/Plan     Active Hospital Problems    Diagnosis   • Grade II diastolic dysfunction   • Acute colitis   • ESRD (end stage renal disease) (CMS/[facility])   • Acute on chronic anemia   • Duodenal ulcer     Abnormal CT at the duodenal bulb, microperforation noted.  Recent duodenal ulcer with perforation 5/2019 s/p surgical repair     • Paroxysmal atrial fibrillation (CMS/[facility])   • Gastrointestinal hemorrhage   • (HFpEF) heart failure with preserved ejection fraction (CMS/[facility])   • Hypothyroidism, TSH 12.4, FT4 0.41   • Hypertension, currently hypotensive       Plan:  1.  Upper GI yesterday showed no signs of perforation. Received 1 unit of pRBCs and 4 units of FFP upon arrival.  2.  Clear liquid diet, advance per surgery   3.  NG tube in place  4.  PO PPI BID, d/c gtt  5.  Hold outpatient anti-HTN medications; monitor BP trend  6. Metronidazole and levofloxacin for now, will complete 7 day course; possible mild colitis noted on CT A/P (new circumferential wall thickening along the descending and proximal sigmoid colon, suggestive of mild colitis.  7.  GI and general surgery following; appreciate their assistance  8.  Dialysis regimen per Nephrology - Received dialysis yesterday    9.   TSH and FT4 are consistent with poor controlled hypothyroidism.  Gave a dose of IV levothyroxine 75 mcg yesterday.  Restart levothyroxine at 125 mcg (home dose was 112 mcg)  11.  Titrate up metoprolol from 25 mg to 50 mg  12.  AM hemoglobin and renal function panel   13.  Echocardiogram reviewed, EF 26-30%  14.  PT   15. Replace potassium with 40 meq PO, sodium phos 15 mmol     Transfer to floor                Discharge Planning: I expect the patient to be discharged to home in 2-3 days.    Madhav Webber MD   07/02/19   7:06 AM

## 2019-07-02 NOTE — PLAN OF CARE
Problem: Patient Care Overview  Goal: Plan of Care Review  Outcome: Ongoing (interventions implemented as appropriate)   07/02/19 0830   Coping/Psychosocial   Plan of Care Reviewed With patient   Plan of Care Review   Progress improving   OTHER   Outcome Summary Pt. agreeable to therapy. Pt. was able to get to EOB with minimal help and take a few steps to chair with assist of 2. Will continue to work on strengthening and progression of activity.

## 2019-07-03 LAB
ALBUMIN SERPL-MCNC: 2.4 G/DL (ref 3.5–5)
ANION GAP SERPL CALCULATED.3IONS-SCNC: 8 MMOL/L (ref 4–13)
BUN BLD-MCNC: 14 MG/DL (ref 5–21)
BUN/CREAT SERPL: 4.7 (ref 7–25)
CALCIUM SPEC-SCNC: 7.1 MG/DL (ref 8.4–10.4)
CHLORIDE SERPL-SCNC: 110 MMOL/L (ref 98–110)
CO2 SERPL-SCNC: 23 MMOL/L (ref 24–31)
CREAT BLD-MCNC: 3 MG/DL (ref 0.5–1.4)
DEPRECATED RDW RBC AUTO: 63.4 FL (ref 40–54)
ERYTHROCYTE [DISTWIDTH] IN BLOOD BY AUTOMATED COUNT: 19.9 % (ref 12–15)
GFR SERPL CREATININE-BSD FRML MDRD: 15 ML/MIN/1.73
GLUCOSE BLD-MCNC: 69 MG/DL (ref 70–100)
HCT VFR BLD AUTO: 29.7 % (ref 37–47)
HGB BLD-MCNC: 9.7 G/DL (ref 12–16)
MCH RBC QN AUTO: 31.4 PG (ref 28–32)
MCHC RBC AUTO-ENTMCNC: 32.7 G/DL (ref 33–36)
MCV RBC AUTO: 96.1 FL (ref 82–98)
PHOSPHATE SERPL-MCNC: 2.4 MG/DL (ref 2.5–4.5)
PLATELET # BLD AUTO: 135 10*3/MM3 (ref 130–400)
PMV BLD AUTO: 11.7 FL (ref 6–12)
POTASSIUM BLD-SCNC: 3.6 MMOL/L (ref 3.5–5.3)
RBC # BLD AUTO: 3.09 10*6/MM3 (ref 4.2–5.4)
SODIUM BLD-SCNC: 141 MMOL/L (ref 135–145)
WBC NRBC COR # BLD: 11.11 10*3/MM3 (ref 4.8–10.8)

## 2019-07-03 PROCEDURE — 97110 THERAPEUTIC EXERCISES: CPT

## 2019-07-03 PROCEDURE — 80069 RENAL FUNCTION PANEL: CPT | Performed by: INTERNAL MEDICINE

## 2019-07-03 PROCEDURE — 99231 SBSQ HOSP IP/OBS SF/LOW 25: CPT | Performed by: NURSE PRACTITIONER

## 2019-07-03 PROCEDURE — 85027 COMPLETE CBC AUTOMATED: CPT | Performed by: INTERNAL MEDICINE

## 2019-07-03 PROCEDURE — 5A1D70Z PERFORMANCE OF URINARY FILTRATION, INTERMITTENT, LESS THAN 6 HOURS PER DAY: ICD-10-PCS | Performed by: INTERNAL MEDICINE

## 2019-07-03 PROCEDURE — 25010000002 LEVOFLOXACIN PER 250 MG: Performed by: INTERNAL MEDICINE

## 2019-07-03 RX ADMIN — METOPROLOL TARTRATE 50 MG: 50 TABLET ORAL at 21:12

## 2019-07-03 RX ADMIN — PANTOPRAZOLE SODIUM 40 MG: 40 TABLET, DELAYED RELEASE ORAL at 18:13

## 2019-07-03 RX ADMIN — PANTOPRAZOLE SODIUM 40 MG: 40 TABLET, DELAYED RELEASE ORAL at 11:51

## 2019-07-03 RX ADMIN — POTASSIUM CHLORIDE 40 MEQ: 20 SOLUTION ORAL at 11:51

## 2019-07-03 RX ADMIN — METRONIDAZOLE 500 MG: 500 INJECTION, SOLUTION INTRAVENOUS at 11:50

## 2019-07-03 RX ADMIN — LEVOTHYROXINE SODIUM 125 MCG: 125 TABLET ORAL at 06:22

## 2019-07-03 RX ADMIN — LEVOFLOXACIN 500 MG: 5 INJECTION, SOLUTION INTRAVENOUS at 18:13

## 2019-07-03 RX ADMIN — METRONIDAZOLE 500 MG: 500 INJECTION, SOLUTION INTRAVENOUS at 18:13

## 2019-07-03 RX ADMIN — METRONIDAZOLE 500 MG: 500 INJECTION, SOLUTION INTRAVENOUS at 02:27

## 2019-07-03 RX ADMIN — COLLAGENASE SANTYL: 250 OINTMENT TOPICAL at 16:22

## 2019-07-03 NOTE — PROGRESS NOTES
Nephrology ([facility]) Progress Note      Patient:  Cheri Ely  YOB: 1940  Date of Service: 7/3/2019  MRN: [id number]   Acct: [id number]   Primary Care Physician: Manny Peters APRN  Advance Directive:   Code Status and Medical Interventions:   Ordered at: 06/27/19 1635     Level Of Support Discussed With:    Patient     Code Status:    CPR     Medical Interventions (Level of Support Prior to Arrest):    Full     Admit Date: 6/27/2019       Hospital Day: 6  Referring Provider: William Alfaro MD      Patient personally seen and examined.  Complete chart including Consults, Notes, Operative Reports, Labs, Cardiology, and Radiology studies reviewed as able.        Subjective:  Cheri Ely is a 79 y.o. female  whom we were consulted for end stage renal disease.  Routine hemodialysis Monday Wednesday Friday at [facility].  No recent issues with dialysis.  Recurrent history of GI bleeding. Had laparoscopic repair of perforated duodenal ulcer 5/12/19.  This time presented with melena and several episodes of dark emesis.  Was feeling very weak and lightheaded.  Initially was significantly hypotensive; improved following IV fluid resuscitation and PRBC transfusion.  Upper GI study on 7/01 showed no perforation.    Today feeling better. No new overnight issues. Seen during dialysis  Dialysis   Pt was seen on RRT; tolerating well  Modality: Hemodialysis  Access: Arterial Venous Fistula  Location: left upper  QB: 300  QD: 600  UF: 2000     Allergies:  Patient has no known allergies.    Home Meds:  Medications Prior to Admission   Medication Sig Dispense Refill Last Dose   • acetaminophen (TYLENOL) 325 MG tablet Take 2 tablets by mouth Every 6 (Six) Hours As Needed for Mild Pain .   Past Week at Unknown time   • allopurinol (ZYLOPRIM) 100 MG tablet Take 100 mg by mouth Daily.   Past Month at Unknown time   • aspirin 81 MG EC tablet Take 1 tablet by mouth Daily.    Past Week at Unknown time   • B Complex-C-Folic Acid (JOHN-SANGEETA) tablet Take 1 tablet by mouth Daily.   Past Week at Unknown time   • carvedilol (COREG) 12.5 MG tablet Take 12.5 mg by mouth Daily. Monday, Wednesday, and Friday dose. Hold if SBP < 100.    Past Week at Unknown time   • carvedilol (COREG) 12.5 MG tablet Take 12.5 mg by mouth 2 (Two) Times a Day With Meals. Sunday, Tuesday, Thursday, and Saturday dose. Hold if SBP <100.    Past Week at Unknown time   • Cholecalciferol (VITAMIN D) 2000 units capsule Take 1 capsule by mouth Daily. 30 capsule  Past Week at Unknown time   • clindamycin (CLEOCIN) 150 MG capsule Take 150 mg by mouth 4 (Four) Times a Day. 10 day therapy.  Start on 6/21/2019.   Past Week at Unknown time   • epoetin ulcy (EPOGEN,PROCRIT) 81287 UNIT/ML injection Inject 1 mL under the skin into the appropriate area as directed 3 (Three) Times a Week.   Past Week at Unknown time   • HYDROcodone-acetaminophen (NORCO) 7.5-325 MG per tablet Take 1 tablet by mouth Every 4 (Four) Hours As Needed for Moderate Pain  for up to 30 doses. 30 tablet 0 Past Week at Unknown time   • levothyroxine (SYNTHROID, LEVOTHROID) 112 MCG tablet Take 112 mcg by mouth Daily.   Past Week at Unknown time   • losartan (COZAAR) 25 MG tablet Take 25 mg by mouth 3 (Three) Times a Week. Monday, Wednesday, and Friday.   Past Week at Unknown time   • megestrol (MEGACE) 40 MG/ML suspension Take 400 mg by mouth Daily.   Past Week at Unknown time   • ondansetron ODT (ZOFRAN-ODT) 4 MG disintegrating tablet Take 4 mg by mouth Every 8 (Eight) Hours As Needed for Nausea or Vomiting.   6/26/2019 at Unknown time   • Polyethyl Glyc-Propyl Glyc PF 0.4-0.3 % solution ophthalmic solution Administer 2 drops to both eyes Every 2 (Two) Hours As Needed (dry eyes).   Past Week at Unknown time   • pravastatin (PRAVACHOL) 40 MG tablet Take 40 mg by mouth Daily.   Past Week at Unknown time   • sertraline (ZOLOFT) 50 MG tablet Take 50 mg by mouth Daily.    Past Week at Unknown time   • sucralfate (CARAFATE) 1 GM/10ML suspension Take 10 mL by mouth 2 (Two) Times a Day. 1200 mL 0 Past Week at Unknown time       Medicines:  Current Facility-Administered Medications   Medication Dose Route Frequency Provider Last Rate Last Dose   • acetaminophen (TYLENOL) tablet 650 mg  650 mg Oral Q6H PRN Azalia Shaver DO   650 mg at 07/02/19 0152   • collagenase ointment   Topical Q24H iWlliam Alfaro MD       • dextrose (D50W) 25 g/ 50mL Intravenous Solution 50 mL  50 mL Intravenous Q1H PRN William Alfaro MD   50 mL at 06/28/19 0744   • levoFLOXacin (LEVAQUIN) 500 mg/100 mL D5W (premix) (LEVAQUIN) 500 mg  500 mg Intravenous Q48H Madhav Webber MD   500 mg at 07/01/19 1717   • levothyroxine (SYNTHROID, LEVOTHROID) tablet 125 mcg  125 mcg Oral Daily Madhav Webber MD   125 mcg at 07/03/19 0622   • lidocaine 3 % cream   Apply externally Daily PRN Madhav Webber MD       • metoprolol tartrate (LOPRESSOR) tablet 50 mg  50 mg Oral Q12H Madhav Webber MD   50 mg at 07/02/19 2035   • metroNIDAZOLE (FLAGYL) IVPB 500 mg  500 mg Intravenous Q8H Madhav Webber MD   500 mg at 07/03/19 0227   • pantoprazole (PROTONIX) EC tablet 40 mg  40 mg Oral BID AC Madhav Webber MD   40 mg at 07/02/19 1706   • potassium chloride (KAYCIEL) 20 MEQ/15ML (10%) solution 40 mEq  40 mEq Oral Daily Madhav Webber MD   40 mEq at 07/02/19 0916   • sodium chloride 0.9 % bolus 100 mL  100 mL Intravenous PRN Chris Hsu MD           Past Medical History:  Past Medical History:   Diagnosis Date   • Arthritis    • Carotid artery disease (CMS/HCC)    • CHF (congestive heart failure) (CMS/HCC)    • Chronic kidney disease on chronic dialysis (CMS/HCC)    • Chronic renal failure     on dialysis ON MON, WED, FRI   • Coronary artery disease    • Disease of thyroid gland    • Diverticulitis    • Gastric ulcer    • History of transfusion    • Hyperlipidemia    •  Hypertension    • Injury of back    • Mesenteric artery insufficiency (CMS/HCC)    • Multilevel degenerative disc disease    • Osteoporosis    • Pancreatitis    • Pelvis fracture (CMS/HCC)    • Pneumonia        Past Surgical History:  Past Surgical History:   Procedure Laterality Date   • AORTAGRAM Right 1/8/2018    Procedure: MESENTERIC ANGIOGRAM, GROIN ACCESS, MYNX CLOSURE;  Surgeon: Tomasz San DO;  Location: [facility] OR;  Service:    • AORTIC VALVE SURGERY     • APPENDECTOMY     • BACK SURGERY     • CAPSULE ENDOSCOPY N/A 3/30/2019    Procedure: CAPSULE ENDOSCOPY M2A;  Surgeon: David Yu MD;  Location: [facility] ENDOSCOPY;  Service: Gastroenterology   • CARDIAC SURGERY     • CAROTID ENDARTERECTOMY Bilateral    • CATARACT EXTRACTION, BILATERAL     • CERVICAL SPINE SURGERY     • CHOLECYSTECTOMY     • COLON SURGERY     • COLONOSCOPY  10/01/2015   • COLONOSCOPY N/A 3/28/2019    Procedure: COLONOSCOPY WITH ANESTHESIA;  Surgeon: Rafita Merida MD;  Location: [facility] ENDOSCOPY;  Service: Gastroenterology   • CORONARY ARTERY BYPASS GRAFT     • DIALYSIS FISTULA CREATION     • ENDOSCOPY N/A 12/27/2018    Procedure: ESOPHAGOGASTRODUODENOSCOPY WITH ANESTHESIA;  Surgeon: Moni Garza MD;  Location: [facility] ENDOSCOPY;  Service: Gastroenterology   • ENDOSCOPY N/A 2/28/2019    Procedure: ESOPHAGOGASTRODUODENOSCOPY WITH ANESTHESIA;  Surgeon: Moni Garza MD;  Location: [facility] ENDOSCOPY;  Service: Gastroenterology   • ENDOSCOPY N/A 3/11/2019    Procedure: ESOPHAGOGASTRODUODENOSCOPY WITH ANESTHESIA;  Surgeon: Moni Garza MD;  Location: [facility] ENDOSCOPY;  Service: Gastroenterology   • ENDOSCOPY N/A 3/27/2019    Procedure: ESOPHAGOGASTRODUODENOSCOPY WITH ANESTHESIA;  Surgeon: Rafita Merida MD;  Location: [facility] ENDOSCOPY;  Service: Gastroenterology   • EXPLORATORY LAPAROTOMY N/A 5/13/2019    Procedure: LAPAROTOMY EXPLORATORY, OVERSEW DUODENAL ULCER WITH FRED PATCH, KOCHER MANEUVER;  Surgeon: Laila Rodriguez  MD;  Location:  [facility] OR;  Service: General   • HIP TROCHANTERIC NAILING WITH INTRAMEDULLARY HIP SCREW Right 2/8/2019    Procedure: HIP TROCANTERIC NAILING LONG WITH INTRAMEDULLARY HIP SCREW;  Surgeon: Boo Camacho MD;  Location:  [facility] OR;  Service: Orthopedics   • JOINT REPLACEMENT      knee   • LAPAROSCOPIC GASTRIC BANDING     • LEEP     • LUMBAR FUSION     • TOE AMPUTATION Left     2nd toe   • TOTAL KNEE ARTHROPLASTY Bilateral    • TUBAL ABDOMINAL LIGATION     • UPPER GASTROINTESTINAL ENDOSCOPY  10/01/2015       Family History  Family History   Problem Relation Age of Onset   • Hypertension Mother    • Cancer Mother         stomach   • Coronary artery disease Father    • Heart attack Father    • Diabetes Brother    • Coronary artery disease Brother    • Colon cancer Neg Hx    • Colon polyps Neg Hx        Social History  Social History     Socioeconomic History   • Marital status:      Spouse name: Not on file   • Number of children: Not on file   • Years of education: Not on file   • Highest education level: Not on file   Tobacco Use   • Smoking status: Never Smoker   • Smokeless tobacco: Never Used   Substance and Sexual Activity   • Alcohol use: No   • Drug use: No   • Sexual activity: Defer         Review of Systems:  History obtained from chart review and the patient  General ROS: No fever or chills  Respiratory ROS: No cough, shortness of breath, wheezing  Cardiovascular ROS: No chest pain or palpitations  Gastrointestinal ROS: positive for - blood in stools  Genito-Urinary ROS: No dysuria or hematuria    Objective:  Patient Vitals for the past 24 hrs:   BP Temp Temp src Pulse Resp SpO2   07/03/19 0418 110/62 97.7 °F (36.5 °C) Oral 91 16 95 %   07/03/19 0007 107/66 97.5 °F (36.4 °C) Oral 92 16 97 %   07/02/19 2013 109/59 97.4 °F (36.3 °C) Oral 82 16 100 %   07/02/19 1301 101/52 97.3 °F (36.3 °C) Oral 74 16 100 %   07/02/19 1200 98/57 -- -- 94 -- --   07/02/19 1130 96/60 -- -- 89 -- --   07/02/19  1100 98/59 -- -- 106 -- --   07/02/19 1045 103/58 -- -- 110 -- --   07/02/19 1020 (!) 89/66 -- -- (!) 134 -- --     No intake or output data in the 24 hours ending 07/03/19 0955  General: awake/alert    Neck: supple, no JVD  Chest:  clear to auscultation bilaterally without respiratory distress  CVS: regular rate and rhythm  Abdominal: soft, nontender, positive bowel sounds  Extremities: bilateral trace edema  Skin: warm and dry without rash  Neuro: no focal motor deficits    Labs:  Results from last 7 days   Lab Units 07/03/19 0552 07/02/19 0319 07/01/19  0225   WBC 10*3/mm3 11.11* 10.52 9.31   HEMOGLOBIN g/dL 9.7* 9.6* 9.9*   HEMATOCRIT % 29.7* 29.4* 30.3*   PLATELETS 10*3/mm3 135 136 114*         Results from last 7 days   Lab Units 07/03/19 0552 07/02/19 0319 07/01/19 0225 06/28/19  0631 06/27/19  1245   SODIUM mmol/L 141 141 143   < > 141 140   POTASSIUM mmol/L 3.6 3.4* 3.7   < > 3.5 3.2*   CHLORIDE mmol/L 110 111* 109   < > 106 108   CO2 mmol/L 23.0* 25.0 27.0   < > 26.0 21.0*   BUN mg/dL 14 11 15   < > 38* 28*   CREATININE mg/dL 3.00* 2.32* 2.45*   < > 3.02* 2.57*   CALCIUM mg/dL 7.1* 7.4* 7.4*   < > 7.2* 6.5*   BILIRUBIN mg/dL  --   --   --   --  0.6 0.5   ALK PHOS U/L  --   --   --   --  179* 181*   ALT (SGPT) U/L  --   --   --   --  32 35   AST (SGOT) U/L  --   --   --   --  35 43   GLUCOSE mg/dL 69* 91 94   < > 59* 110*    < > = values in this interval not displayed.       Radiology:   Imaging Results (last 72 hours)     ** No results found for the last 72 hours. **          Culture:  Blood Culture   Date Value Ref Range Status   06/27/2019 No growth at 3 days  Preliminary   06/27/2019 No growth at 3 days  Preliminary         Assessment   1.  End stage renal disease on hemodialysis MWF  2.  Hypertension with recent HYPOtension  3.  Anemia of CKD and acute GI blood loss  4.  GI bleeding  5.  Secondary hyperparathyroidism    Plan:  1.  Dialysis today  2.  Monitor labs        Dirk Tristan,  APRN  7/3/2019  9:55 AM

## 2019-07-03 NOTE — PLAN OF CARE
Problem: Patient Care Overview  Goal: Plan of Care Review  Outcome: Ongoing (interventions implemented as appropriate)   07/03/19 1549   Coping/Psychosocial   Plan of Care Reviewed With patient   OTHER   Outcome Summary Pt. agreed to sitting EOB and exercising. Performs LE ex actively. Good sitting balance. Denied opportunity to tr to chair.2nd to fatigue. Will benefit from strengthening and transfer activities.

## 2019-07-03 NOTE — PROGRESS NOTES
[facility]  Inpatient Progress Note     TODAY'S DATE: 07/03/19  Cheri Ely  1940      Reason for Follow Up: GI bleed, recent duodenal ulcer with perforation May 2019    Subjective:   Denies abdominal pain.  Denies nausea vomiting.  No fevers.  Had one loose stool this morning.    She continues on clear liquid diet.  Continues on oral Protonix.  White blood cell count 11.1, hemoglobin 9.7.      No Known Allergies    Current Facility-Administered Medications:   •  acetaminophen (TYLENOL) tablet 650 mg, 650 mg, Oral, Q6H PRN, Azalia Shaver DO, 650 mg at 07/02/19 0152  •  collagenase ointment, , Topical, Q24H, William Alfaro MD  •  dextrose (D50W) 25 g/ 50mL Intravenous Solution 50 mL, 50 mL, Intravenous, Q1H PRN, William Alfaro MD, 50 mL at 06/28/19 0744  •  levoFLOXacin (LEVAQUIN) 500 mg/100 mL D5W (premix) (LEVAQUIN) 500 mg, 500 mg, Intravenous, Q48H, Madhav Webber MD, 500 mg at 07/01/19 1717  •  levothyroxine (SYNTHROID, LEVOTHROID) tablet 125 mcg, 125 mcg, Oral, Daily, Madhav Webber MD, 125 mcg at 07/03/19 0622  •  lidocaine 3 % cream, , Apply externally, Daily PRN, Madhav Webber MD  •  metoprolol tartrate (LOPRESSOR) tablet 50 mg, 50 mg, Oral, Q12H, Madhav Webber MD, 50 mg at 07/02/19 2035  •  metroNIDAZOLE (FLAGYL) IVPB 500 mg, 500 mg, Intravenous, Q8H, Madhav Webber MD, 500 mg at 07/03/19 1150  •  pantoprazole (PROTONIX) EC tablet 40 mg, 40 mg, Oral, BID AC, Madhav Webber MD, 40 mg at 07/03/19 1151  •  potassium chloride (KAYCIEL) 20 MEQ/15ML (10%) solution 40 mEq, 40 mEq, Oral, Daily, Madhav Webber MD, 40 mEq at 07/03/19 1151  •  sodium chloride 0.9 % bolus 100 mL, 100 mL, Intravenous, PRN, Chris Hsu MD    Review of Systems   Constitutional: Negative for chills and fever.   Respiratory: Negative for cough, shortness of breath and wheezing.    Cardiovascular: Negative for chest pain and  palpitations.   Gastrointestinal: Negative for abdominal distention, abdominal pain, anal bleeding, blood in stool, constipation, diarrhea, nausea and vomiting.       Objective     Vital Signs  Temp:  [97.4 °F (36.3 °C)-98.1 °F (36.7 °C)] 98.1 °F (36.7 °C)  Heart Rate:  [82-92] 85  Resp:  [16] 16  BP: (101-110)/(59-66) 101/64  Body mass index is 28.2 kg/m².  No intake or output data in the 24 hours ending 07/03/19 1538  No intake/output data recorded.       PHYSICAL EXAM  Physical Exam   Constitutional: She appears well-developed and well-nourished. No distress.   Cardiovascular: Normal rate, regular rhythm and normal heart sounds.   Pulmonary/Chest: Effort normal and breath sounds normal.   Abdominal: Soft. Bowel sounds are normal. She exhibits no distension. There is no tenderness.   Neurological: She is alert.   Skin: Skin is warm and dry.   Vitals reviewed.          Results Review:   I have reviewed all of the patient's current test results    Results from last 7 days   Lab Units 07/03/19  0552 07/02/19 0319 07/01/19  0225   WBC 10*3/mm3 11.11* 10.52 9.31   HEMOGLOBIN g/dL 9.7* 9.6* 9.9*   HEMATOCRIT % 29.7* 29.4* 30.3*   PLATELETS 10*3/mm3 135 136 114*       Results from last 7 days   Lab Units 07/03/19  0552 07/02/19  0319 07/01/19  0225  06/28/19  0631 06/27/19  1245   SODIUM mmol/L 141 141 143   < > 141 140   POTASSIUM mmol/L 3.6 3.4* 3.7   < > 3.5 3.2*   CHLORIDE mmol/L 110 111* 109   < > 106 108   CO2 mmol/L 23.0* 25.0 27.0   < > 26.0 21.0*   BUN mg/dL 14 11 15   < > 38* 28*   CREATININE mg/dL 3.00* 2.32* 2.45*   < > 3.02* 2.57*   CALCIUM mg/dL 7.1* 7.4* 7.4*   < > 7.2* 6.5*   BILIRUBIN mg/dL  --   --   --   --  0.6 0.5   ALK PHOS U/L  --   --   --   --  179* 181*   ALT (SGPT) U/L  --   --   --   --  32 35   AST (SGOT) U/L  --   --   --   --  35 43   GLUCOSE mg/dL 69* 91 94   < > 59* 110*    < > = values in this interval not displayed.       Results from last 7 days   Lab Units 07/02/19  6805  06/29/19  0708 06/28/19  0631   INR  2.77* 1.81* 1.76*       Lab Results   Lab Value Date/Time    LIPASE <10 (L) 05/13/2019 0023    LIPASE 14 (L) 05/12/2019 2133    LIPASE 28 03/24/2019 1018    LIPASE 28 09/15/2017 2059       Radiology Review:  Imaging Results (last 24 hours)     ** No results found for the last 24 hours. 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   • Chronic mesenteric ischemia (CMS/HCC) K55.1   • Black tarry stools K92.1   • Hx of gastritis Z87.19   • Acute gastric ulcer with hemorrhage K25.0   • Closed displaced intertrochanteric fracture of right femur (CMS/HCC) S72.141A   • Displaced intertrochanteric fracture of right femur, initial encounter for closed fracture (CMS/HCC) S72.141A   • Hypotension I95.9   • Acute blood loss anemia D62   • GI bleed K92.2   • Gastrointestinal hemorrhage K92.2   • (HFpEF) heart failure with preserved ejection fraction (CMS/HCC) I50.30   • Hypothyroidism, TSH 12.4, FT4 0.41 E03.9   • Diastolic dysfunction I51.9   • Diastolic heart failure (CMS/HCC) I50.30   • Volume overload E87.70   • AVM (arteriovenous malformation) of small bowel, 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  Melena  2.  Coffee-ground emesis  3.  Abnormal CT of the duodenal bulb, microperforation noted  4.  Recent duodenal ulcer with perforation May 2019  status post surgical repair    No signs of active GI bleeding at this time.  She does not complain of any abdominal pain.  She was switched to oral Protonix.  She had upper GI done July 1, 2019 with no evidence of perforation.  Dr. rodriguez ordered a abdominal x-ray which was unremarkable.  Advance diet per Dr. Rodriguez recommendations. Will sign off.       I discussed the patients findings and my recommendations with patient and family      EMR Dragon/transcription disclaimer:  Much of this encounter note is electronic transcription/translation of spoken language to printed text.  The electronic translation of spoken language may be erroneous, or at times, nonsensical words or phrases may be inadvertently transcribed.  Although I have reviewed the note for such errors, some may still exist.      Abril KNOX  3:38 PM    ABILIO Redd  07/03/19  3:38 PM

## 2019-07-03 NOTE — PLAN OF CARE
Problem: Patient Care Overview  Goal: Plan of Care Review  Outcome: Ongoing (interventions implemented as appropriate)   07/03/19 0659   Coping/Psychosocial   Plan of Care Reviewed With patient   Plan of Care Review   Progress no change   OTHER   Outcome Summary Skin care provided. VSS. Assisted with repositioning. Dialysis scheduled for today. Safety maintained       Problem: Skin Injury Risk (Adult)  Goal: Skin Health and Integrity  Outcome: Ongoing (interventions implemented as appropriate)      Problem: Fall Risk (Adult)  Goal: Absence of Fall  Outcome: Ongoing (interventions implemented as appropriate)      Problem: Wound (Includes Pressure Injury) (Adult)  Goal: Signs and Symptoms of Listed Potential Problems Will be Absent, Minimized or Managed (Wound)  Outcome: Ongoing (interventions implemented as appropriate)      Problem: Nutrition, Imbalanced: Inadequate Oral Intake (Adult)  Goal: Identify Related Risk Factors and Signs and Symptoms  Outcome: Outcome(s) achieved Date Met: 07/03/19    Goal: Improved Oral Intake  Outcome: Ongoing (interventions implemented as appropriate)    Goal: Prevent Further Weight Loss  Outcome: Ongoing (interventions implemented as appropriate)

## 2019-07-03 NOTE — PROGRESS NOTES
[facility] Medicine Services  INPATIENT PROGRESS NOTE    Patient Name: Cheri Ely  Date of Admission: 6/27/2019  Today's Date: 07/03/19  Length of Stay: 6  Primary Care Physician: Manny Peters APRN    Subjective   Chief Complaint: tired  HPI     Patient seen and examined at bedside.  Patient just returned from dialysis and is very lethargic and feeling weak.  She indicates she did not sleep well last night.  She tolerated dialysis well, denies any specific symptoms, just tired.          Review of Systems   Constitutional: Positive for fatigue. Negative for activity change, appetite change, chills, diaphoresis and fever.   Respiratory: Negative for cough and shortness of breath.    Cardiovascular: Negative for chest pain and palpitations.   Gastrointestinal: Negative for abdominal distention, abdominal pain, constipation, diarrhea, nausea and vomiting.   Neurological: Positive for weakness.        All pertinent negatives and positives are as above. All other systems have been reviewed and are negative unless otherwise stated.     Objective    Temp:  [97.3 °F (36.3 °C)-97.7 °F (36.5 °C)] 97.7 °F (36.5 °C)  Heart Rate:  [] 91  Resp:  [16] 16  BP: ()/(52-66) 110/62  Physical Exam  Constitutional: She is oriented to person, place, and time. No distress. Lethargic.  HENT:   Head: Normocephalic and atraumatic.   Eyes: Conjunctivae are normal. No scleral icterus.   Neck: Neck supple. No JVD present.   Cardiovascular: Exam reveals no gallop and no friction rub.   Murmur heard.  Irregularly irregular, normal rate; left-sided fistula with palpable thrill  Pulmonary/Chest: Effort normal and breath sounds normal. No stridor. No respiratory distress.   Abdominal: Soft. Bowel sounds are normal.   Musculoskeletal: She exhibits no edema.   Neurological: She is alert and oriented to person, place, and time.   Skin: Skin is dry. No rash noted. She is not diaphoretic. No  pallor. Cool extremities.   Psychiatric: She has a normal mood and affect. Her behavior is normal.   Nursing note and vitals reviewed.          Results Review:  I have reviewed the labs, radiology results, and diagnostic studies.    Laboratory Data:   Results from last 7 days   Lab Units 07/03/19 0552 07/02/19 0319 07/01/19 0225   WBC 10*3/mm3 11.11* 10.52 9.31   HEMOGLOBIN g/dL 9.7* 9.6* 9.9*   HEMATOCRIT % 29.7* 29.4* 30.3*   PLATELETS 10*3/mm3 135 136 114*        Results from last 7 days   Lab Units 07/03/19 0552 07/02/19 0319 07/01/19 0225 06/28/19  0631 06/27/19  1245   SODIUM mmol/L 141 141 143   < > 141 140   POTASSIUM mmol/L 3.6 3.4* 3.7   < > 3.5 3.2*   CHLORIDE mmol/L 110 111* 109   < > 106 108   CO2 mmol/L 23.0* 25.0 27.0   < > 26.0 21.0*   BUN mg/dL 14 11 15   < > 38* 28*   CREATININE mg/dL 3.00* 2.32* 2.45*   < > 3.02* 2.57*   CALCIUM mg/dL 7.1* 7.4* 7.4*   < > 7.2* 6.5*   BILIRUBIN mg/dL  --   --   --   --  0.6 0.5   ALK PHOS U/L  --   --   --   --  179* 181*   ALT (SGPT) U/L  --   --   --   --  32 35   AST (SGOT) U/L  --   --   --   --  35 43   GLUCOSE mg/dL 69* 91 94   < > 59* 110*    < > = values in this interval not displayed.       Culture Data:   Blood Culture   Date Value Ref Range Status   06/27/2019 No growth at 5 days  Final   06/27/2019 No growth at 5 days  Final       Radiology Data:   Imaging Results (last 24 hours)     Procedure Component Value Units Date/Time    XR Abdomen KUB [[id number]] Collected:  07/02/19 1440     Updated:  07/02/19 1447    Narrative:       EXAMINATION:  XR ABDOMEN KUB-  7/2/2019 2:25 PM CDT     HISTORY: possible contained air leak; K92.1-Melena; K26.5-Chronic or  unspecified duodenal ulcer with perforation; I95.89-Other hypotension;  E86.1-Hypovolemia; Z74.09-Other reduced mobility      COMPARISON: Abdominal radiograph dated 03/09/2019 and upper GI  examination performed yesterday. 06/27/2019 abdomen and pelvis CT was  also reviewed.     TECHNIQUE: Single  view: KUB     FINDINGS:      Bowel gas pattern is nonspecific with a small amount of gas appreciated  in small and large bowel, with out plain film evidence of extraluminal  gas/free air. There are no dilated bowel loops indicate obstruction.     Changes from gastric banding observed. Lumbar spine surgery and right  femoral neck pinning changes observed.     Surgical clips also in the right upper quadrant out cholecystectomy.  Surgical clips noted in the right hemipelvis.     Aortoiliac calcifications identified.       Impression:       1. Nonspecific bowel gas pattern.  This report was finalized on 07/02/2019 14:44 by Dr. Valentino Mccollum MD.          I have reviewed the patient's current medications.     Assessment/Plan     Active Hospital Problems    Diagnosis   • Chronic combined systolic and diastolic CHF (congestive heart failure) (CMS/HCC)   • Acute colitis   • ESRD (end stage renal disease) (CMS/HCC)   • Acute on chronic anemia   • Duodenal ulcer     Abnormal CT at the duodenal bulb, microperforation noted.  Recent duodenal ulcer with perforation 5/2019 s/p surgical repair     • Paroxysmal atrial fibrillation (CMS/[facility])   • Gastrointestinal hemorrhage   • Hypothyroidism, TSH 12.4, FT4 0.41   • Hypertension, currently hypotensive       Plan:  1.  Upper GI yesterday showed no signs of perforation. Received 1 unit of pRBCs and 4 units of FFP upon arrival.  2.  Clear liquid diet, advance per surgery   3.  NG removed  4.  PO PPI BID  5.  Hold outpatient anti-HTN medications; monitor BP trend  6. Metronidazole and levofloxacin for now, will complete 7 day course; possible mild colitis noted on CT A/P (new circumferential wall thickening along the descending and proximal sigmoid colon, suggestive of mild colitis.  7.  GI and general surgery following; appreciate their assistance  8.  Dialysis regimen per Nephrology - Received dialysis today  9.   TSH and FT4 are consistent with poor controlled hypothyroidism.  Gave a  dose of IV levothyroxine 75 mcg when she was NPO.  Continue levothyroxine at 125 mcg (home dose was 112 mcg)  11.  Continue 50 mg  12.  AM hemoglobin and renal function panel   13.  Echocardiogram reviewed, EF 26-30%  14.  PT   15. Potassium and phosphorus are improved today                 Discharge Planning: I expect the patient to be discharged to rehab in ? Days.     Madhav Webber MD   07/03/19   10:49 AM

## 2019-07-04 LAB
ANION GAP SERPL CALCULATED.3IONS-SCNC: 8 MMOL/L (ref 4–13)
BUN BLD-MCNC: 9 MG/DL (ref 5–21)
BUN/CREAT SERPL: 3.7 (ref 7–25)
CALCIUM SPEC-SCNC: 7.2 MG/DL (ref 8.4–10.4)
CHLORIDE SERPL-SCNC: 110 MMOL/L (ref 98–110)
CO2 SERPL-SCNC: 22 MMOL/L (ref 24–31)
CREAT BLD-MCNC: 2.41 MG/DL (ref 0.5–1.4)
DEPRECATED RDW RBC AUTO: 63.8 FL (ref 40–54)
ERYTHROCYTE [DISTWIDTH] IN BLOOD BY AUTOMATED COUNT: 20.4 % (ref 12–15)
GFR SERPL CREATININE-BSD FRML MDRD: 19 ML/MIN/1.73
GLUCOSE BLD-MCNC: 75 MG/DL (ref 70–100)
HCT VFR BLD AUTO: 30 % (ref 37–47)
HGB BLD-MCNC: 9.9 G/DL (ref 12–16)
MCH RBC QN AUTO: 31.9 PG (ref 28–32)
MCHC RBC AUTO-ENTMCNC: 33 G/DL (ref 33–36)
MCV RBC AUTO: 96.8 FL (ref 82–98)
PLATELET # BLD AUTO: 119 10*3/MM3 (ref 130–400)
PMV BLD AUTO: 11.8 FL (ref 6–12)
POTASSIUM BLD-SCNC: 4 MMOL/L (ref 3.5–5.3)
RBC # BLD AUTO: 3.1 10*6/MM3 (ref 4.2–5.4)
SODIUM BLD-SCNC: 140 MMOL/L (ref 135–145)
WBC NRBC COR # BLD: 8.86 10*3/MM3 (ref 4.8–10.8)

## 2019-07-04 PROCEDURE — 97530 THERAPEUTIC ACTIVITIES: CPT

## 2019-07-04 PROCEDURE — 85027 COMPLETE CBC AUTOMATED: CPT | Performed by: INTERNAL MEDICINE

## 2019-07-04 PROCEDURE — 80048 BASIC METABOLIC PNL TOTAL CA: CPT | Performed by: INTERNAL MEDICINE

## 2019-07-04 RX ORDER — ONDANSETRON 4 MG/1
4 TABLET, FILM COATED ORAL ONCE
Status: COMPLETED | OUTPATIENT
Start: 2019-07-04 | End: 2019-07-04

## 2019-07-04 RX ORDER — LANOLIN ALCOHOL/MO/W.PET/CERES
3 CREAM (GRAM) TOPICAL NIGHTLY
Status: DISCONTINUED | OUTPATIENT
Start: 2019-07-04 | End: 2019-07-06 | Stop reason: HOSPADM

## 2019-07-04 RX ORDER — ONDANSETRON 2 MG/ML
4 INJECTION INTRAMUSCULAR; INTRAVENOUS ONCE
Status: DISCONTINUED | OUTPATIENT
Start: 2019-07-04 | End: 2019-07-04

## 2019-07-04 RX ADMIN — METRONIDAZOLE 500 MG: 500 INJECTION, SOLUTION INTRAVENOUS at 10:18

## 2019-07-04 RX ADMIN — ONDANSETRON 4 MG: 4 TABLET, FILM COATED ORAL at 10:01

## 2019-07-04 RX ADMIN — SODIUM CHLORIDE 100 ML: 9 INJECTION, SOLUTION INTRAVENOUS at 19:49

## 2019-07-04 RX ADMIN — METRONIDAZOLE 500 MG: 500 INJECTION, SOLUTION INTRAVENOUS at 01:55

## 2019-07-04 RX ADMIN — METOPROLOL TARTRATE 50 MG: 50 TABLET ORAL at 09:10

## 2019-07-04 RX ADMIN — SODIUM CHLORIDE 100 ML: 9 INJECTION, SOLUTION INTRAVENOUS at 20:10

## 2019-07-04 RX ADMIN — LEVOTHYROXINE SODIUM 125 MCG: 125 TABLET ORAL at 06:03

## 2019-07-04 RX ADMIN — POTASSIUM CHLORIDE 40 MEQ: 20 SOLUTION ORAL at 09:10

## 2019-07-04 RX ADMIN — Medication 3 MG: at 22:13

## 2019-07-04 RX ADMIN — COLLAGENASE SANTYL: 250 OINTMENT TOPICAL at 17:26

## 2019-07-04 RX ADMIN — PANTOPRAZOLE SODIUM 40 MG: 40 TABLET, DELAYED RELEASE ORAL at 06:40

## 2019-07-04 RX ADMIN — PANTOPRAZOLE SODIUM 40 MG: 40 TABLET, DELAYED RELEASE ORAL at 17:26

## 2019-07-04 NOTE — PLAN OF CARE
Problem: Patient Care Overview  Goal: Plan of Care Review  Outcome: Ongoing (interventions implemented as appropriate)   07/04/19 6890   Coping/Psychosocial   Plan of Care Reviewed With patient   Plan of Care Review   Progress improving   OTHER   Outcome Summary Pt was up in the chair, transfered sit to stand with min/mod assist X 2. Pt was able to take a few steps from the chair back to bed with HHA and mod assist X 2. Will continue to work with pt to increase strength and progress with transfers

## 2019-07-04 NOTE — PROGRESS NOTES
Nephrology ([facility]) Progress Note      Patient:  Cheri Ely  YOB: 1940  Date of Service: 7/4/2019  MRN: [id number]   Acct: [id number]   Primary Care Physician: Manny Peters APRN  Advance Directive:   Code Status and Medical Interventions:   Ordered at: 06/27/19 1635     Level Of Support Discussed With:    Patient     Code Status:    CPR     Medical Interventions (Level of Support Prior to Arrest):    Full     Admit Date: 6/27/2019       Hospital Day: 7  Referring Provider: William Alfaro MD      Patient personally seen and examined.  Complete chart including Consults, Notes, Operative Reports, Labs, Cardiology, and Radiology studies reviewed as able.        Subjective:  Cheri Ely is a 79 y.o. female  whom we were consulted for end stage renal disease.  Routine hemodialysis Monday Wednesday Friday at [facility].  No recent issues with dialysis.  Recurrent history of GI bleeding. Had laparoscopic repair of perforated duodenal ulcer 5/12/19.  This time presented with melena and several episodes of dark emesis.  Was feeling very weak and lightheaded.  Initially was significantly hypotensive; improved following IV fluid resuscitation and PRBC transfusion.  Upper GI study on 7/01 showed no perforation.    Today, she offered new complaints but she was hypotensive.  Status post dialysis yesterday.    Allergies:  Patient has no known allergies.    Home Meds:  Medications Prior to Admission   Medication Sig Dispense Refill Last Dose   • acetaminophen (TYLENOL) 325 MG tablet Take 2 tablets by mouth Every 6 (Six) Hours As Needed for Mild Pain .   Past Week at Unknown time   • allopurinol (ZYLOPRIM) 100 MG tablet Take 100 mg by mouth Daily.   Past Month at Unknown time   • aspirin 81 MG EC tablet Take 1 tablet by mouth Daily.   Past Week at Unknown time   • B Complex-C-Folic Acid (JOHN-SANGEETA) tablet Take 1 tablet by mouth Daily.   Past Week at Unknown time    • carvedilol (COREG) 12.5 MG tablet Take 12.5 mg by mouth Daily. Monday, Wednesday, and Friday dose. Hold if SBP < 100.    Past Week at Unknown time   • carvedilol (COREG) 12.5 MG tablet Take 12.5 mg by mouth 2 (Two) Times a Day With Meals. Sunday, Tuesday, Thursday, and Saturday dose. Hold if SBP <100.    Past Week at Unknown time   • Cholecalciferol (VITAMIN D) 2000 units capsule Take 1 capsule by mouth Daily. 30 capsule  Past Week at Unknown time   • clindamycin (CLEOCIN) 150 MG capsule Take 150 mg by mouth 4 (Four) Times a Day. 10 day therapy.  Start on 6/21/2019.   Past Week at Unknown time   • epoetin lucy (EPOGEN,PROCRIT) 70730 UNIT/ML injection Inject 1 mL under the skin into the appropriate area as directed 3 (Three) Times a Week.   Past Week at Unknown time   • HYDROcodone-acetaminophen (NORCO) 7.5-325 MG per tablet Take 1 tablet by mouth Every 4 (Four) Hours As Needed for Moderate Pain  for up to 30 doses. 30 tablet 0 Past Week at Unknown time   • levothyroxine (SYNTHROID, LEVOTHROID) 112 MCG tablet Take 112 mcg by mouth Daily.   Past Week at Unknown time   • losartan (COZAAR) 25 MG tablet Take 25 mg by mouth 3 (Three) Times a Week. Monday, Wednesday, and Friday.   Past Week at Unknown time   • megestrol (MEGACE) 40 MG/ML suspension Take 400 mg by mouth Daily.   Past Week at Unknown time   • ondansetron ODT (ZOFRAN-ODT) 4 MG disintegrating tablet Take 4 mg by mouth Every 8 (Eight) Hours As Needed for Nausea or Vomiting.   6/26/2019 at Unknown time   • Polyethyl Glyc-Propyl Glyc PF 0.4-0.3 % solution ophthalmic solution Administer 2 drops to both eyes Every 2 (Two) Hours As Needed (dry eyes).   Past Week at Unknown time   • pravastatin (PRAVACHOL) 40 MG tablet Take 40 mg by mouth Daily.   Past Week at Unknown time   • sertraline (ZOLOFT) 50 MG tablet Take 50 mg by mouth Daily.   Past Week at Unknown time   • sucralfate (CARAFATE) 1 GM/10ML suspension Take 10 mL by mouth 2 (Two) Times a Day. 1200 mL 0  Past Week at Unknown time       Medicines:  Current Facility-Administered Medications   Medication Dose Route Frequency Provider Last Rate Last Dose   • acetaminophen (TYLENOL) tablet 650 mg  650 mg Oral Q6H PRN Azalia Shaver DO   650 mg at 07/02/19 0152   • collagenase ointment   Topical Q24H William Alfaro MD       • dextrose (D50W) 25 g/ 50mL Intravenous Solution 50 mL  50 mL Intravenous Q1H PRN William Alfaro MD   50 mL at 06/28/19 0744   • levothyroxine (SYNTHROID, LEVOTHROID) tablet 125 mcg  125 mcg Oral Daily Madhav Webber MD   125 mcg at 07/04/19 0603   • lidocaine 3 % cream   Apply externally Daily PRN Madhav Webber MD       • melatonin tablet 3 mg  3 mg Oral Nightly Madhav Webber MD       • metoprolol tartrate (LOPRESSOR) tablet 50 mg  50 mg Oral Q12H Madhav Webber MD   50 mg at 07/04/19 0910   • pantoprazole (PROTONIX) EC tablet 40 mg  40 mg Oral BID AC Madhav Webber MD   40 mg at 07/04/19 0640   • potassium chloride (KAYCIEL) 20 MEQ/15ML (10%) solution 40 mEq  40 mEq Oral Daily Madhav Webber MD   40 mEq at 07/04/19 0910   • sodium chloride 0.9 % bolus 100 mL  100 mL Intravenous PRN Chris Hsu MD           Past Medical History:  Past Medical History:   Diagnosis Date   • Arthritis    • Carotid artery disease (CMS/HCC)    • CHF (congestive heart failure) (CMS/HCC)    • Chronic kidney disease on chronic dialysis (CMS/HCC)    • Chronic renal failure     on dialysis ON MON, WED, FRI   • Coronary artery disease    • Disease of thyroid gland    • Diverticulitis    • Gastric ulcer    • History of transfusion    • Hyperlipidemia    • Hypertension    • Injury of back    • Mesenteric artery insufficiency (CMS/HCC)    • Multilevel degenerative disc disease    • Osteoporosis    • Pancreatitis    • Pelvis fracture (CMS/HCC)    • Pneumonia        Past Surgical History:  Past Surgical History:   Procedure Laterality Date   • AORTAGRAM Right  1/8/2018    Procedure: MESENTERIC ANGIOGRAM, GROIN ACCESS, MYNX CLOSURE;  Surgeon: Tomasz San DO;  Location: [facility] OR;  Service:    • AORTIC VALVE SURGERY     • APPENDECTOMY     • BACK SURGERY     • CAPSULE ENDOSCOPY N/A 3/30/2019    Procedure: CAPSULE ENDOSCOPY M2A;  Surgeon: David Yu MD;  Location: [facility] ENDOSCOPY;  Service: Gastroenterology   • CARDIAC SURGERY     • CAROTID ENDARTERECTOMY Bilateral    • CATARACT EXTRACTION, BILATERAL     • CERVICAL SPINE SURGERY     • CHOLECYSTECTOMY     • COLON SURGERY     • COLONOSCOPY  10/01/2015   • COLONOSCOPY N/A 3/28/2019    Procedure: COLONOSCOPY WITH ANESTHESIA;  Surgeon: Rafita Merida MD;  Location:  [facility] ENDOSCOPY;  Service: Gastroenterology   • CORONARY ARTERY BYPASS GRAFT     • DIALYSIS FISTULA CREATION     • ENDOSCOPY N/A 12/27/2018    Procedure: ESOPHAGOGASTRODUODENOSCOPY WITH ANESTHESIA;  Surgeon: Moni Garza MD;  Location:  [facility] ENDOSCOPY;  Service: Gastroenterology   • ENDOSCOPY N/A 2/28/2019    Procedure: ESOPHAGOGASTRODUODENOSCOPY WITH ANESTHESIA;  Surgeon: Moni Garza MD;  Location:  [facility] ENDOSCOPY;  Service: Gastroenterology   • ENDOSCOPY N/A 3/11/2019    Procedure: ESOPHAGOGASTRODUODENOSCOPY WITH ANESTHESIA;  Surgeon: Moni Garza MD;  Location:  [facility] ENDOSCOPY;  Service: Gastroenterology   • ENDOSCOPY N/A 3/27/2019    Procedure: ESOPHAGOGASTRODUODENOSCOPY WITH ANESTHESIA;  Surgeon: Rafita Merida MD;  Location: [facility] ENDOSCOPY;  Service: Gastroenterology   • EXPLORATORY LAPAROTOMY N/A 5/13/2019    Procedure: LAPAROTOMY EXPLORATORY, OVERSEW DUODENAL ULCER WITH FRED PATCH, KOCHER MANEUVER;  Surgeon: Laila Rodriguez MD;  Location: [facility] OR;  Service: General   • HIP TROCHANTERIC NAILING WITH INTRAMEDULLARY HIP SCREW Right 2/8/2019    Procedure: HIP TROCANTERIC NAILING LONG WITH INTRAMEDULLARY HIP SCREW;  Surgeon: Boo Camacho MD;  Location:  [facility] OR;  Service: Orthopedics   • JOINT REPLACEMENT      knee   •  LAPAROSCOPIC GASTRIC BANDING     • LEEP     • LUMBAR FUSION     • TOE AMPUTATION Left     2nd toe   • TOTAL KNEE ARTHROPLASTY Bilateral    • TUBAL ABDOMINAL LIGATION     • UPPER GASTROINTESTINAL ENDOSCOPY  10/01/2015       Family History  Family History   Problem Relation Age of Onset   • Hypertension Mother    • Cancer Mother         stomach   • Coronary artery disease Father    • Heart attack Father    • Diabetes Brother    • Coronary artery disease Brother    • Colon cancer Neg Hx    • Colon polyps Neg Hx        Social History  Social History     Socioeconomic History   • Marital status:      Spouse name: Not on file   • Number of children: Not on file   • Years of education: Not on file   • Highest education level: Not on file   Tobacco Use   • Smoking status: Never Smoker   • Smokeless tobacco: Never Used   Substance and Sexual Activity   • Alcohol use: No   • Drug use: No   • Sexual activity: Defer         Review of Systems:  History obtained from chart review and the patient  General ROS: No fever or chills  Respiratory ROS: No cough, shortness of breath, wheezing  Cardiovascular ROS: No chest pain or palpitations  Gastrointestinal ROS: positive for - blood in stools  Genito-Urinary ROS: No dysuria or hematuria    Objective:  Patient Vitals for the past 24 hrs:   BP Temp Temp src Pulse Resp SpO2   07/04/19 1129 (!) 87/48 97.5 °F (36.4 °C) Oral 107 16 96 %   07/04/19 0808 95/40 97.7 °F (36.5 °C) Oral 106 16 94 %   07/04/19 0420 97/46 98.7 °F (37.1 °C) Oral 83 16 95 %   07/03/19 2316 147/88 98.4 °F (36.9 °C) Oral 79 16 95 %   07/03/19 2030 97/56 98.6 °F (37 °C) Oral 96 16 96 %   07/03/19 1656 98/54 97.7 °F (36.5 °C) Oral 86 16 98 %       Intake/Output Summary (Last 24 hours) at 7/4/2019 1256  Last data filed at 7/4/2019 1129  Gross per 24 hour   Intake 580 ml   Output --   Net 580 ml     General: awake/alert    Neck: supple, no JVD  Chest:  clear to auscultation bilaterally without respiratory  distress  CVS: regular rate and rhythm  Abdominal: soft, nontender, positive bowel sounds  Extremities: bilateral trace edema  Skin: warm and dry without rash  Neuro: no focal motor deficits    Labs:  Results from last 7 days   Lab Units 07/04/19  0554 07/03/19 0552 07/02/19 0319   WBC 10*3/mm3 8.86 11.11* 10.52   HEMOGLOBIN g/dL 9.9* 9.7* 9.6*   HEMATOCRIT % 30.0* 29.7* 29.4*   PLATELETS 10*3/mm3 119* 135 136         Results from last 7 days   Lab Units 07/04/19  0554 07/03/19  0552 07/02/19 0319 06/28/19  0631   SODIUM mmol/L 140 141 141   < > 141   POTASSIUM mmol/L 4.0 3.6 3.4*   < > 3.5   CHLORIDE mmol/L 110 110 111*   < > 106   CO2 mmol/L 22.0* 23.0* 25.0   < > 26.0   BUN mg/dL 9 14 11   < > 38*   CREATININE mg/dL 2.41* 3.00* 2.32*   < > 3.02*   CALCIUM mg/dL 7.2* 7.1* 7.4*   < > 7.2*   BILIRUBIN mg/dL  --   --   --   --  0.6   ALK PHOS U/L  --   --   --   --  179*   ALT (SGPT) U/L  --   --   --   --  32   AST (SGOT) U/L  --   --   --   --  35   GLUCOSE mg/dL 75 69* 91   < > 59*    < > = values in this interval not displayed.       Radiology:   Imaging Results (last 72 hours)     ** No results found for the last 72 hours. **          Culture:  Blood Culture   Date Value Ref Range Status   06/27/2019 No growth at 3 days  Preliminary   06/27/2019 No growth at 3 days  Preliminary         Assessment   1.  End stage renal disease on hemodialysis MWF  2.  Hypertension with recent HYPOtension  3.  Anemia of CKD and acute GI blood loss  4.  GI bleeding  5.  Secondary hyperparathyroidism    Plan:  1.  Dialysis scheduled in a.m.  2.  Monitor labs  3.  We will hold metoprolol for low blood pressure      Faisal Sevilla MD  7/4/2019  12:56 PM

## 2019-07-04 NOTE — PROGRESS NOTES
"    [facility] Medicine Services  INPATIENT PROGRESS NOTE    Patient Name: Cheri Ely  Date of Admission: 6/27/2019  Today's Date: 07/04/19  Length of Stay: 7  Primary Care Physician: Manny Peters APRN    Subjective   Chief Complaint: \"sleepy\"  HPI     Patient was seen and examined at bedside.  Patient is sitting up in the chair, patient ate most of her breakfast, but did not eat or Jell-O.  Patient indicates that she is \"tired of Jell-O\".  Patient is not sleeping well since admission, she request something for sleep.  She indicates that she is unsure if she has tried melatonin in the past.  She denies any more dark stools, but she does indicate that she has had loose stools.        Review of Systems   Constitutional: Positive for fatigue. Negative for activity change, appetite change, chills and fever.   Respiratory: Negative for cough and shortness of breath.    Cardiovascular: Negative for chest pain and palpitations.   Gastrointestinal: Negative for abdominal distention, abdominal pain, constipation, diarrhea, nausea and vomiting.   Endocrine: Negative for polyphagia.   Neurological: Positive for weakness.        All pertinent negatives and positives are as above. All other systems have been reviewed and are negative unless otherwise stated.     Objective    Temp:  [97.7 °F (36.5 °C)-98.7 °F (37.1 °C)] 97.7 °F (36.5 °C)  Heart Rate:  [] 106  Resp:  [16] 16  BP: ()/(40-88) 95/40  Physical Exam  Constitutional: She is oriented to person, place, and time. No distress. She is sitting up in the chair  HENT:   Head: Normocephalic and atraumatic.   Eyes: Conjunctivae are normal. No scleral icterus.   Neck: Neck supple. No JVD present.   Cardiovascular: Exam reveals no gallop and no friction rub.   Murmur heard.  Irregularly irregular, normal rate; left-sided fistula with palpable thrill  Pulmonary/Chest: Effort normal and breath sounds normal. No stridor. No " respiratory distress.   Abdominal: Soft. Bowel sounds are normal.   Musculoskeletal: She exhibits no edema.   Neurological: She is alert and oriented to person, place, and time.   Skin: Skin is dry. No rash noted. She is not diaphoretic. No pallor. Cool extremities.   Psychiatric: She has a normal mood and affect. Her behavior is normal.   Nursing note and vitals reviewed.          Results Review:  I have reviewed the labs, radiology results, and diagnostic studies.    Laboratory Data:   Results from last 7 days   Lab Units 07/04/19  0554 07/03/19  0552 07/02/19 0319   WBC 10*3/mm3 8.86 11.11* 10.52   HEMOGLOBIN g/dL 9.9* 9.7* 9.6*   HEMATOCRIT % 30.0* 29.7* 29.4*   PLATELETS 10*3/mm3 119* 135 136        Results from last 7 days   Lab Units 07/04/19  0554 07/03/19  0552 07/02/19 0319  06/28/19  0631 06/27/19  1245   SODIUM mmol/L 140 141 141   < > 141 140   POTASSIUM mmol/L 4.0 3.6 3.4*   < > 3.5 3.2*   CHLORIDE mmol/L 110 110 111*   < > 106 108   CO2 mmol/L 22.0* 23.0* 25.0   < > 26.0 21.0*   BUN mg/dL 9 14 11   < > 38* 28*   CREATININE mg/dL 2.41* 3.00* 2.32*   < > 3.02* 2.57*   CALCIUM mg/dL 7.2* 7.1* 7.4*   < > 7.2* 6.5*   BILIRUBIN mg/dL  --   --   --   --  0.6 0.5   ALK PHOS U/L  --   --   --   --  179* 181*   ALT (SGPT) U/L  --   --   --   --  32 35   AST (SGOT) U/L  --   --   --   --  35 43   GLUCOSE mg/dL 75 69* 91   < > 59* 110*    < > = values in this interval not displayed.       Culture Data:   Blood Culture   Date Value Ref Range Status   06/27/2019 No growth at 5 days  Final   06/27/2019 No growth at 5 days  Final       Radiology Data:   Imaging Results (last 24 hours)     ** No results found for the last 24 hours. **          I have reviewed the patient's current medications.     Assessment/Plan     Active Hospital Problems    Diagnosis   • Chronic combined systolic and diastolic CHF (congestive heart failure) (CMS/[facility])   • Acute colitis   • ESRD (end stage renal disease) (CMS/[facility])   • Acute on  chronic anemia   • Duodenal ulcer     Abnormal CT at the duodenal bulb, microperforation noted.  Recent duodenal ulcer with perforation 5/2019 s/p surgical repair     • Paroxysmal atrial fibrillation (CMS/HCC)   • Gastrointestinal hemorrhage   • Hypothyroidism, TSH 12.4, FT4 0.41   • Hypertension, currently hypotensive       Plan:  1.  Upper GI 7/1 showed no signs of perforation. Received 1 unit of pRBCs and 4 units of FFP upon arrival.  2.  Clear liquid diet, advance per surgery   3.  NG removed  4.  PO PPI BID  5. Continue to hold outpatient anti-HTN medications; monitor BP trend  6. Metronidazole and levofloxacin for now, she has completed 7 day course; possible mild colitis noted on CT A/P (new circumferential wall thickening along the descending and proximal sigmoid colon, suggestive of mild colitis.  7.  GI and general surgery following; appreciate their assistance  8.  Dialysis regimen per Nephrology - Received dialysis yesterday   9.   TSH and FT4 are consistent with poor controlled hypothyroidism.  Gave a dose of IV levothyroxine 75 mcg when she was NPO.  Continue levothyroxine at 125 mcg (home dose was 112 mcg)  11.  Continue metoprolol 50 mg  12.  AM hemoglobin and renal function panel and magnesium   13.  Echocardiogram reviewed, EF 26-30%.  She cannot tolerate goal directed therapy with ACE/ARB due to hypotension.  14.  PT   15.  Hemoglobin stable, mild drop in platelets.  BMP is appropriate.    16.  Melatonin 3 mg qhs     Telemetry was reviewed, the patient remains in atrial fibrillation, rate ranged anywhere from , this morning it was in the 90s.  Patient had frequent PVCs last night.            Discharge Planning: I expect the patient to be discharged to ? in ? days.    Madhav Webber MD   07/04/19   9:23 AM

## 2019-07-04 NOTE — PLAN OF CARE
Problem: Patient Care Overview  Goal: Plan of Care Review  Outcome: Ongoing (interventions implemented as appropriate)   07/04/19 0517   Coping/Psychosocial   Plan of Care Reviewed With patient   Plan of Care Review   Progress no change   OTHER   Outcome Summary pt c/o mild discomfort in shoulders and buttocks; Q2 turns alleviated discomfort; pt feels very fatigued; pt rested fair between times of care; VSS; safety maintained; will continue to monitor     Goal: Interprofessional Rounds/Family Conf  Outcome: Ongoing (interventions implemented as appropriate)   07/04/19 0517   Interdisciplinary Rounds/Family Conf   Participants nursing;patient       Problem: Skin Injury Risk (Adult)  Goal: Skin Health and Integrity  Outcome: Ongoing (interventions implemented as appropriate)   07/04/19 0517   Skin Injury Risk (Adult)   Skin Health and Integrity making progress toward outcome       Problem: Fall Risk (Adult)  Goal: Absence of Fall  Outcome: Ongoing (interventions implemented as appropriate)   07/04/19 0517   Fall Risk (Adult)   Absence of Fall making progress toward outcome       Problem: Wound (Includes Pressure Injury) (Adult)  Goal: Signs and Symptoms of Listed Potential Problems Will be Absent, Minimized or Managed (Wound)  Outcome: Ongoing (interventions implemented as appropriate)   07/04/19 0517   Goal/Outcome Evaluation   Problems Assessed (Wound) all   Problems Present (Wound) situational response       Problem: Nutrition, Imbalanced: Inadequate Oral Intake (Adult)  Goal: Improved Oral Intake  Outcome: Ongoing (interventions implemented as appropriate)   07/04/19 0517   Nutrition, Imbalanced: Inadequate Oral Intake (Adult)   Improved Oral Intake making progress toward outcome     Goal: Prevent Further Weight Loss  Outcome: Ongoing (interventions implemented as appropriate)   07/04/19 0517   Nutrition, Imbalanced: Inadequate Oral Intake (Adult)   Prevent Further Weight Loss making progress toward outcome

## 2019-07-04 NOTE — THERAPY TREATMENT NOTE
Acute Care - Physical Therapy Treatment Note  [facility]     Patient Name: Cheri Ely  : 1940  MRN: [id number]  Today's Date: 2019  Onset of Illness/Injury or Date of Surgery: 19  Date of Referral to PT: 19  Referring Physician: Dr. Webber     Admit Date: 2019    Visit Dx:    ICD-10-CM ICD-9-CM   1. Gastrointestinal hemorrhage with melena K92.1 578.1   2. Duodenal ulcer with perforation (CMS/[facility]) K26.5 532.50   3. Hypotension due to hypovolemia I95.89 458.8    E86.1 276.52   4. Mobility impaired Z74.09 799.89     Patient Active Problem List   Diagnosis   • Hypertension, currently hypotensive   • Hyperlipidemia   • Chronic kidney disease on chronic dialysis (CMS/[facility])   • Closed fracture of ramus of left pubis (CMS/[facility])   • Occlusion of superior mesenteric artery (CMS/[facility])   • Chronic mesenteric ischemia (CMS/[facility])   • Black tarry stools   • Hx of gastritis   • Acute gastric ulcer with hemorrhage   • Closed displaced intertrochanteric fracture of right femur (CMS/[facility])   • Displaced intertrochanteric fracture of right femur, initial encounter for closed fracture (CMS/[facility])   • Hypotension   • Acute blood loss anemia   • GI bleed   • Gastrointestinal hemorrhage   • (HFpEF) heart failure with preserved ejection fraction (CMS/[facility])   • Hypothyroidism, TSH 12.4, FT4 0.41   • Diastolic dysfunction   • Diastolic heart failure (CMS/[facility])   • Volume overload   • AVM (arteriovenous malformation) of small bowel, acquired (CMS/[facility])   • Abdominal pain   • Hypoglycemia   • Paroxysmal atrial fibrillation (CMS/[facility])   • Acute colitis   • ESRD (end stage renal disease) (CMS/[facility])   • Acute on chronic anemia   • Duodenal ulcer   • Grade II diastolic dysfunction   • Chronic combined systolic and diastolic CHF (congestive heart failure) (CMS/[facility])       Therapy Treatment    Rehabilitation Treatment Summary     Row Name 19 0948             Treatment Time/Intention    Discipline  physical therapy assistant   -JENNIFER      Document Type  therapy note (daily note)  -JENNIFER      Subjective Information  complains of;weakness;fatigue;pain;nausea/vomiting  -JENNIFER      Existing Precautions/Restrictions  fall  -JENNIFER      Recorded by [JENNIFER] Pramod Kaur, PTA 07/04/19 1031      Row Name 07/04/19 0948             Bed Mobility Assessment/Treatment    Supine-Sit Coke (Bed Mobility)  -- pt was up in the chair  -JENNIFER      Sit-Supine Coke (Bed Mobility)  verbal cues;moderate assist (50% patient effort)  -JENNIFER      Comment (Bed Mobility)  after getting pt back to bed, assisted with getting her cleaned up  -JENNIFER      Recorded by [JENNIFER] Pramod Kaur, PTA 07/04/19 1031      Row Name 07/04/19 0948             Sit-Stand Transfer    Sit-Stand Coke (Transfers)  verbal cues;minimum assist (75% patient effort);moderate assist (50% patient effort);2 person assist  -JENNIFER      Recorded by [JENNIFER] Pramod Kaur, PTA 07/04/19 1031      Row Name 07/04/19 0948             Stand-Sit Transfer    Stand-Sit Coke (Transfers)  verbal cues;minimum assist (75% patient effort);2 person assist  -JENNIFER      Recorded by [JENNIFER] Pramod Kaur, PTA 07/04/19 1031      Row Name 07/04/19 0948             Gait/Stairs Assessment/Training    Coke Level (Gait)  verbal cues;minimum assist (75% patient effort);2 person assist  -JENNIFER      Assistive Device (Gait)  -- HHA  -JENNIFER      Distance in Feet (Gait)  pt was able to take a few steps from the chair to the bed  -JENNIFER      Recorded by [JENNIFER] Parmod Kaur, PTA 07/04/19 1031      Row Name 07/04/19 0948             Positioning and Restraints    Pre-Treatment Position  sitting in chair/recliner  -JENNIFER      Post Treatment Position  bed  -JENNIFER      In Bed  fowlers;call light within reach;encouraged to call for assist;with nsg;side rails up x2  -JENNIFER      Recorded by [JENNIFER] Pramod Kaur, PTA 07/04/19 1031      Row Name 07/04/19 0948             Pain Scale: Numbers Pre/Post-Treatment    Pain Scale: Numbers,  Pretreatment  0/10 - no pain  -JENNIFER      Pain Scale: Numbers, Post-Treatment  0/10 - no pain  -JENNIFER      Recorded by [JENNIFER] Pramod Kaur, PTA 07/04/19 1031      Row Name                Wound 06/27/19 1837 Left lower breast MASD (moisture associated skin damage)    Wound - Properties Group Date first assessed: 06/27/19 [MW] Time first assessed: 1837 [MW] Side: Left [MW] Orientation: lower [MW], underneath  Location: breast [MW] Type: MASD (moisture associated skin damage) [MW] Recorded by:  [MW] Hetal Casiano RN 06/27/19 1838    Row Name                Wound 06/27/19 1838 Right anterior greater trochanter MASD (moisture associated skin damage)    Wound - Properties Group Date first assessed: 06/27/19 [MW] Time first assessed: 1838 [MW] Present On Admission : picture taken;yes [MW] Side: Right [MW] Orientation: anterior [MW] Location: greater trochanter [MW] Type: MASD (moisture associated skin damage) [MW] Recorded by:  [MW] Hetal Casiano RN 06/27/19 1838    Row Name                Wound 06/27/19 1836 Right gluteal pressure injury    Wound - Properties Group Date first assessed: 06/27/19 [HS] Time first assessed: 1836 [HS] Present On Admission : yes;picture taken [HS] Side: Right [HS] Location: gluteal [HS] Type: pressure injury [HS] Stage, Pressure Injury: Stage 3 [HS] Recorded by:  [HS] Peggy Monterroso RN 06/28/19 1421    Row Name                Wound 06/27/19 1836 Left gluteal pressure injury    Wound - Properties Group Date first assessed: 06/27/19 [HS] Time first assessed: 1836 [HS] Present On Admission : yes;picture taken [HS] Side: Left [HS] Location: gluteal [HS] Type: pressure injury [HS] Stage, Pressure Injury: Stage 3 [HS] Recorded by:  [HS] Peggy Monterroso RN 06/28/19 1422      User Key  (r) = Recorded By, (t) = Taken By, (c) = Cosigned By    Initials Name Effective Dates Discipline    JENNIFER Pramod Kaur, PTA 12/08/16 -  PT    Hetal García, RN 08/02/16 -  Nurse    HS Peggy Monterroso,  RN 12/27/16 -  Nurse          Wound 06/27/19 1837 Left lower breast MASD (moisture associated skin damage) (Active)   Dressing Appearance open to air 7/3/2019  8:00 PM   Base red 7/3/2019  8:00 PM   Periwound pink 7/3/2019  8:00 PM   Periwound Temperature warm 7/3/2019  8:00 PM   Periwound Skin Turgor soft 7/3/2019  8:00 PM   Drainage Amount none 7/3/2019  8:00 PM   Dressing Care, Wound open to air 7/3/2019  8:00 PM       Wound 06/27/19 1838 Right anterior greater trochanter MASD (moisture associated skin damage) (Active)   Dressing Appearance open to air 7/3/2019  8:00 PM   Base pink 7/3/2019  8:00 PM   Periwound pink 7/3/2019  8:00 PM   Periwound Temperature warm 7/3/2019  8:00 PM   Periwound Skin Turgor soft 7/3/2019  8:00 PM   Drainage Amount none 7/3/2019  8:00 PM   Dressing Care, Wound open to air 7/3/2019  8:00 PM       Wound 06/27/19 1836 Right gluteal pressure injury (Active)   Dressing Appearance dry;intact 7/3/2019  8:00 PM   Base slough 7/3/2019  8:00 PM   Periwound redness;blanchable 7/3/2019  8:00 PM   Periwound Temperature warm 7/3/2019  8:00 PM   Periwound Skin Turgor soft 7/3/2019  8:00 PM   Drainage Characteristics/Odor serous 7/3/2019  8:00 PM   Drainage Amount scant 7/3/2019  8:00 PM   Care, Wound cleansed with;water 7/4/2019  6:00 AM       Wound 06/27/19 1836 Left gluteal pressure injury (Active)   Dressing Appearance dry;intact 7/3/2019  8:00 PM   Base slough 7/3/2019  8:00 PM   Periwound redness;blanchable 7/3/2019  8:00 PM   Periwound Temperature warm 7/3/2019  8:00 PM   Periwound Skin Turgor soft 7/3/2019  8:00 PM   Drainage Characteristics/Odor serous 7/3/2019  8:00 PM   Drainage Amount scant 7/3/2019  8:00 PM   Care, Wound cleansed with;water 7/4/2019  6:00 AM           Physical Therapy Education     Title: PT OT SLP Therapies (Done)     Topic: Physical Therapy (Done)     Point: Mobility training (Done)     Learning Progress Summary           Patient Acceptance, E VU by JENNIFER at 7/4/2019   9:48 AM    Comment:  safety with transfers    Acceptance, E, VU by SC at 7/1/2019  3:53 PM    Comment:  Pt is educated and knows her deficits at this time                   Point: Home exercise program (Done)     Learning Progress Summary           Patient Acceptance, E, VU by SC at 7/1/2019  3:53 PM    Comment:  Pt is educated and knows her deficits at this time                   Point: Body mechanics (Done)     Learning Progress Summary           Patient Acceptance, E, VU by SC at 7/1/2019  3:53 PM    Comment:  Pt is educated and knows her deficits at this time                   Point: Precautions (Done)     Learning Progress Summary           Patient Acceptance, E, VU by SC at 7/1/2019  3:53 PM    Comment:  Pt is educated and knows her deficits at this time                               User Key     Initials Effective Dates Name Provider Type Discipline     12/08/16 -  Pramod Kaur PTA Physical Therapy Assistant PT    SC 05/15/19 -  Ko Vail PT Student PT Student PT                PT Recommendation and Plan     Plan of Care Reviewed With: patient  Progress: improving  Outcome Summary: Pt was up in the chair, transfered sit to stand with min/mod assist X 2.  Pt was able to take a few steps from the chair back to bed with HHA and mod assist X 2.  Will continue to work with pt to increase strength and progress with transfers  Outcome Measures     Row Name 07/04/19 0948 07/01/19 3214          How much help from another person do you currently need...    Turning from your back to your side while in flat bed without using bedrails?  3  -JENNIFER  3  -JAZMÍN (r) SC (t) JAZMÍN (c)     Moving from lying on back to sitting on the side of a flat bed without bedrails?  2  -JENNIFER  1  -JAZMÍN (r) SC (t) JAZMÍN (c)     Moving to and from a bed to a chair (including a wheelchair)?  2  -JENNIFER  1  -JAZMÍN (r) SC (t) JAZMÍN (c)     Standing up from a chair using your arms (e.g., wheelchair, bedside chair)?  2  -JENNIFER  1  -JAZMÍN (r) SC (t) JAZMÍN (c)      Climbing 3-5 steps with a railing?  1  -JENNIFER  1  -JAZMÍN (r) SC (t) JAZMÍN (c)     To walk in hospital room?  1  -JENNIFER  1  -JAZMÍN (r) SC (t) JAZMÍN (c)     AM-PAC 6 Clicks Score (PT)  11  -JENNIFER  8  -JAZMÍN (r) SC (t)        Functional Assessment    Outcome Measure Options  AM-PAC 6 Clicks Basic Mobility (PT)  -JENNIFER  AM-PAC 6 Clicks Basic Mobility (PT)  -JAZMÍN (r) SC (t) JAZMÍN (c)       User Key  (r) = Recorded By, (t) = Taken By, (c) = Cosigned By    Initials Name Provider Type    Je Youssef, PT DPT Physical Therapist    Pramod Pate, PTA Physical Therapy Assistant    SC Ko Vail, PT Student PT Student         Time Calculation:   PT Charges     Row Name 07/04/19 0948             Time Calculation    Start Time  0948  -JENNIFER      Stop Time  1012  -JENNIFER      Time Calculation (min)  24 min  -JENNIFER      PT Received On  07/04/19  -JENNIFER      PT Goal Re-Cert Due Date  07/11/19  -JENNIFER         Time Calculation- PT    Total Timed Code Minutes- PT  24 minute(s)  -JENNIFER        User Key  (r) = Recorded By, (t) = Taken By, (c) = Cosigned By    Initials Name Provider Type    Pramod Pate PTA Physical Therapy Assistant        Therapy Charges for Today     Code Description Service Date Service Provider Modifiers Qty    27060094018  PT THERAPEUTIC ACT EA 15 MIN 7/4/2019 Pramod Kaur PTA GP 2          PT G-Codes  Outcome Measure Options: AM-PAC 6 Clicks Basic Mobility (PT)  AM-PAC 6 Clicks Score (PT): 11    Pramod Kaur PTA  7/4/2019

## 2019-07-05 LAB
ALBUMIN SERPL-MCNC: 2.2 G/DL (ref 3.5–5)
ANION GAP SERPL CALCULATED.3IONS-SCNC: 6 MMOL/L (ref 4–13)
BUN BLD-MCNC: 12 MG/DL (ref 5–21)
BUN/CREAT SERPL: 4 (ref 7–25)
CALCIUM SPEC-SCNC: 7.3 MG/DL (ref 8.4–10.4)
CHLORIDE SERPL-SCNC: 111 MMOL/L (ref 98–110)
CO2 SERPL-SCNC: 21 MMOL/L (ref 24–31)
CREAT BLD-MCNC: 3.01 MG/DL (ref 0.5–1.4)
DEPRECATED RDW RBC AUTO: 67.9 FL (ref 40–54)
ERYTHROCYTE [DISTWIDTH] IN BLOOD BY AUTOMATED COUNT: 20.8 % (ref 12–15)
GFR SERPL CREATININE-BSD FRML MDRD: 15 ML/MIN/1.73
GLUCOSE BLD-MCNC: 77 MG/DL (ref 70–100)
HCT VFR BLD AUTO: 31.2 % (ref 37–47)
HGB BLD-MCNC: 10 G/DL (ref 12–16)
MAGNESIUM SERPL-MCNC: 1.5 MG/DL (ref 1.4–2.2)
MCH RBC QN AUTO: 31 PG (ref 28–32)
MCHC RBC AUTO-ENTMCNC: 32.1 G/DL (ref 33–36)
MCV RBC AUTO: 96.6 FL (ref 82–98)
PHOSPHATE SERPL-MCNC: 2.2 MG/DL (ref 2.5–4.5)
PLATELET # BLD AUTO: 130 10*3/MM3 (ref 130–400)
PMV BLD AUTO: 11.7 FL (ref 6–12)
POTASSIUM BLD-SCNC: 4.3 MMOL/L (ref 3.5–5.3)
RBC # BLD AUTO: 3.23 10*6/MM3 (ref 4.2–5.4)
SODIUM BLD-SCNC: 138 MMOL/L (ref 135–145)
WBC NRBC COR # BLD: 10.04 10*3/MM3 (ref 4.8–10.8)

## 2019-07-05 PROCEDURE — 83735 ASSAY OF MAGNESIUM: CPT | Performed by: INTERNAL MEDICINE

## 2019-07-05 PROCEDURE — 85027 COMPLETE CBC AUTOMATED: CPT | Performed by: INTERNAL MEDICINE

## 2019-07-05 PROCEDURE — 80069 RENAL FUNCTION PANEL: CPT | Performed by: INTERNAL MEDICINE

## 2019-07-05 PROCEDURE — 5A1D70Z PERFORMANCE OF URINARY FILTRATION, INTERMITTENT, LESS THAN 6 HOURS PER DAY: ICD-10-PCS | Performed by: INTERNAL MEDICINE

## 2019-07-05 RX ORDER — LOPERAMIDE HYDROCHLORIDE 2 MG/1
4 CAPSULE ORAL 4 TIMES DAILY PRN
Status: DISCONTINUED | OUTPATIENT
Start: 2019-07-05 | End: 2019-07-06 | Stop reason: HOSPADM

## 2019-07-05 RX ADMIN — ACETAMINOPHEN 650 MG: 325 TABLET, FILM COATED ORAL at 23:06

## 2019-07-05 RX ADMIN — Medication 3 MG: at 20:10

## 2019-07-05 RX ADMIN — METOPROLOL TARTRATE 50 MG: 50 TABLET ORAL at 20:10

## 2019-07-05 RX ADMIN — COLLAGENASE SANTYL: 250 OINTMENT TOPICAL at 17:19

## 2019-07-05 RX ADMIN — ACETAMINOPHEN 650 MG: 325 TABLET, FILM COATED ORAL at 13:08

## 2019-07-05 RX ADMIN — ACETAMINOPHEN 650 MG: 325 TABLET, FILM COATED ORAL at 06:40

## 2019-07-05 RX ADMIN — LEVOTHYROXINE SODIUM 125 MCG: 125 TABLET ORAL at 06:36

## 2019-07-05 RX ADMIN — PANTOPRAZOLE SODIUM 40 MG: 40 TABLET, DELAYED RELEASE ORAL at 06:36

## 2019-07-05 RX ADMIN — LOPERAMIDE HYDROCHLORIDE 4 MG: 2 CAPSULE ORAL at 17:19

## 2019-07-05 RX ADMIN — PANTOPRAZOLE SODIUM 40 MG: 40 TABLET, DELAYED RELEASE ORAL at 17:19

## 2019-07-05 NOTE — PLAN OF CARE
"Problem: Patient Care Overview  Goal: Plan of Care Review  Outcome: Ongoing (interventions implemented as appropriate)   07/05/19 0506   Coping/Psychosocial   Plan of Care Reviewed With patient   Plan of Care Review   Progress no change   OTHER   Outcome Summary pt had no c/o pain this shift; she does have a generalized \"can't get comfortable\" complaint; pt has been awake all night; small BMs occurring aprroximately every 2 hours; sameer-area skin is very red and irritated; barrier cream being applied with each clean up; left diaper off to see if that will help; pt BP was very low at start of shift; two 100ml bolus of NS given; pressure has been stable around 90s/40s; all other VSS; safety maintained; will continue to monitor     Goal: Interprofessional Rounds/Family Conf  Outcome: Ongoing (interventions implemented as appropriate)   07/05/19 0506   Interdisciplinary Rounds/Family Conf   Participants nursing;patient       Problem: Skin Injury Risk (Adult)  Goal: Skin Health and Integrity  Outcome: Ongoing (interventions implemented as appropriate)   07/05/19 0506   Skin Injury Risk (Adult)   Skin Health and Integrity making progress toward outcome       Problem: Fall Risk (Adult)  Goal: Absence of Fall  Outcome: Ongoing (interventions implemented as appropriate)   07/05/19 0506   Fall Risk (Adult)   Absence of Fall making progress toward outcome       Problem: Wound (Includes Pressure Injury) (Adult)  Goal: Signs and Symptoms of Listed Potential Problems Will be Absent, Minimized or Managed (Wound)  Outcome: Ongoing (interventions implemented as appropriate)   07/05/19 0506   Goal/Outcome Evaluation   Problems Assessed (Wound) all   Problems Present (Wound) situational response       Problem: Nutrition, Imbalanced: Inadequate Oral Intake (Adult)  Goal: Improved Oral Intake  Outcome: Ongoing (interventions implemented as appropriate)   07/05/19 0506   Nutrition, Imbalanced: Inadequate Oral Intake (Adult)   Improved " Oral Intake making progress toward outcome     Goal: Prevent Further Weight Loss  Outcome: Ongoing (interventions implemented as appropriate)   07/05/19 3735   Nutrition, Imbalanced: Inadequate Oral Intake (Adult)   Prevent Further Weight Loss making progress toward outcome

## 2019-07-05 NOTE — PLAN OF CARE
Problem: Patient Care Overview  Goal: Plan of Care Review  Outcome: Ongoing (interventions implemented as appropriate)   07/05/19 1338   Plan of Care Review   Progress no change   OTHER   Outcome Summary Pt in dialysis at time of attempted visit. She was upgraded from a clear liquid to a full liquid diet yesterday. RDN adjusted supplement to Novasource Renal daily to meet estimated kcal/protein needs. Will continue to follow.       Problem: Nutrition, Imbalanced: Inadequate Oral Intake (Adult)  Goal: Improved Oral Intake  Outcome: Ongoing (interventions implemented as appropriate)   07/05/19 1338   Nutrition, Imbalanced: Inadequate Oral Intake (Adult)   Improved Oral Intake making progress toward outcome     Goal: Prevent Further Weight Loss  Outcome: Ongoing (interventions implemented as appropriate)   07/05/19 1338   Nutrition, Imbalanced: Inadequate Oral Intake (Adult)   Prevent Further Weight Loss making progress toward outcome

## 2019-07-05 NOTE — PLAN OF CARE
Problem: Patient Care Overview  Goal: Plan of Care Review  Outcome: Ongoing (interventions implemented as appropriate)   07/05/19 6255   Coping/Psychosocial   Plan of Care Reviewed With patient   Plan of Care Review   Progress improving   OTHER   Outcome Summary Diet advanced to regular. Plans to dc to [facility] tomorrow. Dialysis this shift with reports of 1600ml off.        Problem: Skin Injury Risk (Adult)  Goal: Skin Health and Integrity  Outcome: Ongoing (interventions implemented as appropriate)      Problem: Fall Risk (Adult)  Goal: Absence of Fall  Outcome: Ongoing (interventions implemented as appropriate)      Problem: Wound (Includes Pressure Injury) (Adult)  Goal: Signs and Symptoms of Listed Potential Problems Will be Absent, Minimized or Managed (Wound)  Outcome: Ongoing (interventions implemented as appropriate)      Problem: Nutrition, Imbalanced: Inadequate Oral Intake (Adult)  Goal: Improved Oral Intake  Outcome: Ongoing (interventions implemented as appropriate)    Goal: Prevent Further Weight Loss  Outcome: Ongoing (interventions implemented as appropriate)

## 2019-07-05 NOTE — PROGRESS NOTES
Nephrology ([facility]) Progress Note      Patient:  Cheri Ely  YOB: 1940  Date of Service: 7/5/2019  MRN: [id number]   Acct: [id number]   Primary Care Physician: Manny Peters APRN  Advance Directive:   Code Status and Medical Interventions:   Ordered at: 06/27/19 1635     Level Of Support Discussed With:    Patient     Code Status:    CPR     Medical Interventions (Level of Support Prior to Arrest):    Full     Admit Date: 6/27/2019       Hospital Day: 8  Referring Provider: William Alfaro MD      Patient personally seen and examined.  Complete chart including Consults, Notes, Operative Reports, Labs, Cardiology, and Radiology studies reviewed as able.        Subjective:  Cheri Ely is a 79 y.o. female  whom we were consulted for end stage renal disease.  Routine hemodialysis Monday Wednesday Friday at [facility].  No recent issues with dialysis.  Recurrent history of GI bleeding. Had laparoscopic repair of perforated duodenal ulcer 5/12/19.  This time presented with melena and several episodes of dark emesis.  Was feeling very weak and lightheaded.  Initially was significantly hypotensive; improved following IV fluid resuscitation and PRBC transfusion.  Upper GI study on 7/01 showed no perforation.    Today feeling fatigued. Moderately hypotensive overnight--given small IV fluid bolus (200 ml).     Allergies:  Patient has no known allergies.    Home Meds:  Medications Prior to Admission   Medication Sig Dispense Refill Last Dose   • acetaminophen (TYLENOL) 325 MG tablet Take 2 tablets by mouth Every 6 (Six) Hours As Needed for Mild Pain .   Past Week at Unknown time   • allopurinol (ZYLOPRIM) 100 MG tablet Take 100 mg by mouth Daily.   Past Month at Unknown time   • aspirin 81 MG EC tablet Take 1 tablet by mouth Daily.   Past Week at Unknown time   • B Complex-C-Folic Acid (JOHN-SANGEETA) tablet Take 1 tablet by mouth Daily.   Past Week at Unknown  time   • carvedilol (COREG) 12.5 MG tablet Take 12.5 mg by mouth Daily. Monday, Wednesday, and Friday dose. Hold if SBP < 100.    Past Week at Unknown time   • carvedilol (COREG) 12.5 MG tablet Take 12.5 mg by mouth 2 (Two) Times a Day With Meals. Sunday, Tuesday, Thursday, and Saturday dose. Hold if SBP <100.    Past Week at Unknown time   • Cholecalciferol (VITAMIN D) 2000 units capsule Take 1 capsule by mouth Daily. 30 capsule  Past Week at Unknown time   • clindamycin (CLEOCIN) 150 MG capsule Take 150 mg by mouth 4 (Four) Times a Day. 10 day therapy.  Start on 6/21/2019.   Past Week at Unknown time   • epoetin lucy (EPOGEN,PROCRIT) 01242 UNIT/ML injection Inject 1 mL under the skin into the appropriate area as directed 3 (Three) Times a Week.   Past Week at Unknown time   • HYDROcodone-acetaminophen (NORCO) 7.5-325 MG per tablet Take 1 tablet by mouth Every 4 (Four) Hours As Needed for Moderate Pain  for up to 30 doses. 30 tablet 0 Past Week at Unknown time   • levothyroxine (SYNTHROID, LEVOTHROID) 112 MCG tablet Take 112 mcg by mouth Daily.   Past Week at Unknown time   • losartan (COZAAR) 25 MG tablet Take 25 mg by mouth 3 (Three) Times a Week. Monday, Wednesday, and Friday.   Past Week at Unknown time   • megestrol (MEGACE) 40 MG/ML suspension Take 400 mg by mouth Daily.   Past Week at Unknown time   • ondansetron ODT (ZOFRAN-ODT) 4 MG disintegrating tablet Take 4 mg by mouth Every 8 (Eight) Hours As Needed for Nausea or Vomiting.   6/26/2019 at Unknown time   • Polyethyl Glyc-Propyl Glyc PF 0.4-0.3 % solution ophthalmic solution Administer 2 drops to both eyes Every 2 (Two) Hours As Needed (dry eyes).   Past Week at Unknown time   • pravastatin (PRAVACHOL) 40 MG tablet Take 40 mg by mouth Daily.   Past Week at Unknown time   • sertraline (ZOLOFT) 50 MG tablet Take 50 mg by mouth Daily.   Past Week at Unknown time   • sucralfate (CARAFATE) 1 GM/10ML suspension Take 10 mL by mouth 2 (Two) Times a Day. 1200 mL  0 Past Week at Unknown time       Medicines:  Current Facility-Administered Medications   Medication Dose Route Frequency Provider Last Rate Last Dose   • acetaminophen (TYLENOL) tablet 650 mg  650 mg Oral Q6H PRN Azalia Shaver DO   650 mg at 07/05/19 0640   • collagenase ointment   Topical Q24H William Alfaro MD       • dextrose (D50W) 25 g/ 50mL Intravenous Solution 50 mL  50 mL Intravenous Q1H PRN William Alfaro MD   50 mL at 06/28/19 0744   • levothyroxine (SYNTHROID, LEVOTHROID) tablet 125 mcg  125 mcg Oral Daily Madhav Webber MD   125 mcg at 07/05/19 0636   • lidocaine 3 % cream   Apply externally Daily PRN Madhav Webber MD       • melatonin tablet 3 mg  3 mg Oral Nightly Madhav Webber MD   3 mg at 07/04/19 2213   • metoprolol tartrate (LOPRESSOR) tablet 50 mg  50 mg Oral Q12H Faisal Sevilla MD   50 mg at 07/04/19 0910   • pantoprazole (PROTONIX) EC tablet 40 mg  40 mg Oral BID AC Madhav Webber MD   40 mg at 07/05/19 0636   • sodium chloride 0.9 % bolus 100 mL  100 mL Intravenous PRN Chris Hsu  mL/hr at 07/04/19 2010 100 mL at 07/04/19 2010       Past Medical History:  Past Medical History:   Diagnosis Date   • Arthritis    • Carotid artery disease (CMS/HCC)    • CHF (congestive heart failure) (CMS/HCC)    • Chronic kidney disease on chronic dialysis (CMS/HCC)    • Chronic renal failure     on dialysis ON MON, WED, FRI   • Coronary artery disease    • Disease of thyroid gland    • Diverticulitis    • Gastric ulcer    • History of transfusion    • Hyperlipidemia    • Hypertension    • Injury of back    • Mesenteric artery insufficiency (CMS/HCC)    • Multilevel degenerative disc disease    • Osteoporosis    • Pancreatitis    • Pelvis fracture (CMS/HCC)    • Pneumonia        Past Surgical History:  Past Surgical History:   Procedure Laterality Date   • AORTAGRAM Right 1/8/2018    Procedure: MESENTERIC ANGIOGRAM, GROIN ACCESS, MYNX  CLOSURE;  Surgeon: Tomasz San DO;  Location: [facility] OR;  Service:    • AORTIC VALVE SURGERY     • APPENDECTOMY     • BACK SURGERY     • CAPSULE ENDOSCOPY N/A 3/30/2019    Procedure: CAPSULE ENDOSCOPY M2A;  Surgeon: David Yu MD;  Location: [facility] ENDOSCOPY;  Service: Gastroenterology   • CARDIAC SURGERY     • CAROTID ENDARTERECTOMY Bilateral    • CATARACT EXTRACTION, BILATERAL     • CERVICAL SPINE SURGERY     • CHOLECYSTECTOMY     • COLON SURGERY     • COLONOSCOPY  10/01/2015   • COLONOSCOPY N/A 3/28/2019    Procedure: COLONOSCOPY WITH ANESTHESIA;  Surgeon: Rafita Merida MD;  Location: [facility] ENDOSCOPY;  Service: Gastroenterology   • CORONARY ARTERY BYPASS GRAFT     • DIALYSIS FISTULA CREATION     • ENDOSCOPY N/A 12/27/2018    Procedure: ESOPHAGOGASTRODUODENOSCOPY WITH ANESTHESIA;  Surgeon: Moni Garza MD;  Location: [facility] ENDOSCOPY;  Service: Gastroenterology   • ENDOSCOPY N/A 2/28/2019    Procedure: ESOPHAGOGASTRODUODENOSCOPY WITH ANESTHESIA;  Surgeon: Moni Garza MD;  Location: [facility] ENDOSCOPY;  Service: Gastroenterology   • ENDOSCOPY N/A 3/11/2019    Procedure: ESOPHAGOGASTRODUODENOSCOPY WITH ANESTHESIA;  Surgeon: Moni Garza MD;  Location: [facility] ENDOSCOPY;  Service: Gastroenterology   • ENDOSCOPY N/A 3/27/2019    Procedure: ESOPHAGOGASTRODUODENOSCOPY WITH ANESTHESIA;  Surgeon: Rafita Merida MD;  Location: [facility] ENDOSCOPY;  Service: Gastroenterology   • EXPLORATORY LAPAROTOMY N/A 5/13/2019    Procedure: LAPAROTOMY EXPLORATORY, OVERSEW DUODENAL ULCER WITH FRED PATCH, KOCHER MANEUVER;  Surgeon: Laila Rodriguez MD;  Location: [facility] OR;  Service: General   • HIP TROCHANTERIC NAILING WITH INTRAMEDULLARY HIP SCREW Right 2/8/2019    Procedure: HIP TROCANTERIC NAILING LONG WITH INTRAMEDULLARY HIP SCREW;  Surgeon: Boo Camacho MD;  Location: [facility] OR;  Service: Orthopedics   • JOINT REPLACEMENT      knee   • LAPAROSCOPIC GASTRIC BANDING     • LEEP     • LUMBAR FUSION     •  TOE AMPUTATION Left     2nd toe   • TOTAL KNEE ARTHROPLASTY Bilateral    • TUBAL ABDOMINAL LIGATION     • UPPER GASTROINTESTINAL ENDOSCOPY  10/01/2015       Family History  Family History   Problem Relation Age of Onset   • Hypertension Mother    • Cancer Mother         stomach   • Coronary artery disease Father    • Heart attack Father    • Diabetes Brother    • Coronary artery disease Brother    • Colon cancer Neg Hx    • Colon polyps Neg Hx        Social History  Social History     Socioeconomic History   • Marital status:      Spouse name: Not on file   • Number of children: Not on file   • Years of education: Not on file   • Highest education level: Not on file   Tobacco Use   • Smoking status: Never Smoker   • Smokeless tobacco: Never Used   Substance and Sexual Activity   • Alcohol use: No   • Drug use: No   • Sexual activity: Defer         Review of Systems:  History obtained from chart review and the patient  General ROS: No fever or chills  Respiratory ROS: No cough, shortness of breath, wheezing  Cardiovascular ROS: No chest pain or palpitations  Gastrointestinal ROS: positive for - blood in stools  Genito-Urinary ROS: No dysuria or hematuria  Neurological ROS: no headache or dizziness    Objective:  Patient Vitals for the past 24 hrs:   BP Temp Temp src Pulse Resp SpO2   07/05/19 0752 90/55 97.4 °F (36.3 °C) Axillary 118 20 99 %   07/05/19 0400 94/50 98.1 °F (36.7 °C) Oral 86 18 98 %   07/05/19 0000 94/46 -- -- -- -- --   07/04/19 2241 93/46 98.4 °F (36.9 °C) Oral 92 18 95 %   07/04/19 2030 92/46 -- -- -- -- --   07/04/19 2008 (!) 88/44 -- -- -- -- --   07/04/19 1937 (!) 84/45 98.7 °F (37.1 °C) Oral 95 18 96 %   07/04/19 1648 118/58 98.1 °F (36.7 °C) Axillary 91 16 99 %   07/04/19 1129 (!) 87/48 97.5 °F (36.4 °C) Oral 107 16 96 %       Intake/Output Summary (Last 24 hours) at 7/5/2019 1020  Last data filed at 7/5/2019 0923  Gross per 24 hour   Intake 1440 ml   Output --   Net 1440 ml     General:  awake/alert    Neck: supple, no JVD  Chest:  clear to auscultation bilaterally without respiratory distress  CVS: regular rate and rhythm  Abdominal: soft, nontender, positive bowel sounds  Extremities: bilateral trace edema  Skin: warm and dry without rash  Neuro: no focal motor deficits    Labs:  Results from last 7 days   Lab Units 07/05/19  0655 07/04/19  0554 07/03/19  0552   WBC 10*3/mm3 10.04 8.86 11.11*   HEMOGLOBIN g/dL 10.0* 9.9* 9.7*   HEMATOCRIT % 31.2* 30.0* 29.7*   PLATELETS 10*3/mm3 130 119* 135         Results from last 7 days   Lab Units 07/05/19  0655 07/04/19  0554 07/03/19  0552   SODIUM mmol/L 138 140 141   POTASSIUM mmol/L 4.3 4.0 3.6   CHLORIDE mmol/L 111* 110 110   CO2 mmol/L 21.0* 22.0* 23.0*   BUN mg/dL 12 9 14   CREATININE mg/dL 3.01* 2.41* 3.00*   CALCIUM mg/dL 7.3* 7.2* 7.1*   GLUCOSE mg/dL 77 75 69*       Radiology:   Imaging Results (last 72 hours)     ** No results found for the last 72 hours. **          Culture:  Blood Culture   Date Value Ref Range Status   06/27/2019 No growth at 3 days  Preliminary   06/27/2019 No growth at 3 days  Preliminary         Assessment   1.  End stage renal disease on hemodialysis MWF  2.  Hypertension with recent HYPOtension  3.  Anemia of CKD and acute GI blood loss  4.  GI bleeding  5.  Secondary hyperparathyroidism    Plan:  1.  Dialysis today  2.  Continue to hold Metoprolol if SBP < 110  3.  Monitor labs        Dirk Tristan, ABILIO  7/5/2019  10:20 AM

## 2019-07-05 NOTE — PROGRESS NOTES
"    [facility] Medicine Services  INPATIENT PROGRESS NOTE    Patient Name: Cheri Ely  Date of Admission: 6/27/2019  Today's Date: 07/05/19  Length of Stay: 8  Primary Care Physician: Manny Peters APRN    Subjective   Chief Complaint: \"want to go home\"  HPI     Patient seen and examined at bedside.  Patient would like to go \"home\" or back to the nursing facility.  Potassium liquid was upsetting her stomach this has been discontinued.  Patient still having loose stools, c diff was negative.        Review of Systems   Constitutional: Negative for activity change, appetite change, chills, diaphoresis, fatigue and fever.   Cardiovascular: Negative for chest pain and palpitations.   Gastrointestinal: Positive for diarrhea. Negative for abdominal distention, abdominal pain, constipation, nausea and vomiting.        All pertinent negatives and positives are as above. All other systems have been reviewed and are negative unless otherwise stated.     Objective    Temp:  [97.4 °F (36.3 °C)-98.7 °F (37.1 °C)] 97.4 °F (36.3 °C)  Heart Rate:  [] 118  Resp:  [16-20] 20  BP: ()/(44-58) 90/55  Physical Exam  Constitutional: She is oriented to person, place, and time. No distress. resting in bed   HENT:   Head: Normocephalic and atraumatic.   Eyes: Conjunctivae are normal. No scleral icterus.   Neck: Neck supple. No JVD present.   Cardiovascular: Exam reveals no gallop and no friction rub.   Murmur heard. Edema of left upper extremity   Irregularly irregular, tachycardia; left-sided fistula with palpable thrill  Pulmonary/Chest: Effort normal and breath sounds normal. No stridor. No respiratory distress.   Abdominal: Soft. Bowel sounds are normal.   Musculoskeletal: She exhibits no edema.   Neurological: She is alert and oriented to person, place, and time.   Skin: Skin is dry. No rash noted. She is not diaphoretic. No pallor. Cool extremities.   Psychiatric: She has a normal " mood and affect. Her behavior is normal.   Nursing note and vitals reviewed.          Results Review:  I have reviewed the labs, radiology results, and diagnostic studies.    Laboratory Data:   Results from last 7 days   Lab Units 07/05/19  0655 07/04/19  0554 07/03/19  0552   WBC 10*3/mm3 10.04 8.86 11.11*   HEMOGLOBIN g/dL 10.0* 9.9* 9.7*   HEMATOCRIT % 31.2* 30.0* 29.7*   PLATELETS 10*3/mm3 130 119* 135        Results from last 7 days   Lab Units 07/05/19  0655 07/04/19  0554 07/03/19  0552   SODIUM mmol/L 138 140 141   POTASSIUM mmol/L 4.3 4.0 3.6   CHLORIDE mmol/L 111* 110 110   CO2 mmol/L 21.0* 22.0* 23.0*   BUN mg/dL 12 9 14   CREATININE mg/dL 3.01* 2.41* 3.00*   CALCIUM mg/dL 7.3* 7.2* 7.1*   GLUCOSE mg/dL 77 75 69*       Culture Data:        Radiology Data:   Imaging Results (last 24 hours)     ** No results found for the last 24 hours. **          I have reviewed the patient's current medications.     Assessment/Plan     Active Hospital Problems    Diagnosis   • Chronic combined systolic and diastolic CHF (congestive heart failure) (CMS/[facility])   • Acute colitis   • ESRD (end stage renal disease) (CMS/[facility])   • Acute on chronic anemia   • Duodenal ulcer     Abnormal CT at the duodenal bulb, microperforation noted.  Recent duodenal ulcer with perforation 5/2019 s/p surgical repair     • Paroxysmal atrial fibrillation (CMS/[facility])   • Gastrointestinal hemorrhage   • Hypothyroidism, TSH 12.4, FT4 0.41   • Hypertension, currently hypotensive       Plan:  1.  Upper GI 7/1 showed no signs of perforation. Received 1 unit of pRBCs and 4 units of FFP upon arrival.  2. Full liquid diet, advance per surgery   3.  NG removed  4.  PO PPI BID  5. Continue to hold outpatient anti-HTN medications; monitor BP trend  6. Metronidazole and levofloxacin, she has completed 7 day course; possible mild colitis noted on CT A/P (new circumferential wall thickening along the descending and proximal sigmoid colon, suggestive of mild  colitis.  7.  GI and general surgery following; appreciate their assistance  8.  Dialysis regimen per Nephrology - Received dialysis yesterday   9.   TSH and FT4 are consistent with poor controlled hypothyroidism.  Gave a dose of IV levothyroxine 75 mcg when she was NPO.  Continue levothyroxine at 125 mcg (home dose was 112 mcg)  11.  Continue metoprolol 50 mg, hold parameter placed by nephrology   12.  AM hemoglobin and renal function panel and magnesium   13.  Echocardiogram reviewed, EF 26-30%.  She cannot tolerate goal directed therapy with ACE/ARB due to hypotension.  14.  PT   15.  Hemoglobin stable, mild drop in platelets.  BMP is appropriate.    16.  Melatonin 3 mg qhs   17.  C diff was negative   18.  Immodium 4 mg      Telemetry was reviewed, the patient remains in atrial fibrillation,  with PVCs.          Patient appropriate for discharge from medicine standpoint.  Will await to see about advancing diet prior to discharging possibly over the weekend.          Discharge Planning: I expect the patient to be discharged to SNF in 1-3 days.    Madhav Webber MD   07/05/19   10:50 AM

## 2019-07-05 NOTE — PROGRESS NOTES
Continued Stay Note   [place]     Patient Name: Cheri Ely  MRN: [id number]  Today's Date: 7/5/2019    Admit Date: 6/27/2019    Discharge Plan     Row Name 07/05/19 1001       Plan    Plan  [facility]    Patient/Family in Agreement with Plan  yes    Plan Comments  Pt will be discharged to [facility] upon discharge from hospital. [phone]        Discharge Codes    No documentation.             MALIA Morin

## 2019-07-06 VITALS
HEIGHT: 60 IN | RESPIRATION RATE: 16 BRPM | BODY MASS INDEX: 27.7 KG/M2 | SYSTOLIC BLOOD PRESSURE: 103 MMHG | WEIGHT: 141.09 LBS | OXYGEN SATURATION: 100 % | DIASTOLIC BLOOD PRESSURE: 44 MMHG | TEMPERATURE: 97.3 F | HEART RATE: 102 BPM

## 2019-07-06 LAB
ALBUMIN SERPL-MCNC: 2.1 G/DL (ref 3.5–5)
ANION GAP SERPL CALCULATED.3IONS-SCNC: 6 MMOL/L (ref 4–13)
BUN BLD-MCNC: 7 MG/DL (ref 5–21)
BUN/CREAT SERPL: 3.3 (ref 7–25)
CALCIUM SPEC-SCNC: 6.8 MG/DL (ref 8.4–10.4)
CHLORIDE SERPL-SCNC: 104 MMOL/L (ref 98–110)
CO2 SERPL-SCNC: 25 MMOL/L (ref 24–31)
CREAT BLD-MCNC: 2.13 MG/DL (ref 0.5–1.4)
DEPRECATED RDW RBC AUTO: 66.6 FL (ref 40–54)
ERYTHROCYTE [DISTWIDTH] IN BLOOD BY AUTOMATED COUNT: 20.4 % (ref 12–15)
GFR SERPL CREATININE-BSD FRML MDRD: 22 ML/MIN/1.73
GLUCOSE BLD-MCNC: 73 MG/DL (ref 70–100)
HCT VFR BLD AUTO: 28.1 % (ref 37–47)
HGB BLD-MCNC: 9.5 G/DL (ref 12–16)
MCH RBC QN AUTO: 31.9 PG (ref 28–32)
MCHC RBC AUTO-ENTMCNC: 33.8 G/DL (ref 33–36)
MCV RBC AUTO: 94.3 FL (ref 82–98)
PHOSPHATE SERPL-MCNC: 1.9 MG/DL (ref 2.5–4.5)
PLATELET # BLD AUTO: 81 10*3/MM3 (ref 130–400)
PMV BLD AUTO: 13.1 FL (ref 6–12)
POTASSIUM BLD-SCNC: 3.6 MMOL/L (ref 3.5–5.3)
RBC # BLD AUTO: 2.98 10*6/MM3 (ref 4.2–5.4)
SODIUM BLD-SCNC: 135 MMOL/L (ref 135–145)
WBC NRBC COR # BLD: 9.18 10*3/MM3 (ref 4.8–10.8)

## 2019-07-06 PROCEDURE — 85027 COMPLETE CBC AUTOMATED: CPT | Performed by: INTERNAL MEDICINE

## 2019-07-06 PROCEDURE — 80069 RENAL FUNCTION PANEL: CPT | Performed by: INTERNAL MEDICINE

## 2019-07-06 RX ORDER — LEVOTHYROXINE SODIUM 0.12 MG/1
125 TABLET ORAL DAILY
Start: 2019-07-07

## 2019-07-06 RX ORDER — PANTOPRAZOLE SODIUM 40 MG/1
40 TABLET, DELAYED RELEASE ORAL DAILY
Start: 2019-07-06

## 2019-07-06 RX ORDER — METOPROLOL TARTRATE 50 MG/1
50 TABLET, FILM COATED ORAL EVERY 12 HOURS SCHEDULED
Start: 2019-07-06 | End: 2019-08-12 | Stop reason: HOSPADM

## 2019-07-06 RX ORDER — LIDOCAINE HYDROCHLORIDE 30 MG/G
CREAM TOPICAL DAILY PRN
Start: 2019-07-06

## 2019-07-06 RX ADMIN — LEVOTHYROXINE SODIUM 125 MCG: 125 TABLET ORAL at 05:52

## 2019-07-06 RX ADMIN — PANTOPRAZOLE SODIUM 40 MG: 40 TABLET, DELAYED RELEASE ORAL at 08:02

## 2019-07-06 RX ADMIN — METOPROLOL TARTRATE 50 MG: 50 TABLET ORAL at 11:15

## 2019-07-06 NOTE — PROGRESS NOTES
Continued Stay Note  [facility]     Patient Name: Cheri Ely  MRN: [id number]  Today's Date: 7/6/2019    Admit Date: 6/27/2019    Discharge Plan     Row Name 07/06/19 1159       Plan    Plan Comments  Pt is being discharged to [facility]. Sunni RN has already called report and called EMS. SW will follow and assist if any discharge needs arise.     Final Discharge Disposition Code  03 - skilled nursing facility (SNF)        Discharge Codes    No documentation.       Expected Discharge Date and Time     Expected Discharge Date Expected Discharge Time    Jul 6, 2019             Eleanor Mireles

## 2019-07-06 NOTE — THERAPY DISCHARGE NOTE
Acute Care - Physical Therapy Discharge Summary  [facility]       Patient Name: Cheri Ely  : 1940  MRN: [id number]    Today's Date: 2019  Onset of Illness/Injury or Date of Surgery: 19    Date of Referral to PT: 19  Referring Physician: Dr. Webber       Admit Date: 2019      PT Recommendation and Plan    Visit Dx:    ICD-10-CM ICD-9-CM   1. Gastrointestinal hemorrhage with melena K92.1 578.1   2. Duodenal ulcer with perforation (CMS/[facility]) K26.5 532.50   3. Hypotension due to hypovolemia I95.89 458.8    E86.1 276.52   4. Mobility impaired Z74.09 799.89       Outcome Measures     Row Name 19 0948             How much help from another person do you currently need...    Turning from your back to your side while in flat bed without using bedrails?  3  -JENNIFER      Moving from lying on back to sitting on the side of a flat bed without bedrails?  2  -JENNIFER      Moving to and from a bed to a chair (including a wheelchair)?  2  -JENNIFER      Standing up from a chair using your arms (e.g., wheelchair, bedside chair)?  2  -JENNIFER      Climbing 3-5 steps with a railing?  1  -JENNIFER      To walk in hospital room?  1  -JENNIFER      AM-PAC 6 Clicks Score (PT)  11  -JENNIFER         Functional Assessment    Outcome Measure Options  AM-PAC 6 Clicks Basic Mobility (PT)  -JENNIFER        User Key  (r) = Recorded By, (t) = Taken By, (c) = Cosigned By    Initials Name Provider Type    Pramod Pate PTA Physical Therapy Assistant              Rehab Goal Summary     Row Name 19 1611             Physical Therapy Goals    Bed Mobility Goal Selection (PT)  bed mobility, PT goal 1  -LG      Transfer Goal Selection (PT)  transfer, PT goal 1  -LG         Bed Mobility Goal 1 (PT)    Activity/Assistive Device (Bed Mobility Goal 1, PT)  rolling to left;rolling to right;sit to supine;supine to sit  -LG      [place] Level/Cues Needed (Bed Mobility Goal 1, PT)  moderate assist (50-74% patient effort)  -LG      Time Frame (Bed  Mobility Goal 1, PT)  long term goal (LTG);10 days  -LG      Progress/Outcomes (Bed Mobility Goal 1, PT)  goal not met  -LG         Transfer Goal 1 (PT)    Activity/Assistive Device (Transfer Goal 1, PT)  bed-to-chair/chair-to-bed;lateral transfer  -LG      [place] Level/Cues Needed (Transfer Goal 1, PT)  moderate assist (50-74% patient effort)  -LG      Time Frame (Transfer Goal 1, PT)  long term goal (LTG);10 days  -LG      Progress/Outcome (Transfer Goal 1, PT)  goal not met  -LG        User Key  (r) = Recorded By, (t) = Taken By, (c) = Cosigned By    Initials Name Provider Type Discipline    James Marquez, PTA Physical Therapy Assistant PT              PT Discharge Summary  Anticipated Discharge Disposition (PT): skilled nursing facility  Reason for Discharge: Discharge from facility  Outcomes Achieved: Refer to plan of care for updates on goals achieved  Discharge Destination: SNF      James Augustine PTA   7/6/2019

## 2019-07-06 NOTE — DISCHARGE SUMMARY
[facility] Medicine Services  DISCHARGE SUMMARY       Date of Admission: 6/27/2019  Date of Discharge:  7/6/2019  Primary Care Physician: Manny Peters, ABILIO    Discharge Diagnoses:  Patient Active Problem List   Diagnosis   • Chronic kidney disease on chronic dialysis (CMS/[facility])   • Gastrointestinal hemorrhage   • (HFpEF) heart failure with preserved ejection fraction (CMS/HCC)   • Hypothyroidism, TSH 12.4, FT4 0.41   • Paroxysmal atrial fibrillation (CMS/HCC)   • Acute colitis   • ESRD (end stage renal disease) (CMS/[facility])   • Acute on chronic anemia   • Duodenal ulcer   • Chronic combined systolic and diastolic CHF (congestive heart failure) (CMS/[facility])         Presenting Problem/History of Present Illness:  Gastrointestinal hemorrhage with melena [K92.1]     Chief Complaint on Day of Discharge:   Weakness    History of Present Illness on Day of Discharge:   The patient has been accepted to skilled nursing facility and is scheduled to go back there today.    Hospital Course  Patient is a 79-year-old female  with past medical history significant for previous GI bleed with peptic ulcer disease, coronary artery disease status post CABG, aortic valve disease status post aortic surgery end-stage renal disease on hemodialysis Monday, Wednesday and Friday, hyperlipidemia, hypertension, osteoporosis who came in via the emergency room with chief complaint of black tarry stool.  Patient is on hemodialysis had usual hemodialysis a day prior to presentation.  According to the patient and family early hours of the morning patient had a large bowel movement.  Patient developed anterior abdominal wall tiny abscess at old incisional scar site and patient was on clindamycin, according to the family members, patient started the last couple of days having persistent diarrhea.  On the day of presentation, patient had a black tarry stool and they became concerned and decided to bring the  patient in patient. She was complaining of generalized weakness easy fatigability.On presentation to the emergency room patient was found to be severely hypotensive, patient was found to have severe anemia secondary to blood loss, patient was type and cross has been started on transfusion and transferred to the intensive care unit to be admitted under the services of hospitalist medicine with GI consultation.  Patient has a history of similar manifestation in the past was on Plavix which was discontinued currently only on aspirin.  Patient denies being on any form of anticoagulation but INR was found to be elevated.    Plan:   Keep patient n.p.o. for now  Patient was type and cross receiving blood transfusion now  Hemoglobin and hematocrit level every 6 hourly  Transfuse to keep hemoglobin greater than 7  INR is elevated 2.3 will transfuse FFP  Keep on IV fluid  Patient was seen in consultation by gastroenterologist  End-stage renal disease nephrology consultation for maintenance hemodialysis  Acute colitis managed with empiric antibiotic Levaquin and Flagyl  Patient on Protonix drip  Patient received empiric antibiotic Rocephin in the emergency room  Hold antihypertensive medication for now    Patient received 1 unit of PRBCs and 4 units of FFP within the first 24 hours of admission. An NG tube was placed for decompression and antibiotics were initiated at the time of admission.  The patient was placed on broad-spectrum antibiotics at the time of admission because of a CT finding of a new circumferential wall thickening along the descending and proximal sigmoid colon suggestive of colitis.  General surgery, gastroenterology and nephrology were consulted at the time of admission.  Blood pressure medications were held secondary to low blood pressure.  Patient continued to be managed conservatively while in the intensive care unit and was transferred to the medical surgical floor on day 5.  An upper GI study was  performed on day 5 with no evidence of duodenal perforation noted.  The patient's H&H remained stable.  On the fifth hospital day the patient was started on clear liquids and tolerated it well.  Diet was slowly advanced and was well-tolerated.  There is no evidence of further blood loss.  On the day of discharge patient was tolerating a regular diet without difficulty.  She was tolerating maintenance hemodialysis without issues as well.      Consults:   Gastroenterology:  Melena/coffee-ground emesis  Patient with known history of duodenal ulcer status post surgical repair for perforation 6 weeks or so ago.  Likely has recurrent disease.  Microperforation is contraindication to proceeding with endoscopy.  Correct coagulation parameters.  INR is greater than 2.  Platelet count is reasonable.     Abnormal findings on CT imaging at the duodenal bulb  Microperforation noted.  General surgery to see.    Nephrology:  Assessment:  End-stage renal disease  Hypertension with recent hypotension  Anemia chronic kidney disease and acute GI blood loss  Secondary hyperparathyroidism    General surgery:   Gastrointestinal hemorrhage        Will support and monitor.  Probably needs an upper GI at some point.  She seems hemodynamically and clinically stable at this time.  We very difficult to address surgically if this area is leaking again.        Laila Rodriguez MD    Nephrology:   Assessment:  End-stage renal disease  Hypertension with recent hypotension  Anemia chronic kidney disease and acute GI blood loss  Secondary hyperparathyroidism    Plan:  We will consider extra dialysis treatment tomorrow for volume and elect light optimization  Continue to monitor lab work including blood counts with hospital service  GI/surgery planning ongoing     Chris Hsu MD    Pertinent Test Results:   Lab Results (all)     Procedure Component Value Units Date/Time    CBC (No Diff) [[id number]]  (Abnormal) Collected:  07/06/19 0424     Specimen:  Blood Updated:  07/06/19 0630     WBC 9.18 10*3/mm3      RBC 2.98 10*6/mm3      Hemoglobin 9.5 g/dL      Hematocrit 28.1 %      MCV 94.3 fL      MCH 31.9 pg      MCHC 33.8 g/dL      RDW 20.4 %      RDW-SD 66.6 fl      MPV 13.1 fL      Platelets 81 10*3/mm3     Renal Function Panel [[id number]]  (Abnormal) Collected:  07/06/19 0421    Specimen:  Blood Updated:  07/06/19 0522     Glucose 73 mg/dL      BUN 7 mg/dL      Creatinine 2.13 mg/dL      Sodium 135 mmol/L      Potassium 3.6 mmol/L      Chloride 104 mmol/L      CO2 25.0 mmol/L      Calcium 6.8 mg/dL      Albumin 2.10 g/dL      Phosphorus 1.9 mg/dL      Anion Gap 6.0 mmol/L      BUN/Creatinine Ratio 3.3     eGFR Non African Amer 22 mL/min/1.73     Narrative:       GFR Normal >60  Chronic Kidney Disease <60  Kidney Failure <15    Renal Function Panel [[id number]]  (Abnormal) Collected:  07/05/19 0655    Specimen:  Blood Updated:  07/05/19 0728     Glucose 77 mg/dL      BUN 12 mg/dL      Creatinine 3.01 mg/dL      Sodium 138 mmol/L      Potassium 4.3 mmol/L      Chloride 111 mmol/L      CO2 21.0 mmol/L      Calcium 7.3 mg/dL      Albumin 2.20 g/dL      Phosphorus 2.2 mg/dL      Anion Gap 6.0 mmol/L      BUN/Creatinine Ratio 4.0     eGFR Non African Amer 15 mL/min/1.73     Blood Culture - Blood, Blood, Central Line [[id number]] Collected:  06/27/19 1416    Specimen:  Blood, Central Line Updated:  07/02/19 1500     Blood Culture No growth at 5 days    Blood Culture - Blood, Blood, Central Line [[id number]] Collected:  06/27/19 1245    Specimen:  Blood, Central Line Updated:  07/02/19 1315     Blood Culture No growth at 5 days    Protime-INR [[id number]]  (Abnormal) Collected:  07/02/19 0319    Specimen:  Blood Updated:  07/02/19 0426     Protime 30.3 Seconds      INR 2.77    Renal Function Panel [[id number]]  (Abnormal) Collected:  07/02/19 0319    Specimen:  Blood Updated:  07/02/19 0401     Glucose 91 mg/dL      BUN 11 mg/dL      Creatinine 2.32 mg/dL       Sodium 141 mmol/L      Potassium 3.4 mmol/L      Chloride 111 mmol/L      CO2 25.0 mmol/L      Calcium 7.4 mg/dL      Albumin 2.40 g/dL      Phosphorus 1.7 mg/dL      Anion Gap 5.0 mmol/L      BUN/Creatinine Ratio 4.7     eGFR Non African Amer 20 mL/min/1.73     CBC Auto Differential [[id number]]  (Abnormal) Collected:  07/02/19 0319    Specimen:  Blood Updated:  07/02/19 0349     WBC 10.52 10*3/mm3      RBC 3.05 10*6/mm3      Hemoglobin 9.6 g/dL      Hematocrit 29.4 %      MCV 96.4 fL      MCH 31.5 pg      MCHC 32.7 g/dL      RDW 19.8 %      RDW-SD 64.7 fl      MPV 12.2 fL      Platelets 136 10*3/mm3      Neutrophil % 86.9 %      Lymphocyte % 7.6 %      Monocyte % 3.2 %      Eosinophil % 1.2 %      Basophil % 0.2 %      Neutrophils, Absolute 9.14 10*3/mm3      Lymphocytes, Absolute 0.80 10*3/mm3      Monocytes, Absolute 0.34 10*3/mm3      Eosinophils, Absolute 0.13 10*3/mm3      Basophils, Absolute 0.02 10*3/mm3     TSH [[id number]]  (Abnormal) Collected:  07/01/19 0225    Specimen:  Blood Updated:  07/01/19 0333     TSH 12.400 mIU/mL     T4, Free [[id number]]  (Abnormal) Collected:  07/01/19 0225    Specimen:  Blood Updated:  07/01/19 0319     Free T4 0.41 ng/dL     Basic Metabolic Panel [[id number]]  (Abnormal) Collected:  06/30/19 0221    Specimen:  Blood Updated:  06/30/19 0321     Glucose 75 mg/dL      BUN 10 mg/dL      Creatinine 1.75 mg/dL      Sodium 141 mmol/L      Potassium 3.6 mmol/L      Chloride 106 mmol/L      CO2 27.0 mmol/L      Calcium 7.3 mg/dL      eGFR Non African Amer 28 mL/min/1.73      BUN/Creatinine Ratio 5.7     Anion Gap 8.0 mmol/L     CBC Auto Differential [[id number]]  (Abnormal) Collected:  06/30/19 0221    Specimen:  Blood Updated:  06/30/19 0315     WBC 7.56 10*3/mm3      RBC 2.93 10*6/mm3      Hemoglobin 9.2 g/dL      Hematocrit 27.9 %      MCV 95.2 fL      MCH 31.4 pg      MCHC 33.0 g/dL      RDW 19.2 %      RDW-SD 62.5 fl      MPV 11.8 fL      Platelets 89 10*3/mm3      Neutrophil %  79.7 %      Lymphocyte % 12.4 %      Monocyte % 4.4 %      Eosinophil % 2.2 %      Basophil % 0.4 %      Immature Grans % 0.9 %      Neutrophils, Absolute 6.02 10*3/mm3      Lymphocytes, Absolute 0.94 10*3/mm3      Monocytes, Absolute 0.33 10*3/mm3      Eosinophils, Absolute 0.17 10*3/mm3      Basophils, Absolute 0.03 10*3/mm3      Immature Grans, Absolute 0.07 10*3/mm3      nRBC 0.0 /100 WBC     Phosphorus [[id number]]  (Abnormal) Collected:  06/30/19 0221    Specimen:  Blood Updated:  06/30/19 0313     Phosphorus 1.7 mg/dL     Magnesium [[id number]]  (Normal) Collected:  06/30/19 0221    Specimen:  Blood Updated:  06/30/19 0313     Magnesium 1.7 mg/dL     Hemoglobin & Hematocrit, Blood [[id number]]  (Abnormal) Collected:  06/29/19 1758    Specimen:  Blood Updated:  06/29/19 1806     Hemoglobin 9.3 g/dL      Hematocrit 28.2 %     Hemoglobin & Hematocrit, Blood [[id number]]  (Abnormal) Collected:  06/29/19 1143    Specimen:  Blood Updated:  06/29/19 1200     Hemoglobin 9.6 g/dL      Hematocrit 28.0 %     Magnesium [[id number]]  (Normal) Collected:  06/29/19 0708    Specimen:  Blood Updated:  06/29/19 0745     Magnesium 1.7 mg/dL     Phosphorus [[id number]]  (Abnormal) Collected:  06/29/19 0708    Specimen:  Blood Updated:  06/29/19 0745     Phosphorus 2.0 mg/dL     Protime-INR [[id number]]  (Abnormal) Collected:  06/29/19 0708    Specimen:  Blood Updated:  06/29/19 0733     Protime 21.6 Seconds      INR 1.81    Clostridium Difficile Toxin, PCR - Stool, Per Rectum [[id number]]  (Normal) Collected:  06/28/19 0735    Specimen:  Stool from Per Rectum Updated:  06/28/19 1120     C. Difficile Toxins by PCR Negative    Narrative:       Performance characteristics of test not established for patients <2 years of age.    Occult Blood X 1, Stool - Stool, Per Rectum [[id number]]  (Abnormal) Collected:  06/28/19 0735    Specimen:  Stool from Per Rectum Updated:  06/28/19 0837     Fecal Occult Blood Positive    Protime-INR [[id number]]   (Abnormal) Collected:  06/28/19 0631    Specimen:  Blood Updated:  06/28/19 0732     Protime 21.1 Seconds      INR 1.76    Comprehensive Metabolic Panel [[id number]]  (Abnormal) Collected:  06/28/19 0631    Specimen:  Blood Updated:  06/28/19 0717     Glucose 59 mg/dL      BUN 38 mg/dL      Creatinine 3.02 mg/dL      Sodium 141 mmol/L      Potassium 3.5 mmol/L      Chloride 106 mmol/L      CO2 26.0 mmol/L      Calcium 7.2 mg/dL      Total Protein 4.9 g/dL      Albumin 2.10 g/dL      ALT (SGPT) 32 U/L      AST (SGOT) 35 U/L      Alkaline Phosphatase 179 U/L      Total Bilirubin 0.6 mg/dL      eGFR Non African Amer 15 mL/min/1.73      Globulin 2.8 gm/dL      A/G Ratio 0.8 g/dL      BUN/Creatinine Ratio 12.6     Anion Gap 9.0 mmol/L     Narrative:       GFR Normal >60  Chronic Kidney Disease <60  Kidney Failure <15    Phosphorus [[id number]]  (Normal) Collected:  06/28/19 0631    Specimen:  Blood Updated:  06/28/19 0715     Phosphorus 2.5 mg/dL     Magnesium [[id number]]  (Normal) Collected:  06/28/19 0631    Specimen:  Blood Updated:  06/28/19 0715     Magnesium 1.6 mg/dL     CBC Auto Differential [[id number]]  (Abnormal) Collected:  06/28/19 0631    Specimen:  Blood Updated:  06/28/19 0703     WBC 9.18 10*3/mm3      RBC 3.33 10*6/mm3      Hemoglobin 10.3 g/dL      Hematocrit 30.5 %      MCV 91.6 fL      MCH 30.9 pg      MCHC 33.8 g/dL      RDW 19.4 %      RDW-SD 62.3 fl      MPV 11.5 fL      Platelets 151 10*3/mm3      Neutrophil % 86.2 %      Lymphocyte % 10.0 %      Monocyte % 2.8 %      Eosinophil % 0.3 %      Basophil % 0.2 %      Immature Grans % 0.5 %      Neutrophils, Absolute 7.90 10*3/mm3      Lymphocytes, Absolute 0.92 10*3/mm3      Monocytes, Absolute 0.26 10*3/mm3      Eosinophils, Absolute 0.03 10*3/mm3      Basophils, Absolute 0.02 10*3/mm3      Immature Grans, Absolute 0.05 10*3/mm3      nRBC 0.0 /100 WBC     Hemoglobin & Hematocrit, Blood [[id number]]  (Abnormal) Collected:  06/28/19 0631    Specimen:   Blood Updated:  06/28/19 0702     Hemoglobin 10.3 g/dL      Hematocrit 30.5 %     Hemoglobin & Hematocrit, Blood [[id number]]  (Abnormal) Collected:  06/27/19 2301    Specimen:  Blood Updated:  06/27/19 2341     Hemoglobin 10.1 g/dL      Hematocrit 29.5 %     Hemoglobin & Hematocrit, Blood [[id number]]  (Abnormal) Collected:  06/27/19 1851    Specimen:  Blood Updated:  06/27/19 1915     Hemoglobin 10.3 g/dL      Hematocrit 30.7 %     Lactic Acid, Reflex [[id number]]  (Normal) Collected:  06/27/19 1642    Specimen:  Blood Updated:  06/27/19 1704     Lactate 1.4 mmol/L     Lactic Acid, Reflex Timer (This will reflex a repeat order 3-3:15 hours after ordered.) [[id number]] Collected:  06/27/19 1245    Specimen:  Blood, Central Line Updated:  06/27/19 1630     Extra Tube Hold for add-ons.     Comment: Auto resulted.       POC Occult Blood Stool [[id number]]  (Abnormal) Collected:  06/27/19 1333    Specimen:  Stool Updated:  06/27/19 1334     Fecal Occult Blood Positive    Comprehensive Metabolic Panel [[id number]]  (Abnormal) Collected:  06/27/19 1245    Specimen:  Blood Updated:  06/27/19 1321     Glucose 110 mg/dL      BUN 28 mg/dL      Creatinine 2.57 mg/dL      Sodium 140 mmol/L      Potassium 3.2 mmol/L      Chloride 108 mmol/L      CO2 21.0 mmol/L      Calcium 6.5 mg/dL      Total Protein 4.4 g/dL      Albumin 1.80 g/dL      ALT (SGPT) 35 U/L      AST (SGOT) 43 U/L      Alkaline Phosphatase 181 U/L      Total Bilirubin 0.5 mg/dL      eGFR Non African Amer 18 mL/min/1.73      Globulin 2.6 gm/dL      A/G Ratio 0.7 g/dL      BUN/Creatinine Ratio 10.9     Anion Gap 11.0 mmol/L     Narrative:       GFR Normal >60  Chronic Kidney Disease <60  Kidney Failure <15    Lactic Acid, Plasma [[id number]]  (Abnormal) Collected:  06/27/19 1245    Specimen:  Blood, Central Line Updated:  06/27/19 1320     Lactate 4.9 mmol/L     Protime-INR [[id number]]  (Abnormal) Collected:  06/27/19 1245    Specimen:  Blood Updated:  06/27/19 1323      Protime 26.8 Seconds      INR 2.37    aPTT [[id number]]  (Abnormal) Collected:  06/27/19 1245    Specimen:  Blood Updated:  06/27/19 1320     PTT 43.2 seconds     CBC Auto Differential [[id number]]  (Abnormal) Collected:  06/27/19 1245    Specimen:  Blood Updated:  06/27/19 1307     WBC 9.39 10*3/mm3      RBC 2.88 10*6/mm3      Hemoglobin 9.2 g/dL      Hematocrit 28.0 %      MCV 97.2 fL      MCH 31.9 pg      MCHC 32.9 g/dL      RDW 18.4 %      RDW-SD 63.4 fl      MPV 11.3 fL      Platelets 162 10*3/mm3      Neutrophil % 88.1 %      Lymphocyte % 7.6 %      Monocyte % 2.4 %      Eosinophil % 0.2 %      Basophil % 0.2 %      Immature Grans % 1.5 %      Neutrophils, Absolute 8.27 10*3/mm3      Lymphocytes, Absolute 0.71 10*3/mm3      Monocytes, Absolute 0.23 10*3/mm3      Eosinophils, Absolute 0.02 10*3/mm3      Basophils, Absolute 0.02 10*3/mm3      Immature Grans, Absolute 0.14 10*3/mm3      nRBC 0.2 /100 WBC         Imaging Results (all)     Procedure Component Value Units Date/Time    XR Abdomen KUB [[id number]] Collected:  07/02/19 1440     Updated:  07/02/19 1447    Narrative:       EXAMINATION:  XR ABDOMEN KUB-  7/2/2019 2:25 PM CDT     HISTORY: possible contained air leak; K92.1-Melena; K26.5-Chronic or  unspecified duodenal ulcer with perforation; I95.89-Other hypotension;  E86.1-Hypovolemia; Z74.09-Other reduced mobility      COMPARISON: Abdominal radiograph dated 03/09/2019 and upper GI  examination performed yesterday. 06/27/2019 abdomen and pelvis CT was  also reviewed.     TECHNIQUE: Single view: KUB     FINDINGS:      Bowel gas pattern is nonspecific with a small amount of gas appreciated  in small and large bowel, with out plain film evidence of extraluminal  gas/free air. There are no dilated bowel loops indicate obstruction.     Changes from gastric banding observed. Lumbar spine surgery and right  femoral neck pinning changes observed.     Surgical clips also in the right upper quadrant out  cholecystectomy.  Surgical clips noted in the right hemipelvis.     Aortoiliac calcifications identified.       Impression:       1. Nonspecific bowel gas pattern.  This report was finalized on 07/02/2019 14:44 by Dr. Valentino Mccollum MD.    FL Upper GI Water Soluble [[id number]] Collected:  07/01/19 1320     Updated:  07/01/19 1324    Narrative:       EXAMINATION: FL UPPER GI WATER SOLUBLE-     7/1/2019 12:45 PM CDT     HISTORY: Possible leak from prior oversew duodenal ulcer; K92.1-Melena;  K26.5-Chronic or unspecified duodenal ulcer with perforation;  I95.89-Other hypotension; E86.1-Hypovolemia     Water-soluble upper GI.  Number of images = 8.  Fluoroscopy time = 1.9 minutes.     Water-soluble contrast was placed through the indwelling nasogastric  tube.  Normal gastric emptying.  No contrast extravasation to indicate perforation at the proximal  duodenum.     Summary:  1. No evidence of perforation.  This report was finalized on 07/01/2019 13:21 by Dr. Adithya Pérez MD.    XR Chest 1 View [[id number]] Collected:  06/27/19 1628     Updated:  06/27/19 1632    Narrative:       XR CHEST 1 VW- 6/27/2019 1:19 PM CDT     HISTORY: CENTRAL LINE PLACEMENT       COMPARISON: Chest exam dated 05/12/2019.     FINDINGS:   New right subclavian central line with tip projecting over the right  atrium. Underlying cardiomegaly which is stable. Pulmonary vasculature  are unremarkable. There are fibrotic changes throughout the  interstitium. No consolidation. No pleural effusion or pneumothorax.  Sternal wires are identified. Additional valvular prosthesis.       Impression:       1. New right subclavian central line with tip projecting over the right  atrium. Otherwise stable exam.        This report was finalized on 06/27/2019 16:28 by Dr Ronan Varner, .    CT Abdomen Pelvis Without Contrast [[id number]] Collected:  06/27/19 1339     Updated:  06/27/19 1350    Narrative:       CT ABDOMEN AND PELVIS without contrast 6/27/2019 1:15 PM  CDT     HISTORY: History of duodenal perforation, now with melena and epigastric  pain     COMPARISON: CT scan dated 05/12/2019      DLP: 721 mGy cm     TECHNIQUE: Noncontrast enhanced images of the abdomen and pelvis  obtained without oral contrast.      FINDINGS:   Linear scarring in both bases. No consolidation. Bilateral trace  layering effusions.      LIVER: Grossly normal.      BILIARY SYSTEM: The gallbladder has been removed. There is no  intrahepatic or extrahepatic ductal dilation.      PANCREAS: Diffuse atrophy. No focal lesion.      SPLEEN: Unremarkable.      KIDNEYS AND ADRENALS: Adrenal glands are unremarkable. Bilateral severe  renal atrophy. No hydronephrosis.  The ureters are decompressed and  normal in appearance.     RETROPERITONEUM: No adenopathy or mass.      GI TRACT: Prior gastric band. The stomach is mildly distended. There are  foci of high density material identified within the gastric lumen, near  the antrum (series 3-image 38). I suspect this is hemorrhage. There is a  small focus of extraluminal gas in the region of the duodenal bulb  (series 3-image 27). Mild fat stranding in this area as well. The  duodenum is nondistended. In general, the small bowel is decompressed.  There is a mild circumferential wall thickening along the descending and  proximal sigmoid colon. The colon is nondistended.     OTHER: There is no gross free air as was seen on the recent comparison  exam. No gross intraperitoneal free fluid. Surgical changes along the  anterior abdominal wall. Prior lumbar spinal fusion. Prior right hip  fracture fixation.     PELVIS: No mass lesion, fluid collection or significant lymphadenopathy  is seen in the pelvis. The urinary bladder is normal in appearance.       Impression:       1. There is a small amount of high-density material within the gastric  lumen in the region of the antrum, thought to reflect hemorrhage.   2. There is a small focus of extraluminal air adjacent to the  "duodenal  bulb. This may reflect a recurrent microperforation. No gross free air  as was seen on the recent comparison exam. No gross intraperitoneal free  fluid.  3. There is a new circumferential wall thickening along the descending  and proximal sigmoid colon, the appearance of which suggests a mild  colitis.     The results of this exam were discussed with Elder Calovdy at the time of  this dictation on 06/27/2019 at 1347 hours        This report was finalized on 06/27/2019 13:47 by Dr Ronan Varner, .              Condition on Discharge:    Stable    Physical Exam on Discharge:  /44 (BP Location: Right arm, Patient Position: Sitting)   Pulse 102   Temp 97.3 °F (36.3 °C) (Axillary)   Resp 16   Ht 152.4 cm (60\")   Wt 64 kg (141 lb 1.5 oz)   SpO2 100%   BMI 27.56 kg/m²   Physical Exam  Constitutional: She is oriented to person, place, and time. No distress. resting in bed   HENT:   Head: Normocephalic and atraumatic.   Eyes: Conjunctivae are normal. No scleral icterus.   Neck: Neck supple. No JVD present.   Cardiovascular: Exam reveals no gallop and no friction rub.   Murmur heard. Edema of left upper extremity   Irregularly irregular, tachycardia; left-sided fistula with palpable thrill  Pulmonary/Chest: Effort normal and breath sounds normal. No stridor. No respiratory distress.   Abdominal: Soft. Bowel sounds are normal.   Musculoskeletal: She exhibits no edema.   Neurological: She is alert and oriented to person, place, and time.   Skin: Skin is dry. No rash noted. She is not diaphoretic. No pallor. Cool extremities.   Psychiatric: She has a normal mood and affect.         Discharge Disposition:  Skilled Nursing Facility (DC - External)    Discharge Medications:     Discharge Medications      New Medications      Instructions Start Date   collagenase 250 UNIT/GM ointment   Topical, Every 24 Hours Scheduled      lidocaine 3 % cream cream   Apply externally, Daily PRN      metoprolol tartrate 50 MG " tablet  Commonly known as:  LOPRESSOR   50 mg, Oral, Every 12 Hours Scheduled      pantoprazole 40 MG EC tablet  Commonly known as:  PROTONIX   40 mg, Oral, Daily         Changes to Medications      Instructions Start Date   levothyroxine 125 MCG tablet  Commonly known as:  SYNTHROID, LEVOTHROID  What changed:    · medication strength  · how much to take   125 mcg, Oral, Daily   Start Date:  7/7/2019        Continue These Medications      Instructions Start Date   acetaminophen 325 MG tablet  Commonly known as:  TYLENOL   650 mg, Oral, Every 6 Hours PRN      allopurinol 100 MG tablet  Commonly known as:  ZYLOPRIM   100 mg, Oral, Daily      epoetin lucy 36850 UNIT/ML injection  Commonly known as:  EPOGEN,PROCRIT   10,000 Units, Subcutaneous, 3 Times Weekly      ondansetron ODT 4 MG disintegrating tablet  Commonly known as:  ZOFRAN-ODT   4 mg, Oral, Every 8 Hours PRN      pravastatin 40 MG tablet  Commonly known as:  PRAVACHOL   40 mg, Oral, Daily      JOHN-SANGEETA tablet   1 tablet, Oral, Daily      sucralfate 1 GM/10ML suspension  Commonly known as:  CARAFATE   1 g, Oral, 2 Times Daily      Vitamin D 2000 units capsule   2,000 Units, Oral, Daily         Stop These Medications    aspirin 81 MG EC tablet     carvedilol 12.5 MG tablet  Commonly known as:  COREG     clindamycin 150 MG capsule  Commonly known as:  CLEOCIN     HYDROcodone-acetaminophen 7.5-325 MG per tablet  Commonly known as:  NORCO     losartan 25 MG tablet  Commonly known as:  COZAAR     megestrol 40 MG/ML suspension  Commonly known as:  MEGACE     Polyethyl Glyc-Propyl Glyc PF 0.4-0.3 % solution ophthalmic solution     sertraline 50 MG tablet  Commonly known as:  ZOLOFT            Discharge Diet:   Diet Instructions     Diet: Renal; Thin      Discharge Diet:  Renal    Fluid Consistency:  Thin          Discharge Care Plan / Instructions:   Discharge to SNF    Activity at Discharge:   Activity Instructions     Activity as Tolerated             Edin KING  DO Richard  07/06/19  10:56 AM    Time: Discharge Over 30 min    Please note that portions of this note may have been completed with a voice recognition program. Efforts were made to edit the dictations, but occasionally words are mistranscribed.

## 2019-07-06 NOTE — PLAN OF CARE
"Problem: Patient Care Overview  Goal: Plan of Care Review  Outcome: Ongoing (interventions implemented as appropriate)   07/06/19 0258   Coping/Psychosocial   Plan of Care Reviewed With patient   Plan of Care Review   Progress no change   OTHER   Outcome Summary Patient was a little bit lethargic at beginning of shift, post-dialysis, but has since been awake most of shift. c/o pain in her buttocks. Patient repositioned frequently and use of pillow support without much relief stating that she \"just can't get comfortable\". PRN Tylenol adminstered, as well. Turn q 2 hours.  Patient is anxious for discharge today. VSS. Bed alarm in place; safety maintained. Will continue to monitor.        Problem: Skin Injury Risk (Adult)  Goal: Skin Health and Integrity  Outcome: Ongoing (interventions implemented as appropriate)      Problem: Fall Risk (Adult)  Goal: Absence of Fall  Outcome: Ongoing (interventions implemented as appropriate)      Problem: Wound (Includes Pressure Injury) (Adult)  Goal: Signs and Symptoms of Listed Potential Problems Will be Absent, Minimized or Managed (Wound)  Outcome: Ongoing (interventions implemented as appropriate)        "

## 2019-07-06 NOTE — PROGRESS NOTES
Nephrology ([facility]) Progress Note      Patient:  Cheri Ely  YOB: 1940  Date of Service: 7/6/2019  MRN: [id number]   Acct: [id number]   Primary Care Physician: Manny Peters APRN  Advance Directive:   Code Status and Medical Interventions:   Ordered at: 06/27/19 1635     Level Of Support Discussed With:    Patient     Code Status:    CPR     Medical Interventions (Level of Support Prior to Arrest):    Full     Admit Date: 6/27/2019       Hospital Day: 9  Referring Provider: William Alfaro MD      Patient personally seen and examined.  Complete chart including Consults, Notes, Operative Reports, Labs, Cardiology, and Radiology studies reviewed as able.        Subjective:  Cheri Ely is a 79 y.o. female  whom we were consulted for end stage renal disease.  Routine hemodialysis Monday Wednesday Friday at [facility].  No recent issues with dialysis.  Recurrent history of GI bleeding. Had laparoscopic repair of perforated duodenal ulcer 5/12/19.  This time presented with melena and several episodes of dark emesis.  Was feeling very weak and lightheaded.  Initially was significantly hypotensive; improved following IV fluid resuscitation and PRBC transfusion.  Upper GI study on 7/01 showed no perforation.    Today, she was doing well with no new complaints. She is s.p dialysis on 7/5.    Allergies:  Patient has no known allergies.    Home Meds:  Medications Prior to Admission   Medication Sig Dispense Refill Last Dose   • acetaminophen (TYLENOL) 325 MG tablet Take 2 tablets by mouth Every 6 (Six) Hours As Needed for Mild Pain .   Past Week at Unknown time   • allopurinol (ZYLOPRIM) 100 MG tablet Take 100 mg by mouth Daily.   Past Month at Unknown time   • aspirin 81 MG EC tablet Take 1 tablet by mouth Daily.   Past Week at Unknown time   • B Complex-C-Folic Acid (JOHN-SANGEETA) tablet Take 1 tablet by mouth Daily.   Past Week at Unknown time   • carvedilol  (COREG) 12.5 MG tablet Take 12.5 mg by mouth Daily. Monday, Wednesday, and Friday dose. Hold if SBP < 100.    Past Week at Unknown time   • carvedilol (COREG) 12.5 MG tablet Take 12.5 mg by mouth 2 (Two) Times a Day With Meals. Sunday, Tuesday, Thursday, and Saturday dose. Hold if SBP <100.    Past Week at Unknown time   • Cholecalciferol (VITAMIN D) 2000 units capsule Take 1 capsule by mouth Daily. 30 capsule  Past Week at Unknown time   • clindamycin (CLEOCIN) 150 MG capsule Take 150 mg by mouth 4 (Four) Times a Day. 10 day therapy.  Start on 6/21/2019.   Past Week at Unknown time   • epoetin lucy (EPOGEN,PROCRIT) 83109 UNIT/ML injection Inject 1 mL under the skin into the appropriate area as directed 3 (Three) Times a Week.   Past Week at Unknown time   • HYDROcodone-acetaminophen (NORCO) 7.5-325 MG per tablet Take 1 tablet by mouth Every 4 (Four) Hours As Needed for Moderate Pain  for up to 30 doses. 30 tablet 0 Past Week at Unknown time   • levothyroxine (SYNTHROID, LEVOTHROID) 112 MCG tablet Take 112 mcg by mouth Daily.   Past Week at Unknown time   • losartan (COZAAR) 25 MG tablet Take 25 mg by mouth 3 (Three) Times a Week. Monday, Wednesday, and Friday.   Past Week at Unknown time   • megestrol (MEGACE) 40 MG/ML suspension Take 400 mg by mouth Daily.   Past Week at Unknown time   • ondansetron ODT (ZOFRAN-ODT) 4 MG disintegrating tablet Take 4 mg by mouth Every 8 (Eight) Hours As Needed for Nausea or Vomiting.   6/26/2019 at Unknown time   • Polyethyl Glyc-Propyl Glyc PF 0.4-0.3 % solution ophthalmic solution Administer 2 drops to both eyes Every 2 (Two) Hours As Needed (dry eyes).   Past Week at Unknown time   • pravastatin (PRAVACHOL) 40 MG tablet Take 40 mg by mouth Daily.   Past Week at Unknown time   • sertraline (ZOLOFT) 50 MG tablet Take 50 mg by mouth Daily.   Past Week at Unknown time   • sucralfate (CARAFATE) 1 GM/10ML suspension Take 10 mL by mouth 2 (Two) Times a Day. 1200 mL 0 Past Week at  Unknown time       Medicines:  Current Facility-Administered Medications   Medication Dose Route Frequency Provider Last Rate Last Dose   • acetaminophen (TYLENOL) tablet 650 mg  650 mg Oral Q6H PRN Azalia Shaver DO   650 mg at 07/05/19 2306   • collagenase ointment   Topical Q24H William Alfaro MD       • dextrose (D50W) 25 g/ 50mL Intravenous Solution 50 mL  50 mL Intravenous Q1H PRN William Alfaro MD   50 mL at 06/28/19 0744   • levothyroxine (SYNTHROID, LEVOTHROID) tablet 125 mcg  125 mcg Oral Daily Madhav Webber MD   125 mcg at 07/06/19 0552   • lidocaine 3 % cream   Apply externally Daily PRN Madhav Webber MD       • loperamide (IMODIUM) capsule 4 mg  4 mg Oral 4x Daily PRN Madhav Webber MD   4 mg at 07/05/19 1719   • melatonin tablet 3 mg  3 mg Oral Nightly Madhav Webber MD   3 mg at 07/05/19 2010   • metoprolol tartrate (LOPRESSOR) tablet 50 mg  50 mg Oral Q12H Madhav Webber MD   50 mg at 07/06/19 1115   • pantoprazole (PROTONIX) EC tablet 40 mg  40 mg Oral BID AC Madhav Webber MD   40 mg at 07/06/19 0802   • sodium chloride 0.9 % bolus 100 mL  100 mL Intravenous PRN Chris Hsu  mL/hr at 07/04/19 2010 100 mL at 07/04/19 2010       Past Medical History:  Past Medical History:   Diagnosis Date   • (HFpEF) heart failure with preserved ejection fraction (CMS/HCC) 3/9/2019   • Arthritis    • AVM (arteriovenous malformation) of small bowel, acquired (CMS/HCC) 4/2/2019   • Carotid artery disease (CMS/HCC)    • CHF (congestive heart failure) (CMS/HCC)    • Chronic kidney disease on chronic dialysis (CMS/HCC)    • Chronic mesenteric ischemia (CMS/HCC) 12/14/2017   • Chronic renal failure     on dialysis ON MON, WED, FRI   • Closed displaced intertrochanteric fracture of right femur (CMS/HCC) 2/8/2019    S/p Cephalomedullary nailing 2/8   • Coronary artery disease    • Diastolic dysfunction 3/9/2019    EF 55-60% from echocardiogram on 3/15    • Disease of thyroid gland    • Diverticulitis    • Gastric ulcer    • History of transfusion    • Hyperlipidemia    • Hypertension    • Injury of back    • Mesenteric artery insufficiency (CMS/HCC)    • Multilevel degenerative disc disease    • Osteoporosis    • Pancreatitis    • Pelvis fracture (CMS/HCC)    • Pneumonia        Past Surgical History:  Past Surgical History:   Procedure Laterality Date   • AORTAGRAM Right 1/8/2018    Procedure: MESENTERIC ANGIOGRAM, GROIN ACCESS, MYNX CLOSURE;  Surgeon: Tomasz San DO;  Location: [facility] OR;  Service:    • AORTIC VALVE SURGERY     • APPENDECTOMY     • BACK SURGERY     • CAPSULE ENDOSCOPY N/A 3/30/2019    Procedure: CAPSULE ENDOSCOPY M2A;  Surgeon: David Yu MD;  Location: [facility] ENDOSCOPY;  Service: Gastroenterology   • CARDIAC SURGERY     • CAROTID ENDARTERECTOMY Bilateral    • CATARACT EXTRACTION, BILATERAL     • CERVICAL SPINE SURGERY     • CHOLECYSTECTOMY     • COLON SURGERY     • COLONOSCOPY  10/01/2015   • COLONOSCOPY N/A 3/28/2019    Procedure: COLONOSCOPY WITH ANESTHESIA;  Surgeon: Rafita Merida MD;  Location: [facility] ENDOSCOPY;  Service: Gastroenterology   • CORONARY ARTERY BYPASS GRAFT     • DIALYSIS FISTULA CREATION     • ENDOSCOPY N/A 12/27/2018    Procedure: ESOPHAGOGASTRODUODENOSCOPY WITH ANESTHESIA;  Surgeon: Moni Garza MD;  Location: [facility] ENDOSCOPY;  Service: Gastroenterology   • ENDOSCOPY N/A 2/28/2019    Procedure: ESOPHAGOGASTRODUODENOSCOPY WITH ANESTHESIA;  Surgeon: Moni Garza MD;  Location: [facility] ENDOSCOPY;  Service: Gastroenterology   • ENDOSCOPY N/A 3/11/2019    Procedure: ESOPHAGOGASTRODUODENOSCOPY WITH ANESTHESIA;  Surgeon: Moni Garza MD;  Location: [facility] ENDOSCOPY;  Service: Gastroenterology   • ENDOSCOPY N/A 3/27/2019    Procedure: ESOPHAGOGASTRODUODENOSCOPY WITH ANESTHESIA;  Surgeon: Rafita Merida MD;  Location: [facility] ENDOSCOPY;  Service: Gastroenterology   • EXPLORATORY LAPAROTOMY N/A 5/13/2019     Procedure: LAPAROTOMY EXPLORATORY, OVERSEW DUODENAL ULCER WITH FRED PATCH, KOCHER MANEUVER;  Surgeon: Laila Rodriguez MD;  Location:  [facility] OR;  Service: General   • HIP TROCHANTERIC NAILING WITH INTRAMEDULLARY HIP SCREW Right 2/8/2019    Procedure: HIP TROCANTERIC NAILING LONG WITH INTRAMEDULLARY HIP SCREW;  Surgeon: Boo Camacho MD;  Location:  [facility] OR;  Service: Orthopedics   • JOINT REPLACEMENT      knee   • LAPAROSCOPIC GASTRIC BANDING     • LEEP     • LUMBAR FUSION     • TOE AMPUTATION Left     2nd toe   • TOTAL KNEE ARTHROPLASTY Bilateral    • TUBAL ABDOMINAL LIGATION     • UPPER GASTROINTESTINAL ENDOSCOPY  10/01/2015       Family History  Family History   Problem Relation Age of Onset   • Hypertension Mother    • Cancer Mother         stomach   • Coronary artery disease Father    • Heart attack Father    • Diabetes Brother    • Coronary artery disease Brother    • Colon cancer Neg Hx    • Colon polyps Neg Hx        Social History  Social History     Socioeconomic History   • Marital status:      Spouse name: Not on file   • Number of children: Not on file   • Years of education: Not on file   • Highest education level: Not on file   Tobacco Use   • Smoking status: Never Smoker   • Smokeless tobacco: Never Used   Substance and Sexual Activity   • Alcohol use: No   • Drug use: No   • Sexual activity: Defer         Review of Systems:  History obtained from chart review and the patient  General ROS: No fever or chills  Respiratory ROS: No cough, shortness of breath, wheezing  Cardiovascular ROS: No chest pain or palpitations  Gastrointestinal ROS: positive for - blood in stools  Genito-Urinary ROS: No dysuria or hematuria  Neurological ROS: no headache or dizziness    Objective:  Patient Vitals for the past 24 hrs:   BP Temp Temp src Pulse Resp SpO2 Weight   07/06/19 0811 103/44 97.3 °F (36.3 °C) Axillary 102 16 100 % --   07/06/19 0425 104/50 98 °F (36.7 °C) Oral 79 18 96 % --   07/06/19 0000 --  -- -- 84 -- -- --   07/05/19 1954 103/49 97.9 °F (36.6 °C) Oral 94 18 98 % 64 kg (141 lb 1.5 oz)   07/05/19 1600 (!) 87/45 -- -- -- -- -- --   07/05/19 1545 (!) 73/35 97.8 °F (36.6 °C) Oral 118 20 -- --   07/05/19 1516 91/47 98.2 °F (36.8 °C) -- 88 20 -- --       Intake/Output Summary (Last 24 hours) at 7/6/2019 1304  Last data filed at 7/6/2019 1243  Gross per 24 hour   Intake 240 ml   Output 1674 ml   Net -1434 ml     General: awake/alert    Neck: supple, no JVD  Chest:  clear to auscultation bilaterally without respiratory distress  CVS: regular rate and rhythm  Abdominal: soft, nontender, positive bowel sounds  Extremities: bilateral trace edema  Skin: warm and dry without rash  Neuro: no focal motor deficits    Labs:  Results from last 7 days   Lab Units 07/06/19  0421 07/05/19  0655 07/04/19  0554   WBC 10*3/mm3 9.18 10.04 8.86   HEMOGLOBIN g/dL 9.5* 10.0* 9.9*   HEMATOCRIT % 28.1* 31.2* 30.0*   PLATELETS 10*3/mm3 81* 130 119*         Results from last 7 days   Lab Units 07/06/19  0421 07/05/19  0655 07/04/19  0554   SODIUM mmol/L 135 138 140   POTASSIUM mmol/L 3.6 4.3 4.0   CHLORIDE mmol/L 104 111* 110   CO2 mmol/L 25.0 21.0* 22.0*   BUN mg/dL 7 12 9   CREATININE mg/dL 2.13* 3.01* 2.41*   CALCIUM mg/dL 6.8* 7.3* 7.2*   GLUCOSE mg/dL 73 77 75       Radiology:   Imaging Results (last 72 hours)     ** No results found for the last 72 hours. **          Culture:  Blood Culture   Date Value Ref Range Status   06/27/2019 No growth at 3 days  Preliminary   06/27/2019 No growth at 3 days  Preliminary         Assessment   1.  End stage renal disease on hemodialysis MWF  2.  Hypertension with recent HYPOtension  3.  Anemia of CKD and acute GI blood loss  4.  GI bleeding  5.  Secondary hyperparathyroidism    Plan:  1.  Dialysis is due next on 7/8  2.  Continue to hold Metoprolol if SBP < 110  3.  Monitor labs        Faisal Sevilla MD  7/6/2019  1:04 PM

## 2019-08-09 ENCOUNTER — OFFICE VISIT (OUTPATIENT)
Dept: VASCULAR SURGERY | Facility: CLINIC | Age: 79
End: 2019-08-09

## 2019-08-09 ENCOUNTER — TELEPHONE (OUTPATIENT)
Dept: VASCULAR SURGERY | Facility: CLINIC | Age: 79
End: 2019-08-09

## 2019-08-09 VITALS
BODY MASS INDEX: 27.68 KG/M2 | OXYGEN SATURATION: 97 % | DIASTOLIC BLOOD PRESSURE: 62 MMHG | WEIGHT: 141 LBS | SYSTOLIC BLOOD PRESSURE: 90 MMHG | HEIGHT: 60 IN | HEART RATE: 73 BPM

## 2019-08-09 DIAGNOSIS — Z79.02 ENCOUNTER FOR MONITORING ANTIPLATELET THERAPY: ICD-10-CM

## 2019-08-09 DIAGNOSIS — Z51.81 ENCOUNTER FOR MONITORING ANTIPLATELET THERAPY: ICD-10-CM

## 2019-08-09 DIAGNOSIS — R60.9 EDEMA, UNSPECIFIED TYPE: ICD-10-CM

## 2019-08-09 DIAGNOSIS — Z01.818 PREOP TESTING: ICD-10-CM

## 2019-08-09 DIAGNOSIS — I77.0 A-V FISTULA (HCC): Primary | ICD-10-CM

## 2019-08-09 PROCEDURE — 99214 OFFICE O/P EST MOD 30 MIN: CPT | Performed by: NURSE PRACTITIONER

## 2019-08-09 NOTE — PROGRESS NOTES
"8/9/2019       Manny Peters, APRN   [address] [facility] [address]      Cheri Ely  1940    Chief Complaint   Patient presents with   • Follow-up     6 month f/u of fistula issues.  Edith at the [facility] said that the fistula is running yellow, no matter what they do.  Pt states that there was a lot of issue with it being yellow and painful.  She states that she had it today and they put it in a different location.  At this placement, the patient stated it did fine.  Pt is unsure of her meds.       Dear Manny Peters, APRN       HPI  I had the pleasure of seeing your patient Cheri Ely in the office today. As you recall, Cheri Ely is a 79 y.o.  female who you are currently following for routine health maintenance.  She was having complaints of pain when eats, but this has resolved.  She had lap band surgery in 2008.  She is also on dialysis with a left upper extremity fistula created by Dr. Dubose in 2015.  She did undergo a mesenteric angiogram, but was unsuccessful.  Dr. San was unable to cannulate the superior mesenteric artery or celiac artery.  Her dialysis is on Monday, Wednesday, and Friday.  We did speak with dialysis , who noted her flow has been low and unable to clear properly. She reports problem began Monday.        Review of Systems   Constitutional: Negative.    HENT: Negative.    Eyes: Negative.    Respiratory: Negative.    Endocrine: Negative.    Genitourinary: Negative.    Musculoskeletal: Negative.    Skin: Negative.    Allergic/Immunologic: Negative.    Neurological: Negative.    Hematological: Negative.    Psychiatric/Behavioral: Negative.        BP 90/62   Pulse 73   Ht 152.4 cm (60\")   Wt 64 kg (141 lb)   SpO2 97%   BMI 27.54 kg/m²   Physical Exam   Constitutional: She is oriented to person, place, and time. She appears well-developed and well-nourished.   HENT:   Head: Normocephalic and atraumatic.   Eyes: Pupils are equal, round, " and reactive to light. No scleral icterus.   Neck: Neck supple. No JVD present. Carotid bruit is not present. No thyromegaly present.   Cardiovascular: Normal rate, regular rhythm and normal heart sounds.   Pulses:       Carotid pulses are 2+ on the right side, and 2+ on the left side.       Femoral pulses are 2+ on the right side, and 2+ on the left side.       Popliteal pulses are 2+ on the right side, and 2+ on the left side.        Dorsalis pedis pulses are 2+ on the right side, and 2+ on the left side.        Posterior tibial pulses are 2+ on the right side, and 2+ on the left side.   Left upper extremity fistula thrill distallly pulsatile proximally   Pulmonary/Chest: Effort normal and breath sounds normal.   Abdominal: Soft. Bowel sounds are normal. She exhibits no distension, no abdominal bruit and no mass. There is no hepatosplenomegaly. There is no tenderness.   Musculoskeletal: Normal range of motion. She exhibits no edema.   Lymphadenopathy:     She has no cervical adenopathy.   Neurological: She is alert and oriented to person, place, and time. She has normal strength. No cranial nerve deficit or sensory deficit.   Skin: Skin is warm, dry and intact.   Psychiatric: She has a normal mood and affect.   Nursing note and vitals reviewed.        Patient Active Problem List   Diagnosis   • Chronic kidney disease on chronic dialysis (CMS/[facility])   • Gastrointestinal hemorrhage   • (HFpEF) heart failure with preserved ejection fraction (CMS/[facility])   • Hypothyroidism, TSH 12.4, FT4 0.41   • Paroxysmal atrial fibrillation (CMS/[facility])   • Acute colitis   • ESRD (end stage renal disease) (CMS/[facility])   • Acute on chronic anemia   • Duodenal ulcer   • Chronic combined systolic and diastolic CHF (congestive heart failure) (CMS/[facility])         ICD-10-CM ICD-9-CM   1. A-V fistula (CMS/[facility]) I77.0 447.0   2. Preop testing Z01.818 V72.84   3. Encounter for monitoring antiplatelet therapy Z51.81 V58.83    Z79.02 V58.63   4. Edema,  unspecified type  R60.9 782.3         Plan: After thoroughly evaluating Cheri Ely, I believe the best course of action is to proceed with left upper extremity fistulogram on Monday.  Risks/benefits were explained at great length to the patient which include but are not limited to bleeding, infection, vessel damage.  I also counseled her about her vascular risk factors with regards to hypertension and hyperlipidemia.  The patient can continue taking her current medication regimen as previously planned.  This was all discussed in full with complete understanding.    Thank you for allowing me to participate in the care of your patient.  Please do not hesitate with any questions or concerns.  I will keep you aware of any further encounters with Cheri Ely.        Sincerely yours,         ABILIO Rae, ABILIO Dukes

## 2019-08-09 NOTE — TELEPHONE ENCOUNTER
Spoke with patient daughter and advised of upcoming procedure.  Patient pre work is scheduled for 8/12/2019 at 515 am.  Patient procedure is scheduled for 8/12/2019 at 515 am.  Patient advised of location time and prep.  Patient expressed understanding for all that was discussed.

## 2019-08-09 NOTE — H&P
8/9/2019       Manny Peters, APRN   [address] [facility] [address]      Cheri Ely  1940    Chief Complaint   Patient presents with   • Follow-up     6 month f/u of fistula issues.  Edith at the [place] dialysis center said that the fistula is running yellow, no matter what they do.  Pt states that there was a lot of issue with it being yellow and painful.  She states that she had it today and they put it in a different location.  At this placement, the patient stated it did fine.  Pt is unsure of her meds.       Dear Manny Peters, APRN       HPI  I had the pleasure of seeing your patient Cheri Ely in the office today. As you recall, Cheri Ely is a 79 y.o.  female who you are currently following for routine health maintenance.  She was having complaints of pain when eats, but this has resolved.  She had lap band surgery in 2008.  She is also on dialysis with a left upper extremity fistula created by Dr. Dubose in 2015.  She did undergo a mesenteric angiogram, but was unsuccessful.  Dr. San was unable to cannulate the superior mesenteric artery or celiac artery.  Her dialysis is on Monday, Wednesday, and Friday.  We did speak with dialysis , who noted her flow has been low and unable to clear properly. She reports problem began Monday.      Past Medical History:   Diagnosis Date   • (HFpEF) heart failure with preserved ejection fraction (CMS/HCC) 3/9/2019   • Arthritis    • AVM (arteriovenous malformation) of small bowel, acquired (CMS/HCC) 4/2/2019   • Carotid artery disease (CMS/HCC)    • CHF (congestive heart failure) (CMS/HCC)    • Chronic kidney disease on chronic dialysis (CMS/HCC)    • Chronic mesenteric ischemia (CMS/HCC) 12/14/2017   • Chronic renal failure     on dialysis ON MON, WED, FRI   • Closed displaced intertrochanteric fracture of right femur (CMS/HCC) 2/8/2019    S/p Cephalomedullary nailing 2/8   • Coronary artery disease    • Diastolic dysfunction  3/9/2019    EF 55-60% from echocardiogram on 3/15   • Disease of thyroid gland    • Diverticulitis    • Diverticulosis    • Gastric ulcer    • Gout    • History of transfusion    • Hyperlipidemia    • Hypertension    • Injury of back    • Mesenteric artery insufficiency (CMS/HCC)    • Multilevel degenerative disc disease    • Osteoporosis    • Pancreatitis    • Pelvis fracture (CMS/HCC)    • Pneumonia    • RA (rheumatoid arthritis) (CMS/HCC)    • Sleep apnea      Past Surgical History:   Procedure Laterality Date   • AORTAGRAM Right 1/8/2018    Procedure: MESENTERIC ANGIOGRAM, GROIN ACCESS, MYNX CLOSURE;  Surgeon: Tomasz San DO;  Location: [facility] OR;  Service:    • AORTIC VALVE SURGERY     • APPENDECTOMY     • BACK SURGERY     • CAPSULE ENDOSCOPY N/A 3/30/2019    Dr. Yu-Angiodysplasia; Limit anticoagulation as much as is reasonable; Gastric passage time: 0h21m; Small bowel passage time: 2h2m   • CARDIAC SURGERY     • CAROTID ENDARTERECTOMY Bilateral    • CATARACT EXTRACTION, BILATERAL     • CERVICAL SPINE SURGERY     • CHOLECYSTECTOMY     • COLON SURGERY     • COLONOSCOPY  10/01/2015    Diverticulosis in the sigmoid colon; The examined portion of the ileum was normal; The examination was otherwise normal on direct and retroflexion views; No specimens collected; No plans to repeat colonoscopy due to multiple comorbidities   • COLONOSCOPY N/A 3/28/2019    Dr. Merida-The examined portion of the ileum was normal; One 6mm tubular adenomatous polyp at the hepatic flexure; Diverticulosis in the sigmoid colon   • CORONARY ARTERY BYPASS GRAFT     • DIALYSIS FISTULA CREATION     • ENDOSCOPY N/A 12/27/2018    LA Grade B reflux esophagitis; Non-bleeding gastric ulcers with no stigmata of bleeding-biopsied; Erosive gastritis; Normal examined duodenum; Repeat 2 months   • ENDOSCOPY N/A 2/28/2019    LA Grade B esophagitis-biopsied-clip (MR conditional) was placed; An adjustable gastric banding was found, characterized  by healthy appearing mucosa; Normal stomach; Normal examined duodenum; Repeat 2 months   • ENDOSCOPY N/A 3/11/2019    An endoclip was found in the esophagus; Normal stomach; Normal examined duodenum; No specimens collected; Repeat endo 2-3 months   • ENDOSCOPY N/A 3/27/2019    Dr. Merida-Normal examined jejunum; Normal examined duodenum; An adjustable gastric banding was found; Normal esophagus; No specimens collected   • ENDOSCOPY  10/01/2015    Normal esophagus; Bleeding erosive gastropathy-biopsied; Thickening of the gastric mucosa in the cardia-biopsied; Normal examined duodenum   • EXPLORATORY LAPAROTOMY N/A 5/13/2019    Procedure: LAPAROTOMY EXPLORATORY, OVERSEW DUODENAL ULCER WITH FRED PATCH, KOCHER MANEUVER;  Surgeon: Laila Rodriguez MD;  Location: [facility] OR;  Service: General   • HIP TROCHANTERIC NAILING WITH INTRAMEDULLARY HIP SCREW Right 2/8/2019    Procedure: HIP TROCANTERIC NAILING LONG WITH INTRAMEDULLARY HIP SCREW;  Surgeon: Boo Camacho MD;  Location: [facility] OR;  Service: Orthopedics   • JOINT REPLACEMENT      knee   • LAPAROSCOPIC GASTRIC BANDING     • LEEP     • LUMBAR FUSION     • TOE AMPUTATION Left     2nd toe   • TOTAL KNEE ARTHROPLASTY Bilateral    • TUBAL ABDOMINAL LIGATION       Family History   Problem Relation Age of Onset   • Hypertension Mother    • Cancer Mother         stomach   • Coronary artery disease Father    • Heart attack Father    • Diabetes Brother    • Coronary artery disease Brother    • Colon cancer Neg Hx    • Colon polyps Neg Hx      Social History     Tobacco Use   • Smoking status: Never Smoker   • Smokeless tobacco: Never Used   Substance Use Topics   • Alcohol use: No   • Drug use: No     No Known Allergies    Current Outpatient Medications:   •  acetaminophen (TYLENOL) 325 MG tablet, Take 2 tablets by mouth Every 6 (Six) Hours As Needed for Mild Pain ., Disp: , Rfl:   •  allopurinol (ZYLOPRIM) 100 MG tablet, Take 100 mg by mouth Daily., Disp: , Rfl:   •  B  "Complex-C-Folic Acid (JOHN-SANGEETA) tablet, Take 1 tablet by mouth Daily., Disp: , Rfl:   •  Cholecalciferol (VITAMIN D) 2000 units capsule, Take 1 capsule by mouth Daily., Disp: 30 capsule, Rfl:   •  collagenase 250 UNIT/GM ointment, Apply  topically to the appropriate area as directed Daily., Disp: , Rfl:   •  epoetin lucy (EPOGEN,PROCRIT) 58805 UNIT/ML injection, Inject 1 mL under the skin into the appropriate area as directed 3 (Three) Times a Week., Disp: , Rfl:   •  levothyroxine (SYNTHROID, LEVOTHROID) 125 MCG tablet, Take 1 tablet by mouth Daily. (Patient taking differently: Take 150 mcg by mouth Daily.), Disp: , Rfl:   •  lidocaine 3 % cream cream, Apply  topically Daily As Needed (prior to dialysis access)., Disp: , Rfl:   •  metoprolol tartrate (LOPRESSOR) 50 MG tablet, Take 1 tablet by mouth Every 12 (Twelve) Hours., Disp: , Rfl:   •  ondansetron ODT (ZOFRAN-ODT) 4 MG disintegrating tablet, Take 4 mg by mouth Every 8 (Eight) Hours As Needed for Nausea or Vomiting., Disp: , Rfl:   •  pantoprazole (PROTONIX) 40 MG EC tablet, Take 1 tablet by mouth Daily., Disp: , Rfl:   •  pravastatin (PRAVACHOL) 40 MG tablet, Take 40 mg by mouth Daily., Disp: , Rfl:   •  sucralfate (CARAFATE) 1 GM/10ML suspension, Take 10 mL by mouth 2 (Two) Times a Day., Disp: 1200 mL, Rfl: 0      Review of Systems   Constitutional: Negative.    HENT: Negative.    Eyes: Negative.    Respiratory: Negative.    Endocrine: Negative.    Genitourinary: Negative.    Musculoskeletal: Negative.    Skin: Negative.    Allergic/Immunologic: Negative.    Neurological: Negative.    Hematological: Negative.    Psychiatric/Behavioral: Negative.        BP 90/62   Pulse 73   Ht 152.4 cm (60\")   Wt 64 kg (141 lb)   SpO2 97%   BMI 27.54 kg/m²   Physical Exam   Constitutional: She is oriented to person, place, and time. She appears well-developed and well-nourished.   HENT:   Head: Normocephalic and atraumatic.   Eyes: Pupils are equal, round, and reactive " to light. No scleral icterus.   Neck: Neck supple. No JVD present. Carotid bruit is not present. No thyromegaly present.   Cardiovascular: Normal rate, regular rhythm and normal heart sounds.   Pulses:       Carotid pulses are 2+ on the right side, and 2+ on the left side.       Femoral pulses are 2+ on the right side, and 2+ on the left side.       Popliteal pulses are 2+ on the right side, and 2+ on the left side.        Dorsalis pedis pulses are 2+ on the right side, and 2+ on the left side.        Posterior tibial pulses are 2+ on the right side, and 2+ on the left side.   Left upper extremity fistula thrill distallly pulsatile proximally   Pulmonary/Chest: Effort normal and breath sounds normal.   Abdominal: Soft. Bowel sounds are normal. She exhibits no distension, no abdominal bruit and no mass. There is no hepatosplenomegaly. There is no tenderness.   Musculoskeletal: Normal range of motion. She exhibits no edema.   Lymphadenopathy:     She has no cervical adenopathy.   Neurological: She is alert and oriented to person, place, and time. She has normal strength. No cranial nerve deficit or sensory deficit.   Skin: Skin is warm, dry and intact.   Psychiatric: She has a normal mood and affect.   Nursing note and vitals reviewed.        Patient Active Problem List   Diagnosis   • Chronic kidney disease on chronic dialysis (CMS/[facility])   • Gastrointestinal hemorrhage   • (HFpEF) heart failure with preserved ejection fraction (CMS/[facility])   • Hypothyroidism, TSH 12.4, FT4 0.41   • Paroxysmal atrial fibrillation (CMS/[facility])   • Acute colitis   • ESRD (end stage renal disease) (CMS/[facility])   • Acute on chronic anemia   • Duodenal ulcer   • Chronic combined systolic and diastolic CHF (congestive heart failure) (CMS/[facility])         ICD-10-CM ICD-9-CM   1. A-V fistula (CMS/[facility]) I77.0 447.0   2. Preop testing Z01.818 V72.84   3. Encounter for monitoring antiplatelet therapy Z51.81 V58.83    Z79.02 V58.63   4. Edema, unspecified type   R60.9 782.3         Plan: After thoroughly evaluating Cheri FUENTES Ely, I believe the best course of action is to proceed with left upper extremity fistulogram on Monday.  Risks/benefits were explained at great length to the patient which include but are not limited to bleeding, infection, vessel damage.  I also counseled her about her vascular risk factors with regards to hypertension and hyperlipidemia.  The patient can continue taking her current medication regimen as previously planned.  This was all discussed in full with complete understanding.    Thank you for allowing me to participate in the care of your patient.  Please do not hesitate with any questions or concerns.  I will keep you aware of any further encounters with Cheri FUENTES Ely.        Sincerely yours,         ABILIO Rae, ABILIO Dukes

## 2019-08-09 NOTE — H&P (VIEW-ONLY)
8/9/2019       Manny Peters, APRN   [address] [facility] [address]      Cheri Ely  1940    Chief Complaint   Patient presents with   • Follow-up     6 month f/u of fistula issues.  Edith at the [place] dialysis center said that the fistula is running yellow, no matter what they do.  Pt states that there was a lot of issue with it being yellow and painful.  She states that she had it today and they put it in a different location.  At this placement, the patient stated it did fine.  Pt is unsure of her meds.       Dear Manny Peters, APRN       HPI  I had the pleasure of seeing your patient Cheri Ely in the office today. As you recall, Cheri Ely is a 79 y.o.  female who you are currently following for routine health maintenance.  She was having complaints of pain when eats, but this has resolved.  She had lap band surgery in 2008.  She is also on dialysis with a left upper extremity fistula created by Dr. Dubose in 2015.  She did undergo a mesenteric angiogram, but was unsuccessful.  Dr. San was unable to cannulate the superior mesenteric artery or celiac artery.  Her dialysis is on Monday, Wednesday, and Friday.  We did speak with dialysis , who noted her flow has been low and unable to clear properly. She reports problem began Monday.      Past Medical History:   Diagnosis Date   • (HFpEF) heart failure with preserved ejection fraction (CMS/HCC) 3/9/2019   • Arthritis    • AVM (arteriovenous malformation) of small bowel, acquired (CMS/HCC) 4/2/2019   • Carotid artery disease (CMS/HCC)    • CHF (congestive heart failure) (CMS/HCC)    • Chronic kidney disease on chronic dialysis (CMS/HCC)    • Chronic mesenteric ischemia (CMS/HCC) 12/14/2017   • Chronic renal failure     on dialysis ON MON, WED, FRI   • Closed displaced intertrochanteric fracture of right femur (CMS/HCC) 2/8/2019    S/p Cephalomedullary nailing 2/8   • Coronary artery disease    • Diastolic dysfunction  3/9/2019    EF 55-60% from echocardiogram on 3/15   • Disease of thyroid gland    • Diverticulitis    • Diverticulosis    • Gastric ulcer    • Gout    • History of transfusion    • Hyperlipidemia    • Hypertension    • Injury of back    • Mesenteric artery insufficiency (CMS/HCC)    • Multilevel degenerative disc disease    • Osteoporosis    • Pancreatitis    • Pelvis fracture (CMS/HCC)    • Pneumonia    • RA (rheumatoid arthritis) (CMS/HCC)    • Sleep apnea      Past Surgical History:   Procedure Laterality Date   • AORTAGRAM Right 1/8/2018    Procedure: MESENTERIC ANGIOGRAM, GROIN ACCESS, MYNX CLOSURE;  Surgeon: Tomasz San DO;  Location: [facility] OR;  Service:    • AORTIC VALVE SURGERY     • APPENDECTOMY     • BACK SURGERY     • CAPSULE ENDOSCOPY N/A 3/30/2019    Dr. Yu-Angiodysplasia; Limit anticoagulation as much as is reasonable; Gastric passage time: 0h21m; Small bowel passage time: 2h2m   • CARDIAC SURGERY     • CAROTID ENDARTERECTOMY Bilateral    • CATARACT EXTRACTION, BILATERAL     • CERVICAL SPINE SURGERY     • CHOLECYSTECTOMY     • COLON SURGERY     • COLONOSCOPY  10/01/2015    Diverticulosis in the sigmoid colon; The examined portion of the ileum was normal; The examination was otherwise normal on direct and retroflexion views; No specimens collected; No plans to repeat colonoscopy due to multiple comorbidities   • COLONOSCOPY N/A 3/28/2019    Dr. Merida-The examined portion of the ileum was normal; One 6mm tubular adenomatous polyp at the hepatic flexure; Diverticulosis in the sigmoid colon   • CORONARY ARTERY BYPASS GRAFT     • DIALYSIS FISTULA CREATION     • ENDOSCOPY N/A 12/27/2018    LA Grade B reflux esophagitis; Non-bleeding gastric ulcers with no stigmata of bleeding-biopsied; Erosive gastritis; Normal examined duodenum; Repeat 2 months   • ENDOSCOPY N/A 2/28/2019    LA Grade B esophagitis-biopsied-clip (MR conditional) was placed; An adjustable gastric banding was found, characterized  by healthy appearing mucosa; Normal stomach; Normal examined duodenum; Repeat 2 months   • ENDOSCOPY N/A 3/11/2019    An endoclip was found in the esophagus; Normal stomach; Normal examined duodenum; No specimens collected; Repeat endo 2-3 months   • ENDOSCOPY N/A 3/27/2019    Dr. Merida-Normal examined jejunum; Normal examined duodenum; An adjustable gastric banding was found; Normal esophagus; No specimens collected   • ENDOSCOPY  10/01/2015    Normal esophagus; Bleeding erosive gastropathy-biopsied; Thickening of the gastric mucosa in the cardia-biopsied; Normal examined duodenum   • EXPLORATORY LAPAROTOMY N/A 5/13/2019    Procedure: LAPAROTOMY EXPLORATORY, OVERSEW DUODENAL ULCER WITH FRED PATCH, KOCHER MANEUVER;  Surgeon: Laila Rodriguez MD;  Location: [facility] OR;  Service: General   • HIP TROCHANTERIC NAILING WITH INTRAMEDULLARY HIP SCREW Right 2/8/2019    Procedure: HIP TROCANTERIC NAILING LONG WITH INTRAMEDULLARY HIP SCREW;  Surgeon: Boo Camacho MD;  Location: [facility] OR;  Service: Orthopedics   • JOINT REPLACEMENT      knee   • LAPAROSCOPIC GASTRIC BANDING     • LEEP     • LUMBAR FUSION     • TOE AMPUTATION Left     2nd toe   • TOTAL KNEE ARTHROPLASTY Bilateral    • TUBAL ABDOMINAL LIGATION       Family History   Problem Relation Age of Onset   • Hypertension Mother    • Cancer Mother         stomach   • Coronary artery disease Father    • Heart attack Father    • Diabetes Brother    • Coronary artery disease Brother    • Colon cancer Neg Hx    • Colon polyps Neg Hx      Social History     Tobacco Use   • Smoking status: Never Smoker   • Smokeless tobacco: Never Used   Substance Use Topics   • Alcohol use: No   • Drug use: No     No Known Allergies    Current Outpatient Medications:   •  acetaminophen (TYLENOL) 325 MG tablet, Take 2 tablets by mouth Every 6 (Six) Hours As Needed for Mild Pain ., Disp: , Rfl:   •  allopurinol (ZYLOPRIM) 100 MG tablet, Take 100 mg by mouth Daily., Disp: , Rfl:   •  B  "Complex-C-Folic Acid (JOHN-SANGEETA) tablet, Take 1 tablet by mouth Daily., Disp: , Rfl:   •  Cholecalciferol (VITAMIN D) 2000 units capsule, Take 1 capsule by mouth Daily., Disp: 30 capsule, Rfl:   •  collagenase 250 UNIT/GM ointment, Apply  topically to the appropriate area as directed Daily., Disp: , Rfl:   •  epoetin lucy (EPOGEN,PROCRIT) 44801 UNIT/ML injection, Inject 1 mL under the skin into the appropriate area as directed 3 (Three) Times a Week., Disp: , Rfl:   •  levothyroxine (SYNTHROID, LEVOTHROID) 125 MCG tablet, Take 1 tablet by mouth Daily. (Patient taking differently: Take 150 mcg by mouth Daily.), Disp: , Rfl:   •  lidocaine 3 % cream cream, Apply  topically Daily As Needed (prior to dialysis access)., Disp: , Rfl:   •  metoprolol tartrate (LOPRESSOR) 50 MG tablet, Take 1 tablet by mouth Every 12 (Twelve) Hours., Disp: , Rfl:   •  ondansetron ODT (ZOFRAN-ODT) 4 MG disintegrating tablet, Take 4 mg by mouth Every 8 (Eight) Hours As Needed for Nausea or Vomiting., Disp: , Rfl:   •  pantoprazole (PROTONIX) 40 MG EC tablet, Take 1 tablet by mouth Daily., Disp: , Rfl:   •  pravastatin (PRAVACHOL) 40 MG tablet, Take 40 mg by mouth Daily., Disp: , Rfl:   •  sucralfate (CARAFATE) 1 GM/10ML suspension, Take 10 mL by mouth 2 (Two) Times a Day., Disp: 1200 mL, Rfl: 0      Review of Systems   Constitutional: Negative.    HENT: Negative.    Eyes: Negative.    Respiratory: Negative.    Endocrine: Negative.    Genitourinary: Negative.    Musculoskeletal: Negative.    Skin: Negative.    Allergic/Immunologic: Negative.    Neurological: Negative.    Hematological: Negative.    Psychiatric/Behavioral: Negative.        BP 90/62   Pulse 73   Ht 152.4 cm (60\")   Wt 64 kg (141 lb)   SpO2 97%   BMI 27.54 kg/m²   Physical Exam   Constitutional: She is oriented to person, place, and time. She appears well-developed and well-nourished.   HENT:   Head: Normocephalic and atraumatic.   Eyes: Pupils are equal, round, and reactive " to light. No scleral icterus.   Neck: Neck supple. No JVD present. Carotid bruit is not present. No thyromegaly present.   Cardiovascular: Normal rate, regular rhythm and normal heart sounds.   Pulses:       Carotid pulses are 2+ on the right side, and 2+ on the left side.       Femoral pulses are 2+ on the right side, and 2+ on the left side.       Popliteal pulses are 2+ on the right side, and 2+ on the left side.        Dorsalis pedis pulses are 2+ on the right side, and 2+ on the left side.        Posterior tibial pulses are 2+ on the right side, and 2+ on the left side.   Left upper extremity fistula thrill distallly pulsatile proximally   Pulmonary/Chest: Effort normal and breath sounds normal.   Abdominal: Soft. Bowel sounds are normal. She exhibits no distension, no abdominal bruit and no mass. There is no hepatosplenomegaly. There is no tenderness.   Musculoskeletal: Normal range of motion. She exhibits no edema.   Lymphadenopathy:     She has no cervical adenopathy.   Neurological: She is alert and oriented to person, place, and time. She has normal strength. No cranial nerve deficit or sensory deficit.   Skin: Skin is warm, dry and intact.   Psychiatric: She has a normal mood and affect.   Nursing note and vitals reviewed.        Patient Active Problem List   Diagnosis   • Chronic kidney disease on chronic dialysis (CMS/[facility])   • Gastrointestinal hemorrhage   • (HFpEF) heart failure with preserved ejection fraction (CMS/[facility])   • Hypothyroidism, TSH 12.4, FT4 0.41   • Paroxysmal atrial fibrillation (CMS/[facility])   • Acute colitis   • ESRD (end stage renal disease) (CMS/[facility])   • Acute on chronic anemia   • Duodenal ulcer   • Chronic combined systolic and diastolic CHF (congestive heart failure) (CMS/[facility])         ICD-10-CM ICD-9-CM   1. A-V fistula (CMS/[facility]) I77.0 447.0   2. Preop testing Z01.818 V72.84   3. Encounter for monitoring antiplatelet therapy Z51.81 V58.83    Z79.02 V58.63   4. Edema, unspecified type   R60.9 782.3         Plan: After thoroughly evaluating Cheri FUENTES Ely, I believe the best course of action is to proceed with left upper extremity fistulogram on Monday.  Risks/benefits were explained at great length to the patient which include but are not limited to bleeding, infection, vessel damage.  I also counseled her about her vascular risk factors with regards to hypertension and hyperlipidemia.  The patient can continue taking her current medication regimen as previously planned.  This was all discussed in full with complete understanding.    Thank you for allowing me to participate in the care of your patient.  Please do not hesitate with any questions or concerns.  I will keep you aware of any further encounters with Cheri FUENTES Ely.        Sincerely yours,         ABILIO Rae, ABILIO Dukes

## 2019-08-12 ENCOUNTER — HOSPITAL ENCOUNTER (OUTPATIENT)
Dept: GENERAL RADIOLOGY | Facility: HOSPITAL | Age: 79
Setting detail: HOSPITAL OUTPATIENT SURGERY
Discharge: HOME | End: 2019-08-12

## 2019-08-12 ENCOUNTER — HOSPITAL ENCOUNTER (OUTPATIENT)
Facility: HOSPITAL | Age: 79
Setting detail: HOSPITAL OUTPATIENT SURGERY
Discharge: HOME | End: 2019-08-12
Attending: SURGERY | Admitting: SURGERY

## 2019-08-12 ENCOUNTER — ANESTHESIA EVENT (OUTPATIENT)
Dept: PERIOP | Facility: HOSPITAL | Age: 79
End: 2019-08-12

## 2019-08-12 ENCOUNTER — ANESTHESIA (OUTPATIENT)
Dept: PERIOP | Facility: HOSPITAL | Age: 79
End: 2019-08-12

## 2019-08-12 ENCOUNTER — APPOINTMENT (OUTPATIENT)
Dept: INTERVENTIONAL RADIOLOGY/VASCULAR | Facility: HOSPITAL | Age: 79
End: 2019-08-12

## 2019-08-12 VITALS
WEIGHT: 149.91 LBS | SYSTOLIC BLOOD PRESSURE: 90 MMHG | BODY MASS INDEX: 29.43 KG/M2 | HEIGHT: 60 IN | RESPIRATION RATE: 24 BRPM | OXYGEN SATURATION: 94 % | TEMPERATURE: 97 F | DIASTOLIC BLOOD PRESSURE: 50 MMHG | HEART RATE: 88 BPM

## 2019-08-12 DIAGNOSIS — Z79.02 ENCOUNTER FOR MONITORING ANTIPLATELET THERAPY: ICD-10-CM

## 2019-08-12 DIAGNOSIS — I77.0 A-V FISTULA (HCC): ICD-10-CM

## 2019-08-12 DIAGNOSIS — Z51.81 ENCOUNTER FOR MONITORING ANTIPLATELET THERAPY: ICD-10-CM

## 2019-08-12 DIAGNOSIS — R60.9 EDEMA, UNSPECIFIED TYPE: ICD-10-CM

## 2019-08-12 DIAGNOSIS — Z01.818 PREOP TESTING: ICD-10-CM

## 2019-08-12 LAB
ABO GROUP BLD: NORMAL
ANION GAP SERPL CALCULATED.3IONS-SCNC: 6 MMOL/L (ref 4–13)
ANISOCYTOSIS BLD QL: ABNORMAL
APTT PPP: 30.6 SECONDS (ref 24.1–35)
BLD GP AB SCN SERPL QL: NEGATIVE
BUN BLD-MCNC: 52 MG/DL (ref 5–21)
BUN/CREAT SERPL: 14 (ref 7–25)
CALCIUM SPEC-SCNC: 9 MG/DL (ref 8.4–10.4)
CHLORIDE SERPL-SCNC: 96 MMOL/L (ref 98–110)
CO2 SERPL-SCNC: 35 MMOL/L (ref 24–31)
CREAT BLD-MCNC: 3.71 MG/DL (ref 0.5–1.4)
DEPRECATED RDW RBC AUTO: 75.1 FL (ref 37–54)
ERYTHROCYTE [DISTWIDTH] IN BLOOD BY AUTOMATED COUNT: 18.6 % (ref 12.3–15.4)
GFR SERPL CREATININE-BSD FRML MDRD: 12 ML/MIN/1.73
GFR SERPL CREATININE-BSD FRML MDRD: ABNORMAL ML/MIN/1.73
GIANT PLATELETS: ABNORMAL
GLUCOSE BLD-MCNC: 80 MG/DL (ref 70–100)
HCT VFR BLD AUTO: 32.2 % (ref 34–46.6)
HGB BLD-MCNC: 9.9 G/DL (ref 12–15.9)
HYPOCHROMIA BLD QL: ABNORMAL
INR PPP: 1.3 (ref 0.91–1.09)
LYMPHOCYTES # BLD MANUAL: 0.61 10*3/MM3 (ref 0.7–3.1)
LYMPHOCYTES NFR BLD MANUAL: 2 % (ref 5–12)
LYMPHOCYTES NFR BLD MANUAL: 9.2 % (ref 19.6–45.3)
MACROCYTES BLD QL SMEAR: ABNORMAL
MCH RBC QN AUTO: 33.7 PG (ref 26.6–33)
MCHC RBC AUTO-ENTMCNC: 30.7 G/DL (ref 31.5–35.7)
MCV RBC AUTO: 109.5 FL (ref 79–97)
MONOCYTES # BLD AUTO: 0.13 10*3/MM3 (ref 0.1–0.9)
NEUTROPHILS # BLD AUTO: 5.86 10*3/MM3 (ref 1.7–7)
NEUTROPHILS NFR BLD MANUAL: 87.8 % (ref 42.7–76)
PLATELET # BLD AUTO: 201 10*3/MM3 (ref 140–450)
PMV BLD AUTO: 10.6 FL (ref 6–12)
POIKILOCYTOSIS BLD QL SMEAR: ABNORMAL
POLYCHROMASIA BLD QL SMEAR: ABNORMAL
POTASSIUM BLD-SCNC: 6.1 MMOL/L (ref 3.5–5.3)
PROTHROMBIN TIME: 16.6 SECONDS (ref 11.9–14.6)
RBC # BLD AUTO: 2.94 10*6/MM3 (ref 3.77–5.28)
RH BLD: POSITIVE
SODIUM BLD-SCNC: 137 MMOL/L (ref 135–145)
T&S EXPIRATION DATE: NORMAL
VARIANT LYMPHS NFR BLD MANUAL: 1 % (ref 0–5)
WBC MORPH BLD: NORMAL
WBC NRBC COR # BLD: 6.67 10*3/MM3 (ref 3.4–10.8)

## 2019-08-12 PROCEDURE — 86901 BLOOD TYPING SEROLOGIC RH(D): CPT | Performed by: NURSE PRACTITIONER

## 2019-08-12 PROCEDURE — 25010000002 ROPIVACAINE PER 1 MG: Performed by: ANESTHESIOLOGY

## 2019-08-12 PROCEDURE — 94799 UNLISTED PULMONARY SVC/PX: CPT

## 2019-08-12 PROCEDURE — G0257 UNSCHED DIALYSIS ESRD PT HOS: HCPCS

## 2019-08-12 PROCEDURE — C1894 INTRO/SHEATH, NON-LASER: HCPCS | Performed by: SURGERY

## 2019-08-12 PROCEDURE — 25010000002 HEPARIN (PORCINE) PER 1000 UNITS: Performed by: NURSE ANESTHETIST, CERTIFIED REGISTERED

## 2019-08-12 PROCEDURE — C1769 GUIDE WIRE: HCPCS | Performed by: SURGERY

## 2019-08-12 PROCEDURE — 85007 BL SMEAR W/DIFF WBC COUNT: CPT | Performed by: NURSE PRACTITIONER

## 2019-08-12 PROCEDURE — 36902 INTRO CATH DIALYSIS CIRCUIT: CPT | Performed by: SURGERY

## 2019-08-12 PROCEDURE — 85610 PROTHROMBIN TIME: CPT | Performed by: NURSE PRACTITIONER

## 2019-08-12 PROCEDURE — 85025 COMPLETE CBC W/AUTO DIFF WBC: CPT | Performed by: NURSE PRACTITIONER

## 2019-08-12 PROCEDURE — 25010000002 HEPARIN (PORCINE) PER 1000 UNITS: Performed by: SURGERY

## 2019-08-12 PROCEDURE — 80048 BASIC METABOLIC PNL TOTAL CA: CPT | Performed by: NURSE PRACTITIONER

## 2019-08-12 PROCEDURE — C1725 CATH, TRANSLUMIN NON-LASER: HCPCS | Performed by: SURGERY

## 2019-08-12 PROCEDURE — 93010 ELECTROCARDIOGRAM REPORT: CPT | Performed by: INTERNAL MEDICINE

## 2019-08-12 PROCEDURE — 25010000003 MEPIVACAINE PER 10 ML: Performed by: ANESTHESIOLOGY

## 2019-08-12 PROCEDURE — 86900 BLOOD TYPING SEROLOGIC ABO: CPT | Performed by: NURSE PRACTITIONER

## 2019-08-12 PROCEDURE — 85730 THROMBOPLASTIN TIME PARTIAL: CPT | Performed by: NURSE PRACTITIONER

## 2019-08-12 PROCEDURE — 93005 ELECTROCARDIOGRAM TRACING: CPT | Performed by: NURSE PRACTITIONER

## 2019-08-12 PROCEDURE — 71046 X-RAY EXAM CHEST 2 VIEWS: CPT

## 2019-08-12 PROCEDURE — 86850 RBC ANTIBODY SCREEN: CPT | Performed by: NURSE PRACTITIONER

## 2019-08-12 PROCEDURE — 25010000002 IOPAMIDOL 61 % SOLUTION: Performed by: SURGERY

## 2019-08-12 PROCEDURE — 76000 FLUOROSCOPY <1 HR PHYS/QHP: CPT

## 2019-08-12 RX ORDER — SODIUM CHLORIDE 0.9 % (FLUSH) 0.9 %
3 SYRINGE (ML) INJECTION AS NEEDED
Status: DISCONTINUED | OUTPATIENT
Start: 2019-08-12 | End: 2019-08-12 | Stop reason: HOSPADM

## 2019-08-12 RX ORDER — BUPIVACAINE HCL/0.9 % NACL/PF 0.1 %
2 PLASTIC BAG, INJECTION (ML) EPIDURAL ONCE
Status: COMPLETED | OUTPATIENT
Start: 2019-08-12 | End: 2019-08-12

## 2019-08-12 RX ORDER — NALOXONE HCL 0.4 MG/ML
0.4 VIAL (ML) INJECTION AS NEEDED
Status: DISCONTINUED | OUTPATIENT
Start: 2019-08-12 | End: 2019-08-12 | Stop reason: HOSPADM

## 2019-08-12 RX ORDER — SODIUM CHLORIDE 0.9 % (FLUSH) 0.9 %
3 SYRINGE (ML) INJECTION EVERY 12 HOURS SCHEDULED
Status: DISCONTINUED | OUTPATIENT
Start: 2019-08-12 | End: 2019-08-12 | Stop reason: HOSPADM

## 2019-08-12 RX ORDER — HYDROCODONE BITARTRATE AND ACETAMINOPHEN 5; 325 MG/1; MG/1
1 TABLET ORAL ONCE AS NEEDED
Status: DISCONTINUED | OUTPATIENT
Start: 2019-08-12 | End: 2019-08-12 | Stop reason: HOSPADM

## 2019-08-12 RX ORDER — FENTANYL CITRATE 50 UG/ML
25 INJECTION, SOLUTION INTRAMUSCULAR; INTRAVENOUS AS NEEDED
Status: DISCONTINUED | OUTPATIENT
Start: 2019-08-12 | End: 2019-08-12 | Stop reason: HOSPADM

## 2019-08-12 RX ORDER — DIPHENOXYLATE HCL/ATROPINE 2.5-.025/5
5 LIQUID (ML) ORAL 4 TIMES DAILY PRN
COMMUNITY

## 2019-08-12 RX ORDER — SODIUM CHLORIDE 9 MG/ML
500 INJECTION, SOLUTION INTRAVENOUS CONTINUOUS
Status: DISCONTINUED | OUTPATIENT
Start: 2019-08-12 | End: 2019-08-12 | Stop reason: HOSPADM

## 2019-08-12 RX ORDER — ONDANSETRON 2 MG/ML
4 INJECTION INTRAMUSCULAR; INTRAVENOUS ONCE AS NEEDED
Status: DISCONTINUED | OUTPATIENT
Start: 2019-08-12 | End: 2019-08-12 | Stop reason: HOSPADM

## 2019-08-12 RX ORDER — FAMOTIDINE 10 MG/ML
20 INJECTION, SOLUTION INTRAVENOUS
Status: COMPLETED | OUTPATIENT
Start: 2019-08-12 | End: 2019-08-12

## 2019-08-12 RX ORDER — OXYCODONE AND ACETAMINOPHEN 7.5; 325 MG/1; MG/1
1 TABLET ORAL EVERY 4 HOURS PRN
Status: DISCONTINUED | OUTPATIENT
Start: 2019-08-12 | End: 2019-08-12 | Stop reason: HOSPADM

## 2019-08-12 RX ORDER — OXYCODONE HYDROCHLORIDE AND ACETAMINOPHEN 5; 325 MG/1; MG/1
1 TABLET ORAL ONCE AS NEEDED
Status: DISCONTINUED | OUTPATIENT
Start: 2019-08-12 | End: 2019-08-12 | Stop reason: HOSPADM

## 2019-08-12 RX ORDER — METOCLOPRAMIDE HYDROCHLORIDE 5 MG/ML
5 INJECTION INTRAMUSCULAR; INTRAVENOUS
Status: DISCONTINUED | OUTPATIENT
Start: 2019-08-12 | End: 2019-08-12 | Stop reason: HOSPADM

## 2019-08-12 RX ORDER — SODIUM CHLORIDE, SODIUM LACTATE, POTASSIUM CHLORIDE, CALCIUM CHLORIDE 600; 310; 30; 20 MG/100ML; MG/100ML; MG/100ML; MG/100ML
100 INJECTION, SOLUTION INTRAVENOUS CONTINUOUS
Status: DISCONTINUED | OUTPATIENT
Start: 2019-08-12 | End: 2019-08-12 | Stop reason: HOSPADM

## 2019-08-12 RX ORDER — HEPARIN SODIUM 1000 [USP'U]/ML
INJECTION, SOLUTION INTRAVENOUS; SUBCUTANEOUS AS NEEDED
Status: DISCONTINUED | OUTPATIENT
Start: 2019-08-12 | End: 2019-08-12 | Stop reason: SURG

## 2019-08-12 RX ORDER — IBUPROFEN 600 MG/1
600 TABLET ORAL ONCE AS NEEDED
Status: DISCONTINUED | OUTPATIENT
Start: 2019-08-12 | End: 2019-08-12 | Stop reason: HOSPADM

## 2019-08-12 RX ORDER — METOPROLOL TARTRATE 5 MG/5ML
2 INJECTION INTRAVENOUS ONCE AS NEEDED
Status: COMPLETED | OUTPATIENT
Start: 2019-08-12 | End: 2019-08-12

## 2019-08-12 RX ORDER — LOSARTAN POTASSIUM 25 MG/1
25 TABLET ORAL DAILY
COMMUNITY
End: 2020-01-23

## 2019-08-12 RX ORDER — IPRATROPIUM BROMIDE AND ALBUTEROL SULFATE 2.5; .5 MG/3ML; MG/3ML
3 SOLUTION RESPIRATORY (INHALATION) ONCE AS NEEDED
Status: DISCONTINUED | OUTPATIENT
Start: 2019-08-12 | End: 2019-08-12 | Stop reason: HOSPADM

## 2019-08-12 RX ORDER — LABETALOL HYDROCHLORIDE 5 MG/ML
5 INJECTION, SOLUTION INTRAVENOUS
Status: DISCONTINUED | OUTPATIENT
Start: 2019-08-12 | End: 2019-08-12 | Stop reason: HOSPADM

## 2019-08-12 RX ORDER — ROPIVACAINE HYDROCHLORIDE 5 MG/ML
INJECTION, SOLUTION EPIDURAL; INFILTRATION; PERINEURAL
Status: COMPLETED | OUTPATIENT
Start: 2019-08-12 | End: 2019-08-12

## 2019-08-12 RX ORDER — CARVEDILOL 12.5 MG/1
12.5 TABLET ORAL 2 TIMES DAILY WITH MEALS
COMMUNITY

## 2019-08-12 RX ORDER — SODIUM CHLORIDE 0.9 % (FLUSH) 0.9 %
1-10 SYRINGE (ML) INJECTION AS NEEDED
Status: DISCONTINUED | OUTPATIENT
Start: 2019-08-12 | End: 2019-08-12 | Stop reason: HOSPADM

## 2019-08-12 RX ADMIN — Medication 2 G: at 11:41

## 2019-08-12 RX ADMIN — SODIUM CHLORIDE 1000 ML: 9 INJECTION, SOLUTION INTRAVENOUS at 07:53

## 2019-08-12 RX ADMIN — METOPROLOL TARTRATE 2 MG: 5 INJECTION INTRAVENOUS at 11:15

## 2019-08-12 RX ADMIN — ROPIVACAINE HYDROCHLORIDE 20 ML: 5 INJECTION, SOLUTION EPIDURAL; INFILTRATION; PERINEURAL at 11:33

## 2019-08-12 RX ADMIN — FAMOTIDINE 20 MG: 10 INJECTION, SOLUTION INTRAVENOUS at 11:15

## 2019-08-12 RX ADMIN — MEPIVACAINE HYDROCHLORIDE 10 ML: 10 INJECTION, SOLUTION INFILTRATION at 11:35

## 2019-08-12 RX ADMIN — HEPARIN SODIUM 2000 UNITS: 1000 INJECTION, SOLUTION INTRAVENOUS; SUBCUTANEOUS at 12:06

## 2019-08-12 NOTE — ANESTHESIA POSTPROCEDURE EVALUATION
"Patient: Cheri Ely    Procedure Summary     Date:  08/12/19 Room / Location:  [facility] OR  /  PAD HYBRID OR 12    Anesthesia Start:  1136 Anesthesia Stop:      Procedure:  FISTULOGRAM LEFT UPPER EXTREMITY, BALLOON ANGIOPLASTY (Left Arm Lower) Diagnosis:       A-V fistula (CMS/HCC)      Preop testing      (A-V fistula (CMS/HCC) [I77.0])      (Preop testing [Z01.818])    Surgeon:  Tomasz San DO Provider:  Ronn Stevens CRNA    Anesthesia Type:  regional ASA Status:  4          Anesthesia Type: regional  Last vitals  BP   95/49 (08/12/19 1130)   Temp   97 °F (36.1 °C) (08/12/19 0644)   Pulse   68 (08/12/19 1133)   Resp   18 (08/12/19 1130)     SpO2   95 % (08/12/19 1130)     Post Anesthesia Care and Evaluation    Patient location during evaluation: PACU  Patient participation: complete - patient participated  Level of consciousness: awake and alert  Pain management: adequate  Airway patency: patent  Anesthetic complications: No anesthetic complications  PONV Status: none  Cardiovascular status: acceptable and hemodynamically stable  Respiratory status: acceptable  Hydration status: acceptable    Comments: Blood pressure 95/49, pulse 68, temperature 97 °F (36.1 °C), temperature source Temporal, resp. rate 18, height 152 cm (59.84\"), weight 68 kg (149 lb 14.6 oz), SpO2 95 %, not currently breastfeeding.    Patient discharged from PACU based upon Hilary score. Please see RN notes for further details      "

## 2019-08-12 NOTE — DISCHARGE INSTRUCTIONS
What to expect after a Nerve Block  Nerve blocks administered to block pain affect many types of nerves, including those nerves that control movement, pain, and normal sensation.  Following a nerve block, you may notice some bruising at the site where the block was given.  You may experience sensations such as:  numbness of the affected area or limb, tingling, heaviness (that is the limb feels heavy to you), weakness or inability to move the affected arm or leg, or a feeling as if your arm or leg has “fallen asleep”.    A nerve block can last from 9-18 hours depending on the medications used.  Usually the weakness wears off first followed by the tingling and heaviness.  As the block wears off, you may begin to notice pain; however, this sequence of events may occur in any order.  Typically, you will be able to move your limb before you will feel it.  Once a nerve block begins to wear off, the effects are usually completely gone within 60 minutes.    If you experience continued side effects that you believe are block related for longer than 48 hours, please call your healthcare provider.  Please see block-specific instructions below.    Instructions for any block involving the shoulder or arm  • If you have had any kind of shoulder/arm block, you will go home with your arm in a sling.  Wear the sling until the block has completely worn off.  You may be required to wear it for a longer period of time per your surgeon’s recommendations.  • I you have had a shoulder/arm block; it is a good idea to sleep on a recliner with pillows under your arm.    Note:  If you have severe or prolonged shortness of breath, please seek medical assistance as soon as possible.    Protection of a “blocked” arm (limb)  • After a nerve block, you cannot feel pain, pressure, or extremes of temperature in the affected limb.  And because of this, your blocked limb is at more risk for injury.  For example, it is possible to burn your limb on an  extremely hot surface without feeling it.  • When resting, it is important to reposition your limb periodically to avoid prolonged pressure on it.  This may require the use of pillows and padding.  • While sleeping, you should avoid rolling onto the affected limb or putting too much pressure on it.  • If you have a cast or tight dressing, check the color of your fingers of the affected limb.  Call your surgeon if they look discolored (that is, dusky, dark colored)  • Use caution in cold weather.  Cover your limb appropriately to protect it from the cold.  Pain Management  Your surgeon will give you a prescription for pain medication.  Begin taking this before the nerve block wears off.  Bear in mind that sometimes the block can wear off in the middle of the night.PATIENT/FAMILY/RESPONSIBLE PARTY VERBALIZES UNDERSTANDING OF ABOVE EDUCATION.  COPY OF PAIN SCALE GIVEN AND REVIEWED WITH VERBALIZED UNDERSTANDING.

## 2019-08-12 NOTE — ANESTHESIA PROCEDURE NOTES
Peripheral Block    Pre-sedation assessment completed: 8/12/2019 11:20 AM    Patient reassessed immediately prior to procedure    Patient location during procedure: pre-op  Start time: 8/12/2019 11:26 AM  Stop time: 8/12/2019 11:32 AM  Reason for block: primary anesthetic  Performed by  Anesthesiologist: Teena Rivera MD  Preanesthetic Checklist  Completed: patient identified, site marked, surgical consent, pre-op evaluation, timeout performed, IV checked, risks and benefits discussed and monitors and equipment checked  Prep:  Pt Position: supine  Sterile barriers:cap, gloves and mask  Prep: ChloraPrep  Patient monitoring: blood pressure monitoring, continuous pulse oximetry and EKG  Procedure  Sedation:no  Performed under: local infiltration  Guidance:ultrasound guided and nerve stimulator  ULTRASOUND INTERPRETATION. Using ultrasound guidance a 22 G gauge needle was placed in close proximity to the nerve, at which point, under ultrasound guidance anesthetic was injected in the area of the nerve and spread of the anesthesia was seen on ultrasound in close proximity thereto.  There were no abnormalities seen on ultrasound; a digital image was taken; and the patient tolerated the procedure with no complications. Images:still images not obtained    Block Type:infraclavicular  Injection Technique:single-shot  Needle Type:echogenic  Needle Gauge:21 G  Resistance on Injection: none    Medications Used: ropivacaine (NAROPIN) injection 0.5 %, 20 mL      Medications  Comment:20 cc 0.5% ropi and 10 cc 1% mepivacaine drawn up into 30 cc syringe. 20 cc used for infraclavicular block. 10 cc injected subcutaneous in intercostobrachial distribution.     Post Assessment  Injection Assessment: negative aspiration for heme, no paresthesia on injection and incremental injection  Patient Tolerance:comfortable throughout block  Complications:no

## 2019-08-12 NOTE — OP NOTE
Cheri Ely  8/12/2019     PREOPERATIVE DIAGNOSIS: A-V fistula (CMS/HCC) [I77.0]  Preop testing [Z01.818]     POSTOPERATIVE DIAGNOSIS: Post-Op Diagnosis Codes:     * A-V fistula (CMS/HCC) [I77.0]     * Preop testing [Z01.818]     PROCEDURE PERFORMED:   1.  Ultrasound-guided cannulation of the left radial artery at the wrist  2.  Left upper extremity fistulogram with central venogram with radiographic supervision and interpretation  3.  Balloon angioplasty of the proximal fistula with a 7 x 20 mm and 8 x 20 mm [place] balloons     SURGEON: Tomasz San DO      ANESTHESIA: MAC/nerve block    PREPARATION: Routine.    STAFF: Circulator: Adelita Glover RN  Scrub Person: Marlene Mccollum  Assistant: Brinda Rodriguez  Vascular Radiology Technician: Emma Winn    ESTIMATED BLOOD LOSS: 10 mL    SPECIMENS: None    COMPLICATIONS: None    INDICATIONS: Cheri Ely is a 79 y.o. female was having complaints of pain when eats, but this has resolved.  She had lap band surgery in 2008.  She is also on dialysis with a left upper extremity fistula created by Dr. Dubose in 2015.  Her dialysis is on Monday, Wednesday, and Friday.  We did speak with dialysis , who noted her flow has been low and unable to clear properly. She reports problem began Monday. The indications, risks, and possible complications of the procedure were explained to the patient, who voiced understanding and wished to proceed with surgery.     PROCEDURE IN DETAIL: The patient was taken to the operating room and placed on the operating table in a supine position. After MAC anesthesia was obtained, the left upper extremity was prepped and draped in a sterile manner.  Under ultrasound guidance and using a micropuncture technique the left radial artery was cannulated at the wrist and the micro-sheath was placed.  A left upper extremity AV fistulogram with central venogram was performed.  Findings are as follows:  1.  Patent radial, ulnar, and  brachial arteries without stenosis  2.  Patent brachial basilic AV fistula with evidence of severe stenosis in the proximal 2 to 3 cm past the anastomosis  3.  Patent central venogram without any evidence of stenosis    At this point, the decision was made to treat this proximal stenosis.  The patient was given 2000 units of intravenous heparin.  A 6 [place] sheath was placed.  The advantage Glidewire was advanced up into the AV fistula under fluoroscopic guidance.  A 7 x 20 mm and an 8 x 20 mm [place] balloons were used to balloon angioplasty the proximal fistula successfully.  Completion fistulogram was performed which showed rapid flow through the fistula with adequate clearing.  At this point, I felt no further intervention was warranted.  The sheath and wire were removed.  Direct pressure was held for an additional 10 minutes to help ensure hemostasis. Sterile dressings were applied. The patient tolerated the procedure well. Sponge and needle counts were correct. The patient was then awakened in the operating room and taken to the recovery room in good condition.    Tomasz San,   Date: 8/12/2019 Time: 12:30 PM     CC:Manny Peters, ABILIO

## 2019-08-12 NOTE — ANESTHESIA PREPROCEDURE EVALUATION
Anesthesia Evaluation     Patient summary reviewed and Nursing notes reviewed   no history of anesthetic complications:  NPO Solid Status: > 8 hours  NPO Liquid Status: > 8 hours           Airway   Mallampati: II  TM distance: >3 FB  Neck ROM: full  No difficulty expected  Dental    (+) edentulous, upper dentures and lower dentures    Pulmonary - normal exam   (+) sleep apnea,   Cardiovascular - normal exam  Exercise tolerance: poor (<4 METS)    ECG reviewed  Patient on routine beta blocker and Beta blocker given within 24 hours of surgery    (+) hypertension, CAD, CABG >6 Months, dysrhythmias (paroxysmal afib) Atrial Fib, CHF, PVD, hyperlipidemia,  carotid artery disease    ROS comment: Echo 7/2019  · Left atrial cavity size is moderately dilated.  · Moderate tricuspid valve regurgitation is present.  · Mild-to-moderate mitral valve regurgitation is present  · Left ventricular diastolic dysfunction.  · Left ventricular systolic function is severely decreased.     DIFFICULT TEST - SHOULD BE REPEATED WITH CONTRAST AFTER HR IS CONTROLLED  RHYTHM IS RAPID AFIB  SEVERE LV DYSFUNCTION  SEVERE PULMONARY HTN  BI-ATRIAL ENLARGEMENT  VALVULAR DYSFUNCTION  LEFT PLEURAL EFFUSION NOTED    Neuro/Psych- negative ROS  GI/Hepatic/Renal/Endo    (+)  GI bleeding, renal disease dialysis, hypothyroidism,     ROS Comment: MWF dialysis, today she reports that on Friday they had difficulty with dialysis due to clotting of fistula. She presents today for fistulogram. K 6.1 today, creatinine 3.7 up from baseline of around 2. She reports her legs are swelling. She takes aspirin and is off blood thinners 2/2 gastric ulcers.     Musculoskeletal     Abdominal  - normal exam   Substance History      OB/GYN          Other   (+) arthritis                       Anesthesia Plan    ASA 4     regional   (Discussed with Dr. San. Pt has fluid overload likely 2/2 inadequate dialysis. She is planning to receive dialysis today after fistulogram. Will  proceed with regional anesthesia and no sedation. )  intravenous induction   Anesthetic plan, all risks, benefits, and alternatives have been provided, discussed and informed consent has been obtained with: patient.  Use of blood products discussed with patient .

## 2019-08-28 ENCOUNTER — OFFICE VISIT (OUTPATIENT)
Dept: VASCULAR SURGERY | Facility: CLINIC | Age: 79
End: 2019-08-28

## 2019-08-28 VITALS
BODY MASS INDEX: 29.23 KG/M2 | WEIGHT: 145 LBS | DIASTOLIC BLOOD PRESSURE: 70 MMHG | SYSTOLIC BLOOD PRESSURE: 118 MMHG | HEART RATE: 84 BPM | OXYGEN SATURATION: 96 % | HEIGHT: 59 IN

## 2019-08-28 DIAGNOSIS — Z99.2 CHRONIC KIDNEY DISEASE ON CHRONIC DIALYSIS (HCC): ICD-10-CM

## 2019-08-28 DIAGNOSIS — I77.0 A-V FISTULA (HCC): Primary | ICD-10-CM

## 2019-08-28 DIAGNOSIS — N18.6 CHRONIC KIDNEY DISEASE ON CHRONIC DIALYSIS (HCC): ICD-10-CM

## 2019-08-28 PROCEDURE — 99213 OFFICE O/P EST LOW 20 MIN: CPT | Performed by: NURSE PRACTITIONER

## 2019-08-28 NOTE — PROGRESS NOTES
"8/28/2019       Manny Peters, APRN   [address] [facility] [address]      Cheri Ely  1940    Chief Complaint   Patient presents with   • Follow-up     2 Week Post-Op Follow UP For FISTULOGRAM LEFT UPPER EXTREMITY, BALLOON ANGIOPLASTY. Patient denies any stroke like symptoms.        Dear Manny Peters, APRN       HPI  I had the pleasure of seeing your patient Cheri Ely in the office today. As you recall, Cheri Ely is a 79 y.o.  female who you are currently following for routine health maintenance.  She was having complaints of pain when eats, but this has resolved.  She had lap band surgery in 2008.  She is also on dialysis with a left upper extremity fistula created by Dr. Dubose in 2015.  She did undergo a mesenteric angiogram, but was unsuccessful.  Dr. San was unable to cannulate the superior mesenteric artery or celiac artery.  Her dialysis is on Monday, Wednesday, and Friday.  She did undergo a left upper extremity fistulogram with balloon angioplasty of the proximal fistula on 8/12/2019 and is now back for follow-up.  She does report she is having no problems during dialysis since her fistulogram.    Review of Systems   Constitutional: Negative.    HENT: Negative.    Eyes: Negative.    Respiratory: Negative.    Endocrine: Negative.    Genitourinary: Negative.    Musculoskeletal: Negative.    Skin: Negative.    Allergic/Immunologic: Negative.    Neurological: Negative.    Hematological: Negative.    Psychiatric/Behavioral: Negative.        /70 (BP Location: Left arm, Patient Position: Sitting, Cuff Size: Adult)   Pulse 84   Ht 149.9 cm (59\")   Wt 65.8 kg (145 lb)   SpO2 96%   BMI 29.29 kg/m²   Physical Exam   Constitutional: She is oriented to person, place, and time. She appears well-developed and well-nourished.   HENT:   Head: Normocephalic and atraumatic.   Eyes: Pupils are equal, round, and reactive to light. No scleral icterus.   Neck: Neck supple. No " JVD present. Carotid bruit is not present. No thyromegaly present.   Cardiovascular: Normal rate, regular rhythm and normal heart sounds.   Pulses:       Carotid pulses are 2+ on the right side, and 2+ on the left side.       Femoral pulses are 2+ on the right side, and 2+ on the left side.       Popliteal pulses are 2+ on the right side, and 2+ on the left side.        Dorsalis pedis pulses are 2+ on the right side, and 2+ on the left side.        Posterior tibial pulses are 2+ on the right side, and 2+ on the left side.   Left upper extremity fistula thrill +   Pulmonary/Chest: Effort normal and breath sounds normal.   Abdominal: Soft. Bowel sounds are normal. She exhibits no distension, no abdominal bruit and no mass. There is no hepatosplenomegaly. There is no tenderness.   Musculoskeletal: Normal range of motion. She exhibits no edema.   Lymphadenopathy:     She has no cervical adenopathy.   Neurological: She is alert and oriented to person, place, and time. She has normal strength. No cranial nerve deficit or sensory deficit.   Skin: Skin is warm, dry and intact.   Psychiatric: She has a normal mood and affect.   Nursing note and vitals reviewed.        Patient Active Problem List   Diagnosis   • Chronic kidney disease on chronic dialysis (CMS/[facility])   • Gastrointestinal hemorrhage   • (HFpEF) heart failure with preserved ejection fraction (CMS/[facility])   • Hypothyroidism, TSH 12.4, FT4 0.41   • Paroxysmal atrial fibrillation (CMS/[facility])   • Acute colitis   • ESRD (end stage renal disease) (CMS/[facility])   • Acute on chronic anemia   • Duodenal ulcer   • Chronic combined systolic and diastolic CHF (congestive heart failure) (CMS/[facility])   • A-V fistula (CMS/[facility])   • Preop testing         ICD-10-CM ICD-9-CM   1. A-V fistula (CMS/[facility]) I77.0 447.0   2. Chronic kidney disease on chronic dialysis (CMS/[facility]) N18.6 585.9    Z99.2 V45.11         Plan: After thoroughly evaluating Cheri Ely, I am pleased reports she is doing well  status post left upper extremity fistulogram.  She reports no further troubles during dialysis.  We will see her back in 6 months for continued surveillance.  She can contact our office sooner if any problems occur.   I also counseled her about her vascular risk factors with regards to hypertension and hyperlipidemia.  The patient can continue taking her current medication regimen as previously planned.  This was all discussed in full with complete understanding.    Thank you for allowing me to participate in the care of your patient.  Please do not hesitate with any questions or concerns.  I will keep you aware of any further encounters with Cheri Ely.        Sincerely yours,         ABILIO Rae Joseph D., APRN

## 2019-10-03 ENCOUNTER — HOSPITAL ENCOUNTER (EMERGENCY)
Facility: HOSPITAL | Age: 79
Discharge: HOME | End: 2019-10-03
Attending: EMERGENCY MEDICINE | Admitting: EMERGENCY MEDICINE

## 2019-10-03 ENCOUNTER — APPOINTMENT (OUTPATIENT)
Dept: CT IMAGING | Facility: HOSPITAL | Age: 79
End: 2019-10-03

## 2019-10-03 VITALS
BODY MASS INDEX: 29.43 KG/M2 | DIASTOLIC BLOOD PRESSURE: 39 MMHG | SYSTOLIC BLOOD PRESSURE: 94 MMHG | RESPIRATION RATE: 17 BRPM | HEART RATE: 71 BPM | OXYGEN SATURATION: 98 % | TEMPERATURE: 98.6 F | HEIGHT: 59 IN | WEIGHT: 146 LBS

## 2019-10-03 DIAGNOSIS — I95.3 HEMODIALYSIS-ASSOCIATED HYPOTENSION: Primary | ICD-10-CM
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
## 2019-10-03 DIAGNOSIS — S00.03XA CONTUSION OF SCALP, INITIAL ENCOUNTER: ICD-10-CM

## 2019-10-03 LAB
ALBUMIN SERPL-MCNC: 2.8 G/DL (ref 3.5–5.2)
ALBUMIN/GLOB SERPL: 0.7 G/DL
ALP SERPL-CCNC: 306 U/L (ref 39–117)
ALT SERPL W P-5'-P-CCNC: 15 U/L (ref 1–33)
ANION GAP SERPL CALCULATED.3IONS-SCNC: 10 MMOL/L (ref 5–15)
AST SERPL-CCNC: 28 U/L (ref 1–32)
BASOPHILS # BLD AUTO: 0.04 10*3/MM3 (ref 0–0.2)
BASOPHILS NFR BLD AUTO: 0.6 % (ref 0–1.5)
BILIRUB SERPL-MCNC: 0.4 MG/DL (ref 0.2–1.2)
BUN BLD-MCNC: 34 MG/DL (ref 8–23)
BUN/CREAT SERPL: 9.3 (ref 7–25)
CALCIUM SPEC-SCNC: 8.6 MG/DL (ref 8.6–10.5)
CHLORIDE SERPL-SCNC: 94 MMOL/L (ref 98–107)
CO2 SERPL-SCNC: 36 MMOL/L (ref 22–29)
CREAT BLD-MCNC: 3.65 MG/DL (ref 0.57–1)
DEPRECATED RDW RBC AUTO: 61.9 FL (ref 37–54)
EOSINOPHIL # BLD AUTO: 0.14 10*3/MM3 (ref 0–0.4)
EOSINOPHIL NFR BLD AUTO: 2 % (ref 0.3–6.2)
ERYTHROCYTE [DISTWIDTH] IN BLOOD BY AUTOMATED COUNT: 16.5 % (ref 12.3–15.4)
GFR SERPL CREATININE-BSD FRML MDRD: 12 ML/MIN/1.73
GFR SERPL CREATININE-BSD FRML MDRD: ABNORMAL ML/MIN/{1.73_M2}
GLOBULIN UR ELPH-MCNC: 4 GM/DL
GLUCOSE BLD-MCNC: 100 MG/DL (ref 65–99)
HCT VFR BLD AUTO: 41.5 % (ref 34–46.6)
HGB BLD-MCNC: 12.6 G/DL (ref 12–15.9)
HOLD SPECIMEN: NORMAL
LYMPHOCYTES # BLD AUTO: 0.63 10*3/MM3 (ref 0.7–3.1)
LYMPHOCYTES NFR BLD AUTO: 8.8 % (ref 19.6–45.3)
MCH RBC QN AUTO: 30.8 PG (ref 26.6–33)
MCHC RBC AUTO-ENTMCNC: 30.4 G/DL (ref 31.5–35.7)
MCV RBC AUTO: 101.5 FL (ref 79–97)
MONOCYTES # BLD AUTO: 0.43 10*3/MM3 (ref 0.1–0.9)
MONOCYTES NFR BLD AUTO: 6 % (ref 5–12)
NEUTROPHILS # BLD AUTO: 5.9 10*3/MM3 (ref 1.7–7)
NEUTROPHILS NFR BLD AUTO: 82.5 % (ref 42.7–76)
PLATELET # BLD AUTO: 134 10*3/MM3 (ref 140–450)
PMV BLD AUTO: 10.8 FL (ref 6–12)
POTASSIUM BLD-SCNC: 4 MMOL/L (ref 3.5–5.2)
PROT SERPL-MCNC: 6.8 G/DL (ref 6–8.5)
RBC # BLD AUTO: 4.09 10*6/MM3 (ref 3.77–5.28)
SODIUM BLD-SCNC: 140 MMOL/L (ref 136–145)
WBC NRBC COR # BLD: 7.15 10*3/MM3 (ref 3.4–10.8)
WHOLE BLOOD HOLD SPECIMEN: NORMAL
WHOLE BLOOD HOLD SPECIMEN: NORMAL

## 2019-10-03 PROCEDURE — 93005 ELECTROCARDIOGRAM TRACING: CPT | Performed by: EMERGENCY MEDICINE

## 2019-10-03 PROCEDURE — 99284 EMERGENCY DEPT VISIT MOD MDM: CPT

## 2019-10-03 PROCEDURE — 70450 CT HEAD/BRAIN W/O DYE: CPT

## 2019-10-03 PROCEDURE — 96360 HYDRATION IV INFUSION INIT: CPT

## 2019-10-03 PROCEDURE — 85025 COMPLETE CBC W/AUTO DIFF WBC: CPT | Performed by: EMERGENCY MEDICINE

## 2019-10-03 PROCEDURE — 80053 COMPREHEN METABOLIC PANEL: CPT | Performed by: EMERGENCY MEDICINE

## 2019-10-03 PROCEDURE — 93010 ELECTROCARDIOGRAM REPORT: CPT | Performed by: INTERNAL MEDICINE

## 2019-10-03 RX ORDER — SODIUM CHLORIDE 9 MG/ML
250 INJECTION, SOLUTION INTRAVENOUS ONCE
Status: COMPLETED | OUTPATIENT
Start: 2019-10-03 | End: 2019-10-03

## 2019-10-03 RX ADMIN — SODIUM CHLORIDE 250 ML/HR: 9 INJECTION, SOLUTION INTRAVENOUS at 13:04

## 2019-10-03 NOTE — ED PROVIDER NOTES
Subjective   Patient thinks that too much fluid was withdrawn from her during dialysis she had an episode yesterday which she felt slightly lightheaded and fell and hit her head no loss of consciousness was able to get up and go with some support there on home health evaluated her today she went for dialysis her blood pressure was low she was sent back home with home health reevaluate her and sent her to the ER in route to the ER the EMS gave her 200 mils of normal saline bolus now she feels much better denies any back pain denies any hip pain no other complaints        Hypotension   Location:  Hypotension  Severity:  Moderate  Onset quality:  Gradual  Timing:  Constant  Progression:  Resolved  Chronicity:  Recurrent  Associated symptoms: no abdominal pain, no chest pain, no congestion, no cough, no diarrhea, no fever, no headaches, no loss of consciousness, no myalgias, no nausea, no rhinorrhea, no shortness of breath, no sore throat, no vomiting and no wheezing        Review of Systems   Constitutional: Negative.  Negative for fever.   HENT: Negative.  Negative for congestion, rhinorrhea and sore throat.    Eyes: Negative.    Respiratory: Negative.  Negative for cough, shortness of breath and wheezing.    Cardiovascular: Negative.  Negative for chest pain.   Gastrointestinal: Negative for abdominal distention, abdominal pain, blood in stool, diarrhea, nausea and vomiting.   Endocrine: Negative.    Genitourinary: Negative.    Musculoskeletal: Negative for myalgias.   Skin: Negative.    Neurological: Negative.  Negative for loss of consciousness and headaches.   Hematological: Negative.    All other systems reviewed and are negative.      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   • Carotid artery disease (CMS/[facility])    • CHF (congestive heart failure)  (CMS/HCC)    • Chronic kidney disease on chronic dialysis (CMS/HCC)    • Chronic mesenteric ischemia (CMS/HCC) 12/14/2017   • Chronic renal failure     on dialysis ON MON, WED, FRI   • Closed displaced intertrochanteric fracture of right femur (CMS/HCC) 2/8/2019    S/p Cephalomedullary nailing 2/8   • Coronary artery disease    • Diastolic dysfunction 3/9/2019    EF 55-60% from echocardiogram on 3/15   • Disease of thyroid gland    • Diverticulitis    • Diverticulosis    • Gastric ulcer    • Gout    • History of transfusion    • Hyperlipidemia    • Hypertension    • Injury of back    • Mesenteric artery insufficiency (CMS/HCC)    • Multilevel degenerative disc disease    • Osteoporosis    • Pancreatitis    • Pelvis fracture (CMS/HCC)    • Pneumonia    • RA (rheumatoid arthritis) (CMS/HCC)    • Sleep apnea        No Known Allergies    Past Surgical History:   Procedure Laterality Date   • AORTAGRAM Right 1/8/2018    Procedure: MESENTERIC ANGIOGRAM, GROIN ACCESS, MYNX CLOSURE;  Surgeon: Tomasz San DO;  Location: [facility] OR;  Service:    • AORTIC VALVE SURGERY     • APPENDECTOMY     • BACK SURGERY     • CAPSULE ENDOSCOPY N/A 3/30/2019    Dr. Yu-Angiodysplasia; Limit anticoagulation as much as is reasonable; Gastric passage time: 0h21m; Small bowel passage time: 2h2m   • CARDIAC SURGERY     • CAROTID ENDARTERECTOMY Bilateral    • CATARACT EXTRACTION, BILATERAL     • CERVICAL SPINE SURGERY     • CHOLECYSTECTOMY     • COLON SURGERY     • COLONOSCOPY  10/01/2015    Diverticulosis in the sigmoid colon; The examined portion of the ileum was normal; The examination was otherwise normal on direct and retroflexion views; No specimens collected; No plans to repeat colonoscopy due to multiple comorbidities   • COLONOSCOPY N/A 3/28/2019    Dr. Merida-The examined portion of the ileum was normal; One 6mm tubular adenomatous polyp at the hepatic flexure; Diverticulosis in the sigmoid colon   • CORONARY ARTERY BYPASS  GRAFT     • DIALYSIS FISTULA CREATION     • ENDOSCOPY N/A 12/27/2018    LA Grade B reflux esophagitis; Non-bleeding gastric ulcers with no stigmata of bleeding-biopsied; Erosive gastritis; Normal examined duodenum; Repeat 2 months   • ENDOSCOPY N/A 2/28/2019    LA Grade B esophagitis-biopsied-clip (MR conditional) was placed; An adjustable gastric banding was found, characterized by healthy appearing mucosa; Normal stomach; Normal examined duodenum; Repeat 2 months   • ENDOSCOPY N/A 3/11/2019    An endoclip was found in the esophagus; Normal stomach; Normal examined duodenum; No specimens collected; Repeat endo 2-3 months   • ENDOSCOPY N/A 3/27/2019    Dr. Merida-Normal examined jejunum; Normal examined duodenum; An adjustable gastric banding was found; Normal esophagus; No specimens collected   • ENDOSCOPY  10/01/2015    Normal esophagus; Bleeding erosive gastropathy-biopsied; Thickening of the gastric mucosa in the cardia-biopsied; Normal examined duodenum   • EXPLORATORY LAPAROTOMY N/A 5/13/2019    Procedure: LAPAROTOMY EXPLORATORY, OVERSEW DUODENAL ULCER WITH FRED PATCH, KOCHER MANEUVER;  Surgeon: Laila Rodriguez MD;  Location: [facility] OR;  Service: General   • HIP TROCHANTERIC NAILING WITH INTRAMEDULLARY HIP SCREW Right 2/8/2019    Procedure: HIP TROCANTERIC NAILING LONG WITH INTRAMEDULLARY HIP SCREW;  Surgeon: Boo Camacho MD;  Location: [facility] OR;  Service: Orthopedics   • JOINT REPLACEMENT      knee   • LAPAROSCOPIC GASTRIC BANDING     • LEEP     • LUMBAR FUSION     • TOE AMPUTATION Left     2nd toe   • TOTAL KNEE ARTHROPLASTY Bilateral    • TUBAL ABDOMINAL LIGATION         Family History   Problem Relation Age of Onset   • Hypertension Mother    • Cancer Mother         stomach   • Coronary artery disease Father    • Heart attack Father    • Diabetes Brother    • Coronary artery disease Brother    • Colon cancer Neg Hx    • Colon polyps Neg Hx        Social History     Socioeconomic History   •  Marital status:      Spouse name: Not on file   • Number of children: Not on file   • Years of education: Not on file   • Highest education level: Not on file   Tobacco Use   • Smoking status: Never Smoker   • Smokeless tobacco: Never Used   Substance and Sexual Activity   • Alcohol use: No   • Drug use: No   • Sexual activity: Defer           Objective   Physical Exam   Constitutional: She is oriented to person, place, and time. She appears well-developed and well-nourished.   HENT:   Head: Normocephalic.   Right Ear: External ear normal.   Left supraorbital hematoma no entrapment no ocular involvement no hyphema   Eyes: Conjunctivae are normal. Pupils are equal, round, and reactive to light.   Neck: Normal range of motion. Neck supple.   Cardiovascular: Normal rate, regular rhythm, normal heart sounds and intact distal pulses. PMI is not displaced. Exam reveals no decreased pulses.   No murmur heard.  Pulmonary/Chest: Effort normal and breath sounds normal. No accessory muscle usage. No tachypnea. No respiratory distress. She has no decreased breath sounds. She has no wheezes.   Abdominal: Soft. Bowel sounds are normal. There is no tenderness.   Musculoskeletal: Normal range of motion. She exhibits no edema or tenderness.   Lower extremity exam bilaterally is unremarkable.  There is no right or left calf tenderness .  There is no palpable venous cord.  No obvious difference in the size of the legs.  No pitting edema.  The dorsalis pedis and posterior tibial femoral and popliteal pulses are palpable and +2 bilaterally.  Homans sign is negative   Neurological: She is alert and oriented to person, place, and time. She has normal reflexes. No cranial nerve deficit. Coordination normal.   Skin: Skin is warm. No rash noted. No erythema.   Nursing note and vitals reviewed.      Procedures           ED Course  ED Course as of Oct 03 1356   Thu Oct 03, 2019   1354 Patient blood pressure has improved she feels 100%  better CT scan of the head is negative she wants to go home I have discussed this case the patient will discharge home  [TS]   1000  The patient's symptoms are now better.  Patient is not having pain.  No chest pain, difficulty breathing, nausea, vomiting or palpitations.  No anxiety or dizziness.  Vital signs have been reviewed and appear to be correct.  Physical exam findings are improved.  Alert.  Oriented X3 .  No acute distress.  Breath sounds normal.  No respiratory distress, decreased breath sounds or wheezes.  Normal heart rate and rhythm.  Heart sounds normal.  .  Abdomen soft and nontender.  No abdominal tenderness or guarding or rebound tenderness.  Skin warm and dry.  No cyanosis or diaphoresis.  Oriented X 3.  Not anxious.  No alteration in mental status or weakness.          [TS]      ED Course User Index  [TS] Markie Rodriguez MD                  [facility]    Final diagnoses:   Hemodialysis-associated hypotension   Fall, initial encounter   Contusion of scalp, initial encounter              Markie Rodriguez MD  10/03/19 [id number]

## 2019-10-03 NOTE — DISCHARGE INSTRUCTIONS
Contusion    A contusion is a deep bruise. Contusions happen when an injury causes bleeding under the skin. Symptoms of bruising include pain, swelling, and discolored skin. The skin may turn blue, purple, or yellow.  Follow these instructions at home:  · Rest the injured area.  · If told, put ice on the injured area.  ? Put ice in a plastic bag.  ? Place a towel between your skin and the bag.  ? Leave the ice on for 20 minutes, 2-3 times per day.  · If told, put light pressure (compression) on the injured area using an elastic bandage. Make sure the bandage is not too tight. Remove it and put it back on as told by your doctor.  · If possible, raise (elevate) the injured area above the level of your heart while you are sitting or lying down.  · Take over-the-counter and prescription medicines only as told by your doctor.  Contact a doctor if:  · Your symptoms do not get better after several days of treatment.  · Your symptoms get worse.  · You have trouble moving the injured area.  Get help right away if:  · You have very bad pain.  · You have a loss of feeling (numbness) in a hand or foot.  · Your hand or foot turns pale or cold.  This information is not intended to replace advice given to you by your health care provider. Make sure you discuss any questions you have with your health care provider.  Document Released: 06/05/2009 Document Revised: 05/25/2017 Document Reviewed: 05/04/2016  [employer] Interactive Patient Education © 2019 [employer]      Facial or Scalp Contusion    A facial or scalp contusion is a deep bruise (contusion) on the face or head. Bruises happen when an injury causes bleeding under the skin. The bruise may turn blue, purple, or yellow. Minor injuries will cause a bruise that is not painful, but worse bruises can stay painful and swollen for a few weeks.  Follow these instructions at home:  · Take over-the-counter and prescription medicines only as told by your doctor.  · If directed, apply  ice to the injured area.  ? Put ice in a plastic bag.  ? Place a towel between your skin and the bag.  ? Leave the ice on for 20 minutes, 2-3 times a day.  · Keep all follow-up visits as told by your doctor. This is important.  Contact a doctor if:  · You have trouble biting or chewing.  · Your pain or swelling gets worse.  · You have pain when you move your eyes.  Get help right away if:  · You have very bad pain or a headache, and medicine does not help.  · You are very tired or confused, or your personality changes.  · You throw up (vomit).  · You have a nosebleed that does not stop.  · You see two of everything (double vision) or have blurry vision.  · You have clear fluid coming from your nose or ear, and it does not go away.  · You have problems walking or using your arms or legs.  · You are very dizzy.  Summary  · A facial or scalp contusion is a deep bruise (contusion) on the face or head.  · Bruises happen when an injury causes bleeding under the skin.  · Minor injuries will cause a bruise that is not painful, but worse bruises can stay painful and swollen for a few weeks.  This information is not intended to replace advice given to you by your health care provider. Make sure you discuss any questions you have with your health care provider.  Document Released: 12/06/2012 Document Revised: 11/07/2017 Document Reviewed: 11/07/2017  [employer] Interactive Patient Education © 2019 [employer]

## 2019-10-03 NOTE — ED NOTES
Patient is a 79 year old female that presents to ER with hypotension and generalized weakness. EMS reports that patient home health contacted them due to patient being hypotensive. Patient reports that her BP is normally low. Patient currently denies any active pain at this time.      Jayesh Gordon RN  10/03/19 [id number]

## 2020-01-01 ENCOUNTER — APPOINTMENT (OUTPATIENT)
Dept: GENERAL RADIOLOGY | Age: 80
DRG: 314 | End: 2020-01-01
Payer: MEDICARE

## 2020-01-01 ENCOUNTER — HOSPITAL ENCOUNTER (INPATIENT)
Age: 80
LOS: 1 days | Discharge: HOSPICE | DRG: 314 | End: 2020-04-22
Attending: PEDIATRICS | Admitting: INTERNAL MEDICINE
Payer: MEDICARE

## 2020-01-01 ENCOUNTER — TELEPHONE (OUTPATIENT)
Dept: VASCULAR SURGERY | Age: 80
End: 2020-01-01

## 2020-01-01 ENCOUNTER — PREP FOR PROCEDURE (OUTPATIENT)
Dept: VASCULAR SURGERY | Age: 80
End: 2020-01-01

## 2020-01-01 ENCOUNTER — VIRTUAL VISIT (OUTPATIENT)
Dept: VASCULAR SURGERY | Age: 80
End: 2020-01-01
Payer: MEDICARE

## 2020-01-01 VITALS
HEIGHT: 59 IN | SYSTOLIC BLOOD PRESSURE: 75 MMHG | DIASTOLIC BLOOD PRESSURE: 64 MMHG | HEART RATE: 122 BPM | BODY MASS INDEX: 23.18 KG/M2 | OXYGEN SATURATION: 97 % | TEMPERATURE: 97.9 F | WEIGHT: 115 LBS | RESPIRATION RATE: 15 BRPM

## 2020-01-01 LAB
ABO/RH: NORMAL
ALBUMIN SERPL-MCNC: 2 G/DL (ref 3.5–5.2)
ALP BLD-CCNC: 208 U/L (ref 35–104)
ALT SERPL-CCNC: 25 U/L (ref 5–33)
ANION GAP SERPL CALCULATED.3IONS-SCNC: 19 MMOL/L (ref 7–19)
ANION GAP SERPL CALCULATED.3IONS-SCNC: 21 MMOL/L (ref 7–19)
ANTIBODY SCREEN: NORMAL
APTT: 57.5 SEC (ref 26–36.2)
AST SERPL-CCNC: 34 U/L (ref 5–32)
BASE EXCESS ARTERIAL: -9.5 MMOL/L (ref -2–2)
BASOPHILS ABSOLUTE: 0.1 K/UL (ref 0–0.2)
BASOPHILS RELATIVE PERCENT: 0.1 % (ref 0–1)
BILIRUB SERPL-MCNC: 0.4 MG/DL (ref 0.2–1.2)
BUN BLDV-MCNC: 37 MG/DL (ref 8–23)
BUN BLDV-MCNC: 39 MG/DL (ref 8–23)
C DIFF TOXIN/ANTIGEN: NORMAL
CALCIUM SERPL-MCNC: 7.8 MG/DL (ref 8.8–10.2)
CALCIUM SERPL-MCNC: 8.4 MG/DL (ref 8.8–10.2)
CARBOXYHEMOGLOBIN ARTERIAL: 1 % (ref 0–5)
CHLORIDE BLD-SCNC: 93 MMOL/L (ref 98–111)
CHLORIDE BLD-SCNC: 99 MMOL/L (ref 98–111)
CO2: 14 MMOL/L (ref 22–29)
CO2: 18 MMOL/L (ref 22–29)
CREAT SERPL-MCNC: 5.9 MG/DL (ref 0.5–0.9)
CREAT SERPL-MCNC: 6.2 MG/DL (ref 0.5–0.9)
EKG P AXIS: NORMAL DEGREES
EKG P-R INTERVAL: NORMAL MS
EKG Q-T INTERVAL: 310 MS
EKG Q-T INTERVAL: 356 MS
EKG Q-T INTERVAL: 376 MS
EKG QRS DURATION: 146 MS
EKG QRS DURATION: 168 MS
EKG QRS DURATION: 174 MS
EKG QTC CALCULATION (BAZETT): 454 MS
EKG QTC CALCULATION (BAZETT): 454 MS
EKG QTC CALCULATION (BAZETT): 458 MS
EKG T AXIS: 119 DEGREES
EKG T AXIS: 137 DEGREES
EKG T AXIS: 169 DEGREES
EOSINOPHILS ABSOLUTE: 0 K/UL (ref 0–0.6)
EOSINOPHILS RELATIVE PERCENT: 0 % (ref 0–5)
GFR NON-AFRICAN AMERICAN: 7
GFR NON-AFRICAN AMERICAN: 7
GLUCOSE BLD-MCNC: 113 MG/DL (ref 74–109)
GLUCOSE BLD-MCNC: 84 MG/DL (ref 74–109)
HAV IGM SER IA-ACNC: NORMAL
HBV SURFACE AB TITR SER: NORMAL {TITER}
HCO3 ARTERIAL: 16.4 MMOL/L (ref 22–26)
HCT VFR BLD CALC: 28.2 % (ref 37–47)
HEMOGLOBIN, ART, EXTENDED: 7.9 G/DL (ref 12–16)
HEMOGLOBIN: 9.3 G/DL (ref 12–16)
HEPATITIS B CORE IGM ANTIBODY: NORMAL
HEPATITIS B SURFACE ANTIGEN INTERPRETATION: NORMAL
HEPATITIS C ANTIBODY INTERPRETATION: NORMAL
IMMATURE GRANULOCYTES #: 0.4 K/UL
INR BLD: 1.77 (ref 0.88–1.18)
LACTIC ACID: 0.3 MMOL/L (ref 0.5–1.9)
LIPASE: 6 U/L (ref 13–60)
LYMPHOCYTES ABSOLUTE: 0.5 K/UL (ref 1.1–4.5)
LYMPHOCYTES RELATIVE PERCENT: 1.4 % (ref 20–40)
MAGNESIUM: 1.4 MG/DL (ref 1.6–2.4)
MCH RBC QN AUTO: 35.1 PG (ref 27–31)
MCHC RBC AUTO-ENTMCNC: 33 G/DL (ref 33–37)
MCV RBC AUTO: 106.4 FL (ref 81–99)
METHEMOGLOBIN ARTERIAL: 0.7 %
MONOCYTES ABSOLUTE: 0.6 K/UL (ref 0–0.9)
MONOCYTES RELATIVE PERCENT: 1.8 % (ref 0–10)
NEUTROPHILS ABSOLUTE: 32.1 K/UL (ref 1.5–7.5)
NEUTROPHILS RELATIVE PERCENT: 95.6 % (ref 50–65)
O2 CONTENT ARTERIAL: 10.5 ML/DL
O2 SAT, ARTERIAL: 93.7 %
O2 THERAPY: ABNORMAL
PCO2 ARTERIAL: 35 MMHG (ref 35–45)
PDW BLD-RTO: 16.5 % (ref 11.5–14.5)
PH ARTERIAL: 7.28 (ref 7.35–7.45)
PLATELET # BLD: 193 K/UL (ref 130–400)
PMV BLD AUTO: 11.5 FL (ref 9.4–12.3)
PO2 ARTERIAL: 70 MMHG (ref 80–100)
POTASSIUM REFLEX MAGNESIUM: 3.5 MMOL/L (ref 3.5–5)
POTASSIUM REFLEX MAGNESIUM: 3.6 MMOL/L (ref 3.5–5)
POTASSIUM, WHOLE BLOOD: 3.3
PROTHROMBIN TIME: 20.8 SEC (ref 12–14.6)
RBC # BLD: 2.65 M/UL (ref 4.2–5.4)
SODIUM BLD-SCNC: 130 MMOL/L (ref 136–145)
SODIUM BLD-SCNC: 134 MMOL/L (ref 136–145)
TOTAL PROTEIN: 4.8 G/DL (ref 6.6–8.7)
TROPONIN: 0.11 NG/ML (ref 0–0.03)
TROPONIN: 0.13 NG/ML (ref 0–0.03)
TROPONIN: 0.14 NG/ML (ref 0–0.03)
TROPONIN: 0.15 NG/ML (ref 0–0.03)
VANCOMYCIN RANDOM: 10.7 UG/ML
WBC # BLD: 33.6 K/UL (ref 4.8–10.8)

## 2020-01-01 PROCEDURE — 2500000003 HC RX 250 WO HCPCS: Performed by: PEDIATRICS

## 2020-01-01 PROCEDURE — P9047 ALBUMIN (HUMAN), 25%, 50ML: HCPCS

## 2020-01-01 PROCEDURE — 93005 ELECTROCARDIOGRAM TRACING: CPT | Performed by: PEDIATRICS

## 2020-01-01 PROCEDURE — 1090F PRES/ABSN URINE INCON ASSESS: CPT | Performed by: NURSE PRACTITIONER

## 2020-01-01 PROCEDURE — G8400 PT W/DXA NO RESULTS DOC: HCPCS | Performed by: NURSE PRACTITIONER

## 2020-01-01 PROCEDURE — 83605 ASSAY OF LACTIC ACID: CPT

## 2020-01-01 PROCEDURE — 2500000003 HC RX 250 WO HCPCS: Performed by: HOSPITALIST

## 2020-01-01 PROCEDURE — 99285 EMERGENCY DEPT VISIT HI MDM: CPT

## 2020-01-01 PROCEDURE — 06HY33Z INSERTION OF INFUSION DEVICE INTO LOWER VEIN, PERCUTANEOUS APPROACH: ICD-10-PCS | Performed by: PEDIATRICS

## 2020-01-01 PROCEDURE — 86403 PARTICLE AGGLUT ANTBDY SCRN: CPT

## 2020-01-01 PROCEDURE — 86900 BLOOD TYPING SEROLOGIC ABO: CPT

## 2020-01-01 PROCEDURE — 02HV33Z INSERTION OF INFUSION DEVICE INTO SUPERIOR VENA CAVA, PERCUTANEOUS APPROACH: ICD-10-PCS | Performed by: SURGERY

## 2020-01-01 PROCEDURE — 85610 PROTHROMBIN TIME: CPT

## 2020-01-01 PROCEDURE — 80074 ACUTE HEPATITIS PANEL: CPT

## 2020-01-01 PROCEDURE — 96374 THER/PROPH/DIAG INJ IV PUSH: CPT

## 2020-01-01 PROCEDURE — 36415 COLL VENOUS BLD VENIPUNCTURE: CPT

## 2020-01-01 PROCEDURE — 2000000000 HC ICU R&B

## 2020-01-01 PROCEDURE — 6360000002 HC RX W HCPCS: Performed by: PEDIATRICS

## 2020-01-01 PROCEDURE — 87324 CLOSTRIDIUM AG IA: CPT

## 2020-01-01 PROCEDURE — 84484 ASSAY OF TROPONIN QUANT: CPT

## 2020-01-01 PROCEDURE — 99222 1ST HOSP IP/OBS MODERATE 55: CPT | Performed by: NURSE PRACTITIONER

## 2020-01-01 PROCEDURE — 36620 INSERTION CATHETER ARTERY: CPT

## 2020-01-01 PROCEDURE — 99223 1ST HOSP IP/OBS HIGH 75: CPT | Performed by: NURSE PRACTITIONER

## 2020-01-01 PROCEDURE — 86706 HEP B SURFACE ANTIBODY: CPT

## 2020-01-01 PROCEDURE — 93010 ELECTROCARDIOGRAM REPORT: CPT | Performed by: INTERNAL MEDICINE

## 2020-01-01 PROCEDURE — 86850 RBC ANTIBODY SCREEN: CPT

## 2020-01-01 PROCEDURE — 84132 ASSAY OF SERUM POTASSIUM: CPT

## 2020-01-01 PROCEDURE — G8428 CUR MEDS NOT DOCUMENT: HCPCS | Performed by: NURSE PRACTITIONER

## 2020-01-01 PROCEDURE — 85730 THROMBOPLASTIN TIME PARTIAL: CPT

## 2020-01-01 PROCEDURE — 86901 BLOOD TYPING SEROLOGIC RH(D): CPT

## 2020-01-01 PROCEDURE — 96375 TX/PRO/DX INJ NEW DRUG ADDON: CPT

## 2020-01-01 PROCEDURE — 6370000000 HC RX 637 (ALT 250 FOR IP): Performed by: HOSPITALIST

## 2020-01-01 PROCEDURE — 8090000000 HC CONTINUOUS VENOVENOUS HEMOFIL

## 2020-01-01 PROCEDURE — 90945 DIALYSIS ONE EVALUATION: CPT

## 2020-01-01 PROCEDURE — 85025 COMPLETE CBC W/AUTO DIFF WBC: CPT

## 2020-01-01 PROCEDURE — 87186 SC STD MICRODIL/AGAR DIL: CPT

## 2020-01-01 PROCEDURE — 99214 OFFICE O/P EST MOD 30 MIN: CPT | Performed by: NURSE PRACTITIONER

## 2020-01-01 PROCEDURE — 36556 INSERT NON-TUNNEL CV CATH: CPT | Performed by: SURGERY

## 2020-01-01 PROCEDURE — 6360000002 HC RX W HCPCS: Performed by: HOSPITALIST

## 2020-01-01 PROCEDURE — 2500000003 HC RX 250 WO HCPCS: Performed by: INTERNAL MEDICINE

## 2020-01-01 PROCEDURE — 6360000002 HC RX W HCPCS: Performed by: INTERNAL MEDICINE

## 2020-01-01 PROCEDURE — 4004F PT TOBACCO SCREEN RCVD TLK: CPT | Performed by: NURSE PRACTITIONER

## 2020-01-01 PROCEDURE — 36556 INSERT NON-TUNNEL CV CATH: CPT

## 2020-01-01 PROCEDURE — 2580000003 HC RX 258: Performed by: PEDIATRICS

## 2020-01-01 PROCEDURE — 83735 ASSAY OF MAGNESIUM: CPT

## 2020-01-01 PROCEDURE — 37799 UNLISTED PX VASCULAR SURGERY: CPT

## 2020-01-01 PROCEDURE — 5A1D90Z PERFORMANCE OF URINARY FILTRATION, CONTINUOUS, GREATER THAN 18 HOURS PER DAY: ICD-10-PCS | Performed by: INTERNAL MEDICINE

## 2020-01-01 PROCEDURE — 6360000002 HC RX W HCPCS

## 2020-01-01 PROCEDURE — 0JH63XZ INSERTION OF TUNNELED VASCULAR ACCESS DEVICE INTO CHEST SUBCUTANEOUS TISSUE AND FASCIA, PERCUTANEOUS APPROACH: ICD-10-PCS | Performed by: SURGERY

## 2020-01-01 PROCEDURE — 87449 NOS EACH ORGANISM AG IA: CPT

## 2020-01-01 PROCEDURE — 6370000000 HC RX 637 (ALT 250 FOR IP): Performed by: INTERNAL MEDICINE

## 2020-01-01 PROCEDURE — 2500000003 HC RX 250 WO HCPCS

## 2020-01-01 PROCEDURE — 82803 BLOOD GASES ANY COMBINATION: CPT

## 2020-01-01 PROCEDURE — 71045 X-RAY EXAM CHEST 1 VIEW: CPT

## 2020-01-01 PROCEDURE — 94640 AIRWAY INHALATION TREATMENT: CPT

## 2020-01-01 PROCEDURE — 2580000003 HC RX 258: Performed by: INTERNAL MEDICINE

## 2020-01-01 PROCEDURE — 87040 BLOOD CULTURE FOR BACTERIA: CPT

## 2020-01-01 PROCEDURE — 1123F ACP DISCUSS/DSCN MKR DOCD: CPT | Performed by: NURSE PRACTITIONER

## 2020-01-01 PROCEDURE — 80202 ASSAY OF VANCOMYCIN: CPT

## 2020-01-01 PROCEDURE — 80053 COMPREHEN METABOLIC PANEL: CPT

## 2020-01-01 PROCEDURE — 2580000003 HC RX 258: Performed by: HOSPITALIST

## 2020-01-01 PROCEDURE — 4040F PNEUMOC VAC/ADMIN/RCVD: CPT | Performed by: NURSE PRACTITIONER

## 2020-01-01 PROCEDURE — G8421 BMI NOT CALCULATED: HCPCS | Performed by: NURSE PRACTITIONER

## 2020-01-01 PROCEDURE — 83690 ASSAY OF LIPASE: CPT

## 2020-01-01 RX ORDER — SODIUM CHLORIDE 0.9 % (FLUSH) 0.9 %
10 SYRINGE (ML) INJECTION PRN
Status: DISCONTINUED | OUTPATIENT
Start: 2020-01-01 | End: 2020-01-01 | Stop reason: HOSPADM

## 2020-01-01 RX ORDER — PRAVASTATIN SODIUM 20 MG
40 TABLET ORAL NIGHTLY
Status: DISCONTINUED | OUTPATIENT
Start: 2020-01-01 | End: 2020-01-01 | Stop reason: HOSPADM

## 2020-01-01 RX ORDER — SUCRALFATE 1 G/1
1 TABLET ORAL DAILY
COMMUNITY

## 2020-01-01 RX ORDER — VITAMIN B COMPLEX
2000 TABLET ORAL DAILY
Status: DISCONTINUED | OUTPATIENT
Start: 2020-01-01 | End: 2020-01-01 | Stop reason: HOSPADM

## 2020-01-01 RX ORDER — ALBUTEROL SULFATE 2.5 MG/3ML
2.5 SOLUTION RESPIRATORY (INHALATION)
Status: DISCONTINUED | OUTPATIENT
Start: 2020-01-01 | End: 2020-01-01 | Stop reason: HOSPADM

## 2020-01-01 RX ORDER — MULTIVIT-MIN/IRON/FOLIC ACID/K 18-600-40
2000 CAPSULE ORAL DAILY
COMMUNITY
Start: 2019-04-02 | End: 2020-01-01 | Stop reason: SDUPTHER

## 2020-01-01 RX ORDER — METOPROLOL TARTRATE 5 MG/5ML
INJECTION INTRAVENOUS
Status: COMPLETED
Start: 2020-01-01 | End: 2020-01-01

## 2020-01-01 RX ORDER — DIGOXIN 0.25 MG/ML
INJECTION INTRAMUSCULAR; INTRAVENOUS
Status: COMPLETED
Start: 2020-01-01 | End: 2020-01-01

## 2020-01-01 RX ORDER — METOPROLOL TARTRATE 5 MG/5ML
2.5 INJECTION INTRAVENOUS ONCE
Status: COMPLETED | OUTPATIENT
Start: 2020-01-01 | End: 2020-01-01

## 2020-01-01 RX ORDER — LANOLIN ALCOHOL/MO/W.PET/CERES
400 CREAM (GRAM) TOPICAL DAILY
Status: DISCONTINUED | OUTPATIENT
Start: 2020-01-01 | End: 2020-01-01 | Stop reason: HOSPADM

## 2020-01-01 RX ORDER — ONDANSETRON 4 MG/1
4 TABLET, ORALLY DISINTEGRATING ORAL EVERY 8 HOURS PRN
Status: DISCONTINUED | OUTPATIENT
Start: 2020-01-01 | End: 2020-01-01 | Stop reason: HOSPADM

## 2020-01-01 RX ORDER — 0.9 % SODIUM CHLORIDE 0.9 %
30 INTRAVENOUS SOLUTION INTRAVENOUS ONCE
Status: COMPLETED | OUTPATIENT
Start: 2020-01-01 | End: 2020-01-01

## 2020-01-01 RX ORDER — ASPIRIN 81 MG/1
81 TABLET ORAL ONCE
Status: CANCELLED | OUTPATIENT
Start: 2020-01-01 | End: 2020-01-01

## 2020-01-01 RX ORDER — DIPHENHYDRAMINE HYDROCHLORIDE 50 MG/ML
25 INJECTION INTRAMUSCULAR; INTRAVENOUS EVERY 4 HOURS PRN
Status: DISCONTINUED | OUTPATIENT
Start: 2020-01-01 | End: 2020-01-01 | Stop reason: HOSPADM

## 2020-01-01 RX ORDER — ATROPINE SULFATE 10 MG/ML
3 SOLUTION/ DROPS OPHTHALMIC EVERY 4 HOURS PRN
Status: DISCONTINUED | OUTPATIENT
Start: 2020-01-01 | End: 2020-01-01 | Stop reason: HOSPADM

## 2020-01-01 RX ORDER — BUDESONIDE 0.5 MG/2ML
0.5 INHALANT ORAL EVERY 12 HOURS
Status: DISCONTINUED | OUTPATIENT
Start: 2020-01-01 | End: 2020-01-01 | Stop reason: HOSPADM

## 2020-01-01 RX ORDER — 0.9 % SODIUM CHLORIDE 0.9 %
500 INTRAVENOUS SOLUTION INTRAVENOUS ONCE
Status: DISCONTINUED | OUTPATIENT
Start: 2020-01-01 | End: 2020-01-01 | Stop reason: HOSPADM

## 2020-01-01 RX ORDER — METOPROLOL TARTRATE 5 MG/5ML
2.5 INJECTION INTRAVENOUS EVERY 6 HOURS PRN
Status: DISCONTINUED | OUTPATIENT
Start: 2020-01-01 | End: 2020-01-01 | Stop reason: HOSPADM

## 2020-01-01 RX ORDER — LIDOCAINE AND PRILOCAINE 25; 25 MG/G; MG/G
CREAM TOPICAL
COMMUNITY
Start: 2020-01-01

## 2020-01-01 RX ORDER — ACETAMINOPHEN 325 MG/1
650 TABLET ORAL EVERY 6 HOURS PRN
Status: DISCONTINUED | OUTPATIENT
Start: 2020-01-01 | End: 2020-01-01 | Stop reason: HOSPADM

## 2020-01-01 RX ORDER — DIGOXIN 0.25 MG/ML
125 INJECTION INTRAMUSCULAR; INTRAVENOUS DAILY
Status: DISCONTINUED | OUTPATIENT
Start: 2020-01-01 | End: 2020-01-01

## 2020-01-01 RX ORDER — SODIUM CHLORIDE 9 MG/ML
INJECTION, SOLUTION INTRAVENOUS ONCE
Status: COMPLETED | OUTPATIENT
Start: 2020-01-01 | End: 2020-01-01

## 2020-01-01 RX ORDER — ACETAMINOPHEN 650 MG/1
650 SUPPOSITORY RECTAL EVERY 6 HOURS PRN
Status: DISCONTINUED | OUTPATIENT
Start: 2020-01-01 | End: 2020-01-01 | Stop reason: HOSPADM

## 2020-01-01 RX ORDER — LEVOTHYROXINE SODIUM 0.12 MG/1
125 TABLET ORAL DAILY
COMMUNITY
Start: 2019-01-01 | End: 2020-01-01 | Stop reason: SDUPTHER

## 2020-01-01 RX ORDER — 0.9 % SODIUM CHLORIDE 0.9 %
100 INTRAVENOUS SOLUTION INTRAVENOUS EVERY 30 MIN PRN
Status: DISCONTINUED | OUTPATIENT
Start: 2020-01-01 | End: 2020-01-01 | Stop reason: HOSPADM

## 2020-01-01 RX ORDER — ACETAMINOPHEN 500 MG
500 TABLET ORAL EVERY 4 HOURS PRN
Status: DISCONTINUED | OUTPATIENT
Start: 2020-01-01 | End: 2020-01-01 | Stop reason: HOSPADM

## 2020-01-01 RX ORDER — POLYETHYLENE GLYCOL 3350 17 G/17G
17 POWDER, FOR SOLUTION ORAL DAILY PRN
Status: DISCONTINUED | OUTPATIENT
Start: 2020-01-01 | End: 2020-01-01 | Stop reason: HOSPADM

## 2020-01-01 RX ORDER — PRAVASTATIN SODIUM 40 MG
40 TABLET ORAL DAILY
COMMUNITY
End: 2020-01-01 | Stop reason: SDUPTHER

## 2020-01-01 RX ORDER — ALLOPURINOL 100 MG/1
100 TABLET ORAL DAILY
COMMUNITY

## 2020-01-01 RX ORDER — 0.9 % SODIUM CHLORIDE 0.9 %
200 INTRAVENOUS SOLUTION INTRAVENOUS PRN
Status: DISCONTINUED | OUTPATIENT
Start: 2020-01-01 | End: 2020-01-01 | Stop reason: HOSPADM

## 2020-01-01 RX ORDER — DIGOXIN 0.25 MG/ML
125 INJECTION INTRAMUSCULAR; INTRAVENOUS ONCE
Status: COMPLETED | OUTPATIENT
Start: 2020-01-01 | End: 2020-01-01

## 2020-01-01 RX ORDER — ONDANSETRON 2 MG/ML
4 INJECTION INTRAMUSCULAR; INTRAVENOUS EVERY 6 HOURS PRN
Status: DISCONTINUED | OUTPATIENT
Start: 2020-01-01 | End: 2020-01-01 | Stop reason: HOSPADM

## 2020-01-01 RX ORDER — ALBUMIN (HUMAN) 12.5 G/50ML
SOLUTION INTRAVENOUS
Status: COMPLETED
Start: 2020-01-01 | End: 2020-01-01

## 2020-01-01 RX ORDER — ALBUMIN (HUMAN) 12.5 G/50ML
12.5 SOLUTION INTRAVENOUS PRN
Status: DISCONTINUED | OUTPATIENT
Start: 2020-01-01 | End: 2020-01-01 | Stop reason: HOSPADM

## 2020-01-01 RX ORDER — ALBUMIN (HUMAN) 12.5 G/50ML
25 SOLUTION INTRAVENOUS ONCE
Status: COMPLETED | OUTPATIENT
Start: 2020-01-01 | End: 2020-01-01

## 2020-01-01 RX ORDER — DOPAMINE HYDROCHLORIDE 160 MG/100ML
INJECTION, SOLUTION INTRAVENOUS
Status: COMPLETED
Start: 2020-01-01 | End: 2020-01-01

## 2020-01-01 RX ORDER — SUCRALFATE 1 G/1
1 TABLET ORAL DAILY
Status: DISCONTINUED | OUTPATIENT
Start: 2020-01-01 | End: 2020-01-01 | Stop reason: HOSPADM

## 2020-01-01 RX ORDER — SODIUM CHLORIDE 0.9 % (FLUSH) 0.9 %
10 SYRINGE (ML) INJECTION EVERY 12 HOURS SCHEDULED
Status: DISCONTINUED | OUTPATIENT
Start: 2020-01-01 | End: 2020-01-01 | Stop reason: HOSPADM

## 2020-01-01 RX ORDER — SODIUM CHLORIDE 0.9 % (FLUSH) 0.9 %
10 SYRINGE (ML) INJECTION PRN
Status: CANCELLED | OUTPATIENT
Start: 2020-01-01

## 2020-01-01 RX ORDER — M-VIT,TX,IRON,MINS/CALC/FOLIC 27MG-0.4MG
1 TABLET ORAL DAILY
Status: DISCONTINUED | OUTPATIENT
Start: 2020-01-01 | End: 2020-01-01 | Stop reason: HOSPADM

## 2020-01-01 RX ORDER — FERROUS SULFATE 325(65) MG
325 TABLET ORAL 3 TIMES DAILY
Status: DISCONTINUED | OUTPATIENT
Start: 2020-01-01 | End: 2020-01-01 | Stop reason: HOSPADM

## 2020-01-01 RX ORDER — ARFORMOTEROL TARTRATE 15 UG/2ML
15 SOLUTION RESPIRATORY (INHALATION) 2 TIMES DAILY
Status: DISCONTINUED | OUTPATIENT
Start: 2020-01-01 | End: 2020-01-01 | Stop reason: HOSPADM

## 2020-01-01 RX ORDER — FOLIC ACID/VIT B COMPLEX AND C 0.8 MG
1 TABLET ORAL DAILY
COMMUNITY

## 2020-01-01 RX ORDER — CLONIDINE HYDROCHLORIDE 0.1 MG/1
0.1 TABLET ORAL PRN
Status: CANCELLED | OUTPATIENT
Start: 2020-01-01

## 2020-01-01 RX ORDER — CYPROHEPTADINE HYDROCHLORIDE 4 MG/1
4 TABLET ORAL 3 TIMES DAILY PRN
COMMUNITY

## 2020-01-01 RX ORDER — PANTOPRAZOLE SODIUM 40 MG/1
40 TABLET, DELAYED RELEASE ORAL
Status: DISCONTINUED | OUTPATIENT
Start: 2020-01-01 | End: 2020-01-01 | Stop reason: HOSPADM

## 2020-01-01 RX ORDER — HEPARIN SODIUM 5000 [USP'U]/ML
5000 INJECTION, SOLUTION INTRAVENOUS; SUBCUTANEOUS EVERY 8 HOURS SCHEDULED
Status: DISCONTINUED | OUTPATIENT
Start: 2020-01-01 | End: 2020-01-01 | Stop reason: HOSPADM

## 2020-01-01 RX ORDER — FOLIC ACID/VIT B COMPLEX AND C 0.8 MG
1 TABLET ORAL DAILY
Status: DISCONTINUED | OUTPATIENT
Start: 2020-01-01 | End: 2020-01-01 | Stop reason: HOSPADM

## 2020-01-01 RX ADMIN — ACETAMINOPHEN 650 MG: 325 TABLET ORAL at 22:02

## 2020-01-01 RX ADMIN — FERROUS SULFATE TAB 325 MG (65 MG ELEMENTAL FE) 325 MG: 325 (65 FE) TAB at 13:46

## 2020-01-01 RX ADMIN — MEROPENEM 1 G: 1 INJECTION, POWDER, FOR SOLUTION INTRAVENOUS at 01:20

## 2020-01-01 RX ADMIN — ALBUMIN (HUMAN) 25 G: 12.5 SOLUTION INTRAVENOUS at 03:03

## 2020-01-01 RX ADMIN — MULTIPLE VITAMINS W/ MINERALS TAB 1 TABLET: TAB at 13:41

## 2020-01-01 RX ADMIN — VASOPRESSIN 0.02 UNITS/MIN: 20 INJECTION INTRAVENOUS at 04:11

## 2020-01-01 RX ADMIN — Medication 1000 MG: at 01:49

## 2020-01-01 RX ADMIN — DIPHENHYDRAMINE HYDROCHLORIDE 25 MG: 50 INJECTION, SOLUTION INTRAMUSCULAR; INTRAVENOUS at 19:26

## 2020-01-01 RX ADMIN — DOPAMINE HYDROCHLORIDE 400000 MCG: 160 INJECTION, SOLUTION INTRAVENOUS at 01:24

## 2020-01-01 RX ADMIN — SODIUM CHLORIDE 500 ML: 9 INJECTION, SOLUTION INTRAVENOUS at 02:20

## 2020-01-01 RX ADMIN — Medication 2.5 MCG/MIN: at 04:52

## 2020-01-01 RX ADMIN — ONDANSETRON 4 MG: 2 INJECTION INTRAMUSCULAR; INTRAVENOUS at 19:25

## 2020-01-01 RX ADMIN — METOPROLOL TARTRATE 2.5 MG: 5 INJECTION, SOLUTION INTRAVENOUS at 16:57

## 2020-01-01 RX ADMIN — PANTOPRAZOLE SODIUM 40 MG: 40 TABLET, DELAYED RELEASE ORAL at 08:57

## 2020-01-01 RX ADMIN — ONDANSETRON 8 MG: 2 INJECTION INTRAMUSCULAR; INTRAVENOUS at 23:02

## 2020-01-01 RX ADMIN — DARBEPOETIN ALFA 60 MCG: 60 SOLUTION INTRAVENOUS; SUBCUTANEOUS at 13:53

## 2020-01-01 RX ADMIN — ACETAMINOPHEN 500 MG: 500 TABLET, FILM COATED ORAL at 17:18

## 2020-01-01 RX ADMIN — DIGOXIN 125 MCG: 0.25 INJECTION INTRAMUSCULAR; INTRAVENOUS at 03:40

## 2020-01-01 RX ADMIN — ARFORMOTEROL TARTRATE 15 MCG: 15 SOLUTION RESPIRATORY (INHALATION) at 07:08

## 2020-01-01 RX ADMIN — SODIUM CHLORIDE, PRESERVATIVE FREE 10 ML: 5 INJECTION INTRAVENOUS at 09:47

## 2020-01-01 RX ADMIN — VASOPRESSIN 0.04 UNITS/MIN: 20 INJECTION INTRAVENOUS at 17:03

## 2020-01-01 RX ADMIN — Medication 400 MG: at 09:46

## 2020-01-01 RX ADMIN — METOPROLOL TARTRATE 2.5 MG: 5 INJECTION INTRAVENOUS at 16:57

## 2020-01-01 RX ADMIN — LEVOTHYROXINE SODIUM 175 MCG: 125 TABLET ORAL at 08:57

## 2020-01-01 RX ADMIN — ARFORMOTEROL TARTRATE 15 MCG: 15 SOLUTION RESPIRATORY (INHALATION) at 18:27

## 2020-01-01 RX ADMIN — HEPARIN SODIUM 5000 UNITS: 5000 INJECTION INTRAVENOUS; SUBCUTANEOUS at 13:47

## 2020-01-01 RX ADMIN — VANCOMYCIN HYDROCHLORIDE 750 MG: 1 INJECTION, POWDER, LYOPHILIZED, FOR SOLUTION INTRAVENOUS at 17:10

## 2020-01-01 RX ADMIN — BUDESONIDE 500 MCG: 0.5 INHALANT RESPIRATORY (INHALATION) at 18:28

## 2020-01-01 RX ADMIN — VITAMIN D, TAB 1000IU (100/BT) 2000 UNITS: 25 TAB at 09:46

## 2020-01-01 RX ADMIN — MICONAZOLE NITRATE: 20 POWDER TOPICAL at 09:46

## 2020-01-01 RX ADMIN — SODIUM CHLORIDE 1000 ML: 9 INJECTION, SOLUTION INTRAVENOUS at 23:20

## 2020-01-01 RX ADMIN — HEPARIN SODIUM 5000 UNITS: 5000 INJECTION INTRAVENOUS; SUBCUTANEOUS at 05:47

## 2020-01-01 RX ADMIN — DIGOXIN 125 MCG: 0.25 INJECTION INTRAMUSCULAR; INTRAVENOUS at 03:44

## 2020-01-01 RX ADMIN — MICONAZOLE NITRATE: 20 CREAM TOPICAL at 09:46

## 2020-01-01 RX ADMIN — ALBUMIN (HUMAN) 25 G: 0.25 INJECTION, SOLUTION INTRAVENOUS at 03:03

## 2020-01-01 RX ADMIN — BUDESONIDE 500 MCG: 0.5 INHALANT RESPIRATORY (INHALATION) at 07:08

## 2020-01-01 RX ADMIN — Medication 5 MCG/MIN: at 00:42

## 2020-01-01 RX ADMIN — FERROUS SULFATE TAB 325 MG (65 MG ELEMENTAL FE) 325 MG: 325 (65 FE) TAB at 09:55

## 2020-01-01 RX ADMIN — SUCRALFATE 1 G: 1 TABLET ORAL at 09:46

## 2020-01-01 RX ADMIN — MORPHINE SULFATE 0.25 MG/HR: 10 INJECTION INTRAVENOUS at 22:19

## 2020-01-01 RX ADMIN — Medication 5 MCG/MIN: at 04:39

## 2020-01-01 ASSESSMENT — PAIN SCALES - GENERAL
PAINLEVEL_OUTOF10: 5
PAINLEVEL_OUTOF10: 7
PAINLEVEL_OUTOF10: 5
PAINLEVEL_OUTOF10: 3
PAINLEVEL_OUTOF10: 3
PAINLEVEL_OUTOF10: 5

## 2020-01-01 ASSESSMENT — ENCOUNTER SYMPTOMS
ABDOMINAL PAIN: 0
RHINORRHEA: 0
DIARRHEA: 0
DIARRHEA: 1
SHORTNESS OF BREATH: 0
COUGH: 0
SHORTNESS OF BREATH: 1
EYE DISCHARGE: 0
EYES NEGATIVE: 1
NAUSEA: 0
VOMITING: 0
BACK PAIN: 0

## 2020-01-01 ASSESSMENT — PAIN DESCRIPTION - LOCATION
LOCATION: GENERALIZED
LOCATION: HEAD

## 2020-01-01 ASSESSMENT — PAIN DESCRIPTION - DESCRIPTORS
DESCRIPTORS: HEADACHE
DESCRIPTORS: ACHING

## 2020-01-22 ENCOUNTER — TELEPHONE (OUTPATIENT)
Dept: VASCULAR SURGERY | Facility: CLINIC | Age: 80
End: 2020-01-22

## 2020-01-22 NOTE — TELEPHONE ENCOUNTER
Left message reminding Ms Ely of her appointment for Thursday, January 23rd, 2020 at 1030 am with Dr San. Also advised Ms Ely if she had any questions or needed to reschedule to please call the office at [phone].

## 2020-01-23 ENCOUNTER — OFFICE VISIT (OUTPATIENT)
Dept: VASCULAR SURGERY | Facility: CLINIC | Age: 80
End: 2020-01-23

## 2020-01-23 VITALS
DIASTOLIC BLOOD PRESSURE: 62 MMHG | BODY MASS INDEX: 25.4 KG/M2 | HEIGHT: 59 IN | SYSTOLIC BLOOD PRESSURE: 104 MMHG | HEART RATE: 65 BPM | WEIGHT: 126 LBS

## 2020-01-23 DIAGNOSIS — I77.0 A-V FISTULA (HCC): Primary | ICD-10-CM

## 2020-01-23 DIAGNOSIS — N18.6 ESRD (END STAGE RENAL DISEASE) (HCC): ICD-10-CM

## 2020-01-23 PROCEDURE — 99213 OFFICE O/P EST LOW 20 MIN: CPT | Performed by: NURSE PRACTITIONER

## 2020-01-23 NOTE — PROGRESS NOTES
"1/23/2020       Manny Peters, APRN   [address] [facility] [address]      Cheri Ely  1940    Chief Complaint   Patient presents with   • Follow-up     6 month f/u of AV fistula.  Pt states that they are using the fistula without any issue.        Dear Manny Peters, APRN       HPI  I had the pleasure of seeing your patient Cheri Ely in the office today. As you recall, Cheri Ely is a 79 y.o.  female who you are currently following for routine health maintenance.  She was having complaints of pain when eats, but this has resolved.  She had lap band surgery in 2008.  She is also on dialysis with a left upper extremity fistula created by Dr. Dubose in 2015.  She did undergo a mesenteric angiogram, but was unsuccessful.  Dr. San was unable to cannulate the superior mesenteric artery or celiac artery.  She has dialysis on Monday, Wednesday, and Friday.  She did undergo a left upper extremity fistulogram with balloon angioplasty on 8/12/2019.  Since that time she is having no difficulty with dialysis.    She is maintained on Pravachol.      Review of Systems   Constitutional: Negative.    HENT: Negative.    Eyes: Negative.    Respiratory: Negative.    Endocrine: Negative.    Genitourinary: Negative.    Musculoskeletal: Negative.    Skin: Negative.    Allergic/Immunologic: Negative.    Neurological: Negative.    Hematological: Negative.    Psychiatric/Behavioral: Negative.        /62   Pulse 65   Ht 149.9 cm (59\")   Wt 57.2 kg (126 lb)   BMI 25.45 kg/m²   Physical Exam   Constitutional: She is oriented to person, place, and time. She appears well-developed and well-nourished.   HENT:   Head: Normocephalic and atraumatic.   Eyes: Pupils are equal, round, and reactive to light. No scleral icterus.   Neck: Neck supple. No JVD present. Carotid bruit is not present. No thyromegaly present.   Cardiovascular: Normal rate, regular rhythm and normal heart sounds.   Pulses:       " Carotid pulses are 2+ on the right side, and 2+ on the left side.       Femoral pulses are 2+ on the right side, and 2+ on the left side.       Popliteal pulses are 2+ on the right side, and 2+ on the left side.        Dorsalis pedis pulses are 2+ on the right side, and 2+ on the left side.        Posterior tibial pulses are 2+ on the right side, and 2+ on the left side.   Left upper extremity fistula +thrill   Pulmonary/Chest: Effort normal and breath sounds normal.   Abdominal: Soft. Bowel sounds are normal. She exhibits no distension, no abdominal bruit and no mass. There is no hepatosplenomegaly. There is no tenderness.   Musculoskeletal: Normal range of motion. She exhibits no edema.   Lymphadenopathy:     She has no cervical adenopathy.   Neurological: She is alert and oriented to person, place, and time. She has normal strength. No cranial nerve deficit or sensory deficit.   Skin: Skin is warm, dry and intact.   Psychiatric: She has a normal mood and affect.   Nursing note and vitals reviewed.        Patient Active Problem List   Diagnosis   • Chronic kidney disease on chronic dialysis (CMS/[facility])   • Gastrointestinal hemorrhage   • (HFpEF) heart failure with preserved ejection fraction (CMS/[facility])   • Hypothyroidism, TSH 12.4, FT4 0.41   • Paroxysmal atrial fibrillation (CMS/[facility])   • Acute colitis   • ESRD (end stage renal disease) (CMS/[facility])   • Acute on chronic anemia   • Duodenal ulcer   • Chronic combined systolic and diastolic CHF (congestive heart failure) (CMS/[facility])   • A-V fistula (CMS/[facility])   • Preop testing         ICD-10-CM ICD-9-CM   1. A-V fistula (CMS/[facility]) I77.0 447.0   2. ESRD (end stage renal disease) (CMS/[facility]) N18.6 585.6         Plan: After thoroughly evaluating Cheri Ely, I leave the best course of action is to remain conservative from vascular standpoint.  She does have a positive thrill to her fistula.  She continues report no further problems during dialysis.  I encouraged her to let us  know if any problems occur.  We will see her back in 6 months for continued surveillance of her fistula.   I also counseled her about her vascular risk factors with regards to hypertension and hyperlipidemia, which appears stable on her current medication regimen..  The patient can continue taking her current medication regimen as previously planned.  This was all discussed in full with complete understanding.    Thank you for allowing me to participate in the care of your patient.  Please do not hesitate with any questions or concerns.  I will keep you aware of any further encounters with Cheri Ely.        Sincerely yours,         ABILIO Rae, ABILIO Dukes

## 2020-03-23 NOTE — PROGRESS NOTES
"Chief Complaint:   Chief Complaint   Patient presents with   • GI Problem     Diarrhea and weight loss         Patient ID: Cheri Ely is a 80 y.o. female     History of Present Illness: This is a very pleasant 80-year-old who is here today with complaints of diarrhea and weight loss.     The patient was referred by ABILIO Jung at [facility] the patient was accompanied by her daughter on 11/29/2019 with complaints of continuing to lose weight mainly because of difficulty chewing after teeth were extracted about 2 years ago.  Patient also had complaints of diarrhea despite the use of Questran and had started over-the-counter Imodium.      Today the patient who is again accompanied by her daughter tell me she recently has had a skin tears and was given two different antibiotics within the past month and a half though diarrhea was occurring prior. The patient states that she has been having up to \"4 to 5 watery stools a day.  She states that \" I have pain where my lap band is that's a sharp pain and every time I move it hurts.\"  The patient states \"everything I eat runs right through me even the protein drinks.\"  She states she has had a loss of appetite as well.    The patient denies any nausea, vomiting,  dysphagia, pyrosis or hematemesis.  The patient denies any fever or chills.  Denies any melena or hematochezia.       The patient's last colonoscopy was performed on 3/28/2019 per Dr. Rafita Merida with finding of one tubular adenomatous polyp and diverticulosis.  The patient was hospitalized on 6/27/2019 and discharged on 7/6/2019.  During that hospitalization the patient had acute colitis, acute on chronic anemia and duodenal ulcer with microperforation.  The patient underwent previous duodenal ulcer with perforation in May 2019 status post surgical repair prior to the above note admission.  The patient has carried a significant history for previous GI bleed with peptic ulcer disease.  " "The patient presented with black tarry stools.  The patient received packed red blood cells along with FFP's and transferred to intensive care unit.  Plavix was discontinued on that hospitalization.  An upper GI study was performed on day 5 with no evidence of duodenal perforation noted.  The patient underwent several EGDs 2019 C surgical history below.  On 3/20 4/19 capsule endoscopy was performed that revealed angiodysplasia.  The patient's last EGD was performed on 3/27/2019 for melena found to be normal with an adjustable gastric banding noted.  The patient was discharged to home with stable hemoglobin on Carafate, Zofran, and Protonix.  Patient was instructed to follow-up in our office appointment for 7/30/2019 the patient was \"no-show.\"        The patient has a history of end-stage renal disease on dialysis M,W,F. The patient also carries a history of heart failure and A. fib along with CAD.  The patient has had a history of antiplatelet medication of Plavix but that was discontinued last year.        Last 15 Recorded Weights   View Complete Flowsheet   Weight Weight (kg) Weight (lbs) Weight Method VISIT REPORT   3/24/2020 52.436 kg 115 lb 9.6 oz - Report   1/23/2020 57.153 kg 126 lb - Report   10/3/2019 66.225 kg 146 lb - -   8/28/2019 65.772 kg 145 lb - Report   8/12/2019 68 kg 149 lb 14.6 oz - -   8/9/2019 63.957 kg 141 lb - Report   7/5/2019 64 kg 141 lb 1.5 oz Bed scale -   7/2/2019 65.499 kg 144 lb 6.4 oz Bed scale -   7/1/2019 63.4 kg 139 lb 12.4 oz - -   7/1/2019 63.4 kg 139 lb 12.4 oz Bed scale -   7/1/2019 65.409 kg 144 lb 3.2 oz Bed scale -   6/30/2019 66.769 kg 147 lb 3.2 oz Bed scale -   6/29/2019 68.947 kg 152 lb Bed scale -   6/28/2019 65.998 kg 145 lb 8 oz Bed scale -   6/27/2019 66.86 kg 147 lb 6.4 oz           Past Medical History:   Diagnosis Date   • (HFpEF) heart failure with preserved ejection fraction (CMS/HCC) 3/9/2019   • A-fib (CMS/HCC)    • 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, 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/2019   • Carotid artery disease (CMS/HCC)    • CHF (congestive heart failure) (CMS/HCC)    • Chronic kidney disease on chronic dialysis (CMS/HCC)    • Chronic mesenteric ischemia (CMS/HCC) 12/14/2017   • Chronic renal failure     on dialysis ON MON, WED, FRI   • Closed displaced intertrochanteric fracture of right femur (CMS/HCC) 2/8/2019    S/p Cephalomedullary nailing 2/8   • COPD (chronic obstructive pulmonary disease) (CMS/[facility])    • Coronary artery disease    • Diastolic dysfunction 3/9/2019    EF 55-60% from echocardiogram on 3/15   • Diverticulitis    • Diverticulosis    • Gastric ulcer    • Gout    • History of transfusion    • Hyperlipidemia    • Hypertension    • Hypothyroidism    • Injury of back    • Mesenteric artery insufficiency (CMS/HCC)    • Multilevel degenerative disc disease    • Osteoporosis    • Pancreatitis    • Pelvis fracture (CMS/HCC)    • Pneumonia    • PVD (peripheral vascular disease) (CMS/HCC)    • RA (rheumatoid arthritis) (CMS/[facility])    • Sleep apnea        Past Surgical History:   Procedure Laterality Date   • AORTAGRAM Right 1/8/2018    Procedure: MESENTERIC ANGIOGRAM, GROIN ACCESS, MYNX CLOSURE;  Surgeon: Tomasz San DO;  Location: [facility] OR;  Service:    • AORTIC VALVE SURGERY     • APPENDECTOMY     • BACK SURGERY     • CAPSULE ENDOSCOPY N/A 3/30/2019    Dr. Yu-Angiodysplasia; Limit anticoagulation as much as is reasonable; Gastric passage time: 0h21m; Small bowel passage time: 2h2m   • CARDIAC SURGERY     • CAROTID ENDARTERECTOMY Bilateral    • CATARACT EXTRACTION, BILATERAL     • CERVICAL SPINE SURGERY     • CHOLECYSTECTOMY     • COLON RESECTION     • COLONOSCOPY  10/01/2015    Diverticulosis in the sigmoid colon; The examined portion of the ileum was normal; The examination was otherwise normal on direct and retroflexion views; No specimens collected; No plans to repeat colonoscopy due to multiple comorbidities   • COLONOSCOPY N/A 3/28/2019      [place]-The examined portion of the ileum was normal; One 6mm tubular adenomatous polyp at the hepatic flexure; Diverticulosis in the sigmoid colon   • CORONARY ARTERY BYPASS GRAFT     • DIALYSIS FISTULA CREATION     • ENDOSCOPY N/A 12/27/2018    LA Grade B reflux esophagitis; Non-bleeding gastric ulcers with no stigmata of bleeding-biopsied; Erosive gastritis; Normal examined duodenum; Repeat 2 months   • ENDOSCOPY N/A 2/28/2019    LA Grade B esophagitis-biopsied-clip (MR conditional) was placed; An adjustable gastric banding was found, characterized by healthy appearing mucosa; Normal stomach; Normal examined duodenum; Repeat 2 months   • ENDOSCOPY N/A 3/11/2019    An endoclip was found in the esophagus; Normal stomach; Normal examined duodenum; No specimens collected; Repeat endo 2-3 months   • ENDOSCOPY N/A 3/27/2019    Dr. Merida-Normal examined jejunum; Normal examined duodenum; An adjustable gastric banding was found; Normal esophagus; No specimens collected   • ENDOSCOPY  10/01/2015    Normal esophagus; Bleeding erosive gastropathy-biopsied; Thickening of the gastric mucosa in the cardia-biopsied; Normal examined duodenum   • EXPLORATORY LAPAROTOMY N/A 5/13/2019    Procedure: LAPAROTOMY EXPLORATORY, OVERSEW DUODENAL ULCER WITH FRED PATCH, KOCHER MANEUVER;  Surgeon: Laila Rodriguez MD;  Location:  [facility] OR;  Service: General   • HIP TROCHANTERIC NAILING WITH INTRAMEDULLARY HIP SCREW Right 2/8/2019    Procedure: HIP TROCANTERIC NAILING LONG WITH INTRAMEDULLARY HIP SCREW;  Surgeon: Boo Camacho MD;  Location:  [facility] OR;  Service: Orthopedics   • LAPAROSCOPIC GASTRIC BANDING     • LEEP     • LUMBAR FUSION     • REPLACEMENT TOTAL KNEE      Bilateral   • TOE AMPUTATION Left     2nd toe   • TOTAL KNEE ARTHROPLASTY Bilateral    • TUBAL ABDOMINAL LIGATION           Current Outpatient Medications:   •  acetaminophen (TYLENOL) 325 MG tablet, Take 2 tablets by mouth Every 6 (Six) Hours As Needed for Mild Pain  ., Disp: , Rfl:   •  allopurinol (ZYLOPRIM) 100 MG tablet, Take 100 mg by mouth Daily., Disp: , Rfl:   •  B COMPLEX VITAMINS PO, Take 2 tablets by mouth Daily., Disp: , Rfl:   •  B Complex-C-Folic Acid (JOHN-SANGEETA) tablet, Take 1 tablet by mouth Daily., Disp: , Rfl:   •  carvedilol (COREG) 12.5 MG tablet, Take 12.5 mg by mouth 2 (Two) Times a Day With Meals. TAKE ONE TABLET TWICE A DAY ON NONDIALYSIS DAYS, AND 1 TABLET DAILY ON DIALYSIS DAY (MON, WED, FRI), Disp: , Rfl:   •  Cholecalciferol (VITAMIN D) 2000 units capsule, Take 1 capsule by mouth Daily., Disp: 30 capsule, Rfl:   •  cyproheptadine (PERIACTIN) 4 MG tablet, Take 4 mg by mouth 3 (Three) Times a Day As Needed for Allergies., Disp: , Rfl:   •  diphenoxylate-atropine (LOMOTIL) 2.5-0.025 MG/5ML liquid, Take 5 mL by mouth 4 (Four) Times a Day As Needed for Diarrhea., Disp: , Rfl:   •  levothyroxine (SYNTHROID, LEVOTHROID) 125 MCG tablet, Take 1 tablet by mouth Daily. (Patient taking differently: Take 150 mcg by mouth Daily.), Disp: , Rfl:   •  lidocaine 3 % cream cream, Apply  topically Daily As Needed (prior to dialysis access)., Disp: , Rfl:   •  Multiple Vitamins-Minerals (MULTIVITAMIN ADULT PO), Take 1 tablet by mouth Daily., Disp: , Rfl:   •  oxyCODONE-acetaminophen (PERCOCET)  MG per tablet, Take 1 tablet by mouth Every 6 (Six) Hours As Needed for Moderate Pain ., Disp: , Rfl:   •  pantoprazole (PROTONIX) 40 MG EC tablet, Take 1 tablet by mouth Daily., Disp: , Rfl:   •  pravastatin (PRAVACHOL) 40 MG tablet, Take 40 mg by mouth Daily., Disp: , Rfl:   •  sertraline (ZOLOFT) 50 MG tablet, Take 50 mg by mouth Every Night., Disp: , Rfl:   •  sucralfate (CARAFATE) 1 GM/10ML suspension, Take 10 mL by mouth 2 (Two) Times a Day. (Patient taking differently: Take 1 g by mouth Daily.), Disp: 1200 mL, Rfl: 0    No Known Allergies    Social History     Socioeconomic History   • Marital status:      Spouse name: Not on file   • Number of children: Not on  "file   • Years of education: Not on file   • Highest education level: Not on file   Tobacco Use   • Smoking status: Never Smoker   • Smokeless tobacco: Never Used   Substance and Sexual Activity   • Alcohol use: No   • Drug use: No   • Sexual activity: Defer       Family History   Problem Relation Age of Onset   • Hypertension Mother    • Cancer Mother         stomach   • Coronary artery disease Father    • Heart attack Father    • Diabetes Brother    • Coronary artery disease Brother    • Colon cancer Neg Hx    • Colon polyps Neg Hx        Vitals:    03/24/20 0952   BP: 114/52   Pulse: 65   Temp: 97 °F (36.1 °C)   SpO2: 90%   Weight: 52.4 kg (115 lb 9.6 oz)   Height: 149.9 cm (59\")       Review of Systems:    General:    Present -feeling well   Skin:    Not Present-Rash   HEENT:     Not Present-Acute visual changes or Acute hearing changes   Neck :    Not Present- swollen glands   Genitourinary:      Not Present- burning, frequency, urgency hematuria, dysuria,   Cardiovascular:   Not Present-chest pain, palpitations, or pressure   Respiratory:   Not Present- shortness of breath or cough   Gastrointestinal:  Musculoskeletal:  Neurological:  Psychiatric:   Present as mentioned in the HP    Not Present. Recent gait disturbances.    Not Present-Seizures and weakness in extremities.    Not Present- Anxiety or Depression.       Physical Exam:    General Appearance:    Alert, cooperative, in no acute distress   Psych:    Mood appropriate    Eyes:          conjunctivae and sclerae normal, no   icterus, no pallor   ENMT:    Ears appear intact with no abnormalities noted oral mucosa moist   Neck:   No adenopathy, supple, trachea midline, no thyromegaly, no   carotid bruit, no JVD    Cardiovascular:    Regular rhythm and normal rate, normal S1 and S2, no            murmur, no gallop, no rub, no click   Gastrointestinal:     Inspection normal.  Normal bowel sounds, no masses, no organomegaly, soft round non-tender, " non-distended, no guarding, no rebound or tenderness. No hepatosplenomegaly.   Skin:   No bleeding, bruising or rash   Neurologic:   nonfocal       Lab Results   Component Value Date    WBC 7.15 10/03/2019    WBC 6.67 08/12/2019    WBC 9.18 07/06/2019    HGB 12.6 10/03/2019    HGB 9.9 (L) 08/12/2019    HGB 9.5 (L) 07/06/2019    HCT 41.5 10/03/2019    HCT 32.2 (L) 08/12/2019    HCT 28.1 (L) 07/06/2019     (L) 10/03/2019     08/12/2019    PLT 81 (L) 07/06/2019        Lab Results   Component Value Date     10/03/2019     08/12/2019     07/06/2019    K 4.0 10/03/2019    K 6.1 (C) 08/12/2019    K 3.6 07/06/2019    CL 94 (L) 10/03/2019    CL 96 (L) 08/12/2019     07/06/2019    CO2 36.0 (H) 10/03/2019    CO2 35.0 (H) 08/12/2019    CO2 25.0 07/06/2019    BUN 34 (H) 10/03/2019    BUN 52 (H) 08/12/2019    BUN 7 07/06/2019    CREATININE 3.65 (H) 10/03/2019    CREATININE 3.71 (H) 08/12/2019    CREATININE 2.13 (H) 07/06/2019    BILITOT 0.4 10/03/2019    BILITOT 0.6 06/28/2019    BILITOT 0.5 06/27/2019    ALKPHOS 306 (H) 10/03/2019    ALKPHOS 179 (H) 06/28/2019    ALKPHOS 181 (H) 06/27/2019    ALT 15 10/03/2019    ALT 32 06/28/2019    ALT 35 06/27/2019    AST 28 10/03/2019    AST 35 06/28/2019    AST 43 06/27/2019    GLUCOSE 100 (H) 10/03/2019    GLUCOSE 80 08/12/2019    GLUCOSE 73 07/06/2019       Lab Results   Component Value Date    INR 1.30 (H) 08/12/2019    INR 2.77 (H) 07/02/2019    INR 1.81 (H) 06/29/2019       Body mass index is 23.35 kg/m². Patient's Body mass index is 23.35 kg/m². BMI is below normal parameters. Recommendations include: Labs as noted below.    Assessment and Plan:  Assessment/Plan   Cheri was seen today for gi problem.    Diagnoses and all orders for this visit:    Diarrhea, unspecified type  Comments:  Labs and GI panel as noted below.  Will await findings and discuss with Dr. Garza on how to further proceed.  Orders:  -     CBC & Differential; Future  -      Comprehensive Metabolic Panel; Future  -     C-reactive Protein; Future  -     Gastrointestinal Panel, PCR - Stool, Per Rectum; Future  -     Fecal Leukocytes - Stool, Per Rectum; Future  -     Clostridium Difficile Toxin - Stool, Per Rectum; Future    Weight loss  -     CBC & Differential; Future  -     Comprehensive Metabolic Panel; Future  -     C-reactive Protein; Future  -     Gastrointestinal Panel, PCR - Stool, Per Rectum; Future  -     Fecal Leukocytes - Stool, Per Rectum; Future  -     Clostridium Difficile Toxin - Stool, Per Rectum; Future    History of colitis  -     CBC & Differential; Future  -     Comprehensive Metabolic Panel; Future  -     C-reactive Protein; Future  -     Gastrointestinal Panel, PCR - Stool, Per Rectum; Future  -     Fecal Leukocytes - Stool, Per Rectum; Future  -     Clostridium Difficile Toxin - Stool, Per Rectum; Future    History of stomach ulcers  Comments:  Continue on PPI and Carafate at this time    Loss of appetite    Epigastric pain             There are no Patient Instructions on file for this visit.    Next follow-up appointment          EMR Dragon/Transcription disclaimer:  Much of this encounter note is an electronic transcription/translation of spoken language to printed text. The electronic translation of spoken language may permit erroneous, or at times, nonsensical words or phrases to be inadvertently transcribed; although I have reviewed the note for such errors, some may still exist.

## 2020-03-24 ENCOUNTER — OFFICE VISIT (OUTPATIENT)
Dept: GASTROENTEROLOGY | Facility: CLINIC | Age: 80
End: 2020-03-24

## 2020-03-24 ENCOUNTER — LAB (OUTPATIENT)
Dept: LAB | Facility: HOSPITAL | Age: 80
End: 2020-03-24

## 2020-03-24 VITALS
SYSTOLIC BLOOD PRESSURE: 114 MMHG | HEIGHT: 59 IN | OXYGEN SATURATION: 90 % | BODY MASS INDEX: 23.31 KG/M2 | TEMPERATURE: 97 F | DIASTOLIC BLOOD PRESSURE: 52 MMHG | WEIGHT: 115.6 LBS | HEART RATE: 65 BPM

## 2020-03-24 DIAGNOSIS — Z87.11 HISTORY OF STOMACH ULCERS: ICD-10-CM

## 2020-03-24 DIAGNOSIS — R63.4 WEIGHT LOSS: ICD-10-CM

## 2020-03-24 DIAGNOSIS — R19.7 DIARRHEA, UNSPECIFIED TYPE: Primary | ICD-10-CM

## 2020-03-24 DIAGNOSIS — R10.13 EPIGASTRIC PAIN: ICD-10-CM

## 2020-03-24 DIAGNOSIS — R63.0 LOSS OF APPETITE: ICD-10-CM

## 2020-03-24 DIAGNOSIS — Z87.19 HISTORY OF COLITIS: ICD-10-CM

## 2020-03-24 DIAGNOSIS — R19.7 DIARRHEA, UNSPECIFIED TYPE: ICD-10-CM

## 2020-03-24 LAB
ADV 40+41 DNA STL QL NAA+NON-PROBE: NOT DETECTED
ALBUMIN SERPL-MCNC: 2.6 G/DL (ref 3.5–5.2)
ALBUMIN/GLOB SERPL: 0.9 G/DL
ALP SERPL-CCNC: 199 U/L (ref 39–117)
ALT SERPL W P-5'-P-CCNC: 18 U/L (ref 1–33)
ANION GAP SERPL CALCULATED.3IONS-SCNC: 15.1 MMOL/L (ref 5–15)
AST SERPL-CCNC: 20 U/L (ref 1–32)
ASTRO TYP 1-8 RNA STL QL NAA+NON-PROBE: NOT DETECTED
BASOPHILS # BLD AUTO: 0.06 10*3/MM3 (ref 0–0.2)
BASOPHILS NFR BLD AUTO: 0.5 % (ref 0–1.5)
BILIRUB SERPL-MCNC: 0.3 MG/DL (ref 0.2–1.2)
BUN BLD-MCNC: 30 MG/DL (ref 8–23)
BUN/CREAT SERPL: 7.8 (ref 7–25)
C CAYETANENSIS DNA STL QL NAA+NON-PROBE: NOT DETECTED
C DIFF TOX GENS STL QL NAA+PROBE: NEGATIVE
CALCIUM SPEC-SCNC: 8.4 MG/DL (ref 8.6–10.5)
CAMPY SP DNA.DIARRHEA STL QL NAA+PROBE: NOT DETECTED
CHLORIDE SERPL-SCNC: 99 MMOL/L (ref 98–107)
CO2 SERPL-SCNC: 24.9 MMOL/L (ref 22–29)
CREAT BLD-MCNC: 3.84 MG/DL (ref 0.57–1)
CRP SERPL-MCNC: 15.18 MG/DL (ref 0–0.5)
CRYPTOSP STL CULT: NOT DETECTED
DEPRECATED RDW RBC AUTO: 60.7 FL (ref 37–54)
E COLI DNA SPEC QL NAA+PROBE: NOT DETECTED
E HISTOLYT AG STL-ACNC: NOT DETECTED
EAEC PAA PLAS AGGR+AATA ST NAA+NON-PRB: NOT DETECTED
EC STX1 + STX2 GENES STL NAA+PROBE: NOT DETECTED
EOSINOPHIL # BLD AUTO: 0.04 10*3/MM3 (ref 0–0.4)
EOSINOPHIL NFR BLD AUTO: 0.3 % (ref 0.3–6.2)
EPEC EAE GENE STL QL NAA+NON-PROBE: NOT DETECTED
ERYTHROCYTE [DISTWIDTH] IN BLOOD BY AUTOMATED COUNT: 16.6 % (ref 12.3–15.4)
ETEC LTA+ST1A+ST1B TOX ST NAA+NON-PROBE: NOT DETECTED
G LAMBLIA DNA SPEC QL NAA+PROBE: NOT DETECTED
GFR SERPL CREATININE-BSD FRML MDRD: 11 ML/MIN/1.73
GFR SERPL CREATININE-BSD FRML MDRD: ABNORMAL ML/MIN/{1.73_M2}
GLOBULIN UR ELPH-MCNC: 2.9 GM/DL
GLUCOSE BLD-MCNC: 207 MG/DL (ref 65–99)
HCT VFR BLD AUTO: 33.8 % (ref 34–46.6)
HGB BLD-MCNC: 11.3 G/DL (ref 12–15.9)
IMM GRANULOCYTES # BLD AUTO: 0.32 10*3/MM3 (ref 0–0.05)
IMM GRANULOCYTES NFR BLD AUTO: 2.6 % (ref 0–0.5)
LYMPHOCYTES # BLD AUTO: 0.57 10*3/MM3 (ref 0.7–3.1)
LYMPHOCYTES NFR BLD AUTO: 4.6 % (ref 19.6–45.3)
MCH RBC QN AUTO: 34 PG (ref 26.6–33)
MCHC RBC AUTO-ENTMCNC: 33.4 G/DL (ref 31.5–35.7)
MCV RBC AUTO: 101.8 FL (ref 79–97)
MONOCYTES # BLD AUTO: 0.45 10*3/MM3 (ref 0.1–0.9)
MONOCYTES NFR BLD AUTO: 3.6 % (ref 5–12)
NEUTROPHILS # BLD AUTO: 10.9 10*3/MM3 (ref 1.7–7)
NEUTROPHILS NFR BLD AUTO: 88.4 % (ref 42.7–76)
NOROVIRUS GI+II RNA STL QL NAA+NON-PROBE: NOT DETECTED
NRBC BLD AUTO-RTO: 0 /100 WBC (ref 0–0.2)
P SHIGELLOIDES DNA STL QL NAA+PROBE: NOT DETECTED
PLATELET # BLD AUTO: 151 10*3/MM3 (ref 140–450)
PMV BLD AUTO: 11.8 FL (ref 6–12)
POTASSIUM BLD-SCNC: 3 MMOL/L (ref 3.5–5.2)
PROT SERPL-MCNC: 5.5 G/DL (ref 6–8.5)
RBC # BLD AUTO: 3.32 10*6/MM3 (ref 3.77–5.28)
RV RNA STL NAA+PROBE: NOT DETECTED
SALMONELLA DNA SPEC QL NAA+PROBE: NOT DETECTED
SAPO I+II+IV+V RNA STL QL NAA+NON-PROBE: NOT DETECTED
SHIGELLA SP+EIEC IPAH STL QL NAA+PROBE: NOT DETECTED
SODIUM BLD-SCNC: 139 MMOL/L (ref 136–145)
V CHOLERAE DNA SPEC QL NAA+PROBE: NOT DETECTED
VIBRIO DNA SPEC NAA+PROBE: NOT DETECTED
WBC NRBC COR # BLD: 12.34 10*3/MM3 (ref 3.4–10.8)
YERSINIA STL CULT: NOT DETECTED

## 2020-03-24 PROCEDURE — 80053 COMPREHEN METABOLIC PANEL: CPT | Performed by: NURSE PRACTITIONER

## 2020-03-24 PROCEDURE — 86140 C-REACTIVE PROTEIN: CPT | Performed by: NURSE PRACTITIONER

## 2020-03-24 PROCEDURE — 85025 COMPLETE CBC W/AUTO DIFF WBC: CPT | Performed by: NURSE PRACTITIONER

## 2020-03-24 PROCEDURE — 0097U HC BIOFIRE FILMARRAY GI PANEL: CPT | Performed by: NURSE PRACTITIONER

## 2020-03-24 PROCEDURE — 36415 COLL VENOUS BLD VENIPUNCTURE: CPT

## 2020-03-24 PROCEDURE — 87205 SMEAR GRAM STAIN: CPT | Performed by: NURSE PRACTITIONER

## 2020-03-24 PROCEDURE — 99214 OFFICE O/P EST MOD 30 MIN: CPT | Performed by: NURSE PRACTITIONER

## 2020-03-24 PROCEDURE — 87493 C DIFF AMPLIFIED PROBE: CPT | Performed by: NURSE PRACTITIONER

## 2020-03-24 RX ORDER — OXYCODONE AND ACETAMINOPHEN 10; 325 MG/1; MG/1
1 TABLET ORAL EVERY 6 HOURS PRN
COMMUNITY

## 2020-03-24 RX ORDER — CYPROHEPTADINE HYDROCHLORIDE 4 MG/1
4 TABLET ORAL 3 TIMES DAILY PRN
COMMUNITY

## 2020-03-25 ENCOUNTER — APPOINTMENT (OUTPATIENT)
Dept: CT IMAGING | Facility: HOSPITAL | Age: 80
End: 2020-03-25

## 2020-03-25 ENCOUNTER — TELEPHONE (OUTPATIENT)
Dept: GASTROENTEROLOGY | Facility: CLINIC | Age: 80
End: 2020-03-25

## 2020-03-25 ENCOUNTER — HOSPITAL ENCOUNTER (EMERGENCY)
Facility: HOSPITAL | Age: 80
Discharge: HOME | End: 2020-03-25
Attending: EMERGENCY MEDICINE | Admitting: EMERGENCY MEDICINE

## 2020-03-25 ENCOUNTER — APPOINTMENT (OUTPATIENT)
Dept: GENERAL RADIOLOGY | Facility: HOSPITAL | Age: 80
End: 2020-03-25

## 2020-03-25 VITALS
WEIGHT: 115 LBS | TEMPERATURE: 98.1 F | SYSTOLIC BLOOD PRESSURE: 94 MMHG | BODY MASS INDEX: 23.18 KG/M2 | DIASTOLIC BLOOD PRESSURE: 51 MMHG | HEIGHT: 59 IN | OXYGEN SATURATION: 100 % | RESPIRATION RATE: 14 BRPM | HEART RATE: 99 BPM

## 2020-03-25 DIAGNOSIS — N18.9 CHRONIC RENAL FAILURE, UNSPECIFIED CKD STAGE: ICD-10-CM

## 2020-03-25 DIAGNOSIS — E86.0 DEHYDRATION: ICD-10-CM

## 2020-03-25 DIAGNOSIS — R53.1 WEAKNESS: Primary | ICD-10-CM

## 2020-03-25 LAB
ALBUMIN SERPL-MCNC: 2.7 G/DL (ref 3.5–5.2)
ALBUMIN/GLOB SERPL: 0.8 G/DL
ALP SERPL-CCNC: 242 U/L (ref 39–117)
ALT SERPL W P-5'-P-CCNC: 20 U/L (ref 1–33)
ANION GAP SERPL CALCULATED.3IONS-SCNC: 14 MMOL/L (ref 5–15)
APTT PPP: 41 SECONDS (ref 24.1–35)
AST SERPL-CCNC: 24 U/L (ref 1–32)
BASOPHILS # BLD AUTO: 0.1 10*3/MM3 (ref 0–0.2)
BASOPHILS NFR BLD AUTO: 0.8 % (ref 0–1.5)
BILIRUB SERPL-MCNC: 0.5 MG/DL (ref 0.2–1.2)
BUN BLD-MCNC: 16 MG/DL (ref 8–23)
BUN/CREAT SERPL: 5.7 (ref 7–25)
CALCIUM SPEC-SCNC: 8.9 MG/DL (ref 8.6–10.5)
CHLORIDE SERPL-SCNC: 100 MMOL/L (ref 98–107)
CO2 SERPL-SCNC: 26 MMOL/L (ref 22–29)
CREAT BLD-MCNC: 2.79 MG/DL (ref 0.57–1)
D-LACTATE SERPL-SCNC: 1.8 MMOL/L (ref 0.5–2)
DEPRECATED RDW RBC AUTO: 67.7 FL (ref 37–54)
EOSINOPHIL # BLD AUTO: 0.09 10*3/MM3 (ref 0–0.4)
EOSINOPHIL NFR BLD AUTO: 0.7 % (ref 0.3–6.2)
ERYTHROCYTE [DISTWIDTH] IN BLOOD BY AUTOMATED COUNT: 18.2 % (ref 12.3–15.4)
GFR SERPL CREATININE-BSD FRML MDRD: 16 ML/MIN/1.73
GLOBULIN UR ELPH-MCNC: 3.6 GM/DL
GLUCOSE BLD-MCNC: 116 MG/DL (ref 65–99)
HCT VFR BLD AUTO: 37.2 % (ref 34–46.6)
HGB BLD-MCNC: 12.1 G/DL (ref 12–15.9)
IMM GRANULOCYTES # BLD AUTO: 0.34 10*3/MM3 (ref 0–0.05)
IMM GRANULOCYTES NFR BLD AUTO: 2.6 % (ref 0–0.5)
INR PPP: 1.21 (ref 0.91–1.09)
LYMPHOCYTES # BLD AUTO: 0.71 10*3/MM3 (ref 0.7–3.1)
LYMPHOCYTES NFR BLD AUTO: 5.5 % (ref 19.6–45.3)
MCH RBC QN AUTO: 33.1 PG (ref 26.6–33)
MCHC RBC AUTO-ENTMCNC: 32.5 G/DL (ref 31.5–35.7)
MCV RBC AUTO: 101.6 FL (ref 79–97)
MONOCYTES # BLD AUTO: 0.6 10*3/MM3 (ref 0.1–0.9)
MONOCYTES NFR BLD AUTO: 4.7 % (ref 5–12)
NEUTROPHILS # BLD AUTO: 11.01 10*3/MM3 (ref 1.7–7)
NEUTROPHILS NFR BLD AUTO: 85.7 % (ref 42.7–76)
NRBC BLD AUTO-RTO: 0.2 /100 WBC (ref 0–0.2)
PLATELET # BLD AUTO: 151 10*3/MM3 (ref 140–450)
PMV BLD AUTO: 11.5 FL (ref 6–12)
POTASSIUM BLD-SCNC: 3.3 MMOL/L (ref 3.5–5.2)
PROCALCITONIN SERPL-MCNC: 0.34 NG/ML (ref 0.1–0.25)
PROT SERPL-MCNC: 6.3 G/DL (ref 6–8.5)
PROTHROMBIN TIME: 15.2 SECONDS (ref 11.9–14.6)
RBC # BLD AUTO: 3.66 10*6/MM3 (ref 3.77–5.28)
SODIUM BLD-SCNC: 140 MMOL/L (ref 136–145)
WBC NRBC COR # BLD: 12.85 10*3/MM3 (ref 3.4–10.8)
WBC STL QL MICRO: ABNORMAL

## 2020-03-25 PROCEDURE — 93010 ELECTROCARDIOGRAM REPORT: CPT | Performed by: INTERNAL MEDICINE

## 2020-03-25 PROCEDURE — 85730 THROMBOPLASTIN TIME PARTIAL: CPT | Performed by: EMERGENCY MEDICINE

## 2020-03-25 PROCEDURE — 85025 COMPLETE CBC W/AUTO DIFF WBC: CPT | Performed by: EMERGENCY MEDICINE

## 2020-03-25 PROCEDURE — 93005 ELECTROCARDIOGRAM TRACING: CPT | Performed by: EMERGENCY MEDICINE

## 2020-03-25 PROCEDURE — 87040 BLOOD CULTURE FOR BACTERIA: CPT | Performed by: EMERGENCY MEDICINE

## 2020-03-25 PROCEDURE — 84145 PROCALCITONIN (PCT): CPT | Performed by: EMERGENCY MEDICINE

## 2020-03-25 PROCEDURE — 71045 X-RAY EXAM CHEST 1 VIEW: CPT

## 2020-03-25 PROCEDURE — 83605 ASSAY OF LACTIC ACID: CPT | Performed by: EMERGENCY MEDICINE

## 2020-03-25 PROCEDURE — 99283 EMERGENCY DEPT VISIT LOW MDM: CPT

## 2020-03-25 PROCEDURE — 85610 PROTHROMBIN TIME: CPT | Performed by: EMERGENCY MEDICINE

## 2020-03-25 PROCEDURE — 80053 COMPREHEN METABOLIC PANEL: CPT | Performed by: EMERGENCY MEDICINE

## 2020-03-25 PROCEDURE — 74176 CT ABD & PELVIS W/O CONTRAST: CPT

## 2020-03-25 RX ORDER — MORPHINE SULFATE 2 MG/ML
2 INJECTION, SOLUTION INTRAMUSCULAR; INTRAVENOUS ONCE
Status: DISCONTINUED | OUTPATIENT
Start: 2020-03-25 | End: 2020-03-25 | Stop reason: HOSPADM

## 2020-03-25 RX ORDER — ONDANSETRON 2 MG/ML
4 INJECTION INTRAMUSCULAR; INTRAVENOUS ONCE
Status: DISCONTINUED | OUTPATIENT
Start: 2020-03-25 | End: 2020-03-25 | Stop reason: HOSPADM

## 2020-03-25 RX ORDER — SODIUM CHLORIDE 0.9 % (FLUSH) 0.9 %
10 SYRINGE (ML) INJECTION AS NEEDED
Status: DISCONTINUED | OUTPATIENT
Start: 2020-03-25 | End: 2020-03-25 | Stop reason: HOSPADM

## 2020-03-25 RX ORDER — ONDANSETRON 4 MG/1
4 TABLET, ORALLY DISINTEGRATING ORAL EVERY 8 HOURS PRN
Qty: 12 TABLET | Refills: 0 | Status: SHIPPED | OUTPATIENT
Start: 2020-03-25

## 2020-03-25 RX ADMIN — SODIUM CHLORIDE 500 ML: 0.9 INJECTION, SOLUTION INTRAVENOUS at 19:07

## 2020-03-25 RX ADMIN — SODIUM CHLORIDE 500 ML: 9 INJECTION, SOLUTION INTRAVENOUS at 20:38

## 2020-03-25 NOTE — TELEPHONE ENCOUNTER
----- Message from ABILIO Garcia sent at 3/25/2020 11:04 AM CDT -----  Please notify the patient that I discussed with Dr. Garza our office visit as well as the results.  Labs were essentially unremarkable.  CRP was slightly elevated and she did show a few fecal leukocytes.  Due to her age, other comorbidities and in the setting of Covid-N19 Dr. Garza does not feel that a repeat colonoscopy is necessary.  Her most recent one was normal.  The safest route we could proceed to would be a CT of abdomen and pelvis which appears she already has an order for.  Regarding the patient's complaints of upper GI Dr. Garza instructs her to call the surgeon who performed her lap band as she is unable to do anything to help with that problem.

## 2020-03-25 NOTE — TELEPHONE ENCOUNTER
"Pt's daughter, Christy, just called me back-pt was unable to answer my calls earlier because she was at dialysis. Christy tells me that pt is very weak and \"not doing well\"-pt's sister is with her and they are going to her PCP's office. Christy think they will end up bringing her to [facility]-she thinks she needs admitted.     I told her I would let you know this update-if pt is admitted we will let hospitalist decide if pt needs CT while in hospital-she VU.  Pt/daughter advised to call me back with any further questions/problems. I did tell Christy I would call her tomorrow morning to follow up and discuss your recommendations(obviously depending on whether or not pt is admitted).     "

## 2020-03-25 NOTE — TELEPHONE ENCOUNTER
Chart reviewed.  Labs were reasonable.  Stool studies neg for infectious cause.  She does have fecal leucocytes.    Last endo 3/2019--normal except lap band  Last colon 3/2019--unremarkable except tics  Last CT abd 6/2019--? Thickening desc/prox sig colon    Given her age, COVID-19, recent neg colon, I would NOT recommend repeat colonoscopy.  Safest course is repeat CT abd and pelvis.    Re: her upper complaints, I cant help her.  That would be a discussion with the surgeon who did her lap band    Moni Garza MD

## 2020-03-25 NOTE — TELEPHONE ENCOUNTER
Called and spoke to pt's daughter, Christy, re: results-she VU. Pt would like to have CT at [facility]-there is no order placed as of right now.     Candace-can you place order for CT to be done at [facility](needs to be external)? Also-how quickly does this need to be done? I am not sure if [facility] has any restrictions in place like [facility] does at this time due to COVID19. I told pt I would ask you in case they do so I can advise them on when pt needs this CT. Thanks!

## 2020-03-26 NOTE — TELEPHONE ENCOUNTER
"Spoke to pt's daughter Christy-I told her I would call and check on pt this morning. She is home from ER-she was told that her issues are most likely coming from dialysis and \"fluid being pulled off of her too quickly\". She has dialysis again tomorrow and is to discuss further with them. They are also following up with PCP about who to discuss lap band issues with as the surgeon who performed that surgery on pt has retired/practices in another state.     Candace-Christy was asking what pt could do about her diarrhea? That seems to be her biggest complaint at this time. She is taking Imodium OTC-Christy was asking if liquid Imodium would work better for pt? Or is there anything else you recommend at this time?   "

## 2020-03-26 NOTE — TELEPHONE ENCOUNTER
Spoke to Christy re: Candace's recommendations-she VU.  Pt/daughter advised to call me back with any further questions/problems.     independent

## 2020-03-30 LAB
BACTERIA SPEC AEROBE CULT: NORMAL
BACTERIA SPEC AEROBE CULT: NORMAL

## 2020-04-14 ENCOUNTER — HOSPITAL ENCOUNTER (INPATIENT)
Facility: HOSPITAL | Age: 80
LOS: 2 days | Discharge: HOME | End: 2020-04-17
Attending: INTERNAL MEDICINE | Admitting: FAMILY MEDICINE

## 2020-04-14 DIAGNOSIS — Z74.09 IMPAIRED TRANSFERS: ICD-10-CM

## 2020-04-14 PROCEDURE — 99201: CPT

## 2020-04-14 RX ORDER — DIPHENOXYLATE HYDROCHLORIDE AND ATROPINE SULFATE 2.5; .025 MG/1; MG/1
1 TABLET ORAL
Status: DISCONTINUED | OUTPATIENT
Start: 2020-04-14 | End: 2020-04-17 | Stop reason: HOSPADM

## 2020-04-14 RX ORDER — SODIUM CHLORIDE 0.9 % (FLUSH) 0.9 %
10 SYRINGE (ML) INJECTION EVERY 12 HOURS SCHEDULED
Status: DISCONTINUED | OUTPATIENT
Start: 2020-04-15 | End: 2020-04-17 | Stop reason: HOSPADM

## 2020-04-14 RX ORDER — SUCRALFATE ORAL 1 G/10ML
1 SUSPENSION ORAL
Status: DISCONTINUED | OUTPATIENT
Start: 2020-04-15 | End: 2020-04-17 | Stop reason: HOSPADM

## 2020-04-14 RX ORDER — ALLOPURINOL 100 MG/1
100 TABLET ORAL DAILY
Status: DISCONTINUED | OUTPATIENT
Start: 2020-04-15 | End: 2020-04-17 | Stop reason: HOSPADM

## 2020-04-14 RX ORDER — LEVOTHYROXINE SODIUM 0.15 MG/1
150 TABLET ORAL DAILY
Status: DISCONTINUED | OUTPATIENT
Start: 2020-04-15 | End: 2020-04-17 | Stop reason: HOSPADM

## 2020-04-14 RX ORDER — ACETAMINOPHEN 325 MG/1
650 TABLET ORAL EVERY 4 HOURS PRN
Status: DISCONTINUED | OUTPATIENT
Start: 2020-04-14 | End: 2020-04-17 | Stop reason: HOSPADM

## 2020-04-14 RX ORDER — SODIUM CHLORIDE 0.9 % (FLUSH) 0.9 %
10 SYRINGE (ML) INJECTION AS NEEDED
Status: DISCONTINUED | OUTPATIENT
Start: 2020-04-14 | End: 2020-04-17 | Stop reason: HOSPADM

## 2020-04-14 RX ORDER — SUCRALFATE ORAL 1 G/10ML
1 SUSPENSION ORAL 2 TIMES DAILY
Status: DISCONTINUED | OUTPATIENT
Start: 2020-04-15 | End: 2020-04-14

## 2020-04-14 RX ORDER — ACETAMINOPHEN 325 MG/1
650 TABLET ORAL EVERY 6 HOURS PRN
Status: DISCONTINUED | OUTPATIENT
Start: 2020-04-14 | End: 2020-04-14 | Stop reason: SDUPTHER

## 2020-04-14 RX ORDER — PANTOPRAZOLE SODIUM 40 MG/1
40 TABLET, DELAYED RELEASE ORAL DAILY
Status: DISCONTINUED | OUTPATIENT
Start: 2020-04-15 | End: 2020-04-17 | Stop reason: HOSPADM

## 2020-04-14 RX ORDER — OXYCODONE AND ACETAMINOPHEN 10; 325 MG/1; MG/1
1 TABLET ORAL EVERY 6 HOURS PRN
Status: DISCONTINUED | OUTPATIENT
Start: 2020-04-14 | End: 2020-04-17 | Stop reason: HOSPADM

## 2020-04-14 RX ORDER — ACETAMINOPHEN 650 MG/1
650 SUPPOSITORY RECTAL EVERY 4 HOURS PRN
Status: DISCONTINUED | OUTPATIENT
Start: 2020-04-14 | End: 2020-04-17 | Stop reason: HOSPADM

## 2020-04-14 RX ORDER — SUCRALFATE 1 G/1
1 TABLET ORAL DAILY
COMMUNITY

## 2020-04-14 RX ORDER — ONDANSETRON 2 MG/ML
4 INJECTION INTRAMUSCULAR; INTRAVENOUS EVERY 6 HOURS PRN
Status: DISCONTINUED | OUTPATIENT
Start: 2020-04-14 | End: 2020-04-17 | Stop reason: HOSPADM

## 2020-04-14 RX ORDER — ONDANSETRON 4 MG/1
4 TABLET, FILM COATED ORAL EVERY 6 HOURS PRN
Status: DISCONTINUED | OUTPATIENT
Start: 2020-04-14 | End: 2020-04-17 | Stop reason: HOSPADM

## 2020-04-14 RX ORDER — ACETAMINOPHEN 160 MG/5ML
650 SOLUTION ORAL EVERY 4 HOURS PRN
Status: DISCONTINUED | OUTPATIENT
Start: 2020-04-14 | End: 2020-04-17 | Stop reason: HOSPADM

## 2020-04-14 RX ORDER — LIDOCAINE HYDROCHLORIDE 30 MG/G
CREAM TOPICAL DAILY PRN
Status: DISCONTINUED | OUTPATIENT
Start: 2020-04-14 | End: 2020-04-17 | Stop reason: HOSPADM

## 2020-04-15 ENCOUNTER — APPOINTMENT (OUTPATIENT)
Dept: CT IMAGING | Facility: HOSPITAL | Age: 80
End: 2020-04-15

## 2020-04-15 PROBLEM — K52.9 CHRONIC DIARRHEA: Status: ACTIVE | Noted: 2020-04-15

## 2020-04-15 PROBLEM — D63.1 ANEMIA DUE TO CHRONIC KIDNEY DISEASE, ON CHRONIC DIALYSIS (HCC): Status: ACTIVE | Noted: 2019-06-30

## 2020-04-15 PROBLEM — E03.9 HYPOTHYROIDISM (ACQUIRED): Status: ACTIVE | Noted: 2020-04-15

## 2020-04-15 PROBLEM — E88.09 HYPOALBUMINEMIA: Status: ACTIVE | Noted: 2020-04-15

## 2020-04-15 PROBLEM — K92.2 GASTROINTESTINAL HEMORRHAGE: Status: RESOLVED | Noted: 2019-03-09 | Resolved: 2020-04-15

## 2020-04-15 PROBLEM — R62.7 FAILURE TO THRIVE IN ADULT: Status: ACTIVE | Noted: 2020-04-15

## 2020-04-15 PROBLEM — E46 PROTEIN-CALORIE MALNUTRITION (HCC): Status: ACTIVE | Noted: 2020-04-15

## 2020-04-15 PROBLEM — N18.6 ANEMIA DUE TO CHRONIC KIDNEY DISEASE, ON CHRONIC DIALYSIS (HCC): Status: ACTIVE | Noted: 2019-06-30

## 2020-04-15 PROBLEM — K26.9 DUODENAL ULCER: Status: RESOLVED | Noted: 2019-06-30 | Resolved: 2020-04-15

## 2020-04-15 PROBLEM — K52.9 ACUTE COLITIS: Status: RESOLVED | Noted: 2019-06-30 | Resolved: 2020-04-15

## 2020-04-15 PROBLEM — I27.20 PULMONARY HYPERTENSION, MODERATE TO SEVERE (HCC): Status: ACTIVE | Noted: 2020-04-15

## 2020-04-15 PROBLEM — Z99.2 ANEMIA DUE TO CHRONIC KIDNEY DISEASE, ON CHRONIC DIALYSIS (HCC): Status: ACTIVE | Noted: 2019-06-30

## 2020-04-15 LAB
ALBUMIN SERPL-MCNC: 2.1 G/DL (ref 3.5–5.2)
ALBUMIN/GLOB SERPL: 0.8 G/DL
ALP SERPL-CCNC: 176 U/L (ref 39–117)
ALT SERPL W P-5'-P-CCNC: 14 U/L (ref 1–33)
ANION GAP SERPL CALCULATED.3IONS-SCNC: 19 MMOL/L (ref 5–15)
AST SERPL-CCNC: 14 U/L (ref 1–32)
BASOPHILS # BLD AUTO: 0.05 10*3/MM3 (ref 0–0.2)
BASOPHILS NFR BLD AUTO: 0.5 % (ref 0–1.5)
BILIRUB SERPL-MCNC: 0.3 MG/DL (ref 0.2–1.2)
BUN BLD-MCNC: 53 MG/DL (ref 8–23)
BUN/CREAT SERPL: 7.7 (ref 7–25)
CALCIUM SPEC-SCNC: 7.4 MG/DL (ref 8.6–10.5)
CHLORIDE SERPL-SCNC: 101 MMOL/L (ref 98–107)
CO2 SERPL-SCNC: 18 MMOL/L (ref 22–29)
CREAT BLD-MCNC: 6.84 MG/DL (ref 0.57–1)
DEPRECATED RDW RBC AUTO: 65.1 FL (ref 37–54)
EOSINOPHIL # BLD AUTO: 0.11 10*3/MM3 (ref 0–0.4)
EOSINOPHIL NFR BLD AUTO: 1.1 % (ref 0.3–6.2)
ERYTHROCYTE [DISTWIDTH] IN BLOOD BY AUTOMATED COUNT: 17 % (ref 12.3–15.4)
FOLATE SERPL-MCNC: >20 NG/ML (ref 4.78–24.2)
GFR SERPL CREATININE-BSD FRML MDRD: 6 ML/MIN/1.73
GFR SERPL CREATININE-BSD FRML MDRD: ABNORMAL ML/MIN/{1.73_M2}
GLOBULIN UR ELPH-MCNC: 2.7 GM/DL
GLUCOSE BLD-MCNC: 75 MG/DL (ref 65–99)
HCT VFR BLD AUTO: 25.6 % (ref 34–46.6)
HGB BLD-MCNC: 8.5 G/DL (ref 12–15.9)
IMM GRANULOCYTES # BLD AUTO: 0.15 10*3/MM3 (ref 0–0.05)
IMM GRANULOCYTES NFR BLD AUTO: 1.4 % (ref 0–0.5)
LYMPHOCYTES # BLD AUTO: 0.75 10*3/MM3 (ref 0.7–3.1)
LYMPHOCYTES NFR BLD AUTO: 7.2 % (ref 19.6–45.3)
MCH RBC QN AUTO: 34.6 PG (ref 26.6–33)
MCHC RBC AUTO-ENTMCNC: 33.2 G/DL (ref 31.5–35.7)
MCV RBC AUTO: 104.1 FL (ref 79–97)
MONOCYTES # BLD AUTO: 0.51 10*3/MM3 (ref 0.1–0.9)
MONOCYTES NFR BLD AUTO: 4.9 % (ref 5–12)
NEUTROPHILS # BLD AUTO: 8.87 10*3/MM3 (ref 1.7–7)
NEUTROPHILS NFR BLD AUTO: 84.9 % (ref 42.7–76)
NRBC BLD AUTO-RTO: 0 /100 WBC (ref 0–0.2)
PLATELET # BLD AUTO: 216 10*3/MM3 (ref 140–450)
PMV BLD AUTO: 11.1 FL (ref 6–12)
POTASSIUM BLD-SCNC: 3 MMOL/L (ref 3.5–5.2)
PROT SERPL-MCNC: 4.8 G/DL (ref 6–8.5)
RBC # BLD AUTO: 2.46 10*6/MM3 (ref 3.77–5.28)
SODIUM BLD-SCNC: 138 MMOL/L (ref 136–145)
TSH SERPL DL<=0.05 MIU/L-ACNC: 0.34 UIU/ML (ref 0.27–4.2)
VIT B12 BLD-MCNC: 1644 PG/ML (ref 211–946)
WBC NRBC COR # BLD: 10.44 10*3/MM3 (ref 3.4–10.8)

## 2020-04-15 PROCEDURE — 84443 ASSAY THYROID STIM HORMONE: CPT | Performed by: NURSE PRACTITIONER

## 2020-04-15 PROCEDURE — 25010000002 EPOETIN ALFA-EPBX 4000 UNIT/ML SOLUTION: Performed by: INTERNAL MEDICINE

## 2020-04-15 PROCEDURE — 82607 VITAMIN B-12: CPT | Performed by: FAMILY MEDICINE

## 2020-04-15 PROCEDURE — 5A1D70Z PERFORMANCE OF URINARY FILTRATION, INTERMITTENT, LESS THAN 6 HOURS PER DAY: ICD-10-PCS | Performed by: INTERNAL MEDICINE

## 2020-04-15 PROCEDURE — 82746 ASSAY OF FOLIC ACID SERUM: CPT | Performed by: FAMILY MEDICINE

## 2020-04-15 PROCEDURE — 85025 COMPLETE CBC W/AUTO DIFF WBC: CPT | Performed by: NURSE PRACTITIONER

## 2020-04-15 PROCEDURE — 80053 COMPREHEN METABOLIC PANEL: CPT | Performed by: NURSE PRACTITIONER

## 2020-04-15 PROCEDURE — 74174 CTA ABD&PLVS W/CONTRAST: CPT

## 2020-04-15 PROCEDURE — 0 IOPAMIDOL PER 1 ML: Performed by: FAMILY MEDICINE

## 2020-04-15 PROCEDURE — 97162 PT EVAL MOD COMPLEX 30 MIN: CPT

## 2020-04-15 PROCEDURE — 99232 SBSQ HOSP IP/OBS MODERATE 35: CPT | Performed by: INTERNAL MEDICINE

## 2020-04-15 RX ADMIN — SUCRALFATE 1 G: 1 SUSPENSION ORAL at 11:26

## 2020-04-15 RX ADMIN — SODIUM CHLORIDE, PRESERVATIVE FREE 10 ML: 5 INJECTION INTRAVENOUS at 20:42

## 2020-04-15 RX ADMIN — SUCRALFATE 1 G: 1 SUSPENSION ORAL at 08:26

## 2020-04-15 RX ADMIN — SUCRALFATE 1 G: 1 SUSPENSION ORAL at 20:42

## 2020-04-15 RX ADMIN — LEVOTHYROXINE SODIUM 150 MCG: 150 TABLET ORAL at 05:41

## 2020-04-15 RX ADMIN — IOPAMIDOL 125 ML: 755 INJECTION, SOLUTION INTRAVENOUS at 10:45

## 2020-04-15 RX ADMIN — PANTOPRAZOLE SODIUM 40 MG: 40 TABLET, DELAYED RELEASE ORAL at 08:26

## 2020-04-15 RX ADMIN — EPOETIN ALFA-EPBX 4000 UNITS: 4000 INJECTION, SOLUTION INTRAVENOUS; SUBCUTANEOUS at 21:27

## 2020-04-15 RX ADMIN — ALLOPURINOL 100 MG: 100 TABLET ORAL at 08:26

## 2020-04-15 NOTE — PLAN OF CARE
Problem: Patient Care Overview  Goal: Plan of Care Review  Outcome: Ongoing (interventions implemented as appropriate)  Flowsheets (Taken 4/15/2020 1403)  Progress: no change  Plan of Care Reviewed With: patient  Outcome Summary: Pt is ordered a cardiac, renal diet. No po intake recorded yet. She reports chronic poor appetite and chronic diarrhea. She is complaining of abdominal pain with nausea and diarrhea. She has lost ~19# in the last 6 months. She has ESRD and receives HD. She has used Novas[employer] Renal oral supplements in the past. Will send BID with meals. She qualifies for moderate malnutrition. She has been advised of alternate selections. MSA sent to MD for review. Will cont to follow for nutrition needs.

## 2020-04-15 NOTE — PROGRESS NOTES
Malnutrition Severity Assessment    Patient Name:  Cheri Ely  YOB: 1940  MRN: [id number]  Admit Date:  4/14/2020    Patient meets criteria for : Moderate (non-severe) Malnutrition(secondary signs: general weakness, FTT, slow skin elasticity, stage 2 PI to coccyx)    Comments:  Please attest this not if you agree with this assessment. Thanks!    Malnutrition Severity Assessment  Malnutrition Type: Chronic Disease - Related Malnutrition     Malnutrition Type (last 8 hours)      Malnutrition Severity Assessment     Row Name 04/15/20 1400       Malnutrition Severity Assessment    Malnutrition Type  Chronic Disease - Related Malnutrition    Row Name 04/15/20 1400       Insufficient Energy Intake     Insufficient Energy Intake   <75% of est. energy requirement for > or equal to 1 month    Row Name 04/15/20 1400       Unintentional Weight Loss     Unintentional Weight Loss   Weight loss greater than 10% in six months 13% in 6 months    Row Name 04/15/20 1400       Muscle Loss    Loss of Muscle Mass Findings  -- RIYA    Row Name 04/15/20 1400       Fat Loss    Subcutaneous Fat Loss Findings  -- RIYA    Row Name 04/15/20 1400       Fluid Accumulation (Edema)    Fluid Acumulation Findings  Moderate    Fluid Accumulation   Moderate equals 2+ pitting edema    Row Name 04/15/20 1400       Criteria Met (Must meet criteria for severity in at least 2 of these categories: M Wasting, Fat Loss, Fluid, Secondary Signs, Wt. Status, Intake)    Patient meets criteria for   Moderate (non-severe) Malnutrition secondary signs: general weakness, FTT, slow skin elasticity, stage 2 PI to coccyx          Electronically signed by:  Aliyah Bill RDN, LD  04/15/20 14:08

## 2020-04-15 NOTE — PROGRESS NOTES
Discharge Planning Assessment  [facility]     Patient Name: Cheri Ely  MRN: [id number]  Today's Date: 4/15/2020    Admit Date: 4/14/2020    Discharge Needs Assessment     Row Name 04/15/20 0917       Living Environment    Lives With  child(meagan), adult  (Pended)     Name(s) of Who Lives With Patient  Daughter Lucero  (Pended)     Current Living Arrangements  home/apartment/condo  (Pended)     Primary Care Provided by  self  (Pended)     Provides Primary Care For  no one  (Pended)     Quality of Family Relationships  helpful;involved;supportive  (Pended)     Able to Return to Prior Arrangements  yes  (Pended)        Transition Planning    Patient/Family Anticipates Transition to  home  (Pended)     Transportation Anticipated  family or friend will provide  (Pended)        Discharge Needs Assessment    Readmission Within the Last 30 Days  no previous admission in last 30 days  (Pended)     Concerns to be Addressed  no discharge needs identified;denies needs/concerns at this time  (Pended)     Equipment Currently Used at Home  power chair,(recliner lift);commode  (Pended)     Equipment Needed After Discharge  none  (Pended)     Discharge Coordination/Progress  Pt has RX coverage and a PCP. Pt lives at home with Daughter Lucero. Pt daughter stated they are open to hh; MD please consider. Pt has all DME needed at home. SW will follow and assist with discharge needs as they may arise.  (Pended)         Discharge Plan    No documentation.       Destination      Coordination has not been started for this encounter.      Durable Medical Equipment      Coordination has not been started for this encounter.      Dialysis/Infusion      Coordination has not been started for this encounter.      Home Medical Care      Coordination has not been started for this encounter.      Therapy      Coordination has not been started for this encounter.      Community Resources      Coordination has not been started for this encounter.           Demographic Summary    No documentation.       Functional Status    No documentation.       Psychosocial    No documentation.       Abuse/Neglect    No documentation.       Legal    No documentation.       Substance Abuse    No documentation.       Patient Forms    No documentation.           Vesta Ramos

## 2020-04-15 NOTE — THERAPY EVALUATION
Patient Name: Cheri Ely  : 1940    MRN: [id number]                              Today's Date: 4/15/2020       Admit Date: 2020    Visit Dx:     ICD-10-CM ICD-9-CM   1. Impaired transfers R29.91 781.99     Patient Active Problem List   Diagnosis   • Chronic kidney disease on chronic dialysis (CMS/[facility])   • Paroxysmal atrial fibrillation (CMS/[facility])   • ESRD (end stage renal disease) (CMS/[facility])   • Anemia due to chronic kidney disease, on chronic dialysis (CMS/[facility])   • Chronic combined systolic and diastolic CHF (congestive heart failure) (CMS/[facility])   • Chronic diarrhea   • Failure to thrive in adult   • Hypoalbuminemia   • Pulmonary hypertension, moderate to severe (CMS/[facility])   • Hypothyroidism (acquired)   • Protein-calorie malnutrition (CMS/[facility])     Past Medical History:   Diagnosis Date   • (HFpEF) heart failure with preserved ejection fraction (CMS/[facility]) 3/9/2019   • A-fib (CMS/[facility])    • AVM (arteriovenous malformation) of small bowel, acquired 2019   • Carotid artery disease (CMS/[facility])    • Chronic kidney disease on chronic dialysis (CMS/[facility])    • Chronic mesenteric ischemia (CMS/[facility]) 2017   • Closed displaced intertrochanteric fracture of right femur (CMS/[facility]) 2019    S/p Cephalomedullary nailing    • COPD (chronic obstructive pulmonary disease) (CMS/[facility])    • Coronary artery disease    • Diverticulitis    • Diverticulosis    • Gastric ulcer    • Gout    • History of transfusion    • Hyperlipidemia    • Hypertension    • Hypothyroidism    • Injury of back    • Mesenteric artery insufficiency (CMS/[facility])    • Multilevel degenerative disc disease    • Osteoporosis    • Pancreatitis    • Pelvis fracture (CMS/[facility])    • Pneumonia    • PVD (peripheral vascular disease) (CMS/[facility])    • RA (rheumatoid arthritis) (CMS/[facility])    • Sleep apnea      Past Surgical History:   Procedure Laterality Date   • AORTAGRAM Right 2018    Procedure: MESENTERIC ANGIOGRAM, GROIN ACCESS, MYNX CLOSURE;  Surgeon:  Tomasz San DO;  Location: [facility] OR;  Service:    • AORTIC VALVE SURGERY     • APPENDECTOMY     • BACK SURGERY     • CAPSULE ENDOSCOPY N/A 3/30/2019    Dr. Yu-Angiodysplasia; Limit anticoagulation as much as is reasonable; Gastric passage time: 0h21m; Small bowel passage time: 2h2m   • CARDIAC SURGERY     • CAROTID ENDARTERECTOMY Bilateral    • CATARACT EXTRACTION, BILATERAL     • CERVICAL SPINE SURGERY     • CHOLECYSTECTOMY     • COLON RESECTION     • COLONOSCOPY  10/01/2015    Diverticulosis in the sigmoid colon; The examined portion of the ileum was normal; The examination was otherwise normal on direct and retroflexion views; No specimens collected; No plans to repeat colonoscopy due to multiple comorbidities   • COLONOSCOPY N/A 3/28/2019    Dr. Merida-The examined portion of the ileum was normal; One 6mm tubular adenomatous polyp at the hepatic flexure; Diverticulosis in the sigmoid colon   • CORONARY ARTERY BYPASS GRAFT     • DIALYSIS FISTULA CREATION     • ENDOSCOPY N/A 12/27/2018    LA Grade B reflux esophagitis; Non-bleeding gastric ulcers with no stigmata of bleeding-biopsied; Erosive gastritis; Normal examined duodenum; Repeat 2 months   • ENDOSCOPY N/A 2/28/2019    LA Grade B esophagitis-biopsied-clip (MR conditional) was placed; An adjustable gastric banding was found, characterized by healthy appearing mucosa; Normal stomach; Normal examined duodenum; Repeat 2 months   • ENDOSCOPY N/A 3/11/2019    An endoclip was found in the esophagus; Normal stomach; Normal examined duodenum; No specimens collected; Repeat endo 2-3 months   • ENDOSCOPY N/A 3/27/2019    Dr. Merida-Normal examined jejunum; Normal examined duodenum; An adjustable gastric banding was found; Normal esophagus; No specimens collected   • ENDOSCOPY  10/01/2015    Normal esophagus; Bleeding erosive gastropathy-biopsied; Thickening of the gastric mucosa in the cardia-biopsied; Normal examined duodenum   • EXPLORATORY LAPAROTOMY  N/A 5/13/2019    Procedure: LAPAROTOMY EXPLORATORY, OVERSEW DUODENAL ULCER WITH FRED PATCH, KOCHER MANEUVER;  Surgeon: Laila Rodriguez MD;  Location:  [facility] OR;  Service: General   • HIP TROCHANTERIC NAILING WITH INTRAMEDULLARY HIP SCREW Right 2/8/2019    Procedure: HIP TROCANTERIC NAILING LONG WITH INTRAMEDULLARY HIP SCREW;  Surgeon: Boo Camacho MD;  Location:  [facility] OR;  Service: Orthopedics   • LAPAROSCOPIC GASTRIC BANDING     • LEEP     • LUMBAR FUSION     • REPLACEMENT TOTAL KNEE      Bilateral   • TOE AMPUTATION Left     2nd toe   • TOTAL KNEE ARTHROPLASTY Bilateral    • TUBAL ABDOMINAL LIGATION       General Information     Row Name 04/15/20 1000          PT Evaluation Time/Intention    Document Type  evaluation CC: nausea, diarrhea, weakness & dehydration. Dx: chronic diarrhea, adult failure to thrive, hypoalbuminemia.. Hx ESRD on hemodialysis.   -JAZMÍN     Mode of Treatment  physical therapy  -JAZMÍN     Row Name 04/15/20 1000          General Information    Patient Profile Reviewed?  yes  -JAZMÍN     Prior Level of Function  independent:;bed mobility;transfer;w/c or scooter mainly t/f's to scooter, uses scooter for in home mobility  -JAZMÍN     Existing Precautions/Restrictions  fall  -JAZMÍN     Barriers to Rehab  medically complex;previous functional deficit  -JAZMÍN     Row Name 04/15/20 1000          Relationship/Environment    Lives With  child(meagan), adult Daughter  -JAZMÍN     Row Name 04/15/20 1000          Resource/Environmental Concerns    Current Living Arrangements  home/apartment/condo ramp  -JAZMÍN     Row Name 04/15/20 1000          Cognitive Assessment/Intervention- PT/OT    Orientation Status (Cognition)  oriented x 4  -JAZMÍN     Row Name 04/15/20 1000          Safety Issues, Functional Mobility    Impairments Affecting Function (Mobility)  balance;endurance/activity tolerance;strength;shortness of breath  -JAZMÍN     Comment, Safety Issues/Impairments (Mobility)  B LE pitting edema, R LE worse  -JAZMÍN       User Key  (r)  = Recorded By, (t) = Taken By, (c) = Cosigned By    Initials Name Provider Type    Je Youssef PT DPT Physical Therapist        Mobility     Row Name 04/15/20 1000          Bed Mobility Assessment/Treatment    Bed Mobility Assessment/Treatment  supine-sit;sit-supine  -JAZMÍN     Supine-Sit [place] (Bed Mobility)  moderate assist (50% patient effort)  -JAZMÍN     Sit-Supine [place] (Bed Mobility)  moderate assist (50% patient effort)  -JAZMÍN     Assistive Device (Bed Mobility)  head of bed elevated;bed rails;draw sheet  -JAZMÍN       User Key  (r) = Recorded By, (t) = Taken By, (c) = Cosigned By    Initials Name Provider Type    Je Youssef PT DPT Physical Therapist        Obj/Interventions     Row Name 04/15/20 1000          General ROM    GENERAL ROM COMMENTS  B LE AROM imapired ~ 25%  -JAZMÍN     Row Name 04/15/20 1000          MMT (Manual Muscle Testing)    General MMT Comments  B LE functionally 3+/5  -JAZMÍN     Row Name 04/15/20 1000          Static Sitting Balance    Level of [place] (Unsupported Sitting, Static Balance)  contact guard assist  -JAZMÍN     Sitting Position (Unsupported Sitting, Static Balance)  sitting on edge of bed  -JAZMÍN     Row Name 04/15/20 1000          Dynamic Sitting Balance    Level of [place], Reaches Outside Midline (Sitting, Dynamic Balance)  contact guard assist  -JAZMÍN     Sitting Position, Reaches Outside Midline (Sitting, Dynamic Balance)  sitting on edge of bed  -JAZMÍN       User Key  (r) = Recorded By, (t) = Taken By, (c) = Cosigned By    Initials Name Provider Type    Je Youssef PT DPT Physical Therapist        Goals/Plan     Row Name 04/15/20 1000          Bed Mobility Goal 1 (PT)    Activity/Assistive Device (Bed Mobility Goal 1, PT)  sit to supine/supine to sit  -JAZMÍN     [place] Level/Cues Needed (Bed Mobility Goal 1, PT)  minimum assist (75% or more patient effort)  -JAZMÍN     Time Frame (Bed Mobility Goal 1, PT)  long term goal (LTG);10 days  -JAZMÍN      Progress/Outcomes (Bed Mobility Goal 1, PT)  goal ongoing  -JAZMÍN     Row Name 04/15/20 1000          Transfer Goal 1 (PT)    Activity/Assistive Device (Transfer Goal 1, PT)  sit-to-stand/stand-to-sit;bed-to-chair/chair-to-bed;lateral transfer;wheelchair transfer stand pivot   -JAZMÍN     [place] Level/Cues Needed (Transfer Goal 1, PT)  minimum assist (75% or more patient effort)  -JAZMÍN     Time Frame (Transfer Goal 1, PT)  long term goal (LTG);10 days  -JAZMÍN     Progress/Outcome (Transfer Goal 1, PT)  goal ongoing  -JAZMÍN       User Key  (r) = Recorded By, (t) = Taken By, (c) = Cosigned By    Initials Name Provider Type    Je Youssef, PT DPT Physical Therapist        Clinical Impression     Row Name 04/15/20 1000          Pain Assessment    Additional Documentation  Pain Scale: FACES Pre/Post-Treatment (Group)  -JAZMÍN     Row Name 04/15/20 1000          Pain Scale: Numbers Pre/Post-Treatment    Pain Location - Orientation  generalized  -JAZMÍN     Row Name 04/15/20 1000          Pain Scale: FACES Pre/Post-Treatment    Pain: FACES Scale, Pretreatment  2-->hurts little bit  -JAZMÍN     Pain: FACES Scale, Post-Treatment  2-->hurts little bit  -JAZMÍN     Pre/Post Treatment Pain Comment  c/o weakness, edema, fatigue  -JAZMÍN     Row Name 04/15/20 1000          Plan of Care Review    Plan of Care Reviewed With  patient  -JAZMÍN     Row Name 04/15/20 1000          Physical Therapy Clinical Impression    Patient/Family Goals Statement (PT Clinical Impression)  increase strength  -JAZMÍN     Criteria for Skilled Interventions Met (PT Clinical Impression)  yes;treatment indicated  -JAZMÍN     Rehab Potential (PT Clinical Summary)  good, to achieve stated therapy goals  -JAZMÍN     Predicted Duration of Therapy (PT)  until d/c  -JAZMÍN     Row Name 04/15/20 1000          Positioning and Restraints    Pre-Treatment Position  in bed  -JAZMÍN     Post Treatment Position  bed  -JAZMÍN     In Bed  fowlers;call light within reach;encouraged to call for assist;legs elevated   -JAZMÍN       User Key  (r) = Recorded By, (t) = Taken By, (c) = Cosigned By    Initials Name Provider Type    Je Youssef PT DPT Physical Therapist        Outcome Measures     Row Name 04/15/20 1000          How much help from another person do you currently need...    Turning from your back to your side while in flat bed without using bedrails?  2  -JAZMÍN     Moving from lying on back to sitting on the side of a flat bed without bedrails?  2  -JAZÍMN     Moving to and from a bed to a chair (including a wheelchair)?  2  -JAZMÍN     Standing up from a chair using your arms (e.g., wheelchair, bedside chair)?  2  -JAZMÍN     Climbing 3-5 steps with a railing?  1  -JAZMÍN     To walk in hospital room?  1  -JAZMÍN     AM-PAC 6 Clicks Score (PT)  10  -JAZMÍN     Row Name 04/15/20 1000          Functional Assessment    Outcome Measure Options  AM-PAC 6 Clicks Basic Mobility (PT)  -JAZMÍN       User Key  (r) = Recorded By, (t) = Taken By, (c) = Cosigned By    Initials Name Provider Type    Je Youssef, PT DPT Physical Therapist          PT Recommendation and Plan  Planned Therapy Interventions (PT Eval): bed mobility training, transfer training, gait training, balance training, home exercise program, patient/family education, postural re-education, strengthening  Outcome Summary/Treatment Plan (PT)  Anticipated Discharge Disposition (PT): home with 24/7 care, home with home health, skilled nursing facility  Plan of Care Reviewed With: patient  Outcome Summary: PT eval completed. She presents alert and oriented. She demos weakness in B UE/LE as well as B LE pitting edema. R LE worse when compared to L LE. She needed mod assist to get to the EOB and was CGA to sit at bedside. PT will continue to progress strength and t/f's. She plans to d.c home with daughter assist and HH., consider higher care only if needed.     Time Calculation:   PT Charges     Row Name 04/15/20 1114             Time Calculation    Start Time  1000  -JAZMÍN      Stop Time   1055  -JAZMÍN      Time Calculation (min)  55 min  -JAZMÍN      PT Received On  04/15/20  -JAZMÍN      PT Goal Re-Cert Due Date  04/25/20  -JAZMÍN        User Key  (r) = Recorded By, (t) = Taken By, (c) = Cosigned By    Initials Name Provider Type    Je Youssef, PT DPT Physical Therapist        Therapy Charges for Today     Code Description Service Date Service Provider Modifiers Qty    [id number] HC PT EVAL MOD COMPLEXITY 4 4/15/2020 Je Vale, PT DPT GP 1          PT G-Codes  Outcome Measure Options: AM-PAC 6 Clicks Basic Mobility (PT)  AM-PAC 6 Clicks Score (PT): 10    Je Vale, DINORAH DPT  4/15/2020

## 2020-04-15 NOTE — PLAN OF CARE
Problem: Patient Care Overview  Goal: Plan of Care Review  Flowsheets (Taken 4/15/2020 1000)  Outcome Summary: PT eval completed. She presents alert and oriented. She demos weakness in B UE/LE as well as B LE pitting edema. R LE worse when compared to L LE. She needed mod assist to get to the EOB and was CGA to sit at bedside. PT will continue to progress strength and t/f's. She plans to d.c home with daughter assist and HH., consider higher care only if needed.

## 2020-04-15 NOTE — PLAN OF CARE
Problem: Patient Care Overview  Goal: Plan of Care Review  Outcome: Ongoing (interventions implemented as appropriate)  Flowsheets  Taken 4/15/2020 0629  Progress: no change  Outcome Summary: Pt admitted to  direct admit from [facility]. Has had no reports of pain throughout night and has rested well. Pressure injury to coccyx and skin tear to R hand present on admission. AF  on tele. VSS. Will continue to monitor.  Taken 4/14/2020 2835  Plan of Care Reviewed With: patient

## 2020-04-15 NOTE — H&P
[facility] Medicine Services  HISTORY AND PHYSICAL    Date of Admission: 4/14/2020  Primary Care Physician: Manny Peters APRN    Subjective     Chief Complaint: Nausea, diarrhea, weakness and dehydration    History of Present Illness  Cheri Ely is an extremely complicated patient with a past medical history of end-stage renal disease on hemodialysis, chronic anemia with known AVMs, followed by [facility] GI most recently seen by Candace Mann NP 3/24/2020 due to chronic diarrhea stool studies negative, patient states no one knows why she has diarrhea.  She did undergo lap band surgery in 2008 and has lost from greater than 200 pounds, today she weighs 123.  Due to severe diarrhea yesterday she did not present for hemodialysis.  She presented to [facility] emergency department with above complaints and was transferred to [facility] as they do not have a dialysis unit.  She went received 1 L LR for dehydration.  She does have diffuse edema, abdomen is tender on palpation.  CT of the abdomen pelvis obtained on 3/25/2020 was negative for acute changes.  Patient was noted at outlying facility to have creatinine 6.8 and potassium of 3.8.  Venous gases revealed pH 7.26.  Patient also has a nonhealing wound on the right hand that was scratched by a coat  and a nonhealing skin tear on the right forearm.  No cellulitic changes noted.  Patient continues to complain of abdominal pain, weakness, diarrhea and generalized feeling of unwellness.  She is admitted for further evaluation and treatment.    Review of Systems   A 10 point review of systems was completed, all negative except for those discussed in HPI    Past Medical History:   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   • Carotid artery  disease (CMS/HCC)    • Chronic kidney disease on chronic dialysis (CMS/HCC)    • Chronic mesenteric ischemia (CMS/HCC) 12/14/2017   • Closed displaced intertrochanteric fracture of right femur (CMS/HCC) 2/8/2019    S/p Cephalomedullary nailing 2/8   • COPD (chronic obstructive pulmonary disease) (CMS/HCC)    • Coronary artery disease    • Diastolic dysfunction 3/9/2019    EF 55-60% from echocardiogram on 3/15   • Diverticulitis    • Diverticulosis    • Gastric ulcer    • Gout    • History of transfusion    • Hyperlipidemia    • Hypertension    • Hypothyroidism    • Injury of back    • Mesenteric artery insufficiency (CMS/HCC)    • Multilevel degenerative disc disease    • Osteoporosis    • Pancreatitis    • Pelvis fracture (CMS/HCC)    • Pneumonia    • PVD (peripheral vascular disease) (CMS/HCC)    • RA (rheumatoid arthritis) (CMS/HCC)    • Sleep apnea        Past Surgical History:   Past Surgical History:   Procedure Laterality Date   • AORTAGRAM Right 1/8/2018    Procedure: MESENTERIC ANGIOGRAM, GROIN ACCESS, MYNX CLOSURE;  Surgeon: Tomasz San DO;  Location: [facility] OR;  Service:    • AORTIC VALVE SURGERY     • APPENDECTOMY     • BACK SURGERY     • CAPSULE ENDOSCOPY N/A 3/30/2019    Dr. Yu-Angiodysplasia; Limit anticoagulation as much as is reasonable; Gastric passage time: 0h21m; Small bowel passage time: 2h2m   • CARDIAC SURGERY     • CAROTID ENDARTERECTOMY Bilateral    • CATARACT EXTRACTION, BILATERAL     • CERVICAL SPINE SURGERY     • CHOLECYSTECTOMY     • COLON RESECTION     • COLONOSCOPY  10/01/2015    Diverticulosis in the sigmoid colon; The examined portion of the ileum was normal; The examination was otherwise normal on direct and retroflexion views; No specimens collected; No plans to repeat colonoscopy due to multiple comorbidities   • COLONOSCOPY N/A 3/28/2019    Dr. Merida-The examined portion of the ileum was normal; One 6mm tubular adenomatous polyp at the hepatic flexure; Diverticulosis  in the sigmoid colon   • CORONARY ARTERY BYPASS GRAFT     • DIALYSIS FISTULA CREATION     • ENDOSCOPY N/A 12/27/2018    LA Grade B reflux esophagitis; Non-bleeding gastric ulcers with no stigmata of bleeding-biopsied; Erosive gastritis; Normal examined duodenum; Repeat 2 months   • ENDOSCOPY N/A 2/28/2019    LA Grade B esophagitis-biopsied-clip (MR conditional) was placed; An adjustable gastric banding was found, characterized by healthy appearing mucosa; Normal stomach; Normal examined duodenum; Repeat 2 months   • ENDOSCOPY N/A 3/11/2019    An endoclip was found in the esophagus; Normal stomach; Normal examined duodenum; No specimens collected; Repeat endo 2-3 months   • ENDOSCOPY N/A 3/27/2019    Dr. Merida-Normal examined jejunum; Normal examined duodenum; An adjustable gastric banding was found; Normal esophagus; No specimens collected   • ENDOSCOPY  10/01/2015    Normal esophagus; Bleeding erosive gastropathy-biopsied; Thickening of the gastric mucosa in the cardia-biopsied; Normal examined duodenum   • EXPLORATORY LAPAROTOMY N/A 5/13/2019    Procedure: LAPAROTOMY EXPLORATORY, OVERSEW DUODENAL ULCER WITH FRED PATCH, KOCHER MANEUVER;  Surgeon: Laila Rodriguez MD;  Location:  [facility] OR;  Service: General   • HIP TROCHANTERIC NAILING WITH INTRAMEDULLARY HIP SCREW Right 2/8/2019    Procedure: HIP TROCANTERIC NAILING LONG WITH INTRAMEDULLARY HIP SCREW;  Surgeon: Boo Camacho MD;  Location:  [facility] OR;  Service: Orthopedics   • LAPAROSCOPIC GASTRIC BANDING     • LEEP     • LUMBAR FUSION     • REPLACEMENT TOTAL KNEE      Bilateral   • TOE AMPUTATION Left     2nd toe   • TOTAL KNEE ARTHROPLASTY Bilateral    • TUBAL ABDOMINAL LIGATION         Family History: family history includes Cancer in her mother; Coronary artery disease in her brother and father; Diabetes in her brother; Heart attack in her father; Hypertension in her mother.    Social History:  reports that she has never smoked. She has never used  smokeless tobacco. She reports that she does not drink alcohol or use drugs.    Code Status: Full, if unable to speak for herself her daughter will speak for her      Allergies:  No Known Allergies    Medications:  Prior to Admission medications    Medication Sig Start Date End Date Taking? Authorizing Provider   acetaminophen (TYLENOL) 325 MG tablet Take 2 tablets by mouth Every 6 (Six) Hours As Needed for Mild Pain . 3/12/19   Jacquie Gasca APRN   allopurinol (ZYLOPRIM) 100 MG tablet Take 100 mg by mouth Daily.    Radha Kruse MD   B COMPLEX VITAMINS PO Take 2 tablets by mouth Daily.    Radha Kruse MD   B Complex-C-Folic Acid (JOHN-SANGEETA) tablet Take 1 tablet by mouth Daily.    Radha Kruse MD   carvedilol (COREG) 12.5 MG tablet Take 12.5 mg by mouth 2 (Two) Times a Day With Meals. TAKE ONE TABLET TWICE A DAY ON NONDIALYSIS DAYS, AND 1 TABLET DAILY ON DIALYSIS DAY (MON, WED, FRI)    Radha Kruse MD   Cholecalciferol (VITAMIN D) 2000 units capsule Take 1 capsule by mouth Daily. 4/2/19   Jeffrey Zamora MD   cyproheptadine (PERIACTIN) 4 MG tablet Take 4 mg by mouth 3 (Three) Times a Day As Needed for Allergies.    Radha Kruse MD   diphenoxylate-atropine (LOMOTIL) 2.5-0.025 MG/5ML liquid Take 5 mL by mouth 4 (Four) Times a Day As Needed for Diarrhea.    Radha Kruse MD   levothyroxine (SYNTHROID, LEVOTHROID) 125 MCG tablet Take 1 tablet by mouth Daily.  Patient taking differently: Take 150 mcg by mouth Daily. 7/7/19   Edin Ramos DO   lidocaine 3 % cream cream Apply  topically Daily As Needed (prior to dialysis access). 7/6/19   Edin Ramos DO   Multiple Vitamins-Minerals (MULTIVITAMIN ADULT PO) Take 1 tablet by mouth Daily.    Radha Kruse MD   ondansetron ODT (ZOFRAN-ODT) 4 MG disintegrating tablet Place 1 tablet on the tongue Every 8 (Eight) Hours As Needed for Nausea. 3/25/20   Anam English DO    "  oxyCODONE-acetaminophen (PERCOCET)  MG per tablet Take 1 tablet by mouth Every 6 (Six) Hours As Needed for Moderate Pain .    Provider, MD Radha   pantoprazole (PROTONIX) 40 MG EC tablet Take 1 tablet by mouth Daily. 7/6/19   Edin Ramos DO   pravastatin (PRAVACHOL) 40 MG tablet Take 40 mg by mouth Daily.    Provider, MD Radha   sertraline (ZOLOFT) 50 MG tablet Take 50 mg by mouth Every Night.    Provider, MD Radha   sucralfate (CARAFATE) 1 GM/10ML suspension Take 10 mL by mouth 2 (Two) Times a Day.  Patient taking differently: Take 1 g by mouth Daily. 4/2/19   Jeffrey Zamora MD       Objective     BP (!) 87/50 (BP Location: Right arm, Patient Position: Lying)   Pulse 118   Temp 97.7 °F (36.5 °C) (Oral)   Resp 18   Ht 149.9 cm (59\")   Wt 56.2 kg (123 lb 14.4 oz)   SpO2 98%   BMI 25.02 kg/m²   Physical Exam   Constitutional: She is oriented to person, place, and time. She appears well-developed. No distress.   Frail, cachectic   HENT:   Head: Normocephalic and atraumatic.   Eyes: Pupils are equal, round, and reactive to light. Conjunctivae and EOM are normal. No scleral icterus.   Neck: Normal range of motion. Neck supple. No JVD present. No tracheal deviation present.   Cardiovascular: Normal rate, normal heart sounds and intact distal pulses. Exam reveals no gallop.   No murmur heard.  Irregular-atrial fibrillation chronic   Pulmonary/Chest: Effort normal and breath sounds normal. No respiratory distress. She has no wheezes. She has no rales.   Abdominal: Soft. Bowel sounds are normal. She exhibits no distension. There is tenderness ( Left upper quadrant). There is no guarding.   Musculoskeletal: Normal range of motion. She exhibits edema ( Diffuse upper and lower extremities).   Neurological: She is alert and oriented to person, place, and time.   Skin: Skin is warm and dry. No rash noted. She is not diaphoretic. No erythema. No pallor.   Skin tear right forearm, " nonhealing wound right hand-weeping.  Ecchymosis bilateral upper extremities   Psychiatric: She has a normal mood and affect. Her behavior is normal.   Vitals reviewed.      Pertinent Data: Obtained at outlying facility today  WBC 10.8, hemoglobin 9.5, hematocrit 29.3, platelet count 250,000  Venous pH 7.26, HCO3 18.8, CO2 17  Sodium 135, potassium 2.8, creatinine 6.8, albumin 2.4, protein 5.4, alkaline phosphate 181  Amylase 36, lipase 14    3/25/2020 CT abdomen and pelvis  Study Result     CT ABDOMEN PELVIS WO CONTRAST- 3/25/2020 7:19 PM CDT     HISTORY: Acute abdominal pain.  Is a dialysis patient.  Has had a lap  band surgery      COMPARISON: 06/27/2019      DOSE LENGTH PRODUCT: 303 mGy cm. Automated exposure control was also  utilized to decrease patient radiation dose.     TECHNIQUE: Noncontrast enhanced images of the abdomen and pelvis  obtained without oral contrast. Multiplanar reformatted images were  provided for review.      FINDINGS:   LOWER CHEST: Chronic interstitial thickening is seen in the RIGHT lung  base. The heart is enlarged.      LIVER: No focal liver lesion within limits of a noncontrast study.      BILIARY SYSTEM: The gallbladder has been removed. There is no evidence  of biliary ductal dilation.      PANCREAS: Pancreatic atrophy is noted.      SPLEEN: Unremarkable.      KIDNEYS: Bilateral renal atrophy is present. There is a 2.2 cm simple  cyst in the LEFT kidney. The ureters are decompressed and normal in  appearance.     ADRENALS: Unremarkable.     RETROPERITONEUM: No mass, lymphadenopathy or hemorrhage.      GI TRACT: Colonic diverticula are seen. A laparoscopic band is present.  There is no bowel obstruction.      OTHER: Severe atherosclerotic disease is noted throughout the abdomen  and pelvis. Degenerative and postsurgical changes are present in the  spine and hips.     PELVIS: The urinary bladder is decompressed. No pelvic mass or  lymphadenopathy is seen.      IMPRESSION:  1. No  evidence of acute abdominopelvic process.   2. Chronic changes described above.     This report was finalized on 03/25/2020 19:46 by Dr. Jacob Flores MD.       I have personally reviewed and interpreted the radiology studies and ECG obtained at time of admission.     Assessment / Plan     Assessment:   Active Hospital Problems    Diagnosis   • Chronic diarrhea   • Failure to thrive in adult   • Hypoalbuminemia   • ESRD (end stage renal disease) (CMS/[facility])   • Chronic anemia   • Paroxysmal atrial fibrillation (CMS/[facility])   • Hypothyroidism, TSH 12.4, FT4 0.41       Plan:   1.  Admit as inpatient  2.  Home medications reviewed and restarted as appropriate  3.  DVT prophylaxis with SCDs  4.  Consult nephrology  5.  Labs in a.m.  6.  PT and nutrition consult in a.m.  7.  Consider GI consult    Plan discussed with NP and agree    I discussed the patient's findings and my recommendations with: Azalia Shaver DO  Time spent 40 minutes    Patient seen and examined by me on 4/14/2020 at 9:55 PM.    Shakira Bassett, ABILIO  04/15/20   00:03

## 2020-04-15 NOTE — PROGRESS NOTES
[facility] Medicine Services  INPATIENT PROGRESS NOTE    Length of Stay: 1  Date of Admission: 4/14/2020  Primary Care Physician: Manny Peters APRN    Subjective     Chief Complaint:     Recurrent diarrhea    HPI     The patient's primary complaint is that of recurrent diarrhea.  She has no other significant complaints at this point.  She has had a relatively full work-up on an outpatient basis for diarrhea with no significant abnormalities noted.  I ordered a CT angiogram of the abdomen and pelvis today which revealed high-grade narrowing of the celiac artery at its origin.  Also revealed was complete occlusion of the SMA and a large RIZWANA likely due to collateralization.  The bowel did not appear acutely ischemic or necrotic.  There was severe bilateral renal atrophy with high-grade renal artery stenosis noted.  Given the multiplicity of abnormalities, vascular surgery will be consulted for further recommendations.  Gastroenterology has evaluated the patient and notes no particular role for endoscopy or colonoscopy in the current setting.  Discontinuation of Zoloft was recommended.    Review of Systems     All pertinent negatives and positives are as above. All other systems have been reviewed and are negative unless otherwise stated.     Objective    Temp:  [97.4 °F (36.3 °C)-98.5 °F (36.9 °C)] 98.5 °F (36.9 °C)  Heart Rate:  [] 79  Resp:  [16-18] 16  BP: (87-99)/(40-55) 98/42    Lab Results (last 24 hours)     Procedure Component Value Units Date/Time    Vitamin B12 [[id number]] Collected:  04/15/20 0837    Specimen:  Blood Updated:  04/15/20 0905    Folate [[id number]] Collected:  04/15/20 0837    Specimen:  Blood Updated:  04/15/20 0905    Comprehensive Metabolic Panel [[id number]]  (Abnormal) Collected:  04/15/20 0404    Specimen:  Blood Updated:  04/15/20 0447     Glucose 75 mg/dL      BUN 53 mg/dL      Creatinine 6.84 mg/dL      Sodium 138 mmol/L      Potassium  3.0 mmol/L      Chloride 101 mmol/L      CO2 18.0 mmol/L      Calcium 7.4 mg/dL      Total Protein 4.8 g/dL      Albumin 2.10 g/dL      ALT (SGPT) 14 U/L      AST (SGOT) 14 U/L      Alkaline Phosphatase 176 U/L      Total Bilirubin 0.3 mg/dL      eGFR Non African Amer 6 mL/min/1.73      Comment: <15 Indicative of kidney failure.        eGFR   Amer --     Comment: <15 Indicative of kidney failure.        Globulin 2.7 gm/dL      A/G Ratio 0.8 g/dL      BUN/Creatinine Ratio 7.7     Anion Gap 19.0 mmol/L     Narrative:       GFR Normal >60  Chronic Kidney Disease <60  Kidney Failure <15      TSH [[id number]]  (Normal) Collected:  04/15/20 0404    Specimen:  Blood Updated:  04/15/20 0447     TSH 0.340 uIU/mL     CBC Auto Differential [[id number]]  (Abnormal) Collected:  04/15/20 0404    Specimen:  Blood Updated:  04/15/20 0423     WBC 10.44 10*3/mm3      RBC 2.46 10*6/mm3      Hemoglobin 8.5 g/dL      Hematocrit 25.6 %      .1 fL      MCH 34.6 pg      MCHC 33.2 g/dL      RDW 17.0 %      RDW-SD 65.1 fl      MPV 11.1 fL      Platelets 216 10*3/mm3      Neutrophil % 84.9 %      Lymphocyte % 7.2 %      Monocyte % 4.9 %      Eosinophil % 1.1 %      Basophil % 0.5 %      Immature Grans % 1.4 %      Neutrophils, Absolute 8.87 10*3/mm3      Lymphocytes, Absolute 0.75 10*3/mm3      Monocytes, Absolute 0.51 10*3/mm3      Eosinophils, Absolute 0.11 10*3/mm3      Basophils, Absolute 0.05 10*3/mm3      Immature Grans, Absolute 0.15 10*3/mm3      nRBC 0.0 /100 WBC           Imaging Results (Last 24 Hours)     Procedure Component Value Units Date/Time    CT Angiogram Abdomen Pelvis [[id number]] Collected:  04/15/20 1005     Updated:  04/15/20 1030    Narrative:       CT ANGIOGRAM ABDOMEN PELVIS- 4/15/2020 9:53 AM CDT     HISTORY: possible ischemic bowel disease     COMPARISON: CT scan dated 03/25/2020     DOSE LENGTH PRODUCT: 636 mGy cm. Automated exposure control was also  utilized to decrease patient radiation dose.        TECHNIQUE: Helical tomographic images of the abdomen and pelvis  utilizing angiographic protocol were obtained following the intravenous  infusion of contrast. Multiplanar and 3 D reformatted images were  provided for review.     FINDINGS:     Angiogram:     Abdominal aorta is normal in caliber. Moderate atheromatous  calcification throughout the aorta. High-grade atheromatous narrowing of  the proximal celiac artery (series 7-image 82) along an approximately 6  mm segment. There is high-grade atheromatous plaque in the proximal SMA,  which appears completely occluded just beyond its origin. There is  reconstitution of the SMA, distally. The RIZWANA is patent and is larger in  caliber than is typically seen. Bilateral high-grade atheromatous  narrowing along the proximal and mid portions of the renal arteries. The  native right renal artery is likely completely occluded with a  downstream mild reconstitution via collaterals.     Mild diffuse atheromatous plaque along the bilateral common, internal  and external iliac arteries.     Other findings:  Four-chamber cardiomegaly. Aortic valvular prosthesis. Mild septal  thickening in the lung bases with trace bilateral pleural effusions.     Contrast reflux into the hepatic veins. No obvious focal liver lesion.  Prior cholecystectomy. The biliary tree is nondilated. Diffuse atrophy  of the pancreas. Nonspecific small rounded cystic lesion at the  pancreatic tail, measuring 1 cm (series 5-image 48). This is stable.  Spleen is normal in size. Adrenal glands are unremarkable. Bilateral  severe renal atrophy. Stable simple appearing left renal cyst. No  hydronephrosis. No retroperitoneal adenopathy or mass. LAP-BAND in  place. Stomach is nondistended. Small bowel is nondilated. Mucosal  enhancement along the small bowel appears appropriate. There is no  inflammatory change surrounding the small bowel. Diverticulosis along  the descending and sigmoid colon. Mucosal enhancement  along the colon  appears appropriate. There is no surrounding inflammatory change.      There is no mesenteric adenopathy, mass or inflammatory change. No  intraperitoneal free fluid or free air. Prior lumbar spinal fusion. No  acute bony abnormality. Right hip TFN. Underlying osteopenia.       Impression:       1. High-grade atheromatous narrowing at the celiac artery origin. The  proximal SMA appears completely occluded, with reconstitution distally.  The RIZWANA is patent and is fairly large, likely due to its role as a  collateral. The bowel does not appear acutely ischemic or necrotic  although the underlying atheromatous disease may be a source for a more  chronic, intermittent or postprandial bowel ischemia.  2. Four-chamber cardiomegaly with minimal septal thickening/interstitial  edema in the lung bases. Additional trace pleural effusions.  3. Severe bilateral renal atrophy with high-grade renal artery  atheromatous narrowings. The right renal artery may be completely  occluded, proximally, with a distal reconstitution.  4. There is a small (approximately 1 cm) cystic lesion of the pancreatic  tail which has been stable. This is likely benign.  This report was finalized on 04/15/2020 10:27 by Dr Ronan Varner, .             Intake/Output Summary (Last 24 hours) at 4/15/2020 1454  Last data filed at 4/15/2020 1310  Gross per 24 hour   Intake 480 ml   Output --   Net 480 ml       Physical Exam   Constitutional: She is oriented to person, place, and time. She appears well-developed. She appears cachectic. She is cooperative. She has a sickly appearance. No distress.   HENT:   Head: Normocephalic and atraumatic.   Nose: Nose normal.   Mouth/Throat: Oropharynx is clear and moist.   Eyes: Conjunctivae are normal. No scleral icterus.   Neck: Neck supple. No JVD present.   Cardiovascular: Normal rate, regular rhythm, normal heart sounds and intact distal pulses.   No murmur heard.  Pulmonary/Chest: Effort normal and  breath sounds normal. No respiratory distress.   Abdominal: Soft. Bowel sounds are normal. She exhibits no mass. There is no tenderness.   Musculoskeletal: Normal range of motion. She exhibits no edema.   Neurological: She is alert and oriented to person, place, and time. Coordination normal.   Skin: Skin is warm and dry.   Psychiatric: She has a normal mood and affect.       Results Review:  I have reviewed the labs, radiology results, and diagnostic studies since my last progress note and made treatment changes reflective of the results.   I have reviewed the current medications.    Assessment/Plan     Active Hospital Problems    Diagnosis   • **Failure to thrive in adult   • Chronic diarrhea   • Hypoalbuminemia   • Pulmonary hypertension, moderate to severe (CMS/HCC)   • Hypothyroidism (acquired)   • Protein-calorie malnutrition (CMS/HCC)   • Chronic combined systolic and diastolic CHF (congestive heart failure) (CMS/HCC)   • ESRD (end stage renal disease) (CMS/HCC)   • Anemia due to chronic kidney disease, on chronic dialysis (CMS/HCC)   • Paroxysmal atrial fibrillation (CMS/HCC)   • Chronic kidney disease on chronic dialysis (CMS/HCC)       PLAN:  Vascular surgery consultation  Continue the bulk of home medications  Discontinue Minh Ramos DO   04/15/20   14:54

## 2020-04-15 NOTE — CONSULTS
Nephrology ([facility]) Consult Note      Patient:  Cheri Ely  YOB: 1940  Date of Service: 4/15/2020  MRN: [id number]   Acct: [id number]   Primary Care Physician: Manny Peters, ABILIO  Advance Directive:   Code Status and Medical Interventions:   Ordered at: 04/14/20 2240     Level Of Support Discussed With:    Patient     Code Status:    CPR     Medical Interventions (Level of Support Prior to Arrest):    Full     Admit Date: 4/14/2020       Hospital Day: 1  Referring Provider: Azalia Shaver DO      Patient personally seen and examined.  Complete chart including Consults, Notes, Operative Reports, Labs, Cardiology, and Radiology studies reviewed as able.        Subjective:  Cheri Ely is a 80 y.o. female  whom we were consulted for end stage renal disease.  Routine hemodialysis Monday Wednesday Friday at [facility].  missed dialysis on Monday 4/13 due to not feeling well.  Extensive history of GI issues/chronic diarrhea.  Presented to [facility] facility with several days of severe diarrhea; was given 1 liter LR at [facility] then transferred to [facility].  Currently has complaint of abdominal pain, weakness (with several recent falls), diarrhea. Patient had CT angiogram done earlier this morning.    Allergies:  Patient has no known allergies.    Home Meds:  Medications Prior to Admission   Medication Sig Dispense Refill Last Dose   • allopurinol (ZYLOPRIM) 100 MG tablet Take 100 mg by mouth Daily.   4/14/2020 at Unknown time   • B Complex-C-Folic Acid (JOHN-SANGEETA) tablet Take 1 tablet by mouth Daily.   4/14/2020 at Unknown time   • carvedilol (COREG) 12.5 MG tablet Take 12.5 mg by mouth 2 (Two) Times a Day With Meals. TAKE ONE TABLET TWICE A DAY ON NONDIALYSIS DAYS, AND 1 TABLET DAILY ON DIALYSIS DAY (MON, WED, FRI)   4/14/2020 at Unknown time   • cyproheptadine (PERIACTIN) 4 MG tablet Take 4 mg by mouth 3 (Three) Times a Day As Needed for  Allergies.   4/14/2020 at Unknown time   • levothyroxine (SYNTHROID, LEVOTHROID) 125 MCG tablet Take 1 tablet by mouth Daily.   4/14/2020 at Unknown time   • pantoprazole (PROTONIX) 40 MG EC tablet Take 1 tablet by mouth Daily.   4/14/2020 at Unknown time   • pravastatin (PRAVACHOL) 40 MG tablet Take 40 mg by mouth Daily.   4/14/2020 at Unknown time   • sertraline (ZOLOFT) 50 MG tablet Take 50 mg by mouth Every Night.   4/14/2020 at Unknown time   • sucralfate (CARAFATE) 1 g tablet Take 1 g by mouth Daily.   4/14/2020 at Unknown time   • acetaminophen (TYLENOL) 325 MG tablet Take 2 tablets by mouth Every 6 (Six) Hours As Needed for Mild Pain .   Unknown at Unknown time   • Cholecalciferol (VITAMIN D) 2000 units capsule Take 1 capsule by mouth Daily. 30 capsule  Taking   • diphenoxylate-atropine (LOMOTIL) 2.5-0.025 MG/5ML liquid Take 5 mL by mouth 4 (Four) Times a Day As Needed for Diarrhea.   Taking   • lidocaine 3 % cream cream Apply  topically Daily As Needed (prior to dialysis access).   Taking   • Multiple Vitamins-Minerals (MULTIVITAMIN ADULT PO) Take 1 tablet by mouth Daily.   Taking   • ondansetron ODT (ZOFRAN-ODT) 4 MG disintegrating tablet Place 1 tablet on the tongue Every 8 (Eight) Hours As Needed for Nausea. 12 tablet 0    • oxyCODONE-acetaminophen (PERCOCET)  MG per tablet Take 1 tablet by mouth Every 6 (Six) Hours As Needed for Moderate Pain .   Unknown at Unknown time       Medicines:  Current Facility-Administered Medications   Medication Dose Route Frequency Provider Last Rate Last Dose   • acetaminophen (TYLENOL) tablet 650 mg  650 mg Oral Q4H PRN Shakira Bassett APRN        Or   • acetaminophen (TYLENOL) 160 MG/5ML solution 650 mg  650 mg Oral Q4H PRN Shakira Bassett APRN        Or   • acetaminophen (TYLENOL) suppository 650 mg  650 mg Rectal Q4H PRN Shakira Bassett APRN       • allopurinol (ZYLOPRIM) tablet 100 mg  100 mg Oral Daily Shakira Bassett APRN   100 mg at 04/15/20 0826    • diphenoxylate-atropine (LOMOTIL) 2.5-0.025 MG per tablet 1 tablet  1 tablet Oral Q2H PRN Shakira Bassett APRN       • [COMPLETED] iopamidol (ISOVUE-370) 76 % injection 125 mL  125 mL Intravenous Once in imaging Edin Ramos    125 mL at 04/15/20 1045   • levothyroxine (SYNTHROID, LEVOTHROID) tablet 150 mcg  150 mcg Oral Daily Shakira Bassett APRN   150 mcg at 04/15/20 0541   • lidocaine 3 % cream   Apply externally Daily PRN Shakira Bassett APRN       • ondansetron (ZOFRAN) tablet 4 mg  4 mg Oral Q6H PRN Shakira Bassett APRN        Or   • ondansetron (ZOFRAN) injection 4 mg  4 mg Intravenous Q6H PRN Shakira Bassett, ABILIO       • oxyCODONE-acetaminophen (PERCOCET)  MG per tablet 1 tablet  1 tablet Oral Q6H PRN Shakira Bassett APRN       • pantoprazole (PROTONIX) EC tablet 40 mg  40 mg Oral Daily Shakira Bassett APRN   40 mg at 04/15/20 0826   • sertraline (ZOLOFT) tablet 50 mg  50 mg Oral Nightly Shakira Bassett APRN       • sodium chloride 0.9 % flush 10 mL  10 mL Intravenous Q12H Shakira Bassett APRN       • sodium chloride 0.9 % flush 10 mL  10 mL Intravenous PRN Shakira Bassett, ABILIO       • sucralfate (CARAFATE) 1 GM/10ML suspension 1 g  1 g Oral 4x Daily AC & at Bedtime Shakira Bassett APRN   1 g at 04/15/20 0826       Past Medical History:  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   • Carotid artery disease (CMS/HCC)    • Chronic kidney disease on chronic dialysis (CMS/HCC)    • Chronic mesenteric ischemia (CMS/HCC) 12/14/2017   • Closed displaced intertrochanteric fracture of right femur (CMS/HCC) 2/8/2019    S/p Cephalomedullary nailing 2/8   • COPD (chronic obstructive pulmonary disease) (CMS/HCC)    • Coronary artery disease    • Diverticulitis    • Diverticulosis    • Gastric ulcer    •  Gout    • History of transfusion    • Hyperlipidemia    • Hypertension    • Hypothyroidism    • Injury of back    • Mesenteric artery insufficiency (CMS/HCC)    • Multilevel degenerative disc disease    • Osteoporosis    • Pancreatitis    • Pelvis fracture (CMS/HCC)    • Pneumonia    • PVD (peripheral vascular disease) (CMS/HCC)    • RA (rheumatoid arthritis) (CMS/HCC)    • Sleep apnea        Past Surgical History:  Past Surgical History:   Procedure Laterality Date   • AORTAGRAM Right 1/8/2018    Procedure: MESENTERIC ANGIOGRAM, GROIN ACCESS, MYNX CLOSURE;  Surgeon: Tomasz San DO;  Location: [facility] OR;  Service:    • AORTIC VALVE SURGERY     • APPENDECTOMY     • BACK SURGERY     • CAPSULE ENDOSCOPY N/A 3/30/2019    Dr. Yu-Angiodysplasia; Limit anticoagulation as much as is reasonable; Gastric passage time: 0h21m; Small bowel passage time: 2h2m   • CARDIAC SURGERY     • CAROTID ENDARTERECTOMY Bilateral    • CATARACT EXTRACTION, BILATERAL     • CERVICAL SPINE SURGERY     • CHOLECYSTECTOMY     • COLON RESECTION     • COLONOSCOPY  10/01/2015    Diverticulosis in the sigmoid colon; The examined portion of the ileum was normal; The examination was otherwise normal on direct and retroflexion views; No specimens collected; No plans to repeat colonoscopy due to multiple comorbidities   • COLONOSCOPY N/A 3/28/2019    Dr. Merida-The examined portion of the ileum was normal; One 6mm tubular adenomatous polyp at the hepatic flexure; Diverticulosis in the sigmoid colon   • CORONARY ARTERY BYPASS GRAFT     • DIALYSIS FISTULA CREATION     • ENDOSCOPY N/A 12/27/2018    LA Grade B reflux esophagitis; Non-bleeding gastric ulcers with no stigmata of bleeding-biopsied; Erosive gastritis; Normal examined duodenum; Repeat 2 months   • ENDOSCOPY N/A 2/28/2019    LA Grade B esophagitis-biopsied-clip (MR conditional) was placed; An adjustable gastric banding was found, characterized by healthy appearing mucosa; Normal stomach;  Normal examined duodenum; Repeat 2 months   • ENDOSCOPY N/A 3/11/2019    An endoclip was found in the esophagus; Normal stomach; Normal examined duodenum; No specimens collected; Repeat endo 2-3 months   • ENDOSCOPY N/A 3/27/2019    Dr. Merida-Normal examined jejunum; Normal examined duodenum; An adjustable gastric banding was found; Normal esophagus; No specimens collected   • ENDOSCOPY  10/01/2015    Normal esophagus; Bleeding erosive gastropathy-biopsied; Thickening of the gastric mucosa in the cardia-biopsied; Normal examined duodenum   • EXPLORATORY LAPAROTOMY N/A 5/13/2019    Procedure: LAPAROTOMY EXPLORATORY, OVERSEW DUODENAL ULCER WITH FRED PATCH, KOCHER MANEUVER;  Surgeon: Laila Rodriguez MD;  Location: [facility] OR;  Service: General   • HIP TROCHANTERIC NAILING WITH INTRAMEDULLARY HIP SCREW Right 2/8/2019    Procedure: HIP TROCANTERIC NAILING LONG WITH INTRAMEDULLARY HIP SCREW;  Surgeon: Boo Camacho MD;  Location:  [facility] OR;  Service: Orthopedics   • LAPAROSCOPIC GASTRIC BANDING     • LEEP     • LUMBAR FUSION     • REPLACEMENT TOTAL KNEE      Bilateral   • TOE AMPUTATION Left     2nd toe   • TOTAL KNEE ARTHROPLASTY Bilateral    • TUBAL ABDOMINAL LIGATION         Family History  Family History   Problem Relation Age of Onset   • Hypertension Mother    • Cancer Mother         stomach   • Coronary artery disease Father    • Heart attack Father    • Diabetes Brother    • Coronary artery disease Brother    • Colon cancer Neg Hx    • Colon polyps Neg Hx        Social History  Social History     Socioeconomic History   • Marital status:      Spouse name: Not on file   • Number of children: Not on file   • Years of education: Not on file   • Highest education level: Not on file   Tobacco Use   • Smoking status: Never Smoker   • Smokeless tobacco: Never Used   Substance and Sexual Activity   • Alcohol use: No   • Drug use: No   • Sexual activity: Defer         Review of Systems:  History obtained  "from chart review and the patient  General ROS: Patient complains of fatigue and No fever or chills  Respiratory ROS: No cough, shortness of breath, wheezing  Cardiovascular ROS: No chest pain or palpitations  Gastrointestinal ROS: positive for - abdominal pain and diarrhea  Genito-Urinary ROS: No dysuria or hematuria  Neurological ROS: no headache or dizziness  14 point ROS reviewed with the patient and negative except as noted above and in the HPI unless unable to obtain.    Objective:  Patient Vitals for the past 24 hrs:   BP Temp Temp src Pulse Resp SpO2 Height Weight   04/15/20 0754 93/40 97.7 °F (36.5 °C) Oral 65 16 93 % -- --   04/15/20 0641 -- -- -- -- -- -- -- 58 kg (127 lb 13.9 oz)   04/15/20 0352 99/48 97.4 °F (36.3 °C) Oral 102 18 100 % -- --   04/14/20 2304 (!) 87/50 97.7 °F (36.5 °C) Oral 118 18 98 % 149.9 cm (59\") 56.2 kg (123 lb 14.4 oz)   04/14/20 2135 -- -- -- -- -- 100 % -- --   04/14/20 2129 95/55 97.7 °F (36.5 °C) Oral 97 18 -- 149.9 cm (59\") 52.2 kg (115 lb)     No intake or output data in the 24 hours ending 04/15/20 1030  General: awake/alert    Neck: supple, no JVD  Chest:  clear to auscultation bilaterally without respiratory distress  CVS: regular rate and rhythm  Abdominal: mild tenderness, +BS  Extremities: trace lower extremity edema  Skin: multiple skin tears/bruises in various stages of healing and warm and dry without rash   Neuro: no focal motor deficits      Labs:  Results from last 7 days   Lab Units 04/15/20  0404   WBC 10*3/mm3 10.44   HEMOGLOBIN g/dL 8.5*   HEMATOCRIT % 25.6*   PLATELETS 10*3/mm3 216         Results from last 7 days   Lab Units 04/15/20  0404   SODIUM mmol/L 138   POTASSIUM mmol/L 3.0*   CHLORIDE mmol/L 101   CO2 mmol/L 18.0*   BUN mg/dL 53*   CREATININE mg/dL 6.84*   CALCIUM mg/dL 7.4*   BILIRUBIN mg/dL 0.3   ALK PHOS U/L 176*   ALT (SGPT) U/L 14   AST (SGOT) U/L 14   GLUCOSE mg/dL 75       Radiology:   Imaging Results (Last 72 Hours)     Procedure Component " Value Units Date/Time    CT Angiogram Abdomen Pelvis [[id number]] Resulted:  04/15/20 0954     Updated:  04/15/20 1003          Culture:  No results found for: BLOODCX, URINECX, WOUNDCX, MRSACX, RESPCX, STOOLCX      Assessment   1.  End stage renal disease on hemodialysis MWF  2.  Hypokalemia  3.  Metabolic acidosis  4.  Chronic diarrhea  5.  Hypothyroidism   6.  Anemia of CKD    Plan:  1.  Dialysis today  2.  Monitor labs  3.  Further assessment and plan pending Dr Sevilla's evaluation of patient      Thank you for the consult, we appreciate the opportunity to provide care to your patients.  Feel free to contact me if I can be of any further assistance.      Dirk Tristan, APRN  4/15/2020  10:30

## 2020-04-15 NOTE — CONSULTS
[facility]  Inpatient Consult Note      Referring Provider: Azalia Shaver DO  Primary Physician: Manny Peters, ABILIO     Date of Admission: 4/14/2020  Date of Service:  04/15/20    Chief Complaint   Patient presents with   • GABRIEL DIRECT ADMISSION       Subjective     Cheri Ely is a 80 y.o. female we are asked to see for diarrhea.  Patient with multiple medical problems.  Typically followed by Dr. Garza in our practice.  Actually saw her nurse practitioner in the office back in mid March.  Complained of some diarrhea which is been worked up.  This diarrhea is chronic in nature.  Also with some complaints of abdominal pain in the mid abdomen.  She has been seen and evaluated by vascular surgery in the past.  CT angios has been completed on this admission.  Significant disease noted in the celiac and SMA.  Also with significant narrowing in the mid portions of both renal arteries.    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   • Carotid artery disease (CMS/HCC)    • Chronic kidney disease on chronic dialysis (CMS/HCC)    • Chronic mesenteric ischemia (CMS/HCC) 12/14/2017   • Closed displaced intertrochanteric fracture of right femur (CMS/HCC) 2/8/2019    S/p Cephalomedullary nailing 2/8   • COPD (chronic obstructive pulmonary disease) (CMS/HCC)    • Coronary artery disease    • Diverticulitis    • Diverticulosis    • Gastric ulcer    • Gout    • History of transfusion    • Hyperlipidemia    • Hypertension    • Hypothyroidism    • Injury of back    • Mesenteric artery insufficiency (CMS/HCC)    • Multilevel degenerative disc disease    • Osteoporosis    • Pancreatitis    • Pelvis fracture (CMS/HCC)    • Pneumonia    • PVD (peripheral vascular disease) (CMS/HCC)    • RA (rheumatoid arthritis) (CMS/HCC)    • Sleep apnea         Past Surgical History:   Procedure Laterality Date   • AORTAGRAM Right 1/8/2018    Procedure: MESENTERIC ANGIOGRAM, GROIN ACCESS, MYNX CLOSURE;  Surgeon: Tomasz San DO;  Location: [facility] OR;  Service:    • AORTIC VALVE SURGERY     • APPENDECTOMY     • BACK SURGERY     • CAPSULE ENDOSCOPY N/A 3/30/2019    Dr. Yu-Angiodysplasia; Limit anticoagulation as much as is reasonable; Gastric passage time: 0h21m; Small bowel passage time: 2h2m   • CARDIAC SURGERY     • CAROTID ENDARTERECTOMY Bilateral    • CATARACT EXTRACTION, BILATERAL     • CERVICAL SPINE SURGERY     • CHOLECYSTECTOMY     • COLON RESECTION     • COLONOSCOPY  10/01/2015    Diverticulosis in the sigmoid colon; The examined portion of the ileum was normal; The examination was otherwise normal on direct and retroflexion views; No specimens collected; No plans to repeat colonoscopy due to multiple comorbidities   • COLONOSCOPY N/A 3/28/2019    Dr. Merida-The examined portion of the ileum was normal; One 6mm tubular adenomatous polyp at the hepatic flexure; Diverticulosis in the sigmoid colon   • CORONARY ARTERY BYPASS GRAFT     • DIALYSIS FISTULA CREATION     • ENDOSCOPY N/A 12/27/2018    LA Grade B reflux esophagitis; Non-bleeding gastric ulcers with no stigmata of bleeding-biopsied; Erosive gastritis; Normal examined duodenum; Repeat 2 months   • ENDOSCOPY N/A 2/28/2019    LA Grade B esophagitis-biopsied-clip (MR conditional) was placed; An adjustable gastric banding was found, characterized by healthy appearing mucosa; Normal stomach; Normal examined duodenum; Repeat 2 months   • ENDOSCOPY N/A 3/11/2019    An endoclip was found in the esophagus; Normal stomach; Normal examined duodenum; No specimens collected; Repeat endo 2-3 months   • ENDOSCOPY N/A 3/27/2019    Dr. Merida-Normal examined jejunum; Normal examined duodenum; An adjustable gastric banding was found; Normal esophagus; No specimens collected   • ENDOSCOPY  10/01/2015    Normal  esophagus; Bleeding erosive gastropathy-biopsied; Thickening of the gastric mucosa in the cardia-biopsied; Normal examined duodenum   • EXPLORATORY LAPAROTOMY N/A 5/13/2019    Procedure: LAPAROTOMY EXPLORATORY, OVERSEW DUODENAL ULCER WITH FRED PATCH, KOCHER MANEUVER;  Surgeon: Laila Rodriguez MD;  Location: [facility] OR;  Service: General   • HIP TROCHANTERIC NAILING WITH INTRAMEDULLARY HIP SCREW Right 2/8/2019    Procedure: HIP TROCANTERIC NAILING LONG WITH INTRAMEDULLARY HIP SCREW;  Surgeon: Boo Camacho MD;  Location: [facility] OR;  Service: Orthopedics   • LAPAROSCOPIC GASTRIC BANDING     • LEEP     • LUMBAR FUSION     • REPLACEMENT TOTAL KNEE      Bilateral   • TOE AMPUTATION Left     2nd toe   • TOTAL KNEE ARTHROPLASTY Bilateral    • TUBAL ABDOMINAL LIGATION            Current Facility-Administered Medications:   •  acetaminophen (TYLENOL) tablet 650 mg, 650 mg, Oral, Q4H PRN **OR** acetaminophen (TYLENOL) 160 MG/5ML solution 650 mg, 650 mg, Oral, Q4H PRN **OR** acetaminophen (TYLENOL) suppository 650 mg, 650 mg, Rectal, Q4H PRN, Shakira Bassett, APRN  •  allopurinol (ZYLOPRIM) tablet 100 mg, 100 mg, Oral, Daily, Shakira Bassett, APRN, 100 mg at 04/15/20 0826  •  diphenoxylate-atropine (LOMOTIL) 2.5-0.025 MG per tablet 1 tablet, 1 tablet, Oral, Q2H PRN, Shakira Bassett, APRN  •  levothyroxine (SYNTHROID, LEVOTHROID) tablet 150 mcg, 150 mcg, Oral, Daily, Shakira Bassett, APRN, 150 mcg at 04/15/20 0541  •  lidocaine 3 % cream, , Apply externally, Daily PRN, Shakira Bassett, APRN  •  ondansetron (ZOFRAN) tablet 4 mg, 4 mg, Oral, Q6H PRN **OR** ondansetron (ZOFRAN) injection 4 mg, 4 mg, Intravenous, Q6H PRN, Shakira Bassett, APRN  •  oxyCODONE-acetaminophen (PERCOCET)  MG per tablet 1 tablet, 1 tablet, Oral, Q6H PRN, Shakira Bassett, APRN  •  pantoprazole (PROTONIX) EC tablet 40 mg, 40 mg, Oral, Daily, Shakira Bassett, APRPEPITO, 40 mg at 04/15/20 0826  •  sertraline (ZOLOFT) tablet 50 mg, 50  "mg, Oral, Nightly, Shakira Bassett APRN  •  sodium chloride 0.9 % flush 10 mL, 10 mL, Intravenous, Q12H, Shakira Bassett APRN  •  sodium chloride 0.9 % flush 10 mL, 10 mL, Intravenous, PRN, Shakira Bassett APRN  •  sucralfate (CARAFATE) 1 GM/10ML suspension 1 g, 1 g, Oral, 4x Daily AC & at Bedtime, Shakira Bassett APRN, 1 g at 04/15/20 1126    No Known Allergies    Social History     Socioeconomic History   • Marital status:      Spouse name: Not on file   • Number of children: Not on file   • Years of education: Not on file   • Highest education level: Not on file   Tobacco Use   • Smoking status: Never Smoker   • Smokeless tobacco: Never Used   Substance and Sexual Activity   • Alcohol use: No   • Drug use: No   • Sexual activity: Defer       Family History   Problem Relation Age of Onset   • Hypertension Mother    • Cancer Mother         stomach   • Coronary artery disease Father    • Heart attack Father    • Diabetes Brother    • Coronary artery disease Brother    • Colon cancer Neg Hx    • Colon polyps Neg Hx        Review of Systems   Respiratory: Negative.    Cardiovascular: Negative.    Gastrointestinal: Positive for abdominal pain and diarrhea. Negative for blood in stool.       Objective     BP 98/42 (BP Location: Right arm, Patient Position: Lying)   Pulse 79   Temp 98.5 °F (36.9 °C) (Oral)   Resp 16   Ht 149.9 cm (59\")   Wt 58 kg (127 lb 13.9 oz)   SpO2 92%   BMI 25.83 kg/m²     Physical Exam   Cardiovascular: Normal rate.   Pulmonary/Chest: Effort normal.   Abdominal: Soft.   Neurological: She is alert.   Skin: Skin is warm.       Lab Results   Component Value Date    WBC 10.44 04/15/2020    HGB 8.5 (L) 04/15/2020    HCT 25.6 (L) 04/15/2020     04/15/2020        Lab Results   Component Value Date     04/15/2020    K 3.0 (L) 04/15/2020     04/15/2020    CO2 18.0 (L) 04/15/2020    CO2 26.0 03/25/2020    CO2 24.9 03/24/2020    BUN 53 (H) 04/15/2020    CREATININE " 6.84 (H) 04/15/2020    BILITOT 0.3 04/15/2020    ALKPHOS 176 (H) 04/15/2020    ALT 14 04/15/2020    AST 14 04/15/2020    GLUCOSE 75 04/15/2020       Lab Results   Component Value Date    INR 1.21 (H) 03/25/2020       IMPRESSION/PLAN:     Patient Active Problem List   Diagnosis   • Chronic kidney disease on chronic dialysis (CMS/[facility])   • Paroxysmal atrial fibrillation (CMS/[facility])   • ESRD (end stage renal disease) (CMS/[facility])   • Anemia due to chronic kidney disease, on chronic dialysis (CMS/HCC)   • Chronic combined systolic and diastolic CHF (congestive heart failure) (CMS/[facility])   • Chronic diarrhea   • Failure to thrive in adult   • Hypoalbuminemia   • Pulmonary hypertension, moderate to severe (CMS/HCC)   • Hypothyroidism (acquired)   • Protein-calorie malnutrition (CMS/[facility])         Abdominal pain, weight loss, and diarrhea was the significantly abnormal CT mesenteric angiogram.  Needs vascular surgery to see and evaluate for possible options.  Apparently has failed arteriogram in the past.  I see no role for endoscopy or colonoscopy in the setting.  As far as diarrhea I would stop all offending medications.  Certainly Zoloft could contribute to this.  Other meds should be reviewed as well.    EMR Dragon/transcription disclaimer: Much of this encounter note is an electronic transcription/translation of spoken language to printed text.  The electronic translation of spoken language may permit erroneous, or at times, nonsensical words or phrases to be inadvertently transcribed.  Although I have reviewed the note for such errors, some may still exist.      Rafita Merida MD  04/15/20  13:12

## 2020-04-15 NOTE — PLAN OF CARE
Problem: Patient Care Overview  Goal: Plan of Care Review  Outcome: Ongoing (interventions implemented as appropriate)  Flowsheets  Taken 4/15/2020 1636 by Isabell Mendieta RN  Progress: no change  Outcome Summary: VSS. No c/o pain. Bedbound, turn q2hr. Edema +2-3 generalized. AFB  on tele. Minimal appetite this shift. Daughter called to say pt's biggest issue was diarrhea/feeling too unwell to attend dialysis. St 2 to coccyx, DTI to gluteal folds, skin tear to right hand from home. Thrill felt to left fistula. Continue to monitor.  Taken 4/15/2020 1409 by Aliyah Bill  Plan of Care Reviewed With: patient  Goal: Individualization and Mutuality  Outcome: Ongoing (interventions implemented as appropriate)  Flowsheets (Taken 4/15/2020 1636)  Patient Specific Interventions: dialysis  Goal: Discharge Needs Assessment  Outcome: Ongoing (interventions implemented as appropriate)  Goal: Interprofessional Rounds/Family Conf  Outcome: Ongoing (interventions implemented as appropriate)     Problem: Fall Risk (Adult)  Goal: Identify Related Risk Factors and Signs and Symptoms  Outcome: Ongoing (interventions implemented as appropriate)  Flowsheets  Taken 4/15/2020 0649 by Alyssa Monterroso RN  Related Risk Factors (Fall Risk): gait/mobility problems;history of falls;slippery/uneven surfaces;environment unfamiliar  Taken 4/15/2020 1636 by Isabell Mendieta RN  Signs and Symptoms (Fall Risk): presence of risk factors  Goal: Absence of Fall  Outcome: Ongoing (interventions implemented as appropriate)  Flowsheets (Taken 4/15/2020 1636)  Absence of Fall: achieves outcome     Problem: Skin Injury Risk (Adult)  Goal: Identify Related Risk Factors and Signs and Symptoms  Outcome: Ongoing (interventions implemented as appropriate)  Flowsheets (Taken 4/15/2020 0649 by Alyssa Monterroso RN)  Related Risk Factors (Skin Injury Risk): advanced age;edema;mobility impaired;moisture  Goal: Skin Health and Integrity  Outcome:  Ongoing (interventions implemented as appropriate)  Flowsheets (Taken 4/15/2020 1636)  Skin Health and Integrity: making progress toward outcome     Problem: Nutrition, Imbalanced: Inadequate Oral Intake (Adult)  Goal: Improved Oral Intake  Outcome: Ongoing (interventions implemented as appropriate)  Flowsheets (Taken 4/15/2020 1636)  Improved Oral Intake: making progress toward outcome  Goal: Prevent Further Weight Loss  Outcome: Ongoing (interventions implemented as appropriate)  Flowsheets (Taken 4/15/2020 1636)  Prevent Further Weight Loss: unable to achieve outcome

## 2020-04-16 PROBLEM — K55.069 OCCLUSION OF SUPERIOR MESENTERIC ARTERY (HCC): Status: ACTIVE | Noted: 2020-04-16

## 2020-04-16 PROBLEM — I77.1 CELIAC ARTERY STENOSIS (HCC): Status: ACTIVE | Noted: 2020-04-16

## 2020-04-16 LAB
25(OH)D3 SERPL-MCNC: 81.3 NG/ML (ref 30–100)
ANION GAP SERPL CALCULATED.3IONS-SCNC: 14 MMOL/L (ref 5–15)
BUN BLD-MCNC: 36 MG/DL (ref 8–23)
BUN/CREAT SERPL: 7 (ref 7–25)
CALCIUM SPEC-SCNC: 6.9 MG/DL (ref 8.6–10.5)
CHLORIDE SERPL-SCNC: 97 MMOL/L (ref 98–107)
CO2 SERPL-SCNC: 22 MMOL/L (ref 22–29)
CREAT BLD-MCNC: 5.17 MG/DL (ref 0.57–1)
GFR SERPL CREATININE-BSD FRML MDRD: 8 ML/MIN/1.73
GFR SERPL CREATININE-BSD FRML MDRD: ABNORMAL ML/MIN/{1.73_M2}
GLUCOSE BLD-MCNC: 83 MG/DL (ref 65–99)
MAGNESIUM SERPL-MCNC: 1.2 MG/DL (ref 1.6–2.4)
PHOSPHATE SERPL-MCNC: 4 MG/DL (ref 2.5–4.5)
POTASSIUM BLD-SCNC: 2.7 MMOL/L (ref 3.5–5.2)
PTH-INTACT SERPL-MCNC: 54 PG/ML (ref 15–65)
SODIUM BLD-SCNC: 133 MMOL/L (ref 136–145)
URATE SERPL-MCNC: 4.2 MG/DL (ref 2.4–5.7)

## 2020-04-16 PROCEDURE — 97530 THERAPEUTIC ACTIVITIES: CPT

## 2020-04-16 PROCEDURE — 99221 1ST HOSP IP/OBS SF/LOW 40: CPT | Performed by: NURSE PRACTITIONER

## 2020-04-16 PROCEDURE — 84100 ASSAY OF PHOSPHORUS: CPT | Performed by: INTERNAL MEDICINE

## 2020-04-16 PROCEDURE — 25010000002 MAGNESIUM SULFATE IN D5W 1G/100ML (PREMIX) 1-5 GM/100ML-% SOLUTION: Performed by: NURSE PRACTITIONER

## 2020-04-16 PROCEDURE — 82306 VITAMIN D 25 HYDROXY: CPT | Performed by: NURSE PRACTITIONER

## 2020-04-16 PROCEDURE — 99232 SBSQ HOSP IP/OBS MODERATE 35: CPT | Performed by: INTERNAL MEDICINE

## 2020-04-16 PROCEDURE — 80048 BASIC METABOLIC PNL TOTAL CA: CPT | Performed by: INTERNAL MEDICINE

## 2020-04-16 PROCEDURE — 84550 ASSAY OF BLOOD/URIC ACID: CPT | Performed by: NURSE PRACTITIONER

## 2020-04-16 PROCEDURE — 83970 ASSAY OF PARATHORMONE: CPT | Performed by: NURSE PRACTITIONER

## 2020-04-16 PROCEDURE — 97110 THERAPEUTIC EXERCISES: CPT

## 2020-04-16 PROCEDURE — 83735 ASSAY OF MAGNESIUM: CPT | Performed by: NURSE PRACTITIONER

## 2020-04-16 PROCEDURE — 25010000002 POTASSIUM CHLORIDE PER 2 MEQ: Performed by: INTERNAL MEDICINE

## 2020-04-16 RX ORDER — MAGNESIUM SULFATE 1 G/100ML
1 INJECTION INTRAVENOUS ONCE
Status: COMPLETED | OUTPATIENT
Start: 2020-04-16 | End: 2020-04-16

## 2020-04-16 RX ORDER — POTASSIUM CHLORIDE 14.9 MG/ML
20 INJECTION INTRAVENOUS ONCE
Status: COMPLETED | OUTPATIENT
Start: 2020-04-16 | End: 2020-04-16

## 2020-04-16 RX ORDER — POTASSIUM CHLORIDE 750 MG/1
20 CAPSULE, EXTENDED RELEASE ORAL ONCE
Status: COMPLETED | OUTPATIENT
Start: 2020-04-16 | End: 2020-04-16

## 2020-04-16 RX ADMIN — LEVOTHYROXINE SODIUM 150 MCG: 150 TABLET ORAL at 05:57

## 2020-04-16 RX ADMIN — OXYCODONE HYDROCHLORIDE AND ACETAMINOPHEN 1 TABLET: 10; 325 TABLET ORAL at 09:50

## 2020-04-16 RX ADMIN — MAGNESIUM SULFATE 1 G: 1 INJECTION INTRAVENOUS at 12:12

## 2020-04-16 RX ADMIN — POTASSIUM CHLORIDE 20 MEQ: 750 CAPSULE, EXTENDED RELEASE ORAL at 12:11

## 2020-04-16 RX ADMIN — SUCRALFATE 1 G: 1 SUSPENSION ORAL at 18:46

## 2020-04-16 RX ADMIN — SUCRALFATE 1 G: 1 SUSPENSION ORAL at 12:11

## 2020-04-16 RX ADMIN — SUCRALFATE 1 G: 1 SUSPENSION ORAL at 20:03

## 2020-04-16 RX ADMIN — SODIUM CHLORIDE, PRESERVATIVE FREE 10 ML: 5 INJECTION INTRAVENOUS at 20:03

## 2020-04-16 RX ADMIN — ALLOPURINOL 100 MG: 100 TABLET ORAL at 12:11

## 2020-04-16 RX ADMIN — PANTOPRAZOLE SODIUM 40 MG: 40 TABLET, DELAYED RELEASE ORAL at 09:51

## 2020-04-16 RX ADMIN — DIPHENOXYLATE HYDROCHLORIDE AND ATROPINE SULFATE 1 TABLET: 2.5; .025 TABLET ORAL at 09:50

## 2020-04-16 RX ADMIN — SODIUM CHLORIDE, PRESERVATIVE FREE 10 ML: 5 INJECTION INTRAVENOUS at 12:12

## 2020-04-16 RX ADMIN — POTASSIUM CHLORIDE 20 MEQ: 14.9 INJECTION, SOLUTION INTRAVENOUS at 14:40

## 2020-04-16 NOTE — PROGRESS NOTES
Nephrology ([facility]) Progress Note      Patient:  Cheri Ely  YOB: 1940  Date of Service: 4/16/2020  MRN: [id number]   Acct: [id number]   Primary Care Physician: Manny Peters, ABILIO  Advance Directive:   Code Status and Medical Interventions:   Ordered at: 04/14/20 2240     Level Of Support Discussed With:    Patient     Code Status:    CPR     Medical Interventions (Level of Support Prior to Arrest):    Full     Admit Date: 4/14/2020       Hospital Day: 2  Referring Provider: Azalia Shaver DO      Patient personally seen and examined.  Complete chart including Consults, Notes, Operative Reports, Labs, Cardiology, and Radiology studies reviewed as able.        Subjective:  Cheri Ely is a 80 y.o. female  whom we were consulted for end stage renal disease.  Routine hemodialysis Monday Wednesday Friday at [facility].  missed dialysis on Monday 4/13 due to not feeling well.  Extensive history of GI issues/chronic diarrhea.  Presented to [facility] facility with several days of severe diarrhea; was given IV fluids at [facility] then transferred to [facility]. Had CT angiogram of abdomen done on 4/15. Tolerated HD well on 4/15    Today feeling a bit better, no new overnight issues.    Allergies:  Patient has no known allergies.    Home Meds:  Medications Prior to Admission   Medication Sig Dispense Refill Last Dose   • allopurinol (ZYLOPRIM) 100 MG tablet Take 100 mg by mouth Daily.   4/14/2020 at Unknown time   • B Complex-C-Folic Acid (JOHN-SANGEETA) tablet Take 1 tablet by mouth Daily.   4/14/2020 at Unknown time   • carvedilol (COREG) 12.5 MG tablet Take 12.5 mg by mouth 2 (Two) Times a Day With Meals. TAKE ONE TABLET TWICE A DAY ON NONDIALYSIS DAYS, AND 1 TABLET DAILY ON DIALYSIS DAY (MON, WED, FRI)   4/14/2020 at Unknown time   • cyproheptadine (PERIACTIN) 4 MG tablet Take 4 mg by mouth 3 (Three) Times a Day As Needed for Allergies.   4/14/2020  at Unknown time   • levothyroxine (SYNTHROID, LEVOTHROID) 125 MCG tablet Take 1 tablet by mouth Daily.   4/14/2020 at Unknown time   • pantoprazole (PROTONIX) 40 MG EC tablet Take 1 tablet by mouth Daily.   4/14/2020 at Unknown time   • pravastatin (PRAVACHOL) 40 MG tablet Take 40 mg by mouth Daily.   4/14/2020 at Unknown time   • sertraline (ZOLOFT) 50 MG tablet Take 50 mg by mouth Every Night.   4/14/2020 at Unknown time   • sucralfate (CARAFATE) 1 g tablet Take 1 g by mouth Daily.   4/14/2020 at Unknown time   • acetaminophen (TYLENOL) 325 MG tablet Take 2 tablets by mouth Every 6 (Six) Hours As Needed for Mild Pain .   Unknown at Unknown time   • Cholecalciferol (VITAMIN D) 2000 units capsule Take 1 capsule by mouth Daily. 30 capsule  Taking   • diphenoxylate-atropine (LOMOTIL) 2.5-0.025 MG/5ML liquid Take 5 mL by mouth 4 (Four) Times a Day As Needed for Diarrhea.   Taking   • lidocaine 3 % cream cream Apply  topically Daily As Needed (prior to dialysis access).   Taking   • Multiple Vitamins-Minerals (MULTIVITAMIN ADULT PO) Take 1 tablet by mouth Daily.   Taking   • ondansetron ODT (ZOFRAN-ODT) 4 MG disintegrating tablet Place 1 tablet on the tongue Every 8 (Eight) Hours As Needed for Nausea. 12 tablet 0    • oxyCODONE-acetaminophen (PERCOCET)  MG per tablet Take 1 tablet by mouth Every 6 (Six) Hours As Needed for Moderate Pain .   Unknown at Unknown time       Medicines:  Current Facility-Administered Medications   Medication Dose Route Frequency Provider Last Rate Last Dose   • acetaminophen (TYLENOL) tablet 650 mg  650 mg Oral Q4H PRN Shakira Bassett APRN        Or   • acetaminophen (TYLENOL) 160 MG/5ML solution 650 mg  650 mg Oral Q4H PRN Shakira Bassett APRN        Or   • acetaminophen (TYLENOL) suppository 650 mg  650 mg Rectal Q4H PRN Shakira Bassett APRN       • allopurinol (ZYLOPRIM) tablet 100 mg  100 mg Oral Daily Shakira Bassett APRN   100 mg at 04/15/20 0826   •  diphenoxylate-atropine (LOMOTIL) 2.5-0.025 MG per tablet 1 tablet  1 tablet Oral Q2H PRN Shakira Bassett APRN   1 tablet at 04/16/20 0950   • epoetin lucy-epbx (RETACRIT) injection 4,000 Units  4,000 Units Subcutaneous Once per day on Mon Wed Fri Faisal Sevilla MD   4,000 Units at 04/15/20 2127   • levothyroxine (SYNTHROID, LEVOTHROID) tablet 150 mcg  150 mcg Oral Daily Shakira Bassett APRN   150 mcg at 04/16/20 0557   • lidocaine 3 % cream   Apply externally Daily PRN Shakira Bassett, APRN       • ondansetron (ZOFRAN) tablet 4 mg  4 mg Oral Q6H PRN Shakira Bassett APRN        Or   • ondansetron (ZOFRAN) injection 4 mg  4 mg Intravenous Q6H PRN Shakira Bassett, APRN       • oxyCODONE-acetaminophen (PERCOCET)  MG per tablet 1 tablet  1 tablet Oral Q6H PRN Shakira Bassett APRPEPITO   1 tablet at 04/16/20 0950   • pantoprazole (PROTONIX) EC tablet 40 mg  40 mg Oral Daily Shakira Bassett APRN   40 mg at 04/16/20 0951   • potassium chloride (MICRO-K) CR capsule 20 mEq  20 mEq Oral Once Dirk Tristan, APRN       • sodium chloride 0.9 % flush 10 mL  10 mL Intravenous Q12H Shakira Bassett APRN   10 mL at 04/15/20 2042   • sodium chloride 0.9 % flush 10 mL  10 mL Intravenous PRN Shakira Bassett, APRN       • sucralfate (CARAFATE) 1 GM/10ML suspension 1 g  1 g Oral 4x Daily AC & at Bedtime Shakira Bassett APRN   Stopped at 04/15/20 2043       Past Medical History:  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   • Carotid artery disease (CMS/HCC)    • Chronic kidney disease on chronic dialysis (CMS/HCC)    • Chronic mesenteric ischemia (CMS/HCC) 12/14/2017   • Closed displaced intertrochanteric fracture of right femur (CMS/HCC) 2/8/2019    S/p Cephalomedullary nailing 2/8   • COPD (chronic obstructive pulmonary disease)  (CMS/HCC)    • Coronary artery disease    • Diverticulitis    • Diverticulosis    • Gastric ulcer    • Gout    • History of transfusion    • Hyperlipidemia    • Hypertension    • Hypothyroidism    • Injury of back    • Mesenteric artery insufficiency (CMS/HCC)    • Multilevel degenerative disc disease    • Osteoporosis    • Pancreatitis    • Pelvis fracture (CMS/HCC)    • Pneumonia    • PVD (peripheral vascular disease) (CMS/HCC)    • RA (rheumatoid arthritis) (CMS/HCC)    • Sleep apnea        Past Surgical History:  Past Surgical History:   Procedure Laterality Date   • AORTAGRAM Right 1/8/2018    Procedure: MESENTERIC ANGIOGRAM, GROIN ACCESS, MYNX CLOSURE;  Surgeon: Tomasz San DO;  Location: [facility] OR;  Service:    • AORTIC VALVE SURGERY     • APPENDECTOMY     • BACK SURGERY     • CAPSULE ENDOSCOPY N/A 3/30/2019    Dr. Yu-Angiodysplasia; Limit anticoagulation as much as is reasonable; Gastric passage time: 0h21m; Small bowel passage time: 2h2m   • CARDIAC SURGERY     • CAROTID ENDARTERECTOMY Bilateral    • CATARACT EXTRACTION, BILATERAL     • CERVICAL SPINE SURGERY     • CHOLECYSTECTOMY     • COLON RESECTION     • COLONOSCOPY  10/01/2015    Diverticulosis in the sigmoid colon; The examined portion of the ileum was normal; The examination was otherwise normal on direct and retroflexion views; No specimens collected; No plans to repeat colonoscopy due to multiple comorbidities   • COLONOSCOPY N/A 3/28/2019    Dr. Merida-The examined portion of the ileum was normal; One 6mm tubular adenomatous polyp at the hepatic flexure; Diverticulosis in the sigmoid colon   • CORONARY ARTERY BYPASS GRAFT     • DIALYSIS FISTULA CREATION     • ENDOSCOPY N/A 12/27/2018    LA Grade B reflux esophagitis; Non-bleeding gastric ulcers with no stigmata of bleeding-biopsied; Erosive gastritis; Normal examined duodenum; Repeat 2 months   • ENDOSCOPY N/A 2/28/2019    LA Grade B esophagitis-biopsied-clip (MR conditional) was  placed; An adjustable gastric banding was found, characterized by healthy appearing mucosa; Normal stomach; Normal examined duodenum; Repeat 2 months   • ENDOSCOPY N/A 3/11/2019    An endoclip was found in the esophagus; Normal stomach; Normal examined duodenum; No specimens collected; Repeat endo 2-3 months   • ENDOSCOPY N/A 3/27/2019    Dr. Merida-Normal examined jejunum; Normal examined duodenum; An adjustable gastric banding was found; Normal esophagus; No specimens collected   • ENDOSCOPY  10/01/2015    Normal esophagus; Bleeding erosive gastropathy-biopsied; Thickening of the gastric mucosa in the cardia-biopsied; Normal examined duodenum   • EXPLORATORY LAPAROTOMY N/A 5/13/2019    Procedure: LAPAROTOMY EXPLORATORY, OVERSEW DUODENAL ULCER WITH FRED PATCH, KOCHER MANEUVER;  Surgeon: Laila Rodriguez MD;  Location: [facility] OR;  Service: General   • HIP TROCHANTERIC NAILING WITH INTRAMEDULLARY HIP SCREW Right 2/8/2019    Procedure: HIP TROCANTERIC NAILING LONG WITH INTRAMEDULLARY HIP SCREW;  Surgeon: Boo Camacho MD;  Location: [facility] OR;  Service: Orthopedics   • LAPAROSCOPIC GASTRIC BANDING     • LEEP     • LUMBAR FUSION     • REPLACEMENT TOTAL KNEE      Bilateral   • TOE AMPUTATION Left     2nd toe   • TOTAL KNEE ARTHROPLASTY Bilateral    • TUBAL ABDOMINAL LIGATION         Family History  Family History   Problem Relation Age of Onset   • Hypertension Mother    • Cancer Mother         stomach   • Coronary artery disease Father    • Heart attack Father    • Diabetes Brother    • Coronary artery disease Brother    • Colon cancer Neg Hx    • Colon polyps Neg Hx        Social History  Social History     Socioeconomic History   • Marital status:      Spouse name: Not on file   • Number of children: Not on file   • Years of education: Not on file   • Highest education level: Not on file   Tobacco Use   • Smoking status: Never Smoker   • Smokeless tobacco: Never Used   Substance and Sexual Activity   •  Alcohol use: No   • Drug use: No   • Sexual activity: Defer         Review of Systems:  History obtained from chart review and the patient  General ROS: Patient complains of fatigue and No fever or chills  Respiratory ROS: No cough, shortness of breath, wheezing  Cardiovascular ROS: No chest pain or palpitations  Gastrointestinal ROS: positive for - diarrhea  No abdominal pain or melena  Genito-Urinary ROS: No dysuria or hematuria  Neurological ROS; no headache or dizziness    Objective:  Patient Vitals for the past 24 hrs:   BP Temp Temp src Pulse Resp SpO2 Weight   04/16/20 0734 101/45 99 °F (37.2 °C) Oral 88 16 97 % --   04/16/20 0356 (!) 89/60 98.4 °F (36.9 °C) Oral 95 16 98 % 57 kg (125 lb 10.6 oz)   04/15/20 2330 (!) 84/34 99.3 °F (37.4 °C) Oral 66 16 92 % --   04/15/20 1950 (!) 92/23 98.1 °F (36.7 °C) Oral 98 16 94 % --   04/15/20 1135 98/42 98.5 °F (36.9 °C) Oral 79 16 92 % --       Intake/Output Summary (Last 24 hours) at 4/16/2020 1040  Last data filed at 4/16/2020 0844  Gross per 24 hour   Intake 620 ml   Output --   Net 620 ml     General: awake/alert    Neck: supple, no JVD  Chest:  clear to auscultation bilaterally without respiratory distress  CVS: regular rate and rhythm  Abdominal: soft, nontender, positive bowel sounds  Extremities: no cyanosis or edema  Skin: warm and dry without rash   Neuro: no focal motor deficits      Labs:  Results from last 7 days   Lab Units 04/15/20  0404   WBC 10*3/mm3 10.44   HEMOGLOBIN g/dL 8.5*   HEMATOCRIT % 25.6*   PLATELETS 10*3/mm3 216         Results from last 7 days   Lab Units 04/16/20  0439 04/15/20  0404   SODIUM mmol/L 133* 138   POTASSIUM mmol/L 2.7* 3.0*   CHLORIDE mmol/L 97* 101   CO2 mmol/L 22.0 18.0*   BUN mg/dL 36* 53*   CREATININE mg/dL 5.17* 6.84*   CALCIUM mg/dL 6.9* 7.4*   BILIRUBIN mg/dL  --  0.3   ALK PHOS U/L  --  176*   ALT (SGPT) U/L  --  14   AST (SGOT) U/L  --  14   GLUCOSE mg/dL 83 75       Radiology:   Imaging Results (Last 72 Hours)      Procedure Component Value Units Date/Time    CT Angiogram Abdomen Pelvis [[id number]] Collected:  04/15/20 1005     Updated:  04/15/20 1030    Narrative:       CT ANGIOGRAM ABDOMEN PELVIS- 4/15/2020 9:53 AM CDT     HISTORY: possible ischemic bowel disease     COMPARISON: CT scan dated 03/25/2020     DOSE LENGTH PRODUCT: 636 mGy cm. Automated exposure control was also  utilized to decrease patient radiation dose.     TECHNIQUE: Helical tomographic images of the abdomen and pelvis  utilizing angiographic protocol were obtained following the intravenous  infusion of contrast. Multiplanar and 3 D reformatted images were  provided for review.     FINDINGS:     Angiogram:     Abdominal aorta is normal in caliber. Moderate atheromatous  calcification throughout the aorta. High-grade atheromatous narrowing of  the proximal celiac artery (series 7-image 82) along an approximately 6  mm segment. There is high-grade atheromatous plaque in the proximal SMA,  which appears completely occluded just beyond its origin. There is  reconstitution of the SMA, distally. The RIZWANA is patent and is larger in  caliber than is typically seen. Bilateral high-grade atheromatous  narrowing along the proximal and mid portions of the renal arteries. The  native right renal artery is likely completely occluded with a  downstream mild reconstitution via collaterals.     Mild diffuse atheromatous plaque along the bilateral common, internal  and external iliac arteries.     Other findings:  Four-chamber cardiomegaly. Aortic valvular prosthesis. Mild septal  thickening in the lung bases with trace bilateral pleural effusions.     Contrast reflux into the hepatic veins. No obvious focal liver lesion.  Prior cholecystectomy. The biliary tree is nondilated. Diffuse atrophy  of the pancreas. Nonspecific small rounded cystic lesion at the  pancreatic tail, measuring 1 cm (series 5-image 48). This is stable.  Spleen is normal in size. Adrenal glands are  unremarkable. Bilateral  severe renal atrophy. Stable simple appearing left renal cyst. No  hydronephrosis. No retroperitoneal adenopathy or mass. LAP-BAND in  place. Stomach is nondistended. Small bowel is nondilated. Mucosal  enhancement along the small bowel appears appropriate. There is no  inflammatory change surrounding the small bowel. Diverticulosis along  the descending and sigmoid colon. Mucosal enhancement along the colon  appears appropriate. There is no surrounding inflammatory change.      There is no mesenteric adenopathy, mass or inflammatory change. No  intraperitoneal free fluid or free air. Prior lumbar spinal fusion. No  acute bony abnormality. Right hip TFN. Underlying osteopenia.       Impression:       1. High-grade atheromatous narrowing at the celiac artery origin. The  proximal SMA appears completely occluded, with reconstitution distally.  The RIZWANA is patent and is fairly large, likely due to its role as a  collateral. The bowel does not appear acutely ischemic or necrotic  although the underlying atheromatous disease may be a source for a more  chronic, intermittent or postprandial bowel ischemia.  2. Four-chamber cardiomegaly with minimal septal thickening/interstitial  edema in the lung bases. Additional trace pleural effusions.  3. Severe bilateral renal atrophy with high-grade renal artery  atheromatous narrowings. The right renal artery may be completely  occluded, proximally, with a distal reconstitution.  4. There is a small (approximately 1 cm) cystic lesion of the pancreatic  tail which has been stable. This is likely benign.  This report was finalized on 04/15/2020 10:27 by Dr Ronan Varner, .          Culture:  No results found for: BLOODCX, URINECX, WOUNDCX, MRSACX, RESPCX, STOOLCX      Assessment   1.  End stage renal disease on hemodialysis MWF  2.  Hypokalemia  3.  Metabolic acidosis  4.  Chronic diarrhea  5.  Hypothyroidism   6.  Anemia of CKD    Plan:  1.  Dialysis next  due 4/17  2.  Monitor labs      Dirk Tristan, ABILIO  4/16/2020  10:40

## 2020-04-16 NOTE — THERAPY TREATMENT NOTE
Acute Care - Physical Therapy Treatment Note  [facility]     Patient Name: Cheri Ely  : 1940  MRN: [id number]  Today's Date: 2020             Admit Date: 2020    Visit Dx:    ICD-10-CM ICD-9-CM   1. Impaired transfers R29.91 781.99     Patient Active Problem List   Diagnosis   • Chronic kidney disease on chronic dialysis (CMS/HCC)   • Paroxysmal atrial fibrillation (CMS/HCC)   • ESRD (end stage renal disease) (CMS/[facility])   • Anemia due to chronic kidney disease, on chronic dialysis (CMS/HCC)   • Chronic combined systolic and diastolic CHF (congestive heart failure) (CMS/HCC)   • Chronic diarrhea   • Failure to thrive in adult   • Hypoalbuminemia   • Pulmonary hypertension, moderate to severe (CMS/HCC)   • Hypothyroidism (acquired)   • Protein-calorie malnutrition (CMS/HCC)   • Occlusion of superior mesenteric artery (CMS/HCC)   • Celiac artery stenosis (CMS/HCC)       Therapy Treatment    Rehabilitation Treatment Summary     Row Name 20 1443 20 1036 20 0800       Treatment Time/Intention    Discipline  physical therapy assistant  -AB  physical therapy assistant  -AB  physical therapy assistant  -AB    Document Type  therapy note (daily note)  -AB  therapy note (daily note)  -AB  --    Subjective Information  no complaints  -AB  no complaints  -AB2  --    Mode of Treatment  physical therapy  -AB  physical therapy  -AB  --    Comment  --  --  needs to be cleaned up  -AB    Reason Treatment Not Performed  --  --  unavailable for treatment  -AB    Existing Precautions/Restrictions  fall  -AB  fall  -AB  --    Recorded by [AB] Rafaela Arreguin, [facility] 20 1446 [AB] Rafaela Arreguin, [facility] 20 1044  [AB2] Rafaela Arreguin, PTA 20 1119 [AB] Rafaela Arreguin, [facility] 20 0857    Row Name 20 1036          Bed Mobility Assessment/Treatment    Bed Mobility Assessment/Treatment  --  rolling right;rolling left  -AB     Rolling Left [place] (Bed  Mobility)  verbal cues;minimum assist (75% patient effort)  -AB  minimum assist (75% patient effort)  -AB     Rolling Right [place] (Bed Mobility)  verbal cues;minimum assist (75% patient effort)  -AB  minimum assist (75% patient effort)  -AB     Supine-Sit [place] (Bed Mobility)  --  minimum assist (75% patient effort)  -AB     Sit-Supine [place] (Bed Mobility)  --  contact guard  -AB     Comment (Bed Mobility)  roll to get cleaned up  -AB  --     Recorded by [AB] Rafaela Arreguin, PTA 04/16/20 1446 [AB] Rafaela Arreguin, [facility] 04/16/20 1119     Row Name 04/16/20 1036             Transfer Assessment/Treatment    Transfer Assessment/Treatment  sit-stand transfer;stand-sit transfer  -AB      Recorded by [AB] Rafaela Arreguin, [facility] 04/16/20 1119      Row Name 04/16/20 1036             Sit-Stand Transfer    Sit-Stand [place] (Transfers)  verbal cues;contact guard  -AB      Recorded by [AB] Rafaela Arreguin, [facility] 04/16/20 1119      Row Name 04/16/20 1036             Stand-Sit Transfer    Stand-Sit [place] (Transfers)  verbal cues;contact guard  -AB      Recorded by [AB] Rafaela Arreguin, [facility] 04/16/20 1119      Row Name 04/16/20 1036             Gait/Stairs Assessment/Training    [place] Level (Gait)  contact guard;minimum assist (75% patient effort)  -AB      Assistive Device (Gait)  other (see comments) HHA  -AB      Distance in Feet (Gait)  2 side steps up in bed  -AB      Recorded by [AB] Rafaela Arreguin, PTA 04/16/20 1119      Row Name 04/16/20 1036             Motor Skills Assessment/Interventions    Additional Documentation  Therapeutic Exercise (Group)  -AB      Recorded by [AB] Rafaela Arreguin, PTA 04/16/20 1119      Row Name 04/16/20 1443 04/16/20 1036          Therapeutic Exercise    Lower Extremity (Therapeutic Exercise)  SAQ (short arc quad), bilateral;heel slides, bilateral  -AB  LAQ (long arc quad), bilateral  -AB     Lower Extremity Range of Motion (Therapeutic Exercise)  hip  abduction/adduction, bilateral;ankle dorsiflexion/plantar flexion, bilateral  -AB  hip flexion/extension, bilateral;hip abduction/adduction, bilateral;ankle dorsiflexion/plantar flexion, bilateral  -AB     Exercise Type (Therapeutic Exercise)  AROM (active range of motion)  -AB  AROM (active range of motion)  -AB     Position (Therapeutic Exercise)  supine  -AB  seated  -AB     Sets/Reps (Therapeutic Exercise)  20  -AB  20  -AB     Recorded by [AB] Rafaela Arreguin, PTA 04/16/20 1446 [AB] Rafaela Arreguin, PTA 04/16/20 1119     Row Name 04/16/20 1036             Positioning and Restraints    Pre-Treatment Position  in bed  -AB      Post Treatment Position  bed  -AB      In Bed  side lying left;call light within reach  -AB      Recorded by [AB] Rafaela Arreguin PTA 04/16/20 1119      Row Name                Wound 04/14/20 2317 medial coccyx Pressure Injury    Wound - Properties Group Date first assessed: 04/14/20 [MC] Time first assessed: 2317 [MC] Present on Hospital Admission: Y [MC] Orientation: medial [MC] Location: coccyx [MC] Primary Wound Type: Pressure inj [MC] Stage, Pressure Injury: Stage 2 [MC] Recorded by:  [MC] Alyssa Monterroso RN 04/15/20 0156    Row Name                Wound 04/14/20 2317 Right posterior hand Skin Tear    Wound - Properties Group Date first assessed: 04/14/20 [MC] Time first assessed: 2317 [MC] Side: Right [] Orientation: posterior [] Location: hand [] Primary Wound Type: Skin tear [MC] Recorded by:  [OLIVIER] Alyssa Monterroso RN 04/15/20 0406      User Key  (r) = Recorded By, (t) = Taken By, (c) = Cosigned By    Initials Name Effective Dates Discipline    AB Rafaela Arreguin PTA 08/02/16 -  PT    MC Alyssa Monterroso RN 02/03/20 -  Nurse          Wound 04/14/20 2317 medial coccyx Pressure Injury (Active)   Dressing Appearance dressing loose 4/16/2020  8:00 AM   Closure None 4/16/2020  8:00 AM   Base pink;purple;non-blanchable 4/16/2020  8:00 AM   Periwound  intact;pink;warm;non-blanchable 4/16/2020  8:00 AM   Periwound Temperature warm 4/16/2020  8:00 AM   Periwound Skin Turgor soft 4/16/2020  8:00 AM   Drainage Amount none 4/16/2020  8:00 AM   Care, Wound cleansed with;soap and water 4/15/2020  7:30 PM   Dressing Care, Wound dressing changed 4/15/2020  7:30 PM   Periwound Care, Wound dry periwound area maintained 4/15/2020  7:30 PM       Wound 04/14/20 2317 Right posterior hand Skin Tear (Active)   Dressing Appearance dry;intact 4/16/2020  8:00 AM   Closure None 4/16/2020  8:00 AM   Base dressing in place, unable to visualize 4/16/2020  8:00 AM   Periwound pink;blanchable 4/16/2020  8:00 AM   Periwound Temperature cool 4/16/2020  8:00 AM   Drainage Amount none 4/16/2020  8:00 AM   Dressing Care, Wound coban;gauze 4/15/2020  7:30 PM   Periwound Care, Wound dry periwound area maintained 4/15/2020  7:30 PM               PT Recommendation and Plan     Progress: improving  Outcome Summary: She needed min assist to roll to be cleaned up, Min assist to get to EOB, CGA for sit/Stand and took 2 side steps HHA up in bed. She performed 20 reps sitting exercises. CGA to get back in bed.     Time Calculation:   PT Charges     Row Name 04/16/20 1443 04/16/20 1036          Time Calculation    Start Time  1443  -AB  1036  -AB     Stop Time  1506  -AB  1120  -AB     Time Calculation (min)  23 min  -AB  44 min  -AB     PT Received On  04/16/20  -AB  04/16/20  -AB        Time Calculation- PT    Total Timed Code Minutes- PT  23 minute(s)  -AB  44 minute(s)  -AB       User Key  (r) = Recorded By, (t) = Taken By, (c) = Cosigned By    Initials Name Provider Type    AB Rafaela Arreguin PTA Physical Therapy Assistant        Therapy Charges for Today     Code Description Service Date Service Provider Modifiers Qty    21945020049 HC PT THERAPEUTIC ACT EA 15 MIN 4/16/2020 Rafaela Arreguin PTA GP 1    74826492213 HC PT THER PROC EA 15 MIN 4/16/2020 Rafaela Arreguin, PTA GP 2    77540718938 HC  PT THER PROC EA 15 MIN 4/16/2020 Rafaela Arreguin, PTA GP 2          PT G-Codes  Outcome Measure Options: AM-PAC 6 Clicks Basic Mobility (PT)  AM-PAC 6 Clicks Score (PT): 10    Rafaela Arreguin, PTA  4/16/2020

## 2020-04-16 NOTE — PROGRESS NOTES
[facility] Medicine Services  INPATIENT PROGRESS NOTE    Length of Stay: 2  Date of Admission: 4/14/2020  Primary Care Physician: Mnany Peters APRN    Subjective     Chief Complaint:     Recurrent diarrhea    HPI     Patient continues to have recurrent diarrhea with no other significant complaints at this point.  CT angiogram of the abdomen and pelvis shows significant abnormalities throughout the intra-abdominal circulation.  Vascular surgery has seen the patient but no definitive plan is in place for intervention currently.  She continues with routine dialysis while hospitalized.  Nephrology continues to follow with the patient as well.  My opinion is that chronic ischemia is creating a malabsorption disorder leading to the patient's chronic diarrhea.  She certainly displays severe intra-abdominal flow limiting abnormalities on the CT angiogram of the abdomen and pelvis.  I am not sure if vascular intervention is imminent or even plausible.  Potentially the patient could be sent home rather than kept inpatient over the weekend, and could follow-up with vascular surgery on an outpatient basis next week.  I will contact the vascular service and see what their short-term plan is.      Review of Systems     All pertinent negatives and positives are as above. All other systems have been reviewed and are negative unless otherwise stated.     Objective    Temp:  [98.1 °F (36.7 °C)-99.3 °F (37.4 °C)] 98.2 °F (36.8 °C)  Heart Rate:  [] 116  Resp:  [16-18] 18  BP: ()/(23-60) 92/48    Lab Results (last 24 hours)     Procedure Component Value Units Date/Time    Vitamin D 25 Hydroxy [[id number]]  (Normal) Collected:  04/16/20 0439    Specimen:  Blood Updated:  04/16/20 1241     25 Hydroxy, Vitamin D 81.3 ng/ml     Narrative:       Reference Range for Total Vitamin D 25(OH)     Deficiency <20.0 ng/mL   Insufficiency 21-29 ng/mL   Sufficiency  ng/mL  Toxicity >100  ng/ml    Results may be falsely increased if patient taking Biotin.      Magnesium [[id number]]  (Abnormal) Collected:  04/16/20 0439    Specimen:  Blood Updated:  04/16/20 1017     Magnesium 1.2 mg/dL     Uric Acid [[id number]]  (Normal) Collected:  04/16/20 0439    Specimen:  Blood Updated:  04/16/20 0523     Uric Acid 4.2 mg/dL     Basic Metabolic Panel [[id number]]  (Abnormal) Collected:  04/16/20 0439    Specimen:  Blood Updated:  04/16/20 0523     Glucose 83 mg/dL      BUN 36 mg/dL      Creatinine 5.17 mg/dL      Sodium 133 mmol/L      Potassium 2.7 mmol/L      Chloride 97 mmol/L      CO2 22.0 mmol/L      Calcium 6.9 mg/dL      eGFR   Amer --     Comment: <15 Indicative of kidney failure.        eGFR Non African Amer 8 mL/min/1.73      Comment: <15 Indicative of kidney failure.        BUN/Creatinine Ratio 7.0     Anion Gap 14.0 mmol/L     Narrative:       GFR Normal >60  Chronic Kidney Disease <60  Kidney Failure <15      Phosphorus [[id number]]  (Normal) Collected:  04/16/20 0439    Specimen:  Blood Updated:  04/16/20 0523     Phosphorus 4.0 mg/dL     PTH, Intact [[id number]]  (Normal) Collected:  04/16/20 0439    Specimen:  Blood Updated:  04/16/20 0520     PTH, Intact 54.0 pg/mL     Narrative:       Results may be falsely decreased if patient taking Biotin.      Vitamin B12 [[id number]]  (Abnormal) Collected:  04/15/20 0837    Specimen:  Blood Updated:  04/15/20 1929     Vitamin B-12 1,644 pg/mL     Narrative:       Results may be falsely increased if patient taking Biotin.      Folate [[id number]]  (Normal) Collected:  04/15/20 0837    Specimen:  Blood Updated:  04/15/20 1929     Folate >20.00 ng/mL     Narrative:       Results may be falsely increased if patient taking Biotin.            Imaging Results (Last 24 Hours)     ** No results found for the last 24 hours. **             Intake/Output Summary (Last 24 hours) at 4/16/2020 1416  Last data filed at 4/16/2020 1332  Gross per 24 hour   Intake 620 ml    Output --   Net 620 ml       Physical Exam    Constitutional: She is oriented to person, place, and time. She appears well-developed. She appears cachectic. She is cooperative. She has a sickly appearance. No distress.   HENT:   Head: Normocephalic and atraumatic.  Moon faced appearance.  Nose: Nose normal.   Mouth/Throat: Oropharynx is clear and moist.   Eyes: Conjunctivae are normal. No scleral icterus.   Neck: Neck supple. No JVD present.   Cardiovascular: Normal rate, regular rhythm, normal heart sounds and intact distal pulses.   No murmur heard.  Pulmonary/Chest: Effort normal and breath sounds normal. No respiratory distress.   Abdominal: Soft. Bowel sounds are normal. She exhibits no mass. There is no tenderness.   Musculoskeletal: Normal range of motion. She exhibits no edema.   Neurological: She is alert and oriented to person, place, and time. Coordination normal.   Skin: Skin is warm and dry.   Psychiatric: She has a normal mood and affect.     Results Review:  I have reviewed the labs, radiology results, and diagnostic studies since my last progress note and made treatment changes reflective of the results.   I have reviewed the current medications.    Assessment/Plan     Active Hospital Problems    Diagnosis   • **Failure to thrive in adult   • Occlusion of superior mesenteric artery (CMS/HCC)   • Celiac artery stenosis (CMS/HCC)   • Chronic diarrhea   • Hypoalbuminemia   • Pulmonary hypertension, moderate to severe (CMS/HCC)   • Hypothyroidism (acquired)   • Protein-calorie malnutrition (CMS/HCC)   • Chronic combined systolic and diastolic CHF (congestive heart failure) (CMS/HCC)   • ESRD (end stage renal disease) (CMS/HCC)   • Anemia due to chronic kidney disease, on chronic dialysis (CMS/HCC)   • Paroxysmal atrial fibrillation (CMS/HCC)   • Chronic kidney disease on chronic dialysis (CMS/HCC)       PLAN:  Vascular surgery plan and ultimate recommendation is pending  Continue current  therapies  Continue dialysis per nephrology    Edin Ramos DO   04/16/20   14:16

## 2020-04-16 NOTE — THERAPY TREATMENT NOTE
Acute Care - Physical Therapy Treatment Note  [facility]     Patient Name: Cheri Ely  : 1940  MRN: [id number]  Today's Date: 2020             Admit Date: 2020    Visit Dx:    ICD-10-CM ICD-9-CM   1. Impaired transfers R29.91 781.99     Patient Active Problem List   Diagnosis   • Chronic kidney disease on chronic dialysis (CMS/HCC)   • Paroxysmal atrial fibrillation (CMS/HCC)   • ESRD (end stage renal disease) (CMS/[facility])   • Anemia due to chronic kidney disease, on chronic dialysis (CMS/HCC)   • Chronic combined systolic and diastolic CHF (congestive heart failure) (CMS/HCC)   • Chronic diarrhea   • Failure to thrive in adult   • Hypoalbuminemia   • Pulmonary hypertension, moderate to severe (CMS/HCC)   • Hypothyroidism (acquired)   • Protein-calorie malnutrition (CMS/[facility])       Therapy Treatment    Rehabilitation Treatment Summary     Row Name 20 1036 20 0800          Treatment Time/Intention    Discipline  physical therapy assistant  -AB  physical therapy assistant  -AB     Document Type  therapy note (daily note)  -AB  --     Subjective Information  no complaints  -AB2  --     Mode of Treatment  physical therapy  -AB  --     Comment  --  needs to be cleaned up  -AB     Reason Treatment Not Performed  --  unavailable for treatment  -AB     Existing Precautions/Restrictions  fall  -AB  --     Recorded by [AB] Rafaela Arreguin, [facility] 20 1044  [AB2] Rafaela Arreguin, [facility] 20 1119 [AB] Rafaela Arreguin, [facility] 20 0857     Row Name 20 1036             Bed Mobility Assessment/Treatment    Bed Mobility Assessment/Treatment  rolling right;rolling left  -AB      Rolling Left [place] (Bed Mobility)  minimum assist (75% patient effort)  -AB      Rolling Right [place] (Bed Mobility)  minimum assist (75% patient effort)  -AB      Supine-Sit [place] (Bed Mobility)  minimum assist (75% patient effort)  -AB      Sit-Supine [place] (Bed Mobility)  contact  guard  -AB      Recorded by [AB] Rafaela Arreguin, [facility] 04/16/20 1119      Row Name 04/16/20 1036             Transfer Assessment/Treatment    Transfer Assessment/Treatment  sit-stand transfer;stand-sit transfer  -AB      Recorded by [AB] Rafaela Arreguin, [facility] 04/16/20 1119      Row Name 04/16/20 1036             Sit-Stand Transfer    Sit-Stand [place] (Transfers)  verbal cues;contact guard  -AB      Recorded by [AB] Rafaela Arreguin, [facility] 04/16/20 1119      Row Name 04/16/20 1036             Stand-Sit Transfer    Stand-Sit [place] (Transfers)  verbal cues;contact guard  -AB      Recorded by [AB] Rafaela Arreguin, [facility] 04/16/20 1119      Row Name 04/16/20 1036             Gait/Stairs Assessment/Training    [place] Level (Gait)  contact guard;minimum assist (75% patient effort)  -AB      Assistive Device (Gait)  other (see comments) HHA  -AB      Distance in Feet (Gait)  2 side steps up in bed  -AB      Recorded by [AB] Rafaela Arreguin, [facility] 04/16/20 1119      Row Name 04/16/20 1036             Motor Skills Assessment/Interventions    Additional Documentation  Therapeutic Exercise (Group)  -AB      Recorded by [AB] Rafaela Arreguin, [facility] 04/16/20 1119      Row Name 04/16/20 1036             Therapeutic Exercise    Lower Extremity (Therapeutic Exercise)  LAQ (long arc quad), bilateral  -AB      Lower Extremity Range of Motion (Therapeutic Exercise)  hip flexion/extension, bilateral;hip abduction/adduction, bilateral;ankle dorsiflexion/plantar flexion, bilateral  -AB      Exercise Type (Therapeutic Exercise)  AROM (active range of motion)  -AB      Position (Therapeutic Exercise)  seated  -AB      Sets/Reps (Therapeutic Exercise)  20  -AB      Recorded by [AB] Rafaela Arreguin, [facility] 04/16/20 1119      Row Name 04/16/20 1036             Positioning and Restraints    Pre-Treatment Position  in bed  -AB      Post Treatment Position  bed  -AB      In Bed  side lying left;call light within reach  -AB      Recorded  by [AB] Rafaela Arreguin, PTA 04/16/20 1119      Row Name                Wound 04/14/20 2317 medial coccyx Pressure Injury    Wound - Properties Group Date first assessed: 04/14/20 [MC] Time first assessed: 2317 [MC] Present on Hospital Admission: Y [MC] Orientation: medial [MC] Location: coccyx [MC] Primary Wound Type: Pressure inj [MC] Stage, Pressure Injury: Stage 2 [MC] Recorded by:  [OLIVIER] Alyssa Monterroso RN 04/15/20 0156    Row Name                Wound 04/14/20 2317 Right posterior hand Skin Tear    Wound - Properties Group Date first assessed: 04/14/20 [MC] Time first assessed: 2317 [] Side: Right [] Orientation: posterior [] Location: hand [] Primary Wound Type: Skin tear [MC] Recorded by:  [Alyssa Bonilla RN 04/15/20 0406      User Key  (r) = Recorded By, (t) = Taken By, (c) = Cosigned By    Initials Name Effective Dates Discipline    AB Rafaela Arreguin, PTA 08/02/16 -  PT    Alyssa Zhou RN 02/03/20 -  Nurse          Wound 04/14/20 2317 medial coccyx Pressure Injury (Active)   Dressing Appearance dressing loose 4/16/2020  8:00 AM   Closure None 4/16/2020  8:00 AM   Base pink;purple;non-blanchable 4/16/2020  8:00 AM   Periwound intact;pink;warm;non-blanchable 4/16/2020  8:00 AM   Periwound Temperature warm 4/16/2020  8:00 AM   Periwound Skin Turgor soft 4/16/2020  8:00 AM   Drainage Amount none 4/16/2020  8:00 AM   Care, Wound cleansed with;soap and water 4/15/2020  7:30 PM   Dressing Care, Wound dressing changed 4/15/2020  7:30 PM   Periwound Care, Wound dry periwound area maintained 4/15/2020  7:30 PM       Wound 04/14/20 2317 Right posterior hand Skin Tear (Active)   Dressing Appearance dry;intact 4/16/2020  8:00 AM   Closure None 4/16/2020  8:00 AM   Base dressing in place, unable to visualize 4/16/2020  8:00 AM   Periwound pink;blanchable 4/16/2020  8:00 AM   Periwound Temperature cool 4/16/2020  8:00 AM   Drainage Amount none 4/16/2020  8:00 AM   Dressing Care, Wound  coban;gauze 4/15/2020  7:30 PM   Periwound Care, Wound dry periwound area maintained 4/15/2020  7:30 PM               PT Recommendation and Plan     Progress: improving  Outcome Summary: She needed min assist to roll to be cleaned up, Min assist to get to EOB, CGA for sit/Stand and took 2 side steps HHA up in bed. She performed 20 reps sitting exercises. CGA to get back in bed.     Time Calculation:   PT Charges     Row Name 04/16/20 1036             Time Calculation    Start Time  1036  -AB      Stop Time  1120  -AB      Time Calculation (min)  44 min  -AB      PT Received On  04/16/20  -AB         Time Calculation- PT    Total Timed Code Minutes- PT  44 minute(s)  -AB        User Key  (r) = Recorded By, (t) = Taken By, (c) = Cosigned By    Initials Name Provider Type    AB Rafaela Arreguin PTA Physical Therapy Assistant        Therapy Charges for Today     Code Description Service Date Service Provider Modifiers Qty    46276610273 HC PT THERAPEUTIC ACT EA 15 MIN 4/16/2020 Rafaela Arreguin PTA GP 1    24595380719 HC PT THER PROC EA 15 MIN 4/16/2020 Rafaela Arreguin PTA GP 2          PT G-Codes  Outcome Measure Options: AM-PAC 6 Clicks Basic Mobility (PT)  AM-PAC 6 Clicks Score (PT): 10    Rafaela Arreguin PTA  4/16/2020

## 2020-04-16 NOTE — PROGRESS NOTES
CC: Abdominal pain, diarrhea, weight loss    Subjective:   Only complaint is diarrhea.  Attempting to eat.      Current Facility-Administered Medications:   •  acetaminophen (TYLENOL) tablet 650 mg, 650 mg, Oral, Q4H PRN **OR** acetaminophen (TYLENOL) 160 MG/5ML solution 650 mg, 650 mg, Oral, Q4H PRN **OR** acetaminophen (TYLENOL) suppository 650 mg, 650 mg, Rectal, Q4H PRN, Shakira Bassett, APRN  •  allopurinol (ZYLOPRIM) tablet 100 mg, 100 mg, Oral, Daily, Shakira Bassett APRN, 100 mg at 04/16/20 1211  •  diphenoxylate-atropine (LOMOTIL) 2.5-0.025 MG per tablet 1 tablet, 1 tablet, Oral, Q2H PRN, Shakira Bassett, APRN, 1 tablet at 04/16/20 0950  •  epoetin lucy-epbx (RETACRIT) injection 4,000 Units, 4,000 Units, Subcutaneous, Once per day on Mon Wed Fri, Faisal Sevilla MD, 4,000 Units at 04/15/20 2127  •  levothyroxine (SYNTHROID, LEVOTHROID) tablet 150 mcg, 150 mcg, Oral, Daily, Shakira Bassett APRN, 150 mcg at 04/16/20 0557  •  lidocaine 3 % cream, , Apply externally, Daily PRN, Shakira Bassett, APRN  •  [START ON 4/17/2020] magnesium oxide (MAGOX) tablet 400 mg, 400 mg, Oral, Daily, Dirk Tristan, APRN  •  ondansetron (ZOFRAN) tablet 4 mg, 4 mg, Oral, Q6H PRN **OR** ondansetron (ZOFRAN) injection 4 mg, 4 mg, Intravenous, Q6H PRN, Shakira Bassett, APRN  •  oxyCODONE-acetaminophen (PERCOCET)  MG per tablet 1 tablet, 1 tablet, Oral, Q6H PRN, Shakira Bassett, APRN, 1 tablet at 04/16/20 0950  •  pantoprazole (PROTONIX) EC tablet 40 mg, 40 mg, Oral, Daily, Shakira Bassett APRN, 40 mg at 04/16/20 0951  •  sodium chloride 0.9 % flush 10 mL, 10 mL, Intravenous, Q12H, Shakira Bassett APRN, 10 mL at 04/16/20 1212  •  sodium chloride 0.9 % flush 10 mL, 10 mL, Intravenous, PRN, Shakira Bassett, APRN  •  sucralfate (CARAFATE) 1 GM/10ML suspension 1 g, 1 g, Oral, 4x Daily AC & at Bedtime, Shakira Bassett APRN, 1 g at 04/16/20 1211    Review of Systems   Respiratory: Negative.     Cardiovascular: Negative.    Gastrointestinal: Positive for diarrhea.         Vital Signs:  Temp:  [98.1 °F (36.7 °C)-99.3 °F (37.4 °C)] 98.2 °F (36.8 °C)  Heart Rate:  [] 116  Resp:  [16-18] 18  BP: ()/(23-60) 92/48  Body mass index is 25.38 kg/m².     Physical Exam   Cardiovascular: Normal rate.   Pulmonary/Chest: Effort normal.   Abdominal: Soft.        Results Review:     LABS:   Results from last 7 days   Lab Units 04/15/20  0404   WBC 10*3/mm3 10.44   HEMOGLOBIN g/dL 8.5*   HEMATOCRIT % 25.6*   PLATELETS 10*3/mm3 216       Results from last 7 days   Lab Units 04/16/20  0439 04/15/20  0404   SODIUM mmol/L 133* 138   POTASSIUM mmol/L 2.7* 3.0*   CHLORIDE mmol/L 97* 101   CO2 mmol/L 22.0 18.0*   BUN mg/dL 36* 53*   CREATININE mg/dL 5.17* 6.84*   CALCIUM mg/dL 6.9* 7.4*   BILIRUBIN mg/dL  --  0.3   ALK PHOS U/L  --  176*   ALT (SGPT) U/L  --  14   AST (SGOT) U/L  --  14   GLUCOSE mg/dL 83 75             Lab Results   Lab Value Date/Time    LIPASE <10 (L) 05/13/2019 0023    LIPASE 14 (L) 05/12/2019 2133    LIPASE 28 03/24/2019 1018    LIPASE 28 09/15/2017 2059       Radiology:    Imaging Results (Last 24 Hours)     ** No results found for the last 24 hours. **          Impression/Plan:    Patient Active Problem List   Diagnosis   • Chronic kidney disease on chronic dialysis (CMS/HCC)   • Paroxysmal atrial fibrillation (CMS/HCC)   • ESRD (end stage renal disease) (CMS/HCC)   • Anemia due to chronic kidney disease, on chronic dialysis (CMS/HCC)   • Chronic combined systolic and diastolic CHF (congestive heart failure) (CMS/HCC)   • Chronic diarrhea   • Failure to thrive in adult   • Hypoalbuminemia   • Pulmonary hypertension, moderate to severe (CMS/HCC)   • Hypothyroidism (acquired)   • Protein-calorie malnutrition (CMS/HCC)   • Occlusion of superior mesenteric artery (CMS/HCC)   • Celiac artery stenosis (CMS/HCC)       1.  Abdominal pain, diarrhea, weight loss, previous negative endoscopy work-up.   Significant findings on CT which is being followed by vascular surgery.  No plans for endoscopic evaluation until the vascular issues have been addressed.  My guess is she is having significant chronic ischemic changes based on the retrograde flow of her SMA through the RIZWANA and lack of flow through the celiac plexus.  I would not do an endoscopy or colonoscopy on her until attempted been made to reestablish blood flow.  She can follow-up with Dr. Garza as an outpatient.  We will sign off    EMR Dragon/transcription disclaimer: Much of this encounter note is electronic transcription/translation of spoken language to printed text.  The electronic translation of spoken language may permit erroneous, or at times, nonsensical words or phrases to be inadvertently transcribed.  Although I have reviewed the note for such errors, some may still exist.      Rafita Merida MD  04/16/20  12:40

## 2020-04-16 NOTE — CONSULTS
Cheri Ely  [id number]  [id number]  408/1  No att. providers found  4/14/2020    Chief Complaint   Patient presents with   • HARVEY DIRECT ADMISSION       HPI: Cheri Ely is a 80 y.o. female who is known to our office.  She was initially seen after having complaints of pain when she ate however that had resolved.  She has a history of lap band surgery 2008.  She is also a dialysis patient with a left upper extremity AV fistula created by Dr. Dubose in 2015.  She had left upper extremity fistulogram with balloon angioplasty in August 2019.  She reports no problems with her fistula since that time.  She underwent a mesenteric angiogram in January 2018 which was unsuccessful.  At that time Dr. San was unable to cannulate the superior mesenteric artery or celiac artery.  She is admitted for failure to thrive.  She has complaints of nausea, diarrhea, weakness, and dehydration.  She has diarrhea which has been chronic in nature and does follow with gastroenterology.  She does have some complaints of abdominal pain as well.  She did have a CTA of the abdomen pelvis this admission, which I did review.    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   • Carotid artery disease (CMS/HCC)    • Chronic kidney disease on chronic dialysis (CMS/HCC)    • Chronic mesenteric ischemia (CMS/HCC) 12/14/2017   • Closed displaced intertrochanteric fracture of right femur (CMS/HCC) 2/8/2019    S/p Cephalomedullary nailing 2/8   • COPD (chronic obstructive pulmonary disease) (CMS/HCC)    • Coronary artery disease    • Diverticulitis    • Diverticulosis    • Gastric ulcer    • Gout    • History of transfusion    • Hyperlipidemia    • Hypertension    • Hypothyroidism    • Injury of back    • Mesenteric artery insufficiency (CMS/HCC)    • Multilevel degenerative  disc disease    • Osteoporosis    • Pancreatitis    • Pelvis fracture (CMS/HCC)    • Pneumonia    • PVD (peripheral vascular disease) (CMS/HCC)    • RA (rheumatoid arthritis) (CMS/HCC)    • Sleep apnea        Past Surgical History:   Procedure Laterality Date   • AORTAGRAM Right 1/8/2018    Procedure: MESENTERIC ANGIOGRAM, GROIN ACCESS, MYNX CLOSURE;  Surgeon: Tomasz San DO;  Location: [facility] OR;  Service:    • AORTIC VALVE SURGERY     • APPENDECTOMY     • BACK SURGERY     • CAPSULE ENDOSCOPY N/A 3/30/2019    Dr. Yu-Angiodysplasia; Limit anticoagulation as much as is reasonable; Gastric passage time: 0h21m; Small bowel passage time: 2h2m   • CARDIAC SURGERY     • CAROTID ENDARTERECTOMY Bilateral    • CATARACT EXTRACTION, BILATERAL     • CERVICAL SPINE SURGERY     • CHOLECYSTECTOMY     • COLON RESECTION     • COLONOSCOPY  10/01/2015    Diverticulosis in the sigmoid colon; The examined portion of the ileum was normal; The examination was otherwise normal on direct and retroflexion views; No specimens collected; No plans to repeat colonoscopy due to multiple comorbidities   • COLONOSCOPY N/A 3/28/2019    Dr. Merida-The examined portion of the ileum was normal; One 6mm tubular adenomatous polyp at the hepatic flexure; Diverticulosis in the sigmoid colon   • CORONARY ARTERY BYPASS GRAFT     • DIALYSIS FISTULA CREATION     • ENDOSCOPY N/A 12/27/2018    LA Grade B reflux esophagitis; Non-bleeding gastric ulcers with no stigmata of bleeding-biopsied; Erosive gastritis; Normal examined duodenum; Repeat 2 months   • ENDOSCOPY N/A 2/28/2019    LA Grade B esophagitis-biopsied-clip (MR conditional) was placed; An adjustable gastric banding was found, characterized by healthy appearing mucosa; Normal stomach; Normal examined duodenum; Repeat 2 months   • ENDOSCOPY N/A 3/11/2019    An endoclip was found in the esophagus; Normal stomach; Normal examined duodenum; No specimens collected; Repeat endo 2-3 months   •  ENDOSCOPY N/A 3/27/2019    Dr. Merida-Normal examined jejunum; Normal examined duodenum; An adjustable gastric banding was found; Normal esophagus; No specimens collected   • ENDOSCOPY  10/01/2015    Normal esophagus; Bleeding erosive gastropathy-biopsied; Thickening of the gastric mucosa in the cardia-biopsied; Normal examined duodenum   • EXPLORATORY LAPAROTOMY N/A 5/13/2019    Procedure: LAPAROTOMY EXPLORATORY, OVERSEW DUODENAL ULCER WITH FRED PATCH, KOCHER MANEUVER;  Surgeon: Laila Rodriguez MD;  Location: [facility] OR;  Service: General   • HIP TROCHANTERIC NAILING WITH INTRAMEDULLARY HIP SCREW Right 2/8/2019    Procedure: HIP TROCANTERIC NAILING LONG WITH INTRAMEDULLARY HIP SCREW;  Surgeon: Boo Camacho MD;  Location: [facility] OR;  Service: Orthopedics   • LAPAROSCOPIC GASTRIC BANDING     • LEEP     • LUMBAR FUSION     • REPLACEMENT TOTAL KNEE      Bilateral   • TOE AMPUTATION Left     2nd toe   • TOTAL KNEE ARTHROPLASTY Bilateral    • TUBAL ABDOMINAL LIGATION         Family History   Problem Relation Age of Onset   • Hypertension Mother    • Cancer Mother         stomach   • Coronary artery disease Father    • Heart attack Father    • Diabetes Brother    • Coronary artery disease Brother    • Colon cancer Neg Hx    • Colon polyps Neg Hx        Social History     Socioeconomic History   • Marital status:      Spouse name: Not on file   • Number of children: Not on file   • Years of education: Not on file   • Highest education level: Not on file   Tobacco Use   • Smoking status: Never Smoker   • Smokeless tobacco: Never Used   Substance and Sexual Activity   • Alcohol use: No   • Drug use: No   • Sexual activity: Defer       No Known Allergies    Hospital Medications (active)       Dose Frequency Start End    acetaminophen (TYLENOL) 160 MG/5ML solution 650 mg 650 mg Every 4 Hours PRN 4/14/2020     Admin Instructions: Do not exceed 4 grams of acetaminophen in a 24 hr period.<BR><BR>If given for pain,  "use the following pain scale: <BR>Mild Pain = Pain Score of 1-3, CPOT 1-2<BR>Moderate Pain = Pain Score of 4-6, CPOT 3-4<BR>Severe Pain = Pain Score of 7-10, CPOT 5-8    Route: Oral    Linked Group 1:  \"Or\" Linked Group Details        acetaminophen (TYLENOL) suppository 650 mg 650 mg Every 4 Hours PRN 4/14/2020     Admin Instructions: Do not exceed 4 grams of acetaminophen in a 24 hr period.<BR><BR>If given for pain, use the following pain scale: <BR>Mild Pain = Pain Score of 1-3, CPOT 1-2<BR>Moderate Pain = Pain Score of 4-6, CPOT 3-4<BR>Severe Pain = Pain Score of 7-10, CPOT 5-8    Route: Rectal    Linked Group 1:  \"Or\" Linked Group Details        acetaminophen (TYLENOL) tablet 650 mg 650 mg Every 4 Hours PRN 4/14/2020     Admin Instructions: Do not exceed 4 grams of acetaminophen in a 24 hr period.<BR><BR>If given for pain, use the following pain scale: <BR>Mild Pain = Pain Score of 1-3, CPOT 1-2<BR>Moderate Pain = Pain Score of 4-6, CPOT 3-4<BR>Severe Pain = Pain Score of 7-10, CPOT 5-8    Route: Oral    Linked Group 1:  \"Or\" Linked Group Details        allopurinol (ZYLOPRIM) tablet 100 mg 100 mg Daily 4/15/2020     Admin Instructions: (BKC) Take with food if GI upset occurs.    Route: Oral    diphenoxylate-atropine (LOMOTIL) 2.5-0.025 MG per tablet 1 tablet 1 tablet Every 2 Hours PRN 4/14/2020     Admin Instructions: Maximum 8 tablets per 24 hours.<BR>    Route: Oral    epoetin lucy-epbx (RETACRIT) injection 4,000 Units 4,000 Units 3 Times Weekly 4/15/2020     Route: Subcutaneous    levothyroxine (SYNTHROID, LEVOTHROID) tablet 150 mcg 150 mcg Daily 4/15/2020     Admin Instructions: Take on empty stomach.    Route: Oral    lidocaine 3 % cream  Daily PRN 4/14/2020     Admin Instructions: Apply to fistula as needed.    Route: Apply externally    magnesium oxide (MAGOX) tablet 400 mg 400 mg Daily 4/17/2020     Admin Instructions: Each 400mg of magnesium oxide is equal to 241.3mg of elemental magnesium.    Route: " "Oral    magnesium sulfate in D5W 1g/100mL (PREMIX) 1 g Once 4/16/2020     Route: Intravenous    ondansetron (ZOFRAN) injection 4 mg 4 mg Every 6 Hours PRN 4/14/2020     Admin Instructions: If BOTH ondansetron (ZOFRAN) and promethazine (PHENERGAN) are ordered use ondansetron first and THEN promethazine IF ondansetron is ineffective.    Route: Intravenous    Linked Group 2:  \"Or\" Linked Group Details        ondansetron (ZOFRAN) tablet 4 mg 4 mg Every 6 Hours PRN 4/14/2020     Admin Instructions: If BOTH ondansetron (ZOFRAN) and promethazine (PHENERGAN) are ordered use ondansetron first and THEN promethazine IF ondansetron is ineffective.    Route: Oral    Linked Group 2:  \"Or\" Linked Group Details        oxyCODONE-acetaminophen (PERCOCET)  MG per tablet 1 tablet 1 tablet Every 6 Hours PRN 4/14/2020     Admin Instructions: [EDE]<BR><BR>Do not exceed 4 grams of acetaminophen in a 24 hr period.<BR><BR>If given for pain, use the following pain scale: <BR>Mild Pain = Pain Score of 1-3, CPOT 1-2<BR>Moderate Pain = Pain Score of 4-6, CPOT 3-4<BR>Severe Pain = Pain Score of 7-10, CPOT 5-8    Route: Oral    pantoprazole (PROTONIX) EC tablet 40 mg 40 mg Daily 4/15/2020     Admin Instructions: Swallow whole; do not crush, split, or chew.    Route: Oral    potassium chloride (MICRO-K) CR capsule 20 mEq 20 mEq Once 4/16/2020     Admin Instructions: Do not crush. Take with food.    Route: Oral    sodium chloride 0.9 % flush 10 mL 10 mL Every 12 Hours Scheduled 4/15/2020     Route: Intravenous    sodium chloride 0.9 % flush 10 mL 10 mL As Needed 4/14/2020     Route: Intravenous    sucralfate (CARAFATE) 1 GM/10ML suspension 1 g 1 g 4 Times Daily Before Meals & Nightly 4/15/2020     Route: Oral          Review of Systems   Constitutional: Positive for appetite change and fatigue.   HENT: Negative.    Eyes: Negative.    Respiratory: Negative.    Cardiovascular: Negative.    Gastrointestinal: Positive for diarrhea, nausea and " "vomiting.   Endocrine: Negative.    Genitourinary: Negative.    Musculoskeletal: Negative.    Skin: Negative.    Allergic/Immunologic: Negative.    Neurological: Positive for weakness.   Hematological: Negative.    Psychiatric/Behavioral: Negative.        BP 93/42 (BP Location: Right arm, Patient Position: Lying)   Pulse 86   Temp 100 °F (37.8 °C) (Oral)   Resp 16   Ht 149.9 cm (59\")   Wt 57 kg (125 lb 10.6 oz)   SpO2 96%   BMI 25.38 kg/m²    Physical Exam   Constitutional: She is oriented to person, place, and time. She appears well-developed and well-nourished. She appears cachectic. She has a sickly appearance. No distress.   HENT:   Head: Normocephalic and atraumatic.   Mouth/Throat: Oropharynx is clear and moist.   Eyes: Pupils are equal, round, and reactive to light. No scleral icterus.   Neck: Normal range of motion. Neck supple. No JVD present. Carotid bruit is not present. No thyromegaly present.   Cardiovascular: Normal rate, regular rhythm, S2 normal, normal heart sounds, intact distal pulses and normal pulses. Exam reveals no gallop and no friction rub.   No murmur heard.  Pulmonary/Chest: Effort normal and breath sounds normal.   Abdominal: Soft. Normal appearance and normal aorta. Bowel sounds are increased. There is no hepatosplenomegaly. There is no tenderness.   Musculoskeletal: Normal range of motion.   Neurological: She is alert and oriented to person, place, and time. No cranial nerve deficit.   Skin: Skin is warm and dry. She is not diaphoretic.   Psychiatric: She has a normal mood and affect. Her behavior is normal. Judgment and thought content normal.   Nursing note and vitals reviewed.    CT ANGIOGRAM ABDOMEN PELVIS- 4/15/2020 9:53 AM CDT     HISTORY: possible ischemic bowel disease     COMPARISON: CT scan dated 03/25/2020     DOSE LENGTH PRODUCT: 636 mGy cm. Automated exposure control was also  utilized to decrease patient radiation dose.     TECHNIQUE: Helical tomographic images of " the abdomen and pelvis  utilizing angiographic protocol were obtained following the intravenous  infusion of contrast. Multiplanar and 3 D reformatted images were  provided for review.     FINDINGS:     Angiogram:     Abdominal aorta is normal in caliber. Moderate atheromatous  calcification throughout the aorta. High-grade atheromatous narrowing of  the proximal celiac artery (series 7-image 82) along an approximately 6  mm segment. There is high-grade atheromatous plaque in the proximal SMA,  which appears completely occluded just beyond its origin. There is  reconstitution of the SMA, distally. The RIZWANA is patent and is larger in  caliber than is typically seen. Bilateral high-grade atheromatous  narrowing along the proximal and mid portions of the renal arteries. The  native right renal artery is likely completely occluded with a  downstream mild reconstitution via collaterals.     Mild diffuse atheromatous plaque along the bilateral common, internal  and external iliac arteries.     Other findings:  Four-chamber cardiomegaly. Aortic valvular prosthesis. Mild septal  thickening in the lung bases with trace bilateral pleural effusions.     Contrast reflux into the hepatic veins. No obvious focal liver lesion.  Prior cholecystectomy. The biliary tree is nondilated. Diffuse atrophy  of the pancreas. Nonspecific small rounded cystic lesion at the  pancreatic tail, measuring 1 cm (series 5-image 48). This is stable.  Spleen is normal in size. Adrenal glands are unremarkable. Bilateral  severe renal atrophy. Stable simple appearing left renal cyst. No  hydronephrosis. No retroperitoneal adenopathy or mass. LAP-BAND in  place. Stomach is nondistended. Small bowel is nondilated. Mucosal  enhancement along the small bowel appears appropriate. There is no  inflammatory change surrounding the small bowel. Diverticulosis along  the descending and sigmoid colon. Mucosal enhancement along the colon  appears appropriate. There  is no surrounding inflammatory change.      There is no mesenteric adenopathy, mass or inflammatory change. No  intraperitoneal free fluid or free air. Prior lumbar spinal fusion. No  acute bony abnormality. Right hip TFN. Underlying osteopenia.     IMPRESSION:  1. High-grade atheromatous narrowing at the celiac artery origin. The  proximal SMA appears completely occluded, with reconstitution distally.  The RIZWANA is patent and is fairly large, likely due to its role as a  collateral. The bowel does not appear acutely ischemic or necrotic  although the underlying atheromatous disease may be a source for a more  chronic, intermittent or postprandial bowel ischemia.  2. Four-chamber cardiomegaly with minimal septal thickening/interstitial  edema in the lung bases. Additional trace pleural effusions.  3. Severe bilateral renal atrophy with high-grade renal artery  atheromatous narrowings. The right renal artery may be completely  occluded, proximally, with a distal reconstitution.  4. There is a small (approximately 1 cm) cystic lesion of the pancreatic  tail which has been stable. This is likely benign.  This report was finalized on 04/15/2020 10:27 by Dr Ronan Varner, .    Laboratory Data:              Invalid input(s): LABALBU, PROT          Diagnostic Data:  Imaging Results (Last 24 Hours)     ** No results found for the last 24 hours. **          Impression:    Failure to thrive in adult    Chronic kidney disease on chronic dialysis (CMS/HCC)    Paroxysmal atrial fibrillation (CMS/HCC)    ESRD (end stage renal disease) (CMS/HCC)    Anemia due to chronic kidney disease, on chronic dialysis (CMS/HCC)    Chronic combined systolic and diastolic CHF (congestive heart failure) (CMS/HCC)    Chronic diarrhea    Hypoalbuminemia    Pulmonary hypertension, moderate to severe (CMS/HCC)    Hypothyroidism (acquired)    Protein-calorie malnutrition (CMS/HCC)    Occlusion of superior mesenteric artery (CMS/HCC)    Celiac artery  stenosis (CMS/HCC)    Ischemic bowel disease (CMS/HCC)      Plan: After thoroughly evaluating Cheri Ely, I believe the best course of action is to continue to follow the patient.  We will see how she does over the weekend and possibly revisit mesenteric angiogram next week.  She did previously have a mesenteric angiogram and Dr. San was unable to cannulate the celiac or SMA.  She does have significant renal artery stenosis as well, with right renal artery occlusion.  Her SMA does appear occluded but reconstitutes distally.  She does have significant narrowing of her celiac artery as well.  Her RIZWANA is patent and enlarged however some narrowing at the takeoff as well.  We will continue to follow through the weekend.  Further recommendations to follow.  Thank you for allowing me to participate in the care of your patient.  Please do not hesitate to call with any questions or concerns.    Tomasz San, DO   Vascular Surgery  [phone]

## 2020-04-16 NOTE — PLAN OF CARE
Problem: Patient Care Overview  Goal: Plan of Care Review  Outcome: Ongoing (interventions implemented as appropriate)  Flowsheets (Taken 4/16/2020 6037)  Progress: no change  Plan of Care Reviewed With: patient  Outcome Summary: NO C/O PAIN THIS SHIFT. PT TURNED EVERY 2 HOURS. INCONTINENCE CARE PROVIDED AND BARRIER CREAM APPLIED. MEPILEX ON COCCYX. SAFETY MAINTAINED. CONTINUE TO MONITOR. WILL NOTIFY MD OF ANY CHANGES.     Problem: Nutrition, Imbalanced: Inadequate Oral Intake (Adult)  Goal: Prevent Further Weight Loss  Description  Patient will demonstrate the desired outcomes by discharge/transition of care.  Outcome: Ongoing (interventions implemented as appropriate)     Problem: Skin Injury Risk (Adult)  Goal: Skin Health and Integrity  Description  Patient will demonstrate the desired outcomes by discharge/transition of care.  Outcome: Ongoing (interventions implemented as appropriate)

## 2020-04-16 NOTE — PLAN OF CARE
Problem: Patient Care Overview  Goal: Plan of Care Review  Outcome: Ongoing (interventions implemented as appropriate)  Flowsheets (Taken 4/16/2020 1036)  Progress: improving  Outcome Summary: She needed min assist to roll to be cleaned up, Min assist to get to EOB, CGA for sit/Stand and took 2 side steps HHA up in bed. She performed 20 reps sitting exercises. CGA to get back in bed.

## 2020-04-17 VITALS
RESPIRATION RATE: 16 BRPM | TEMPERATURE: 100 F | BODY MASS INDEX: 25.33 KG/M2 | WEIGHT: 125.66 LBS | DIASTOLIC BLOOD PRESSURE: 42 MMHG | OXYGEN SATURATION: 96 % | HEIGHT: 59 IN | HEART RATE: 86 BPM | SYSTOLIC BLOOD PRESSURE: 93 MMHG

## 2020-04-17 PROBLEM — K55.9 ISCHEMIC BOWEL DISEASE (HCC): Status: ACTIVE | Noted: 2020-04-17

## 2020-04-17 LAB
ANION GAP SERPL CALCULATED.3IONS-SCNC: 17 MMOL/L (ref 5–15)
BUN BLD-MCNC: 44 MG/DL (ref 8–23)
BUN/CREAT SERPL: 8.1 (ref 7–25)
CALCIUM SPEC-SCNC: 8 MG/DL (ref 8.6–10.5)
CHLORIDE SERPL-SCNC: 97 MMOL/L (ref 98–107)
CO2 SERPL-SCNC: 20 MMOL/L (ref 22–29)
CREAT BLD-MCNC: 5.46 MG/DL (ref 0.57–1)
GFR SERPL CREATININE-BSD FRML MDRD: 8 ML/MIN/1.73
GFR SERPL CREATININE-BSD FRML MDRD: ABNORMAL ML/MIN/{1.73_M2}
GLUCOSE BLD-MCNC: 91 MG/DL (ref 65–99)
POTASSIUM BLD-SCNC: 3.4 MMOL/L (ref 3.5–5.2)
SODIUM BLD-SCNC: 134 MMOL/L (ref 136–145)
WHOLE BLOOD HOLD SPECIMEN: NORMAL

## 2020-04-17 PROCEDURE — 25010000002 EPOETIN ALFA-EPBX 4000 UNIT/ML SOLUTION: Performed by: INTERNAL MEDICINE

## 2020-04-17 PROCEDURE — 80048 BASIC METABOLIC PNL TOTAL CA: CPT | Performed by: INTERNAL MEDICINE

## 2020-04-17 PROCEDURE — 97530 THERAPEUTIC ACTIVITIES: CPT

## 2020-04-17 PROCEDURE — 97110 THERAPEUTIC EXERCISES: CPT

## 2020-04-17 RX ADMIN — LEVOTHYROXINE SODIUM 150 MCG: 150 TABLET ORAL at 05:39

## 2020-04-17 RX ADMIN — PANTOPRAZOLE SODIUM 40 MG: 40 TABLET, DELAYED RELEASE ORAL at 08:02

## 2020-04-17 RX ADMIN — SUCRALFATE 1 G: 1 SUSPENSION ORAL at 08:00

## 2020-04-17 RX ADMIN — ALLOPURINOL 100 MG: 100 TABLET ORAL at 08:00

## 2020-04-17 RX ADMIN — SODIUM CHLORIDE, PRESERVATIVE FREE 10 ML: 5 INJECTION INTRAVENOUS at 08:01

## 2020-04-17 RX ADMIN — SUCRALFATE 1 G: 1 SUSPENSION ORAL at 11:21

## 2020-04-17 RX ADMIN — MAGNESIUM GLUCONATE 500 MG ORAL TABLET 400 MG: 500 TABLET ORAL at 08:01

## 2020-04-17 RX ADMIN — EPOETIN ALFA-EPBX 4000 UNITS: 4000 INJECTION, SOLUTION INTRAVENOUS; SUBCUTANEOUS at 17:47

## 2020-04-17 NOTE — PLAN OF CARE
Problem: Patient Care Overview  Goal: Plan of Care Review  Outcome: Ongoing (interventions implemented as appropriate)  Flowsheets (Taken 4/17/2020 4152)  Progress: no change  Plan of Care Reviewed With: patient  Outcome Summary: VSS. Rested quietly. Refuses to turn at times. No c/o voiced. Safety maintained. Call light within reach. Will continue to monitor.

## 2020-04-17 NOTE — PLAN OF CARE
Problem: Patient Care Overview  Goal: Plan of Care Review  Outcome: Ongoing (interventions implemented as appropriate)  Flowsheets (Taken 4/17/2020 1346)  Progress: no change  Plan of Care Reviewed With: patient  Outcome Summary: Pt remains on a cardiac, renal diet. She has consumed 50% of the last 4 meals. She is also receiving [employer] Renal nutrition supplement BID. Cont to have persistent diarrhea. Will cont to follow for nutrition needs.

## 2020-04-17 NOTE — PROGRESS NOTES
Continued Stay Note   [place]     Patient Name: Cheri Ely  MRN: [id number]  Today's Date: 4/17/2020    Admit Date: 4/14/2020    Discharge Plan     Row Name 04/17/20 1014       Plan    Plan  Home Health    Patient/Family in Agreement with Plan  yes    Plan Comments  Spoke with dtr- Christy [phone] to reassess for home needs. Pt lives at home with her dtr Lucero, she is at work therefore did not answer.  Christy says there are lots of family support that will be able to assist pt at home. They are aware of all pt's limitations and able to handle her at home. They do request HH care to be ordered. Will follow.         Discharge Codes    No documentation.             MALIA Morin

## 2020-04-17 NOTE — PLAN OF CARE
Problem: Patient Care Overview  Goal: Plan of Care Review  Outcome: Ongoing (interventions implemented as appropriate)  Flowsheets (Taken 4/17/2020 1511)  Progress: no change  Plan of Care Reviewed With: patient  Outcome Summary: no co pain. pt in dialysis. pt had several stools today. dc home after dialysis.

## 2020-04-17 NOTE — PLAN OF CARE
Problem: Patient Care Overview  Goal: Plan of Care Review  Outcome: Ongoing (interventions implemented as appropriate)  Flowsheets (Taken 4/17/2020 1315)  Outcome Summary: Pt. requires minimal assist for supine to sit. CGA for sit to supine. Stood x2 with SBA. Exercised LE's actively. Will benefit from strengthening activities.

## 2020-04-17 NOTE — DISCHARGE SUMMARY
[facility] Medicine [facility]  DISCHARGE SUMMARY       Date of Admission: 4/14/2020  Date of Discharge:  4/17/2020  Primary Care Physician: Manny Peters, ABILIO    Discharge Diagnoses:  Active Hospital Problems    Diagnosis   • **Failure to thrive in adult   • Ischemic bowel disease (CMS/HCC)   • Occlusion of superior mesenteric artery (CMS/HCC)   • Celiac artery stenosis (CMS/HCC)   • Chronic diarrhea   • Hypoalbuminemia   • Pulmonary hypertension, moderate to severe (CMS/HCC)   • Hypothyroidism (acquired)   • Protein-calorie malnutrition (CMS/HCC)   • Chronic combined systolic and diastolic CHF (congestive heart failure) (CMS/HCC)   • ESRD (end stage renal disease) (CMS/HCC)   • Anemia due to chronic kidney disease, on chronic dialysis (CMS/HCC)   • Paroxysmal atrial fibrillation (CMS/HCC)   • Chronic kidney disease on chronic dialysis (CMS/HCC)         Presenting Problem/History of Present Illness:  Failure to thrive in adult [R62.7]  Failure to thrive in adult [R62.7]     Chief Complaint on Day of Discharge:   Recurrent diarrhea    History of Present Illness on Day of Discharge:   Patient continues to have recurrent diarrhea.  She has no other significant complaints at this point.  The patient and I again discussed a CT angiogram of the abdomen and pelvis showing significant abnormalities throughout the intra-abdominal circulation.  Vascular surgery has seen the patient but no definitive plan has been arrived for intervention.  She is currently on dialysis.  The patient is suffering from chronic ischemia of the small bowel creating a malabsorptive disorder leading to chronic diarrhea.  She is to follow-up with vascular surgery in 2 weeks on an outpatient basis to discuss treatment options.  She is stable for discharge home today.    Hospital Course  Cheri Ely is an extremely complicated patient with a past medical history of end-stage renal disease on hemodialysis,  chronic anemia with known AVMs, followed by [facility] most recently seen by Candace Mann NP 3/24/2020 due to chronic diarrhea stool studies negative, patient states no one knows why she has diarrhea.  She did undergo lap band surgery in 2008 and has lost from greater than 200 pounds, today she weighs 123.  Due to severe diarrhea yesterday she did not present for hemodialysis.  She presented to [facility] emergency department with above complaints and was transferred to [facility] as they do not have a dialysis unit.  She went received 1 L LR for dehydration.  She does have diffuse edema, abdomen is tender on palpation.  CT of the abdomen pelvis obtained on 3/25/2020 was negative for acute changes.  Patient was noted at outlying facility to have creatinine 6.8 and potassium of 3.8.  Venous gases revealed pH 7.26.  Patient also has a nonhealing wound on the right hand that was scratched by a coat  and a nonhealing skin tear on the right forearm.  No cellulitic changes noted.  Patient continues to complain of abdominal pain, weakness, diarrhea and generalized feeling of unwellness.  She is admitted for further evaluation and treatment.  Plan:   1.  Admit as inpatient  2.  Home medications reviewed and restarted as appropriate  3.  DVT prophylaxis with SCDs  4.  Consult nephrology  5.  Labs in a.m.  6.  PT and nutrition consult in a.m.  7.  Consider GI consult    The day after admission the patient was evaluated and interviewed.  She noted a significant abdominal discomfort after eating and food fear.  I ordered a CT angiogram of the abdomen pelvis which showed high-grade narrowing of the celiac artery at its origin, complete SMA occlusion and a large RIZWANA likely secondary to collateralization.  The bowel did not appear acutely ischemic or necrotic.  There is severe bilateral renal atrophy with high-grade renal artery stenosis noted bilaterally.  Vascular surgery was consulted for further  recommendations.  Gastroenterology evaluated the patient and noted no particular role for endoscopy or colonoscopy.  After vascular evaluation was performed, the recommendation was to continue to follow the patient and see how she did and possibly revisit mesenteric angiogram at some point in the future.  Previously the patient had a mesenteric angiogram with Dr. San was unable to cannulate the celiac or the SMA.  There is no definitive vascular plan formulated at the time and it was felt that the patient was appropriate for discharge home to see her primary care provider in 1 week and to follow-up with vascular surgery in 2 weeks to further discuss and elucidate a vascular treatment plan      Consults:   Nephrology:  Assessment and plan:   1.  End stage renal disease on hemodialysis MWF  2.  Hypokalemia  3.  Metabolic acidosis  4.  Chronic diarrhea  5.  Hypothyroidism   6.  Anemia of CKD     Will provide dialysis on Mon-Wed-fri as on schedule. Follow up labs. Will use 4K bath. Will add Procrit for anemia management.     Gastroenterology:  Patient Active Problem List   Diagnosis   • Chronic kidney disease on chronic dialysis (CMS/HCC)   • Paroxysmal atrial fibrillation (CMS/HCC)   • ESRD (end stage renal disease) (CMS/HCC)   • Anemia due to chronic kidney disease, on chronic dialysis (CMS/HCC)   • Chronic combined systolic and diastolic CHF (congestive heart failure) (CMS/HCC)   • Chronic diarrhea   • Failure to thrive in adult   • Hypoalbuminemia   • Pulmonary hypertension, moderate to severe (CMS/HCC)   • Hypothyroidism (acquired)   • Protein-calorie malnutrition (CMS/HCC)            Abdominal pain, weight loss, and diarrhea was the significantly abnormal CT mesenteric angiogram.  Needs vascular surgery to see and evaluate for possible options.  Apparently has failed arteriogram in the past.  I see no role for endoscopy or colonoscopy in the setting.  As far as diarrhea I would stop all offending medications.   Certainly Zoloft could contribute to this.  Other meds should be reviewed as well.       Rafita Merida MD    Pertinent Test Results:   Lab Results (last 7 days)     Procedure Component Value Units Date/Time    Extra Tubes [[id number]] Collected:  04/17/20 0530    Specimen:  Blood, Venous Line Updated:  04/17/20 0715    Narrative:       The following orders were created for panel order Extra Tubes.  Procedure                               Abnormality         Status                     ---------                               -----------         ------                     Lavender Top[[id number]]                                     Final result                 Please view results for these tests on the individual orders.    Lavender Top [[id number]] Collected:  04/17/20 0530    Specimen:  Blood Updated:  04/17/20 0715     Extra Tube hold for add-on     Comment: Auto resulted       Basic Metabolic Panel [[id number]]  (Abnormal) Collected:  04/17/20 0523    Specimen:  Blood Updated:  04/17/20 0617     Glucose 91 mg/dL      BUN 44 mg/dL      Creatinine 5.46 mg/dL      Sodium 134 mmol/L      Potassium 3.4 mmol/L      Chloride 97 mmol/L      CO2 20.0 mmol/L      Calcium 8.0 mg/dL      eGFR   Amer --     Comment: <15 Indicative of kidney failure.        eGFR Non African Amer 8 mL/min/1.73      Comment: <15 Indicative of kidney failure.        BUN/Creatinine Ratio 8.1     Anion Gap 17.0 mmol/L     Narrative:       GFR Normal >60  Chronic Kidney Disease <60  Kidney Failure <15      Vitamin D 25 Hydroxy [[id number]]  (Normal) Collected:  04/16/20 0439    Specimen:  Blood Updated:  04/16/20 1241     25 Hydroxy, Vitamin D 81.3 ng/ml     Narrative:       Reference Range for Total Vitamin D 25(OH)     Deficiency <20.0 ng/mL   Insufficiency 21-29 ng/mL   Sufficiency  ng/mL  Toxicity >100 ng/ml    Results may be falsely increased if patient taking Biotin.      Magnesium [[id number]]  (Abnormal) Collected:  04/16/20 0439     Specimen:  Blood Updated:  04/16/20 1017     Magnesium 1.2 mg/dL     Uric Acid [[id number]]  (Normal) Collected:  04/16/20 0439    Specimen:  Blood Updated:  04/16/20 0523     Uric Acid 4.2 mg/dL     Basic Metabolic Panel [[id number]]  (Abnormal) Collected:  04/16/20 0439    Specimen:  Blood Updated:  04/16/20 0523     Glucose 83 mg/dL      BUN 36 mg/dL      Creatinine 5.17 mg/dL      Sodium 133 mmol/L      Potassium 2.7 mmol/L      Chloride 97 mmol/L      CO2 22.0 mmol/L      Calcium 6.9 mg/dL      eGFR   Amer --     Comment: <15 Indicative of kidney failure.        eGFR Non African Amer 8 mL/min/1.73      Comment: <15 Indicative of kidney failure.        BUN/Creatinine Ratio 7.0     Anion Gap 14.0 mmol/L     Narrative:       GFR Normal >60  Chronic Kidney Disease <60  Kidney Failure <15      Phosphorus [[id number]]  (Normal) Collected:  04/16/20 0439    Specimen:  Blood Updated:  04/16/20 0523     Phosphorus 4.0 mg/dL     PTH, Intact [[id number]]  (Normal) Collected:  04/16/20 0439    Specimen:  Blood Updated:  04/16/20 0520     PTH, Intact 54.0 pg/mL     Narrative:       Results may be falsely decreased if patient taking Biotin.      Vitamin B12 [[id number]]  (Abnormal) Collected:  04/15/20 0837    Specimen:  Blood Updated:  04/15/20 1929     Vitamin B-12 1,644 pg/mL     Narrative:       Results may be falsely increased if patient taking Biotin.      Folate [[id number]]  (Normal) Collected:  04/15/20 0837    Specimen:  Blood Updated:  04/15/20 1929     Folate >20.00 ng/mL     Narrative:       Results may be falsely increased if patient taking Biotin.      Comprehensive Metabolic Panel [[id number]]  (Abnormal) Collected:  04/15/20 0404    Specimen:  Blood Updated:  04/15/20 0447     Glucose 75 mg/dL      BUN 53 mg/dL      Creatinine 6.84 mg/dL      Sodium 138 mmol/L      Potassium 3.0 mmol/L      Chloride 101 mmol/L      CO2 18.0 mmol/L      Calcium 7.4 mg/dL      Total Protein 4.8 g/dL      Albumin 2.10 g/dL       ALT (SGPT) 14 U/L      AST (SGOT) 14 U/L      Alkaline Phosphatase 176 U/L      Total Bilirubin 0.3 mg/dL      eGFR Non African Amer 6 mL/min/1.73      Comment: <15 Indicative of kidney failure.        eGFR   Amer --     Comment: <15 Indicative of kidney failure.        Globulin 2.7 gm/dL      A/G Ratio 0.8 g/dL      BUN/Creatinine Ratio 7.7     Anion Gap 19.0 mmol/L     Narrative:       GFR Normal >60  Chronic Kidney Disease <60  Kidney Failure <15      TSH [[id number]]  (Normal) Collected:  04/15/20 0404    Specimen:  Blood Updated:  04/15/20 0447     TSH 0.340 uIU/mL     CBC Auto Differential [[id number]]  (Abnormal) Collected:  04/15/20 0404    Specimen:  Blood Updated:  04/15/20 0423     WBC 10.44 10*3/mm3      RBC 2.46 10*6/mm3      Hemoglobin 8.5 g/dL      Hematocrit 25.6 %      .1 fL      MCH 34.6 pg      MCHC 33.2 g/dL      RDW 17.0 %      RDW-SD 65.1 fl      MPV 11.1 fL      Platelets 216 10*3/mm3      Neutrophil % 84.9 %      Lymphocyte % 7.2 %      Monocyte % 4.9 %      Eosinophil % 1.1 %      Basophil % 0.5 %      Immature Grans % 1.4 %      Neutrophils, Absolute 8.87 10*3/mm3      Lymphocytes, Absolute 0.75 10*3/mm3      Monocytes, Absolute 0.51 10*3/mm3      Eosinophils, Absolute 0.11 10*3/mm3      Basophils, Absolute 0.05 10*3/mm3      Immature Grans, Absolute 0.15 10*3/mm3      nRBC 0.0 /100 WBC         Imaging Results (Last 7 Days)     Procedure Component Value Units Date/Time    CT Angiogram Abdomen Pelvis [[id number]] Collected:  04/15/20 1005     Updated:  04/15/20 1030    Narrative:       CT ANGIOGRAM ABDOMEN PELVIS- 4/15/2020 9:53 AM CDT     HISTORY: possible ischemic bowel disease     COMPARISON: CT scan dated 03/25/2020     DOSE LENGTH PRODUCT: 636 mGy cm. Automated exposure control was also  utilized to decrease patient radiation dose.     TECHNIQUE: Helical tomographic images of the abdomen and pelvis  utilizing angiographic protocol were obtained following the  intravenous  infusion of contrast. Multiplanar and 3 D reformatted images were  provided for review.     FINDINGS:     Angiogram:     Abdominal aorta is normal in caliber. Moderate atheromatous  calcification throughout the aorta. High-grade atheromatous narrowing of  the proximal celiac artery (series 7-image 82) along an approximately 6  mm segment. There is high-grade atheromatous plaque in the proximal SMA,  which appears completely occluded just beyond its origin. There is  reconstitution of the SMA, distally. The RIZWANA is patent and is larger in  caliber than is typically seen. Bilateral high-grade atheromatous  narrowing along the proximal and mid portions of the renal arteries. The  native right renal artery is likely completely occluded with a  downstream mild reconstitution via collaterals.     Mild diffuse atheromatous plaque along the bilateral common, internal  and external iliac arteries.     Other findings:  Four-chamber cardiomegaly. Aortic valvular prosthesis. Mild septal  thickening in the lung bases with trace bilateral pleural effusions.     Contrast reflux into the hepatic veins. No obvious focal liver lesion.  Prior cholecystectomy. The biliary tree is nondilated. Diffuse atrophy  of the pancreas. Nonspecific small rounded cystic lesion at the  pancreatic tail, measuring 1 cm (series 5-image 48). This is stable.  Spleen is normal in size. Adrenal glands are unremarkable. Bilateral  severe renal atrophy. Stable simple appearing left renal cyst. No  hydronephrosis. No retroperitoneal adenopathy or mass. LAP-BAND in  place. Stomach is nondistended. Small bowel is nondilated. Mucosal  enhancement along the small bowel appears appropriate. There is no  inflammatory change surrounding the small bowel. Diverticulosis along  the descending and sigmoid colon. Mucosal enhancement along the colon  appears appropriate. There is no surrounding inflammatory change.      There is no mesenteric adenopathy, mass  "or inflammatory change. No  intraperitoneal free fluid or free air. Prior lumbar spinal fusion. No  acute bony abnormality. Right hip TFN. Underlying osteopenia.       Impression:       1. High-grade atheromatous narrowing at the celiac artery origin. The  proximal SMA appears completely occluded, with reconstitution distally.  The RIZWANA is patent and is fairly large, likely due to its role as a  collateral. The bowel does not appear acutely ischemic or necrotic  although the underlying atheromatous disease may be a source for a more  chronic, intermittent or postprandial bowel ischemia.  2. Four-chamber cardiomegaly with minimal septal thickening/interstitial  edema in the lung bases. Additional trace pleural effusions.  3. Severe bilateral renal atrophy with high-grade renal artery  atheromatous narrowings. The right renal artery may be completely  occluded, proximally, with a distal reconstitution.  4. There is a small (approximately 1 cm) cystic lesion of the pancreatic  tail which has been stable. This is likely benign.  This report was finalized on 04/15/2020 10:27 by Dr Ronan Varner, .              Condition on Discharge:    Stable    Physical Exam on Discharge:  BP 93/42 (BP Location: Right arm, Patient Position: Lying)   Pulse 86   Temp 100 °F (37.8 °C) (Oral)   Resp 16   Ht 149.9 cm (59\")   Wt 57 kg (125 lb 10.6 oz)   SpO2 96%   BMI 25.38 kg/m²   Physical Exam  Constitutional: She is oriented to person, place, and time. She appears well-developed. She appears cachectic. She is cooperative. She has a sickly appearance. No distress.   HENT:   Head: Normocephalic and atraumatic.  Moon faced appearance.  Nose: Nose normal.   Mouth/Throat: Oropharynx is clear and moist.   Eyes: Conjunctivae are normal. No scleral icterus.   Neck: Neck supple. No JVD present.   Cardiovascular: Normal rate, regular rhythm, normal heart sounds and intact distal pulses.   No murmur heard.  Pulmonary/Chest: Effort normal and " breath sounds normal. No respiratory distress.   Abdominal: Soft. Bowel sounds are normal. She exhibits no mass. There is no tenderness.   Musculoskeletal: Normal range of motion. She exhibits no edema.   Neurological: She is alert and oriented to person, place, and time. Coordination normal.   Skin: Skin is warm and dry.   Psychiatric: She has a normal mood and affect.     Discharge Disposition:  Home or Self Care    Discharge Medications:     Discharge Medications      New Medications      Instructions Start Date   magnesium oxide 400 (241.3 Mg) MG tablet tablet  Commonly known as:  MAGOX   400 mg, Oral, Daily   Start Date:  April 18, 2020        Continue These Medications      Instructions Start Date   allopurinol 100 MG tablet  Commonly known as:  ZYLOPRIM   100 mg, Oral, Daily      carvedilol 12.5 MG tablet  Commonly known as:  COREG   12.5 mg, Oral, 2 Times Daily With Meals, TAKE ONE TABLET TWICE A DAY ON NONDIALYSIS DAYS, AND 1 TABLET DAILY ON DIALYSIS DAY (MON, WED, FRI)       cyproheptadine 4 MG tablet  Commonly known as:  PERIACTIN   4 mg, Oral, 3 Times Daily PRN      diphenoxylate-atropine 2.5-0.025 MG/5ML liquid  Commonly known as:  LOMOTIL   5 mL, Oral, 4 Times Daily PRN      levothyroxine 125 MCG tablet  Commonly known as:  SYNTHROID, LEVOTHROID   125 mcg, Oral, Daily      lidocaine 3 % cream cream   Apply externally, Daily PRN      MULTIVITAMIN ADULT PO   1 tablet, Oral, Daily      ondansetron ODT 4 MG disintegrating tablet  Commonly known as:  ZOFRAN-ODT   4 mg, Translingual, Every 8 Hours PRN      oxyCODONE-acetaminophen  MG per tablet  Commonly known as:  PERCOCET   1 tablet, Oral, Every 6 Hours PRN      pantoprazole 40 MG EC tablet  Commonly known as:  PROTONIX   40 mg, Oral, Daily      pravastatin 40 MG tablet  Commonly known as:  PRAVACHOL   40 mg, Oral, Daily      Danni-Gabbie tablet   1 tablet, Oral, Daily      sertraline 50 MG tablet  Commonly known as:  ZOLOFT   50 mg, Oral, Nightly         sucralfate 1 g tablet  Commonly known as:  CARAFATE   1 g, Oral, Daily      Vitamin D 50 MCG (2000 UT) capsule   2,000 Units, Oral, Daily         Stop These Medications    acetaminophen 325 MG tablet  Commonly known as:  TYLENOL            Discharge Diet:   Diet Instructions     Diet: Regular; Thin      Discharge Diet:  Regular    Fluid Consistency:  Thin          Discharge Care Plan / Instructions:   Discharge home    Activity at Discharge:   Activity Instructions     Activity as Tolerated            Follow-up Appointments:  Follow-up with primary care provider next week  Follow-up with Dr. San for further vascular treatment discussions       Edin Ramos DO  04/17/20  14:20    Time: Discharge over 30 min    Please note that part of this note may be an electronic transcription/translation of spoken language to printed text using the Dragon Dictation System. Efforts were made to edit the dictations, but occasionally words are mistranscribed.

## 2020-04-18 ENCOUNTER — READMISSION MANAGEMENT (OUTPATIENT)
Dept: CALL CENTER | Facility: HOSPITAL | Age: 80
End: 2020-04-18

## 2020-04-18 NOTE — THERAPY DISCHARGE NOTE
Acute Care - Physical Therapy Discharge Summary  [facility]       Patient Name: Cheri Ely  : 1940  MRN: [id number]    Today's Date: 2020                 Admit Date: 2020      PT Recommendation and Plan    Visit Dx:    ICD-10-CM ICD-9-CM   1. Impaired transfers R29.91 781.99               Rehab Goal Summary     Row Name 20 0720             Bed Mobility Goal 1 (PT)    Activity/Assistive Device (Bed Mobility Goal 1, PT)  sit to supine/supine to sit  -AB      Belva Level/Cues Needed (Bed Mobility Goal 1, PT)  minimum assist (75% or more patient effort)  -AB      Time Frame (Bed Mobility Goal 1, PT)  long term goal (LTG);10 days  -AB      Progress/Outcomes (Bed Mobility Goal 1, PT)  goal met  -AB         Transfer Goal 1 (PT)    Activity/Assistive Device (Transfer Goal 1, PT)  sit-to-stand/stand-to-sit;bed-to-chair/chair-to-bed;lateral transfer;wheelchair transfer stand pivot   -AB      Belva Level/Cues Needed (Transfer Goal 1, PT)  minimum assist (75% or more patient effort)  -AB      Time Frame (Transfer Goal 1, PT)  long term goal (LTG);10 days  -AB      Progress/Outcome (Transfer Goal 1, PT)  goal not met  -AB        User Key  (r) = Recorded By, (t) = Taken By, (c) = Cosigned By    Initials Name Provider Type Discipline    Rafaela Hidalgo PTA Physical Therapy Assistant PT              PT Discharge Summary  Anticipated Discharge Disposition (PT): home with 24/7 care, home with home health, skilled nursing facility  Reason for Discharge: Discharge from facility  Outcomes Achieved: Refer to plan of care for updates on goals achieved  Discharge Destination: Home with assist      Rafaela Arreguin PTA   2020        Suspect 2* CHF consider contribution of a fib to s/sx, pt on 1L NC w/no wob, conversational dyspnea  Lungs with faint rales on lower lateral lung fields  No PNA or s/sx of acute infection  Continue to monitor and treat underlying etiologies

## 2020-04-18 NOTE — OUTREACH NOTE
Prep Survey      Responses   [facility] facility patient discharged from?  [facility]   Is LACE score < 7 ?  No   Eligibility  Readm Mgmt   Discharge diagnosis  Failure to thrive in adult, ischemic bowel disease,  Occlusion of sup. mesenteric artery, celiac artery stenosis, chronic diarrhea, hypoalbuminemia, Pulmonary HTN, hypothyroidism, Protein-calorie malnutrition, Chronic combined Chf, ESRD, anemia  PAF, chronic dialysis   COVID-19 Test Status  Not tested   Does the patient have one of the following disease processes/diagnoses(primary or secondary)?  Other   Does the patient have Home health ordered?  No   Is there a DME ordered?  No   Comments regarding appointments  See AVS   Prep survey completed?  Yes          Nette Portillo RN

## 2020-04-20 ENCOUNTER — READMISSION MANAGEMENT (OUTPATIENT)
Dept: CALL CENTER | Facility: HOSPITAL | Age: 80
End: 2020-04-20

## 2020-04-20 NOTE — H&P
shortness of breath. Cardiovascular - no chest pain, syncope, or significant dizziness. No palpitations or significant leg swelling. No claudication. Gastrointestinal -  has not had abdominal swelling or pain. No blood in stool. No severe constipation, diarrhea, nausea, or vomiting. Genitourinary - No difficulty urinating, dysuria, frequency, or urgency. No flank pain or hematuria. Musculoskeletal - has not had back pain, gait disturbance, or myalgia. Skin - no color change, rash, pallor, or new wound. Neurologic - no dizziness, facial asymmetry, or light headedness. No seizures. No speech difficulty or lateralizing weakness. Hematologic - no easy bruising or excessive bleeding. Psychiatric - no severe anxiety or nervousness. No confusion. All other review of systems are negative. PHYSICAL EXAMINATION:    Due to this being a TeleHealth encounter, evaluation of the following organ systems is limited: Vitals/Constitutional/EENT/Resp/CV/GI//MS/Neuro/Skin/Heme-Lymph-Imm. Constitutional - well developed, well nourished. No diaphoresis or acute distress. HENT - head normocephalic. Right external ear canal appears normal.  Left external ear canal appears normal.  Septum appears midline. Eyes - conjunctiva normal.   No exudate. No icterus. Neck- ROM appears normal, no tracheal deviation. Extremities -No cyanosis, clubbing, has edema. No signs atheroembolic event. Pulmonary - effort appears normal.  No respiratory distress. No accessory muscle use  Lungs - Breath sounds normal. No wheezes or rales. Musculoskeletal -   Motor grossly intact in visible lower extremities and upper extremities  Neurologic - alert and oriented X 3. Cranial Nerves II-XII grossly intact  Skin - intact. No rash, erythema, or pallor.    Psychiatric - mood, affect, and behavior appear normal.  Judgment and thought processes appear normal.    Options were discussed with the patient including continued medical

## 2020-04-20 NOTE — PROGRESS NOTES
SUPERFICIALIZATION  performed by Von Reagan MD at [place] [address] VASCULAR SURGERY Left 2015    Left Brachial basilic av fistula creation    VASCULAR SURGERY Left 16 [facility]    Left upper ext. fistulograms    VASCULAR SURGERY  2015 [facility]    Operative Procedure: left brachial basilic. AV fistula creation    VASCULAR SURGERY  2016 [facility]    Procedure: Left upper fistulograms    VASCULAR SURGERY Left 16 [facility]    Superficialization left basilic vein   ,   Social History     Tobacco Use    Smoking status: Never Smoker    Smokeless tobacco: Never Used   Substance Use Topics    Alcohol use: No    Drug use: No   ,   Family History   Problem Relation Age of Onset    Heart Failure Father          heart attack    Heart Disease Father     Heart Failure Brother          heart attack     Cancer Brother     Diabetes Brother         x 2    Cancer Mother         stomach, bone    High Blood Pressure Mother    ,   Health Maintenance   Topic Date Due    DTaP/Tdap/Td vaccine (1 - Tdap) 1960    Shingles Vaccine (1 of 2) 1991    DEXA (modify frequency per FRAX score)  1996    Pneumococcal 65+ years Vaccine (1 of 1 - PPSV23) 2006    Lipid screen  2012    Potassium monitoring  2017    Creatinine monitoring  2017    Flu vaccine (Season Ended) 2020    Hepatitis A vaccine  Aged Out    Hepatitis B vaccine  Aged Out    Hib vaccine  Aged Out    Meningococcal (ACWY) vaccine  Aged Out       Review of Systems    Constitutional - no significant activity change, appetite change, or unexpected weight change. No fever or chills. No diaphoresis or significant fatigue. HENT - no significant rhinorrhea or epistaxis. No tinnitus or significant hearing loss. Eyes - no sudden vision change or amaurosis. Respiratory - no significant shortness of breath, wheezing, or stridor.   No apnea, cough, or chest tightness associated with

## 2020-04-20 NOTE — OUTREACH NOTE
Medical Week 1 Survey      Responses   [facility] patient discharged from?  [place]   COVID-19 Test Status  Not tested   Does the patient have one of the following disease processes/diagnoses(primary or secondary)?  Other   Is there a successful TCM telephone encounter documented?  No   Week 1 attempt successful?  No   Unsuccessful attempts  Attempt 1          Jessica Andrews LPN

## 2020-04-21 PROBLEM — T82.590A DIALYSIS AV FISTULA MALFUNCTION (HCC): Status: ACTIVE | Noted: 2020-01-01

## 2020-04-21 PROBLEM — Z51.5 PALLIATIVE CARE PATIENT: Status: ACTIVE | Noted: 2020-01-01

## 2020-04-21 PROBLEM — A41.9 SEPTIC SHOCK (HCC): Status: ACTIVE | Noted: 2020-01-01

## 2020-04-21 PROBLEM — R65.21 SEPTIC SHOCK (HCC): Status: ACTIVE | Noted: 2020-01-01

## 2020-04-21 PROBLEM — E78.2 MIXED HYPERLIPIDEMIA: Chronic | Status: ACTIVE | Noted: 2020-01-01

## 2020-04-21 NOTE — ED PROVIDER NOTES
Patient is an anuric. Musculoskeletal: Positive for arthralgias and myalgias. Negative for back pain and neck pain. Skin: Positive for rash (Right groin area). Negative for pallor. Neurological: Positive for weakness and light-headedness. Negative for syncope. Psychiatric/Behavioral: Negative for agitation and confusion. All other systems reviewed and are negative. PAST MEDICALHISTORY     Past Medical History:   Diagnosis Date    [facility]     CAD (coronary artery disease)     Carotid artery occlusion     CHF (congestive heart failure) ([facility]     Diverticulitis     HIGH CHOLESTEROL     History of blood transfusion     no reactions    Hypertension     Kidney disease     Osteoarthritis     Osteoporosis     Sleep apnea     c-pap    Thyroid disease          SURGICAL HISTORY       Past Surgical History:   Procedure Laterality Date    AMPUTATION  4/23/13    left 2nd toe    AORTIC VALVE REPLACEMENT      2015    APPENDECTOMY      BACK SURGERY      BACK SURGERY      back surgery x 2,,,last 2014    CARDIAC CATHETERIZATION  6/26/14  MDL    with aortic root injection. EF 55-60%    CAROTID ENDARTERECTOMY Bilateral     CERVICAL FUSION      CHOLECYSTECTOMY      COLECTOMY      CORONARY ARTERY BYPASS GRAFT      x 2    DIALYSIS FISTULA CREATION Left 12/18/2015    LEFT BRACHIAL / BASILIC AV FISTULA   performed by Zhang Arciniega MD at [address] [place] [address] Ave      both knees replaced    LAP BAND      LEE      NECK SURGERY  1/12    TN AV ANAST,UP ARM BASILIC VEIN TRANSPOSIT Left 4/82/3870    BASILIC VEIN SUPERFICIALIZATION  performed by Zhang Arciniega MD at [place] [address] VASCULAR SURGERY Left 12/18/2015    Left Brachial basilic av fistula creation    VASCULAR SURGERY Left 1/4/16 [facility]    Left upper ext. fistulograms    VASCULAR SURGERY  12/18/2015 [facility]    Operative Procedure: left brachial basilic.  AV fistula creation    VASCULAR SURGERY  2016 [facility]    Procedure: Left upper fistulograms    VASCULAR SURGERY Left 16 [facility]    Superficialization left basilic vein         CURRENT MEDICATIONS     Current Discharge Medication List      CONTINUE these medications which have NOT CHANGED    Details   allopurinol (ZYLOPRIM) 100 MG tablet Take 100 mg by mouth daily      B Complex-C-Folic Acid (DAVID-LYNNE) TABS Take 1 tablet by mouth daily      cyproheptadine (PERIACTIN) 4 MG tablet Take 4 mg by mouth 3 times daily as needed      lidocaine-prilocaine (EMLA) 2.5-2.5 % cream APPLY SMALL AMOUNT TO ACCESS SITE (AVF) 1 HOUR BEFORE DIALYSIS. COVER WITH OCCLUSIVE DRESSING (SARAN WRAP)      magnesium oxide (MAG-OX) 400 (241.3 Mg) MG TABS tablet Take 400 mg by mouth daily      sertraline (ZOLOFT) 50 MG tablet Take 50 mg by mouth nightly      sucralfate (CARAFATE) 1 GM tablet Take 1 g by mouth daily      omeprazole (PRILOSEC) 20 MG capsule Take 20 mg by mouth daily      levothyroxine (SYNTHROID) 175 MCG tablet Take 175 mcg by mouth Daily      pravastatin (PRAVACHOL) 40 MG tablet Take 40 mg by mouth nightly Indications: High Amount of Cholesterol in the Blood       Cholecalciferol (VITAMIN D) 2000 UNITS CAPS capsule Take 2,000 Units by mouth daily       carvedilol (COREG) 12.5 MG tablet Take 12.5 mg by mouth 2 times daily (with meals)      febuxostat 40 MG TABS tablet Take 40 mg by mouth daily      ferrous sulfate 325 (65 FE) MG tablet Take 325 mg by mouth three times daily       fluticasone-salmeterol (ADVAIR) 250-50 MCG/DOSE AEPB Inhale 1 puff into the lungs every 12 hours as needed       Multiple Vitamin (MULTIVITAMIN PO) Take 1 tablet by mouth daily. ALLERGIES     Patient has no known allergies.     FAMILY HISTORY       Family History   Problem Relation Age of Onset    Heart Failure Father          heart attack    Heart Disease Father     Heart Failure Brother          heart attack     Cancer Brother     Diabetes Cranial Nerves: No dysarthria or facial asymmetry. Sensory: Sensation is intact. Motor: Weakness (Significant generalized weakness.) present. No tremor, atrophy, abnormal muscle tone or seizure activity. Coordination: Coordination normal.   Psychiatric:         Mood and Affect: Mood normal.         Behavior: Behavior normal.         DIAGNOSTIC RESULTS     EKG: All EKG's areinterpreted by the Emergency Department Physician who either signs or Co-signs this chart in the absence of a cardiologist.    EKG dated 4/20/2020 at 11:40 PM: Sinus tachycardia, rate 114. Artifact noted. Interventricular conduction delay with QRS of 144 noted. Cues present in 2, 3, and aVF. Left ventricular hypertrophy. EKG dated 4/21/2020 at 12:48 AM: Artifact versus interference noted. Complexes have widened with a QRS of 172. Morphology of complexes appear similar to prior EKG. Leads I and II are unreadable. EKG dated 4/21/2020 at 0 1:13 AM: Atrial fibrillation with rapid ventricular response, rate 123. Nonspecific interventricular conduction delay is present. Q waves present in 3 and aVF and V3. Poor R wave conduction in V2 through V6. QRS is 172. RADIOLOGY:  Non-plain film images such as CT, Ultrasound and MRI are read by the radiologist. Plain radiographic images are visualized and preliminarily interpreted bythe emergency physician with the below findings:    XR CHEST PORTABLE    (Results Pending)     Chest x-ray dated 4/21/2020 at 0 2:16 AM: Significant cardiomegaly. Hazy bilateral opacities can be seen possibly secondary to pulmonary edema versus infection. Patient has multiple postsurgical changes including sternal wires and cervical and lumbar fusion.       LABS:  Labs Reviewed   CBC WITH AUTO DIFFERENTIAL - Abnormal; Notable for the following components:       Result Value    WBC 33.6 (*)     RBC 2.65 (*)     Hemoglobin 9.3 (*)     Hematocrit 28.2 (*)     .4 (*)     MCH 35.1 (*)     RDW

## 2020-04-21 NOTE — PROCEDURES
Informed Consent obtained    Site prepped with chlorhexadine  US used to place hollow bore needle in to left femoral artery  Return of pulsatile bright red blood  Guidewire inserted through the hollow bore needle  Hollow bore needle removed  Arterial catheter placed over guidewire  Pulsatile bright red blood from arterial line  Arterial line sutured in to place  Bio patch applied  Tegaderm placed    Total attempts 2 - patient moved after first stick prior to guidewire advancement    Not included in any other charge

## 2020-04-21 NOTE — ED NOTES
ASSISTED WITH CENTRAL LINE PLACEMENT TO RIGHT FEMORAL VEIN X [phone] Ulysses Albright RN  04/21/20 0150

## 2020-04-21 NOTE — PROGRESS NOTES
Follow-up note from this morning. Her access has clotted off. Vascular surgery has been consulted. She may need temporary access for hemodialysis. She still is requiring 2 pressors for blood pressure support. She is currently on meropenem and vancomycin. We will continue to monitor and further recommendations to follow.
Pharmacy Renal Adjustment    Shae Stevenson is a 79 y.o. female. Pharmacy has renally adjusted medications per protocol. Recent Labs     04/20/20  2340   BUN 39*       Recent Labs     04/20/20  2340   CREATININE 6.2*       CrCl cannot be calculated (Unknown ideal weight.).     Height:   Ht Readings from Last 1 Encounters:   02/12/16 5' 1\" (1.549 m)     Weight:  Wt Readings from Last 1 Encounters:   04/21/20 115 lb (52.2 kg)       CKD stage: 5         Baseline SCr: 6.2    Plan: Adjust the following medications based on renal function:           Merrem 500 mg iv every 24 hours    Electronically signed by JEZ [facility] on 4/21/2020 at 3:32 AM
Pharmacy Vancomycin Consult     Vancomycin Day: 1  Current Dosing: Pulse dosing    Temp max:  98    Recent Labs     04/20/20  2340 04/21/20  1011   BUN 39* 37*       Recent Labs     04/20/20  2340 04/21/20  1011   CREATININE 6.2* 5.9*       Recent Labs     04/20/20  2340   WBC 33.6*         Intake/Output Summary (Last 24 hours) at 4/21/2020 1555  Last data filed at 4/21/2020 1200  Gross per 24 hour   Intake 2448.06 ml   Output 0 ml   Net 2448.06 ml       Culture Date Source Results   04/20/20 blood Sent                       Ht Readings from Last 1 Encounters:   04/21/20 4' 11\" (1.499 m)        Wt Readings from Last 1 Encounters:   04/21/20 115 lb (52.2 kg)         Body mass index is 23.23 kg/m². CrCl cannot be calculated (Unknown ideal weight.). Random level: 10.7    Assessment/Plan:  Will give Vancomycin 750 mg IV x 1 dose and order a Vancomycin random level after SLEDD ends.     Electronically signed by Easton Martin RPh, BCPS, 4/21/2020,4:04 PM
Physical Therapy  Nursing requested to hold PT evaluation at this time.
SLED set up by Abebe Ash RN and started at 15:56. Dr. Hugh Mo in and made aware of irregular HR in the 120s-150s and and hypotension with levophed increased. UF rate decreased to 250. Lopressor IV ordered for HR. Will increase UF rate as tolerated.
loss in 3 months  · Ideal Body Wt: 98 lb (44.5 kg), % Ideal Body 117.3%  · BMI Classification: BMI 18.5 - 24.9 Normal Weight    Nutrition Interventions:   Continue NPO  Continued Inpatient Monitoring    Nutrition Evaluation:   · Evaluation: Goals set   · Goals: meet nutritional needs through po intake    · Monitoring: Nutrition Progression, Meal Intake, Diet Tolerance, Skin Integrity, Wound Healing, Weight, Pertinent Labs      Electronically signed by Aixa Montes De Oca MS, RD, LD on 4/21/20 at 10:41 AM CDT    Contact Number: [phone]

## 2020-04-21 NOTE — CONSULTS
or bruit noted. Skin: Skin color, texture, turgor normal. No rashes or lesions  Neurologic: Grossly intact. LABS AND DIAGNOSTICS:    CXR:  1. Cardiomegaly with bilateral pulmonary opacities primarily interstitial in appearance with a perihilar predilection. CHF/edema favored. Prior mediastinal surgery. CBC with Differential:   Lab Results   Component Value Date    WBC 33.6 04/20/2020    RBC 2.65 04/20/2020    HGB 9.3 04/20/2020    HCT 28.2 04/20/2020     04/20/2020    .4 04/20/2020    LYMPHOPCT 1.4 04/20/2020     BMP:   Lab Results   Component Value Date     04/21/2020    K 3.5 04/21/2020    CL 99 04/21/2020    CO2 14 04/21/2020    BUN 37 04/21/2020    CREATININE 5.9 04/21/2020    CALCIUM 7.8 04/21/2020    LABGLOM 7 04/21/2020    GLUCOSE 84 04/21/2020         ASSESSMENT:    1. AV Fistula Malfunction, LUE  2. Sepsis, Unknown Source with Hypotension on Pressures  3. Atrial Fibrillation/Flutter with RVR  4. ESRD on Hemodialysis  5. Acquired Hypotension    PLAN:    1. Proceed with Temporary Hemodialysis Line Placement    Patient discussed with Dr. Dwayne Rodriguez.       SHANE Minor

## 2020-04-21 NOTE — H&P
Patient Information:  Patient:Anni Valdez  YOB: 1940  Date of Service: 4/21/2020  MRN: [id number]   Acct: [de-identified]   Primary Care Physician: Edward Manzo  Advance Directive: Full Code  Admit Date: 4/20/2020       Hospital Day: 1        SUBJECTIVE:    Chief Complaint   Patient presents with    Generalized Body Aches     Patient unable to have dialysis today due to fistula dysfuction. Patient suppsed to have fistulogram tomorrow     HPI and ROS:  Mrs. Samara Lovett is a pleasant 79year old  american lady. She has a past medical history most significant for ESRD on HD with anuria. She had presented for HD and was unable to have, as while they could extract clots from her fistula it was unable to flush. She had pain at the site. She presented to the ED today for generalized aches. She was set up to come today to fitstulagram with anticipated intervention. Despite missed HD as the fistula was full of clots and unable to flush, her potassium was three and a half at admission. She also endorses: weakness, fatigue, and near syncope. She also denies: fever, chills, night sweats, syncope, confusion, chest pain, chest pressure, dyspnea, diaphoresis, nausea, and sneeze. She wants us to know she is anuric at baseline.      (Allergies confirmed with patient, following subjective sections as per chart review, she is unable to report home meds)    Past Medical History:   Diagnosis Date    Arthritis, rheumatoid ([address]     CAD (coronary artery disease)     Carotid artery occlusion     CHF (congestive heart failure) ([address]     Diverticulitis     HIGH CHOLESTEROL     History of blood transfusion     no reactions    Hypertension     Kidney disease     Osteoarthritis     Osteoporosis     Sleep apnea     c-pap    Thyroid disease      Past Surgical History:   Procedure Laterality Date    AMPUTATION  4/23/13    left 2nd toe    AORTIC VALVE REPLACEMENT      2015    APPENDECTOMY      BACK Cholesterol in the Blood     Historical Provider, MD   Cholecalciferol (VITAMIN D) 2000 UNITS CAPS capsule Take 2,000 Units by mouth daily     Historical Provider, MD   carvedilol (COREG) 12.5 MG tablet Take 12.5 mg by mouth 2 times daily (with meals)    Historical Provider, MD   febuxostat 40 MG TABS tablet Take 40 mg by mouth daily    Historical Provider, MD   ferrous sulfate 325 (65 FE) MG tablet Take 325 mg by mouth three times daily     Historical Provider, MD   fluticasone-salmeterol (ADVAIR) 250-50 MCG/DOSE AEPB Inhale 1 puff into the lungs every 12 hours as needed     Historical Provider, MD   Multiple Vitamin (MULTIVITAMIN PO) Take 1 tablet by mouth daily. Historical Provider, MD         OBJECTIVE:    Vitals:    04/21/20 0212   BP:    Pulse: 113   Resp: 18   Temp:    SpO2: 100%     /82   Pulse 113   Temp 92.1 °F (33.4 °C) (Bladder)   Resp 18   Wt 115 lb (52.2 kg)   SpO2 100%   BMI 21.73 kg/m²       Intake/Output Summary (Last 24 hours) at 4/21/2020 0306  Last data filed at 4/21/2020 0208  Gross per 24 hour   Intake 1566 ml   Output --   Net 1566 ml     Physical Exam  Vitals signs reviewed. Constitutional:       General: She is not in acute distress. Appearance: Normal appearance. She is normal weight. She is ill-appearing. HENT:      Head: Normocephalic and atraumatic. Nose: No congestion or rhinorrhea. Eyes:      General:         Right eye: No discharge. Left eye: No discharge. Neck:      Musculoskeletal: Neck supple. Comments: Trachea appears midline  Cardiovascular:      Rate and Rhythm: Regular rhythm. Tachycardia present. Heart sounds: No murmur. No friction rub. No gallop. Pulmonary:      Effort: No respiratory distress. Breath sounds: No stridor. No wheezing, rhonchi or rales. Abdominal:      General: Bowel sounds are normal.      Tenderness: There is no abdominal tenderness. There is no guarding or rebound.    Musculoskeletal: General: No tenderness. Right lower leg: Edema present. Left lower leg: Edema present. Skin:     General: Skin is warm. Comments: nondiaphoretic   Neurological:      Mental Status: She is alert. Comments: No tremors, speaks clearly   Psychiatric:         Mood and Affect: Mood normal.         Behavior: Behavior normal.         LABORATORY DATA:    CBC:  Recent Labs     04/20/20 2340   WBC 33.6*   HGB 9.3*   HCT 28.2*        CMP:  Recent Labs     04/20/20 2340   *   K 3.6   CL 93*   CO2 18*   BUN 39*   CREATININE 6.2*   CALCIUM 8.4*     Recent Labs     04/20/20 2340   AST 34*   ALT 25   BILITOT 0.4   ALKPHOS 208*     Coag Panel:   Recent Labs     04/20/20 2340   INR 1.77*   PROTIME 20.8*   APTT 57.5*     Cardiac Enzymes:   Recent Labs     04/20/20 2340   TROPONINI 0.15*       IMAGING:  No results found.         ASESSMENTS & PLANS:    Septic Shock requiring multiple Pressor Support likely secondary to Infected Thrombosed Hemodialysis Fistula:  ESRD on HD:  Hypoalbuminemia PoA:   Admit to ICU under hospitalist service  Telemetry  Oximetry  Was transiently on Levophed GGT, did not tolerate it as rapidly went in to a SVT  On dopamine GGT, will wean off if able  Vasopressin GGT at septic shock dosing  Albumin 25g of 25% solution  Code status full code as per patient  Palliative Care Eval for Goals of Care  Vasc Sx consult in AM, was already to see as OP tomorrow for fistulagram with anticipated intervention  Nephro consult in AM, ESRD on HD, potassium WNL tonight so will delay placing HD fistula to allow further ABx Tx first as is likely still bacteremia  Has R Femoral Central Line from her EP  Has L Femoral Arterial Line which was placed by me  HOLD Carvedilol  Merum 1g IV q8h - pharmacy asked to dose adjust as needed  Vancomycin to be dosed by level by pharmacy    Chronic Medical Problems:  Continue home medications as indicated  Sub nebs for home puffers  Sub meds as needed for any

## 2020-04-21 NOTE — CONSULTS
Nephrology ([facility]) Consult Note      Patient:  Reji Hayes  YOB: 1940  Date of Service: 4/21/2020  MRN: [id number]   Acct: [de-identified]   Primary Care Physician: Renata Willett  Advance Directive: Full Code  Admit Date: 4/20/2020       Hospital Day: 0  Referring Provider: Rebecca Daniels DO    Patient independently seen and examined, Chart, Consults, Notes, Operative notes, Labs, Cardiology, and Radiology studies reviewed as able. Chief complaint: Clotted access/end-stage renal disease/hypotension    Subjective:  Reji Hayes is a 79 y.o. female  whom we were consulted for end-stage renal disease. Patient has end-stage renal disease secondary to hypertensive nephrosclerosis and goes to [facility] hemodialysis unit on Monday Wednesday Friday. She presented to the emergency room yesterday with weakness, lack of energy and tiredness and mild shortness of breath. She presented with clotted hemodialysis access and was unable to receive routine hemodialysis treatment with clotted access. Patient denies any nausea vomiting or diarrhea or dysuria. Apparently she is an uric. She denies any abdominal pain. She was found to be severely hypertensive, needing vasopressors including norepinephrine and vasopressin. Patient has a right femoral triple-lumen being used for vasopressors. Chest x-ray shows mild pulmonary edema, no evidence of consolidation. Allergies:  Patient has no known allergies.     Medicines:  Current Facility-Administered Medications   Medication Dose Route Frequency Provider Last Rate Last Dose    norepinephrine (LEVOPHED) 64 MCG/ML infusion SOLN        Stopped at 04/21/20 0048    norepinephrine (LEVOPHED) 16 mg in dextrose 5% 250 mL infusion  5 mcg/min Intravenous Continuous Everett Mendez MD 4.7 mL/hr at 04/21/20 0830 5 mcg/min at 04/21/20 0830    sodium chloride flush 0.9 % injection 10 mL  10 mL Intravenous 2 times per day Machelle Logan MD        sodium chloride flush 0.9 % injection 10 mL  10 mL Intravenous PRN Rashid Mcclain MD        acetaminophen (TYLENOL) tablet 650 mg  650 mg Oral Q6H PRN Rashid Mcclain MD        Or    acetaminophen (TYLENOL) suppository 650 mg  650 mg Rectal Q6H PRN Rashid Mcclain MD        polyethylene glycol [facility]) packet 17 g  17 g Oral Daily PRN Rashid Mcclain MD        ondansetron (ZOFRAN-ODT) disintegrating tablet 4 mg  4 mg Oral Q8H PRN Rashid Mcclain MD        Or    ondansetron [facility]) injection 4 mg  4 mg Intravenous Q6H PRN Rashid Mcclain MD        heparin (porcine) injection 5,000 Units  5,000 Units Subcutaneous 3 times per day Rashid Mcclain MD   5,000 Units at 04/21/20 0547    [START ON 4/22/2020] meropenem (MERREM) 0.5 g in sterile water 10 mL IV syringe  0.5 g Intravenous Q24H Rashid Mcclain MD        vancomycin Pola Samson) intermittent dosing (placeholder)   Other RX Placeholder Sherren Mallet, MD   Stopped at 04/21/20 0400    vasopressin 20 Units in dextrose 5 % 100 mL infusion  0.02 Units/min Intravenous Continuous Rashid Mcclain MD 6 mL/hr at 04/21/20 0503 0.02 Units/min at 04/21/20 0503    0.9 % sodium chloride bolus  500 mL Intravenous Once Rashid Mcclain MD        sucralfate (CARAFATE) tablet 1 g  1 g Oral Daily Rashid Mcclain MD        sertraline (ZOLOFT) tablet 50 mg  50 mg Oral Nightly Rashid Mcclain MD        pravastatin (PRAVACHOL) tablet 40 mg  40 mg Oral Nightly Rashid Mcclain MD        pantoprazole (PROTONIX) tablet 40 mg  40 mg Oral QAM AC Rashid Mcclain MD   40 mg at 04/21/20 6346    therapeutic multivitamin-minerals 1 tablet  1 tablet Oral Daily Rashid Mcclain MD        magnesium oxide (MAG-OX) tablet 400 mg  400 mg Oral Daily Rashid Mcclain MD        levothyroxine (SYNTHROID) tablet 175 mcg  175 mcg Oral Daily Rashid Mcclain MD   175 mcg at 04/21/20 0857    budesonide (PULMICORT) nebulizer suspension 500 mcg  0.5 mg Nebulization Q12H Rashid Mcclain MD   500 mcg at 04/21/20 0708    Arformoterol Tartrate (BROVANA) nebulizer solution 15 mcg  15 mcg Nebulization BID Karlene Lua MD   15 mcg at 04/21/20 0708    albuterol (PROVENTIL) nebulizer solution 2.5 mg  2.5 mg Nebulization Q1H PRN Karlene Lua MD        ferrous sulfate (IRON 325) tablet 325 mg  325 mg Oral TID Karlene Lua MD        Vitamin D (CHOLECALCIFEROL) tablet 2,000 Units  2,000 Units Oral Daily Karlene MD Stoney        virginia-hollis tablet 1 tablet  1 tablet Oral Daily Karlene Lua MD        miconazole (MICOTIN) 2 % cream   Topical BID Karlene MD Stoney        miconazole (MICOTIN) 2 % powder   Topical BID Karlene MD Stoney           Past Medical History:  Past Medical History:   Diagnosis Date    Arthritis, rheumatoid [facility]     CAD (coronary artery disease)     Carotid artery occlusion     CHF (congestive heart failure) ([facility]     Diverticulitis     HIGH CHOLESTEROL     History of blood transfusion     no reactions    Hypertension     Kidney disease     Osteoarthritis     Osteoporosis     Sleep apnea     c-pap    Thyroid disease        Past Surgical History:  Past Surgical History:   Procedure Laterality Date    AMPUTATION  4/23/13    left 2nd toe    AORTIC VALVE REPLACEMENT      2015    APPENDECTOMY      BACK SURGERY      BACK SURGERY      back surgery x 2,,,last 2014    CARDIAC CATHETERIZATION  6/26/14  MDL    with aortic root injection.  EF 55-60%    CAROTID ENDARTERECTOMY Bilateral     CERVICAL FUSION      CHOLECYSTECTOMY      COLECTOMY      CORONARY ARTERY BYPASS GRAFT      x 2    DIALYSIS FISTULA CREATION Left 12/18/2015    LEFT BRACHIAL / BASILIC AV FISTULA   performed by Leander Crandall MD at [address] [place] [address] Ave      both knees replaced    LAP BAND      LEEP      NECK SURGERY  1/12    MD AV ANAST,UP ARM BASILIC VEIN TRANSPOSIT Left 9/96/5645    BASILIC VEIN SUPERFICIALIZATION  performed by Leander Crandall MD at [place] [address]

## 2020-04-21 NOTE — PLAN OF CARE
Problem: Falls - Risk of:  Goal: Will remain free from falls  Description: Will remain free from falls  4/21/2020 1814 by Kate Coffman RN  Outcome: Ongoing  4/21/2020 0558 by Quique Valente RN  Outcome: Ongoing  Goal: Absence of physical injury  Description: Absence of physical injury  4/21/2020 1814 by Kate Coffman RN  Outcome: Ongoing  4/21/2020 0558 by Quique Valente RN  Outcome: Ongoing     Problem: Discharge Planning:  Goal: Participates in care planning  Description: Participates in care planning  Outcome: Ongoing  Goal: Discharged to appropriate level of care  Description: Discharged to appropriate level of care  Outcome: Ongoing     Problem: Airway Clearance - Ineffective:  Goal: Ability to maintain a clear airway will improve  Description: Ability to maintain a clear airway will improve  Outcome: Ongoing     Problem:  Bowel Function - Altered:  Goal: Bowel elimination is within specified parameters  Description: Bowel elimination is within specified parameters  4/21/2020 1814 by Kate Coffman RN  Outcome: Ongoing  4/21/2020 0558 by Quique Valente RN  Outcome: Ongoing     Problem: Cardiac Output - Decreased:  Goal: Hemodynamic stability will improve  Description: Hemodynamic stability will improve  4/21/2020 1814 by Kate Coffman RN  Outcome: Ongoing  4/21/2020 0558 by Quique Valente RN  Outcome: Ongoing     Problem: Fluid Volume - Imbalance:  Goal: Absence of imbalanced fluid volume signs and symptoms  Description: Absence of imbalanced fluid volume signs and symptoms  4/21/2020 1814 by Kate Coffman RN  Outcome: Ongoing  4/21/2020 0558 by Quique Valente RN  Outcome: Ongoing     Problem: Skin Integrity - Impaired:  Goal: Will show no infection signs and symptoms  Description: Will show no infection signs and symptoms  4/21/2020 1814 by Kate Coffman RN  Outcome: Ongoing  4/21/2020 0558 by Quique Valente RN  Outcome: Ongoing  Goal: Absence of new skin breakdown  Description: Absence of new skin breakdown  4/21/2020 1814 by Manuel Denny RN  Outcome: Ongoing  4/21/2020 0558 by oJsy Wagner RN  Outcome: Ongoing     Problem: Tissue Perfusion, Altered:  Goal: Circulatory function within specified parameters  Description: Circulatory function within specified parameters  4/21/2020 1814 by Manuel Denny RN  Outcome: Ongoing  4/21/2020 0558 by Josy Wagner RN  Outcome: Ongoing     Problem: Nutrition  Goal: Optimal nutrition therapy  Description: Nutrition Problem: Inadequate oral intake, Increased nutrient needs  Intervention: Food and/or Nutrient Delivery: Continue NPO  Nutritional Goals: meet nutritional needs through po intake     4/21/2020 1814 by Manuel Denny RN  Outcome: Ongoing  4/21/2020 1042 by Leny Nunn, MS, RD, LD  Outcome: Ongoing

## 2020-04-21 NOTE — CARE COORDINATION
4 Eyes Skin Assessment    Brandie Landry is being assessed upon: Admission    I agree that Nataliia Pelletier, along with [employer] (either 2 RN's or 1 LPN and 1 RN) have performed a thorough Head to Toe Skin Assessment on the patient. ALL assessment sites listed below have been assessed. Areas assessed by both nurses:     [x]   Head, Face, and Ears   [x]   Shoulders, Back, and Chest  [x]   Arms, Elbows, and Hands   [x]   Coccyx, Sacrum, and Ischium  [x]   Legs, Feet, and Heels    Does the Patient have Skin Breakdown? Yes, wound(s) noted upon assessment. It is the responsibility of the Primary Nurse to assure that the following documentation, preventions, orders, and consults are complete on the above noted wound(s): Wound LDA initiated. LDA Flowsheet Documentation includes the Evy-wound, Wound Assessment, Measurements, Dressing Treatment, Drainage, and Color.     Horace Prevention initiated: Yes  Wound Care Orders initiated: Yes    [address]  nurse consulted for Pressure Injury (Stage 3,4, Unstageable, DTI, NWPT, and Complex wounds) and New or Established Ostomies: NA        Primary Nurse eSignature: Jemima Muñiz RN on 4/21/2020 at 4:44 AM      Co-Signer eSignature: {Esignature:[id number]}

## 2020-04-21 NOTE — CONSULTS
Palliative Care Consult Note  4/21/2020 2:19 PM  Subjective:   Admit Date: 4/20/2020  PCP: Jessenia Miles    Reason For Consult: Goals of care, Code status, Family Support    Requesting Physician: Dr. Van Casiano    History Obtained From: EMR, patient, patient's family, nursing    Chief Complaint: Body Aches    History of Present Illness: Patient is a 76-year-old female with a significant PMH of ESRD on HD, CHF, HTN, CAD with AVR replacement, Verena that presented to the ED on 4/21/2020 due to generalized body aches and LUE fistula dysfunction. Patient was planned for fistulogram tomorrow and missed HD as the fistula was full of clots and unable to flush. She has had progressive fatigue, weakness, and near syncope and is anuric at baseline. In the ED, patient was noted to be tachycardic, but tensive, and was started on Levophed drip but did not tolerate and went into SVT. She was then placed on dopamine and vasopressin to help support her blood pressure, surgery was consulted due to LUE fistula malfunction, nephrology consulted and following for HD management. Labs showing WBC 33.6, ,Ca 8.4, CXR revealing cardiomegaly with bilateral pulmonary opacities, primarily interstitial with perihilar predilection, CHF/edema favored. She was admitted to ICU for further management, currently receiving IV vanc and Merrem, remains on pressors, and has had temporary access placed by vascular surgery. Due to her chronic comorbidities and current critical illness, palliative care was consulted to assist with goals of care, CODE STATUS, family support.     Past Medical History:        Diagnosis Date    Arthritis, rheumatoid (HCC)     CAD (coronary artery disease)     Carotid artery occlusion     CHF (congestive heart failure) (HCC)     Diverticulitis     HIGH CHOLESTEROL     History of blood transfusion     no reactions    Hypertension     Kidney disease     Osteoarthritis     Osteoporosis     Palliative Systems   Constitutional: Positive for activity change and fatigue. HENT: Negative. Eyes: Negative. Respiratory: Positive for shortness of breath. Cardiovascular: Positive for leg swelling. Gastrointestinal: Positive for diarrhea. Endocrine: Negative. Genitourinary: Positive for decreased urine volume. HD dependent   Musculoskeletal: Positive for myalgias. Skin: Negative. Neurological: Negative. Psychiatric/Behavioral: Negative.         Palliative Review of Advance Directives:     Surrogate Decision Maker:yes, daughter     Durable Power of :no    Advanced Directives/Living Parks: yes, copy on file    Out of hospital medical orders in place to reflect resuscitation status (MOLST/POLST): no    Information Sharing:  Patient's awareness of illness:  [] Terminal [] Life-Threatening [x] Serious [] Non life-threatening [] Not serious   [] Not discussed    Family awareness of illness:   [] Terminal [] Life-Threatening [x] Serious [] Nonlife-threatening [] Not serious   [] Not discussed    Diet NPO Effective Now Exceptions are: Sips with Meds    Medications:   norepinephrine 5 mcg/min (04/21/20 0830)    vasopressin (Septic Shock) infusion 0.04 Units/min (04/21/20 1329)     Current Facility-Administered Medications   Medication Dose Route Frequency Provider Last Rate Last Dose    norepinephrine (LEVOPHED) 16 mg in dextrose 5% 250 mL infusion  5 mcg/min Intravenous Continuous Myra Grider MD 4.7 mL/hr at 04/21/20 0830 5 mcg/min at 04/21/20 0830    sodium chloride flush 0.9 % injection 10 mL  10 mL Intravenous 2 times per day Karlene Lua MD   10 mL at 04/21/20 0947    sodium chloride flush 0.9 % injection 10 mL  10 mL Intravenous PRN Karlene Lua MD        acetaminophen (TYLENOL) tablet 650 mg  650 mg Oral Q6H PRN Karlene Lua MD        Or    acetaminophen (TYLENOL) suppository 650 mg  650 mg Rectal Q6H PRN Karlene Lua MD        polyethylene glycol [facility])  ferrous sulfate (IRON 325) tablet 325 mg  325 mg Oral TID Natty Fish MD   325 mg at 04/21/20 1346    Vitamin D (CHOLECALCIFEROL) tablet 2,000 Units  2,000 Units Oral Daily Natty Fish MD   2,000 Units at 04/21/20 0946    virginia-hollis tablet 1 tablet  1 tablet Oral Daily Natty Fish MD        miconazole (MICOTIN) 2 % cream   Topical BID Natty Fish MD        miconazole (MICOTIN) 2 % powder   Topical BID Natty Fish MD        0.9 % sodium chloride bolus  200 mL Intravenous PRN Janeth Duncan MD        0.9 % sodium chloride bolus  100 mL Intravenous Q30 Min PRN Janeth Duncan MD        albumin human 25 % IV solution 12.5 g  12.5 g Intravenous PRN Janeth Duncan MD        diphenhydrAMINE (BENADRYL) injection 25 mg  25 mg Intravenous Q4H PRN Janeth Duncan MD        acetaminophen (TYLENOL) tablet 500 mg  500 mg Oral Q4H PRN Janeth Duncan MD        darbepoetin fior-polysorbate SEVEN [facility]) injection 60 mcg  60 mcg Subcutaneous Weekly - Monday Janeth Duncan MD   60 mcg at 04/21/20 1353        Labs:     Recent Labs     04/20/20  2340   WBC 33.6*   RBC 2.65*   HGB 9.3*   HCT 28.2*   .4*   MCH 35.1*   MCHC 33.0        Recent Labs     04/20/20  2340 04/21/20  1011   * 134*   K 3.6 3.3  3.5   ANIONGAP 19 21*   CL 93* 99   CO2 18* 14*   BUN 39* 37*   CREATININE 6.2* 5.9*   GLUCOSE 113* 84   CALCIUM 8.4* 7.8*     Recent Labs     04/21/20  1011   MG 1.4*     Recent Labs     04/20/20  2340   AST 34*   ALT 25   BILITOT 0.4   ALKPHOS 208*     ABGs:  Recent Labs     04/21/20  1011   PHART 7.280*   BAH7UEX 35.0   PO2ART 70.0*   QVE2EJP 16.4*   BEART -9.5*   HGBAE 7.9*   R5NAHICR 93.7   CARBOXHGBART 1.0   02THERAPY Unknown     HgBA1c: No results for input(s): LABA1C in the last 72 hours.   FLP:    Lab Results   Component Value Date    TRIG 104 12/13/2011    HDL 66 12/13/2011    LDLCALC 75 12/13/2011     TSH:    Lab Results   Component Value Date    TSH 0.71 12/08/2014     Troponin T:   Recent Labs the patient's decision to make herself a DNR and Tatum Bowers verbalizes understanding and states that she will uphold the patient's wishes. She also informed me that the patient is getting to the point that she has questioned whether to continue HD and was not surprised by the patient's choice to change her status to DNR. I talked Tatummadeline Bowers about the ability for the patient to have hospice care if she chooses to forego HD at any point and Tatum Bowers is very open to this and I plan to talk with the patient when she is feeling slightly better. Nursing staff notified of the outcome of my visit and I have updated the CODE STATUS to reflect patient wishes. All questions were answered and I provided emotional support to the patient's daughter and the patient. Palliative care will continue to follow. Recommendations: DNR code status per patient wishes, possibly home with [facility], could consider home with hospice if patient chooses to forego further HD treatments    Thank you for consulting palliative care and allowing us to participate in the care of the patient.       CounselingTopics: Goals of care, Code Status    Time Spent Counselin min                                     Total Face to Face Time: 20 min  Time Spent Reviewing Records/Provider Communication: 15 min  Total Time Spent: 75 min    Electronically signed by SHANE Hicks on 2020 at 2:19 PM    (Please note that portions of this note were completed with a voice recognition program.  Efforts were made to edit the dictations but occasionally words are mis-transcribed.)

## 2020-04-21 NOTE — ED NOTES
ASSESSMENT:    PT ALERT/ORIENTED X4. PUPILS EQUAL/REACTIVE    SKIN:  WARM/DRY multiple bruises to bilateral arms CAPILLARY REFILL < 2SECS poor skin integrity to right hands and right groin and decubitus to coccyx    CARDIAC:  A-fib per montior    LUNGS: CLEAR UPPER AND LOWER LOBES, RESPIRATIONS EVEN/UNLABORED     ABDOMEN: BOWEL SOUNDS NOTED UPPER AND LOWER QUADRANTS                     SOFT AND NONTENDER. EXTREMITIES:  BILATERAL DP AND PT AND +2 EDEMA NOTED BILATERAL LOWER EXTRIMTIES. NO DISTRESS NOTED. SIDE RAILS UP AND CALL LIGHT IN REACH.      Camden Soriano RN  04/21/20 0119

## 2020-04-21 NOTE — PLAN OF CARE
Problem: Falls - Risk of:  Goal: Will remain free from falls  Description: Will remain free from falls  Outcome: Ongoing  Goal: Absence of physical injury  Description: Absence of physical injury  Outcome: Ongoing     Problem:  Bowel Function - Altered:  Goal: Bowel elimination is within specified parameters  Description: Bowel elimination is within specified parameters  Outcome: Ongoing     Problem: Cardiac Output - Decreased:  Goal: Hemodynamic stability will improve  Description: Hemodynamic stability will improve  Outcome: Ongoing     Problem: Fluid Volume - Imbalance:  Goal: Absence of imbalanced fluid volume signs and symptoms  Description: Absence of imbalanced fluid volume signs and symptoms  Outcome: Ongoing     Problem: Skin Integrity - Impaired:  Goal: Will show no infection signs and symptoms  Description: Will show no infection signs and symptoms  Outcome: Ongoing  Goal: Absence of new skin breakdown  Description: Absence of new skin breakdown  Outcome: Ongoing

## 2020-04-21 NOTE — PLAN OF CARE
Vascular:    Patient was scheduled for this am in Radiology Specials with  for a percutaneous thrombolysis/thrombectomy of her LUE AV access, fistulagram/poss angioplasty/poss stenting. Patient admitted through the ER last night with complaints of \"weakness/fatigue/hurting all over\". Patient is a MWF dialysis patient, and has not had treatment since Wednesday, 4/15/20. LUE access site with mild scattered bruising/edema, no thrill or bruit noted. Left hand warm to touch/pink color. Patient currently on levophed and vasopressin, /34 via art line.      Plan for placement of temp dialysis line per  this am.

## 2020-04-22 ENCOUNTER — READMISSION MANAGEMENT (OUTPATIENT)
Dept: CALL CENTER | Facility: HOSPITAL | Age: 80
End: 2020-04-22

## 2020-04-22 PROBLEM — A41.9 SEPSIS (HCC): Status: ACTIVE | Noted: 2020-01-01

## 2020-04-22 PROBLEM — I95.9 HYPOTENSION: Status: ACTIVE | Noted: 2020-01-01

## 2020-04-22 NOTE — OUTREACH NOTE
Medical Week 1 Survey      Responses   [facility] [place], operated by [facility] patient discharged from?  [place]   COVID-19 Test Status  Not tested   Does the patient have one of the following disease processes/diagnoses(primary or secondary)?  Other   Is there a successful TCM telephone encounter documented?  No   Week 1 attempt successful?  No   Unsuccessful attempts  Attempt 2          Valentino Damian RN

## 2020-04-22 NOTE — DISCHARGE SUMMARY
of the cervical and lumbar spine. Thoracic aortic calcifications. Osteopenia and degenerative change of the regional skeleton. .       Impression   1. Cardiomegaly with bilateral pulmonary opacities primarily   interstitial in appearance with a perihilar predilection. CHF/edema   favored. Prior mediastinal surgery. Signed by Dr Pérez Barajas on 4/21/2020 7:38 AM     Narrative   XR CHEST PORTABLE 4/21/2020 12:00 PM   HISTORY: Post right IJ permacath placement   COMPARISON: 4/21/2020   CXR: A single frontal view of the chest is performed. Findings:    Right IJ permacatheter placed with the distal tip projecting over the   right atrium. Persistent bilateral pulmonary opacities, mixed   interstitial and alveolar in appearance. Small left pleural effusion. Stable cardiomegaly. Median sternotomy wires. Prosthetic aortic valve. Thoracic aortic calcification. Gastric band. Postoperative changes of cervical lumbar spine. Osteopenia and advanced degenerative change of the regional skeleton.       Impression   1. Right IJ permacath placement with the distal tip projecting over   the right atrium. No appreciable pneumothorax. 2. Persistent diffuse pulmonary opacities favorable for edema. Probable small left pleural effusion with superimposed left basilar   atelectasis versus consolidation. Persistent cardiomegaly. Prior   mediastinal surgery. Signed by Dr Pérez Barajas on 4/21/2020 1:38 PM     LABS:  Results for Mulugeta Quinonez (MRN [id number]) as of 4/21/2020 22:16   Ref.  Range 4/20/2020 23:35 4/20/2020 23:40 4/21/2020 03:25 4/21/2020 10:11 4/21/2020 13:07 4/21/2020 14:18 4/21/2020 17:15   Sodium Latest Ref Range: 136 - 145 mmol/L  130 (L)  134 (L)      Potassium Latest Ref Range: 3.5 - 5.0 mmol/L  3.6  3.5      Chloride Latest Ref Range: 98 - 111 mmol/L  93 (L)  99      CO2 Latest Ref Range: 22 - 29 mmol/L  18 (L)  14 (L)      BUN Latest Ref Range: 8 - 23 mg/dL  39 (H)  37 (H)      Creatinine Latest Ref

## 2020-04-22 NOTE — CARE COORDINATION
Report given to Tonie Bright in Hospice. Daughters at bedside. Updated throughout the evening. Patient up and alert. Ready to move.

## 2020-04-23 ENCOUNTER — READMISSION MANAGEMENT (OUTPATIENT)
Dept: CALL CENTER | Facility: HOSPITAL | Age: 80
End: 2020-04-23

## 2020-04-23 NOTE — OUTREACH NOTE
Medical Week 1 Survey      Responses   [facility] patient discharged from?  [place]   COVID-19 Test Status  Not tested   Does the patient have one of the following disease processes/diagnoses(primary or secondary)?  Other   Is there a successful TCM telephone encounter documented?  No   Week 1 attempt successful?  No   Revoke  Change in health status-moved to LTC/SNF/Hospice [pt has been transferred to hospice on [facility], pt is non-responsive ]          Ramila Cespedes RN

## 2020-04-24 LAB
BLOOD CULTURE, ROUTINE: ABNORMAL
BLOOD CULTURE, ROUTINE: ABNORMAL
ORGANISM: ABNORMAL

## 2020-04-26 LAB — CULTURE, BLOOD 2: NORMAL

## 2020-08-25 LAB — HOLD SPECIMEN: NORMAL

## 2022-01-05 NOTE — CONSULTS
[facility] Consult  Consults    Date of Admission: 5/12/2019  Date of Consult: 05/13/19    Primary Care Physician: Provider, No Known  Referring Physician: Dr. Laila Fisher  Chief Complaint/Reason for Consultation: Medical management hypertension renal disease coronary artery disease    Subjective   History of Present Illness  Patient is a very pleasant 79-year-old white female past medical history of end-stage renal disease on dialysis Monday Wednesday Friday who has had several hospitalizations this year including for hip fracture close to GI bleeds.  She has chronic anemia which they had not been able to find a cause of really.  She presents with an acute onset of severe abdominal pain starting this evening.  She had nausea and vomiting of some bile-like substance a couple of days ago but no blood.  She presented to the ER was evaluated free air was found on the CT scan of the abdomen.  Surgery was consulted to admit her and has taken her to the OR.  She has undergone an exploratory laparotomy with lysis of adhesions, mobilization of the duodenum with Kocher maneuver, oversew of duodenal ulcer with Cedric patch.  She is now seen in CCU postop.  She is extubated and has an NG tube.  She is in no acute distress.  She is awake and alert her only complaint is of some abdominal pain.  We have been asked to see her and manage her medical problems.  Preop labs were pretty unremarkable except for a BNP of 13,600 which is not unusual for her her creatinine was also 4.51 which is also stable.  Her troponin was less than 0.012.  EKG showed A. fib with a left bundle branch block which is her normal rhythm.  She is currently in A. fib rate controlled.    Review of Systems   Otherwise complete ROS is negative except as mentioned above.    Past Medical History:   Past Medical History:   Diagnosis Date   • Arthritis    • Carotid artery disease (CMS/HCC)    • CHF (congestive heart  failure) (CMS/HCC)    • Chronic kidney disease on chronic dialysis (CMS/HCC)    • Chronic renal failure     on dialysis ON MON, WED, FRI   • Coronary artery disease    • Disease of thyroid gland    • Diverticulitis    • Gastric ulcer    • History of transfusion    • Hyperlipidemia    • Hypertension    • Injury of back    • Mesenteric artery insufficiency (CMS/HCC)    • Multilevel degenerative disc disease    • Osteoporosis    • Pancreatitis    • Pelvis fracture (CMS/HCC)    • Pneumonia      Past Surgical History:  Past Surgical History:   Procedure Laterality Date   • AORTAGRAM Right 1/8/2018    Procedure: MESENTERIC ANGIOGRAM, GROIN ACCESS, MYNX CLOSURE;  Surgeon: Tomasz San DO;  Location: [facility] OR;  Service:    • AORTIC VALVE SURGERY     • APPENDECTOMY     • BACK SURGERY     • CAPSULE ENDOSCOPY N/A 3/30/2019    Procedure: CAPSULE ENDOSCOPY M2A;  Surgeon: David Yu MD;  Location: [facility] ENDOSCOPY;  Service: Gastroenterology   • CARDIAC SURGERY     • CAROTID ENDARTERECTOMY Bilateral    • CATARACT EXTRACTION, BILATERAL     • CERVICAL SPINE SURGERY     • CHOLECYSTECTOMY     • COLON SURGERY     • COLONOSCOPY  10/01/2015   • COLONOSCOPY N/A 3/28/2019    Procedure: COLONOSCOPY WITH ANESTHESIA;  Surgeon: Rafita Merida MD;  Location: [facility] ENDOSCOPY;  Service: Gastroenterology   • CORONARY ARTERY BYPASS GRAFT     • DIALYSIS FISTULA CREATION     • ENDOSCOPY N/A 12/27/2018    Procedure: ESOPHAGOGASTRODUODENOSCOPY WITH ANESTHESIA;  Surgeon: Moni Garza MD;  Location: [facility] ENDOSCOPY;  Service: Gastroenterology   • ENDOSCOPY N/A 2/28/2019    Procedure: ESOPHAGOGASTRODUODENOSCOPY WITH ANESTHESIA;  Surgeon: Moni Garza MD;  Location: [facility] ENDOSCOPY;  Service: Gastroenterology   • ENDOSCOPY N/A 3/11/2019    Procedure: ESOPHAGOGASTRODUODENOSCOPY WITH ANESTHESIA;  Surgeon: Moni Garza MD;  Location: [facility] ENDOSCOPY;  Service: Gastroenterology   • ENDOSCOPY N/A 3/27/2019    Procedure:  ESOPHAGOGASTRODUODENOSCOPY WITH ANESTHESIA;  Surgeon: Rafita Merida MD;  Location: [facility] ENDOSCOPY;  Service: Gastroenterology   • HIP TROCANTERIC NAILING WITH INTRAMEDULLARY HIP SCREW Right 2/8/2019    Procedure: HIP TROCANTERIC NAILING LONG WITH INTRAMEDULLARY HIP SCREW;  Surgeon: Boo Camacho MD;  Location: [facility] OR;  Service: Orthopedics   • JOINT REPLACEMENT      knee   • LAPAROSCOPIC GASTRIC BANDING     • LEEP     • LUMBAR FUSION     • TOE AMPUTATION Left     2nd toe   • TOTAL KNEE ARTHROPLASTY Bilateral    • TUBAL ABDOMINAL LIGATION     • UPPER GASTROINTESTINAL ENDOSCOPY  10/01/2015     Social History:  reports that she has never smoked. She has never used smokeless tobacco. She reports that she does not drink alcohol or use drugs.    Family History: family history includes Cancer in her mother; Coronary artery disease in her brother and father; Diabetes in her brother; Heart attack in her father; Hypertension in her mother.     Allergies: No Known Allergies  Medications: Scheduled Meds:  [START ON 5/14/2019] enoxaparin 30 mg Subcutaneous Daily   metoprolol tartrate 2.5 mg Intravenous Q12H   pantoprazole 40 mg Intravenous Q AM   piperacillin-tazobactam 2.25 g Intravenous Q8H     Continuous Infusions:  dextrose 5 % and sodium chloride 0.45 % with KCl 20 mEq/L 100 mL/hr Last Rate: 100 mL/hr (05/13/19 0522)   Morphine       PRN Meds:.naloxone  •  ondansetron **OR** ondansetron  •  [COMPLETED] Insert peripheral IV **AND** sodium chloride    Objective   Objective    Physical Exam:   Temp:  [97.6 °F (36.4 °C)-98 °F (36.7 °C)] 98 °F (36.7 °C)  Heart Rate:  [] 94  Resp:  [18-22] 20  BP: ()/(34-99) 96/42  Physical Exam  Gen.:  Well-nourished well-developed white female in no acute distress  HEENT: Atraumatic, normocephalic.  Pupils equal, round, and reactive to light. Extraocular movements intact.  Sclerae anicteric.  TMs negative.  Mucous membranes moist.  Neck is supple without lymphadenopathy.   No JVD is noted.  No carotid bruits are auscultated.  Oropharynx is without erythema or exudate.  NG tube is in nares  Chest: Clear to auscultation and percussion.  CV: Irregularly irregular normal S1-S2.  No gallops, murmurs. or rubs.  Abdomen: Soft, appropriately tender, nondistended.  Hypo-active bowel sounds,  No hepatosplenomegaly.  No masses.  No hernias.  Extremities: No clubbing, edema, or cyanosis.  Capillary refill is normal.  Pulses are 2+ and symmetric at radial and dorsalis pedis.  Posterior tibial pulses are intact and 2+ palpable.  Neuro: Patient is awake, alert, and oriented ×3.  Cranial nerves II through XII are grossly intact.  Motor is 5 out of 5 bilaterally.  DTRs are 2+ and symmetric bilaterally. Sensory exam is nonfocal  Skin: Warm, dry, and intact.  No evidence of breakdown.  Sensorium: Normal thought and affect    Nursing notes and vital signs reviewed      Results Reviewed:  I have personally reviewed current lab, radiology, and data and agree with results.  Lab Results (last 24 hours)     Procedure Component Value Units Date/Time    Magnesium [[id number]]  (Normal) Collected:  05/13/19 0023    Specimen:  Blood Updated:  05/13/19 0610     Magnesium 1.6 mg/dL     Phosphorus [[id number]]  (Normal) Collected:  05/13/19 0023    Specimen:  Blood Updated:  05/13/19 0610     Phosphorus 3.6 mg/dL     Amylase [[id number]]  (Abnormal) Collected:  05/13/19 0023    Specimen:  Blood Updated:  05/13/19 0308     Amylase <30 U/L     Lipase [[id number]]  (Abnormal) Collected:  05/13/19 0023    Specimen:  Blood Updated:  05/13/19 0308     Lipase <10 U/L     [place] Draw [[id number]] Collected:  05/12/19 2133    Specimen:  Blood Updated:  05/13/19 0130    Narrative:       The following orders were created for panel order [place] Draw.  Procedure                               Abnormality         Status                     ---------                               -----------         ------                     Light  Blue Top[[id number]]                                   Final result               Green Top (Gel)[[id number]]                                  Final result               Lavender Top[[id number]]                                     Final result               Red Top[[id number]]                                          Final result                 Please view results for these tests on the individual orders.    Red Top [[id number]] Collected:  05/13/19 0023    Specimen:  Blood Updated:  05/13/19 0130     Extra Tube Hold for add-ons.     Comment: Auto resulted.       BNP [[id number]]  (Abnormal) Collected:  05/12/19 2133    Specimen:  Blood Updated:  05/13/19 0054     proBNP 13,600.0 pg/mL     Protime-INR [[id number]]  (Abnormal) Collected:  05/13/19 0023    Specimen:  Blood Updated:  05/13/19 0046     Protime 18.0 Seconds      INR 1.44    aPTT [[id number]]  (Normal) Collected:  05/13/19 0023    Specimen:  Blood Updated:  05/13/19 0046     PTT 34.8 seconds     Blood Culture - Blood, Hand, Right [[id number]] Collected:  05/13/19 0023    Specimen:  Blood from Hand, Right Updated:  05/13/19 0043    Light Blue Top [[id number]] Collected:  05/12/19 2133    Specimen:  Blood Updated:  05/12/19 2245     Extra Tube hold for add-on     Comment: Auto resulted       Green Top (Gel) [[id number]] Collected:  05/12/19 2133    Specimen:  Blood Updated:  05/12/19 2245     Extra Tube Hold for add-ons.     Comment: Auto resulted.       Lavender Top [[id number]] Collected:  05/12/19 2133    Specimen:  Blood Updated:  05/12/19 2245     Extra Tube hold for add-on     Comment: Auto resulted       Troponin [[id number]]  (Normal) Collected:  05/12/19 2133    Specimen:  Blood Updated:  05/12/19 2206     Troponin I <0.012 ng/mL     Lactic Acid, Plasma [[id number]]  (Normal) Collected:  05/12/19 2148    Specimen:  Blood Updated:  05/12/19 2203     Lactate 1.4 mmol/L     Comprehensive Metabolic Panel [[id number]]  (Abnormal) Collected:  05/12/19 2133     Specimen:  Blood Updated:  05/12/19 2154     Glucose 92 mg/dL      BUN 27 mg/dL      Creatinine 4.51 mg/dL      Sodium 136 mmol/L      Potassium 4.0 mmol/L      Chloride 96 mmol/L      CO2 32.0 mmol/L      Calcium 8.4 mg/dL      Total Protein 6.1 g/dL      Albumin 3.00 g/dL      ALT (SGPT) 17 U/L      AST (SGOT) 35 U/L      Alkaline Phosphatase 215 U/L      Total Bilirubin 0.5 mg/dL      eGFR Non African Amer 9 mL/min/1.73      Comment: <15 Indicative of kidney failure.        eGFR   Amer -- mL/min/1.73      Comment: <15 Indicative of kidney failure.        Globulin 3.1 gm/dL      A/G Ratio 1.0 g/dL      BUN/Creatinine Ratio 6.0     Anion Gap 8.0 mmol/L     Narrative:       GFR Normal >60  Chronic Kidney Disease <60  Kidney Failure <15    Lipase [[id number]]  (Abnormal) Collected:  05/12/19 2133    Specimen:  Blood Updated:  05/12/19 2154     Lipase 14 U/L     CBC & Differential [[id number]] Collected:  05/12/19 2133    Specimen:  Blood Updated:  05/12/19 2144    Narrative:       The following orders were created for panel order CBC & Differential.  Procedure                               Abnormality         Status                     ---------                               -----------         ------                     CBC Auto Differential[[id number]]        Abnormal            Final result                 Please view results for these tests on the individual orders.    CBC Auto Differential [[id number]]  (Abnormal) Collected:  05/12/19 2133    Specimen:  Blood Updated:  05/12/19 2144     WBC 9.31 10*3/mm3      RBC 3.78 10*6/mm3      Hemoglobin 11.9 g/dL      Hematocrit 37.6 %      MCV 99.5 fL      MCH 31.5 pg      MCHC 31.6 g/dL      RDW 16.5 %      RDW-SD 60.2 fl      MPV 10.8 fL      Platelets 197 10*3/mm3      Neutrophil % 87.7 %      Lymphocyte % 6.4 %      Monocyte % 3.4 %      Eosinophil % 1.7 %      Basophil % 0.4 %      Immature Grans % 0.4 %      Neutrophils, Absolute 8.15 10*3/mm3      Lymphocytes,  Absolute 0.60 10*3/mm3      Monocytes, Absolute 0.32 10*3/mm3      Eosinophils, Absolute 0.16 10*3/mm3      Basophils, Absolute 0.04 10*3/mm3      Immature Grans, Absolute 0.04 10*3/mm3      nRBC 0.0 /100 WBC         Imaging Results (last 24 hours)     Procedure Component Value Units Date/Time    XR Chest 1 View [[id number]]  (Abnormal) Resulted:  05/13/19 0050     Updated:  05/13/19 0051    CT Abdomen Pelvis Without Contrast [[id number]] Updated:  05/12/19 2335          Assessment / Plan   Assessment:     Active Hospital Problems    Diagnosis   • Abdominal pain   1.  Perforated duodenal ulcer status post surgical repair patient is stable and CCU will monitor closely will check EKG now and daily for 3 days to make sure no significant changes.  We will also check a troponin now and x2 since she does have a high risk of cardiac problems postop.  She is on a beta-blocker will continue this IV.  2.  End-stage renal disease on hemodialysis nephrology has been consulted will check mag and phosphorus.  3.  Acute blood loss anemia stable for her monitor serial CBCs.  4.  Diastolic congestive heart failure with bioprosthetic aortic valve BMPs are unreliable due to her end-stage renal disease monitor clinical exam currently she is stable.  Of note preop chest x-ray shows bilateral infiltrates consistent with probable CHF similar to previous exams as well as free air under the diaphragm    Thank you for allowing us to see this interesting consultation we will follow along with you closely.    Patient seen after midnight      I discussed the patient's findings and my recommendations with patient and her daughter who is healthcare power surrogate with the rest of her siblings..    Jony Concepcion MD  05/13/19  6:13 AM           Eye Shield Used: No

## 2023-05-20 NOTE — DISCHARGE SUMMARY
[facility]  DISCHARGE SUMMARY       Date of Admission: 3/8/2019  Date of Discharge:  3/12/2019  Primary Care Physician: Barrett Mckenzie Jr, MD    Presenting Problem/History of Present Illness:  Anemia; melena    Final Discharge Diagnoses:   **Gastrointestinal hemorrhage   • Volume overload       Secondary to blood product transfusion.       • Hypothyroid   • Diastolic heart failure (CMS/HCC)       EF 55-60% from echocardiogram on 3/15      • Acute blood loss anemia       Superimposed on anemia of chronic disease      • Black tarry stools       Added automatically from request for surgery [id number]      • Hypertension   • Hyperlipidemia   • Chronic kidney disease on chronic dialysis (CMS/HCC)       Monday/Wednesday/Friday         Consults:   1. Dr. Moni Garza-gastroenterology    Procedures Performed:   1. Upper GI endoscopy 3/11-hemoclip in place.     Pertinent Test Results:   Imaging Results (last 7 days)     Procedure Component Value Units Date/Time    XR Chest 1 View [[id number]] Collected:  03/10/19 1408     Updated:  03/10/19 1413    Narrative:       HISTORY: Volume overload     CXR: A frontal view the chest is obtained and compared to the 3/8/2018  study.     Cardiac silhouette is enlarged. There are median sternotomy wires with a  proximal ascending aortic stent. There are diffuse bilateral  predominantly interstitial pulmonary opacities with questionable trace  pleural effusions. There is no pneumothorax. No acute bony pathology.  There are degenerative changes of the regional skeleton. Postoperative  change of the cervical spine. There is thoracic aortic calcification.  Gastric band identified.       Impression:       1. Cardiomegaly with similar diffuse primarily interstitial pulmonary  opacities suggestive for CHF.  This report was finalized on 03/10/2019 14:10 by Dr. Jacquie Salazar MD.    XR Abdomen KUB [[id number]] Collected:  03/09/19 8025      Updated:  03/09/19 1340    Narrative:       HISTORY: Abdominal pain     KUB: Frontal view of the upper abdomen is performed. Pelvis are included  on the exam.     There is no free air. Gastric band is in place. There are postoperative  changes of the lumbar spine. There are cholecystectomy clips. There is  evidence prior mediastinal surgery. Proximal ascending aortic stent in  place.     No dilated loops of bowel are seen within the upper abdomen. Vascular  calcification suspected. Chronic interstitial lung changes noted within  the lung bases. Cardiomegaly.       Impression:       1. No dilated loops of bowel seen within the visible upper abdomen.  Chronic and postoperative changes as described above.  This report was finalized on 03/09/2019 13:36 by Dr. Jacquie Salazar MD.    CT Abdomen Pelvis Without Contrast [[id number]] Collected:  03/09/19 1323     Updated:  03/09/19 1332    Narrative:       CT ABDOMEN PELVIS WO CONTRAST- 3/9/2019 1:13 AM CST     HISTORY: epigastric pain      COMPARISON: 12/14/2017     DOSE LENGTH PRODUCT: 493 mGy cm. Automated exposure control was also  utilized to decrease patient radiation dose.     TECHNIQUE: Axial images of the abdomen and pelvis are obtained without  IV or oral contrast.     FINDINGS:  There is cardiomegaly. There are chronic basilar interstitial  lung changes.     The nonenhanced liver, spleen are unremarkable. The adrenal glands are  stable. There is atrophy of the pancreas. Accessory splenules are  suspected along the tail of the pancreas. There is a stable 11 mm cyst  seen along the tail of the pancreas, unchanged from the study performed  in 2017. Gallbladder surgically absent. There is bilateral renal  atrophy. There are diffuse renal artery calcifications. There is a left  renal cyst. There is no hydronephrosis. Bladder is under distended.  Uterus is unremarkable. No adnexal mass identified.     There is no free intraperitoneal air loculated abscess. There is  [Patient reported PAP Smear was normal] : Patient reported PAP Smear was normal [LMP unknown] : LMP unknown left  colonic diverticulosis. There is no evidence for bowel obstruction.  Stomach is underdistended. Gastric band in place.     There is moderately diffuse vascular calcification but no aneurysm. No  pathologic lymphadenopathy visualized.     There is postoperative changes right proximal femur. There are old  fractures of the left pelvis. There is advanced osteopenia.  Postoperative changes lumbar spine.       Impression:       1. No acute inflammatory process seen within the abdomen or pelvis.  2. Stable 11 mm cystic lesion along the tail the pancreas. Stability  since 2017 favors benignity. Atrophy of pancreatic tissue.   2. Bilateral renal atrophy with renal artery calcification. Left renal  cyst.  3. Gastric band. Previous cholecystectomy.  4. Diffuse dense vascular calcification.   5. Postoperative changes of the lumbar spine.  6. Cardiomegaly. Chronic basilar interstitial lung change.     Comments: A preliminary report is issued to the ER by the [facility]  radiology service.   This report was finalized on 03/09/2019 13:29 by Dr. Jacquie Salazar MD.    XR Chest 1 View [[id number]] Collected:  03/09/19 1320     Updated:  03/09/19 1329    Narrative:       HISTORY: Epigastric pain     CXR: A frontal view the chest is obtained and compared to the to  7/20/2019 study.     There is evidence of prior mediastinal surgery with a proximal ascending  aortic root stent identified. Cardiac silhouette remains enlarged. There  are chronic interstitial lung changes. A component of superimposed edema  considered. No pleural effusion or pneumothorax is identified. There is  no lobar consolidation. A gastric band is in place. The postoperative  changes of the cervical spine.       Impression:       1. Stable cardiomegaly with chronic interstitial lung disease. A  Component of superimposed edema considered. No lobar consolidation.  Prior mediastinal surgery. Aortic root stent. Gastric band.  This report was finalized on 03/09/2019  13:23 by Dr. Jacquie Salazar MD.        Lab Results (last 7 days)     Procedure Component Value Units Date/Time    Comprehensive Metabolic Panel [[id number]]  (Abnormal) Collected:  03/12/19 0534    Specimen:  Blood Updated:  03/12/19 0640     Glucose 79 mg/dL      BUN 20 mg/dL      Creatinine 3.74 mg/dL      Sodium 133 mmol/L      Potassium 3.7 mmol/L      Chloride 95 mmol/L      CO2 29.0 mmol/L      Calcium 7.9 mg/dL      Total Protein 5.2 g/dL      Albumin 2.70 g/dL      ALT (SGPT) 17 U/L      AST (SGOT) 28 U/L      Alkaline Phosphatase 161 U/L      Total Bilirubin 0.6 mg/dL      eGFR Non African Amer 12 mL/min/1.73      Comment: <15 Indicative of kidney failure.        eGFR   Amer -- mL/min/1.73      Comment: <15 Indicative of kidney failure.        Globulin 2.5 gm/dL      A/G Ratio 1.1 g/dL      BUN/Creatinine Ratio 5.3     Anion Gap 9.0 mmol/L     Narrative:       GFR Normal >60  Chronic Kidney Disease <60  Kidney Failure <15    CBC Auto Differential [[id number]]  (Abnormal) Collected:  03/12/19 0534    Specimen:  Blood Updated:  03/12/19 0626     WBC 7.67 10*3/mm3      RBC 2.87 10*6/mm3      Hemoglobin 9.7 g/dL      Hematocrit 29.4 %      .4 fL      MCH 33.8 pg      MCHC 33.0 g/dL      RDW 19.9 %      RDW-SD 75.6 fl      MPV 10.7 fL      Platelets 134 10*3/mm3      Neutrophil % 77.7 %      Lymphocyte % 10.2 %      Monocyte % 6.8 %      Eosinophil % 4.3 %      Basophil % 0.7 %      Immature Grans % 0.3 %      Neutrophils, Absolute 5.97 10*3/mm3      Lymphocytes, Absolute 0.78 10*3/mm3      Monocytes, Absolute 0.52 10*3/mm3      Eosinophils, Absolute 0.33 10*3/mm3      Basophils, Absolute 0.05 10*3/mm3      Immature Grans, Absolute 0.02 10*3/mm3      nRBC 0.0 /100 WBC     Hepatitis Panel, Acute [[id number]]  (Normal) Collected:  03/11/19 1442    Specimen:  Blood Updated:  03/11/19 1601     HCV S/C Ratio 0.06     Hepatitis C Ab Negative     Hep A IgM Negative     Hep B C IgM Negative     Hepatitis B  [N] : Patient reports normal menses Surface Ag Negative    Comprehensive Metabolic Panel [[id number]]  (Abnormal) Collected:  03/11/19 0623    Specimen:  Blood Updated:  03/11/19 0745     Glucose 75 mg/dL      BUN 35 mg/dL      Creatinine 5.20 mg/dL      Sodium 135 mmol/L      Potassium 4.6 mmol/L      Chloride 95 mmol/L      CO2 28.0 mmol/L      Calcium 8.6 mg/dL      Total Protein 5.7 g/dL      Albumin 2.90 g/dL      ALT (SGPT) 23 U/L      AST (SGOT) 24 U/L      Alkaline Phosphatase 172 U/L      Total Bilirubin 0.5 mg/dL      eGFR Non African Amer 8 mL/min/1.73      Comment: <15 Indicative of kidney failure.        eGFR   Amer -- mL/min/1.73      Comment: <15 Indicative of kidney failure.        Globulin 2.8 gm/dL      A/G Ratio 1.0 g/dL      BUN/Creatinine Ratio 6.7     Anion Gap 12.0 mmol/L     Narrative:       The MDRD GFR formula is only valid for adults with stable renal function between ages 18 and 70.    CBC Auto Differential [[id number]]  (Abnormal) Collected:  03/11/19 0623    Specimen:  Blood Updated:  03/11/19 0731     WBC 7.28 10*3/mm3      RBC 3.22 10*6/mm3      Hemoglobin 10.4 g/dL      Hematocrit 33.0 %      .5 fL      MCH 32.3 pg      MCHC 31.5 g/dL      RDW 21.2 %      RDW-SD 80.0 fl      MPV 9.9 fL      Platelets 184 10*3/mm3      Neutrophil % 73.2 %      Lymphocyte % 13.6 %      Monocyte % 7.7 %      Eosinophil % 4.3 %      Basophil % 0.7 %      Immature Grans % 0.5 %      Neutrophils, Absolute 5.33 10*3/mm3      Lymphocytes, Absolute 0.99 10*3/mm3      Monocytes, Absolute 0.56 10*3/mm3      Eosinophils, Absolute 0.31 10*3/mm3      Basophils, Absolute 0.05 10*3/mm3      Immature Grans, Absolute 0.04 10*3/mm3      nRBC 0.0 /100 WBC     CBC Auto Differential [[id number]]  (Abnormal) Collected:  03/10/19 0555    Specimen:  Blood Updated:  03/10/19 0628     WBC 6.58 10*3/mm3      RBC 2.80 10*6/mm3      Hemoglobin 9.4 g/dL      Hematocrit 28.8 %      .9 fL      MCH 33.6 pg      MCHC 32.6 g/dL      RDW 22.5 %       [IUD] : has an intrauterine device RDW-SD 83.7 fl      MPV 9.7 fL      Platelets 177 10*3/mm3      Neutrophil % 75.4 %      Lymphocyte % 14.4 %      Monocyte % 7.0 %      Eosinophil % 1.8 %      Basophil % 0.5 %      Immature Grans % 0.9 %      Neutrophils, Absolute 4.96 10*3/mm3      Lymphocytes, Absolute 0.95 10*3/mm3      Monocytes, Absolute 0.46 10*3/mm3      Eosinophils, Absolute 0.12 10*3/mm3      Basophils, Absolute 0.03 10*3/mm3      Immature Grans, Absolute 0.06 10*3/mm3      nRBC 0.0 /100 WBC     Protime-INR [[id number]]  (Abnormal) Collected:  03/10/19 0449    Specimen:  Blood Updated:  03/10/19 0553     Protime 16.3 Seconds      INR 1.27    Comprehensive Metabolic Panel [[id number]]  (Abnormal) Collected:  03/09/19 0858    Specimen:  Blood Updated:  03/09/19 0915     Glucose 84 mg/dL      BUN 20 mg/dL      Creatinine 2.95 mg/dL      Sodium 139 mmol/L      Potassium 4.1 mmol/L      Chloride 99 mmol/L      CO2 32.0 mmol/L      Calcium 7.9 mg/dL      Total Protein 4.9 g/dL      Albumin 2.50 g/dL      ALT (SGPT) 17 U/L      AST (SGOT) 23 U/L      Alkaline Phosphatase 157 U/L      Total Bilirubin 0.6 mg/dL      eGFR Non African Amer 15 mL/min/1.73      Globulin 2.4 gm/dL      A/G Ratio 1.0 g/dL      BUN/Creatinine Ratio 6.8     Anion Gap 8.0 mmol/L     Narrative:       The MDRD GFR formula is only valid for adults with stable renal function between ages 18 and 70.    CBC Auto Differential [[id number]]  (Abnormal) Collected:  03/09/19 0858    Specimen:  Blood Updated:  03/09/19 0914     WBC 6.12 10*3/mm3      RBC 2.77 10*6/mm3      Hemoglobin 9.1 g/dL      Hematocrit 28.7 %      .6 fL      MCH 32.9 pg      MCHC 31.7 g/dL      RDW 22.5 %      RDW-SD 82.9 fl      MPV 9.4 fL      Platelets 175 10*3/mm3      Neutrophil % 81.7 %      Lymphocyte % 10.1 %      Monocyte % 4.9 %      Eosinophil % 1.8 %      Basophil % 0.5 %      Immature Grans % 1.0 %      Neutrophils, Absolute 5.00 10*3/mm3      Lymphocytes, Absolute 0.62 10*3/mm3      Monocytes,  [Y] : Positive pregnancy history Absolute 0.30 10*3/mm3      Eosinophils, Absolute 0.11 10*3/mm3      Basophils, Absolute 0.03 10*3/mm3      Immature Grans, Absolute 0.06 10*3/mm3      nRBC 0.0 /100 WBC     BNP [[id number]]  (Abnormal) Collected:  03/08/19 2309    Specimen:  Blood Updated:  03/09/19 0014     proBNP 48,700.0 pg/mL     TSH [[id number]]  (Abnormal) Collected:  03/08/19 2309    Specimen:  Blood Updated:  03/09/19 0008     TSH 28.000 mIU/mL     Troponin [[id number]]  (Abnormal) Collected:  03/08/19 2309    Specimen:  Blood Updated:  03/08/19 2349     Troponin I 0.038 ng/mL     Lactic Acid, Plasma [[id number]]  (Normal) Collected:  03/08/19 2309    Specimen:  Blood Updated:  03/08/19 2346     Lactate 1.9 mmol/L     aPTT [[id number]]  (Abnormal) Collected:  03/08/19 2310    Specimen:  Blood Updated:  03/08/19 2343     PTT 40.0 seconds     Protime-INR [[id number]]  (Abnormal) Collected:  03/08/19 2310    Specimen:  Blood Updated:  03/08/19 2343     Protime 17.5 Seconds      INR 1.39    Magnesium [[id number]]  (Normal) Collected:  03/08/19 2309    Specimen:  Blood Updated:  03/08/19 2337     Magnesium 1.8 mg/dL     Comprehensive Metabolic Panel [[id number]]  (Abnormal) Collected:  03/08/19 2309    Specimen:  Blood Updated:  03/08/19 2332     Glucose 95 mg/dL      BUN 17 mg/dL      Creatinine 2.63 mg/dL      Sodium 139 mmol/L      Potassium 3.6 mmol/L      Chloride 96 mmol/L      CO2 33.0 mmol/L      Calcium 8.1 mg/dL      Total Protein 5.2 g/dL      Albumin 2.80 g/dL      ALT (SGPT) 17 U/L      AST (SGOT) 32 U/L      Alkaline Phosphatase 166 U/L      Total Bilirubin 0.4 mg/dL      eGFR Non African Amer 18 mL/min/1.73      Globulin 2.4 gm/dL      A/G Ratio 1.2 g/dL      BUN/Creatinine Ratio 6.5     Anion Gap 10.0 mmol/L     Narrative:       The MDRD GFR formula is only valid for adults with stable renal function between ages 18 and 70.    CBC Auto Differential [[id number]]  (Abnormal) Collected:  03/08/19 1809    Specimen:  Blood Updated:  03/08/19  [unknown] : Patient is unsure of the date of her LMP 2327     WBC 6.47 10*3/mm3      RBC 1.78 10*6/mm3      Hemoglobin 6.1 g/dL      Hematocrit 20.1 %      .9 fL      MCH 34.3 pg      MCHC 30.3 g/dL      RDW 16.1 %      RDW-SD 65.8 fl      MPV 9.7 fL      Platelets 209 10*3/mm3      Neutrophil % 74.0 %      Lymphocyte % 14.8 %      Monocyte % 7.3 %      Eosinophil % 2.8 %      Basophil % 0.5 %      Immature Grans % 0.6 %      Neutrophils, Absolute 4.79 10*3/mm3      Lymphocytes, Absolute 0.96 10*3/mm3      Monocytes, Absolute 0.47 10*3/mm3      Eosinophils, Absolute 0.18 10*3/mm3      Basophils, Absolute 0.03 10*3/mm3      Immature Grans, Absolute 0.04 10*3/mm3      nRBC 0.3 /100 WBC         Hospital Course:  The patient is a 78 y.o. 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  She is also status post recent right hip full medullary nailing of a displaced intertrochanteric hip fracture 2/8.  She presented to the emergency room on 3/8/19 after having a 3-day history of more frequent stooling and associated melena.  He recently had endoscopy showing LA grade B esophagitis with biopsy taken and then she bled from the biopsy site and the Endo Clip was placed.  Hemoglobin on admission was 6.1.  She did receive dialysis prior to resenting to the emergency department.  She was transfused 2 units packed red blood cells and admitted to the hospitalist service for further evaluation management.    Was placed on a Protonix drip and was seen in consultation by Dr. Moni Garza with gastroenterology.  Likely, Dr. Garza had done her previous 2 endoscopies.  She did have one melena stool throughout her hospitalization she was kept n.p.o. initially given clear liquids.  She underwent endoscopy yesterday showing the Hemoclip in place with no signs of  [Menarche Age: ____] : age at menarche was [unfilled] "bleeding.  Her hemoglobin did rise nicely with a discharge hemoglobin of 9.7.  She was 10.2 yesterday.  She did ultimately receive dialysis after showing some volume overload on her chest x-ray.  Patient tells me that today she feels well.  She has not had any chest pain or shortness of breath.  She is very anxious to return to rehab to work on her hip.  She is to maintain off Plavix until 48 more hours.  She can restart her Plavix on 3/14.  Her aspirin has been dose reduced from 325 twice daily to 81 mg daily.  Her other medications have been resumed.  She is stable for discharge back to [facility] today.  She to maintain toe-touch weightbearing on the right lower extremity.  She would need a CBC on Friday which can be drawn in dialysis if need be.     Physical Exam on Discharge:  /51 (BP Location: Right arm, Patient Position: Lying)   Pulse 75   Temp 98.2 °F (36.8 °C) (Oral)   Resp 18   Ht 151.4 cm (59.6\")   Wt 77.6 kg (171 lb)   SpO2 94%   BMI 33.85 kg/m²   Physical Exam   Constitutional: She is oriented to person, place, and time. She appears well-developed and well-nourished.   HENT:   Head: Normocephalic and atraumatic.   Eyes: Conjunctivae and EOM are normal. Pupils are equal, round, and reactive to light.   Neck: Neck supple. No JVD present. No thyromegaly present.   Cardiovascular: Normal rate, regular rhythm, normal heart sounds and intact distal pulses. Exam reveals no gallop and no friction rub.   No murmur heard.  Pulmonary/Chest: Effort normal. No respiratory distress. She has no wheezes. She has no rales. She exhibits no tenderness.   diminished bilaterally    Abdominal: Soft. Bowel sounds are normal. She exhibits no distension. There is no tenderness. There is no rebound and no guarding.   Musculoskeletal: Normal range of motion. She exhibits no edema, tenderness or deformity.   Lymphadenopathy:     She has no cervical adenopathy.   Neurological: She is alert and oriented to " [FreeTextEntry1] : 32 y/o  with Mirena in place s/p robotic-assisted laparoscopic excision of endometriosis, laparoscopic right ovarian cystectomy, laparoscopic lysis of pelvic adhesions and laparoscopic chromopertubation, and insertion of Mirena intrauterine device on 4/10/23 presenting for follow up of endometriosis and Mirena. Patient reports some light bleeding/discharge and no other complaints. Pain from surgery has resolved and pelvic pain she was experiencing prior to surgery similarly has not returned. [PapSmeardate] : 02/20/23 person, place, and time. She displays normal reflexes. No cranial nerve deficit. She exhibits normal muscle tone.   Skin: Skin is warm and dry. No rash noted. There is pallor.   Psychiatric: She has a normal mood and affect. Her behavior is normal. Judgment and thought content normal.     Condition on Discharge: stable    Discharge Disposition:  Skilled Nursing Facility (DC - External)    Discharge Medications:     Discharge Medications      New Medications      Instructions Start Date   acetaminophen 325 MG tablet  Commonly known as:  TYLENOL   650 mg, Oral, Every 6 Hours PRN      Polyethyl Glyc-Propyl Glyc PF 0.4-0.3 % solution ophthalmic solution   2 drops, Both Eyes, Every 2 Hours PRN         Changes to Medications      Instructions Start Date   aspirin 81 MG EC tablet  What changed:    · medication strength  · how much to take  · when to take this   81 mg, Oral, Daily         Continue These Medications      Instructions Start Date   carvedilol 3.125 MG tablet  Commonly known as:  COREG   3.125 mg, Oral, 2 Times Daily With Meals, On Non-dialysis days- Sunday, Tuesday, Thursday, Saturday.  Hold for systolic BP <100      carvedilol 3.125 MG tablet  Commonly known as:  COREG   3.125 mg, Oral, Daily, On dialysis days-Monday, Wednesday, Friday.  Hold for systolic BP <100      clopidogrel 75 MG tablet  Commonly known as:  PLAVIX   75 mg, Oral, Daily      D3 ADULT PO   5,000 Units, Oral, Daily      DAILY-SANGEETA PO   1 tablet, Oral, Daily      diphenhydrAMINE 25 mg capsule  Commonly known as:  BENADRYL   25 mg, Oral, 2 Times Daily PRN      docusate sodium 100 MG capsule   100 mg, Oral, 2 Times Daily      HAIR SKIN AND NAILS FORMULA PO   2 tablets, Oral, Daily      lactulose 20 GM/30ML solution solution   20 g, Oral, Daily PRN      levothyroxine 150 MCG tablet  Commonly known as:  SYNTHROID, LEVOTHROID   150 mcg, Oral, Daily      losartan 25 MG tablet  Commonly known as:  COZAAR   25 mg, Oral, Daily (Monday-Friday),  Monday, Wednesday, And Friday only.      midodrine 2.5 MG tablet  Commonly known as:  PROAMATINE   2.5 mg, Oral, 3 Times Daily      ondansetron ODT 4 MG disintegrating tablet  Commonly known as:  ZOFRAN-ODT   4 mg, Oral, Every 8 Hours PRN      oxyCODONE-acetaminophen  MG per tablet  Commonly known as:  PERCOCET   1 tablet, Oral, Every 4 Hours PRN      pantoprazole 40 MG EC tablet  Commonly known as:  PROTONIX   40 mg, Oral, Daily      pravastatin 40 MG tablet  Commonly known as:  PRAVACHOL   40 mg, Oral, Daily      sertraline 50 MG tablet  Commonly known as:  ZOLOFT   50 mg, Oral, Daily      sucralfate 1 GM/10ML suspension  Commonly known as:  CARAFATE   1 g, Oral, 4 Times Daily      SUPER B COMPLEX/VITAMIN C PO   1 tablet, Oral, Daily         Stop These Medications    nystatin 022725 UNIT/GM powder  Commonly known as:  MYCOSTATIN          Discharge Diet:   Diet Instructions     Diet: Renal, Cardiac; Thin      Discharge Diet:   Renal  Cardiac       Fluid Consistency:  Thin        Activity at Discharge:   Activity Instructions     Discharge Activity Restrictions      1. Toe touch weight bearing on right hip.          Follow-up Appointments:   Future Appointments   Date Time Provider Department Center   7/25/2019  8:30 AM Tomasz San, DO [facility] None   1. Follow up with [facility] MD within 3 days.   2. Dr. Garza as per her recommendation.  3. CBC Friday    Test Results Pending at Discharge: None    ABILIO Fermin  03/12/19  8:34 AM    Time: 25 minutes.     I personally evaluated and examined the patient in conjunction with ABILIO Stockton and agree with the assessment, treatment plan, and disposition of the patient as recorded by her. My history, exam, and further recommendations are:     Discussed with her nurse, Tel.     Hemoglobin 9.7 today.  No signs of ongoing blood loss.  She is back on a baby aspirin and can resume her Plavix soon per GI.  Would continue 81 mg of aspirin daily and 75 mg  [HPVDate] : 02/28/23 [[id number]] : ABNORMAL PER PT of Plavix.  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  She saw Dr. San for this last year.  Aspirin is a chronic medication for her and also provides additional DVT prophylaxis after her recent orthopedic procedure.  She is not a good candidate for anticoagulation.    She has been cleared for discharge by her GI nephrology providers as well.    She last dialyzed yesterday.    Madhav Banks,   03/12/19  10:09 AM       [de-identified] : MIRENA [PGHxTotal] : 5 [[facility] CenterxFulerm] : 1 [[facility] Centeriving] : 1 [PGHxABInduced] : 4

## (undated) DEVICE — ANTIBACTERIAL UNDYED BRAIDED (POLYGLACTIN 910), SYNTHETIC ABSORBABLE SUTURE: Brand: COATED VICRYL

## (undated) DEVICE — DRSNG SURESITE WNDW 4X4.5

## (undated) DEVICE — CONMED SCOPE SAVER BITE BLOCK, 20X27 MM: Brand: SCOPE SAVER

## (undated) DEVICE — RADIFOCUS GLIDEWIRE ADVANTAGE GUIDEWIRE: Brand: GLIDEWIRE ADVANTAGE

## (undated) DEVICE — GLV SURG TRIUMPH GREEN W/ALOE PF LTX 8.5 STRL

## (undated) DEVICE — SENSR O2 OXIMAX FNGR A/ 18IN NONSTR

## (undated) DEVICE — BIT DRL 3FLUT QC NDL PT 4.2X145MM

## (undated) DEVICE — RESERVOIR,SUCTION,100CC,SILICONE: Brand: MEDLINE

## (undated) DEVICE — Device: Brand: DEFENDO AIR/WATER/SUCTION AND BIOPSY VALVE

## (undated) DEVICE — SUT SILK 3/0 SUTUPAK TIES 24IN SA74H

## (undated) DEVICE — DRN JP RND NO TROC SIL 15F 3/16IN

## (undated) DEVICE — YANKAUER,BULB TIP WITH VENT: Brand: ARGYLE

## (undated) DEVICE — MODEL BT2000 P/N 700287-012KIT CONTENTS: MANIFOLD WITH SALINE AND CONTRAST PORTS, SALINE TUBING WITH SPIKE AND HAND SYRINGE, TRANSDUCER: Brand: BT2000 AUTOMATED MANIFOLD KIT

## (undated) DEVICE — SUT SILK 2/0 SH CR8 18IN CR8 C012D

## (undated) DEVICE — TP SXN YANKR BULB STRL

## (undated) DEVICE — SUT SILK 2/0 SUTUPAK TIES 24IN SA75H

## (undated) DEVICE — PAD GRND REM POLYHESIVE A/ DISP

## (undated) DEVICE — THE CHANNEL CLEANING BRUSH IS A NYLON FLEXI BRUSH ATTACHED TO A FLEXIBLE PLASTIC SHEATH DESIGNED TO SAFELY REMOVE DEBRIS FROM FLEXIBLE ENDOSCOPES.

## (undated) DEVICE — DRSNG WND SIL OPTIFOAM GENTLE BRDR ADHS W/SA 4X4IN

## (undated) DEVICE — TBG SMPL FLTR LINE NASL 02/C02 A/ BX/100

## (undated) DEVICE — CATH FLSH OMNI SFT 5F 90CM

## (undated) DEVICE — GLV SURG BIOGEL M LTX PF 7 1/2

## (undated) DEVICE — Device

## (undated) DEVICE — GW FOR TROCH NAIL 3.2X400MM

## (undated) DEVICE — DRSNG SURESITE123 4X10IN

## (undated) DEVICE — A2000 MULTI-USE SYRINGE KIT, P/N 701277-003KIT CONTENTS: 100ML CONTRAST RESERVOIR AND TUBING WITH CONTRAST SPIKE AND CLAMP: Brand: A2000 MULTI-USE SYRINGE KIT

## (undated) DEVICE — CUFF,BP,DISP,1 TUBE,ADULT,HP: Brand: MEDLINE

## (undated) DEVICE — SYS SKIN CLS DERMABOND PRINEO W/22CM MESH TP

## (undated) DEVICE — ENDOGATOR AUXILIARY WATER JET CONNECTOR: Brand: ENDOGATOR

## (undated) DEVICE — DORADO® PTA BALLOON DILATATION CATHETER 7 MM X 20 MM, 40 CM CATHETER: Brand: DORADO®

## (undated) DEVICE — CLTH CLENS READYCLEANSE PERI CARE PK/5

## (undated) DEVICE — PROXIMATE RH ROTATING HEAD SKIN STAPLERS (35 WIDE) CONTAINS 35 STAINLESS STEEL STAPLES: Brand: PROXIMATE

## (undated) DEVICE — MODEL AT P65, P/N 701554-001KIT CONTENTS: HAND CONTROLLER, 3-WAY HIGH-PRESSURE STOPCOCK WITH ROTATING END AND PREMIUM HIGH-PRESSURE TUBING: Brand: ANGIOTOUCH® KIT

## (undated) DEVICE — SUT PDS LP 1 TP1 96IN VIO PDP880GA

## (undated) DEVICE — SUT PDS 1 CTX 36IN VIO PDP371T

## (undated) DEVICE — APPL CHLORAPREP W/TINT 26ML ORNG

## (undated) DEVICE — ELECTRD BLD EXT EDGE/INSUL 6IN

## (undated) DEVICE — PAD MAJOR: Brand: MEDLINE INDUSTRIES, INC.

## (undated) DEVICE — GLV SURG SENSICARE W/ALOE PF LF 7.5 STRL

## (undated) DEVICE — 3M™ IOBAN™ 2 ANTIMICROBIAL INCISE DRAPE 6651EZ: Brand: IOBAN™ 2

## (undated) DEVICE — MASK,OXYGEN,MED CONC,ADLT,7' TUB, UC: Brand: PENDING

## (undated) DEVICE — PK TURNOVER RM ADV

## (undated) DEVICE — BG BANDED WRUBBER BAND AND TP 36X54IN

## (undated) DEVICE — THE SINGLE USE ETRAP – POLYP TRAP IS USED FOR SUCTION RETRIEVAL OF ENDOSCOPICALLY REMOVED POLYPS.: Brand: ETRAP

## (undated) DEVICE — FRCP BX RADJAW4 NDL 2.8 240 STD OG

## (undated) DEVICE — CVR UNIV C/ARM

## (undated) DEVICE — MYNXGRIP 5F: Brand: MYNXGRIP

## (undated) DEVICE — ST MIC/INTRO ACC SHRP/NDL TUNG/TP NITNL 5F 45CM 7CM

## (undated) DEVICE — GOWN,PREVENTION PLUS,XLONG/XLARGE,STRL: Brand: MEDLINE

## (undated) DEVICE — PK HIP ORIF FX TABL 30

## (undated) DEVICE — GLV SURG TRIUMPH MICRO PF LTX 8 STRL

## (undated) DEVICE — CAPS PILLCAM ENDO SB3EX

## (undated) DEVICE — GLV SURG NEOLON 2G PF LF 7.5 STRL

## (undated) DEVICE — DORADO® PTA BALLOON DILATATION CATHETER 8 MM X 20 MM, 40 CM CATHETER: Brand: DORADO®

## (undated) DEVICE — SMARTGOWN BREATHABLE SPECIALTY GOWN: Brand: CONVERTORS

## (undated) DEVICE — DRSNG FM MEPILEX LITE ABS THN 4X4IN

## (undated) DEVICE — SINGLE-USE POLYPECTOMY SNARE: Brand: CAPTIVATOR II

## (undated) DEVICE — PINNACLE INTRODUCER SHEATH: Brand: PINNACLE

## (undated) DEVICE — PAD ENDOVASCULAR: Brand: MEDLINE INDUSTRIES, INC.

## (undated) DEVICE — SHORT LENS-STERILE

## (undated) DEVICE — SYR LL TP 10ML STRL

## (undated) DEVICE — PINNACLE R/O II INTRODUCER SHEATH WITH RADIOPAQUE MARKER: Brand: PINNACLE